# Patient Record
Sex: FEMALE | Race: WHITE | NOT HISPANIC OR LATINO | Employment: UNEMPLOYED | URBAN - NONMETROPOLITAN AREA
[De-identification: names, ages, dates, MRNs, and addresses within clinical notes are randomized per-mention and may not be internally consistent; named-entity substitution may affect disease eponyms.]

---

## 2017-01-01 ENCOUNTER — APPOINTMENT (OUTPATIENT)
Dept: CT IMAGING | Facility: HOSPITAL | Age: 72
End: 2017-01-01

## 2017-01-01 ENCOUNTER — APPOINTMENT (OUTPATIENT)
Dept: ULTRASOUND IMAGING | Facility: HOSPITAL | Age: 72
End: 2017-01-01

## 2017-01-01 ENCOUNTER — HOSPITAL ENCOUNTER (EMERGENCY)
Facility: HOSPITAL | Age: 72
Discharge: HOME | End: 2017-01-01
Attending: EMERGENCY MEDICINE | Admitting: EMERGENCY MEDICINE

## 2017-01-01 VITALS
OXYGEN SATURATION: 97 % | DIASTOLIC BLOOD PRESSURE: 74 MMHG | WEIGHT: 197 LBS | TEMPERATURE: 97.6 F | BODY MASS INDEX: 33.63 KG/M2 | HEIGHT: 64 IN | SYSTOLIC BLOOD PRESSURE: 123 MMHG | RESPIRATION RATE: 18 BRPM | HEART RATE: 62 BPM

## 2017-01-01 DIAGNOSIS — M79.7 FIBROMYALGIA: ICD-10-CM

## 2017-01-01 DIAGNOSIS — S39.012A LUMBAR STRAIN, INITIAL ENCOUNTER: Primary | ICD-10-CM

## 2017-01-01 DIAGNOSIS — N30.00 ACUTE CYSTITIS WITHOUT HEMATURIA: ICD-10-CM

## 2017-01-01 LAB
ALBUMIN SERPL-MCNC: 4 G/DL (ref 3.4–4.8)
ALBUMIN/GLOB SERPL: 1.1 G/DL (ref 1.5–2.5)
ALP SERPL-CCNC: 88 U/L (ref 46–116)
ALT SERPL W P-5'-P-CCNC: 18 U/L (ref 10–36)
AMYLASE SERPL-CCNC: 43 U/L (ref 28–100)
ANION GAP SERPL CALCULATED.3IONS-SCNC: 4.6 MMOL/L (ref 3.6–11.2)
AST SERPL-CCNC: 17 U/L (ref 10–30)
BACTERIA UR QL AUTO: ABNORMAL /HPF
BASOPHILS # BLD AUTO: 0.03 10*3/MM3 (ref 0–0.3)
BASOPHILS NFR BLD AUTO: 0.3 % (ref 0–2)
BILIRUB SERPL-MCNC: 0.2 MG/DL (ref 0.2–1.8)
BILIRUB UR QL STRIP: NEGATIVE
BUN BLD-MCNC: 20 MG/DL (ref 7–21)
BUN/CREAT SERPL: 22.5 (ref 7–25)
CALCIUM SPEC-SCNC: 9.8 MG/DL (ref 7.7–10)
CHLORIDE SERPL-SCNC: 112 MMOL/L (ref 99–112)
CLARITY UR: CLEAR
CO2 SERPL-SCNC: 25.4 MMOL/L (ref 24.3–31.9)
COLOR UR: YELLOW
CREAT BLD-MCNC: 0.89 MG/DL (ref 0.43–1.29)
DEPRECATED RDW RBC AUTO: 43.7 FL (ref 37–54)
EOSINOPHIL # BLD AUTO: 0.27 10*3/MM3 (ref 0–0.7)
EOSINOPHIL NFR BLD AUTO: 2.6 % (ref 0–7)
ERYTHROCYTE [DISTWIDTH] IN BLOOD BY AUTOMATED COUNT: 12.6 % (ref 11.5–14.5)
GFR SERPL CREATININE-BSD FRML MDRD: 62 ML/MIN/1.73
GLOBULIN UR ELPH-MCNC: 3.7 GM/DL
GLUCOSE BLD-MCNC: 98 MG/DL (ref 70–110)
GLUCOSE UR STRIP-MCNC: NEGATIVE MG/DL
HCT VFR BLD AUTO: 36.8 % (ref 37–47)
HGB BLD-MCNC: 11.9 G/DL (ref 12–16)
HGB UR QL STRIP.AUTO: NEGATIVE
HYALINE CASTS UR QL AUTO: ABNORMAL /LPF
IMM GRANULOCYTES # BLD: 0.02 10*3/MM3 (ref 0–0.03)
IMM GRANULOCYTES NFR BLD: 0.2 % (ref 0–0.5)
KETONES UR QL STRIP: NEGATIVE
LEUKOCYTE ESTERASE UR QL STRIP.AUTO: ABNORMAL
LIPASE SERPL-CCNC: 20 U/L (ref 13–60)
LYMPHOCYTES # BLD AUTO: 2.96 10*3/MM3 (ref 1–3)
LYMPHOCYTES NFR BLD AUTO: 28.6 % (ref 16–46)
MCH RBC QN AUTO: 31 PG (ref 27–33)
MCHC RBC AUTO-ENTMCNC: 32.3 G/DL (ref 33–37)
MCV RBC AUTO: 95.8 FL (ref 80–94)
MONOCYTES # BLD AUTO: 1.24 10*3/MM3 (ref 0.1–0.9)
MONOCYTES NFR BLD AUTO: 12 % (ref 0–12)
NEUTROPHILS # BLD AUTO: 5.82 10*3/MM3 (ref 1.4–6.5)
NEUTROPHILS NFR BLD AUTO: 56.3 % (ref 40–75)
NITRITE UR QL STRIP: NEGATIVE
OSMOLALITY SERPL CALC.SUM OF ELEC: 285.7 MOSM/KG (ref 273–305)
PH UR STRIP.AUTO: 7 [PH] (ref 5–8)
PLATELET # BLD AUTO: 255 10*3/MM3 (ref 130–400)
PMV BLD AUTO: 10.1 FL (ref 6–10)
POTASSIUM BLD-SCNC: 3.7 MMOL/L (ref 3.5–5.3)
PROT SERPL-MCNC: 7.7 G/DL (ref 6–8)
PROT UR QL STRIP: NEGATIVE
RBC # BLD AUTO: 3.84 10*6/MM3 (ref 4.2–5.4)
RBC # UR: ABNORMAL /HPF
REF LAB TEST METHOD: ABNORMAL
SODIUM BLD-SCNC: 142 MMOL/L (ref 135–153)
SP GR UR STRIP: 1.01 (ref 1–1.03)
SQUAMOUS #/AREA URNS HPF: ABNORMAL /HPF
UROBILINOGEN UR QL STRIP: ABNORMAL
WBC NRBC COR # BLD: 10.34 10*3/MM3 (ref 4.5–12.5)
WBC UR QL AUTO: ABNORMAL /HPF

## 2017-01-01 PROCEDURE — 87086 URINE CULTURE/COLONY COUNT: CPT | Performed by: EMERGENCY MEDICINE

## 2017-01-01 PROCEDURE — 80053 COMPREHEN METABOLIC PANEL: CPT | Performed by: EMERGENCY MEDICINE

## 2017-01-01 PROCEDURE — 25010000002 ONDANSETRON PER 1 MG: Performed by: EMERGENCY MEDICINE

## 2017-01-01 PROCEDURE — 96374 THER/PROPH/DIAG INJ IV PUSH: CPT

## 2017-01-01 PROCEDURE — 96375 TX/PRO/DX INJ NEW DRUG ADDON: CPT

## 2017-01-01 PROCEDURE — 74176 CT ABD & PELVIS W/O CONTRAST: CPT | Performed by: RADIOLOGY

## 2017-01-01 PROCEDURE — 99284 EMERGENCY DEPT VISIT MOD MDM: CPT

## 2017-01-01 PROCEDURE — 85025 COMPLETE CBC W/AUTO DIFF WBC: CPT | Performed by: EMERGENCY MEDICINE

## 2017-01-01 PROCEDURE — 83690 ASSAY OF LIPASE: CPT | Performed by: EMERGENCY MEDICINE

## 2017-01-01 PROCEDURE — 76705 ECHO EXAM OF ABDOMEN: CPT

## 2017-01-01 PROCEDURE — 81001 URINALYSIS AUTO W/SCOPE: CPT | Performed by: EMERGENCY MEDICINE

## 2017-01-01 PROCEDURE — 25010000002 MORPHINE PER 10 MG: Performed by: EMERGENCY MEDICINE

## 2017-01-01 PROCEDURE — 82150 ASSAY OF AMYLASE: CPT | Performed by: EMERGENCY MEDICINE

## 2017-01-01 PROCEDURE — 76705 ECHO EXAM OF ABDOMEN: CPT | Performed by: RADIOLOGY

## 2017-01-01 PROCEDURE — 74176 CT ABD & PELVIS W/O CONTRAST: CPT

## 2017-01-01 PROCEDURE — 25010000002 KETOROLAC TROMETHAMINE PER 15 MG: Performed by: EMERGENCY MEDICINE

## 2017-01-01 RX ORDER — GABAPENTIN 400 MG/1
400 CAPSULE ORAL 3 TIMES DAILY
Status: ON HOLD | COMMUNITY
End: 2019-11-07 | Stop reason: SDUPTHER

## 2017-01-01 RX ORDER — ACETAMINOPHEN AND CODEINE PHOSPHATE 300; 30 MG/1; MG/1
1 TABLET ORAL 2 TIMES DAILY PRN
Status: ON HOLD | COMMUNITY
End: 2017-12-22

## 2017-01-01 RX ORDER — NAPROXEN 375 MG/1
375 TABLET ORAL 2 TIMES DAILY PRN
Qty: 20 TABLET | Refills: 0 | Status: ON HOLD | OUTPATIENT
Start: 2017-01-01 | End: 2017-12-11

## 2017-01-01 RX ORDER — SULFAMETHOXAZOLE AND TRIMETHOPRIM 800; 160 MG/1; MG/1
1 TABLET ORAL ONCE
Status: COMPLETED | OUTPATIENT
Start: 2017-01-01 | End: 2017-01-01

## 2017-01-01 RX ORDER — OXYBUTYNIN CHLORIDE 5 MG/1
5 TABLET ORAL DAILY
Status: ON HOLD | COMMUNITY
End: 2017-12-11

## 2017-01-01 RX ORDER — KETOROLAC TROMETHAMINE 30 MG/ML
15 INJECTION, SOLUTION INTRAMUSCULAR; INTRAVENOUS ONCE
Status: COMPLETED | OUTPATIENT
Start: 2017-01-01 | End: 2017-01-01

## 2017-01-01 RX ORDER — LOSARTAN POTASSIUM 50 MG/1
100 TABLET ORAL DAILY
Status: ON HOLD | COMMUNITY
End: 2017-12-11

## 2017-01-01 RX ORDER — OMEPRAZOLE 20 MG/1
20 CAPSULE, DELAYED RELEASE ORAL
Status: ON HOLD | COMMUNITY
End: 2018-10-19

## 2017-01-01 RX ORDER — SULFAMETHOXAZOLE AND TRIMETHOPRIM 800; 160 MG/1; MG/1
1 TABLET ORAL 2 TIMES DAILY
Qty: 14 TABLET | Refills: 0 | Status: SHIPPED | OUTPATIENT
Start: 2017-01-01 | End: 2017-07-11

## 2017-01-01 RX ORDER — ASPIRIN 81 MG/1
81 TABLET, CHEWABLE ORAL DAILY
Status: ON HOLD | COMMUNITY
End: 2017-12-11

## 2017-01-01 RX ORDER — SODIUM CHLORIDE 0.9 % (FLUSH) 0.9 %
10 SYRINGE (ML) INJECTION AS NEEDED
Status: DISCONTINUED | OUTPATIENT
Start: 2017-01-01 | End: 2017-01-01 | Stop reason: HOSPADM

## 2017-01-01 RX ORDER — ONDANSETRON 2 MG/ML
4 INJECTION INTRAMUSCULAR; INTRAVENOUS ONCE
Status: COMPLETED | OUTPATIENT
Start: 2017-01-01 | End: 2017-01-01

## 2017-01-01 RX ORDER — TRAMADOL HYDROCHLORIDE 50 MG/1
50 TABLET ORAL 2 TIMES DAILY
Status: ON HOLD | COMMUNITY
End: 2017-12-11

## 2017-01-01 RX ORDER — RANITIDINE 150 MG/1
150 TABLET ORAL DAILY
Status: ON HOLD | COMMUNITY
End: 2017-12-11

## 2017-01-01 RX ORDER — SIMVASTATIN 20 MG
20 TABLET ORAL 2 TIMES WEEKLY
Status: ON HOLD | COMMUNITY
End: 2017-12-11

## 2017-01-01 RX ADMIN — ONDANSETRON 4 MG: 2 INJECTION, SOLUTION INTRAMUSCULAR; INTRAVENOUS at 11:12

## 2017-01-01 RX ADMIN — SULFAMETHOXAZOLE AND TRIMETHOPRIM 160 MG: 800; 160 TABLET ORAL at 15:30

## 2017-01-01 RX ADMIN — KETOROLAC TROMETHAMINE 15 MG: 30 INJECTION, SOLUTION INTRAMUSCULAR; INTRAVENOUS at 15:28

## 2017-01-01 RX ADMIN — MORPHINE SULFATE 4 MG: 4 INJECTION, SOLUTION INTRAMUSCULAR; INTRAVENOUS at 11:14

## 2017-01-01 NOTE — ED PROVIDER NOTES
Subjective   History of Present Illness  71-year-old white female presents with complaint of abdominal pain and right back pain.  She states that this started about 2 days ago with no known initiating factor.  She describes right lumbar pain which is worse with bending, making it difficult to clean herself after using the bathroom.  She also describes right lower quadrant abdominal pain.  She has nausea but denies vomiting or diarrhea.  She states that she has had some constipation.  There are no exacerbating or alleviating factors of this abdominal pain.  Review of Systems   All other systems reviewed and are negative.      Past Medical History   Diagnosis Date   • Arthritis    • Asthma    • Degenerative disc disease, lumbar    • Fibromyalgia    • Hypertension        No Known Allergies    Past Surgical History   Procedure Laterality Date   • Tubal abdominal ligation     • Appendectomy         Family History   Problem Relation Age of Onset   • Breast cancer Sister        Social History     Social History   • Marital status:      Spouse name: N/A   • Number of children: N/A   • Years of education: N/A     Social History Main Topics   • Smoking status: Former Smoker   • Smokeless tobacco: Not on file   • Alcohol use No   • Drug use: No   • Sexual activity: Defer     Other Topics Concern   • Not on file     Social History Narrative           Objective   Physical Exam   Constitutional: She is oriented to person, place, and time. She appears well-developed and well-nourished.   HENT:   Head: Normocephalic and atraumatic.   Mouth/Throat: Oropharynx is clear and moist.   Eyes: Conjunctivae are normal.   Neck: Normal range of motion. Neck supple.   Cardiovascular: Normal rate, regular rhythm and normal heart sounds.  Exam reveals no gallop and no friction rub.    No murmur heard.  Pulmonary/Chest: Effort normal and breath sounds normal. No respiratory distress. She has no wheezes. She has no rales.   Abdominal: Soft.  Bowel sounds are normal. She exhibits no distension. There is tenderness (diffusely tender, more so in the right lower quadrant.). There is no rebound and no guarding.   Musculoskeletal: She exhibits tenderness (Right lumbar tenderness with just touching the skin.). She exhibits no edema or deformity.   Neurological: She is alert and oriented to person, place, and time.   Skin: Skin is warm and dry.   Psychiatric: She has a normal mood and affect.   Nursing note and vitals reviewed.      Procedures   Results for orders placed or performed during the hospital encounter of 01/01/17   Comprehensive Metabolic Panel   Result Value Ref Range    Glucose 98 70 - 110 mg/dL    BUN 20 7 - 21 mg/dL    Creatinine 0.89 0.43 - 1.29 mg/dL    Sodium 142 135 - 153 mmol/L    Potassium 3.7 3.5 - 5.3 mmol/L    Chloride 112 99 - 112 mmol/L    CO2 25.4 24.3 - 31.9 mmol/L    Calcium 9.8 7.7 - 10.0 mg/dL    Total Protein 7.7 6.0 - 8.0 g/dL    Albumin 4.00 3.40 - 4.80 g/dL    ALT (SGPT) 18 10 - 36 U/L    AST (SGOT) 17 10 - 30 U/L    Alkaline Phosphatase 88 46 - 116 U/L    Total Bilirubin 0.2 0.2 - 1.8 mg/dL    eGFR Non African Amer 62 >60 mL/min/1.73    Globulin 3.7 gm/dL    A/G Ratio 1.1 (L) 1.5 - 2.5 g/dL    BUN/Creatinine Ratio 22.5 7.0 - 25.0    Anion Gap 4.6 3.6 - 11.2 mmol/L   Amylase   Result Value Ref Range    Amylase 43 28 - 100 U/L   Lipase   Result Value Ref Range    Lipase 20 13 - 60 U/L   Urinalysis With / Culture If Indicated   Result Value Ref Range    Color, UA Yellow Yellow, Straw    Appearance, UA Clear Clear    pH, UA 7.0 5.0 - 8.0    Specific Gravity, UA 1.007 1.005 - 1.030    Glucose, UA Negative Negative    Ketones, UA Negative Negative    Bilirubin, UA Negative Negative    Blood, UA Negative Negative    Protein, UA Negative Negative    Leuk Esterase, UA Moderate (2+) (A) Negative    Nitrite, UA Negative Negative    Urobilinogen, UA 0.2 E.U./dL 0.2 - 1.0 E.U./dL   CBC Auto Differential   Result Value Ref Range    WBC  10.34 4.50 - 12.50 10*3/mm3    RBC 3.84 (L) 4.20 - 5.40 10*6/mm3    Hemoglobin 11.9 (L) 12.0 - 16.0 g/dL    Hematocrit 36.8 (L) 37.0 - 47.0 %    MCV 95.8 (H) 80.0 - 94.0 fL    MCH 31.0 27.0 - 33.0 pg    MCHC 32.3 (L) 33.0 - 37.0 g/dL    RDW 12.6 11.5 - 14.5 %    RDW-SD 43.7 37.0 - 54.0 fl    MPV 10.1 (H) 6.0 - 10.0 fL    Platelets 255 130 - 400 10*3/mm3    Neutrophil % 56.3 40.0 - 75.0 %    Lymphocyte % 28.6 16.0 - 46.0 %    Monocyte % 12.0 0.0 - 12.0 %    Eosinophil % 2.6 0.0 - 7.0 %    Basophil % 0.3 0.0 - 2.0 %    Immature Grans % 0.2 0.0 - 0.5 %    Neutrophils, Absolute 5.82 1.40 - 6.50 10*3/mm3    Lymphocytes, Absolute 2.96 1.00 - 3.00 10*3/mm3    Monocytes, Absolute 1.24 (H) 0.10 - 0.90 10*3/mm3    Eosinophils, Absolute 0.27 0.00 - 0.70 10*3/mm3    Basophils, Absolute 0.03 0.00 - 0.30 10*3/mm3    Immature Grans, Absolute 0.02 0.00 - 0.03 10*3/mm3   Osmolality, Calculated   Result Value Ref Range    Osmolality Calc 285.7 273.0 - 305.0 mOsm/kg   Urinalysis, Microscopic Only   Result Value Ref Range    RBC, UA 0-2 (A) None Seen /HPF    WBC, UA 6-12 (A) None Seen /HPF    Bacteria, UA 1+ (A) None Seen /HPF    Squamous Epithelial Cells, UA 3-6 (A) None Seen, 0-2 /HPF    Hyaline Casts, UA None Seen None Seen /LPF    Methodology Automated Microscopy             ED Course  ED Course                  MDM  Number of Diagnoses or Management Options  Acute cystitis without hematuria:   Fibromyalgia:   Lumbar strain, initial encounter:      Amount and/or Complexity of Data Reviewed  Clinical lab tests: reviewed and ordered  Tests in the radiology section of CPT®: reviewed and ordered    Risk of Complications, Morbidity, and/or Mortality  Presenting problems: high  Diagnostic procedures: high  Management options: high        Final diagnoses:   Lumbar strain, initial encounter   Acute cystitis without hematuria   Fibromyalgia            Galo Rodgers MD  01/01/17 1524

## 2017-01-01 NOTE — ED NOTES
Pt reports worsening pain allover, requests additional pain medication, Dr. Rodgers notified.     Xuan Gill RN  01/01/17 4228

## 2017-01-03 LAB — BACTERIA SPEC AEROBE CULT: NORMAL

## 2017-01-17 ENCOUNTER — HOSPITAL ENCOUNTER (EMERGENCY)
Facility: HOSPITAL | Age: 72
Discharge: HOME | End: 2017-01-18
Attending: EMERGENCY MEDICINE | Admitting: EMERGENCY MEDICINE

## 2017-01-17 DIAGNOSIS — R39.9 UTI SYMPTOMS: ICD-10-CM

## 2017-01-17 DIAGNOSIS — S09.90XA HEAD INJURY, INITIAL ENCOUNTER: Primary | ICD-10-CM

## 2017-01-17 PROCEDURE — 93010 ELECTROCARDIOGRAM REPORT: CPT | Performed by: INTERNAL MEDICINE

## 2017-01-17 PROCEDURE — 93005 ELECTROCARDIOGRAM TRACING: CPT | Performed by: EMERGENCY MEDICINE

## 2017-01-17 PROCEDURE — 99283 EMERGENCY DEPT VISIT LOW MDM: CPT

## 2017-01-18 ENCOUNTER — APPOINTMENT (OUTPATIENT)
Dept: CT IMAGING | Facility: HOSPITAL | Age: 72
End: 2017-01-18

## 2017-01-18 ENCOUNTER — APPOINTMENT (OUTPATIENT)
Dept: GENERAL RADIOLOGY | Facility: HOSPITAL | Age: 72
End: 2017-01-18

## 2017-01-18 VITALS
HEIGHT: 64 IN | RESPIRATION RATE: 20 BRPM | HEART RATE: 78 BPM | OXYGEN SATURATION: 98 % | BODY MASS INDEX: 34.15 KG/M2 | WEIGHT: 200 LBS | TEMPERATURE: 98.9 F | DIASTOLIC BLOOD PRESSURE: 78 MMHG | SYSTOLIC BLOOD PRESSURE: 130 MMHG

## 2017-01-18 PROCEDURE — 70450 CT HEAD/BRAIN W/O DYE: CPT | Performed by: RADIOLOGY

## 2017-01-18 PROCEDURE — 73120 X-RAY EXAM OF HAND: CPT | Performed by: RADIOLOGY

## 2017-01-18 PROCEDURE — 73120 X-RAY EXAM OF HAND: CPT

## 2017-01-18 PROCEDURE — 70160 X-RAY EXAM OF NASAL BONES: CPT | Performed by: RADIOLOGY

## 2017-01-18 PROCEDURE — 70450 CT HEAD/BRAIN W/O DYE: CPT

## 2017-01-18 PROCEDURE — 70160 X-RAY EXAM OF NASAL BONES: CPT

## 2017-01-18 RX ORDER — SULFAMETHOXAZOLE AND TRIMETHOPRIM 800; 160 MG/1; MG/1
1 TABLET ORAL 2 TIMES DAILY
Qty: 14 TABLET | Refills: 0 | Status: SHIPPED | OUTPATIENT
Start: 2017-01-18 | End: 2017-01-25

## 2017-01-18 NOTE — ED PROVIDER NOTES
Subjective   Patient is a 71 y.o. female presenting with fall.   History provided by:  Patient  Fall   Mechanism of injury: fall    Injury location:  Head/neck, hand and face  Hand injury location:  R hand  Fall:     Fall occurred:  Standing    Point of impact:  Head and face  Suspicion of alcohol use: no    Suspicion of drug use: no    Associated symptoms: no abdominal pain and no chest pain        Review of Systems   Constitutional: Negative.  Negative for fever.   HENT: Negative.         Pain nose   Respiratory: Negative.    Cardiovascular: Negative.  Negative for chest pain.   Gastrointestinal: Negative.  Negative for abdominal pain.   Endocrine: Negative.    Genitourinary: Negative.  Negative for dysuria.   Musculoskeletal:        Pain right hand   Skin: Negative.    Neurological: Negative.    Psychiatric/Behavioral: Negative.    All other systems reviewed and are negative.      Past Medical History   Diagnosis Date   • Arthritis    • Asthma    • Degenerative disc disease, lumbar    • Fibromyalgia    • Hypertension        No Known Allergies    Past Surgical History   Procedure Laterality Date   • Tubal abdominal ligation     • Appendectomy         Family History   Problem Relation Age of Onset   • Breast cancer Sister        Social History     Social History   • Marital status:      Spouse name: N/A   • Number of children: N/A   • Years of education: N/A     Social History Main Topics   • Smoking status: Former Smoker   • Smokeless tobacco: None   • Alcohol use No   • Drug use: No   • Sexual activity: Defer     Other Topics Concern   • None     Social History Narrative           Objective   Physical Exam   Constitutional: She is oriented to person, place, and time. She appears well-developed and well-nourished. No distress.   HENT:   Head: Normocephalic and atraumatic.   Right Ear: External ear normal.   Left Ear: External ear normal.   Nose: Nose normal.   Abrasion bridge of nose   Eyes: Conjunctivae  and EOM are normal. Pupils are equal, round, and reactive to light.   Neck: Normal range of motion. Neck supple. No JVD present. No tracheal deviation present.   Cardiovascular: Normal rate, regular rhythm and normal heart sounds.    No murmur heard.  Pulmonary/Chest: Effort normal and breath sounds normal. No respiratory distress. She has no wheezes.   Abdominal: Soft. Bowel sounds are normal. There is no tenderness.   Musculoskeletal: Normal range of motion. She exhibits no edema or deformity.   temder right hand   Neurological: She is alert and oriented to person, place, and time. No cranial nerve deficit.   Skin: Skin is warm and dry. No rash noted. She is not diaphoretic. No erythema. No pallor.   Psychiatric: She has a normal mood and affect. Her behavior is normal. Thought content normal.   Nursing note and vitals reviewed.      Procedures         ED Course  ED Course                  MDM  Number of Diagnoses or Management Options     Amount and/or Complexity of Data Reviewed  Clinical lab tests: ordered and reviewed  Tests in the radiology section of CPT®: ordered and reviewed    Risk of Complications, Morbidity, and/or Mortality  Presenting problems: moderate  Diagnostic procedures: moderate  Management options: moderate    Patient Progress  Patient progress: stable      Final diagnoses:   Head injury, initial encounter   UTI symptoms            Gabo Lovett MD  01/19/17 8429

## 2017-02-17 ENCOUNTER — TRANSCRIBE ORDERS (OUTPATIENT)
Dept: ADMINISTRATIVE | Facility: HOSPITAL | Age: 72
End: 2017-02-17

## 2017-02-17 DIAGNOSIS — N63.0 BREAST MASS: Primary | ICD-10-CM

## 2017-02-22 ENCOUNTER — APPOINTMENT (OUTPATIENT)
Dept: MAMMOGRAPHY | Facility: HOSPITAL | Age: 72
End: 2017-02-22

## 2017-03-02 ENCOUNTER — HOSPITAL ENCOUNTER (OUTPATIENT)
Dept: MAMMOGRAPHY | Facility: HOSPITAL | Age: 72
Discharge: HOME | End: 2017-03-02
Admitting: NURSE PRACTITIONER

## 2017-03-02 ENCOUNTER — HOSPITAL ENCOUNTER (OUTPATIENT)
Dept: ULTRASOUND IMAGING | Facility: HOSPITAL | Age: 72
Discharge: HOME | End: 2017-03-02

## 2017-03-02 DIAGNOSIS — N63.20 LUMP OF LEFT BREAST: ICD-10-CM

## 2017-03-02 DIAGNOSIS — N63.0 BREAST MASS: ICD-10-CM

## 2017-03-02 PROCEDURE — G0204 DX MAMMO INCL CAD BI: HCPCS | Performed by: RADIOLOGY

## 2017-03-02 PROCEDURE — G0279 TOMOSYNTHESIS, MAMMO: HCPCS | Performed by: RADIOLOGY

## 2017-03-02 PROCEDURE — G0279 TOMOSYNTHESIS, MAMMO: HCPCS

## 2017-03-02 PROCEDURE — G0204 DX MAMMO INCL CAD BI: HCPCS

## 2017-03-02 PROCEDURE — 76642 ULTRASOUND BREAST LIMITED: CPT

## 2017-03-02 PROCEDURE — 76642 ULTRASOUND BREAST LIMITED: CPT | Performed by: RADIOLOGY

## 2017-03-12 ENCOUNTER — APPOINTMENT (OUTPATIENT)
Dept: GENERAL RADIOLOGY | Facility: HOSPITAL | Age: 72
End: 2017-03-12

## 2017-03-12 ENCOUNTER — HOSPITAL ENCOUNTER (EMERGENCY)
Facility: HOSPITAL | Age: 72
Discharge: HOME | End: 2017-03-12
Attending: EMERGENCY MEDICINE | Admitting: EMERGENCY MEDICINE

## 2017-03-12 ENCOUNTER — APPOINTMENT (OUTPATIENT)
Dept: CT IMAGING | Facility: HOSPITAL | Age: 72
End: 2017-03-12

## 2017-03-12 VITALS
BODY MASS INDEX: 33.46 KG/M2 | HEIGHT: 64 IN | WEIGHT: 196 LBS | DIASTOLIC BLOOD PRESSURE: 58 MMHG | RESPIRATION RATE: 18 BRPM | OXYGEN SATURATION: 99 % | HEART RATE: 79 BPM | TEMPERATURE: 97.9 F | SYSTOLIC BLOOD PRESSURE: 116 MMHG

## 2017-03-12 DIAGNOSIS — S80.01XA CONTUSION OF RIGHT KNEE, INITIAL ENCOUNTER: ICD-10-CM

## 2017-03-12 DIAGNOSIS — S01.81XA FOREHEAD LACERATION, INITIAL ENCOUNTER: ICD-10-CM
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
## 2017-03-12 LAB
ALBUMIN SERPL-MCNC: 3.6 G/DL (ref 3.4–4.8)
ALBUMIN/GLOB SERPL: 1 G/DL (ref 1.5–2.5)
ALP SERPL-CCNC: 81 U/L (ref 35–104)
ALT SERPL W P-5'-P-CCNC: 15 U/L (ref 10–36)
ANION GAP SERPL CALCULATED.3IONS-SCNC: 5.1 MMOL/L (ref 3.6–11.2)
AST SERPL-CCNC: 16 U/L (ref 10–30)
BACTERIA UR QL AUTO: ABNORMAL /HPF
BASOPHILS # BLD AUTO: 0.02 10*3/MM3 (ref 0–0.3)
BASOPHILS NFR BLD AUTO: 0.2 % (ref 0–2)
BILIRUB SERPL-MCNC: 0.1 MG/DL (ref 0.2–1.8)
BILIRUB UR QL STRIP: NEGATIVE
BUN BLD-MCNC: 24 MG/DL (ref 7–21)
BUN/CREAT SERPL: 24.7 (ref 7–25)
CALCIUM SPEC-SCNC: 9.2 MG/DL (ref 7.7–10)
CHLORIDE SERPL-SCNC: 111 MMOL/L (ref 99–112)
CLARITY UR: CLEAR
CO2 SERPL-SCNC: 22.9 MMOL/L (ref 24.3–31.9)
COLOR UR: YELLOW
CREAT BLD-MCNC: 0.97 MG/DL (ref 0.43–1.29)
DEPRECATED RDW RBC AUTO: 44.2 FL (ref 37–54)
EOSINOPHIL # BLD AUTO: 0.21 10*3/MM3 (ref 0–0.7)
EOSINOPHIL NFR BLD AUTO: 2.2 % (ref 0–7)
ERYTHROCYTE [DISTWIDTH] IN BLOOD BY AUTOMATED COUNT: 12.7 % (ref 11.5–14.5)
GFR SERPL CREATININE-BSD FRML MDRD: 56 ML/MIN/1.73
GLOBULIN UR ELPH-MCNC: 3.5 GM/DL
GLUCOSE BLD-MCNC: 98 MG/DL (ref 70–110)
GLUCOSE UR STRIP-MCNC: NEGATIVE MG/DL
HCT VFR BLD AUTO: 33.7 % (ref 37–47)
HGB BLD-MCNC: 11 G/DL (ref 12–16)
HGB UR QL STRIP.AUTO: NEGATIVE
IMM GRANULOCYTES # BLD: 0.02 10*3/MM3 (ref 0–0.03)
IMM GRANULOCYTES NFR BLD: 0.2 % (ref 0–0.5)
KETONES UR QL STRIP: NEGATIVE
LEUKOCYTE ESTERASE UR QL STRIP.AUTO: ABNORMAL
LYMPHOCYTES # BLD AUTO: 2.44 10*3/MM3 (ref 1–3)
LYMPHOCYTES NFR BLD AUTO: 25.2 % (ref 16–46)
MCH RBC QN AUTO: 31.2 PG (ref 27–33)
MCHC RBC AUTO-ENTMCNC: 32.6 G/DL (ref 33–37)
MCV RBC AUTO: 95.5 FL (ref 80–94)
MONOCYTES # BLD AUTO: 1.31 10*3/MM3 (ref 0.1–0.9)
MONOCYTES NFR BLD AUTO: 13.5 % (ref 0–12)
NEUTROPHILS # BLD AUTO: 5.68 10*3/MM3 (ref 1.4–6.5)
NEUTROPHILS NFR BLD AUTO: 58.7 % (ref 40–75)
NITRITE UR QL STRIP: NEGATIVE
OSMOLALITY SERPL CALC.SUM OF ELEC: 281.6 MOSM/KG (ref 273–305)
PH UR STRIP.AUTO: 6.5 [PH] (ref 5–8)
PLATELET # BLD AUTO: 197 10*3/MM3 (ref 130–400)
PMV BLD AUTO: 10.3 FL (ref 6–10)
POTASSIUM BLD-SCNC: 3.7 MMOL/L (ref 3.5–5.3)
PROT SERPL-MCNC: 7.1 G/DL (ref 6–8)
PROT UR QL STRIP: NEGATIVE
RBC # BLD AUTO: 3.53 10*6/MM3 (ref 4.2–5.4)
RBC # UR: ABNORMAL /HPF
REF LAB TEST METHOD: ABNORMAL
SODIUM BLD-SCNC: 139 MMOL/L (ref 135–153)
SP GR UR STRIP: 1.01 (ref 1–1.03)
SQUAMOUS #/AREA URNS HPF: ABNORMAL /HPF
TROPONIN I SERPL-MCNC: <0.006 NG/ML
UROBILINOGEN UR QL STRIP: ABNORMAL
WBC NRBC COR # BLD: 9.68 10*3/MM3 (ref 4.5–12.5)
WBC UR QL AUTO: ABNORMAL /HPF

## 2017-03-12 PROCEDURE — 90471 IMMUNIZATION ADMIN: CPT | Performed by: PHYSICIAN ASSISTANT

## 2017-03-12 PROCEDURE — 36415 COLL VENOUS BLD VENIPUNCTURE: CPT

## 2017-03-12 PROCEDURE — 73564 X-RAY EXAM KNEE 4 OR MORE: CPT

## 2017-03-12 PROCEDURE — 93005 ELECTROCARDIOGRAM TRACING: CPT | Performed by: PHYSICIAN ASSISTANT

## 2017-03-12 PROCEDURE — 73562 X-RAY EXAM OF KNEE 3: CPT | Performed by: RADIOLOGY

## 2017-03-12 PROCEDURE — 70450 CT HEAD/BRAIN W/O DYE: CPT

## 2017-03-12 PROCEDURE — 73502 X-RAY EXAM HIP UNI 2-3 VIEWS: CPT

## 2017-03-12 PROCEDURE — 84484 ASSAY OF TROPONIN QUANT: CPT | Performed by: PHYSICIAN ASSISTANT

## 2017-03-12 PROCEDURE — 87086 URINE CULTURE/COLONY COUNT: CPT | Performed by: PHYSICIAN ASSISTANT

## 2017-03-12 PROCEDURE — 25010000002 TDAP 5-2.5-18.5 LF-MCG/0.5 SUSPENSION: Performed by: PHYSICIAN ASSISTANT

## 2017-03-12 PROCEDURE — 90715 TDAP VACCINE 7 YRS/> IM: CPT | Performed by: PHYSICIAN ASSISTANT

## 2017-03-12 PROCEDURE — 85025 COMPLETE CBC W/AUTO DIFF WBC: CPT | Performed by: PHYSICIAN ASSISTANT

## 2017-03-12 PROCEDURE — 99284 EMERGENCY DEPT VISIT MOD MDM: CPT

## 2017-03-12 PROCEDURE — 80053 COMPREHEN METABOLIC PANEL: CPT | Performed by: PHYSICIAN ASSISTANT

## 2017-03-12 PROCEDURE — 73502 X-RAY EXAM HIP UNI 2-3 VIEWS: CPT | Performed by: RADIOLOGY

## 2017-03-12 PROCEDURE — 81001 URINALYSIS AUTO W/SCOPE: CPT | Performed by: PHYSICIAN ASSISTANT

## 2017-03-12 PROCEDURE — 70450 CT HEAD/BRAIN W/O DYE: CPT | Performed by: RADIOLOGY

## 2017-03-12 RX ORDER — LIDOCAINE HYDROCHLORIDE 10 MG/ML
10 INJECTION, SOLUTION EPIDURAL; INFILTRATION; INTRACAUDAL; PERINEURAL ONCE
Status: COMPLETED | OUTPATIENT
Start: 2017-03-12 | End: 2017-03-12

## 2017-03-12 RX ORDER — ACETAMINOPHEN AND CODEINE PHOSPHATE 300; 30 MG/1; MG/1
1 TABLET ORAL ONCE
Status: COMPLETED | OUTPATIENT
Start: 2017-03-12 | End: 2017-03-12

## 2017-03-12 RX ADMIN — LIDOCAINE HYDROCHLORIDE 10 ML: 10 INJECTION, SOLUTION EPIDURAL; INFILTRATION; INTRACAUDAL; PERINEURAL at 08:23

## 2017-03-12 RX ADMIN — TETANUS TOXOID, REDUCED DIPHTHERIA TOXOID AND ACELLULAR PERTUSSIS VACCINE, ADSORBED 0.5 ML: 5; 2.5; 8; 8; 2.5 SUSPENSION INTRAMUSCULAR at 09:39

## 2017-03-12 RX ADMIN — ACETAMINOPHEN AND CODEINE PHOSPHATE 1 TABLET: 30; 300 TABLET ORAL at 07:31

## 2017-03-12 NOTE — ED PROVIDER NOTES
Subjective   HPI Comments: 71-year-old female who states she fell this morning.  She was getting up to get her water and got dizzy and fell into a door.  She states she hit her forehead on the door handle.  She denies any syncope or loss of consciousness.  She states she stays dizzy all the time.  She is also complaining of right knee pain since her fall.  Patient denies any new chest pain or abdominal pain.  She states she chronically has chest pain and abdominal pain but no new symptoms.  She denies any nausea or vomiting.  She denies any change in her vision.    Patient is a 71 y.o. female presenting with fall.   History provided by:  Patient  Fall   Mechanism of injury: fall    Injury location:  Head/neck and leg  Head/neck injury location:  Head  Leg injury location:  R knee  Incident location:  Home  Time since incident:  1 hour  Arrived directly from scene: yes    Fall:     Fall occurred:  Walking    Impact surface: door handle.    Point of impact:  Head    Entrapped after fall: no    Suspicion of alcohol use: no    Suspicion of drug use: no    Tetanus status:  Unknown  Prior to arrival data:     Blood loss:  Minimal    Responsiveness at scene:  Alert    Orientation at scene:  Person, place, situation and time    Loss of consciousness: no      Amnesic to event: no      Airway interventions:  None    Breathing interventions:  None    IV access status:  None    Cardiac interventions:  None    Medications administered:  None    Immobilization:  None  Associated symptoms: no abdominal pain, no back pain, no blindness, no chest pain, no difficulty breathing, no headaches, no hearing loss, no loss of consciousness, no nausea, no neck pain, no seizures and no vomiting        Review of Systems   HENT: Negative for hearing loss.    Eyes: Negative.  Negative for blindness.   Respiratory: Negative.    Cardiovascular: Negative for chest pain.   Gastrointestinal: Negative for abdominal pain, nausea and vomiting.    Genitourinary: Negative.    Musculoskeletal: Positive for arthralgias. Negative for back pain and neck pain.   Skin: Positive for wound.   Neurological: Positive for dizziness. Negative for seizures, loss of consciousness and headaches.   Psychiatric/Behavioral: Negative.    All other systems reviewed and are negative.      Past Medical History   Diagnosis Date   • Arthritis    • Asthma    • Degenerative disc disease, lumbar    • Fibromyalgia    • Hypertension        No Known Allergies    Past Surgical History   Procedure Laterality Date   • Tubal abdominal ligation     • Appendectomy         Family History   Problem Relation Age of Onset   • Breast cancer Sister        Social History     Social History   • Marital status:      Spouse name: N/A   • Number of children: N/A   • Years of education: N/A     Social History Main Topics   • Smoking status: Former Smoker   • Smokeless tobacco: None   • Alcohol use No   • Drug use: No   • Sexual activity: Defer     Other Topics Concern   • None     Social History Narrative   • None           Objective   Physical Exam   Constitutional: She is oriented to person, place, and time. She appears well-developed and well-nourished. No distress.   HENT:   Head: Normocephalic.   Right Ear: External ear normal.   Left Ear: External ear normal.   Nose: Nose normal.   Mouth/Throat: Oropharynx is clear and moist.   Laceration to the forehead   Eyes: Conjunctivae and EOM are normal. Pupils are equal, round, and reactive to light.   Neck: Normal range of motion. Neck supple.   Cardiovascular: Normal rate, regular rhythm, normal heart sounds and intact distal pulses.    Pulmonary/Chest: Effort normal and breath sounds normal. No respiratory distress.   Abdominal: Soft. Bowel sounds are normal. She exhibits no distension. There is no tenderness. There is no rebound and no guarding.   Musculoskeletal: Normal range of motion. She exhibits tenderness (right knee). She exhibits no edema  or deformity.   Neurological: She is alert and oriented to person, place, and time. No cranial nerve deficit. She exhibits normal muscle tone. Coordination normal.   Skin: Skin is warm and dry. She is not diaphoretic.   Psychiatric: She has a normal mood and affect. Her behavior is normal. Judgment and thought content normal.   Nursing note and vitals reviewed.      Laceration Repair  Date/Time: 3/12/2017 9:01 AM  Performed by: CHIQUI ROBBINS  Authorized by: SHANTAL VEGA   Body area: head/neck  Location details: forehead  Laceration length: 6 cm  Foreign bodies: no foreign bodies  Tendon involvement: none  Nerve involvement: none  Vascular damage: no  Anesthesia: local infiltration    Anesthesia:  Anesthesia: local infiltration  Local Anesthetic: lidocaine 1% without epinephrine   Anesthetic total: 10 mL  Sedation:  Patient sedated: no    Preparation: Patient was prepped and draped in the usual sterile fashion.  Amount of cleaning: standard  Debridement: none  Degree of undermining: none  Skin closure: 5-0 nylon and Ethilon  Number of sutures: 10  Technique: simple  Approximation: close  Approximation difficulty: simple  Patient tolerance: Patient tolerated the procedure well with no immediate complications               ED Course  ED Course   Value Comment By Time   ECG 12 Lead 7:45 - sinus rhythm, rate of 66, no acute ischemia per RYAN Barker 03/12 8877    Pt's laceration has been repaired, she tolerated it well.  She is currently drinking a Pepsi, no acute distress.  Awaiting UA results. RYAN Gill 03/12 0120                  MDM  Number of Diagnoses or Management Options  Contusion of right knee, initial encounter:   Fall at home, initial encounter:   Forehead laceration, initial encounter:      Amount and/or Complexity of Data Reviewed  Clinical lab tests: reviewed and ordered  Tests in the radiology section of CPT®: ordered and reviewed  Independent visualization of images,  tracings, or specimens: yes    Patient Progress  Patient progress: improved      Final diagnoses:   Fall at home, initial encounter   Forehead laceration, initial encounter   Contusion of right knee, initial encounter            RYAN Gill  03/12/17 0923

## 2017-03-12 NOTE — ED NOTES
Ekg performed by tech and shown to Dr. Souza at 0745 on 03/12/2017.     Fartun Villa  03/12/17 0748

## 2017-03-12 NOTE — DISCHARGE INSTRUCTIONS
Have sutures removed in 1 week.  Keep the wound clean and dry, apply neosporin (antibiotic ointment) twice a day.    Hypertension  Hypertension, commonly called high blood pressure, is when the force of blood pumping through your arteries is too strong. Your arteries are the blood vessels that carry blood from your heart throughout your body. A blood pressure reading consists of a higher number over a lower number, such as 110/72. The higher number (systolic) is the pressure inside your arteries when your heart pumps. The lower number (diastolic) is the pressure inside your arteries when your heart relaxes. Ideally you want your blood pressure below 120/80.  Hypertension forces your heart to work harder to pump blood. Your arteries may become narrow or stiff. Having untreated or uncontrolled hypertension can cause heart attack, stroke, kidney disease, and other problems.  RISK FACTORS  Some risk factors for high blood pressure are controllable. Others are not.   Risk factors you cannot control include:   · Race. You may be at higher risk if you are .  · Age. Risk increases with age.  · Gender. Men are at higher risk than women before age 45 years. After age 65, women are at higher risk than men.  Risk factors you can control include:  · Not getting enough exercise or physical activity.  · Being overweight.  · Getting too much fat, sugar, calories, or salt in your diet.  · Drinking too much alcohol.  SIGNS AND SYMPTOMS  Hypertension does not usually cause signs or symptoms. Extremely high blood pressure (hypertensive crisis) may cause headache, anxiety, shortness of breath, and nosebleed.  DIAGNOSIS  To check if you have hypertension, your health care provider will measure your blood pressure while you are seated, with your arm held at the level of your heart. It should be measured at least twice using the same arm. Certain conditions can cause a difference in blood pressure between your right and  left arms. A blood pressure reading that is higher than normal on one occasion does not mean that you need treatment. If it is not clear whether you have high blood pressure, you may be asked to return on a different day to have your blood pressure checked again. Or, you may be asked to monitor your blood pressure at home for 1 or more weeks.  TREATMENT  Treating high blood pressure includes making lifestyle changes and possibly taking medicine. Living a healthy lifestyle can help lower high blood pressure. You may need to change some of your habits.  Lifestyle changes may include:  · Following the DASH diet. This diet is high in fruits, vegetables, and whole grains. It is low in salt, red meat, and added sugars.  · Keep your sodium intake below 2,300 mg per day.  · Getting at least 30-45 minutes of aerobic exercise at least 4 times per week.  · Losing weight if necessary.  · Not smoking.  · Limiting alcoholic beverages.  · Learning ways to reduce stress.  Your health care provider may prescribe medicine if lifestyle changes are not enough to get your blood pressure under control, and if one of the following is true:  · You are 18-59 years of age and your systolic blood pressure is above 140.  · You are 60 years of age or older, and your systolic blood pressure is above 150.  · Your diastolic blood pressure is above 90.  · You have diabetes, and your systolic blood pressure is over 140 or your diastolic blood pressure is over 90.  · You have kidney disease and your blood pressure is above 140/90.  · You have heart disease and your blood pressure is above 140/90.  Your personal target blood pressure may vary depending on your medical conditions, your age, and other factors.  HOME CARE INSTRUCTIONS  · Have your blood pressure rechecked as directed by your health care provider.    · Take medicines only as directed by your health care provider. Follow the directions carefully. Blood pressure medicines must be taken as  prescribed. The medicine does not work as well when you skip doses. Skipping doses also puts you at risk for problems.  · Do not smoke.    · Monitor your blood pressure at home as directed by your health care provider.   SEEK MEDICAL CARE IF:   · You think you are having a reaction to medicines taken.  · You have recurrent headaches or feel dizzy.  · You have swelling in your ankles.  · You have trouble with your vision.  SEEK IMMEDIATE MEDICAL CARE IF:  · You develop a severe headache or confusion.  · You have unusual weakness, numbness, or feel faint.  · You have severe chest or abdominal pain.  · You vomit repeatedly.  · You have trouble breathing.  MAKE SURE YOU:   · Understand these instructions.  · Will watch your condition.  · Will get help right away if you are not doing well or get worse.     This information is not intended to replace advice given to you by your health care provider. Make sure you discuss any questions you have with your health care provider.     Document Released: 12/18/2006 Document Revised: 05/03/2016 Document Reviewed: 10/10/2014  [employer] Interactive Patient Education ©2016 [employer]

## 2017-03-14 LAB — BACTERIA SPEC AEROBE CULT: NORMAL

## 2017-04-03 ENCOUNTER — TRANSCRIBE ORDERS (OUTPATIENT)
Dept: ADMINISTRATIVE | Facility: HOSPITAL | Age: 72
End: 2017-04-03

## 2017-04-03 DIAGNOSIS — E11.42 TYPE 2 DIABETES, CONTROLLED, WITH PERIPHERAL NEUROPATHY (HCC): ICD-10-CM

## 2017-04-03 DIAGNOSIS — E55.9 VITAMIN D DEFICIENCY: Primary | ICD-10-CM

## 2017-04-03 DIAGNOSIS — E78.5 DYSLIPIDEMIA: ICD-10-CM

## 2017-04-03 DIAGNOSIS — D51.8 VITAMIN B12 DEFICIENCY (DIETARY) ANEMIA: ICD-10-CM

## 2017-04-03 DIAGNOSIS — I10 ESSENTIAL HYPERTENSION: ICD-10-CM

## 2017-04-03 DIAGNOSIS — R53.82 CHRONIC FATIGUE, UNSPECIFIED: ICD-10-CM

## 2017-04-04 ENCOUNTER — LAB (OUTPATIENT)
Dept: LAB | Facility: HOSPITAL | Age: 72
End: 2017-04-04
Attending: INTERNAL MEDICINE

## 2017-04-04 DIAGNOSIS — I10 ESSENTIAL HYPERTENSION: ICD-10-CM

## 2017-04-04 DIAGNOSIS — R53.82 CHRONIC FATIGUE, UNSPECIFIED: ICD-10-CM

## 2017-04-04 DIAGNOSIS — E11.42 TYPE 2 DIABETES, CONTROLLED, WITH PERIPHERAL NEUROPATHY (HCC): ICD-10-CM

## 2017-04-04 DIAGNOSIS — D51.8 VITAMIN B12 DEFICIENCY (DIETARY) ANEMIA: ICD-10-CM

## 2017-04-04 DIAGNOSIS — E55.9 VITAMIN D DEFICIENCY: ICD-10-CM

## 2017-04-04 DIAGNOSIS — E78.5 DYSLIPIDEMIA: ICD-10-CM

## 2017-04-04 LAB
25(OH)D3 SERPL-MCNC: 25 NG/ML
ALBUMIN SERPL-MCNC: 4.1 G/DL (ref 3.4–4.8)
ALBUMIN/GLOB SERPL: 1.2 G/DL (ref 1.5–2.5)
ALP SERPL-CCNC: 83 U/L (ref 35–104)
ALT SERPL W P-5'-P-CCNC: 13 U/L (ref 10–36)
ANION GAP SERPL CALCULATED.3IONS-SCNC: 4.8 MMOL/L (ref 3.6–11.2)
AST SERPL-CCNC: 15 U/L (ref 10–30)
BASOPHILS # BLD AUTO: 0.02 10*3/MM3 (ref 0–0.3)
BASOPHILS NFR BLD AUTO: 0.2 % (ref 0–2)
BILIRUB SERPL-MCNC: 0.2 MG/DL (ref 0.2–1.8)
BUN BLD-MCNC: 24 MG/DL (ref 7–21)
BUN/CREAT SERPL: 28.6 (ref 7–25)
CALCIUM SPEC-SCNC: 9.5 MG/DL (ref 7.7–10)
CHLORIDE SERPL-SCNC: 112 MMOL/L (ref 99–112)
CHOLEST SERPL-MCNC: 211 MG/DL (ref 0–200)
CO2 SERPL-SCNC: 26.2 MMOL/L (ref 24.3–31.9)
CREAT BLD-MCNC: 0.84 MG/DL (ref 0.43–1.29)
DEPRECATED RDW RBC AUTO: 43.1 FL (ref 37–54)
EOSINOPHIL # BLD AUTO: 0.25 10*3/MM3 (ref 0–0.7)
EOSINOPHIL NFR BLD AUTO: 3.1 % (ref 0–7)
ERYTHROCYTE [DISTWIDTH] IN BLOOD BY AUTOMATED COUNT: 12.8 % (ref 11.5–14.5)
FOLATE SERPL-MCNC: 23.3 NG/ML (ref 5.4–20)
GFR SERPL CREATININE-BSD FRML MDRD: 67 ML/MIN/1.73
GLOBULIN UR ELPH-MCNC: 3.3 GM/DL
GLUCOSE BLD-MCNC: 102 MG/DL (ref 70–110)
HBA1C MFR BLD: 5.7 % (ref 4.5–5.7)
HCT VFR BLD AUTO: 35.8 % (ref 37–47)
HDLC SERPL-MCNC: 46 MG/DL (ref 60–100)
HGB BLD-MCNC: 11.3 G/DL (ref 12–16)
IMM GRANULOCYTES # BLD: 0.01 10*3/MM3 (ref 0–0.03)
IMM GRANULOCYTES NFR BLD: 0.1 % (ref 0–0.5)
LDLC SERPL CALC-MCNC: 104 MG/DL (ref 0–100)
LDLC/HDLC SERPL: 2.25 {RATIO}
LYMPHOCYTES # BLD AUTO: 2.36 10*3/MM3 (ref 1–3)
LYMPHOCYTES NFR BLD AUTO: 28.9 % (ref 16–46)
MCH RBC QN AUTO: 30.8 PG (ref 27–33)
MCHC RBC AUTO-ENTMCNC: 31.6 G/DL (ref 33–37)
MCV RBC AUTO: 97.5 FL (ref 80–94)
MONOCYTES # BLD AUTO: 0.93 10*3/MM3 (ref 0.1–0.9)
MONOCYTES NFR BLD AUTO: 11.4 % (ref 0–12)
NEUTROPHILS # BLD AUTO: 4.59 10*3/MM3 (ref 1.4–6.5)
NEUTROPHILS NFR BLD AUTO: 56.3 % (ref 40–75)
OSMOLALITY SERPL CALC.SUM OF ELEC: 289.2 MOSM/KG (ref 273–305)
PLATELET # BLD AUTO: 240 10*3/MM3 (ref 130–400)
PMV BLD AUTO: 10 FL (ref 6–10)
POTASSIUM BLD-SCNC: 4 MMOL/L (ref 3.5–5.3)
PROT SERPL-MCNC: 7.4 G/DL (ref 6–8)
RBC # BLD AUTO: 3.67 10*6/MM3 (ref 4.2–5.4)
SODIUM BLD-SCNC: 143 MMOL/L (ref 135–153)
TRIGL SERPL-MCNC: 307 MG/DL (ref 0–150)
VIT B12 BLD-MCNC: 616 PG/ML (ref 211–911)
VLDLC SERPL-MCNC: 61.4 MG/DL
WBC NRBC COR # BLD: 8.16 10*3/MM3 (ref 4.5–12.5)

## 2017-04-04 PROCEDURE — 36415 COLL VENOUS BLD VENIPUNCTURE: CPT

## 2017-04-04 PROCEDURE — 82607 VITAMIN B-12: CPT | Performed by: INTERNAL MEDICINE

## 2017-04-04 PROCEDURE — 83036 HEMOGLOBIN GLYCOSYLATED A1C: CPT | Performed by: INTERNAL MEDICINE

## 2017-04-04 PROCEDURE — 82306 VITAMIN D 25 HYDROXY: CPT | Performed by: INTERNAL MEDICINE

## 2017-04-04 PROCEDURE — 82746 ASSAY OF FOLIC ACID SERUM: CPT | Performed by: INTERNAL MEDICINE

## 2017-04-04 PROCEDURE — 85025 COMPLETE CBC W/AUTO DIFF WBC: CPT | Performed by: INTERNAL MEDICINE

## 2017-04-04 PROCEDURE — 80061 LIPID PANEL: CPT | Performed by: INTERNAL MEDICINE

## 2017-04-04 PROCEDURE — 80053 COMPREHEN METABOLIC PANEL: CPT | Performed by: INTERNAL MEDICINE

## 2017-07-03 ENCOUNTER — TRANSCRIBE ORDERS (OUTPATIENT)
Dept: ADMINISTRATIVE | Facility: HOSPITAL | Age: 72
End: 2017-07-03

## 2017-07-03 DIAGNOSIS — R10.13 EPIGASTRIC ABDOMINAL PAIN: Primary | ICD-10-CM

## 2017-07-06 ENCOUNTER — APPOINTMENT (OUTPATIENT)
Dept: ULTRASOUND IMAGING | Facility: HOSPITAL | Age: 72
End: 2017-07-06
Attending: INTERNAL MEDICINE

## 2017-07-11 ENCOUNTER — APPOINTMENT (OUTPATIENT)
Dept: CT IMAGING | Facility: HOSPITAL | Age: 72
End: 2017-07-11

## 2017-07-11 ENCOUNTER — HOSPITAL ENCOUNTER (EMERGENCY)
Facility: HOSPITAL | Age: 72
Discharge: HOME | End: 2017-07-11
Attending: EMERGENCY MEDICINE | Admitting: EMERGENCY MEDICINE

## 2017-07-11 ENCOUNTER — APPOINTMENT (OUTPATIENT)
Dept: GENERAL RADIOLOGY | Facility: HOSPITAL | Age: 72
End: 2017-07-11

## 2017-07-11 VITALS
BODY MASS INDEX: 36.25 KG/M2 | DIASTOLIC BLOOD PRESSURE: 73 MMHG | WEIGHT: 197 LBS | HEIGHT: 62 IN | OXYGEN SATURATION: 99 % | TEMPERATURE: 98.4 F | RESPIRATION RATE: 17 BRPM | SYSTOLIC BLOOD PRESSURE: 125 MMHG | HEART RATE: 73 BPM

## 2017-07-11 DIAGNOSIS — S62.631A CLOSED DISPLACED FRACTURE OF DISTAL PHALANX OF LEFT INDEX FINGER, INITIAL ENCOUNTER: Primary | ICD-10-CM

## 2017-07-11 PROCEDURE — 73130 X-RAY EXAM OF HAND: CPT

## 2017-07-11 PROCEDURE — 70450 CT HEAD/BRAIN W/O DYE: CPT

## 2017-07-11 PROCEDURE — 99283 EMERGENCY DEPT VISIT LOW MDM: CPT

## 2017-07-11 PROCEDURE — 73130 X-RAY EXAM OF HAND: CPT | Performed by: RADIOLOGY

## 2017-07-11 PROCEDURE — 70480 CT ORBIT/EAR/FOSSA W/O DYE: CPT

## 2017-07-11 PROCEDURE — 70450 CT HEAD/BRAIN W/O DYE: CPT | Performed by: RADIOLOGY

## 2017-07-11 PROCEDURE — 70480 CT ORBIT/EAR/FOSSA W/O DYE: CPT | Performed by: RADIOLOGY

## 2017-07-11 RX ORDER — HYDROCODONE BITARTRATE AND ACETAMINOPHEN 5; 325 MG/1; MG/1
1 TABLET ORAL ONCE
Status: COMPLETED | OUTPATIENT
Start: 2017-07-11 | End: 2017-07-11

## 2017-07-11 RX ORDER — HYDROCODONE BITARTRATE AND ACETAMINOPHEN 5; 325 MG/1; MG/1
TABLET ORAL
Status: COMPLETED
Start: 2017-07-11 | End: 2017-07-11

## 2017-07-11 RX ORDER — HYDROCODONE BITARTRATE AND ACETAMINOPHEN 5; 325 MG/1; MG/1
1 TABLET ORAL EVERY 6 HOURS PRN
Qty: 10 TABLET | Refills: 0 | Status: ON HOLD | OUTPATIENT
Start: 2017-07-11 | End: 2017-12-11

## 2017-07-11 RX ADMIN — HYDROCODONE BITARTRATE AND ACETAMINOPHEN 1 TABLET: 5; 325 TABLET ORAL at 08:37

## 2017-07-11 NOTE — DISCHARGE INSTRUCTIONS

## 2017-07-11 NOTE — ED PROVIDER NOTES
Subjective   Patient is a 71 y.o. female presenting with hand injury.   Hand Injury   Location:  Hand  Hand location:  L hand  Injury: yes    Time since incident:  1 day  Mechanism of injury: fall    Fall:     Fall occurred:  Tripped    Impact surface:  [place]    Point of impact:  Hands    Entrapped after fall: no    Pain details:     Quality:  Aching    Severity:  Moderate    Onset quality:  Sudden    Timing:  Constant  Foreign body present:  No foreign bodies  Associated symptoms: no fever    Associated symptoms comment:  She reports that she felt that her finger was dislocated and she reduced it.      Review of Systems   Constitutional: Negative.  Negative for fever.   HENT: Negative.    Respiratory: Negative.    Cardiovascular: Negative.  Negative for chest pain.   Gastrointestinal: Negative.  Negative for abdominal pain.   Endocrine: Negative.    Genitourinary: Negative.  Negative for dysuria.   Skin: Negative.    Neurological: Negative.    Psychiatric/Behavioral: Negative.    All other systems reviewed and are negative.      Past Medical History:   Diagnosis Date   • Arthritis    • Asthma    • Degenerative disc disease, lumbar    • Diabetes mellitus    • Fibromyalgia    • GERD (gastroesophageal reflux disease)    • Hyperlipidemia    • Hypertension    • Osteoporosis        No Known Allergies    Past Surgical History:   Procedure Laterality Date   • APPENDECTOMY     • ESOPHAGEAL DILATATION     • TOTAL SHOULDER REPLACEMENT Bilateral    • TUBAL ABDOMINAL LIGATION         Family History   Problem Relation Age of Onset   • Breast cancer Sister        Social History     Social History   • Marital status:      Spouse name: N/A   • Number of children: N/A   • Years of education: N/A     Social History Main Topics   • Smoking status: Former Smoker   • Smokeless tobacco: None   • Alcohol use No   • Drug use: No   • Sexual activity: Defer     Other Topics Concern   • None     Social History Narrative   • None            Objective   Physical Exam   Constitutional: She is oriented to person, place, and time. She appears well-developed and well-nourished. No distress.   HENT:   Head: Normocephalic and atraumatic.   Right Ear: External ear normal.   Left Ear: External ear normal.   Nose: Nose normal.   Eyes: Conjunctivae and EOM are normal. Pupils are equal, round, and reactive to light.   There is left superior periorbital swelling with some bruising and tenderness.  I do not appreciate any drop-offs.   Neck: Normal range of motion. Neck supple. No JVD present. No tracheal deviation present.   Cardiovascular: Normal rate, regular rhythm and normal heart sounds.    No murmur heard.  Pulmonary/Chest: Effort normal and breath sounds normal. No respiratory distress. She has no wheezes.   Abdominal: Soft. Bowel sounds are normal. There is no tenderness.   Musculoskeletal: Normal range of motion. She exhibits tenderness (losing and swelling over patient's index and middle finger on her left hand.  ). She exhibits no edema or deformity.   Neurological: She is alert and oriented to person, place, and time. No cranial nerve deficit.   Skin: Skin is warm and dry. No rash noted. She is not diaphoretic. No erythema. No pallor.   Psychiatric: She has a normal mood and affect. Her behavior is normal. Thought content normal.   Nursing note and vitals reviewed.      Splint application  Date/Time: 7/11/2017 9:36 AM  Performed by: NOAH DOUGLAS  Authorized by: ISAAC MIGUEL   Location details: left index finger  Splint type: static finger  Supplies used: aluminum splint  Post-procedure: The splinted body part was neurovascularly unchanged following the procedure.  Patient tolerance: Patient tolerated the procedure well with no immediate complications               ED Course  ED Course                  MDM  Number of Diagnoses or Management Options  Closed displaced fracture of distal phalanx of left index finger, initial  encounter: new and requires workup     Amount and/or Complexity of Data Reviewed  Tests in the radiology section of CPT®: ordered and reviewed  Discuss the patient with other providers: yes    Risk of Complications, Morbidity, and/or Mortality  Presenting problems: moderate        Final diagnoses:   Closed displaced fracture of distal phalanx of left index finger, initial encounter            RYAN Daniel  07/11/17 1609

## 2017-07-31 ENCOUNTER — TRANSCRIBE ORDERS (OUTPATIENT)
Dept: ADMINISTRATIVE | Facility: HOSPITAL | Age: 72
End: 2017-07-31

## 2017-07-31 ENCOUNTER — LAB (OUTPATIENT)
Dept: LAB | Facility: HOSPITAL | Age: 72
End: 2017-07-31
Attending: INTERNAL MEDICINE

## 2017-07-31 DIAGNOSIS — E11.9 DIABETES MELLITUS WITHOUT COMPLICATION (HCC): ICD-10-CM

## 2017-07-31 DIAGNOSIS — E78.5 HYPERLIPIDEMIA, UNSPECIFIED HYPERLIPIDEMIA TYPE: ICD-10-CM

## 2017-07-31 DIAGNOSIS — I10 ESSENTIAL HYPERTENSION, MALIGNANT: Primary | ICD-10-CM

## 2017-07-31 DIAGNOSIS — R53.82 CHRONIC FATIGUE, UNSPECIFIED: ICD-10-CM

## 2017-07-31 DIAGNOSIS — D51.8 ANEMIA OF DECREASED VITAMIN B12 ABSORPTION: ICD-10-CM

## 2017-07-31 DIAGNOSIS — I10 ESSENTIAL HYPERTENSION, MALIGNANT: ICD-10-CM

## 2017-07-31 LAB
ALBUMIN SERPL-MCNC: 4.3 G/DL (ref 3.4–4.8)
ALBUMIN/GLOB SERPL: 1.1 G/DL (ref 1.5–2.5)
ALP SERPL-CCNC: 103 U/L (ref 35–104)
ALT SERPL W P-5'-P-CCNC: 15 U/L (ref 10–36)
ANION GAP SERPL CALCULATED.3IONS-SCNC: 7.9 MMOL/L (ref 3.6–11.2)
AST SERPL-CCNC: 23 U/L (ref 10–30)
BASOPHILS # BLD AUTO: 0.02 10*3/MM3 (ref 0–0.3)
BASOPHILS NFR BLD AUTO: 0.2 % (ref 0–2)
BILIRUB SERPL-MCNC: 0.3 MG/DL (ref 0.2–1.8)
BUN BLD-MCNC: 25 MG/DL (ref 7–21)
BUN/CREAT SERPL: 28.1 (ref 7–25)
CALCIUM SPEC-SCNC: 9.8 MG/DL (ref 7.7–10)
CHLORIDE SERPL-SCNC: 114 MMOL/L (ref 99–112)
CHOLEST SERPL-MCNC: 249 MG/DL (ref 0–200)
CO2 SERPL-SCNC: 21.1 MMOL/L (ref 24.3–31.9)
CREAT BLD-MCNC: 0.89 MG/DL (ref 0.43–1.29)
DEPRECATED RDW RBC AUTO: 42 FL (ref 37–54)
EOSINOPHIL # BLD AUTO: 0.18 10*3/MM3 (ref 0–0.7)
EOSINOPHIL NFR BLD AUTO: 1.8 % (ref 0–7)
ERYTHROCYTE [DISTWIDTH] IN BLOOD BY AUTOMATED COUNT: 12.5 % (ref 11.5–14.5)
FOLATE SERPL-MCNC: >24 NG/ML (ref 5.4–20)
GFR SERPL CREATININE-BSD FRML MDRD: 62 ML/MIN/1.73
GLOBULIN UR ELPH-MCNC: 3.8 GM/DL
GLUCOSE BLD-MCNC: 108 MG/DL (ref 70–110)
HBA1C MFR BLD: 5.8 % (ref 4.5–5.7)
HCT VFR BLD AUTO: 37.8 % (ref 37–47)
HDLC SERPL-MCNC: 53 MG/DL (ref 60–100)
HGB BLD-MCNC: 12.3 G/DL (ref 12–16)
IMM GRANULOCYTES # BLD: 0.01 10*3/MM3 (ref 0–0.03)
IMM GRANULOCYTES NFR BLD: 0.1 % (ref 0–0.5)
LDLC SERPL CALC-MCNC: 173 MG/DL (ref 0–100)
LDLC/HDLC SERPL: 3.26 {RATIO}
LYMPHOCYTES # BLD AUTO: 2 10*3/MM3 (ref 1–3)
LYMPHOCYTES NFR BLD AUTO: 20.3 % (ref 16–46)
MCH RBC QN AUTO: 30.9 PG (ref 27–33)
MCHC RBC AUTO-ENTMCNC: 32.5 G/DL (ref 33–37)
MCV RBC AUTO: 95 FL (ref 80–94)
MONOCYTES # BLD AUTO: 0.85 10*3/MM3 (ref 0.1–0.9)
MONOCYTES NFR BLD AUTO: 8.6 % (ref 0–12)
NEUTROPHILS # BLD AUTO: 6.81 10*3/MM3 (ref 1.4–6.5)
NEUTROPHILS NFR BLD AUTO: 69 % (ref 40–75)
OSMOLALITY SERPL CALC.SUM OF ELEC: 289.9 MOSM/KG (ref 273–305)
PLATELET # BLD AUTO: 239 10*3/MM3 (ref 130–400)
PMV BLD AUTO: 9.9 FL (ref 6–10)
POTASSIUM BLD-SCNC: 4 MMOL/L (ref 3.5–5.3)
PROT SERPL-MCNC: 8.1 G/DL (ref 6–8)
RBC # BLD AUTO: 3.98 10*6/MM3 (ref 4.2–5.4)
SODIUM BLD-SCNC: 143 MMOL/L (ref 135–153)
TRIGL SERPL-MCNC: 115 MG/DL (ref 0–150)
VIT B12 BLD-MCNC: 633 PG/ML (ref 211–911)
VLDLC SERPL-MCNC: 23 MG/DL
WBC NRBC COR # BLD: 9.87 10*3/MM3 (ref 4.5–12.5)

## 2017-07-31 PROCEDURE — 82746 ASSAY OF FOLIC ACID SERUM: CPT | Performed by: INTERNAL MEDICINE

## 2017-07-31 PROCEDURE — 80053 COMPREHEN METABOLIC PANEL: CPT | Performed by: INTERNAL MEDICINE

## 2017-07-31 PROCEDURE — 82607 VITAMIN B-12: CPT | Performed by: INTERNAL MEDICINE

## 2017-07-31 PROCEDURE — 83036 HEMOGLOBIN GLYCOSYLATED A1C: CPT | Performed by: INTERNAL MEDICINE

## 2017-07-31 PROCEDURE — 36415 COLL VENOUS BLD VENIPUNCTURE: CPT

## 2017-07-31 PROCEDURE — 85025 COMPLETE CBC W/AUTO DIFF WBC: CPT | Performed by: INTERNAL MEDICINE

## 2017-07-31 PROCEDURE — 80061 LIPID PANEL: CPT | Performed by: INTERNAL MEDICINE

## 2017-09-25 ENCOUNTER — EPISODE CHANGES (OUTPATIENT)
Dept: CASE MANAGEMENT | Facility: OTHER | Age: 72
End: 2017-09-25

## 2017-10-17 ENCOUNTER — HOSPITAL ENCOUNTER (OUTPATIENT)
Dept: MAMMOGRAPHY | Facility: HOSPITAL | Age: 72
Discharge: HOME | End: 2017-10-17
Attending: RADIOLOGY | Admitting: RADIOLOGY

## 2017-10-17 DIAGNOSIS — Z09 FOLLOW-UP EXAM, 3-6 MONTHS SINCE PREVIOUS EXAM: ICD-10-CM

## 2017-10-17 PROCEDURE — G0279 TOMOSYNTHESIS, MAMMO: HCPCS

## 2017-10-17 PROCEDURE — G0206 DX MAMMO INCL CAD UNI: HCPCS | Performed by: RADIOLOGY

## 2017-10-17 PROCEDURE — G0279 TOMOSYNTHESIS, MAMMO: HCPCS | Performed by: RADIOLOGY

## 2017-10-17 PROCEDURE — G0206 DX MAMMO INCL CAD UNI: HCPCS

## 2017-11-30 ENCOUNTER — LAB (OUTPATIENT)
Dept: LAB | Facility: HOSPITAL | Age: 72
End: 2017-11-30
Attending: INTERNAL MEDICINE

## 2017-11-30 ENCOUNTER — TRANSCRIBE ORDERS (OUTPATIENT)
Dept: ADMINISTRATIVE | Facility: HOSPITAL | Age: 72
End: 2017-11-30

## 2017-11-30 DIAGNOSIS — G93.32 CHRONIC FATIGUE SYNDROME: ICD-10-CM

## 2017-11-30 DIAGNOSIS — E11.9 DIABETES MELLITUS WITH NO COMPLICATION (HCC): ICD-10-CM

## 2017-11-30 DIAGNOSIS — E55.9 VITAMIN D DEFICIENCY: ICD-10-CM

## 2017-11-30 DIAGNOSIS — I10 ESSENTIAL HYPERTENSION, MALIGNANT: ICD-10-CM

## 2017-11-30 DIAGNOSIS — M25.50 PAIN IN JOINT, MULTIPLE SITES: ICD-10-CM

## 2017-11-30 DIAGNOSIS — E55.9 VITAMIN D DEFICIENCY: Primary | ICD-10-CM

## 2017-11-30 LAB
25(OH)D3 SERPL-MCNC: 29 NG/ML
ALBUMIN SERPL-MCNC: 4 G/DL (ref 3.4–4.8)
ALBUMIN/GLOB SERPL: 1.1 G/DL (ref 1.5–2.5)
ALP SERPL-CCNC: 95 U/L (ref 35–104)
ALT SERPL W P-5'-P-CCNC: 19 U/L (ref 10–36)
ANION GAP SERPL CALCULATED.3IONS-SCNC: 5.1 MMOL/L (ref 3.6–11.2)
AST SERPL-CCNC: 22 U/L (ref 10–30)
BASOPHILS # BLD AUTO: 0.03 10*3/MM3 (ref 0–0.3)
BASOPHILS NFR BLD AUTO: 0.3 % (ref 0–2)
BILIRUB SERPL-MCNC: 0.4 MG/DL (ref 0.2–1.8)
BUN BLD-MCNC: 17 MG/DL (ref 7–21)
BUN/CREAT SERPL: 17.3 (ref 7–25)
CALCIUM SPEC-SCNC: 9.2 MG/DL (ref 7.7–10)
CHLORIDE SERPL-SCNC: 116 MMOL/L (ref 99–112)
CHROMATIN AB SERPL-ACNC: 3 IU/ML (ref 0–14)
CK SERPL-CCNC: 89 U/L (ref 24–173)
CO2 SERPL-SCNC: 22.9 MMOL/L (ref 24.3–31.9)
CREAT BLD-MCNC: 0.98 MG/DL (ref 0.43–1.29)
CRP SERPL-MCNC: 1.04 MG/DL (ref 0–0.99)
DEPRECATED RDW RBC AUTO: 42.6 FL (ref 37–54)
EOSINOPHIL # BLD AUTO: 0.31 10*3/MM3 (ref 0–0.7)
EOSINOPHIL NFR BLD AUTO: 3.2 % (ref 0–7)
ERYTHROCYTE [DISTWIDTH] IN BLOOD BY AUTOMATED COUNT: 12.6 % (ref 11.5–14.5)
GFR SERPL CREATININE-BSD FRML MDRD: 56 ML/MIN/1.73
GLOBULIN UR ELPH-MCNC: 3.5 GM/DL
GLUCOSE BLD-MCNC: 94 MG/DL (ref 70–110)
HBA1C MFR BLD: 6 % (ref 4.5–5.7)
HCT VFR BLD AUTO: 36.4 % (ref 37–47)
HGB BLD-MCNC: 11.7 G/DL (ref 12–16)
IMM GRANULOCYTES # BLD: 0.01 10*3/MM3 (ref 0–0.03)
IMM GRANULOCYTES NFR BLD: 0.1 % (ref 0–0.5)
LYMPHOCYTES # BLD AUTO: 2.19 10*3/MM3 (ref 1–3)
LYMPHOCYTES NFR BLD AUTO: 22.8 % (ref 16–46)
MCH RBC QN AUTO: 31 PG (ref 27–33)
MCHC RBC AUTO-ENTMCNC: 32.1 G/DL (ref 33–37)
MCV RBC AUTO: 96.3 FL (ref 80–94)
MONOCYTES # BLD AUTO: 1.02 10*3/MM3 (ref 0.1–0.9)
MONOCYTES NFR BLD AUTO: 10.6 % (ref 0–12)
NEUTROPHILS # BLD AUTO: 6.03 10*3/MM3 (ref 1.4–6.5)
NEUTROPHILS NFR BLD AUTO: 63 % (ref 40–75)
OSMOLALITY SERPL CALC.SUM OF ELEC: 288.1 MOSM/KG (ref 273–305)
PLATELET # BLD AUTO: 255 10*3/MM3 (ref 130–400)
PMV BLD AUTO: 9.6 FL (ref 6–10)
POTASSIUM BLD-SCNC: 3.6 MMOL/L (ref 3.5–5.3)
PROT SERPL-MCNC: 7.5 G/DL (ref 6–8)
RBC # BLD AUTO: 3.78 10*6/MM3 (ref 4.2–5.4)
SODIUM BLD-SCNC: 144 MMOL/L (ref 135–153)
WBC NRBC COR # BLD: 9.59 10*3/MM3 (ref 4.5–12.5)

## 2017-11-30 PROCEDURE — 86140 C-REACTIVE PROTEIN: CPT

## 2017-11-30 PROCEDURE — 85025 COMPLETE CBC W/AUTO DIFF WBC: CPT

## 2017-11-30 PROCEDURE — 80053 COMPREHEN METABOLIC PANEL: CPT

## 2017-11-30 PROCEDURE — 82306 VITAMIN D 25 HYDROXY: CPT

## 2017-11-30 PROCEDURE — 36415 COLL VENOUS BLD VENIPUNCTURE: CPT

## 2017-11-30 PROCEDURE — 86200 CCP ANTIBODY: CPT

## 2017-11-30 PROCEDURE — 82550 ASSAY OF CK (CPK): CPT

## 2017-11-30 PROCEDURE — 83036 HEMOGLOBIN GLYCOSYLATED A1C: CPT

## 2017-11-30 PROCEDURE — 86431 RHEUMATOID FACTOR QUANT: CPT

## 2017-12-01 LAB — CCP IGA+IGG SERPL IA-ACNC: 7 UNITS (ref 0–19)

## 2017-12-11 ENCOUNTER — APPOINTMENT (OUTPATIENT)
Dept: GENERAL RADIOLOGY | Facility: HOSPITAL | Age: 72
End: 2017-12-11

## 2017-12-11 ENCOUNTER — APPOINTMENT (OUTPATIENT)
Dept: CT IMAGING | Facility: HOSPITAL | Age: 72
End: 2017-12-11

## 2017-12-11 ENCOUNTER — HOSPITAL ENCOUNTER (INPATIENT)
Facility: HOSPITAL | Age: 72
LOS: 8 days | Discharge: NURSING, REHAB OR LONG-TERM CARE | End: 2017-12-19
Attending: FAMILY MEDICINE | Admitting: INTERNAL MEDICINE

## 2017-12-11 DIAGNOSIS — R41.82 ALTERED MENTAL STATUS, UNSPECIFIED ALTERED MENTAL STATUS TYPE: ICD-10-CM

## 2017-12-11 DIAGNOSIS — J18.9 PNEUMONIA OF LEFT LOWER LOBE DUE TO INFECTIOUS ORGANISM: Primary | ICD-10-CM

## 2017-12-11 LAB
6-ACETYL MORPHINE: NEGATIVE
A-A DO2: 46.5 MMHG (ref 0–300)
ALBUMIN SERPL-MCNC: 4.2 G/DL (ref 3.4–4.8)
ALBUMIN/GLOB SERPL: 1.2 G/DL (ref 1.5–2.5)
ALP SERPL-CCNC: 102 U/L (ref 35–104)
ALT SERPL W P-5'-P-CCNC: 15 U/L (ref 10–36)
AMPHET+METHAMPHET UR QL: NEGATIVE
ANION GAP SERPL CALCULATED.3IONS-SCNC: 8.2 MMOL/L (ref 3.6–11.2)
ARTERIAL PATENCY WRIST A: POSITIVE
AST SERPL-CCNC: 20 U/L (ref 10–30)
ATMOSPHERIC PRESS: 728 MMHG
BACTERIA UR QL AUTO: ABNORMAL /HPF
BARBITURATES UR QL SCN: NEGATIVE
BASE EXCESS BLDA CALC-SCNC: -3.6 MMOL/L
BASOPHILS # BLD AUTO: 0.01 10*3/MM3 (ref 0–0.3)
BASOPHILS NFR BLD AUTO: 0.1 % (ref 0–2)
BDY SITE: ABNORMAL
BENZODIAZ UR QL SCN: POSITIVE
BILIRUB SERPL-MCNC: 0.4 MG/DL (ref 0.2–1.8)
BILIRUB UR QL STRIP: NEGATIVE
BODY TEMPERATURE: 98.6 C
BUN BLD-MCNC: 14 MG/DL (ref 7–21)
BUN/CREAT SERPL: 16.5 (ref 7–25)
BUPRENORPHINE SERPL-MCNC: NEGATIVE NG/ML
CALCIUM SPEC-SCNC: 9.4 MG/DL (ref 7.7–10)
CANNABINOIDS SERPL QL: NEGATIVE
CHLORIDE SERPL-SCNC: 111 MMOL/L (ref 99–112)
CLARITY UR: ABNORMAL
CO2 SERPL-SCNC: 20.8 MMOL/L (ref 24.3–31.9)
COCAINE UR QL: NEGATIVE
COHGB MFR BLD: 1.6 % (ref 0–5)
COLOR UR: YELLOW
CREAT BLD-MCNC: 0.85 MG/DL (ref 0.43–1.29)
D DIMER PPP FEU-MCNC: 1.07 MCGFEU/ML (ref 0–0.5)
D-LACTATE SERPL-SCNC: 0.9 MMOL/L (ref 0.5–2)
DEPRECATED RDW RBC AUTO: 42.5 FL (ref 37–54)
EOSINOPHIL # BLD AUTO: 0.03 10*3/MM3 (ref 0–0.7)
EOSINOPHIL NFR BLD AUTO: 0.2 % (ref 0–7)
ERYTHROCYTE [DISTWIDTH] IN BLOOD BY AUTOMATED COUNT: 12.5 % (ref 11.5–14.5)
ETHANOL BLD-MCNC: <10 MG/DL
ETHANOL UR QL: <0.01 %
GFR SERPL CREATININE-BSD FRML MDRD: 66 ML/MIN/1.73
GLOBULIN UR ELPH-MCNC: 3.6 GM/DL
GLUCOSE BLD-MCNC: 133 MG/DL (ref 70–110)
GLUCOSE BLDC GLUCOMTR-MCNC: 128 MG/DL (ref 70–130)
GLUCOSE BLDC GLUCOMTR-MCNC: 134 MG/DL (ref 70–130)
GLUCOSE UR STRIP-MCNC: NEGATIVE MG/DL
HCO3 BLDA-SCNC: 19.4 MMOL/L (ref 22–26)
HCT VFR BLD AUTO: 37.9 % (ref 37–47)
HCT VFR BLD CALC: 40 % (ref 37–47)
HGB BLD-MCNC: 12.8 G/DL (ref 12–16)
HGB BLDA-MCNC: 13.7 G/DL (ref 12–16)
HGB UR QL STRIP.AUTO: ABNORMAL
HOLD SPECIMEN: NORMAL
HOLD SPECIMEN: NORMAL
HOROWITZ INDEX BLD+IHG-RTO: 21 %
HYALINE CASTS UR QL AUTO: ABNORMAL /LPF
IMM GRANULOCYTES # BLD: 0.03 10*3/MM3 (ref 0–0.03)
IMM GRANULOCYTES NFR BLD: 0.2 % (ref 0–0.5)
INR PPP: 0.98 (ref 0.9–1.1)
KETONES UR QL STRIP: NEGATIVE
LEUKOCYTE ESTERASE UR QL STRIP.AUTO: NEGATIVE
LYMPHOCYTES # BLD AUTO: 0.86 10*3/MM3 (ref 1–3)
LYMPHOCYTES NFR BLD AUTO: 5.1 % (ref 16–46)
MAGNESIUM SERPL-MCNC: 2 MG/DL (ref 1.7–2.6)
MCH RBC QN AUTO: 32.6 PG (ref 27–33)
MCHC RBC AUTO-ENTMCNC: 33.8 G/DL (ref 33–37)
MCV RBC AUTO: 96.4 FL (ref 80–94)
METHADONE UR QL SCN: NEGATIVE
METHGB BLD QL: 0.3 % (ref 0–3)
MODALITY: ABNORMAL
MONOCYTES # BLD AUTO: 1.66 10*3/MM3 (ref 0.1–0.9)
MONOCYTES NFR BLD AUTO: 9.9 % (ref 0–12)
NEUTROPHILS # BLD AUTO: 14.16 10*3/MM3 (ref 1.4–6.5)
NEUTROPHILS NFR BLD AUTO: 84.5 % (ref 40–75)
NITRITE UR QL STRIP: NEGATIVE
OPIATES UR QL: POSITIVE
OSMOLALITY SERPL CALC.SUM OF ELEC: 281.8 MOSM/KG (ref 273–305)
OXYCODONE UR QL SCN: NEGATIVE
OXYHGB MFR BLDV: 90 % (ref 85–100)
PCO2 BLDA: 29.9 MM HG (ref 35–45)
PCP UR QL SCN: NEGATIVE
PH BLDA: 7.43 PH UNITS (ref 7.35–7.45)
PH UR STRIP.AUTO: 8 [PH] (ref 5–8)
PLATELET # BLD AUTO: 203 10*3/MM3 (ref 130–400)
PMV BLD AUTO: 10.2 FL (ref 6–10)
PO2 BLDA: 60.7 MM HG (ref 80–100)
POTASSIUM BLD-SCNC: 3.8 MMOL/L (ref 3.5–5.3)
PROT SERPL-MCNC: 7.8 G/DL (ref 6–8)
PROT UR QL STRIP: NEGATIVE
PROTHROMBIN TIME: 13.1 SECONDS (ref 11–15.4)
RBC # BLD AUTO: 3.93 10*6/MM3 (ref 4.2–5.4)
RBC # UR: ABNORMAL /HPF
REF LAB TEST METHOD: ABNORMAL
SAO2 % BLDCOA: 91.7 % (ref 90–100)
SODIUM BLD-SCNC: 140 MMOL/L (ref 135–153)
SP GR UR STRIP: 1.01 (ref 1–1.03)
SQUAMOUS #/AREA URNS HPF: ABNORMAL /HPF
UROBILINOGEN UR QL STRIP: ABNORMAL
WBC NRBC COR # BLD: 16.75 10*3/MM3 (ref 4.5–12.5)
WBC UR QL AUTO: ABNORMAL /HPF
WHOLE BLOOD HOLD SPECIMEN: NORMAL
WHOLE BLOOD HOLD SPECIMEN: NORMAL

## 2017-12-11 PROCEDURE — 25010000002 AZITHROMYCIN: Performed by: FAMILY MEDICINE

## 2017-12-11 PROCEDURE — 80307 DRUG TEST PRSMV CHEM ANLYZR: CPT | Performed by: FAMILY MEDICINE

## 2017-12-11 PROCEDURE — 85025 COMPLETE CBC W/AUTO DIFF WBC: CPT | Performed by: FAMILY MEDICINE

## 2017-12-11 PROCEDURE — 83735 ASSAY OF MAGNESIUM: CPT | Performed by: FAMILY MEDICINE

## 2017-12-11 PROCEDURE — 81001 URINALYSIS AUTO W/SCOPE: CPT | Performed by: FAMILY MEDICINE

## 2017-12-11 PROCEDURE — 94640 AIRWAY INHALATION TREATMENT: CPT

## 2017-12-11 PROCEDURE — 83050 HGB METHEMOGLOBIN QUAN: CPT | Performed by: FAMILY MEDICINE

## 2017-12-11 PROCEDURE — 36600 WITHDRAWAL OF ARTERIAL BLOOD: CPT | Performed by: FAMILY MEDICINE

## 2017-12-11 PROCEDURE — 70450 CT HEAD/BRAIN W/O DYE: CPT

## 2017-12-11 PROCEDURE — 82375 ASSAY CARBOXYHB QUANT: CPT | Performed by: FAMILY MEDICINE

## 2017-12-11 PROCEDURE — 93005 ELECTROCARDIOGRAM TRACING: CPT | Performed by: INTERNAL MEDICINE

## 2017-12-11 PROCEDURE — 36415 COLL VENOUS BLD VENIPUNCTURE: CPT

## 2017-12-11 PROCEDURE — 83605 ASSAY OF LACTIC ACID: CPT | Performed by: FAMILY MEDICINE

## 2017-12-11 PROCEDURE — 84145 PROCALCITONIN (PCT): CPT | Performed by: FAMILY MEDICINE

## 2017-12-11 PROCEDURE — 82962 GLUCOSE BLOOD TEST: CPT

## 2017-12-11 PROCEDURE — 70450 CT HEAD/BRAIN W/O DYE: CPT | Performed by: RADIOLOGY

## 2017-12-11 PROCEDURE — 85610 PROTHROMBIN TIME: CPT | Performed by: FAMILY MEDICINE

## 2017-12-11 PROCEDURE — 85379 FIBRIN DEGRADATION QUANT: CPT | Performed by: INTERNAL MEDICINE

## 2017-12-11 PROCEDURE — 25010000002 ENOXAPARIN PER 10 MG: Performed by: INTERNAL MEDICINE

## 2017-12-11 PROCEDURE — 87040 BLOOD CULTURE FOR BACTERIA: CPT | Performed by: FAMILY MEDICINE

## 2017-12-11 PROCEDURE — 80053 COMPREHEN METABOLIC PANEL: CPT | Performed by: FAMILY MEDICINE

## 2017-12-11 PROCEDURE — 71010 HC CHEST PA OR AP: CPT

## 2017-12-11 PROCEDURE — 71010 XR CHEST 1 VW: CPT | Performed by: RADIOLOGY

## 2017-12-11 PROCEDURE — 94799 UNLISTED PULMONARY SVC/PX: CPT

## 2017-12-11 PROCEDURE — 25010000002 CEFTRIAXONE: Performed by: FAMILY MEDICINE

## 2017-12-11 PROCEDURE — 82805 BLOOD GASES W/O2 SATURATION: CPT | Performed by: FAMILY MEDICINE

## 2017-12-11 PROCEDURE — 99285 EMERGENCY DEPT VISIT HI MDM: CPT

## 2017-12-11 RX ORDER — OXYBUTYNIN CHLORIDE 10 MG/1
10 TABLET, EXTENDED RELEASE ORAL DAILY
COMMUNITY

## 2017-12-11 RX ORDER — POLYETHYLENE GLYCOL 3350 17 G/17G
17 POWDER, FOR SOLUTION ORAL DAILY
Status: DISCONTINUED | OUTPATIENT
Start: 2017-12-12 | End: 2017-12-19 | Stop reason: HOSPADM

## 2017-12-11 RX ORDER — CYCLOSPORINE 0.5 MG/ML
1 EMULSION OPHTHALMIC 2 TIMES DAILY
Status: ON HOLD | COMMUNITY
End: 2018-10-19

## 2017-12-11 RX ORDER — TOPIRAMATE 100 MG/1
100 TABLET, FILM COATED ORAL 2 TIMES DAILY
Status: CANCELLED | OUTPATIENT
Start: 2017-12-11

## 2017-12-11 RX ORDER — CELECOXIB 200 MG/1
200 CAPSULE ORAL 2 TIMES DAILY PRN
Status: DISCONTINUED | OUTPATIENT
Start: 2017-12-11 | End: 2017-12-19 | Stop reason: HOSPADM

## 2017-12-11 RX ORDER — SODIUM CHLORIDE 0.9 % (FLUSH) 0.9 %
10 SYRINGE (ML) INJECTION AS NEEDED
Status: DISCONTINUED | OUTPATIENT
Start: 2017-12-11 | End: 2017-12-19 | Stop reason: HOSPADM

## 2017-12-11 RX ORDER — OXYBUTYNIN CHLORIDE 5 MG/1
10 TABLET, EXTENDED RELEASE ORAL DAILY
Status: CANCELLED | OUTPATIENT
Start: 2017-12-11

## 2017-12-11 RX ORDER — OXYBUTYNIN CHLORIDE 5 MG/1
10 TABLET, EXTENDED RELEASE ORAL DAILY
Status: DISCONTINUED | OUTPATIENT
Start: 2017-12-11 | End: 2017-12-19 | Stop reason: HOSPADM

## 2017-12-11 RX ORDER — AMLODIPINE BESYLATE 5 MG/1
5 TABLET ORAL
Status: DISCONTINUED | OUTPATIENT
Start: 2017-12-11 | End: 2017-12-19 | Stop reason: HOSPADM

## 2017-12-11 RX ORDER — CELECOXIB 200 MG/1
200 CAPSULE ORAL 2 TIMES DAILY
Status: CANCELLED | OUTPATIENT
Start: 2017-12-11

## 2017-12-11 RX ORDER — BACLOFEN 10 MG/1
10 TABLET ORAL NIGHTLY
COMMUNITY
End: 2018-10-31 | Stop reason: HOSPADM

## 2017-12-11 RX ORDER — GABAPENTIN 400 MG/1
400 CAPSULE ORAL 3 TIMES DAILY
Status: CANCELLED | OUTPATIENT
Start: 2017-12-11

## 2017-12-11 RX ORDER — PANTOPRAZOLE SODIUM 40 MG/1
40 TABLET, DELAYED RELEASE ORAL EVERY MORNING
Status: CANCELLED | OUTPATIENT
Start: 2017-12-12

## 2017-12-11 RX ORDER — ACETAMINOPHEN AND CODEINE PHOSPHATE 300; 30 MG/1; MG/1
1 TABLET ORAL EVERY 4 HOURS PRN
Status: DISCONTINUED | OUTPATIENT
Start: 2017-12-11 | End: 2017-12-19 | Stop reason: HOSPADM

## 2017-12-11 RX ORDER — LOSARTAN POTASSIUM 50 MG/1
100 TABLET ORAL DAILY
Status: CANCELLED | OUTPATIENT
Start: 2017-12-11

## 2017-12-11 RX ORDER — TOPIRAMATE 100 MG/1
100 TABLET, FILM COATED ORAL EVERY 12 HOURS SCHEDULED
Status: DISCONTINUED | OUTPATIENT
Start: 2017-12-11 | End: 2017-12-19 | Stop reason: HOSPADM

## 2017-12-11 RX ORDER — ASPIRIN 81 MG/1
81 TABLET ORAL DAILY
COMMUNITY
End: 2018-10-31 | Stop reason: HOSPADM

## 2017-12-11 RX ORDER — ALPRAZOLAM 0.5 MG/1
0.5 TABLET ORAL 2 TIMES DAILY PRN
Status: DISCONTINUED | OUTPATIENT
Start: 2017-12-11 | End: 2017-12-19 | Stop reason: HOSPADM

## 2017-12-11 RX ORDER — DULOXETIN HYDROCHLORIDE 60 MG/1
60 CAPSULE, DELAYED RELEASE ORAL DAILY
Status: CANCELLED | OUTPATIENT
Start: 2017-12-11

## 2017-12-11 RX ORDER — LOSARTAN POTASSIUM 100 MG/1
100 TABLET ORAL DAILY
Status: ON HOLD | COMMUNITY
End: 2018-10-19

## 2017-12-11 RX ORDER — DULOXETIN HYDROCHLORIDE 60 MG/1
60 CAPSULE, DELAYED RELEASE ORAL DAILY
Status: DISCONTINUED | OUTPATIENT
Start: 2017-12-11 | End: 2017-12-19 | Stop reason: HOSPADM

## 2017-12-11 RX ORDER — ASPIRIN 81 MG/1
81 TABLET ORAL DAILY
Status: DISCONTINUED | OUTPATIENT
Start: 2017-12-12 | End: 2017-12-19 | Stop reason: HOSPADM

## 2017-12-11 RX ORDER — BACLOFEN 10 MG/1
10 TABLET ORAL NIGHTLY
Status: CANCELLED | OUTPATIENT
Start: 2017-12-11

## 2017-12-11 RX ORDER — PANTOPRAZOLE SODIUM 40 MG/1
40 TABLET, DELAYED RELEASE ORAL
Status: DISCONTINUED | OUTPATIENT
Start: 2017-12-12 | End: 2017-12-19 | Stop reason: HOSPADM

## 2017-12-11 RX ORDER — RANITIDINE 300 MG/1
300 TABLET ORAL DAILY
COMMUNITY
End: 2017-12-22 | Stop reason: HOSPADM

## 2017-12-11 RX ORDER — GABAPENTIN 400 MG/1
400 CAPSULE ORAL 3 TIMES DAILY
Status: DISCONTINUED | OUTPATIENT
Start: 2017-12-11 | End: 2017-12-19 | Stop reason: HOSPADM

## 2017-12-11 RX ORDER — ALPRAZOLAM 0.5 MG/1
0.5 TABLET ORAL 2 TIMES DAILY PRN
Status: CANCELLED | OUTPATIENT
Start: 2017-12-11

## 2017-12-11 RX ORDER — POLYETHYLENE GLYCOL 3350 17 G/17G
17 POWDER, FOR SOLUTION ORAL DAILY
Status: ON HOLD | COMMUNITY
End: 2019-11-05

## 2017-12-11 RX ORDER — AMLODIPINE BESYLATE 5 MG/1
5 TABLET ORAL DAILY
Status: CANCELLED | OUTPATIENT
Start: 2017-12-11

## 2017-12-11 RX ORDER — METOPROLOL SUCCINATE 25 MG/1
25 TABLET, EXTENDED RELEASE ORAL DAILY
Status: CANCELLED | OUTPATIENT
Start: 2017-12-11

## 2017-12-11 RX ORDER — IPRATROPIUM BROMIDE AND ALBUTEROL SULFATE 2.5; .5 MG/3ML; MG/3ML
3 SOLUTION RESPIRATORY (INHALATION)
Status: DISCONTINUED | OUTPATIENT
Start: 2017-12-11 | End: 2017-12-11

## 2017-12-11 RX ORDER — ACETAMINOPHEN 325 MG/1
650 TABLET ORAL ONCE
Status: COMPLETED | OUTPATIENT
Start: 2017-12-11 | End: 2017-12-11

## 2017-12-11 RX ORDER — BUPROPION HYDROCHLORIDE 150 MG/1
300 TABLET ORAL DAILY
Status: CANCELLED | OUTPATIENT
Start: 2017-12-12

## 2017-12-11 RX ORDER — LOSARTAN POTASSIUM 50 MG/1
100 TABLET ORAL
Status: DISCONTINUED | OUTPATIENT
Start: 2017-12-11 | End: 2017-12-19 | Stop reason: HOSPADM

## 2017-12-11 RX ORDER — METOPROLOL SUCCINATE 25 MG/1
25 TABLET, EXTENDED RELEASE ORAL
Status: DISCONTINUED | OUTPATIENT
Start: 2017-12-11 | End: 2017-12-19 | Stop reason: HOSPADM

## 2017-12-11 RX ORDER — IPRATROPIUM BROMIDE AND ALBUTEROL SULFATE 2.5; .5 MG/3ML; MG/3ML
3 SOLUTION RESPIRATORY (INHALATION)
Status: DISCONTINUED | OUTPATIENT
Start: 2017-12-11 | End: 2017-12-19 | Stop reason: HOSPADM

## 2017-12-11 RX ORDER — TOPIRAMATE 100 MG/1
100 TABLET, FILM COATED ORAL 2 TIMES DAILY
Status: ON HOLD | COMMUNITY
End: 2019-11-05

## 2017-12-11 RX ORDER — METOPROLOL SUCCINATE 25 MG/1
25 TABLET, EXTENDED RELEASE ORAL DAILY
Status: ON HOLD | COMMUNITY
End: 2018-10-19

## 2017-12-11 RX ORDER — FAMOTIDINE 20 MG/1
40 TABLET, FILM COATED ORAL DAILY
Status: CANCELLED | OUTPATIENT
Start: 2017-12-11

## 2017-12-11 RX ORDER — ACETAMINOPHEN AND CODEINE PHOSPHATE 300; 30 MG/1; MG/1
1 TABLET ORAL 2 TIMES DAILY PRN
Status: CANCELLED | OUTPATIENT
Start: 2017-12-11

## 2017-12-11 RX ADMIN — GABAPENTIN 400 MG: 400 CAPSULE ORAL at 18:00

## 2017-12-11 RX ADMIN — TOPIRAMATE 100 MG: 100 TABLET, FILM COATED ORAL at 21:11

## 2017-12-11 RX ADMIN — METOPROLOL SUCCINATE 25 MG: 25 TABLET, FILM COATED, EXTENDED RELEASE ORAL at 18:00

## 2017-12-11 RX ADMIN — POLYVINYL ALCOHOL, POVIDONE 1 DROP: 14; 6 SOLUTION/ DROPS OPHTHALMIC at 21:11

## 2017-12-11 RX ADMIN — GABAPENTIN 400 MG: 400 CAPSULE ORAL at 21:14

## 2017-12-11 RX ADMIN — CEFTRIAXONE 1 G: 1 INJECTION, POWDER, FOR SOLUTION INTRAMUSCULAR; INTRAVENOUS at 13:49

## 2017-12-11 RX ADMIN — AZITHROMYCIN 500 MG: 500 INJECTION, POWDER, LYOPHILIZED, FOR SOLUTION INTRAVENOUS at 14:46

## 2017-12-11 RX ADMIN — DOXYCYCLINE 100 MG: 100 INJECTION, POWDER, LYOPHILIZED, FOR SOLUTION INTRAVENOUS at 18:01

## 2017-12-11 RX ADMIN — AMLODIPINE BESYLATE 5 MG: 5 TABLET ORAL at 18:00

## 2017-12-11 RX ADMIN — IPRATROPIUM BROMIDE AND ALBUTEROL SULFATE 3 ML: .5; 3 SOLUTION RESPIRATORY (INHALATION) at 18:32

## 2017-12-11 RX ADMIN — ACETAMINOPHEN 650 MG: 325 TABLET ORAL at 13:49

## 2017-12-11 RX ADMIN — SODIUM CHLORIDE 500 ML: 9 INJECTION, SOLUTION INTRAVENOUS at 13:49

## 2017-12-11 RX ADMIN — ENOXAPARIN SODIUM 40 MG: 40 INJECTION SUBCUTANEOUS at 18:01

## 2017-12-11 RX ADMIN — ALPRAZOLAM 0.5 MG: 0.5 TABLET ORAL at 21:11

## 2017-12-11 RX ADMIN — DULOXETINE HYDROCHLORIDE 60 MG: 60 CAPSULE, DELAYED RELEASE ORAL at 18:00

## 2017-12-11 RX ADMIN — OXYBUTYNIN CHLORIDE 10 MG: 5 TABLET, FILM COATED, EXTENDED RELEASE ORAL at 18:00

## 2017-12-11 RX ADMIN — LOSARTAN POTASSIUM 100 MG: 50 TABLET, FILM COATED ORAL at 18:00

## 2017-12-11 NOTE — PLAN OF CARE
Problem: Patient Care Overview (Adult)  Goal: Plan of Care Review  Outcome: Ongoing (interventions implemented as appropriate)    Problem: Pneumonia (Adult)  Goal: Signs and Symptoms of Listed Potential Problems Will be Absent or Manageable (Pneumonia)  Outcome: Ongoing (interventions implemented as appropriate)    Problem: Fall Risk (Adult)  Goal: Identify Related Risk Factors and Signs and Symptoms  Outcome: Ongoing (interventions implemented as appropriate)  Goal: Absence of Falls  Outcome: Ongoing (interventions implemented as appropriate)    Problem: Pressure Ulcer Risk (Lopez Scale) (Adult,Obstetrics,Pediatric)  Goal: Identify Related Risk Factors and Signs and Symptoms  Outcome: Ongoing (interventions implemented as appropriate)  Goal: Skin Integrity  Outcome: Ongoing (interventions implemented as appropriate)

## 2017-12-11 NOTE — PROGRESS NOTES
Smoking Cessation Counseling    As noted in the patient's chart, she is a former smoker.  Smoking cessation counseling not warranted at this time.    Nalini Garcia RPH  12/11/2017  5:16 PM

## 2017-12-11 NOTE — H&P
Chief complaint : Shortness of breath    Subjective     Patient is a 72 y.o. female presented to the emergency room with increased confusion.  She describes increasing cough, congestion and shortness of breath over the last 2 weeks.  Her symptoms worsened to the point she is having difficulty performing her activities of daily living.  She sought attention in the emergency room.  In the emergency room she was seen in evaluating diagnosed with pneumonia.  She was referred for remission.    This afternoon she is alert and sitting up in bed.  She is pleasant and talkative in no obvious distress.  She does complain of continued cough and shortness of breath with any exertion.  She denies any associated hemoptysis.    Review of Systems   Constitutional: Positive for activity change, appetite change, chills, fatigue and fever. Negative for unexpected weight change.   HENT: Positive for postnasal drip, rhinorrhea, sinus pain and sinus pressure. Negative for congestion, dental problem and mouth sores.    Eyes: Negative for pain, discharge, itching and visual disturbance.   Respiratory: Positive for cough and shortness of breath. Negative for apnea, choking, chest tightness and wheezing.    Cardiovascular: Negative for chest pain and leg swelling.   Gastrointestinal: Negative for abdominal distention, abdominal pain, constipation and diarrhea.   Endocrine: Negative for cold intolerance, heat intolerance, polydipsia, polyphagia and polyuria.   Genitourinary: Negative for difficulty urinating, dysuria, hematuria, urgency and vaginal pain.   Musculoskeletal: Positive for arthralgias, back pain and gait problem. Negative for myalgias and neck pain.   Skin: Negative for color change, pallor and rash.   Allergic/Immunologic: Negative for environmental allergies, food allergies and immunocompromised state.   Neurological: Positive for weakness. Negative for dizziness and seizures.   Hematological: Negative for adenopathy.  Does not bruise/bleed easily.   Psychiatric/Behavioral: Negative for agitation, hallucinations and self-injury. The patient is not nervous/anxious and is not hyperactive.         Past Medical History  Past Medical History:   Diagnosis Date   • Arthritis    • Asthma    • Degenerative disc disease, lumbar    • Diabetes mellitus    • Fibromyalgia    • GERD (gastroesophageal reflux disease)    • Hyperlipidemia    • Hypertension    • Osteoporosis      Surgical History  Past Surgical History:   Procedure Laterality Date   • APPENDECTOMY     • ESOPHAGEAL DILATATION     • TOTAL SHOULDER REPLACEMENT Bilateral    • TUBAL ABDOMINAL LIGATION       Family History  Family History   Problem Relation Age of Onset   • Breast cancer Sister      Social History  Social History   Substance Use Topics   • Smoking status: Former Smoker   • Smokeless tobacco: None   • Alcohol use No     Home Medications  Prescriptions Prior to Admission   Medication Sig Dispense Refill Last Dose   • acetaminophen-codeine (TYLENOL #3) 300-30 MG per tablet Take 1 tablet by mouth 2 (Two) Times a Day As Needed.   12/11/2017 at Unknown time   • ALPRAZolam (XANAX) 0.5 MG tablet Take 0.5 mg by mouth 2 (Two) Times a Day As Needed.   12/11/2017 at Unknown time   • amLODIPine (NORVASC) 5 MG tablet Take 5 mg by mouth Daily.   12/11/2017 at Unknown time   • aspirin 81 MG EC tablet Take 81 mg by mouth Daily.   12/11/2017 at Unknown time   • baclofen (LIORESAL) 10 MG tablet Take 10 mg by mouth Every Night.   12/10/2017 at Unknown time   • buPROPion XL (WELLBUTRIN XL) 300 MG 24 hr tablet Take 300 mg by mouth Every Morning.   12/11/2017 at Unknown time   • celecoxib (CELEBREX) 200 MG capsule Take 200 mg by mouth 2 (Two) Times a Day.   12/11/2017 at Unknown time   • cycloSPORINE (RESTASIS) 0.05 % ophthalmic emulsion Administer 1 drop to both eyes 2 (Two) Times a Day.   12/11/2017 at Unknown time   • DULoxetine (CYMBALTA) 60 MG capsule Take 60 mg by mouth Daily.    12/11/2017 at Unknown time   • gabapentin (NEURONTIN) 400 MG capsule Take 400 mg by mouth 3 (Three) Times a Day.   12/11/2017 at Unknown time   • losartan (COZAAR) 100 MG tablet Take 100 mg by mouth Daily.   12/11/2017 at Unknown time   • metoprolol succinate XL (TOPROL-XL) 25 MG 24 hr tablet Take 25 mg by mouth Daily.   12/11/2017 at Unknown time   • omeprazole (priLOSEC) 20 MG capsule Take 20 mg by mouth 2 (Two) Times a Day Before Meals.   12/11/2017 at Unknown time   • oxybutynin XL (DITROPAN-XL) 10 MG 24 hr tablet Take 10 mg by mouth Daily.   12/11/2017 at Unknown time   • polyethylene glycol (MIRALAX) packet Take 17 g by mouth Daily.   12/11/2017 at Unknown time   • raNITIdine (ZANTAC) 300 MG tablet Take 300 mg by mouth Daily.   12/11/2017 at Unknown time   • topiramate (TOPAMAX) 100 MG tablet Take 100 mg by mouth 2 (Two) Times a Day.   12/11/2017 at Unknown time         Allergies:  Review of patient's allergies indicates no known allergies.    Objective     Vital Signs  Temp:  [99.3 °F (37.4 °C)-102.3 °F (39.1 °C)] 99.3 °F (37.4 °C)  Heart Rate:  [76-89] 79  Resp:  [18-20] 20  BP: (107-168)/() 107/57    Physical Exam:    Physical Exam   Constitutional: She is oriented to person, place, and time.   Obese white female who is alert and talkative in no obvious distress at rest.   HENT:   Head: Normocephalic and atraumatic.   Right Ear: External ear normal.   Left Ear: External ear normal.   Nose: Nose normal.   Mouth/Throat: Oropharynx is clear and moist. No oropharyngeal exudate.   Eyes: Conjunctivae and EOM are normal. Pupils are equal, round, and reactive to light. Right eye exhibits no discharge. Left eye exhibits no discharge. No scleral icterus.   Neck: Normal range of motion. Neck supple. No JVD present. No tracheal deviation present. No thyromegaly present.   Cardiovascular: Normal rate, regular rhythm, normal heart sounds and intact distal pulses.  Exam reveals no gallop and no friction rub.    No  murmur heard.  Pulmonary/Chest: Effort normal. No stridor. She has wheezes.   Scattered rhonchi bilaterally, diminished breath sounds at lung bases.  Interrupted speech secondary to shortness of breath.  Accessory muscle use is present.  Respiratory distress with speech.   Abdominal: Soft. Bowel sounds are normal. She exhibits no distension and no mass. There is no tenderness. There is no guarding. No hernia.   Genitourinary:   Genitourinary Comments: No Freitas catheter   Neurological: She is alert and oriented to person, place, and time. She displays normal reflexes. No cranial nerve deficit. Coordination normal.   Skin: Skin is warm and dry. No rash noted. No erythema. No pallor.   Psychiatric: She has a normal mood and affect. Her behavior is normal. Judgment and thought content normal.   Nursing note and vitals reviewed.      Results Review:    I reviewed the patient's clinical results from admission:  Imaging Results (last 72 hours)     Procedure Component Value Units Date/Time    CT Head Without Contrast [[id number]] Collected:  12/11/17 1407     Updated:  12/11/17 1411    Narrative:       CT HEAD WO CONTRAST-     CLINICAL INDICATION: ams          COMPARISON: 7/11/2017      TECHNIQUE: Axial images of the brain were obtained with out intravenous  contrast.  Reformatted images were created in the sagittal and coronal  planes.     DOSE:         Radiation dose reduction techniques were utilized per ALARA protocol.  Automated exposure control was initiated through either or CareDoEndoStim or  DoseRight software packages by  protocol.           FINDINGS:   Today's study shows no mass, hemorrhage, or midline shift.   The ventricles, cisterns, and sulci are unremarkable. There is no  hydrocephalus.   There is no evidence of acute ischemia.  I do not see epidural or subdural hematoma.  The gray-white differentiation is appropriate.   The bone window setting images show no destructive calvarial lesion or  acute  calvarial fracture.   The posterior fossa is unremarkable.             Impression:       No acute intracranial pathology. Nothing is seen on this exam to  specifically account for the patient's symptoms.     This report was finalized on 12/11/2017 2:09 PM by Dr. Chuckie Reyez MD.       XR Chest 1 View [[id number]] Collected:  12/11/17 1430     Updated:  12/11/17 1433    Narrative:       XR CHEST 1 VW-     CLINICAL INDICATION: Altered mental status          COMPARISON: 4/22/2016      TECHNIQUE: Single frontal view of the chest.     FINDINGS:     There is airspace disease in the left lung concerning for pneumonia  The cardiac silhouette is normal. The pulmonary vasculature is  unremarkable.  There is no evidence of an acute osseous abnormality.   There are no suspicious-appearing parenchymal soft tissue nodules.            Impression:       Appearance concerning for left basilar pneumonia         This report was finalized on 12/11/2017 2:31 PM by Dr. Chuckie Reyez MD.             LABS:    Lab Results   Component Value Date    GLUCOSE 133 (H) 12/11/2017    GLUCOSE 94 11/30/2017    GLUCOSE 108 07/31/2017    BUN 14 12/11/2017    CREATININE 0.85 12/11/2017    EGFRIFNONA 66 12/11/2017    EGFRIFAFRI  09/12/2016      Comment:      <15 Indicative of kidney failure.    BCR 16.5 12/11/2017    CO2 20.8 (L) 12/11/2017    CALCIUM 9.4 12/11/2017    ALBUMIN 4.20 12/11/2017    LABIL2 1.2 (L) 12/11/2017    AST 20 12/11/2017    ALT 15 12/11/2017    WBC 16.75 (H) 12/11/2017    WBC 9.59 11/30/2017    WBC 9.87 07/31/2017    HGB 12.8 12/11/2017    HCT 37.9 12/11/2017    MCV 96.4 (H) 12/11/2017     12/11/2017     12/11/2017     11/30/2017     07/31/2017    K 3.8 12/11/2017    K 3.6 11/30/2017    K 4.0 07/31/2017     12/11/2017    ANIONGAP 8.2 12/11/2017       Lab Results   Component Value Date    INR 0.98 12/11/2017    INR <0.90 04/26/2016    INR 0.93 06/24/2014    PROTIME 13.1 12/11/2017    PROTIME 10.1  04/26/2016    PROTIME 10.6 06/24/2014         Results from last 7 days  Lab Units 12/11/17  1333   INR  0.98           Assessment/Plan     1) pneumonia of left lower lobe  2) sepsis  3) metabolic encephalopathy-present at the time of admission  4) leukocytosis  5) anemia-multifactorial  6) DVT prophylaxis-subcutaneous Lovenox      I discussed the patients findings and my recommendations with patient, family and nursing staff.     Skinny Meehan MD  12/11/17  5:35 PM    Time: 33 minutes of time with history and physical and coordination of care

## 2017-12-11 NOTE — ED PROVIDER NOTES
Subjective   HPI Comments: 72-year-old female with history of hypertension, hyperlipidemia presents the emergency room with altered mental status that began yesterday.  Patient's son reports the patient has developed pneumonialike symptoms over the past week.  Patient has had a cough and was evaluated by primary care provider earlier in the week.  Patient was placed on antibiotics but antibiotic is unknown name.  Patient has not been on antibiotics last 3 months.  Patient with no known sick contacts.  Patient's son states that this morning he went to check on patient reports that she was talking on the phone to she thought was his sister's house patient was not talking to anyone.  Patient was also noted to be talking to herself he therefore called EMS who brought her to the emergency room    Patient is a 72 y.o. female presenting with altered mental status.   History provided by:  Patient and relative  Altered Mental Status   Presenting symptoms: confusion and disorientation    Severity:  Moderate  Most recent episode:  Yesterday  Episode history:  Continuous  Timing:  Constant  Progression:  Worsening  Chronicity:  New  Context: recent illness and recent infection    Context: not head injury    Associated symptoms: fever and weakness    Associated symptoms: no abdominal pain, no agitation, no difficulty breathing, no light-headedness, no nausea, no rash, no seizures, no slurred speech and no vomiting        Review of Systems   Constitutional: Positive for fever. Negative for activity change.   HENT: Negative for congestion, sore throat and tinnitus.    Eyes: Negative for visual disturbance.   Respiratory: Negative for apnea, cough, shortness of breath, wheezing and stridor.    Cardiovascular: Negative for chest pain.   Gastrointestinal: Negative for abdominal pain, diarrhea, nausea and vomiting.   Genitourinary: Negative for dysuria and flank pain.   Musculoskeletal: Negative for arthralgias and myalgias.   Skin:  Negative for rash.   Neurological: Positive for weakness. Negative for dizziness, seizures and light-headedness.   Hematological: Negative for adenopathy.   Psychiatric/Behavioral: Positive for confusion. Negative for agitation.   All other systems reviewed and are negative.      Past Medical History:   Diagnosis Date   • Arthritis    • Asthma    • Degenerative disc disease, lumbar    • Diabetes mellitus    • Fibromyalgia    • GERD (gastroesophageal reflux disease)    • Hyperlipidemia    • Hypertension    • Osteoporosis        No Known Allergies    Past Surgical History:   Procedure Laterality Date   • APPENDECTOMY     • ESOPHAGEAL DILATATION     • TOTAL SHOULDER REPLACEMENT Bilateral    • TUBAL ABDOMINAL LIGATION         Family History   Problem Relation Age of Onset   • Breast cancer Sister        Social History     Social History   • Marital status:      Spouse name: N/A   • Number of children: N/A   • Years of education: N/A     Social History Main Topics   • Smoking status: Former Smoker   • Smokeless tobacco: None   • Alcohol use No   • Drug use: No   • Sexual activity: Defer     Other Topics Concern   • None     Social History Narrative           Objective   Physical Exam   Constitutional:   Ill appearing. Febrile. Cooperative.    HENT:   Right Ear: External ear normal.   Left Ear: External ear normal.   Generalized pharyngeal erythema.  Moist mucous membranes.   Eyes: EOM are normal. Pupils are equal, round, and reactive to light. No scleral icterus.   Neck: Normal range of motion. Neck supple. No JVD present. Carotid bruit is not present.   Cardiovascular: Normal rate, regular rhythm and normal heart sounds.  Exam reveals no gallop and no friction rub.    No murmur heard.  Pulmonary/Chest: Effort normal. She has no wheezes. She has no rales.   Abdominal: Soft. Bowel sounds are normal. She exhibits no abdominal bruit. There is no tenderness. There is no rigidity, no rebound, no guarding and no CVA  tenderness.   Musculoskeletal: She exhibits no edema or tenderness.   Neurological: She is alert. No cranial nerve deficit or sensory deficit.   Patient disoriented to situation and time.   Skin: Skin is warm and dry. She is not diaphoretic. There is pallor.   Nursing note and vitals reviewed.      Procedures         ED Course  ED Course                Patient was noted to have recently been treated with antibiotics for pneumonia.  Patient's family is unsure of antibiotic the patient has been given.  Patient was started on Rocephin as well as azithromycin empirically.  Patient given IV fluids while emergency room.  Patient states was noted to have white count 16,000 as well as a temperature rectally of 102.  Patient is given Tylenol.  Patient is more alert since given the aforementioned treatment.  Patient's chest x-ray shows a left lower lobe infiltrate concerning for pneumonia.  Has spoken to Dr. Meehan patient's primary care provider who is agreeable to the patient hospital further evaluation treatment.  MDM    Final diagnoses:   Pneumonia of left lower lobe due to infectious organism   Altered mental status, unspecified altered mental status type            Chava Jose MD  12/11/17 [id number]

## 2017-12-11 NOTE — ED NOTES
Patient presents to ER with confusion, and states she is hurting all over. Patient AAO, but family states she keeps going in and out of confusion. Son and daughter at bedside. Son states they were told the patient has pneumonia and has been treated with abx for it. Family states patient has been feeling bad for the past week and has not been acting like herself.   Patient resting quietly on stretcher with no complaints. Pt AAO. No respiratory distress noted. Respirations even and unlabored. Pt denies any needs at this time. Skin PWD. Pt family at bedside. Will continue to monitor and follow plan of care. Bed rails up x 2, bed in lowest position, call light within reach.      Loreta Schulz RN  12/11/17 0004

## 2017-12-12 ENCOUNTER — APPOINTMENT (OUTPATIENT)
Dept: ULTRASOUND IMAGING | Facility: HOSPITAL | Age: 72
End: 2017-12-12
Attending: INTERNAL MEDICINE

## 2017-12-12 LAB
ALBUMIN SERPL-MCNC: 3.9 G/DL (ref 3.4–4.8)
ALBUMIN/GLOB SERPL: 1.3 G/DL (ref 1.5–2.5)
ALP SERPL-CCNC: 97 U/L (ref 35–104)
ALT SERPL W P-5'-P-CCNC: 17 U/L (ref 10–36)
ANION GAP SERPL CALCULATED.3IONS-SCNC: 10.2 MMOL/L (ref 3.6–11.2)
AST SERPL-CCNC: 23 U/L (ref 10–30)
BASOPHILS # BLD AUTO: 0.01 10*3/MM3 (ref 0–0.3)
BASOPHILS NFR BLD AUTO: 0.1 % (ref 0–2)
BILIRUB SERPL-MCNC: 0.3 MG/DL (ref 0.2–1.8)
BNP SERPL-MCNC: 99 PG/ML (ref 0–100)
BUN BLD-MCNC: 18 MG/DL (ref 7–21)
BUN/CREAT SERPL: 22.5 (ref 7–25)
CALCIUM SPEC-SCNC: 8.7 MG/DL (ref 7.7–10)
CHLORIDE SERPL-SCNC: 111 MMOL/L (ref 99–112)
CO2 SERPL-SCNC: 20.8 MMOL/L (ref 24.3–31.9)
CREAT BLD-MCNC: 0.8 MG/DL (ref 0.43–1.29)
CRP SERPL-MCNC: 11.48 MG/DL (ref 0–0.99)
DEPRECATED RDW RBC AUTO: 44 FL (ref 37–54)
EOSINOPHIL # BLD AUTO: 0.01 10*3/MM3 (ref 0–0.7)
EOSINOPHIL NFR BLD AUTO: 0.1 % (ref 0–7)
ERYTHROCYTE [DISTWIDTH] IN BLOOD BY AUTOMATED COUNT: 12.7 % (ref 11.5–14.5)
FLUAV AG NPH QL: NEGATIVE
FLUBV AG NPH QL IA: NEGATIVE
GFR SERPL CREATININE-BSD FRML MDRD: 70 ML/MIN/1.73
GLOBULIN UR ELPH-MCNC: 3.1 GM/DL
GLUCOSE BLD-MCNC: 96 MG/DL (ref 70–110)
GLUCOSE BLDC GLUCOMTR-MCNC: 106 MG/DL (ref 70–130)
HCT VFR BLD AUTO: 34.5 % (ref 37–47)
HGB BLD-MCNC: 10.9 G/DL (ref 12–16)
IMM GRANULOCYTES # BLD: 0.02 10*3/MM3 (ref 0–0.03)
IMM GRANULOCYTES NFR BLD: 0.1 % (ref 0–0.5)
L PNEUMO1 AG UR QL IA: NEGATIVE
LYMPHOCYTES # BLD AUTO: 2.16 10*3/MM3 (ref 1–3)
LYMPHOCYTES NFR BLD AUTO: 14 % (ref 16–46)
M PNEUMO IGM SER QL: NEGATIVE
MAGNESIUM SERPL-MCNC: 2.1 MG/DL (ref 1.7–2.6)
MCH RBC QN AUTO: 31.1 PG (ref 27–33)
MCHC RBC AUTO-ENTMCNC: 31.6 G/DL (ref 33–37)
MCV RBC AUTO: 98.6 FL (ref 80–94)
MONOCYTES # BLD AUTO: 1.76 10*3/MM3 (ref 0.1–0.9)
MONOCYTES NFR BLD AUTO: 11.4 % (ref 0–12)
NEUTROPHILS # BLD AUTO: 11.42 10*3/MM3 (ref 1.4–6.5)
NEUTROPHILS NFR BLD AUTO: 74.3 % (ref 40–75)
OSMOLALITY SERPL CALC.SUM OF ELEC: 284.9 MOSM/KG (ref 273–305)
PHOSPHATE SERPL-MCNC: 3.4 MG/DL (ref 2.7–4.5)
PLATELET # BLD AUTO: 177 10*3/MM3 (ref 130–400)
PMV BLD AUTO: 10.4 FL (ref 6–10)
POTASSIUM BLD-SCNC: 3.9 MMOL/L (ref 3.5–5.3)
PROT SERPL-MCNC: 7 G/DL (ref 6–8)
RBC # BLD AUTO: 3.5 10*6/MM3 (ref 4.2–5.4)
SODIUM BLD-SCNC: 142 MMOL/L (ref 135–153)
WBC NRBC COR # BLD: 15.38 10*3/MM3 (ref 4.5–12.5)

## 2017-12-12 PROCEDURE — 87804 INFLUENZA ASSAY W/OPTIC: CPT | Performed by: INTERNAL MEDICINE

## 2017-12-12 PROCEDURE — 93970 EXTREMITY STUDY: CPT

## 2017-12-12 PROCEDURE — 93970 EXTREMITY STUDY: CPT | Performed by: RADIOLOGY

## 2017-12-12 PROCEDURE — 86632 CHLAMYDIA IGM ANTIBODY: CPT | Performed by: INTERNAL MEDICINE

## 2017-12-12 PROCEDURE — C1751 CATH, INF, PER/CENT/MIDLINE: HCPCS

## 2017-12-12 PROCEDURE — G8979 MOBILITY GOAL STATUS: HCPCS

## 2017-12-12 PROCEDURE — 94799 UNLISTED PULMONARY SVC/PX: CPT

## 2017-12-12 PROCEDURE — 82962 GLUCOSE BLOOD TEST: CPT

## 2017-12-12 PROCEDURE — 87899 AGENT NOS ASSAY W/OPTIC: CPT | Performed by: INTERNAL MEDICINE

## 2017-12-12 PROCEDURE — 83880 ASSAY OF NATRIURETIC PEPTIDE: CPT | Performed by: INTERNAL MEDICINE

## 2017-12-12 PROCEDURE — G8978 MOBILITY CURRENT STATUS: HCPCS

## 2017-12-12 PROCEDURE — 83735 ASSAY OF MAGNESIUM: CPT | Performed by: INTERNAL MEDICINE

## 2017-12-12 PROCEDURE — 97116 GAIT TRAINING THERAPY: CPT

## 2017-12-12 PROCEDURE — 25010000002 CEFTRIAXONE: Performed by: INTERNAL MEDICINE

## 2017-12-12 PROCEDURE — 25010000002 ENOXAPARIN PER 10 MG: Performed by: INTERNAL MEDICINE

## 2017-12-12 PROCEDURE — 97530 THERAPEUTIC ACTIVITIES: CPT

## 2017-12-12 PROCEDURE — 97163 PT EVAL HIGH COMPLEX 45 MIN: CPT

## 2017-12-12 PROCEDURE — 80053 COMPREHEN METABOLIC PANEL: CPT | Performed by: INTERNAL MEDICINE

## 2017-12-12 PROCEDURE — 85025 COMPLETE CBC W/AUTO DIFF WBC: CPT | Performed by: INTERNAL MEDICINE

## 2017-12-12 PROCEDURE — 86140 C-REACTIVE PROTEIN: CPT | Performed by: INTERNAL MEDICINE

## 2017-12-12 PROCEDURE — 84100 ASSAY OF PHOSPHORUS: CPT | Performed by: INTERNAL MEDICINE

## 2017-12-12 RX ORDER — SODIUM CHLORIDE 0.9 % (FLUSH) 0.9 %
10 SYRINGE (ML) INJECTION EVERY 12 HOURS SCHEDULED
Status: DISCONTINUED | OUTPATIENT
Start: 2017-12-12 | End: 2017-12-19 | Stop reason: HOSPADM

## 2017-12-12 RX ORDER — SODIUM CHLORIDE 0.9 % (FLUSH) 0.9 %
10 SYRINGE (ML) INJECTION AS NEEDED
Status: DISCONTINUED | OUTPATIENT
Start: 2017-12-12 | End: 2017-12-19 | Stop reason: HOSPADM

## 2017-12-12 RX ORDER — L.ACID,CASEI/B.ANIMAL/S.THERMO 16B CELL
1 CAPSULE ORAL DAILY
Status: DISCONTINUED | OUTPATIENT
Start: 2017-12-12 | End: 2017-12-19 | Stop reason: HOSPADM

## 2017-12-12 RX ADMIN — OXYBUTYNIN CHLORIDE 10 MG: 5 TABLET, FILM COATED, EXTENDED RELEASE ORAL at 08:30

## 2017-12-12 RX ADMIN — Medication 10 ML: at 14:31

## 2017-12-12 RX ADMIN — Medication 1 CAPSULE: at 14:29

## 2017-12-12 RX ADMIN — PANTOPRAZOLE SODIUM 40 MG: 40 TABLET, DELAYED RELEASE ORAL at 05:37

## 2017-12-12 RX ADMIN — IPRATROPIUM BROMIDE AND ALBUTEROL SULFATE 3 ML: .5; 3 SOLUTION RESPIRATORY (INHALATION) at 19:10

## 2017-12-12 RX ADMIN — ACETAMINOPHEN AND CODEINE PHOSPHATE 1 TABLET: 30; 300 TABLET ORAL at 19:58

## 2017-12-12 RX ADMIN — GABAPENTIN 400 MG: 400 CAPSULE ORAL at 19:58

## 2017-12-12 RX ADMIN — ENOXAPARIN SODIUM 40 MG: 40 INJECTION SUBCUTANEOUS at 08:30

## 2017-12-12 RX ADMIN — GABAPENTIN 400 MG: 400 CAPSULE ORAL at 17:06

## 2017-12-12 RX ADMIN — TOPIRAMATE 100 MG: 100 TABLET, FILM COATED ORAL at 08:30

## 2017-12-12 RX ADMIN — ALPRAZOLAM 0.5 MG: 0.5 TABLET ORAL at 21:20

## 2017-12-12 RX ADMIN — ACETAMINOPHEN AND CODEINE PHOSPHATE 1 TABLET: 30; 300 TABLET ORAL at 05:53

## 2017-12-12 RX ADMIN — TOPIRAMATE 100 MG: 100 TABLET, FILM COATED ORAL at 19:58

## 2017-12-12 RX ADMIN — GABAPENTIN 400 MG: 400 CAPSULE ORAL at 08:43

## 2017-12-12 RX ADMIN — ACETAMINOPHEN AND CODEINE PHOSPHATE 1 TABLET: 30; 300 TABLET ORAL at 01:59

## 2017-12-12 RX ADMIN — DOXYCYCLINE 100 MG: 100 INJECTION, POWDER, LYOPHILIZED, FOR SOLUTION INTRAVENOUS at 05:37

## 2017-12-12 RX ADMIN — IPRATROPIUM BROMIDE AND ALBUTEROL SULFATE 3 ML: .5; 3 SOLUTION RESPIRATORY (INHALATION) at 06:50

## 2017-12-12 RX ADMIN — IPRATROPIUM BROMIDE AND ALBUTEROL SULFATE 3 ML: .5; 3 SOLUTION RESPIRATORY (INHALATION) at 00:13

## 2017-12-12 RX ADMIN — POLYETHYLENE GLYCOL (3350) 17 G: 17 POWDER, FOR SOLUTION ORAL at 08:31

## 2017-12-12 RX ADMIN — Medication 10 ML: at 19:59

## 2017-12-12 RX ADMIN — DOXYCYCLINE 100 MG: 100 INJECTION, POWDER, LYOPHILIZED, FOR SOLUTION INTRAVENOUS at 17:06

## 2017-12-12 RX ADMIN — DULOXETINE HYDROCHLORIDE 60 MG: 60 CAPSULE, DELAYED RELEASE ORAL at 08:30

## 2017-12-12 RX ADMIN — ASPIRIN 81 MG: 81 TABLET ORAL at 08:30

## 2017-12-12 RX ADMIN — POLYVINYL ALCOHOL, POVIDONE 1 DROP: 14; 6 SOLUTION/ DROPS OPHTHALMIC at 19:59

## 2017-12-12 RX ADMIN — IPRATROPIUM BROMIDE AND ALBUTEROL SULFATE 3 ML: .5; 3 SOLUTION RESPIRATORY (INHALATION) at 13:13

## 2017-12-12 RX ADMIN — POLYVINYL ALCOHOL, POVIDONE 1 DROP: 14; 6 SOLUTION/ DROPS OPHTHALMIC at 08:31

## 2017-12-12 RX ADMIN — CEFTRIAXONE 1 G: 1 INJECTION, POWDER, FOR SOLUTION INTRAMUSCULAR; INTRAVENOUS at 14:28

## 2017-12-12 NOTE — PLAN OF CARE
Problem: Patient Care Overview (Adult)  Goal: Plan of Care Review  Outcome: Ongoing (interventions implemented as appropriate)    12/11/17 1958 12/11/17 2218   Coping/Psychosocial Response Interventions   Plan Of Care Reviewed With patient --    Patient Care Overview   Progress --  progress toward functional goals as expected       Goal: Adult Individualization and Mutuality  Outcome: Ongoing (interventions implemented as appropriate)    Problem: Pneumonia (Adult)  Goal: Signs and Symptoms of Listed Potential Problems Will be Absent or Manageable (Pneumonia)  Outcome: Ongoing (interventions implemented as appropriate)    Problem: Fall Risk (Adult)  Goal: Identify Related Risk Factors and Signs and Symptoms  Outcome: Ongoing (interventions implemented as appropriate)  Goal: Absence of Falls  Outcome: Ongoing (interventions implemented as appropriate)    Problem: Pressure Ulcer Risk (Lopez Scale) (Adult,Obstetrics,Pediatric)  Goal: Identify Related Risk Factors and Signs and Symptoms  Outcome: Ongoing (interventions implemented as appropriate)  Goal: Skin Integrity  Outcome: Ongoing (interventions implemented as appropriate)

## 2017-12-12 NOTE — PROGRESS NOTES
Discharge Planning Assessment   [facility]     Patient Name: Domonique Cummings  MRN: [id number]  Today's Date: 12/11/2017    Admit Date: 12/11/2017          Discharge Needs Assessment       12/11/17 1914    Living Environment    Lives With alone    Living Arrangements apartment    Transportation Available car;family or friend will provide    Discharge Needs Assessment    Concerns To Be Addressed no discharge needs identified    Readmission Within The Last 30 Days no previous admission in last 30 days    Equipment Currently Used at Home none    Discharge Planning Comments PT IS BEING ADMITTED TO [facility] TO DR RAMSEY FOR PNEUMONIA.  VS: 102.3, 89, 18, 142/82, 93%  CO: COUGH, CONFUSION, AMS, WAS SEEN BY PCP THIS WEEK AND STARTED ON ANTIBIOTIC FOR DX OF PNEUMONIA. PT UNSURE OF NAME OF ANTIBIOTIC.   ER TX: NS BOLUS 500 ML, TYLENOL, ROCEPHIN IV, ZITHROMAX IV  LABS: WBC 16.75, BUN 14  CXRAY: LLL INFILTRATE CONCERNING FOR PNEUMONIA  CT HEAD: NEGATIVE  ABG: PH 7.42, PCO2 29.9, PO2 60.7  HX: DM, HTN  FLOOR ORDERS: NEBS Q 6 HRS, CBC/CMP IN AM, LOVENOX SQ QD, VIBRAMYCIN IV, ROCEPHIN IV, ABG PRN, O2 TITRATE, ABG PRN, ASA QD               Discharge Plan             Discharge Placement                     Demographic Summary       12/11/17 1913    Referral Information    Admission Type inpatient    Arrived From home or self-care    Referral Source admission list;emergency department    Reason For Consult discharge planning    Record Reviewed history and physical;medical record;patient profile    Primary Care Physician Information    Name DR RAMSEY            Functional Status       12/11/17 1914    Functional Status Current    Ambulation 0-->independent    Transferring 0-->independent    Toileting 0-->independent    Bathing 0-->independent    Dressing 0-->independent    Eating 0-->independent    Communication 0-->understands/communicates without difficulty    Functional Status Prior    Ambulation 0-->independent    Transferring  0-->independent    Toileting 0-->independent    Bathing 0-->independent    Dressing 0-->independent    Eating 0-->independent    Communication 0-->understands/communicates without difficulty            Psychosocial                 Abuse/Neglect                 Legal       12/11/17 1914    Legal    Legal Comments PT DOES NOT HAVE POA, LIVING WILL OR ADVANCED DIRECTIVES.            Substance Abuse                 Patient Forms               Lindsay Lin, RN

## 2017-12-12 NOTE — PROGRESS NOTES
Discharge Planning Assessment   [place]     Patient Name: Domonique Cummings  MRN: [id number]  Today's Date: 12/12/2017    Admit Date: 12/11/2017          Discharge Needs Assessment       12/12/17 1040    Living Environment    Lives With alone    Living Arrangements apartment   Pt lives at [facility]    Transportation Available car;family or friend will provide   Family provides transportation for pt.     Living Environment    Quality Of Family Relationships supportive;helpful;involved   Deepali Brooks, present , duing time of this review , and supportive    Able to Return to Prior Living Arrangements yes    Living Arrangement Comments Dtr & pt are hopeful for return back to physical  baseline and plans for returning home to her apartment.    Discharge Needs Assessment    Community Agency Name(S) Pt currently does not utilize home health services. She may require home health with P.T at dc. To be determined.    Equipment Currently Used at Home walker, rolling;commode   Pt has cpap that she doesnt wear    Equipment Needed After Discharge none    Discharge Disposition home or self-care            Discharge Plan       12/12/17 1105    Case Management/Social Work Plan    Plan Pt alert and talkative this am. She answers simple questions approp. Deepali Brooks, at bedside assisting pt with info. Pt lives alone at [facility]. She has a Rolling Walker, that she uses occasionally. Pt does not utilize home health services at this time. She may benefit from Home Health at dc ( to be determined on clinical progression). Pt plans to return back to her apartment when stable for dc. She currently denies any additional dc needs. Family provides transportation for pt.  CM will cont. to follow and assist as needed.    Additional Comments Pt admitted with c/o ongoing SOA and increased cough.  Pt has developed worsening symptoms causing difficulty with ADLs. CXR reveals PNA. She is receiving IV Abx          Discharge Placement     No  information found                Demographic Summary       12/12/17 1022    Referral Information    Admission Type inpatient    Arrived From admitted as an inpatient    Referral Source admission list    Reason For Consult discharge planning    Record Reviewed medical record    Primary Care Physician Information    Name Walter Meehan-PCP            Functional Status       12/12/17 1023    Functional Status Current    Current Functional Level Comment up with assist only    Change in Functional Status Since Onset of Current Illness/Injury other (see comments)   Pt reports difficulty with performing ADLs since onset of symptoms.    Functional Status Prior    Swallowing 2-->difficulty swallowing foods   Pt reports h/o swallowing difficulty for couple years requiring dilatation of esophagus.    IADL    IADL Comments Pt reports indep with ADLs prior to onset of symptoms. She is now having difficulty with ADLs since onset of illness.    Activity Tolerance    Usual Activity Tolerance fair    Current Activity Tolerance poor    Cognitive/Perceptual/Developmental    Current Mental Status/Cognitive Functioning other (see comments)   Pt alert, talkative , answering simple questions approp.    Recent Changes in Mental Status/Cognitive Functioning other (see comments)   Per MD notes, pt presented with confusion and AMS. Pt alert and talkative this am.    Employment/Financial    Financial Concerns none   Pt has Medicare and currently denies issues with meds. She denies having to do without any meds r/t cost. She uses [facility].             Psychosocial     None            Abuse/Neglect     None            Legal       12/12/17 1115    Legal    Legal Comments NO POA or AD- Pt declines info at this time.            Substance Abuse     None            Patient Forms     None          Tosha Ivory RN

## 2017-12-12 NOTE — PLAN OF CARE
Problem: Inpatient Physical Therapy  Goal: Transfer Training Goal 1 LTG- PT    12/12/17 1618   Transfer Training PT LTG   Transfer Training PT LTG, Date Established 12/12/17   Transfer Training PT LTG, Time to Achieve by discharge   Transfer Training PT LTG, Activity Type bed to chair /chair to bed;sit to stand/stand to sit   Transfer Training PT LTG, [place] Level contact guard assist;minimum assist (75% patient effort)   Transfer Training PT LTG, Assist Device other (see comments)  (with appropriate AD)       Goal: Gait Training Goal LTG- PT    12/12/17 1618   Gait Training PT LTG   Gait Training Goal PT LTG, Date Established 12/12/17   Gait Training Goal PT LTG, Time to Achieve by discharge   Gait Training Goal PT LTG, [place] Level minimum assist (75% patient effort);contact guard assist   Gait Training Goal PT LTG, Assist Device other (see comments)  (with appropriate AD)   Gait Training Goal PT LTG, Distance to Achieve 150

## 2017-12-12 NOTE — PLAN OF CARE
Problem: Patient Care Overview (Adult)  Goal: Adult Individualization and Mutuality  Outcome: Ongoing (interventions implemented as appropriate)  Goal: Discharge Needs Assessment  Outcome: Ongoing (interventions implemented as appropriate)    Problem: Pneumonia (Adult)  Goal: Signs and Symptoms of Listed Potential Problems Will be Absent or Manageable (Pneumonia)  Outcome: Ongoing (interventions implemented as appropriate)    Problem: Fall Risk (Adult)  Goal: Identify Related Risk Factors and Signs and Symptoms  Outcome: Ongoing (interventions implemented as appropriate)  Goal: Absence of Falls  Outcome: Ongoing (interventions implemented as appropriate)    Problem: Pressure Ulcer Risk (Lopez Scale) (Adult,Obstetrics,Pediatric)  Goal: Identify Related Risk Factors and Signs and Symptoms  Outcome: Ongoing (interventions implemented as appropriate)  Goal: Skin Integrity  Outcome: Ongoing (interventions implemented as appropriate)

## 2017-12-12 NOTE — PROGRESS NOTES
LOS: 1 day       Chief Complaint :  Shortness of breath   Subjective     Interval History:     Patient Complaints:  Domonique Cummings  has been admitted with pneumonia.  She is alert and talking.  She notes that her cough, congestion and shortness of breath persists.  She describes weakness associated with her symptoms which does not allow her to perform any of her activities of daily living.  She denies any associated fevers, chills, hemoptysis.  She does have a mild intermittent productive cough.  She has no other complaints at this time.  She describes an uneventful night.  She continues to tolerate IV antibiotics.    Review of Systems  Objective     Vital Signs  Temp:  [98.5 °F (36.9 °C)-102.3 °F (39.1 °C)] 98.5 °F (36.9 °C)  Heart Rate:  [50-89] 58  Resp:  [18-20] 18  BP: ()/() 100/46    Physical Exam    Constitutional: She is oriented to person, place, and time.   Obese white female who is alert and talkative in no obvious distress at rest.   HENT:   Head: Normocephalic and atraumatic.   Right Ear: External ear normal.   Left Ear: External ear normal.   Nose: Nose normal.   Mouth/Throat: Oropharynx is clear and moist. No oropharyngeal exudate.   Eyes: Conjunctivae and EOM are normal. Pupils are equal, round, and reactive to light. Right eye exhibits no discharge. Left eye exhibits no discharge. No scleral icterus.   Neck: Normal range of motion. Neck supple. No JVD present. No tracheal deviation present. No thyromegaly present.   Cardiovascular: Normal rate, regular rhythm, normal heart sounds and intact distal pulses.  Exam reveals no gallop and no friction rub.    No murmur heard.  Pulmonary/Chest: Effort normal. No stridor. She has wheezes.   Scattered rhonchi bilaterally, diminished breath sounds at lung bases.  Interrupted speech secondary to shortness of breath.  Accessory muscle use is present.  Respiratory distress with speech.   Abdominal: Soft. Bowel sounds are normal. She exhibits no  distension and no mass. There is no tenderness. There is no guarding. No hernia.   Genitourinary:   Genitourinary Comments: No Freitas catheter   Neurological: She is alert and oriented to person, place, and time. She displays normal reflexes. No cranial nerve deficit. Coordination normal.   Skin: Skin is warm and dry. No rash noted. No erythema. No pallor.   Psychiatric: She has a normal mood and affect. Her behavior is normal. Judgment and thought content normal.   Nursing note and vitals reviewed.      Results Review:     I reviewed the patient's new clinical results  : See Below  MEDICATIONS:    amLODIPine 5 mg Oral Q24H   aspirin 81 mg Oral Daily   ceftriaxone 1 g Intravenous Q24H   And      doxycycline 100 mg Intravenous Q12H   DULoxetine 60 mg Oral Daily   enoxaparin 40 mg Subcutaneous Daily   gabapentin 400 mg Oral TID   ipratropium-albuterol 3 mL Nebulization Q6H - RT   losartan 100 mg Oral Q24H   metoprolol succinate XL 25 mg Oral Q24H   oxybutynin XL 10 mg Oral Daily   pantoprazole 40 mg Oral Q AM   polyethylene glycol 17 g Oral Daily   polyvinyl alcohol-povidone 1 drop Both Eyes Q12H   topiramate 100 mg Oral Q12H       LABS:        Results from last 7 days  Lab Units 12/12/17  0543 12/12/17  0147 12/11/17  1346 12/11/17  1333   CRP mg/dL  --  11.48*  --   --    LACTATE mmol/L  --   --  0.9  --    WBC 10*3/mm3 15.38*  --   --  16.75*   HEMOGLOBIN g/dL 10.9*  --   --  12.8   HEMATOCRIT % 34.5*  --   --  37.9   MCV fL 98.6*  --   --  96.4*   MCHC g/dL 31.6*  --   --  33.8   PLATELETS 10*3/mm3 177  --   --  203   INR   --   --   --  0.98       Results from last 7 days  Lab Units 12/11/17  1413   PH, ARTERIAL pH units 7.431   PO2 ART mm Hg 60.7*   PCO2, ARTERIAL mm Hg 29.9*   HCO3 ART mmol/L 19.4*       Results from last 7 days  Lab Units 12/12/17  0147 12/11/17  1333   SODIUM mmol/L 142 140   POTASSIUM mmol/L 3.9 3.8   MAGNESIUM mg/dL 2.1 2.0   CHLORIDE mmol/L 111 111   CO2 mmol/L 20.8* 20.8*   BUN mg/dL 18 14    CREATININE mg/dL 0.80 0.85   EGFR IF NONAFRICN AM mL/min/1.73 70 66   CALCIUM mg/dL 8.7 9.4   GLUCOSE mg/dL 96 133*   ALBUMIN g/dL 3.90 4.20   BILIRUBIN mg/dL 0.3 0.4   ALK PHOS U/L 97 102   AST (SGOT) U/L 23 20   ALT (SGPT) U/L 17 15   Estimated Creatinine Clearance: 65.7 mL/min (by C-G formula based on Cr of 0.8).  No results found for: AMMONIA      Blood Culture   Date Value Ref Range Status   12/11/2017 No growth at less than 24 hours  Preliminary   12/11/2017 No growth at less than 24 hours  Preliminary                  Assessment/Plan    1) pneumonia of left lower lobe  2) sepsis  3) metabolic encephalopathy-present at the time of admission  4) leukocytosis  5) anemia-multifactorial  6) DVT prophylaxis-subcutaneous Lovenox   See orders entered.     Skinny Meehan MD  12/12/17  7:47 AM

## 2017-12-12 NOTE — THERAPY EVALUATION
Acute Care - Physical Therapy Initial Evaluation/Treatment Note  JANEE Peraza     Patient Name: Domonique Cummings  : 1945  MRN: [id number]  Today's Date: 2017   Onset of Illness/Injury or Date of Surgery Date: 17 (admit date)  Date of Referral to PT: 17 (admit date)  Referring Physician: Zoran      Admit Date: 2017     Visit Dx:    ICD-10-CM ICD-9-CM   1. Pneumonia of left lower lobe due to infectious organism J18.1 486   2. Altered mental status, unspecified altered mental status type R41.82 780.97     Patient Active Problem List   Diagnosis   • Pneumonia of left lower lobe due to infectious organism     Past Medical History:   Diagnosis Date   • Arthritis    • Asthma    • Degenerative disc disease, lumbar    • Diabetes mellitus    • Fibromyalgia    • GERD (gastroesophageal reflux disease)    • Hyperlipidemia    • Hypertension    • Osteoporosis      Past Surgical History:   Procedure Laterality Date   • APPENDECTOMY     • ESOPHAGEAL DILATATION     • TOTAL SHOULDER REPLACEMENT Bilateral    • TUBAL ABDOMINAL LIGATION            PT ASSESSMENT (last 72 hours)      PT Evaluation       17 1601 17 1040    Rehab Evaluation    Document Type evaluation;therapy note (daily note)  -CT     Subjective Information agree to therapy;complains of;weakness  -CT     Patient Effort, Rehab Treatment good  -CT     Symptoms Noted During/After Treatment fatigue  -CT     Symptoms Noted Comment Pt tolerated today evaluation and treatment session well with rest breaks provided as needed. Pt able to ambualte with use of RW Min A.   -CT     General Information    Patient Profile Review yes  -CT     Onset of Illness/Injury or Date of Surgery Date 17   admit date  -CT     Referring Physician Zroan  -CT     Precautions/Limitations fall precautions;oxygen therapy device and L/min  -CT     Prior Level of Function independent:;all household mobility;community mobility   with use of AD  -CT      Equipment Currently Used at Home walker, rolling;commode  -CT walker, rolling;commode   Pt has cpap that she doesnt wear  -MW    Plans/Goals Discussed With patient;agreed upon  -CT     Risks Reviewed patient:;LOB;nausea/vomiting;dizziness;increased discomfort;change in vital signs;increased drainage;lines disloged  -CT     Benefits Reviewed patient:;improve function;increase independence;increase strength;increase balance;decrease pain;decrease risk of DVT;improve skin integrity;increase knowledge  -CT     Barriers to Rehab medically complex;physical barrier  -CT     Living Environment    Lives With alone  -CT alone  -MW    Living Arrangements apartment  -CT apartment   Pt lives at [facility]  -MW    Home Accessibility no concerns  -CT     Transportation Available  car;family or friend will provide   Family provides transportation for pt.   -MW    Clinical Impression    Date of Referral to PT 12/12/17   admit date  -CT     Functional Level At Time Of Evaluation Min/Mod A x 2  -CT     Patient/Family Goals Statement Pt goals are to return to PLOF  -CT     Criteria for Skilled Therapeutic Interventions Met yes;treatment indicated  -CT     Pathology/Pathophysiology Noted (Describe Specifically for Each System) musculoskeletal;neuromuscular  -CT     Impairments Found (describe specific impairments) aerobic capacity/endurance;gait, locomotion, and balance  -CT     Functional Limitations in Following Categories (Describe Specific Limitations) self-care;home management  -CT     Rehab Potential good, to achieve stated therapy goals  -CT     Predicted Duration of Therapy Intervention (days/wks) length of stay  -CT     Pain Assessment    Pain Assessment Castillo-Trotter FACES  -CT     Castillo-Trotter FACES Pain Rating 0  -CT     Cognitive Assessment/Intervention    Current Cognitive/Communication Assessment functional  -CT     Orientation Status oriented to;oriented x 4  -CT     Follows Commands/Answers Questions able to follow  multi-step instructions;100% of the time  -CT     Personal Safety mild impairment  -CT     Personal Safety Interventions fall prevention program maintained;gait belt;muscle strengthening facilitated;nonskid shoes/slippers when out of bed  -CT     ROM (Range of Motion)    General ROM Detail BLE grossly WFL  -CT     MMT (Manual Muscle Testing)    General MMT Assessment Detail BLE grossly 4/5  -CT     Bed Mobility, Assessment/Treatment    Bed Mobility, Assistive Device bed rails  -CT     Bed Mobility, Roll Left, [place] moderate assist (50% patient effort)  -CT     Bed Mobility, Roll Right, [place] moderate assist (50% patient effort)  -CT     Bed Mobility, Scoot/Bridge, [place] minimum assist (75% patient effort)  -CT     Bed Mob, Supine to Sit, [place] minimum assist (75% patient effort);moderate assist (50% patient effort)  -CT     Bed Mobility, Impairments strength decreased  -CT     Transfer Assessment/Treatment    Transfers, Sit-Stand [place] moderate assist (50% patient effort)  -CT     Transfers, Stand-Sit [place] moderate assist (50% patient effort)  -CT     Transfers, Sit-Stand-Sit, Assist Device rolling walker  -CT     Gait Assessment/Treatment    Gait, [place] Level minimum assist (75% patient effort);2 person assist required;verbal cues required;nonverbal cues required (demo/gesture)  -CT     Gait, Assistive Device rolling walker  -CT     Gait, Distance (Feet) 150  -CT     Gait, Gait Pattern Analysis swing-to gait  -CT     Gait, Gait Deviations elizabeth decreased;forward flexed posture;step length decreased;weight-shifting ability decreased  -CT     Gait, Safety Issues step length decreased  -CT     Positioning and Restraints    Pre-Treatment Position in bed  -CT     Post Treatment Position chair  -CT     In Chair sitting;call light within reach;encouraged to call for assist  -CT       12/11/17 9815 12/11/17 1176    General Information    Equipment Currently Used at Home  none  -PL none  -CP    Living Environment    Lives With alone  -PL alone  -CP    Living Arrangements apartment  -PL apartment  -CP    Home Accessibility  no concerns  -CP    Stair Railings at Home  none  -CP    Type of Financial/Environmental Concern  none  -CP    Transportation Available car;family or friend will provide  -PL family or friend will provide  -CP      User Key  (r) = Recorded By, (t) = Taken By, (c) = Cosigned By    Initials Name Provider Type    MW Tosha Ivory, RN Registered Nurse    CT Moriah Vegas PT Physical Therapist    PL Lindsay Lin, ZOË Case Manager    CP Angeli Mackey RN Registered Nurse          Physical Therapy Education     Title: PT OT SLP Therapies (Done)     Topic: Physical Therapy (Done)     Point: Mobility training (Done)    Learning Progress Summary    Learner Readiness Method Response Comment Documented by Status   Patient Acceptance [facility] 12/12/17 1619 Done               Point: Home exercise program (Done)    Learning Progress Summary    Learner Readiness Method Response Comment Documented by Status   Patient Acceptance E [facility] 12/12/17 1619 Done               Point: Body mechanics (Done)    Learning Progress Summary    Learner Readiness Method Response Comment Documented by Status   Patient Acceptance E [facility] 12/12/17 1619 Done               Point: Precautions (Done)    Learning Progress Summary    Learner Readiness Method Response Comment Documented by Status   Patient Acceptance E [facility] 12/12/17 1619 Done                      User Key     Initials Effective Dates Name Provider Type Discipline    CT 03/14/16 -  Moriah Vegas PT Physical Therapist PT                PT Recommendation and Plan  Anticipated Equipment Needs At Discharge:  (to be determined)  Anticipated Discharge Disposition: home with assist  Planned Therapy Interventions: balance training, bed mobility training, gait training, home exercise program, motor coordination training,  neuromuscular re-education, patient/family education, strengthening, transfer training  PT Frequency: 3-5 times/wk, per priority policy             IP PT Goals       12/12/17 1618          Transfer Training PT LTG    Transfer Training PT LTG, Date Established 12/12/17  -CT      Transfer Training PT LTG, Time to Achieve by discharge  -CT      Transfer Training PT LTG, Activity Type bed to chair /chair to bed;sit to stand/stand to sit  -CT      Transfer Training PT LTG, [place] Level contact guard assist;minimum assist (75% patient effort)  -CT      Transfer Training PT LTG, Assist Device other (see comments)   with appropriate AD  -CT      Gait Training PT LTG    Gait Training Goal PT LTG, Date Established 12/12/17  -CT      Gait Training Goal PT LTG, Time to Achieve by discharge  -CT      Gait Training Goal PT LTG, [place] Level minimum assist (75% patient effort);contact guard assist  -CT      Gait Training Goal PT LTG, Assist Device other (see comments)   with appropriate AD  -CT      Gait Training Goal PT LTG, Distance to Achieve 150  -CT        User Key  (r) = Recorded By, (t) = Taken By, (c) = Cosigned By    Initials Name Provider Type    CT Moriah Vegas PT Physical Therapist                Outcome Measures       12/12/17 1600          How much help from another person do you currently need...    Turning from your back to your side while in flat bed without using bedrails? 3  -CT      Moving from lying on back to sitting on the side of a flat bed without bedrails? 3  -CT      Moving to and from a bed to a chair (including a wheelchair)? 3  -CT      Standing up from a chair using your arms (e.g., wheelchair, bedside chair)? 3  -CT      Climbing 3-5 steps with a railing? 2  -CT      To walk in hospital room? 3  -CT      AM-PAC 6 Clicks Score 17  -CT      Functional Assessment    Outcome Measure Options AM-PAC 6 Clicks Basic Mobility (PT)  -CT        User Key  (r) = Recorded By, (t) = Taken By, (c) =  Cosigned By    Initials Name Provider Type    CT Moriah Vegas, CLARA Physical Therapist           Time Calculation:         PT Charges       12/12/17 1620          Time Calculation    PT Received On 12/12/17  -CT      PT - Next Appointment 12/13/17  -CT      PT Goal Re-Cert Due Date 12/26/17  -CT      Time Calculation- PT    Total Timed Code Minutes- PT 55 minute(s)  -CT        User Key  (r) = Recorded By, (t) = Taken By, (c) = Cosigned By    Initials Name Provider Type    CT Moriah Vegas PT Physical Therapist          Therapy Charges for Today     Code Description Service Date Service Provider Modifiers Qty    22707076983 HC PT MOBILITY CURRENT 12/12/2017 Moriah Vegas, PT GP, CK 1    82473193183 HC PT MOBILITY PROJECTED 12/12/2017 Moriah Vegas, PT GP, CJ 1    74808771083 HC PT EVAL HIGH COMPLEXITY 2 12/12/2017 Moriah Vegas, PT GP 1    09405648464 HC GAIT TRAINING EA 15 MIN 12/12/2017 Moriah Vegas, PT GP 1    25395513786 HC PT THERAPEUTIC ACT EA 15 MIN 12/12/2017 Moriah Vegas, PT GP 1    77284627254 HC PT THER SUPP EA 15 MIN 12/12/2017 Moriah Vegas, PT GP 4          PT G-Codes  Outcome Measure Options: AM-PAC 6 Clicks Basic Mobility (PT)  Score: 17  Functional Limitation: Mobility: Walking and moving around  Mobility: Walking and Moving Around Current Status (): At least 40 percent but less than 60 percent impaired, limited or restricted  Mobility: Walking and Moving Around Goal Status (): At least 20 percent but less than 40 percent impaired, limited or restricted      Moriah Vegas PT  12/12/2017

## 2017-12-12 NOTE — PROGRESS NOTES
Discharge Planning Assessment   [place]     Patient Name: Domonique Cummings  MRN: [id number]  Today's Date: 12/12/2017    Admit Date: 12/11/2017          Discharge Needs Assessment       12/12/17 1040    Living Environment    Lives With alone    Living Arrangements apartment   Pt lives at [facility]    Transportation Available car;family or friend will provide   Family provides transportation for pt.     Living Environment    Quality Of Family Relationships supportive;helpful;involved   Deepali Brooks, present , duing time of this review , and supportive    Able to Return to Prior Living Arrangements yes    Living Arrangement Comments Dtr & pt are hopeful for return back to physical  baseline and plans for returning home to her apartment.    Discharge Needs Assessment    Community Agency Name(S) Pt currently does not utilize home health services. She may require home health with P.T at dc. To be determined.    Equipment Currently Used at Home walker, rolling;commode   Pt has cpap that she doesnt wear    Equipment Needed After Discharge none    Discharge Disposition home or self-care            Discharge Plan       12/12/17 1105    Case Management/Social Work Plan    Plan Pt alert and talkative this am. She answers simple questions approp. Deepali Brooks, at bedside assisting pt with info. Pt lives alone at [facility]. She has a Rolling Walker, that she uses occasionally. Pt does not utilize home health services at this time. She may benefit from Home Health at dc ( to be determined on clinical progression). Pt plans to return back to her apartment when stable for dc. She currently denies any additional dc needs. Family provides transportation for pt.  CM will cont. to follow and assist as needed.    Additional Comments Pt admitted with c/o ongoing SOA and increased cough.  Pt has developed worsening symptoms causing difficulty with ADLs. CXR reveals PNA. She is receiving IV Abx          Discharge Placement     No  information found                Demographic Summary       12/12/17 1022    Referral Information    Admission Type inpatient    Arrived From admitted as an inpatient    Referral Source admission list    Reason For Consult discharge planning    Record Reviewed medical record    Primary Care Physician Information    Name Dr. Meehan-PCP            Functional Status       12/12/17 1023    Functional Status Current    Current Functional Level Comment up with assist only    Change in Functional Status Since Onset of Current Illness/Injury other (see comments)   Pt reports difficulty with performing ADLs since onset of symptoms.    Functional Status Prior    Swallowing 2-->difficulty swallowing foods   Pt reports h/o swallowing difficulty for couple years requiring , what sounds like,  dilatation of esophagus.    IADL    IADL Comments Pt reports indep with ADLs prior to onset of symptoms. She is now having difficulty with ADLs since onset of illness.    Activity Tolerance    Usual Activity Tolerance fair    Current Activity Tolerance poor    Cognitive/Perceptual/Developmental    Current Mental Status/Cognitive Functioning other (see comments)   Pt alert, talkative , answering simple questions approp.    Recent Changes in Mental Status/Cognitive Functioning other (see comments)   Per MD notes, pt presented with confusion and AMS. Pt alert and talkative this am.    Employment/Financial    Financial Concerns none   Pt has Medicare and currently denies issues with meds. She denies having to do without any meds r/t cost. She uses [facility].             Psychosocial     None            Abuse/Neglect     None            Legal       12/12/17 1115    Legal    Legal Comments NO POA or AD- Pt declines info at this time.            Substance Abuse     None            Patient Forms     None          Tosha Ivory, RN

## 2017-12-13 LAB
ALBUMIN SERPL-MCNC: 3.5 G/DL (ref 3.4–4.8)
ALBUMIN/GLOB SERPL: 1.1 G/DL (ref 1.5–2.5)
ALP SERPL-CCNC: 77 U/L (ref 35–104)
ALT SERPL W P-5'-P-CCNC: 16 U/L (ref 10–36)
ANION GAP SERPL CALCULATED.3IONS-SCNC: 4.9 MMOL/L (ref 3.6–11.2)
AST SERPL-CCNC: 18 U/L (ref 10–30)
BASOPHILS # BLD AUTO: 0.01 10*3/MM3 (ref 0–0.3)
BASOPHILS NFR BLD AUTO: 0.1 % (ref 0–2)
BILIRUB SERPL-MCNC: 0.2 MG/DL (ref 0.2–1.8)
BUN BLD-MCNC: 16 MG/DL (ref 7–21)
BUN/CREAT SERPL: 20.3 (ref 7–25)
CALCIUM SPEC-SCNC: 8.7 MG/DL (ref 7.7–10)
CHLORIDE SERPL-SCNC: 112 MMOL/L (ref 99–112)
CO2 SERPL-SCNC: 23.1 MMOL/L (ref 24.3–31.9)
CREAT BLD-MCNC: 0.79 MG/DL (ref 0.43–1.29)
CRP SERPL-MCNC: 9.14 MG/DL (ref 0–0.99)
DEPRECATED RDW RBC AUTO: 42.6 FL (ref 37–54)
EOSINOPHIL # BLD AUTO: 0.07 10*3/MM3 (ref 0–0.7)
EOSINOPHIL NFR BLD AUTO: 0.7 % (ref 0–7)
ERYTHROCYTE [DISTWIDTH] IN BLOOD BY AUTOMATED COUNT: 12.4 % (ref 11.5–14.5)
GFR SERPL CREATININE-BSD FRML MDRD: 71 ML/MIN/1.73
GLOBULIN UR ELPH-MCNC: 3.1 GM/DL
GLUCOSE BLD-MCNC: 104 MG/DL (ref 70–110)
HCT VFR BLD AUTO: 33.1 % (ref 37–47)
HGB BLD-MCNC: 10.3 G/DL (ref 12–16)
IMM GRANULOCYTES # BLD: 0.02 10*3/MM3 (ref 0–0.03)
IMM GRANULOCYTES NFR BLD: 0.2 % (ref 0–0.5)
LYMPHOCYTES # BLD AUTO: 3.16 10*3/MM3 (ref 1–3)
LYMPHOCYTES NFR BLD AUTO: 31.7 % (ref 16–46)
MAGNESIUM SERPL-MCNC: 1.9 MG/DL (ref 1.7–2.6)
MCH RBC QN AUTO: 30.5 PG (ref 27–33)
MCHC RBC AUTO-ENTMCNC: 31.1 G/DL (ref 33–37)
MCV RBC AUTO: 97.9 FL (ref 80–94)
MONOCYTES # BLD AUTO: 1.26 10*3/MM3 (ref 0.1–0.9)
MONOCYTES NFR BLD AUTO: 12.7 % (ref 0–12)
NEUTROPHILS # BLD AUTO: 5.44 10*3/MM3 (ref 1.4–6.5)
NEUTROPHILS NFR BLD AUTO: 54.6 % (ref 40–75)
OSMOLALITY SERPL CALC.SUM OF ELEC: 280.9 MOSM/KG (ref 273–305)
PHOSPHATE SERPL-MCNC: 3.5 MG/DL (ref 2.7–4.5)
PLATELET # BLD AUTO: 170 10*3/MM3 (ref 130–400)
PMV BLD AUTO: 10.3 FL (ref 6–10)
POTASSIUM BLD-SCNC: 3.7 MMOL/L (ref 3.5–5.3)
PROCALCITONIN SERPL-MCNC: 0.2 NG/ML (ref 0–0.08)
PROT SERPL-MCNC: 6.6 G/DL (ref 6–8)
RBC # BLD AUTO: 3.38 10*6/MM3 (ref 4.2–5.4)
SODIUM BLD-SCNC: 140 MMOL/L (ref 135–153)
WBC NRBC COR # BLD: 9.96 10*3/MM3 (ref 4.5–12.5)

## 2017-12-13 PROCEDURE — 83735 ASSAY OF MAGNESIUM: CPT | Performed by: INTERNAL MEDICINE

## 2017-12-13 PROCEDURE — 97167 OT EVAL HIGH COMPLEX 60 MIN: CPT

## 2017-12-13 PROCEDURE — 87186 SC STD MICRODIL/AGAR DIL: CPT | Performed by: INTERNAL MEDICINE

## 2017-12-13 PROCEDURE — 87205 SMEAR GRAM STAIN: CPT | Performed by: INTERNAL MEDICINE

## 2017-12-13 PROCEDURE — 25010000002 CEFTRIAXONE: Performed by: INTERNAL MEDICINE

## 2017-12-13 PROCEDURE — 87147 CULTURE TYPE IMMUNOLOGIC: CPT | Performed by: INTERNAL MEDICINE

## 2017-12-13 PROCEDURE — 94799 UNLISTED PULMONARY SVC/PX: CPT

## 2017-12-13 PROCEDURE — 85025 COMPLETE CBC W/AUTO DIFF WBC: CPT | Performed by: INTERNAL MEDICINE

## 2017-12-13 PROCEDURE — 86140 C-REACTIVE PROTEIN: CPT | Performed by: INTERNAL MEDICINE

## 2017-12-13 PROCEDURE — 87077 CULTURE AEROBIC IDENTIFY: CPT | Performed by: INTERNAL MEDICINE

## 2017-12-13 PROCEDURE — 84100 ASSAY OF PHOSPHORUS: CPT | Performed by: INTERNAL MEDICINE

## 2017-12-13 PROCEDURE — 80053 COMPREHEN METABOLIC PANEL: CPT | Performed by: INTERNAL MEDICINE

## 2017-12-13 PROCEDURE — 97116 GAIT TRAINING THERAPY: CPT

## 2017-12-13 PROCEDURE — 97110 THERAPEUTIC EXERCISES: CPT

## 2017-12-13 PROCEDURE — 25010000002 ENOXAPARIN PER 10 MG: Performed by: INTERNAL MEDICINE

## 2017-12-13 PROCEDURE — 87070 CULTURE OTHR SPECIMN AEROBIC: CPT | Performed by: INTERNAL MEDICINE

## 2017-12-13 RX ADMIN — Medication 10 ML: at 19:54

## 2017-12-13 RX ADMIN — ALPRAZOLAM 0.5 MG: 0.5 TABLET ORAL at 19:54

## 2017-12-13 RX ADMIN — ACETAMINOPHEN AND CODEINE PHOSPHATE 1 TABLET: 30; 300 TABLET ORAL at 15:13

## 2017-12-13 RX ADMIN — DOXYCYCLINE 100 MG: 100 INJECTION, POWDER, LYOPHILIZED, FOR SOLUTION INTRAVENOUS at 18:06

## 2017-12-13 RX ADMIN — LOSARTAN POTASSIUM 100 MG: 50 TABLET, FILM COATED ORAL at 08:30

## 2017-12-13 RX ADMIN — ENOXAPARIN SODIUM 40 MG: 40 INJECTION SUBCUTANEOUS at 08:31

## 2017-12-13 RX ADMIN — TOPIRAMATE 100 MG: 100 TABLET, FILM COATED ORAL at 19:53

## 2017-12-13 RX ADMIN — IPRATROPIUM BROMIDE AND ALBUTEROL SULFATE 3 ML: .5; 3 SOLUTION RESPIRATORY (INHALATION) at 13:06

## 2017-12-13 RX ADMIN — POLYVINYL ALCOHOL, POVIDONE 1 DROP: 14; 6 SOLUTION/ DROPS OPHTHALMIC at 19:53

## 2017-12-13 RX ADMIN — ASPIRIN 81 MG: 81 TABLET ORAL at 08:31

## 2017-12-13 RX ADMIN — ACETAMINOPHEN AND CODEINE PHOSPHATE 1 TABLET: 30; 300 TABLET ORAL at 19:54

## 2017-12-13 RX ADMIN — IPRATROPIUM BROMIDE AND ALBUTEROL SULFATE 3 ML: .5; 3 SOLUTION RESPIRATORY (INHALATION) at 06:23

## 2017-12-13 RX ADMIN — CEFTRIAXONE 1 G: 1 INJECTION, POWDER, FOR SOLUTION INTRAMUSCULAR; INTRAVENOUS at 08:31

## 2017-12-13 RX ADMIN — AMLODIPINE BESYLATE 5 MG: 5 TABLET ORAL at 08:30

## 2017-12-13 RX ADMIN — DULOXETINE HYDROCHLORIDE 60 MG: 60 CAPSULE, DELAYED RELEASE ORAL at 08:30

## 2017-12-13 RX ADMIN — OXYBUTYNIN CHLORIDE 10 MG: 5 TABLET, FILM COATED, EXTENDED RELEASE ORAL at 08:30

## 2017-12-13 RX ADMIN — TOPIRAMATE 100 MG: 100 TABLET, FILM COATED ORAL at 08:30

## 2017-12-13 RX ADMIN — IPRATROPIUM BROMIDE AND ALBUTEROL SULFATE 3 ML: .5; 3 SOLUTION RESPIRATORY (INHALATION) at 00:38

## 2017-12-13 RX ADMIN — POLYETHYLENE GLYCOL (3350) 17 G: 17 POWDER, FOR SOLUTION ORAL at 08:31

## 2017-12-13 RX ADMIN — GABAPENTIN 400 MG: 400 CAPSULE ORAL at 19:54

## 2017-12-13 RX ADMIN — METOPROLOL SUCCINATE 25 MG: 25 TABLET, FILM COATED, EXTENDED RELEASE ORAL at 08:30

## 2017-12-13 RX ADMIN — PANTOPRAZOLE SODIUM 40 MG: 40 TABLET, DELAYED RELEASE ORAL at 05:36

## 2017-12-13 RX ADMIN — POLYVINYL ALCOHOL, POVIDONE 1 DROP: 14; 6 SOLUTION/ DROPS OPHTHALMIC at 08:31

## 2017-12-13 RX ADMIN — Medication 1 CAPSULE: at 08:31

## 2017-12-13 RX ADMIN — ACETAMINOPHEN AND CODEINE PHOSPHATE 1 TABLET: 30; 300 TABLET ORAL at 04:44

## 2017-12-13 RX ADMIN — DOXYCYCLINE 100 MG: 100 INJECTION, POWDER, LYOPHILIZED, FOR SOLUTION INTRAVENOUS at 05:36

## 2017-12-13 RX ADMIN — IPRATROPIUM BROMIDE AND ALBUTEROL SULFATE 3 ML: .5; 3 SOLUTION RESPIRATORY (INHALATION) at 19:12

## 2017-12-13 RX ADMIN — ALPRAZOLAM 0.5 MG: 0.5 TABLET ORAL at 08:30

## 2017-12-13 RX ADMIN — GABAPENTIN 400 MG: 400 CAPSULE ORAL at 04:44

## 2017-12-13 RX ADMIN — Medication 10 ML: at 08:34

## 2017-12-13 RX ADMIN — GABAPENTIN 400 MG: 400 CAPSULE ORAL at 18:06

## 2017-12-13 NOTE — NURSING NOTE
[facility] ADMISSION NOTE:  Patient enrolled in the TEENA program to assist with education and support during transition home from hospital. TEENA home  will see patient at home within 48 hours of discharge and will follow up with patient in home and telephonically for 6 weeks post discharge under the Karma Model.    Clinical Assessment Instrument Scores:  Short Portable Mental Status Questionnaire- 4  Geriatric Depression Scale-9  Instrumental Activities of Daily Living-4  SEALS Activities of Daily Living-5  Overall Quality of Life- 1  Subjective Health Rating- 1  Symptom Bother Scale-27  Generalized Anxiety Scale-9  Health Literacy Test- 4

## 2017-12-13 NOTE — PROGRESS NOTES
LOS: 2 days       Chief Complaint :  Shortness of breath   Subjective     Interval History:     Patient Complaints:  Domonique Cummings  remains alert and talkative in no obvious distress.  She notes her shortness breath is slightly better.  She denies any chest pain, fever, chills, nausea or vomiting.  She does continue with a productive cough and generalized weakness.  She denies any associated hemoptysis.  She's had no more confusion.  Telemetry continues with sinus rhythm.  Nurses describe no acute changes or events in the last 24 hours.   She continues to tolerate IV antibiotics.    Review of Systems-unchanged from recent history and physical  Objective     Vital Signs  Temp:  [97.6 °F (36.4 °C)-98.7 °F (37.1 °C)] 98.6 °F (37 °C)  Heart Rate:  [55-70] 70  Resp:  [18] 18  BP: (105-129)/(49-68) 105/63    Physical Exam    Constitutional: She is oriented to person, place, and time.   Obese white female who is alert and talkative in no obvious distress at rest.   HENT:   Head: Normocephalic and atraumatic.   Right Ear: External ear normal.   Left Ear: External ear normal.   Nose: Nose normal.   Mouth/Throat: Oropharynx is clear and moist. No oropharyngeal exudate.   Eyes: Conjunctivae and EOM are normal. Pupils are equal, round, and reactive to light. Right eye exhibits no discharge. Left eye exhibits no discharge. No scleral icterus.   Neck: Normal range of motion. Neck supple. No JVD present. No tracheal deviation present. No thyromegaly present.   Cardiovascular: Normal rate, regular rhythm, normal heart sounds and intact distal pulses.  Exam reveals no gallop and no friction rub.    No murmur heard.  Pulmonary/Chest: Effort normal. No stridor.  Trace expiratory wheezing   Scattered rhonchi bilaterally, diminished breath sounds at lung bases.   Abdominal: Soft. Bowel sounds are normal. She exhibits no distension and no mass. There is no tenderness. There is no guarding. No hernia.   Genitourinary:    Genitourinary Comments: No Freitas catheter   Neurological: She is alert and oriented to person, place, and time. She displays normal reflexes. No cranial nerve deficit. Coordination normal.   Skin: Skin is warm and dry. No rash noted. No erythema. No pallor.   Psychiatric: She has a normal mood and affect. Her behavior is normal. Judgment and thought content normal.   Nursing note and vitals reviewed.      Results Review:     I reviewed the patient's new clinical results  : See Below  MEDICATIONS:    amLODIPine 5 mg Oral Q24H   aspirin 81 mg Oral Daily   ceftriaxone 1 g Intravenous Q24H   And      doxycycline 100 mg Intravenous Q12H   DULoxetine 60 mg Oral Daily   enoxaparin 40 mg Subcutaneous Daily   gabapentin 400 mg Oral TID   ipratropium-albuterol 3 mL Nebulization Q6H - RT   losartan 100 mg Oral Q24H   metoprolol succinate XL 25 mg Oral Q24H   oxybutynin XL 10 mg Oral Daily   pantoprazole 40 mg Oral Q AM   polyethylene glycol 17 g Oral Daily   polyvinyl alcohol-povidone 1 drop Both Eyes Q12H   Risaquad-2 1 capsule Oral Daily   sodium chloride 10 mL Intracatheter Q12H   sodium chloride 10 mL Intracatheter Q12H   topiramate 100 mg Oral Q12H       LABS:        Results from last 7 days  Lab Units 12/13/17  0058 12/12/17  0543 12/12/17  0147 12/11/17  1346 12/11/17  1333   CRP mg/dL 9.14*  --  11.48*  --   --    LACTATE mmol/L  --   --   --  0.9  --    WBC 10*3/mm3 9.96 15.38*  --   --  16.75*   HEMOGLOBIN g/dL 10.3* 10.9*  --   --  12.8   HEMATOCRIT % 33.1* 34.5*  --   --  37.9   MCV fL 97.9* 98.6*  --   --  96.4*   MCHC g/dL 31.1* 31.6*  --   --  33.8   PLATELETS 10*3/mm3 170 177  --   --  203   INR   --   --   --   --  0.98       Results from last 7 days  Lab Units 12/11/17  1413   PH, ARTERIAL pH units 7.431   PO2 ART mm Hg 60.7*   PCO2, ARTERIAL mm Hg 29.9*   HCO3 ART mmol/L 19.4*       Results from last 7 days  Lab Units 12/13/17  0058 12/12/17  0147 12/11/17  1333   SODIUM mmol/L 140 142 140   POTASSIUM  mmol/L 3.7 3.9 3.8   MAGNESIUM mg/dL 1.9 2.1 2.0   CHLORIDE mmol/L 112 111 111   CO2 mmol/L 23.1* 20.8* 20.8*   BUN mg/dL 16 18 14   CREATININE mg/dL 0.79 0.80 0.85   EGFR IF NONAFRICN AM mL/min/1.73 71 70 66   CALCIUM mg/dL 8.7 8.7 9.4   GLUCOSE mg/dL 104 96 133*   ALBUMIN g/dL 3.50 3.90 4.20   BILIRUBIN mg/dL 0.2 0.3 0.4   ALK PHOS U/L 77 97 102   AST (SGOT) U/L 18 23 20   ALT (SGPT) U/L 16 17 15   Estimated Creatinine Clearance: 65.7 mL/min (by C-G formula based on Cr of 0.79).  No results found for: AMMONIA      Blood Culture   Date Value Ref Range Status   12/11/2017 No growth at less than 24 hours  Preliminary   12/11/2017 No growth at less than 24 hours  Preliminary                  Assessment/Plan    1) pneumonia of left lower lobe   - C-reactive protein and white blood cell count improving.  Symptoms also improving,  mycoplasma pneumonia and legionella studies negative.  Flu swab negative.  Blood cultures with no growth.  2) sepsis  3) metabolic encephalopathy-present at the time of admission  4) leukocytosis  5) anemia-multifactorial  6) DVT prophylaxis-subcutaneous Lovenox   See orders entered.     Skinny Meehan MD  12/13/17  6:53 AM

## 2017-12-13 NOTE — THERAPY TREATMENT NOTE
Acute Care - Physical Therapy Treatment Note   [place]     Patient Name: Domonique Cummings  : 1945  MRN: [id number]  Today's Date: 2017  Onset of Illness/Injury or Date of Surgery Date: 17 (admit date)  Date of Referral to PT: 17 (admit date)  Referring Physician: Zoran    Admit Date: 2017    Visit Dx:    ICD-10-CM ICD-9-CM   1. Pneumonia of left lower lobe due to infectious organism J18.1 486   2. Altered mental status, unspecified altered mental status type R41.82 780.97     Patient Active Problem List   Diagnosis   • Pneumonia of left lower lobe due to infectious organism               Adult Rehabilitation Note       17 1644          Rehab Assessment/Intervention    Discipline physical therapist  -CT      Document Type therapy note (daily note)  -CT      Subjective Information agree to therapy  -CT      Patient Effort, Rehab Treatment good  -CT      Symptoms Noted During/After Treatment fatigue  -CT      Precautions/Limitations fall precautions;oxygen therapy device and L/min  -CT      Patient Response to Treatment Pt tolerated today treatment session well with rest breaks provided as needed.   -CT      Recorded by [CT] Moriah Vegas, CLARA      Cognitive Assessment/Intervention    Current Cognitive/Communication Assessment functional  -CT      Orientation Status oriented to;oriented x 4  -CT      Personal Safety Interventions fall prevention program maintained;gait belt;muscle strengthening facilitated;nonskid shoes/slippers when out of bed  -CT      Recorded by [CT] Moriah Vegas, PT      Bed Mobility, Assessment/Treatment    Bed Mobility, Assistive Device bed rails  -CT      Bed Mobility, Scoot/Bridge, [place] minimum assist (75% patient effort)  -CT      Bed Mob, Supine to Sit, [place] minimum assist (75% patient effort);moderate assist (50% patient effort)  -CT      Bed Mobility, Impairments strength decreased  -CT      Recorded by [CT] Moriah Vegas, PT       Transfer Assessment/Treatment    Transfers, Sit-Stand [place] moderate assist (50% patient effort)  -CT      Transfers, Stand-Sit [place] moderate assist (50% patient effort)  -CT      Transfers, Sit-Stand-Sit, Assist Device rolling walker  -CT      Recorded by [CT] Moriah Vegas, PT      Gait Assessment/Treatment    Gait, [place] Level minimum assist (75% patient effort);2 person assist required;verbal cues required;nonverbal cues required (demo/gesture)  -CT      Gait, Assistive Device rolling walker  -CT      Gait, Distance (Feet) 160  -CT      Gait, Gait Pattern Analysis swing-to gait  -CT      Recorded by [CT] Moriah Vegas, PT      Therapy Exercises    Bilateral Lower Extremities AROM:;15 reps;sitting  -CT      Recorded by [CT] Moriah Vegas, CLARA      Positioning and Restraints    Pre-Treatment Position in bed  -CT      Post Treatment Position chair  -CT      In Chair sitting;call light within reach;encouraged to call for assist;with family/caregiver  -CT      Recorded by [CT] Moriah Vegas, PT        User Key  (r) = Recorded By, (t) = Taken By, (c) = Cosigned By    Initials Name Effective Dates    CT Moriah Vegas, PT 03/14/16 -                 IP PT Goals       12/12/17 1618          Transfer Training PT LTG    Transfer Training PT LTG, Date Established 12/12/17  -CT      Transfer Training PT LTG, Time to Achieve by discharge  -CT      Transfer Training PT LTG, Activity Type bed to chair /chair to bed;sit to stand/stand to sit  -CT      Transfer Training PT LTG, [place] Level contact guard assist;minimum assist (75% patient effort)  -CT      Transfer Training PT LTG, Assist Device other (see comments)   with appropriate AD  -CT      Gait Training PT LTG    Gait Training Goal PT LTG, Date Established 12/12/17  -CT      Gait Training Goal PT LTG, Time to Achieve by discharge  -CT      Gait Training Goal PT LTG, [place] Level minimum assist (75% patient effort);contact guard assist   -CT      Gait Training Goal PT LTG, Assist Device other (see comments)   with appropriate AD  -CT      Gait Training Goal PT LTG, Distance to Achieve 150  -CT        User Key  (r) = Recorded By, (t) = Taken By, (c) = Cosigned By    Initials Name Provider Type    CT Moriah Vegas PT Physical Therapist          Physical Therapy Education     Title: PT OT SLP Therapies (Done)     Topic: Physical Therapy (Done)     Point: Mobility training (Done)    Learning Progress Summary    Learner Readiness Method Response Comment Documented by Status   Patient Acceptance E VU  CT 12/13/17 1646 Done    Acceptance E VU  EB 12/13/17 0930 Done    Acceptance E VU  EB 12/13/17 0926 Done    Acceptance E VU  CT 12/12/17 1619 Done               Point: Home exercise program (Done)    Learning Progress Summary    Learner Readiness Method Response Comment Documented by Status   Patient Acceptance E VU  CT 12/13/17 1646 Done    Acceptance E VU  EB 12/13/17 0930 Done    Acceptance E VU  EB 12/13/17 0926 Done    Acceptance E VU  CT 12/12/17 1619 Done               Point: Body mechanics (Done)    Learning Progress Summary    Learner Readiness Method Response Comment Documented by Status   Patient Acceptance E VU  CT 12/13/17 1646 Done    Acceptance E VU  EB 12/13/17 0930 Done    Acceptance E VU  EB 12/13/17 0926 Done    Acceptance E VU  CT 12/12/17 1619 Done               Point: Precautions (Done)    Learning Progress Summary    Learner Readiness Method Response Comment Documented by Status   Patient Acceptance E VU  CT 12/13/17 1646 Done    Acceptance E VU  EB 12/13/17 0930 Done    Acceptance E VU  EB 12/13/17 0926 Done    Acceptance E VU  CT 12/12/17 1619 Done                      User Key     Initials Effective Dates Name Provider Type Discipline    EB 06/16/16 -  Shalini Harry, RN Registered Nurse Nurse    CT 03/14/16 -  Moriah Vegas PT Physical Therapist PT                    PT Recommendation and Plan  Anticipated Equipment  Needs At Discharge:  (to be determined)  Anticipated Discharge Disposition: home with assist  Planned Therapy Interventions: balance training, bed mobility training, gait training, home exercise program, motor coordination training, neuromuscular re-education, patient/family education, strengthening, transfer training  PT Frequency: 3-5 times/wk, per priority policy             Outcome Measures       12/13/17 1600 12/12/17 1600       How much help from another person do you currently need...    Turning from your back to your side while in flat bed without using bedrails? 3  -CT 3  -CT     Moving from lying on back to sitting on the side of a flat bed without bedrails? 3  -CT 3  -CT     Moving to and from a bed to a chair (including a wheelchair)? 3  -CT 3  -CT     Standing up from a chair using your arms (e.g., wheelchair, bedside chair)? 3  -CT 3  -CT     Climbing 3-5 steps with a railing? 2  -CT 2  -CT     To walk in hospital room? 3  -CT 3  -CT     AM-PAC 6 Clicks Score 17  -CT 17  -CT     Functional Assessment    Outcome Measure Options AM-PAC 6 Clicks Basic Mobility (PT)  -CT AM-PAC 6 Clicks Basic Mobility (PT)  -CT       User Key  (r) = Recorded By, (t) = Taken By, (c) = Cosigned By    Initials Name Provider Type    CT Moriah Vegas PT Physical Therapist           Time Calculation:         PT Charges       12/13/17 1647          Time Calculation    PT Received On 12/13/17  -CT      PT - Next Appointment 12/14/17  -CT      PT Goal Re-Cert Due Date 12/26/17  -CT      Time Calculation- PT    Total Timed Code Minutes- PT 28 minute(s)  -CT        User Key  (r) = Recorded By, (t) = Taken By, (c) = Cosigned By    Initials Name Provider Type    CT Moriah Vegas PT Physical Therapist          Therapy Charges for Today     Code Description Service Date Service Provider Modifiers Qty    70284686971 HC PT MOBILITY CURRENT 12/12/2017 Moriah Vegas PT GP, CK 1    30431378124 HC PT MOBILITY PROJECTED 12/12/2017 Moriah  Ascencion, PT GP, CJ 1    67598526987 HC PT EVAL HIGH COMPLEXITY 2 12/12/2017 Moriah Ascencion, PT GP 1    19106767776 HC GAIT TRAINING EA 15 MIN 12/12/2017 Moriah Ascencion, PT GP 1    53144826319 HC PT THERAPEUTIC ACT EA 15 MIN 12/12/2017 Moriah Ascencion, PT GP 1    48332932294 HC PT THER SUPP EA 15 MIN 12/12/2017 Moriah Vegas, PT GP 4    53033506923 HC GAIT TRAINING EA 15 MIN 12/13/2017 Moriah Ascencion, PT GP 1    22466926755 HC PT THER PROC EA 15 MIN 12/13/2017 Moriah Ascencion, PT GP 1    67980412061 HC PT THER SUPP EA 15 MIN 12/13/2017 Moriah Vegas, PT GP 2          PT G-Codes  Outcome Measure Options: AM-PAC 6 Clicks Basic Mobility (PT)  Score: 17  Functional Limitation: Mobility: Walking and moving around  Mobility: Walking and Moving Around Current Status (): At least 40 percent but less than 60 percent impaired, limited or restricted  Mobility: Walking and Moving Around Goal Status (): At least 20 percent but less than 40 percent impaired, limited or restricted    Moriah Vegas, PT  12/13/2017

## 2017-12-13 NOTE — PLAN OF CARE
Problem: Inpatient Occupational Therapy  Goal: Strength Goal LTG- OT    12/12/17 1607   Strength OT LTG   Strength Goal OT LTG, Date Established 12/12/17   Strength Goal OT LTG, Time to Achieve by discharge   Strength Goal OT LTG, Measure to Achieve BUE increase x 1 to enhance self care/fxl task performance       Goal: Occupational Therapy Goal LTG- OT    12/12/17 1607   Occupational Therapy OT LTG   Occupational Therapy OT LTG, Date Established 12/12/17   Occupational Therapy OT LTG, Time to Achieve by discharge   Occupational Therapy OT LTG, Activity Type provide finger orthosis to provide fxl positioning L hand. Pt. ind. with applications.

## 2017-12-13 NOTE — PLAN OF CARE
Problem: Pneumonia (Adult)  Goal: Signs and Symptoms of Listed Potential Problems Will be Absent or Manageable (Pneumonia)  Outcome: Ongoing (interventions implemented as appropriate)    Problem: Fall Risk (Adult)  Goal: Identify Related Risk Factors and Signs and Symptoms  Outcome: Outcome(s) achieved Date Met:  12/13/17  Goal: Absence of Falls  Outcome: Ongoing (interventions implemented as appropriate)    Problem: Pressure Ulcer Risk (Lopez Scale) (Adult,Obstetrics,Pediatric)  Goal: Identify Related Risk Factors and Signs and Symptoms  Outcome: Outcome(s) achieved Date Met:  12/13/17  Goal: Skin Integrity  Outcome: Ongoing (interventions implemented as appropriate)

## 2017-12-14 PROCEDURE — 94799 UNLISTED PULMONARY SVC/PX: CPT

## 2017-12-14 PROCEDURE — 97530 THERAPEUTIC ACTIVITIES: CPT

## 2017-12-14 PROCEDURE — 25010000002 CEFTRIAXONE: Performed by: INTERNAL MEDICINE

## 2017-12-14 PROCEDURE — 25010000002 ENOXAPARIN PER 10 MG: Performed by: INTERNAL MEDICINE

## 2017-12-14 PROCEDURE — 97116 GAIT TRAINING THERAPY: CPT

## 2017-12-14 RX ADMIN — ACETAMINOPHEN AND CODEINE PHOSPHATE 1 TABLET: 30; 300 TABLET ORAL at 08:53

## 2017-12-14 RX ADMIN — IPRATROPIUM BROMIDE AND ALBUTEROL SULFATE 3 ML: .5; 3 SOLUTION RESPIRATORY (INHALATION) at 06:56

## 2017-12-14 RX ADMIN — DULOXETINE HYDROCHLORIDE 60 MG: 60 CAPSULE, DELAYED RELEASE ORAL at 08:38

## 2017-12-14 RX ADMIN — GABAPENTIN 400 MG: 400 CAPSULE ORAL at 08:37

## 2017-12-14 RX ADMIN — IPRATROPIUM BROMIDE AND ALBUTEROL SULFATE 3 ML: .5; 3 SOLUTION RESPIRATORY (INHALATION) at 13:18

## 2017-12-14 RX ADMIN — TOPIRAMATE 100 MG: 100 TABLET, FILM COATED ORAL at 20:18

## 2017-12-14 RX ADMIN — CEFTRIAXONE 1 G: 1 INJECTION, POWDER, FOR SOLUTION INTRAMUSCULAR; INTRAVENOUS at 08:37

## 2017-12-14 RX ADMIN — Medication 1 CAPSULE: at 08:37

## 2017-12-14 RX ADMIN — POLYVINYL ALCOHOL, POVIDONE 1 DROP: 14; 6 SOLUTION/ DROPS OPHTHALMIC at 20:18

## 2017-12-14 RX ADMIN — ACETAMINOPHEN AND CODEINE PHOSPHATE 1 TABLET: 30; 300 TABLET ORAL at 20:18

## 2017-12-14 RX ADMIN — IPRATROPIUM BROMIDE AND ALBUTEROL SULFATE 3 ML: .5; 3 SOLUTION RESPIRATORY (INHALATION) at 19:23

## 2017-12-14 RX ADMIN — ALPRAZOLAM 0.5 MG: 0.5 TABLET ORAL at 08:53

## 2017-12-14 RX ADMIN — ACETAMINOPHEN AND CODEINE PHOSPHATE 1 TABLET: 30; 300 TABLET ORAL at 14:22

## 2017-12-14 RX ADMIN — Medication 10 ML: at 08:39

## 2017-12-14 RX ADMIN — PANTOPRAZOLE SODIUM 40 MG: 40 TABLET, DELAYED RELEASE ORAL at 04:56

## 2017-12-14 RX ADMIN — IPRATROPIUM BROMIDE AND ALBUTEROL SULFATE 3 ML: .5; 3 SOLUTION RESPIRATORY (INHALATION) at 00:57

## 2017-12-14 RX ADMIN — TOPIRAMATE 100 MG: 100 TABLET, FILM COATED ORAL at 08:38

## 2017-12-14 RX ADMIN — POLYVINYL ALCOHOL, POVIDONE 1 DROP: 14; 6 SOLUTION/ DROPS OPHTHALMIC at 08:38

## 2017-12-14 RX ADMIN — DOXYCYCLINE 100 MG: 100 INJECTION, POWDER, LYOPHILIZED, FOR SOLUTION INTRAVENOUS at 16:36

## 2017-12-14 RX ADMIN — LOSARTAN POTASSIUM 100 MG: 50 TABLET, FILM COATED ORAL at 08:38

## 2017-12-14 RX ADMIN — POLYETHYLENE GLYCOL (3350) 17 G: 17 POWDER, FOR SOLUTION ORAL at 08:38

## 2017-12-14 RX ADMIN — METOPROLOL SUCCINATE 25 MG: 25 TABLET, FILM COATED, EXTENDED RELEASE ORAL at 08:38

## 2017-12-14 RX ADMIN — ENOXAPARIN SODIUM 40 MG: 40 INJECTION SUBCUTANEOUS at 08:38

## 2017-12-14 RX ADMIN — GABAPENTIN 400 MG: 400 CAPSULE ORAL at 16:37

## 2017-12-14 RX ADMIN — OXYBUTYNIN CHLORIDE 10 MG: 5 TABLET, FILM COATED, EXTENDED RELEASE ORAL at 08:38

## 2017-12-14 RX ADMIN — ALPRAZOLAM 0.5 MG: 0.5 TABLET ORAL at 20:18

## 2017-12-14 RX ADMIN — ASPIRIN 81 MG: 81 TABLET ORAL at 08:38

## 2017-12-14 RX ADMIN — GABAPENTIN 400 MG: 400 CAPSULE ORAL at 20:18

## 2017-12-14 RX ADMIN — AMLODIPINE BESYLATE 5 MG: 5 TABLET ORAL at 08:38

## 2017-12-14 RX ADMIN — DOXYCYCLINE 100 MG: 100 INJECTION, POWDER, LYOPHILIZED, FOR SOLUTION INTRAVENOUS at 04:56

## 2017-12-14 NOTE — THERAPY TREATMENT NOTE
Acute Care - Physical Therapy Treatment Note   [facility]     Patient Name: Domonique Cummings  : 1945  MRN: [id number]  Today's Date: 2017  Onset of Illness/Injury or Date of Surgery Date: 17 (admit date)  Date of Referral to PT: 17 (admit date)  Referring Physician: Zoran    Admit Date: 2017    Visit Dx:    ICD-10-CM ICD-9-CM   1. Pneumonia of left lower lobe due to infectious organism J18.1 486   2. Altered mental status, unspecified altered mental status type R41.82 780.97     Patient Active Problem List   Diagnosis   • Pneumonia of left lower lobe due to infectious organism               Adult Rehabilitation Note       17 1624 17 1644       Rehab Assessment/Intervention    Discipline physical therapy assistant  -RF physical therapist  -CT     Document Type therapy note (daily note)  -RF therapy note (daily note)  -CT     Subjective Information agree to therapy  -RF agree to therapy  -CT     Patient Effort, Rehab Treatment good  -RF good  -CT     Symptoms Noted During/After Treatment fatigue  -RF fatigue  -CT     Precautions/Limitations fall precautions;oxygen therapy device and L/min  -RF fall precautions;oxygen therapy device and L/min  -CT     Patient Response to Treatment Patient demonstrated good activity tolerance with increased ambualtion distances noted this session.   -RF Pt tolerated today treatment session well with rest breaks provided as needed.   -CT     Recorded by [RF] Veronica Mares PTA [CT] Moriah Vegas PT     Pain Assessment    Pain Assessment Castillo-Trotter FACES  -RF      Castillo-Trotter FACES Pain Rating 6  -RF      Pain Type Acute pain  -RF      Pain Location Head   headache  -RF      Pain Intervention(s) Repositioned;Ambulation/increased activity;Rest   RN notified  -RF      Recorded by [RF] Veronica Mares PTA      Cognitive Assessment/Intervention    Current Cognitive/Communication Assessment functional  -RF functional  -CT     Orientation Status  oriented to;oriented x 4  -RF oriented to;oriented x 4  -CT     Follows Commands/Answers Questions able to follow single-step instructions;100% of the time  -RF      Personal Safety fully aware of deficits  -RF      Personal Safety Interventions fall prevention program maintained;muscle strengthening facilitated;gait belt;nonskid shoes/slippers when out of bed  -RF fall prevention program maintained;gait belt;muscle strengthening facilitated;nonskid shoes/slippers when out of bed  -CT     Recorded by [RF] Veronica Mares PTA [CT] Moriah Vegas, PT     Bed Mobility, Assessment/Treatment    Bed Mobility, Assistive Device bed rails  -RF bed rails  -CT     Bed Mobility, Scoot/Bridge, [place] minimum assist (75% patient effort)  -RF minimum assist (75% patient effort)  -CT     Bed Mob, Supine to Sit, [place] minimum assist (75% patient effort);moderate assist (50% patient effort)  -RF minimum assist (75% patient effort);moderate assist (50% patient effort)  -CT     Bed Mobility, Impairments strength decreased  -RF strength decreased  -CT     Recorded by [RF] Veronica Mares PTA [CT] Moriah Vegas PT     Transfer Assessment/Treatment    Transfers, Bed-Chair [place] minimum assist (75% patient effort);nonverbal cues required (demo/gesture);verbal cues required  -RF      Transfers, Sit-Stand [place] minimum assist (75% patient effort)  -RF moderate assist (50% patient effort)  -CT     Transfers, Stand-Sit [place] minimum assist (75% patient effort)  -RF moderate assist (50% patient effort)  -CT     Transfers, Sit-Stand-Sit, Assist Device rolling walker  -RF rolling walker  -CT     Toilet Transfer, [place] minimum assist (75% patient effort)  -RF      Toilet Transfer, Assistive Device elevated toilet seat;rolling walker;other (see comments)   grab bar  -RF      Transfer, Comment Pt performeed static stand unsupported X5 mins for washing hands and brushing hair at sink.   -RF      Recorded  by [RF] Veronica Mares PTA [CT] Moriah Vegas, PT     Gait Assessment/Treatment    Gait, [place] Level minimum assist (75% patient effort);2 person assist required;verbal cues required;nonverbal cues required (demo/gesture)  -RF minimum assist (75% patient effort);2 person assist required;verbal cues required;nonverbal cues required (demo/gesture)  -CT     Gait, Assistive Device rolling walker  -RF rolling walker  -CT     Gait, Distance (Feet) 300  -  -CT     Gait, Gait Pattern Analysis swing-to gait  -RF swing-to gait  -CT     Recorded by [RF] Veronica Mares PTA [CT] Moriah Vegas PT     Therapy Exercises    Bilateral Lower Extremities --  -RF AROM:;15 reps;sitting  -CT     Recorded by [RF] Veronica Mares PTA [CT] Moriah Vegas PT     Positioning and Restraints    Pre-Treatment Position in bed  -RF in bed  -CT     Post Treatment Position chair  -RF chair  -CT     In Chair sitting;call light within reach;encouraged to call for assist;with family/caregiver  -RF sitting;call light within reach;encouraged to call for assist;with family/caregiver  -CT     Recorded by [RF] Veronica Mares PTA [CT] Moriah Vegas PT       User Key  (r) = Recorded By, (t) = Taken By, (c) = Cosigned By    Initials Name Effective Dates    CT Moriah Vegas, PT 03/14/16 -     RF Veronica Mares, PTA 07/19/17 -                 IP PT Goals       12/12/17 1618          Transfer Training PT LTG    Transfer Training PT LTG, Date Established 12/12/17  -CT      Transfer Training PT LTG, Time to Achieve by discharge  -CT      Transfer Training PT LTG, Activity Type bed to chair /chair to bed;sit to stand/stand to sit  -CT      Transfer Training PT LTG, [place] Level contact guard assist;minimum assist (75% patient effort)  -CT      Transfer Training PT LTG, Assist Device other (see comments)   with appropriate AD  -CT      Gait Training PT LTG    Gait Training Goal PT LTG, Date Established 12/12/17  -CT      Gait  Training Goal PT LTG, Time to Achieve by discharge  -CT      Gait Training Goal PT LTG, [place] Level minimum assist (75% patient effort);contact guard assist  -CT      Gait Training Goal PT LTG, Assist Device other (see comments)   with appropriate AD  -CT      Gait Training Goal PT LTG, Distance to Achieve 150  -CT        User Key  (r) = Recorded By, (t) = Taken By, (c) = Cosigned By    Initials Name Provider Type    CT Moriah Vegas PT Physical Therapist          Physical Therapy Education     Title: PT OT SLP Therapies (Done)     Topic: Physical Therapy (Done)     Point: Mobility training (Done)    Learning Progress Summary    Learner Readiness Method Response Comment Documented by Status   Patient Acceptance E VU,NR  RF 12/14/17 1627 Done    Acceptance E VU  MC 12/14/17 0935 Done    Acceptance E VU  SS 12/13/17 2027 Done    Acceptance E VU  CT 12/13/17 1646 Done    Acceptance E VU  EB 12/13/17 0930 Done    Acceptance E VU  EB 12/13/17 0926 Done    Acceptance E VU  CT 12/12/17 1619 Done               Point: Home exercise program (Done)    Learning Progress Summary    Learner Readiness Method Response Comment Documented by Status   Patient Acceptance E VU,NR  RF 12/14/17 1627 Done    Acceptance E VU  MC 12/14/17 0935 Done    Acceptance E VU  SS 12/13/17 2027 Done    Acceptance E VU  CT 12/13/17 1646 Done    Acceptance E VU  EB 12/13/17 0930 Done    Acceptance E VU  EB 12/13/17 0926 Done    Acceptance E VU  CT 12/12/17 1619 Done               Point: Body mechanics (Done)    Learning Progress Summary    Learner Readiness Method Response Comment Documented by Status   Patient Acceptance E VU,NR  RF 12/14/17 1627 Done    Acceptance E VU  MC 12/14/17 0935 Done    Acceptance E VU  SS 12/13/17 2027 Done    Acceptance E VU  CT 12/13/17 1646 Done    Acceptance E VU  EB 12/13/17 0930 Done    Acceptance E VU  EB 12/13/17 0926 Done    Acceptance E VU  CT 12/12/17 1619 Done               Point: Precautions (Done)     Learning Progress Summary    Learner Readiness Method Response Comment Documented by Status   Patient Acceptance E VU,NR  RF 12/14/17 1627 Done    Acceptance E VU  MC 12/14/17 0935 Done    Acceptance E VU  SS 12/13/17 2027 Done    Acceptance E VU  CT 12/13/17 1646 Done    Acceptance E VU  EB 12/13/17 0930 Done    Acceptance E VU  EB 12/13/17 0926 Done    Acceptance E VU  CT 12/12/17 1619 Done                      User Key     Initials Effective Dates Name Provider Type Discipline    EB 06/16/16 -  Shalini Harry, RN Registered Nurse Nurse    CT 03/14/16 -  Moriah Vegas, PT Physical Therapist PT     11/18/16 -  Sandrine Garduno, RN Registered Nurse Nurse     12/27/16 -  Deepthi Morel, RN Registered Nurse Nurse     07/19/17 -  Veronica Mares, JUAN FRANCISCO Physical Therapy Assistant PT                    PT Recommendation and Plan  Anticipated Equipment Needs At Discharge:  (to be determined)  Anticipated Discharge Disposition: home with assist  Planned Therapy Interventions: balance training, bed mobility training, gait training, home exercise program, motor coordination training, neuromuscular re-education, patient/family education, strengthening, transfer training  PT Frequency: 3-5 times/wk, per priority policy             Outcome Measures       12/14/17 1600 12/13/17 1600 12/12/17 1600    How much help from another person do you currently need...    Turning from your back to your side while in flat bed without using bedrails? 3  -RF 3  -CT 3  -CT    Moving from lying on back to sitting on the side of a flat bed without bedrails? 3  -RF 3  -CT 3  -CT    Moving to and from a bed to a chair (including a wheelchair)? 3  -RF 3  -CT 3  -CT    Standing up from a chair using your arms (e.g., wheelchair, bedside chair)? 3  -RF 3  -CT 3  -CT    Climbing 3-5 steps with a railing? 2  -RF 2  -CT 2  -CT    To walk in hospital room? 3  -RF 3  -CT 3  -CT    AM-PAC 6 Clicks Score 17  -RF 17  -CT 17  -CT    Functional  Assessment    Outcome Measure Options AM-PAC 6 Clicks Basic Mobility (PT)  -RF AM-PAC 6 Clicks Basic Mobility (PT)  -CT AM-PAC 6 Clicks Basic Mobility (PT)  -CT      User Key  (r) = Recorded By, (t) = Taken By, (c) = Cosigned By    Initials Name Provider Type    CT Moriah Vegas, PT Physical Therapist    RF Veronica Mares, JUAN FRANCISCO Physical Therapy Assistant           Time Calculation:         PT Charges       12/14/17 1628          Time Calculation    PT Received On 12/14/17  -RF      PT - Next Appointment 12/15/17  -RF      PT Goal Re-Cert Due Date 12/26/17  -RF      Time Calculation- PT    Total Timed Code Minutes- PT 38 minute(s)  -RF        User Key  (r) = Recorded By, (t) = Taken By, (c) = Cosigned By    Initials Name Provider Type    RF Veronica Mares, JUAN FRANCISCO Physical Therapy Assistant          Therapy Charges for Today     Code Description Service Date Service Provider Modifiers Qty    57267443120 HC GAIT TRAINING EA 15 MIN 12/14/2017 Veronica Mares, PTA GP 1    36731480454 HC PT THERAPEUTIC ACT EA 15 MIN 12/14/2017 Veronica Mares, PTA GP 2    61506031885 HC PT THER SUPP EA 15 MIN 12/14/2017 Veronica Mares, JUAN FRANCISCO GP 2          PT G-Codes  Outcome Measure Options: AM-PAC 6 Clicks Basic Mobility (PT)  Score: 17  Functional Limitation: Mobility: Walking and moving around  Mobility: Walking and Moving Around Current Status (): At least 40 percent but less than 60 percent impaired, limited or restricted  Mobility: Walking and Moving Around Goal Status (): At least 20 percent but less than 40 percent impaired, limited or restricted    Veronica Mares PTA  12/14/2017

## 2017-12-14 NOTE — PLAN OF CARE
Problem: Pneumonia (Adult)  Goal: Signs and Symptoms of Listed Potential Problems Will be Absent or Manageable (Pneumonia)  Outcome: Ongoing (interventions implemented as appropriate)    Problem: Fall Risk (Adult)  Goal: Absence of Falls  Outcome: Ongoing (interventions implemented as appropriate)    Problem: Pressure Ulcer Risk (Lopez Scale) (Adult,Obstetrics,Pediatric)  Goal: Skin Integrity  Outcome: Ongoing (interventions implemented as appropriate)

## 2017-12-14 NOTE — PROGRESS NOTES
LOS: 3 days       Chief Complaint :  Shortness of breath   Subjective     Interval History:     Patient Complaints:  Domonique Cummings  continues with cough, congestion and weakness.  She continues with shortness breath with any exertion.  She denies any associated chest pain fevers chills nausea or vomiting.      She continues to tolerate IV antibiotics.    Review of Systems-unchanged from recent history and physical  Objective     Vital Signs  Temp:  [98.3 °F (36.8 °C)-99.2 °F (37.3 °C)] 98.3 °F (36.8 °C)  Heart Rate:  [55-79] 62  Resp:  [16-20] 18  BP: (108-149)/(54-71) 149/71    Physical Exam    Constitutional: She is oriented to person, place, and time.   Obese white female who is alert and talkative in no obvious distress at rest.   HENT:   Head: Normocephalic and atraumatic.   Right Ear: External ear normal.   Left Ear: External ear normal.   Nose: Nose normal.   Mouth/Throat: Oropharynx is clear and moist. No oropharyngeal exudate.   Eyes: Conjunctivae and EOM are normal. Pupils are equal, round, and reactive to light. Right eye exhibits no discharge. Left eye exhibits no discharge. No scleral icterus.   Neck: Normal range of motion. Neck supple. No JVD present. No tracheal deviation present. No thyromegaly present.   Cardiovascular: Normal rate, regular rhythm, normal heart sounds and intact distal pulses.  Exam reveals no gallop and no friction rub.    No murmur heard.  Pulmonary/Chest: Effort normal. No stridor.  Trace expiratory wheezing   Scattered rhonchi bilaterally, diminished breath sounds at lung bases.   Abdominal: Soft. Bowel sounds are normal. She exhibits no distension and no mass. There is no tenderness. There is no guarding. No hernia.   Genitourinary:   Genitourinary Comments: No Freitas catheter   Neurological: She is alert and oriented to person, place, and time. She displays normal reflexes. No cranial nerve deficit. Coordination normal.   Skin: Skin is warm and dry. No rash noted. No  erythema. No pallor.   Psychiatric: She has a normal mood and affect. Her behavior is normal. Judgment and thought content normal.   Nursing note and vitals reviewed.      Results Review:     I reviewed the patient's new clinical results  : See Below  MEDICATIONS:    amLODIPine 5 mg Oral Q24H   aspirin 81 mg Oral Daily   ceftriaxone 1 g Intravenous Q24H   And      doxycycline 100 mg Intravenous Q12H   DULoxetine 60 mg Oral Daily   enoxaparin 40 mg Subcutaneous Daily   gabapentin 400 mg Oral TID   ipratropium-albuterol 3 mL Nebulization Q6H - RT   losartan 100 mg Oral Q24H   metoprolol succinate XL 25 mg Oral Q24H   oxybutynin XL 10 mg Oral Daily   pantoprazole 40 mg Oral Q AM   polyethylene glycol 17 g Oral Daily   polyvinyl alcohol-povidone 1 drop Both Eyes Q12H   Risaquad-2 1 capsule Oral Daily   sodium chloride 10 mL Intracatheter Q12H   sodium chloride 10 mL Intracatheter Q12H   topiramate 100 mg Oral Q12H       LABS:        Results from last 7 days  Lab Units 12/13/17  0058 12/12/17  0543 12/12/17  0147 12/11/17  1346 12/11/17  1333   CRP mg/dL 9.14*  --  11.48*  --   --    LACTATE mmol/L  --   --   --  0.9  --    WBC 10*3/mm3 9.96 15.38*  --   --  16.75*   HEMOGLOBIN g/dL 10.3* 10.9*  --   --  12.8   HEMATOCRIT % 33.1* 34.5*  --   --  37.9   MCV fL 97.9* 98.6*  --   --  96.4*   MCHC g/dL 31.1* 31.6*  --   --  33.8   PLATELETS 10*3/mm3 170 177  --   --  203   INR   --   --   --   --  0.98       Results from last 7 days  Lab Units 12/11/17  1413   PH, ARTERIAL pH units 7.431   PO2 ART mm Hg 60.7*   PCO2, ARTERIAL mm Hg 29.9*   HCO3 ART mmol/L 19.4*       Results from last 7 days  Lab Units 12/13/17  0058 12/12/17  0147 12/11/17  1333   SODIUM mmol/L 140 142 140   POTASSIUM mmol/L 3.7 3.9 3.8   MAGNESIUM mg/dL 1.9 2.1 2.0   CHLORIDE mmol/L 112 111 111   CO2 mmol/L 23.1* 20.8* 20.8*   BUN mg/dL 16 18 14   CREATININE mg/dL 0.79 0.80 0.85   EGFR IF NONAFRICN AM mL/min/1.73 71 70 66   CALCIUM mg/dL 8.7 8.7 9.4    GLUCOSE mg/dL 104 96 133*   ALBUMIN g/dL 3.50 3.90 4.20   BILIRUBIN mg/dL 0.2 0.3 0.4   ALK PHOS U/L 77 97 102   AST (SGOT) U/L 18 23 20   ALT (SGPT) U/L 16 17 15   Estimated Creatinine Clearance: 69.7 mL/min (by C-G formula based on Cr of 0.79).  No results found for: AMMONIA      Blood Culture   Date Value Ref Range Status   12/11/2017 No growth at less than 24 hours  Preliminary   12/11/2017 No growth at less than 24 hours  Preliminary                  Assessment/Plan    1) pneumonia of left lower lobe   - C-reactive protein and white blood cell count improving.  Symptoms also improving,  mycoplasma pneumonia and legionella studies negative.  Flu swab negative.  Blood cultures with no growth.  2) sepsis  3) metabolic encephalopathy-present at the time of admission  4) leukocytosis  5) anemia-multifactorial  6) DVT prophylaxis-subcutaneous Lovenox   See orders entered.     Skinny Meehan MD  12/14/17  8:08 AM

## 2017-12-14 NOTE — PROGRESS NOTES
Discharge Planning Assessment   Stu     Patient Name: Domonique Cummings  MRN: [id number]  Today's Date: 12/14/2017    Admit Date: 12/11/2017          Discharge Needs Assessment     None            Discharge Plan       12/14/17 1333    Case Management/Social Work Plan    Plan SS received consult per Case Management for discharge planning.  SS spoke with pt on this date.  Pt states she will return home at discharge rather than short term nursing home placement at [facility] at discharge.  Pt requests home health referral at discharge.  SS will arrange with Physician order.     Patient/Family In Agreement With Plan yes      12/14/17 1304    Case Management/Social Work Plan    Plan CM follow up: Pt lying quietly in bed. Reports she doesnt feel much improvement, reports she is still weak. Pt has been participating with [facility] Pt is considering home heatlh with [facility]  vs. [facility] for short term rehab. SS consulted to assist with disposition.        Discharge Placement     No information found        Expected Discharge Date and Time     Expected Discharge Date Expected Discharge Time    Dec 16, 2017               Demographic Summary     None            Functional Status     None            Psychosocial     None            Abuse/Neglect     None            Legal     None            Substance Abuse     None            Patient Forms     None          Nayana Brooks

## 2017-12-14 NOTE — PLAN OF CARE
Problem: Patient Care Overview (Adult)  Goal: Plan of Care Review  Outcome: Ongoing (interventions implemented as appropriate)  Goal: Adult Individualization and Mutuality  Outcome: Ongoing (interventions implemented as appropriate)  Goal: Discharge Needs Assessment  Outcome: Ongoing (interventions implemented as appropriate)    Problem: Pneumonia (Adult)  Goal: Signs and Symptoms of Listed Potential Problems Will be Absent or Manageable (Pneumonia)  Outcome: Ongoing (interventions implemented as appropriate)    Problem: Fall Risk (Adult)  Goal: Absence of Falls  Outcome: Ongoing (interventions implemented as appropriate)    Problem: Pressure Ulcer Risk (Lopez Scale) (Adult,Obstetrics,Pediatric)  Goal: Skin Integrity  Outcome: Ongoing (interventions implemented as appropriate)

## 2017-12-15 LAB
ALBUMIN SERPL-MCNC: 3.6 G/DL (ref 3.4–4.8)
ALBUMIN/GLOB SERPL: 1.2 G/DL (ref 1.5–2.5)
ALP SERPL-CCNC: 77 U/L (ref 35–104)
ALT SERPL W P-5'-P-CCNC: 24 U/L (ref 10–36)
ANION GAP SERPL CALCULATED.3IONS-SCNC: 6.8 MMOL/L (ref 3.6–11.2)
AST SERPL-CCNC: 31 U/L (ref 10–30)
BASOPHILS # BLD AUTO: 0.02 10*3/MM3 (ref 0–0.3)
BASOPHILS NFR BLD AUTO: 0.2 % (ref 0–2)
BILIRUB SERPL-MCNC: 0.2 MG/DL (ref 0.2–1.8)
BUN BLD-MCNC: 13 MG/DL (ref 7–21)
BUN/CREAT SERPL: 17.1 (ref 7–25)
C PNEUM IGM TITR SER IF: NORMAL {TITER}
C PSITTACI IGM TITR SER IF: NORMAL {TITER}
C TRACH IGM TITR SER IF: NORMAL {TITER}
CALCIUM SPEC-SCNC: 9.4 MG/DL (ref 7.7–10)
CHLORIDE SERPL-SCNC: 111 MMOL/L (ref 99–112)
CO2 SERPL-SCNC: 22.2 MMOL/L (ref 24.3–31.9)
CREAT BLD-MCNC: 0.76 MG/DL (ref 0.43–1.29)
CRP SERPL-MCNC: 3.73 MG/DL (ref 0–0.99)
DEPRECATED RDW RBC AUTO: 43.7 FL (ref 37–54)
EOSINOPHIL # BLD AUTO: 0.32 10*3/MM3 (ref 0–0.7)
EOSINOPHIL NFR BLD AUTO: 3.5 % (ref 0–7)
ERYTHROCYTE [DISTWIDTH] IN BLOOD BY AUTOMATED COUNT: 12.4 % (ref 11.5–14.5)
GFR SERPL CREATININE-BSD FRML MDRD: 75 ML/MIN/1.73
GLOBULIN UR ELPH-MCNC: 3 GM/DL
GLUCOSE BLD-MCNC: 105 MG/DL (ref 70–110)
HCT VFR BLD AUTO: 33.9 % (ref 37–47)
HGB BLD-MCNC: 10.5 G/DL (ref 12–16)
IMM GRANULOCYTES # BLD: 0.01 10*3/MM3 (ref 0–0.03)
IMM GRANULOCYTES NFR BLD: 0.1 % (ref 0–0.5)
LYMPHOCYTES # BLD AUTO: 2.81 10*3/MM3 (ref 1–3)
LYMPHOCYTES NFR BLD AUTO: 31 % (ref 16–46)
MCH RBC QN AUTO: 30.7 PG (ref 27–33)
MCHC RBC AUTO-ENTMCNC: 31 G/DL (ref 33–37)
MCV RBC AUTO: 99.1 FL (ref 80–94)
MONOCYTES # BLD AUTO: 1.11 10*3/MM3 (ref 0.1–0.9)
MONOCYTES NFR BLD AUTO: 12.3 % (ref 0–12)
NEUTROPHILS # BLD AUTO: 4.78 10*3/MM3 (ref 1.4–6.5)
NEUTROPHILS NFR BLD AUTO: 52.9 % (ref 40–75)
OSMOLALITY SERPL CALC.SUM OF ELEC: 279.9 MOSM/KG (ref 273–305)
PLATELET # BLD AUTO: 194 10*3/MM3 (ref 130–400)
PMV BLD AUTO: 10.5 FL (ref 6–10)
POTASSIUM BLD-SCNC: 3.8 MMOL/L (ref 3.5–5.3)
PROT SERPL-MCNC: 6.6 G/DL (ref 6–8)
RBC # BLD AUTO: 3.42 10*6/MM3 (ref 4.2–5.4)
SODIUM BLD-SCNC: 140 MMOL/L (ref 135–153)
WBC NRBC COR # BLD: 9.05 10*3/MM3 (ref 4.5–12.5)

## 2017-12-15 PROCEDURE — G8987 SELF CARE CURRENT STATUS: HCPCS

## 2017-12-15 PROCEDURE — 25010000002 CEFTRIAXONE: Performed by: INTERNAL MEDICINE

## 2017-12-15 PROCEDURE — 94799 UNLISTED PULMONARY SVC/PX: CPT

## 2017-12-15 PROCEDURE — 97530 THERAPEUTIC ACTIVITIES: CPT

## 2017-12-15 PROCEDURE — 85025 COMPLETE CBC W/AUTO DIFF WBC: CPT | Performed by: INTERNAL MEDICINE

## 2017-12-15 PROCEDURE — 86140 C-REACTIVE PROTEIN: CPT | Performed by: INTERNAL MEDICINE

## 2017-12-15 PROCEDURE — 80053 COMPREHEN METABOLIC PANEL: CPT | Performed by: INTERNAL MEDICINE

## 2017-12-15 PROCEDURE — 25010000002 ENOXAPARIN PER 10 MG: Performed by: INTERNAL MEDICINE

## 2017-12-15 PROCEDURE — G8988 SELF CARE GOAL STATUS: HCPCS

## 2017-12-15 RX ORDER — DOXYCYCLINE 100 MG/1
100 CAPSULE ORAL EVERY 12 HOURS
Status: DISCONTINUED | OUTPATIENT
Start: 2017-12-15 | End: 2017-12-19 | Stop reason: HOSPADM

## 2017-12-15 RX ORDER — CEFDINIR 300 MG/1
300 CAPSULE ORAL EVERY 12 HOURS SCHEDULED
Status: COMPLETED | OUTPATIENT
Start: 2017-12-16 | End: 2017-12-17

## 2017-12-15 RX ADMIN — ACETAMINOPHEN AND CODEINE PHOSPHATE 1 TABLET: 30; 300 TABLET ORAL at 20:20

## 2017-12-15 RX ADMIN — TOPIRAMATE 100 MG: 100 TABLET, FILM COATED ORAL at 08:46

## 2017-12-15 RX ADMIN — ALPRAZOLAM 0.5 MG: 0.5 TABLET ORAL at 08:59

## 2017-12-15 RX ADMIN — Medication 10 ML: at 20:21

## 2017-12-15 RX ADMIN — GABAPENTIN 400 MG: 400 CAPSULE ORAL at 20:20

## 2017-12-15 RX ADMIN — POLYVINYL ALCOHOL, POVIDONE 1 DROP: 14; 6 SOLUTION/ DROPS OPHTHALMIC at 08:45

## 2017-12-15 RX ADMIN — IPRATROPIUM BROMIDE AND ALBUTEROL SULFATE 3 ML: .5; 3 SOLUTION RESPIRATORY (INHALATION) at 07:23

## 2017-12-15 RX ADMIN — OXYBUTYNIN CHLORIDE 10 MG: 5 TABLET, FILM COATED, EXTENDED RELEASE ORAL at 08:46

## 2017-12-15 RX ADMIN — ENOXAPARIN SODIUM 40 MG: 40 INJECTION SUBCUTANEOUS at 08:45

## 2017-12-15 RX ADMIN — DOXYCYCLINE 100 MG: 100 INJECTION, POWDER, LYOPHILIZED, FOR SOLUTION INTRAVENOUS at 04:53

## 2017-12-15 RX ADMIN — ALPRAZOLAM 0.5 MG: 0.5 TABLET ORAL at 20:21

## 2017-12-15 RX ADMIN — PANTOPRAZOLE SODIUM 40 MG: 40 TABLET, DELAYED RELEASE ORAL at 04:53

## 2017-12-15 RX ADMIN — IPRATROPIUM BROMIDE AND ALBUTEROL SULFATE 3 ML: .5; 3 SOLUTION RESPIRATORY (INHALATION) at 00:46

## 2017-12-15 RX ADMIN — DULOXETINE HYDROCHLORIDE 60 MG: 60 CAPSULE, DELAYED RELEASE ORAL at 08:46

## 2017-12-15 RX ADMIN — GABAPENTIN 400 MG: 400 CAPSULE ORAL at 08:50

## 2017-12-15 RX ADMIN — IPRATROPIUM BROMIDE AND ALBUTEROL SULFATE 3 ML: .5; 3 SOLUTION RESPIRATORY (INHALATION) at 13:29

## 2017-12-15 RX ADMIN — POLYETHYLENE GLYCOL (3350) 17 G: 17 POWDER, FOR SOLUTION ORAL at 08:45

## 2017-12-15 RX ADMIN — DOXYCYCLINE 100 MG: 100 CAPSULE ORAL at 17:16

## 2017-12-15 RX ADMIN — METOPROLOL SUCCINATE 25 MG: 25 TABLET, FILM COATED, EXTENDED RELEASE ORAL at 08:45

## 2017-12-15 RX ADMIN — ASPIRIN 81 MG: 81 TABLET ORAL at 08:46

## 2017-12-15 RX ADMIN — LOSARTAN POTASSIUM 100 MG: 50 TABLET, FILM COATED ORAL at 08:45

## 2017-12-15 RX ADMIN — ACETAMINOPHEN AND CODEINE PHOSPHATE 1 TABLET: 30; 300 TABLET ORAL at 08:59

## 2017-12-15 RX ADMIN — Medication 10 ML: at 08:46

## 2017-12-15 RX ADMIN — AMLODIPINE BESYLATE 5 MG: 5 TABLET ORAL at 08:45

## 2017-12-15 RX ADMIN — TOPIRAMATE 100 MG: 100 TABLET, FILM COATED ORAL at 20:21

## 2017-12-15 RX ADMIN — POLYVINYL ALCOHOL, POVIDONE 1 DROP: 14; 6 SOLUTION/ DROPS OPHTHALMIC at 20:21

## 2017-12-15 RX ADMIN — IPRATROPIUM BROMIDE AND ALBUTEROL SULFATE 3 ML: .5; 3 SOLUTION RESPIRATORY (INHALATION) at 19:24

## 2017-12-15 RX ADMIN — ACETAMINOPHEN AND CODEINE PHOSPHATE 1 TABLET: 30; 300 TABLET ORAL at 17:17

## 2017-12-15 RX ADMIN — GABAPENTIN 400 MG: 400 CAPSULE ORAL at 16:41

## 2017-12-15 RX ADMIN — Medication 1 CAPSULE: at 08:46

## 2017-12-15 RX ADMIN — CEFTRIAXONE 1 G: 1 INJECTION, POWDER, FOR SOLUTION INTRAMUSCULAR; INTRAVENOUS at 08:45

## 2017-12-15 NOTE — PROGRESS NOTES
LOS: 4 days       Chief Complaint :  Shortness of breath   Subjective     Interval History:     Patient Complaints:  Domonique Cummings  Describes uneventful night with no acute changes or events.   She continues with cough, congestion and weakness.  She continues with shortness breath with any exertion.  She denies any associated chest pain fevers chills nausea or vomiting.      She continues to tolerate IV antibiotics.    Review of Systems-unchanged from recent history and physical  Objective     Vital Signs  Temp:  [97.9 °F (36.6 °C)-98.9 °F (37.2 °C)] 98.4 °F (36.9 °C)  Heart Rate:  [52-91] 66  Resp:  [18-20] 20  BP: (129-133)/(41-77) 131/62    Physical Exam    Constitutional: She is oriented to person, place, and time.   Obese white female who is alert and talkative in no obvious distress at rest.   HENT:   Head: Normocephalic and atraumatic.   Right Ear: External ear normal.   Left Ear: External ear normal.   Nose: Nose normal.   Mouth/Throat: Oropharynx is clear and moist. No oropharyngeal exudate.   Eyes: Conjunctivae and EOM are normal. Pupils are equal, round, and reactive to light. Right eye exhibits no discharge. Left eye exhibits no discharge. No scleral icterus.   Neck: Normal range of motion. Neck supple. No JVD present. No tracheal deviation present. No thyromegaly present.   Cardiovascular: Normal rate, regular rhythm, normal heart sounds and intact distal pulses.  Exam reveals no gallop and no friction rub.    No murmur heard.  Pulmonary/Chest: Effort normal. No stridor.  Trace expiratory wheezing   Scattered rhonchi bilaterally, diminished breath sounds at lung bases.   Abdominal: Soft. Bowel sounds are normal. She exhibits no distension and no mass. There is no tenderness. There is no guarding. No hernia.   Genitourinary:   Genitourinary Comments: No Freitas catheter   Neurological: She is alert and oriented to person, place, and time. She displays normal reflexes. No cranial nerve deficit.  Coordination normal.   Skin: Skin is warm and dry. No rash noted. No erythema. No pallor.   Psychiatric: She has a normal mood and affect. Her behavior is normal. Judgment and thought content normal.   Nursing note and vitals reviewed.      Results Review:     I reviewed the patient's new clinical results  : See Below  MEDICATIONS:    amLODIPine 5 mg Oral Q24H   aspirin 81 mg Oral Daily   ceftriaxone 1 g Intravenous Q24H   And      doxycycline 100 mg Intravenous Q12H   DULoxetine 60 mg Oral Daily   enoxaparin 40 mg Subcutaneous Daily   gabapentin 400 mg Oral TID   ipratropium-albuterol 3 mL Nebulization Q6H - RT   losartan 100 mg Oral Q24H   metoprolol succinate XL 25 mg Oral Q24H   oxybutynin XL 10 mg Oral Daily   pantoprazole 40 mg Oral Q AM   polyethylene glycol 17 g Oral Daily   polyvinyl alcohol-povidone 1 drop Both Eyes Q12H   Risaquad-2 1 capsule Oral Daily   sodium chloride 10 mL Intracatheter Q12H   sodium chloride 10 mL Intracatheter Q12H   topiramate 100 mg Oral Q12H       LABS:        Results from last 7 days  Lab Units 12/15/17  0104 12/13/17  0058 12/12/17  0543 12/12/17  0147 12/11/17  1346 12/11/17  1333   CRP mg/dL 3.73* 9.14*  --  11.48*  --   --    LACTATE mmol/L  --   --   --   --  0.9  --    WBC 10*3/mm3 9.05 9.96 15.38*  --   --  16.75*   HEMOGLOBIN g/dL 10.5* 10.3* 10.9*  --   --  12.8   HEMATOCRIT % 33.9* 33.1* 34.5*  --   --  37.9   MCV fL 99.1* 97.9* 98.6*  --   --  96.4*   MCHC g/dL 31.0* 31.1* 31.6*  --   --  33.8   PLATELETS 10*3/mm3 194 170 177  --   --  203   INR   --   --   --   --   --  0.98       Results from last 7 days  Lab Units 12/11/17  1413   PH, ARTERIAL pH units 7.431   PO2 ART mm Hg 60.7*   PCO2, ARTERIAL mm Hg 29.9*   HCO3 ART mmol/L 19.4*       Results from last 7 days  Lab Units 12/15/17  0104 12/13/17  0058 12/12/17  0147 12/11/17  1333   SODIUM mmol/L 140 140 142 140   POTASSIUM mmol/L 3.8 3.7 3.9 3.8   MAGNESIUM mg/dL  --  1.9 2.1 2.0   CHLORIDE mmol/L 111 112 111 111    CO2 mmol/L 22.2* 23.1* 20.8* 20.8*   BUN mg/dL 13 16 18 14   CREATININE mg/dL 0.76 0.79 0.80 0.85   EGFR IF NONAFRICN AM mL/min/1.73 75 71 70 66   CALCIUM mg/dL 9.4 8.7 8.7 9.4   GLUCOSE mg/dL 105 104 96 133*   ALBUMIN g/dL 3.60 3.50 3.90 4.20   BILIRUBIN mg/dL 0.2 0.2 0.3 0.4   ALK PHOS U/L 77 77 97 102   AST (SGOT) U/L 31* 18 23 20   ALT (SGPT) U/L 24 16 17 15   Estimated Creatinine Clearance: 69 mL/min (by C-G formula based on Cr of 0.76).  No results found for: AMMONIA      Blood Culture   Date Value Ref Range Status   12/11/2017 No growth at less than 24 hours  Preliminary   12/11/2017 No growth at less than 24 hours  Preliminary                  Assessment/Plan    1) pneumonia of left lower lobe   - C-reactive protein and white blood cell count improving.  Symptoms also improving,  mycoplasma pneumonia and legionella studies negative.  Flu swab negative.  Blood cultures with no growth.  2) sepsis  3) metabolic encephalopathy-present at the time of admission  4) leukocytosis  5) anemia-multifactorial  6) DVT prophylaxis-subcutaneous Lovenox   See orders entered.     Skinny Meehan MD  12/15/17  8:28 AM

## 2017-12-15 NOTE — PLAN OF CARE
Problem: Patient Care Overview (Adult)  Goal: Adult Individualization and Mutuality  Outcome: Ongoing (interventions implemented as appropriate)    Problem: Pneumonia (Adult)  Goal: Signs and Symptoms of Listed Potential Problems Will be Absent or Manageable (Pneumonia)  Outcome: Ongoing (interventions implemented as appropriate)    Problem: Fall Risk (Adult)  Goal: Absence of Falls  Outcome: Ongoing (interventions implemented as appropriate)    Problem: Pressure Ulcer Risk (Lopez Scale) (Adult,Obstetrics,Pediatric)  Goal: Skin Integrity  Outcome: Ongoing (interventions implemented as appropriate)

## 2017-12-15 NOTE — THERAPY EVALUATION
Acute Care - Occupational Therapy Initial Evaluation   [place]     Patient Name: Domonique Cummings  : 1945  MRN: [id number]  Today's Date: 12/15/2017  Onset of Illness/Injury or Date of Surgery Date: 17 (admit date)     Referring Physician: Zoran    Admit Date: 2017       ICD-10-CM ICD-9-CM   1. Pneumonia of left lower lobe due to infectious organism J18.1 486   2. Altered mental status, unspecified altered mental status type R41.82 780.97     Patient Active Problem List   Diagnosis   • Pneumonia of left lower lobe due to infectious organism     Past Medical History:   Diagnosis Date   • Arthritis    • Asthma    • Degenerative disc disease, lumbar    • Diabetes mellitus    • Fibromyalgia    • GERD (gastroesophageal reflux disease)    • Hyperlipidemia    • Hypertension    • Osteoporosis      Past Surgical History:   Procedure Laterality Date   • APPENDECTOMY     • ESOPHAGEAL DILATATION     • TOTAL SHOULDER REPLACEMENT Bilateral    • TUBAL ABDOMINAL LIGATION            OT ASSESSMENT FLOWSHEET (last 72 hours)      OT Evaluation       12/15/17 0800 17 1624 17 1644 17 0824 17 1601    Rehab Evaluation    Document Type evaluation  -KR therapy note (daily note)  -RF therapy note (daily note)  -CT evaluation  -KR evaluation;therapy note (daily note)  -CT    Subjective Information  agree to therapy  -RF agree to therapy  -CT agree to therapy  -KR agree to therapy;complains of;weakness  -CT    Patient Effort, Rehab Treatment  good  -RF good  -CT good  -KR good  -CT    Symptoms Noted During/After Treatment  fatigue  -RF fatigue  -CT  fatigue  -CT    Symptoms Noted Comment     Pt tolerated today evaluation and treatment session well with rest breaks provided as needed. Pt able to ambualte with use of RW Min A.   -CT    General Information    Patient Profile Review     yes  -CT    Onset of Illness/Injury or Date of Surgery Date     17   admit date  -CT    Referring Physician      Meehan  -CT    Precautions/Limitations  fall precautions;oxygen therapy device and L/min  -RF fall precautions;oxygen therapy device and L/min  -CT  fall precautions;oxygen therapy device and L/min  -CT    Prior Level of Function     independent:;all household mobility;community mobility   with use of AD  -CT    Equipment Currently Used at Home     walker, rolling;commode  -CT    Plans/Goals Discussed With     patient;agreed upon  -CT    Risks Reviewed     patient:;LOB;nausea/vomiting;dizziness;increased discomfort;change in vital signs;increased drainage;lines disloged  -CT    Benefits Reviewed     patient:;improve function;increase independence;increase strength;increase balance;decrease pain;decrease risk of DVT;improve skin integrity;increase knowledge  -CT    Barriers to Rehab     medically complex;physical barrier  -CT    Living Environment    Lives With    alone  -KR alone  -CT    Living Arrangements    apartment  -KR apartment  -CT    Home Accessibility     no concerns  -CT    Clinical Impression    Criteria for Skilled Therapeutic Interventions Met    yes  -KR     Rehab Potential    good, to achieve stated therapy goals  -KR     Therapy Frequency    3-5 times/wk  -KR     Pain Assessment    Pain Assessment  Castillo-Trotter FACES  -RF   Castillo-Trotter FACES  -CT    Castillo-Trotter FACES Pain Rating  6  -RF   0  -CT    Pain Type  Acute pain  -RF       Pain Location  Head   headache  -RF       Pain Intervention(s)  Repositioned;Ambulation/increased activity;Rest   RN notified  -RF       Cognitive Assessment/Intervention    Current Cognitive/Communication Assessment  functional  -RF functional  -CT functional  -KR functional  -CT    Orientation Status  oriented to;oriented x 4  -RF oriented to;oriented x 4  -CT oriented x 4  -KR oriented to;oriented x 4  -CT    Follows Commands/Answers Questions  able to follow single-step instructions;100% of the time  -RF   able to follow multi-step instructions;100% of the time  -CT     Personal Safety  fully aware of deficits  -RF   mild impairment  -CT    Personal Safety Interventions  fall prevention program maintained;muscle strengthening facilitated;gait belt;nonskid shoes/slippers when out of bed  -RF fall prevention program maintained;gait belt;muscle strengthening facilitated;nonskid shoes/slippers when out of bed  -CT  fall prevention program maintained;gait belt;muscle strengthening facilitated;nonskid shoes/slippers when out of bed  -CT    ROM (Range of Motion)    General ROM Detail    BUE AROM WFL, L index finger 3/5  -KR BLE grossly WFL  -CT    MMT (Manual Muscle Testing)    General MMT Assessment Detail    BUE 3-/5  -KR BLE grossly 4/5  -CT    Bed Mobility, Assessment/Treatment    Bed Mobility, Assistive Device  bed rails  -RF bed rails  -CT  bed rails  -CT    Bed Mobility, Roll Left, [place]     moderate assist (50% patient effort)  -CT    Bed Mobility, Roll Right, [place]     moderate assist (50% patient effort)  -CT    Bed Mobility, Scoot/Bridge, [place]  minimum assist (75% patient effort)  -RF minimum assist (75% patient effort)  -CT  minimum assist (75% patient effort)  -CT    Bed Mob, Supine to Sit, [place]  minimum assist (75% patient effort);moderate assist (50% patient effort)  -RF minimum assist (75% patient effort);moderate assist (50% patient effort)  -CT  minimum assist (75% patient effort);moderate assist (50% patient effort)  -CT    Bed Mobility, Impairments  strength decreased  -RF strength decreased  -CT  strength decreased  -CT    Transfer Assessment/Treatment    Transfers, Bed-Chair [place]  minimum assist (75% patient effort);nonverbal cues required (demo/gesture);verbal cues required  -RF       Transfers, Sit-Stand [place]  minimum assist (75% patient effort)  -RF moderate assist (50% patient effort)  -CT  moderate assist (50% patient effort)  -CT    Transfers, Stand-Sit [place]  minimum assist (75% patient effort)  -RF moderate  assist (50% patient effort)  -CT  moderate assist (50% patient effort)  -CT    Transfers, Sit-Stand-Sit, Assist Device  rolling walker  -RF rolling walker  -CT  rolling walker  -CT    Toilet Transfer, [place]  minimum assist (75% patient effort)  -RF       Toilet Transfer, Assistive Device  elevated toilet seat;rolling walker;other (see comments)   grab bar  -RF       Transfer, Comment  Pt performeed static stand unsupported X5 mins for washing hands and brushing hair at sink.   -RF       Upper Body Bathing Assessment/Training    UB Bathing Assess/Train, [place] Level    moderate assist (50% patient effort)  -KR     Lower Body Bathing Assessment/Training    LB Bathing Assess/Train, [place] Level    moderate assist (50% patient effort)  -KR     Upper Body Dressing Assessment/Training    UB Dressing Assess/Train, [place]    moderate assist (50% patient effort)  -KR     Lower Body Dressing Assessment/Training    LB Dressing Assess/Train, [place]    moderate assist (50% patient effort)  -KR     Toileting Assessment/Training    Toileting Assess/Train, [place]ep[place] Level    moderate assist (50% patient effort)  -KR     Grooming Assessment/Training    Grooming Assess/Train, Indepen Level    minimum assist (75% patient effort)  -KR     Therapy Exercises    Bilateral Lower Extremities  --  -RF AROM:;15 reps;sitting  -CT      General Therapy Interventions    Planned Therapy Interventions    activity intolerance;ADL retraining;ROM (Range of Motion);strengthening;adaptive equipment training  -KR     Positioning and Restraints    Pre-Treatment Position  in bed  -RF in bed  -CT  in bed  -CT    Post Treatment Position  chair  -RF chair  -CT  chair  -CT    In Chair  sitting;call light within reach;encouraged to call for assist;with family/caregiver  -RF sitting;call light within reach;encouraged to call for assist;with family/caregiver  -CT  sitting;call light within reach;encouraged to call for assist  -CT       12/12/17 1040 12/12/17 1023             General Information    Equipment Currently Used at Home walker, rolling;commode   Pt has cpap that she doesnt wear  -        Living Environment    Lives With alone  -        Living Arrangements apartment   Pt lives at [facility]  -        Transportation Available car;family or friend will provide   Family provides transportation for pt.   -        Functional Level Prior    Swallowing  2-->difficulty swallowing foods   Pt reports h/o swallowing difficulty for couple years requiring , what sounds like,  dilatation of esophagus.  -         User Key  (r) = Recorded By, (t) = Taken By, (c) = Cosigned By    Initials Name Effective Dates     Tosha Ivory, RN 06/16/16 -     KR Erasmo Goff, OT 03/14/16 -     CT Moriah Vegas, PT 03/14/16 -     RF Veronica Mares, PTA 07/19/17 -                  OT Recommendation and Plan  Planned Therapy Interventions: activity intolerance, ADL retraining, ROM (Range of Motion), strengthening, adaptive equipment training  Therapy Frequency: 3-5 times/wk             OT Goals       12/12/17 1607          Strength OT LTG    Strength Goal OT LTG, Date Established 12/12/17  -KR      Strength Goal OT LTG, Time to Achieve by discharge  -KR      Strength Goal OT LTG, Measure to Achieve BUE increase x 1 to enhance self care/fxl task performance  -      Occupational Therapy OT LTG    Occupational Therapy OT LTG, Date Established 12/12/17  -KR      Occupational Therapy OT LTG, Time to Achieve by discharge  -KR      Occupational Therapy OT LTG, Activity Type provide finger orthosis to provide fxl positioning L hand. Pt. ind. with applications.  -        User Key  (r) = Recorded By, (t) = Taken By, (c) = Cosigned By    Initials Name Provider Type    KOSTAS Goff OT Occupational Therapist                Outcome Measures       12/15/17 0800 12/14/17 1600 12/13/17 1600    How much help from another person do you currently need...     Turning from your back to your side while in flat bed without using bedrails?  3  -RF 3  -CT    Moving from lying on back to sitting on the side of a flat bed without bedrails?  3  -RF 3  -CT    Moving to and from a bed to a chair (including a wheelchair)?  3  -RF 3  -CT    Standing up from a chair using your arms (e.g., wheelchair, bedside chair)?  3  -RF 3  -CT    Climbing 3-5 steps with a railing?  2  -RF 2  -CT    To walk in hospital room?  3  -RF 3  -CT    AM-PAC 6 Clicks Score  17  -RF 17  -CT    Functional Assessment    Outcome Measure Options AM-PAC 6 Clicks Daily Activity (OT)  -KR AM-PAC 6 Clicks Basic Mobility (PT)  -RF AM-PAC 6 Clicks Basic Mobility (PT)  -CT      12/13/17 0831 12/12/17 1600       How much help from another person do you currently need...    Turning from your back to your side while in flat bed without using bedrails?  3  -CT     Moving from lying on back to sitting on the side of a flat bed without bedrails?  3  -CT     Moving to and from a bed to a chair (including a wheelchair)?  3  -CT     Standing up from a chair using your arms (e.g., wheelchair, bedside chair)?  3  -CT     Climbing 3-5 steps with a railing?  2  -CT     To walk in hospital room?  3  -CT     AM-PAC 6 Clicks Score  17  -CT     How much help from another is currently needed...    Putting on and taking off regular lower body clothing? 3  -KR      Bathing (including washing, rinsing, and drying) 3  -KR      Toileting (which includes using toilet bed pan or urinal) 3  -KR      Putting on and taking off regular upper body clothing 3  -KR      Taking care of personal grooming (such as brushing teeth) 3  -KR      Eating meals 3  -KR      Score 18  -KR      Functional Assessment    Outcome Measure Options  AM-PAC 6 Clicks Basic Mobility (PT)  -CT       User Key  (r) = Recorded By, (t) = Taken By, (c) = Cosigned By    Initials Name Provider Type    KOSTAS Goff, OT Occupational Therapist    CT Moriah Vegas, PT Physical  Therapist    RF Veronica Mares, JUAN FRANCISCO Physical Therapy Assistant          Time Calculation:        Therapy Charges for Today     Code Description Service Date Service Provider Modifiers Qty    [id number]  OT SELFCARE CURRENT 12/15/2017 GUILLERMO Pedraza CK 1    [id number]  OT SELFCARE PROJECTED 12/15/2017 GUILLERMO Pedraza CJ 1          OT G-codes  Functional Limitation: Self care  Self Care Current Status (): At least 40 percent but less than 60 percent impaired, limited or restricted  Self Care Goal Status (): At least 20 percent but less than 40 percent impaired, limited or restricted    Erasmo Goff OT  12/15/2017

## 2017-12-15 NOTE — PROGRESS NOTES
Discharge Planning Assessment   [facility]     Patient Name: Domonique Cummings  MRN: [id number]  Today's Date: 12/15/2017    Admit Date: 12/11/2017          Discharge Needs Assessment     None            Discharge Plan       12/15/17 1246    Case Management/Social Work Plan    Plan Pt admited on 12/11/17.  Pt lives at home at [facility] and plans to return home at discharge.  Pt requests home health referral at discharge.  SS will follow and assist with discharge needs.     Patient/Family In Agreement With Plan yes        Discharge Placement     No information found        Expected Discharge Date and Time     Expected Discharge Date Expected Discharge Time    Dec 16, 2017               Demographic Summary     None            Functional Status     None            Psychosocial     None            Abuse/Neglect     None            Legal     None            Substance Abuse     None            Patient Forms     None          Nayana Brooks

## 2017-12-15 NOTE — PROGRESS NOTES
Antimicrobial Length of Therapy:    Day 5 Rocephin   Day 5 doxycycline    Thank you.  Samia Brown, Pharm.D.  12/15/2017  1:29 PM

## 2017-12-15 NOTE — SIGNIFICANT NOTE
12/15/17 1553   Rehab Treatment   Discipline physical therapist   Rehab Evaluation   Evaluation Not Performed patient/family decline, not feeling well  (Pt declined PT in AM and PM today d/t not feeling well. )

## 2017-12-16 LAB
BACTERIA SPEC AEROBE CULT: NORMAL
BACTERIA SPEC AEROBE CULT: NORMAL

## 2017-12-16 PROCEDURE — 94799 UNLISTED PULMONARY SVC/PX: CPT

## 2017-12-16 PROCEDURE — 25010000002 FUROSEMIDE PER 20 MG: Performed by: INTERNAL MEDICINE

## 2017-12-16 PROCEDURE — 25010000002 ENOXAPARIN PER 10 MG: Performed by: INTERNAL MEDICINE

## 2017-12-16 RX ORDER — FUROSEMIDE 10 MG/ML
20 INJECTION INTRAMUSCULAR; INTRAVENOUS ONCE
Status: COMPLETED | OUTPATIENT
Start: 2017-12-16 | End: 2017-12-16

## 2017-12-16 RX ADMIN — GABAPENTIN 400 MG: 400 CAPSULE ORAL at 20:44

## 2017-12-16 RX ADMIN — ACETAMINOPHEN AND CODEINE PHOSPHATE 1 TABLET: 30; 300 TABLET ORAL at 10:23

## 2017-12-16 RX ADMIN — DOXYCYCLINE 100 MG: 100 CAPSULE ORAL at 05:13

## 2017-12-16 RX ADMIN — POLYVINYL ALCOHOL, POVIDONE 1 DROP: 14; 6 SOLUTION/ DROPS OPHTHALMIC at 20:44

## 2017-12-16 RX ADMIN — METOPROLOL SUCCINATE 25 MG: 25 TABLET, FILM COATED, EXTENDED RELEASE ORAL at 09:25

## 2017-12-16 RX ADMIN — FUROSEMIDE 20 MG: 10 INJECTION, SOLUTION INTRAMUSCULAR; INTRAVENOUS at 10:23

## 2017-12-16 RX ADMIN — DOXYCYCLINE 100 MG: 100 CAPSULE ORAL at 17:49

## 2017-12-16 RX ADMIN — Medication 10 ML: at 09:25

## 2017-12-16 RX ADMIN — POLYETHYLENE GLYCOL (3350) 17 G: 17 POWDER, FOR SOLUTION ORAL at 09:25

## 2017-12-16 RX ADMIN — IPRATROPIUM BROMIDE AND ALBUTEROL SULFATE 3 ML: .5; 3 SOLUTION RESPIRATORY (INHALATION) at 00:40

## 2017-12-16 RX ADMIN — ENOXAPARIN SODIUM 40 MG: 40 INJECTION SUBCUTANEOUS at 09:24

## 2017-12-16 RX ADMIN — IPRATROPIUM BROMIDE AND ALBUTEROL SULFATE 3 ML: .5; 3 SOLUTION RESPIRATORY (INHALATION) at 19:24

## 2017-12-16 RX ADMIN — Medication 10 ML: at 20:44

## 2017-12-16 RX ADMIN — GABAPENTIN 400 MG: 400 CAPSULE ORAL at 17:27

## 2017-12-16 RX ADMIN — ACETAMINOPHEN AND CODEINE PHOSPHATE 1 TABLET: 30; 300 TABLET ORAL at 20:44

## 2017-12-16 RX ADMIN — LOSARTAN POTASSIUM 100 MG: 50 TABLET, FILM COATED ORAL at 09:25

## 2017-12-16 RX ADMIN — ALPRAZOLAM 0.5 MG: 0.5 TABLET ORAL at 09:24

## 2017-12-16 RX ADMIN — GABAPENTIN 400 MG: 400 CAPSULE ORAL at 09:24

## 2017-12-16 RX ADMIN — TOPIRAMATE 100 MG: 100 TABLET, FILM COATED ORAL at 09:24

## 2017-12-16 RX ADMIN — ALPRAZOLAM 0.5 MG: 0.5 TABLET ORAL at 20:44

## 2017-12-16 RX ADMIN — POLYVINYL ALCOHOL, POVIDONE 1 DROP: 14; 6 SOLUTION/ DROPS OPHTHALMIC at 09:24

## 2017-12-16 RX ADMIN — PANTOPRAZOLE SODIUM 40 MG: 40 TABLET, DELAYED RELEASE ORAL at 05:13

## 2017-12-16 RX ADMIN — CEFDINIR 300 MG: 300 CAPSULE ORAL at 09:24

## 2017-12-16 RX ADMIN — IPRATROPIUM BROMIDE AND ALBUTEROL SULFATE 3 ML: .5; 3 SOLUTION RESPIRATORY (INHALATION) at 07:18

## 2017-12-16 RX ADMIN — Medication 10 ML: at 20:45

## 2017-12-16 RX ADMIN — OXYBUTYNIN CHLORIDE 10 MG: 5 TABLET, FILM COATED, EXTENDED RELEASE ORAL at 09:24

## 2017-12-16 RX ADMIN — ASPIRIN 81 MG: 81 TABLET ORAL at 09:24

## 2017-12-16 RX ADMIN — AMLODIPINE BESYLATE 5 MG: 5 TABLET ORAL at 09:24

## 2017-12-16 RX ADMIN — Medication 1 CAPSULE: at 09:24

## 2017-12-16 RX ADMIN — IPRATROPIUM BROMIDE AND ALBUTEROL SULFATE 3 ML: .5; 3 SOLUTION RESPIRATORY (INHALATION) at 14:47

## 2017-12-16 RX ADMIN — TOPIRAMATE 100 MG: 100 TABLET, FILM COATED ORAL at 20:44

## 2017-12-16 RX ADMIN — ACETAMINOPHEN AND CODEINE PHOSPHATE 1 TABLET: 30; 300 TABLET ORAL at 16:45

## 2017-12-16 RX ADMIN — ACETAMINOPHEN AND CODEINE PHOSPHATE 1 TABLET: 30; 300 TABLET ORAL at 05:15

## 2017-12-16 RX ADMIN — DULOXETINE HYDROCHLORIDE 60 MG: 60 CAPSULE, DELAYED RELEASE ORAL at 09:24

## 2017-12-16 RX ADMIN — CEFDINIR 300 MG: 300 CAPSULE ORAL at 20:44

## 2017-12-16 NOTE — SIGNIFICANT NOTE
Place sponge pads behind pt ears, pt has complaint of slightly sore behind right ear, upon inspection no redness or breakdown was notable.

## 2017-12-16 NOTE — PLAN OF CARE
Problem: Pneumonia (Adult)  Goal: Signs and Symptoms of Listed Potential Problems Will be Absent or Manageable (Pneumonia)  Outcome: Ongoing (interventions implemented as appropriate)    12/13/17 0923   Pneumonia   Problems Assessed (Pneumonia) all   Problems Present (Pneumonia) fluid/electrolyte imbalance         Problem: Fall Risk (Adult)  Goal: Absence of Falls  Outcome: Ongoing (interventions implemented as appropriate)    12/15/17 1112   Fall Risk (Adult)   Absence of Falls making progress toward outcome         Problem: Pressure Ulcer Risk (Lopez Scale) (Adult,Obstetrics,Pediatric)  Goal: Skin Integrity  Outcome: Ongoing (interventions implemented as appropriate)    12/15/17 1112   Pressure Ulcer Risk (Lopez Scale) (Adult,Obstetrics,Pediatric)   Skin Integrity making progress toward outcome

## 2017-12-17 LAB
ALBUMIN SERPL-MCNC: 3.5 G/DL (ref 3.4–4.8)
ALBUMIN/GLOB SERPL: 1.3 G/DL (ref 1.5–2.5)
ALP SERPL-CCNC: 80 U/L (ref 35–104)
ALT SERPL W P-5'-P-CCNC: 34 U/L (ref 10–36)
ANION GAP SERPL CALCULATED.3IONS-SCNC: 7.5 MMOL/L (ref 3.6–11.2)
AST SERPL-CCNC: 30 U/L (ref 10–30)
BACTERIA SPEC RESP CULT: ABNORMAL
BACTERIA SPEC RESP CULT: ABNORMAL
BASOPHILS # BLD AUTO: 0.02 10*3/MM3 (ref 0–0.3)
BASOPHILS NFR BLD AUTO: 0.2 % (ref 0–2)
BILIRUB SERPL-MCNC: 0.1 MG/DL (ref 0.2–1.8)
BUN BLD-MCNC: 20 MG/DL (ref 7–21)
BUN/CREAT SERPL: 21.7 (ref 7–25)
CALCIUM SPEC-SCNC: 8.9 MG/DL (ref 7.7–10)
CHLORIDE SERPL-SCNC: 109 MMOL/L (ref 99–112)
CO2 SERPL-SCNC: 23.5 MMOL/L (ref 24.3–31.9)
CREAT BLD-MCNC: 0.92 MG/DL (ref 0.43–1.29)
CRP SERPL-MCNC: 1.85 MG/DL (ref 0–0.99)
DEPRECATED RDW RBC AUTO: 43.9 FL (ref 37–54)
EOSINOPHIL # BLD AUTO: 0.37 10*3/MM3 (ref 0–0.7)
EOSINOPHIL NFR BLD AUTO: 3.6 % (ref 0–7)
ERYTHROCYTE [DISTWIDTH] IN BLOOD BY AUTOMATED COUNT: 12.3 % (ref 11.5–14.5)
GFR SERPL CREATININE-BSD FRML MDRD: 60 ML/MIN/1.73
GLOBULIN UR ELPH-MCNC: 2.7 GM/DL
GLUCOSE BLD-MCNC: 115 MG/DL (ref 70–110)
GRAM STN SPEC: ABNORMAL
HCT VFR BLD AUTO: 35.7 % (ref 37–47)
HGB BLD-MCNC: 11.3 G/DL (ref 12–16)
IMM GRANULOCYTES # BLD: 0.03 10*3/MM3 (ref 0–0.03)
IMM GRANULOCYTES NFR BLD: 0.3 % (ref 0–0.5)
LYMPHOCYTES # BLD AUTO: 3.57 10*3/MM3 (ref 1–3)
LYMPHOCYTES NFR BLD AUTO: 35.1 % (ref 16–46)
MCH RBC QN AUTO: 30.8 PG (ref 27–33)
MCHC RBC AUTO-ENTMCNC: 31.7 G/DL (ref 33–37)
MCV RBC AUTO: 97.3 FL (ref 80–94)
MONOCYTES # BLD AUTO: 1.24 10*3/MM3 (ref 0.1–0.9)
MONOCYTES NFR BLD AUTO: 12.2 % (ref 0–12)
NEUTROPHILS # BLD AUTO: 4.94 10*3/MM3 (ref 1.4–6.5)
NEUTROPHILS NFR BLD AUTO: 48.6 % (ref 40–75)
OSMOLALITY SERPL CALC.SUM OF ELEC: 282.9 MOSM/KG (ref 273–305)
PLATELET # BLD AUTO: 233 10*3/MM3 (ref 130–400)
PMV BLD AUTO: 10.4 FL (ref 6–10)
POTASSIUM BLD-SCNC: 3.9 MMOL/L (ref 3.5–5.3)
PROT SERPL-MCNC: 6.2 G/DL (ref 6–8)
RBC # BLD AUTO: 3.67 10*6/MM3 (ref 4.2–5.4)
SODIUM BLD-SCNC: 140 MMOL/L (ref 135–153)
WBC NRBC COR # BLD: 10.17 10*3/MM3 (ref 4.5–12.5)

## 2017-12-17 PROCEDURE — 94799 UNLISTED PULMONARY SVC/PX: CPT

## 2017-12-17 PROCEDURE — 80053 COMPREHEN METABOLIC PANEL: CPT | Performed by: INTERNAL MEDICINE

## 2017-12-17 PROCEDURE — 25010000002 ENOXAPARIN PER 10 MG: Performed by: INTERNAL MEDICINE

## 2017-12-17 PROCEDURE — 25010000002 VANCOMYCIN PER 500 MG: Performed by: INTERNAL MEDICINE

## 2017-12-17 PROCEDURE — 85025 COMPLETE CBC W/AUTO DIFF WBC: CPT | Performed by: INTERNAL MEDICINE

## 2017-12-17 PROCEDURE — 86140 C-REACTIVE PROTEIN: CPT | Performed by: INTERNAL MEDICINE

## 2017-12-17 RX ADMIN — Medication 10 ML: at 09:00

## 2017-12-17 RX ADMIN — IPRATROPIUM BROMIDE AND ALBUTEROL SULFATE 3 ML: .5; 3 SOLUTION RESPIRATORY (INHALATION) at 19:18

## 2017-12-17 RX ADMIN — ENOXAPARIN SODIUM 40 MG: 40 INJECTION SUBCUTANEOUS at 09:00

## 2017-12-17 RX ADMIN — ACETAMINOPHEN AND CODEINE PHOSPHATE 1 TABLET: 30; 300 TABLET ORAL at 13:22

## 2017-12-17 RX ADMIN — CEFDINIR 300 MG: 300 CAPSULE ORAL at 08:24

## 2017-12-17 RX ADMIN — Medication 10 ML: at 21:01

## 2017-12-17 RX ADMIN — ACETAMINOPHEN AND CODEINE PHOSPHATE 1 TABLET: 30; 300 TABLET ORAL at 00:33

## 2017-12-17 RX ADMIN — METOPROLOL SUCCINATE 25 MG: 25 TABLET, FILM COATED, EXTENDED RELEASE ORAL at 08:25

## 2017-12-17 RX ADMIN — GABAPENTIN 400 MG: 400 CAPSULE ORAL at 21:01

## 2017-12-17 RX ADMIN — ASPIRIN 81 MG: 81 TABLET ORAL at 08:24

## 2017-12-17 RX ADMIN — POLYETHYLENE GLYCOL (3350) 17 G: 17 POWDER, FOR SOLUTION ORAL at 08:24

## 2017-12-17 RX ADMIN — TOPIRAMATE 100 MG: 100 TABLET, FILM COATED ORAL at 21:01

## 2017-12-17 RX ADMIN — PANTOPRAZOLE SODIUM 40 MG: 40 TABLET, DELAYED RELEASE ORAL at 05:08

## 2017-12-17 RX ADMIN — ACETAMINOPHEN AND CODEINE PHOSPHATE 1 TABLET: 30; 300 TABLET ORAL at 19:14

## 2017-12-17 RX ADMIN — ALPRAZOLAM 0.5 MG: 0.5 TABLET ORAL at 21:01

## 2017-12-17 RX ADMIN — GABAPENTIN 400 MG: 400 CAPSULE ORAL at 16:49

## 2017-12-17 RX ADMIN — IPRATROPIUM BROMIDE AND ALBUTEROL SULFATE 3 ML: .5; 3 SOLUTION RESPIRATORY (INHALATION) at 07:05

## 2017-12-17 RX ADMIN — TOPIRAMATE 100 MG: 100 TABLET, FILM COATED ORAL at 08:25

## 2017-12-17 RX ADMIN — IPRATROPIUM BROMIDE AND ALBUTEROL SULFATE 3 ML: .5; 3 SOLUTION RESPIRATORY (INHALATION) at 13:21

## 2017-12-17 RX ADMIN — POLYVINYL ALCOHOL, POVIDONE 1 DROP: 14; 6 SOLUTION/ DROPS OPHTHALMIC at 08:24

## 2017-12-17 RX ADMIN — DULOXETINE HYDROCHLORIDE 60 MG: 60 CAPSULE, DELAYED RELEASE ORAL at 08:25

## 2017-12-17 RX ADMIN — ACETAMINOPHEN AND CODEINE PHOSPHATE 1 TABLET: 30; 300 TABLET ORAL at 08:28

## 2017-12-17 RX ADMIN — Medication 1 CAPSULE: at 08:24

## 2017-12-17 RX ADMIN — VANCOMYCIN HYDROCHLORIDE 1500 MG: 5 INJECTION, POWDER, LYOPHILIZED, FOR SOLUTION INTRAVENOUS at 16:49

## 2017-12-17 RX ADMIN — CEFDINIR 300 MG: 300 CAPSULE ORAL at 21:01

## 2017-12-17 RX ADMIN — DOXYCYCLINE 100 MG: 100 CAPSULE ORAL at 16:49

## 2017-12-17 RX ADMIN — POLYVINYL ALCOHOL, POVIDONE 1 DROP: 14; 6 SOLUTION/ DROPS OPHTHALMIC at 21:01

## 2017-12-17 RX ADMIN — OXYBUTYNIN CHLORIDE 10 MG: 5 TABLET, FILM COATED, EXTENDED RELEASE ORAL at 08:24

## 2017-12-17 RX ADMIN — GABAPENTIN 400 MG: 400 CAPSULE ORAL at 08:24

## 2017-12-17 RX ADMIN — AMLODIPINE BESYLATE 5 MG: 5 TABLET ORAL at 08:25

## 2017-12-17 RX ADMIN — IPRATROPIUM BROMIDE AND ALBUTEROL SULFATE 3 ML: .5; 3 SOLUTION RESPIRATORY (INHALATION) at 00:23

## 2017-12-17 RX ADMIN — LOSARTAN POTASSIUM 100 MG: 50 TABLET, FILM COATED ORAL at 08:25

## 2017-12-17 RX ADMIN — DOXYCYCLINE 100 MG: 100 CAPSULE ORAL at 05:08

## 2017-12-17 RX ADMIN — ALPRAZOLAM 0.5 MG: 0.5 TABLET ORAL at 08:28

## 2017-12-17 NOTE — PROGRESS NOTES
Patient initiated on vancomycin for MRSA on sputum culture. Based on patient and population kinetics (ke = 0.073 hrs-1, t 1/2 = 9.5 hrs, Vd = 67.4 L), will start vancomycin at 1500 mg q12 hrs. Will check a trough level at steady state and follow.    Thank you,    Susanna Fagan [facility]

## 2017-12-18 PROBLEM — J15.212 MRSA PNEUMONIA (HCC): Status: ACTIVE | Noted: 2017-12-18

## 2017-12-18 PROCEDURE — 25010000002 ENOXAPARIN PER 10 MG: Performed by: INTERNAL MEDICINE

## 2017-12-18 PROCEDURE — 97116 GAIT TRAINING THERAPY: CPT

## 2017-12-18 PROCEDURE — 97530 THERAPEUTIC ACTIVITIES: CPT

## 2017-12-18 PROCEDURE — 94799 UNLISTED PULMONARY SVC/PX: CPT

## 2017-12-18 PROCEDURE — 25010000002 VANCOMYCIN PER 500 MG: Performed by: INTERNAL MEDICINE

## 2017-12-18 RX ADMIN — Medication 10 ML: at 20:17

## 2017-12-18 RX ADMIN — LOSARTAN POTASSIUM 100 MG: 50 TABLET, FILM COATED ORAL at 08:03

## 2017-12-18 RX ADMIN — DOXYCYCLINE 100 MG: 100 CAPSULE ORAL at 18:00

## 2017-12-18 RX ADMIN — IPRATROPIUM BROMIDE AND ALBUTEROL SULFATE 3 ML: .5; 3 SOLUTION RESPIRATORY (INHALATION) at 00:34

## 2017-12-18 RX ADMIN — GABAPENTIN 400 MG: 400 CAPSULE ORAL at 20:11

## 2017-12-18 RX ADMIN — GABAPENTIN 400 MG: 400 CAPSULE ORAL at 16:00

## 2017-12-18 RX ADMIN — PANTOPRAZOLE SODIUM 40 MG: 40 TABLET, DELAYED RELEASE ORAL at 05:10

## 2017-12-18 RX ADMIN — ENOXAPARIN SODIUM 40 MG: 40 INJECTION SUBCUTANEOUS at 08:03

## 2017-12-18 RX ADMIN — Medication 10 ML: at 08:08

## 2017-12-18 RX ADMIN — IPRATROPIUM BROMIDE AND ALBUTEROL SULFATE 3 ML: .5; 3 SOLUTION RESPIRATORY (INHALATION) at 18:50

## 2017-12-18 RX ADMIN — IPRATROPIUM BROMIDE AND ALBUTEROL SULFATE 3 ML: .5; 3 SOLUTION RESPIRATORY (INHALATION) at 07:15

## 2017-12-18 RX ADMIN — POLYETHYLENE GLYCOL (3350) 17 G: 17 POWDER, FOR SOLUTION ORAL at 08:03

## 2017-12-18 RX ADMIN — GABAPENTIN 400 MG: 400 CAPSULE ORAL at 08:03

## 2017-12-18 RX ADMIN — POLYVINYL ALCOHOL, POVIDONE 1 DROP: 14; 6 SOLUTION/ DROPS OPHTHALMIC at 08:03

## 2017-12-18 RX ADMIN — TOPIRAMATE 100 MG: 100 TABLET, FILM COATED ORAL at 08:03

## 2017-12-18 RX ADMIN — ASPIRIN 81 MG: 81 TABLET ORAL at 08:03

## 2017-12-18 RX ADMIN — DULOXETINE HYDROCHLORIDE 60 MG: 60 CAPSULE, DELAYED RELEASE ORAL at 08:03

## 2017-12-18 RX ADMIN — IPRATROPIUM BROMIDE AND ALBUTEROL SULFATE 3 ML: .5; 3 SOLUTION RESPIRATORY (INHALATION) at 13:39

## 2017-12-18 RX ADMIN — ACETAMINOPHEN AND CODEINE PHOSPHATE 1 TABLET: 30; 300 TABLET ORAL at 02:18

## 2017-12-18 RX ADMIN — VANCOMYCIN HYDROCHLORIDE 1500 MG: 5 INJECTION, POWDER, LYOPHILIZED, FOR SOLUTION INTRAVENOUS at 03:06

## 2017-12-18 RX ADMIN — ACETAMINOPHEN AND CODEINE PHOSPHATE 1 TABLET: 30; 300 TABLET ORAL at 08:08

## 2017-12-18 RX ADMIN — ACETAMINOPHEN AND CODEINE PHOSPHATE 1 TABLET: 30; 300 TABLET ORAL at 13:48

## 2017-12-18 RX ADMIN — POLYVINYL ALCOHOL, POVIDONE 1 DROP: 14; 6 SOLUTION/ DROPS OPHTHALMIC at 20:11

## 2017-12-18 RX ADMIN — Medication 1 CAPSULE: at 08:03

## 2017-12-18 RX ADMIN — METOPROLOL SUCCINATE 25 MG: 25 TABLET, FILM COATED, EXTENDED RELEASE ORAL at 08:03

## 2017-12-18 RX ADMIN — TOPIRAMATE 100 MG: 100 TABLET, FILM COATED ORAL at 20:11

## 2017-12-18 RX ADMIN — ALPRAZOLAM 0.5 MG: 0.5 TABLET ORAL at 20:16

## 2017-12-18 RX ADMIN — OXYBUTYNIN CHLORIDE 10 MG: 5 TABLET, FILM COATED, EXTENDED RELEASE ORAL at 08:03

## 2017-12-18 RX ADMIN — AMLODIPINE BESYLATE 5 MG: 5 TABLET ORAL at 08:03

## 2017-12-18 RX ADMIN — ALPRAZOLAM 0.5 MG: 0.5 TABLET ORAL at 08:08

## 2017-12-18 RX ADMIN — DOXYCYCLINE 100 MG: 100 CAPSULE ORAL at 05:10

## 2017-12-18 NOTE — DISCHARGE PLACEMENT REQUEST
Case Management/Social Work    Patient Name:  Domonique Cummings  YOB: 1945  MRN: [id number]  Admit Date:  12/11/2017    Screen for Swing Bed services received from [facility].  Patient meets criteria with PT, OT, RT and IV medication.      Electronically signed by:  Angelina Louis RN  12/18/17 3:01 PM

## 2017-12-18 NOTE — THERAPY TREATMENT NOTE
Acute Care - Occupational Therapy Treatment Note   [facility]     Patient Name: Domonique Cummings  : 1945  MRN: [id number]  Today's Date: 2017  Onset of Illness/Injury or Date of Surgery Date: 17 (admit date)     Referring Physician: Zoran      Admit Date: 2017    Visit Dx:     ICD-10-CM ICD-9-CM   1. Pneumonia of left lower lobe due to infectious organism J18.1 486   2. Altered mental status, unspecified altered mental status type R41.82 780.97     Patient Active Problem List   Diagnosis   • Pneumonia of left lower lobe due to infectious organism   • MRSA pneumonia                 OT Goals       17 1607          Strength OT LTG    Strength Goal OT LTG, Date Established 17  -KR      Strength Goal OT LTG, Time to Achieve by discharge  -KR      Strength Goal OT LTG, Measure to Achieve BUE increase x 1 to enhance self care/fxl task performance  -KR      Occupational Therapy OT LTG    Occupational Therapy OT LTG, Date Established 17  -KR      Occupational Therapy OT LTG, Time to Achieve by discharge  -KR      Occupational Therapy OT LTG, Activity Type provide finger orthosis to provide fxl positioning L hand. Pt. ind. with applications.  -KR        User Key  (r) = Recorded By, (t) = Taken By, (c) = Cosigned By    Initials Name Provider Type    KOSTAS Goff OT Occupational Therapist                OT Recommendation and Plan  Planned Therapy Interventions: activity intolerance, ADL retraining, ROM (Range of Motion), strengthening, adaptive equipment training  Therapy Frequency: 3-5 times/wk          Time Calculation:            OT G-codes  Functional Limitation: Self care  Self Care Current Status (): At least 40 percent but less than 60 percent impaired, limited or restricted  Self Care Goal Status (): At least 20 percent but less than 40 percent impaired, limited or restricted    Erasmo Goff OT  2017

## 2017-12-18 NOTE — PLAN OF CARE
Problem: Patient Care Overview (Adult)  Goal: Plan of Care Review  Outcome: Ongoing (interventions implemented as appropriate)    Problem: Pneumonia (Adult)  Goal: Signs and Symptoms of Listed Potential Problems Will be Absent or Manageable (Pneumonia)  Outcome: Ongoing (interventions implemented as appropriate)    Problem: Fall Risk (Adult)  Goal: Absence of Falls  Outcome: Ongoing (interventions implemented as appropriate)

## 2017-12-18 NOTE — CONSULTS
INFECTIOUS DISEASE CONSULTATION REPORT      Referring Provider: Dr. Meehan  Reason for Consultation: MRSA pneumonia      Principal problem: MRSA pneumonia    Subjective .     History of present illness:    As you well know Dr. Meehan, MsWalter Cummings is a 72 y.o. years old female with past medical history significant for asthma, diabetes, fibromyalgia with prior pneumonia 2 years ago, who presented to [facility] Emergency Department on 12/11/2017 for difficulty breathing, dry cough, generalized weakness and level of energy.  On admission she was found have evidence of pneumonia with leukocytosis and elevated CRP level but negative Legionella urinary antigen, mycoplasma IgM, and influenza with respiratory culture from sputum from 12/13/2017 finalized as MRSA.  CRP level improving to almost normal level at 1.85.    Infectious Disease consultation was requested for antimicrobial management.     History taken from: patient chart RN    Case was discussed with patient and nursing staff    Review of Systems     Constitutional: See history of present illness  Eyes: no eye drainage, itching or redness.  HEENT: no mouth sores, dysphagia or nose bleed.  Respiratory: The history of present illness  Cardiovascular: no chest pain, no palpitations, no orthopnea.  Gastrointestinal: no nausea, vomiting or diarrhea. No abdominal pain, hematemesis or rectal bleeding.  Genitourinary: no dysuria or polyuria.  Hematologic/lymphatic: no lymph node abnormalities, no easy bruising or easy bleeding.  Musculoskeletal: no muscle or joint pain.  Skin: No rash and no itching.  Neurological: no loss of consciousness, no seizure, no headache.  Behavioral/Psych: no depression or suicidal ideation.  Endocrine: no hot flashes.  Immunologic: negative.    Past Medical History    Past Medical History:   Diagnosis Date   • Arthritis    • Asthma    • Degenerative disc disease, lumbar    • Diabetes mellitus    • Fibromyalgia    • GERD  "(gastroesophageal reflux disease)    • Hyperlipidemia    • Hypertension    • Osteoporosis        Past Surgical History    Past Surgical History:   Procedure Laterality Date   • APPENDECTOMY     • ESOPHAGEAL DILATATION     • TOTAL SHOULDER REPLACEMENT Bilateral    • TUBAL ABDOMINAL LIGATION         Family History    Family History   Problem Relation Age of Onset   • Breast cancer Sister        Social History    Social History   Substance Use Topics   • Smoking status: Former Smoker   • Smokeless tobacco: None   • Alcohol use No       Allergies    Review of patient's allergies indicates no known allergies.    Objective     /56 (BP Location: Right arm, Patient Position: Lying)  Pulse 62  Temp 97.6 °F (36.4 °C) (Axillary)   Resp 18  Ht 162.6 cm (64\")  Wt 100 kg (221 lb 3.2 oz)  SpO2 98%  BMI 37.97 kg/m2    Temp:  [97.5 °F (36.4 °C)-97.9 °F (36.6 °C)] 97.6 °F (36.4 °C)        Intake/Output Summary (Last 24 hours) at 12/18/17 0990  Last data filed at 12/18/17 0345   Gross per 24 hour   Intake             1460 ml   Output              950 ml   Net              510 ml         Physical Exam:      General Appearance:    Alert, cooperative, in no acute distress,On 2 L    Head:    Normocephalic, without obvious abnormality, atraumatic   Eyes:            Lids and lashes normal, conjunctivae and sclerae normal, no   icterus, no pallor, corneas clear, PERRLA   Ears:    Ears appear intact with no abnormalities noted   Throat:   No oral lesions, no thrush, oral mucosa moist   Neck:   No adenopathy, supple, trachea midline, no thyromegaly, no   carotid bruit, no JVD   Back:     No tenderness to percussion or palpation, range of motion   normal   Lungs:     Mild wheezing on end expiration bilaterally     Heart:    Regular rhythm and normal rate, normal S1 and S2, no            murmur, no gallop, no rub, no click   Chest Wall:    No abnormalities observed   Abdomen:     Normal bowel sounds, no masses, no organomegaly, soft "        non-tender, non-distended, no guarding, no rebound                tenderness   Rectal:     Deferred   Extremities:   Moves all extremities well, no edema, no cyanosis, no             redness   Pulses:   Pulses palpable and equal bilaterally   Skin:   No bleeding, bruising or rash,Right upper extremity powerglide    Lymph nodes:   No palpable adenopathy   Neurologic:   Awake, alert and oriented x 3. Following commands.       Results:      Results from last 7 days  Lab Units 12/17/17  0112 12/15/17  0104 12/13/17  0058 12/12/17  0543 12/11/17  1333   WBC 10*3/mm3 10.17 9.05 9.96 15.38* 16.75*     Lab Results   Component Value Date    NEUTROABS 4.94 12/17/2017         Results from last 7 days  Lab Units 12/17/17  0112   CREATININE mg/dL 0.92         Results from last 7 days  Lab Units 12/17/17  0112 12/15/17  0104 12/13/17  0058   CRP mg/dL 1.85* 3.73* 9.14*       Cultures:    Blood Culture   Date Value Ref Range Status   12/11/2017 No growth at 5 days  Final   12/11/2017 No growth at 5 days  Final     Respiratory Culture   Date Value Ref Range Status   12/13/2017 Light growth (2+) Staphylococcus aureus, MRSA (A)  Final     Comment:       Methicillin resistant Staphylococcus aureus, Patient may be an isolation risk.  This isolate does not demonstrate inducible clindamycin resistance in vitro.         Results Review:    I have personally reviewed laboratory data, culture results, radiology studies and antimicrobial therapy.    Hospital Medications (active)       Dose Frequency Start End    acetaminophen-codeine (TYLENOL #3) 300-30 MG per tablet 1 tablet 1 tablet Every 4 Hours PRN 12/11/2017 12/21/2017    Sig - Route: Take 1 tablet by mouth Every 4 (Four) Hours As Needed for Moderate Pain . - Oral    ALPRAZolam (XANAX) tablet 0.5 mg 0.5 mg 2 Times Daily PRN 12/11/2017 12/21/2017    Sig - Route: Take 1 tablet by mouth 2 (Two) Times a Day As Needed for Anxiety. - Oral    amLODIPine (NORVASC) tablet 5 mg 5 mg Every  24 Hours Scheduled 12/11/2017     Sig - Route: Take 1 tablet by mouth Daily. - Oral    aspirin EC tablet 81 mg 81 mg Daily 12/12/2017     Sig - Route: Take 1 tablet by mouth Daily. - Oral    cefdinir (OMNICEF) capsule 300 mg 300 mg Every 12 Hours Scheduled 12/16/2017 12/17/2017    Sig - Route: Take 1 capsule by mouth Every 12 (Twelve) Hours. - Oral    celecoxib (CeleBREX) capsule 200 mg 200 mg 2 Times Daily PRN 12/11/2017     Sig - Route: Take 1 capsule by mouth 2 (Two) Times a Day As Needed for Mild Pain . - Oral    doxycycline (MONODOX) capsule 100 mg 100 mg Every 12 Hours 12/15/2017 12/25/2017    Sig - Route: Take 1 capsule by mouth Every 12 (Twelve) Hours. - Oral    DULoxetine (CYMBALTA) DR capsule 60 mg 60 mg Daily 12/11/2017     Sig - Route: Take 1 capsule by mouth Daily. - Oral    enoxaparin (LOVENOX) syringe 40 mg 40 mg Daily 12/11/2017     Sig - Route: Inject 0.4 mL under the skin Daily. - Subcutaneous    gabapentin (NEURONTIN) capsule 400 mg 400 mg 3 Times Daily 12/11/2017     Sig - Route: Take 1 capsule by mouth 3 (Three) Times a Day. - Oral    ipratropium-albuterol (DUO-NEB) nebulizer solution 3 mL 3 mL Every 6 Hours - RT 12/11/2017     Sig - Route: Take 3 mL by nebulization Every 6 (Six) Hours. - Nebulization    losartan (COZAAR) tablet 100 mg 100 mg Every 24 Hours Scheduled 12/11/2017     Sig - Route: Take 2 tablets by mouth Daily. - Oral    metoprolol succinate XL (TOPROL-XL) 24 hr tablet 25 mg 25 mg Every 24 Hours Scheduled 12/11/2017     Sig - Route: Take 1 tablet by mouth Daily. - Oral    oxybutynin XL (DITROPAN-XL) 24 hr tablet 10 mg 10 mg Daily 12/11/2017     Sig - Route: Take 2 tablets by mouth Daily. - Oral    pantoprazole (PROTONIX) EC tablet 40 mg 40 mg Every Early Morning 12/12/2017     Sig - Route: Take 1 tablet by mouth Every Morning. - Oral    Pharmacy to dose vancomycin  Continuous PRN 12/17/2017 12/24/2017    Sig - Route: Continuous As Needed for Consult. - Does not apply     polyethylene glycol (MIRALAX) powder 17 g 17 g Daily 12/12/2017     Sig - Route: Take 17 g by mouth Daily. - Oral    polyvinyl alcohol-povidone (HYPOTEARS) 1.4-0.6 % ophthalmic solution 1 drop 1 drop Every 12 Hours Scheduled 12/11/2017     Sig - Route: Administer 1 drop to both eyes Every 12 (Twelve) Hours. - Both Eyes    Risaquad-2 capsule 1 capsule 1 capsule Daily 12/12/2017 12/25/2017    Sig - Route: Take 1 capsule by mouth Daily. - Oral    sodium chloride 0.9 % flush 10 mL 10 mL As Needed 12/11/2017     Sig - Route: Infuse 10 mL into a venous catheter As Needed for Line Care. - Intravenous    sodium chloride 0.9 % flush 10 mL 10 mL Every 12 Hours Scheduled 12/12/2017     Sig - Route: 10 mL by Intracatheter route Every 12 (Twelve) Hours. - Intracatheter    Cosign for Ordering: Accepted by Skinny Meehan MD on 12/13/2017  7:26 AM    sodium chloride 0.9 % flush 10 mL 10 mL As Needed 12/12/2017     Sig - Route: 10 mL by Intracatheter route As Needed for Line Care (After Medication Administration or Blood Draw). - Intracatheter    Cosign for Ordering: Accepted by Skinny Meehan MD on 12/13/2017  7:26 AM    sodium chloride 0.9 % flush 10 mL 10 mL Every 12 Hours Scheduled 12/12/2017     Sig - Route: 10 mL by Intracatheter route Every 12 (Twelve) Hours. - Intracatheter    Cosign for Ordering: Accepted by Skinny Meehan MD on 12/13/2017  7:26 AM    sodium chloride 0.9 % flush 10 mL 10 mL As Needed 12/12/2017     Sig - Route: 10 mL by Intracatheter route As Needed for Line Care (After Medication Administration). - Intracatheter    Cosign for Ordering: Accepted by Skinny Meehan MD on 12/13/2017  7:26 AM    topiramate (TOPAMAX) tablet 100 mg 100 mg Every 12 Hours Scheduled 12/11/2017     Sig - Route: Take 1 tablet by mouth Every 12 (Twelve) Hours. - Oral    vancomycin (VANCOCIN) 1,500 mg in sodium chloride 0.9 % 500 mL IVPB (Discontinued) 1,500 mg Every 12 Hours 12/17/2017 12/18/2017    Sig - Route: Infuse 1,500 mg  into a venous catheter Every 12 (Twelve) Hours. - Intravenous              Assessment/Plan     ASSESSMENT:    1.  MRSA pneumonia    PLAN:    At this time antibiotic therapy was de-escalated from vancomycin and doxycycline to by mouth doxycycline 100 mg twice a day as the patient is showing significant clinical improvement with almost normalization of her CRP level.      Patient's findings and recommendations were discussed with patient and nursing staff    Code Status: Full Code     Scribed for Terrance Cantu M.D. By [place]ia Collins PA-C.      [place]ia Collins PA-C  12/18/17  9:28 AM    Physician Attestation:    The documentation recorded by the scribe accurately reflects the service I personally performed and the decisions made by me.    Terrance Cantu MD  Infectious Diseases  12/18/17  11:22 AM

## 2017-12-18 NOTE — PROGRESS NOTES
Continued Stay Note  JANEE Peraza     Patient Name: Domonique Cummings  MRN: [id number]  Today's Date: 12/18/2017    Admit Date: 12/11/2017          Discharge Plan       12/18/17 0855    Case Management/Social Work Plan    Plan CM follow up note: Pt currently in isolation for MRSA in sput. CM spoke with pt via phone this am. She reports still feeling weak and slow to progress. She has been started on Vanco IV in addition to doxy po, O2 at 2lnc. & neb. txs. She plans to return back home when stable for dc. Pt is requesting Home Health with P.T  upon discharge, this can be arranged by SS with MD order. CM will cont. to follow. Pt expressing appreciation for care received and CM follow up.               Discharge Codes     None        Expected Discharge Date and Time     Expected Discharge Date Expected Discharge Time    Dec 16, 2017             Tosha Ivory RN

## 2017-12-18 NOTE — THERAPY TREATMENT NOTE
Acute Care - Occupational Therapy Treatment Note   Stu     Patient Name: Domonique Cummings  : 1945  MRN: [id number]  Today's Date: 2017  Onset of Illness/Injury or Date of Surgery Date: 17 (admit date)     Referring Physician: Zoran      Admit Date: 2017    Visit Dx:     ICD-10-CM ICD-9-CM   1. Pneumonia of left lower lobe due to infectious organism J18.1 486   2. Altered mental status, unspecified altered mental status type R41.82 780.97     Patient Active Problem List   Diagnosis   • Pneumonia of left lower lobe due to infectious organism   • MRSA pneumonia                 OT Goals       17 1607          Strength OT LTG    Strength Goal OT LTG, Date Established 17  -KR      Strength Goal OT LTG, Time to Achieve by discharge  -KR      Strength Goal OT LTG, Measure to Achieve BUE increase x 1 to enhance self care/fxl task performance  -KR      Occupational Therapy OT LTG    Occupational Therapy OT LTG, Date Established 17  -KR      Occupational Therapy OT LTG, Time to Achieve by discharge  -KR      Occupational Therapy OT LTG, Activity Type provide finger orthosis to provide fxl positioning L hand. Pt. ind. with applications.  -KR        User Key  (r) = Recorded By, (t) = Taken By, (c) = Cosigned By    Initials Name Provider Type    KOSTAS Goff OT Occupational Therapist                OT Recommendation and Plan  Planned Therapy Interventions: activity intolerance, ADL retraining, ROM (Range of Motion), strengthening, adaptive equipment training  Therapy Frequency: 3-5 times/wk          Time Calculation:            OT G-codes  Functional Limitation: Self care  Self Care Current Status (): At least 40 percent but less than 60 percent impaired, limited or restricted  Self Care Goal Status (): At least 20 percent but less than 40 percent impaired, limited or restricted    Erasmo Goff OT  2017

## 2017-12-18 NOTE — THERAPY TREATMENT NOTE
Acute Care - Physical Therapy Treatment Note   [facility]     Patient Name: Domonique Cummings  : 1945  MRN: [id number]  Today's Date: 2017  Onset of Illness/Injury or Date of Surgery Date: 17 (admit date)  Date of Referral to PT: 17 (admit date)  Referring Physician: Zoran    Admit Date: 2017    Visit Dx:    ICD-10-CM ICD-9-CM   1. Pneumonia of left lower lobe due to infectious organism J18.1 486   2. Altered mental status, unspecified altered mental status type R41.82 780.97     Patient Active Problem List   Diagnosis   • Pneumonia of left lower lobe due to infectious organism   • MRSA pneumonia               Adult Rehabilitation Note       17 1633          Rehab Assessment/Intervention    Discipline physical therapist  -CT      Document Type therapy note (daily note)  -CT      Subjective Information agree to therapy;complains of;weakness  -CT      Patient Effort, Rehab Treatment good  -CT      Precautions/Limitations fall precautions;other (see comments)   isolation precautions  -CT      Patient Response to Treatment Pt tolerated todays treatment session well with rest breaks provided as needed. Pt ambulated outside room today with isolation mask.   -CT      Recorded by [CT] Moriah Vegas PT      Cognitive Assessment/Intervention    Current Cognitive/Communication Assessment functional  -CT      Orientation Status oriented to;oriented x 4  -CT      Personal Safety Interventions fall prevention program maintained;gait belt;muscle strengthening facilitated;nonskid shoes/slippers when out of bed  -CT      Recorded by [CT] Moriah Vegas PT      Bed Mobility, Assessment/Treatment    Bed Mobility, Assistive Device bed rails  -CT      Bed Mobility, Scoot/Bridge, [place] minimum assist (75% patient effort);verbal cues required;nonverbal cues required (demo/gesture)  -CT      Bed Mob, Supine to Sit, [place] minimum assist (75% patient effort);moderate assist (50% patient  effort);verbal cues required;nonverbal cues required (demo/gesture)  -CT      Bed Mob, Sit to Supine, [place] minimum assist (75% patient effort);moderate assist (50% patient effort);verbal cues required;nonverbal cues required (demo/gesture)  -CT      Bed Mobility, Impairments strength decreased  -CT      Recorded by [CT] Moriah Vegas PT      Transfer Assessment/Treatment    Transfers, Sit-Stand [place] minimum assist (75% patient effort)  -CT      Transfers, Stand-Sit [place] minimum assist (75% patient effort)  -CT      Transfers, Sit-Stand-Sit, Assist Device rolling walker  -CT      Toilet Transfer, [place] minimum assist (75% patient effort)  -CT      Toilet Transfer, Assistive Device elevated toilet seat;rolling walker;other (see comments)   grab bar  -CT      Recorded by [CT] Moriah Vegas PT      Gait Assessment/Treatment    Gait, [place] Level minimum assist (75% patient effort);2 person assist required;verbal cues required;nonverbal cues required (demo/gesture)  -CT      Gait, Assistive Device rolling walker  -CT      Gait, Distance (Feet) 300  -CT      Gait, Gait Pattern Analysis swing-to gait  -CT      Recorded by [CT] Moriah Vegas PT      Therapy Exercises    Bilateral Lower Extremities AROM:;15 reps;sitting  -CT      Recorded by [CT] Moriah Vegas PT      Positioning and Restraints    Pre-Treatment Position in bed  -CT      Post Treatment Position bed  -CT      In Bed supine;call light within reach;encouraged to call for assist  -CT      Recorded by [CT] Moriah Vegas PT        User Key  (r) = Recorded By, (t) = Taken By, (c) = Cosigned By    Initials Name Effective Dates    CT Moriah Vegas PT 03/14/16 -                 IP PT Goals       12/12/17 1618          Transfer Training PT LTG    Transfer Training PT LTG, Date Established 12/12/17  -CT      Transfer Training PT LTG, Time to Achieve by discharge  -CT      Transfer Training PT LTG, Activity Type bed to  chair /chair to bed;sit to stand/stand to sit  -CT      Transfer Training PT LTG, [place] Level contact guard assist;minimum assist (75% patient effort)  -CT      Transfer Training PT LTG, Assist Device other (see comments)   with appropriate AD  -CT      Gait Training PT LTG    Gait Training Goal PT LTG, Date Established 12/12/17  -CT      Gait Training Goal PT LTG, Time to Achieve by discharge  -CT      Gait Training Goal PT LTG, [place] Level minimum assist (75% patient effort);contact guard assist  -CT      Gait Training Goal PT LTG, Assist Device other (see comments)   with appropriate AD  -CT      Gait Training Goal PT LTG, Distance to Achieve 150  -CT        User Key  (r) = Recorded By, (t) = Taken By, (c) = Cosigned By    Initials Name Provider Type    CT Moriah Vegas PT Physical Therapist          Physical Therapy Education     Title: PT OT SLP Therapies (Done)     Topic: Physical Therapy (Done)     Point: Mobility training (Done)    Learning Progress Summary    Learner Readiness Method Response Comment Documented by Status   Patient Acceptance E VU  CT 12/18/17 1637 Done    Acceptance E VU  KM 12/17/17 2011 Done    Acceptance E VU  MP 12/17/17 0928 Done    Acceptance E VU  KM 12/16/17 2152 Done    Acceptance E VU  KJ 12/16/17 1721 Done    Acceptance E VU  KJ 12/16/17 1720 Done    Acceptance E VU  KM 12/15/17 2058 Done    Acceptance E VU,NR  RF 12/14/17 1627 Done    Acceptance E VU  MC 12/14/17 0935 Done    Acceptance E VU  SS 12/13/17 2027 Done    Acceptance E VU  CT 12/13/17 1646 Done    Acceptance E VU  EB 12/13/17 0930 Done    Acceptance E VU  EB 12/13/17 0926 Done    Acceptance E VU  CT 12/12/17 1619 Done               Point: Home exercise program (Done)    Learning Progress Summary    Learner Readiness Method Response Comment Documented by Status   Patient Acceptance E VU  CT 12/18/17 1637 Done    Acceptance E VU  KM 12/17/17 2011 Done    Acceptance E VU  MP 12/17/17 0928 Done     Acceptance E VU   12/16/17 2152 Done    Acceptance E VU  KJ 12/16/17 1721 Done    Acceptance E VU  KJ 12/16/17 1720 Done    Acceptance E VU  KM 12/15/17 2058 Done    Acceptance E VU,NR  RF 12/14/17 1627 Done    Acceptance E VU   12/14/17 0935 Done    Acceptance E VU  SS 12/13/17 2027 Done    Acceptance E VU  CT 12/13/17 1646 Done    Acceptance E VU  EB 12/13/17 0930 Done    Acceptance E VU  EB 12/13/17 0926 Done    Acceptance E VU  CT 12/12/17 1619 Done               Point: Body mechanics (Done)    Learning Progress Summary    Learner Readiness Method Response Comment Documented by Status   Patient Acceptance E VU  CT 12/18/17 1637 Done    Acceptance E VU  KM 12/17/17 2011 Done    Acceptance E VU   12/17/17 0928 Done    Acceptance E VU   12/16/17 2152 Done    Acceptance E VU  KJ 12/16/17 1721 Done    Acceptance E VU   12/16/17 1720 Done    Acceptance E VU   12/15/17 2058 Done    Acceptance E VU,NR   12/14/17 1627 Done    Acceptance E VU   12/14/17 0935 Done    Acceptance E VU   12/13/17 2027 Done    Acceptance E VU  CT 12/13/17 1646 Done    Acceptance E VU  EB 12/13/17 0930 Done    Acceptance E VU  EB 12/13/17 0926 Done    Acceptance E VU  CT 12/12/17 1619 Done               Point: Precautions (Done)    Learning Progress Summary    Learner Readiness Method Response Comment Documented by Status   Patient Acceptance E VU  CT 12/18/17 1637 Done    Acceptance E VU   12/17/17 2011 Done    Acceptance E VU   12/17/17 0928 Done    Acceptance E VU   12/16/17 2152 Done    Acceptance E VU  KJ 12/16/17 1721 Done    Acceptance E VU  KJ 12/16/17 1720 Done    Acceptance E VU  KM 12/15/17 2058 Done    Acceptance E VU,NR  RF 12/14/17 1627 Done    Acceptance E VU   12/14/17 0935 Done    Acceptance E VU  SS 12/13/17 2027 Done    Acceptance E VU  CT 12/13/17 1646 Done    Acceptance E VU  EB 12/13/17 0930 Done    Acceptance E VU  EB 12/13/17 0926 Done    Acceptance E VU  CT 12/12/17 1619 Done                       User Key     Initials Effective Dates Name Provider Type Discipline    KM 06/16/16 -  Gabo Black, RN Registered Nurse Nurse    EB 06/16/16 -  Shalini Harry, RN Registered Nurse Nurse    MP 06/16/16 -  Pearl Watkins, RN Registered Nurse Nurse    CT 03/14/16 -  Moriah Vegas, PT Physical Therapist PT    KJ 09/27/17 -  Geeta Ibarra, RN Registered Nurse Nurse    SS 11/18/16 -  Sandrine Garduno, RN Registered Nurse Nurse     12/27/16 -  Deepthi Morel, RN Registered Nurse Nurse    RF 07/19/17 -  Veronica Mares PTA Physical Therapy Assistant PT                    PT Recommendation and Plan  Anticipated Equipment Needs At Discharge:  (to be determined)  Anticipated Discharge Disposition: home with assist  Planned Therapy Interventions: balance training, bed mobility training, gait training, home exercise program, motor coordination training, neuromuscular re-education, patient/family education, strengthening, transfer training  PT Frequency: 3-5 times/wk, per priority policy             Outcome Measures       12/18/17 1600          How much help from another person do you currently need...    Turning from your back to your side while in flat bed without using bedrails? 3  -CT      Moving from lying on back to sitting on the side of a flat bed without bedrails? 3  -CT      Moving to and from a bed to a chair (including a wheelchair)? 3  -CT      Standing up from a chair using your arms (e.g., wheelchair, bedside chair)? 3  -CT      Climbing 3-5 steps with a railing? 2  -CT      To walk in hospital room? 3  -CT      AM-PAC 6 Clicks Score 17  -CT      Functional Assessment    Outcome Measure Options AM-PAC 6 Clicks Basic Mobility (PT)  -CT        User Key  (r) = Recorded By, (t) = Taken By, (c) = Cosigned By    Initials Name Provider Type    CT Moriah Vegas, PT Physical Therapist           Time Calculation:         PT Charges       12/18/17 1637          Time Calculation    PT  Received On 12/18/17  -CT      PT - Next Appointment 12/19/17  -CT      PT Goal Re-Cert Due Date 12/26/17  -CT      Time Calculation- PT    Total Timed Code Minutes- PT 40 minute(s)  -CT        User Key  (r) = Recorded By, (t) = Taken By, (c) = Cosigned By    Initials Name Provider Type    CT Moriah Vegas, PT Physical Therapist          Therapy Charges for Today     Code Description Service Date Service Provider Modifiers Qty    94370801153 HC GAIT TRAINING EA 15 MIN 12/18/2017 Moriah Vegas, PT GP 2    40899562421 HC PT THERAPEUTIC ACT EA 15 MIN 12/18/2017 Moriah Vegas, PT GP 1    14811788760 HC PT THER SUPP EA 15 MIN 12/18/2017 Moriah Vegas, PT GP 3          PT G-Codes  Outcome Measure Options: AM-PAC 6 Clicks Basic Mobility (PT)  Score: 17  Functional Limitation: Mobility: Walking and moving around  Mobility: Walking and Moving Around Current Status (): At least 40 percent but less than 60 percent impaired, limited or restricted  Mobility: Walking and Moving Around Goal Status (): At least 20 percent but less than 40 percent impaired, limited or restricted    Moriah Vegas PT  12/18/2017

## 2017-12-18 NOTE — DISCHARGE PLACEMENT REQUEST
Case Management/Social Work    Patient Name:  Domonique Cummings  YOB: 1945  MRN: [id number]  Admit Date:  12/11/2017    Spoke with Dr. Meehan re: Swing Bed screen.  Gave order for Swing Bed consult and will place patient in Swing Bed services in the AM.      Electronically signed by:  Angelina Louis RN  12/18/17 3:02 PM

## 2017-12-18 NOTE — PROGRESS NOTES
LOS: 7 days       Chief Complaint :  Shortness of breath   Subjective     Interval History:     Patient Complaints:  Domonique Cummings  continues with a gradual improvement.  However she also continues with productive cough, cough, congestion and shortness of breath.  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  Sputum cultures are positive for MRSA.  She has been started on vancomycin on December 18.  She has no other complaints this morning.  Telemetry is consistent with sinus bradycardia.       Review of Systems-unchanged from recent history and physical  Objective     Vital Signs  Temp:  [97.5 °F (36.4 °C)-97.9 °F (36.6 °C)] 97.6 °F (36.4 °C)  Heart Rate:  [54-83] 63  Resp:  [18-20] 18  BP: (116-171)/(56-93) 124/56    Physical Exam    Constitutional: She is oriented to person, place, and time.   Obese white female who is alert and talkative in no obvious distress at rest.   HENT:   Head: Normocephalic and atraumatic.   Right Ear: External ear normal.   Left Ear: External ear normal.   Nose: Nose normal.   Mouth/Throat: Oropharynx is clear and moist. No oropharyngeal exudate.   Eyes: Conjunctivae and EOM are normal. Pupils are equal, round, and reactive to light. Right eye exhibits no discharge. Left eye exhibits no discharge. No scleral icterus.   Neck: Normal range of motion. Neck supple. No JVD present. No tracheal deviation present. No thyromegaly present.   Cardiovascular: Normal rate, regular rhythm, normal heart sounds and intact distal pulses.  Exam reveals no gallop and no friction rub.    No murmur heard.  Pulmonary/Chest: Effort normal. No stridor.  Trace expiratory wheezing   Scattered rhonchi bilaterally, diminished breath sounds at lung bases.   Abdominal: Soft. Bowel sounds are normal. She exhibits no distension and no mass. There is no tenderness. There is no guarding. No hernia.   Genitourinary:   Genitourinary Comments: No Freitas catheter    Neurological: She is alert and oriented to person, place, and time. She displays normal reflexes. No cranial nerve deficit. Coordination normal.   Skin: Skin is warm and dry. No rash noted. No erythema. No pallor.   Psychiatric: She has a normal mood and affect. Her behavior is normal. Judgment and thought content normal.   Nursing note and vitals reviewed.      Results Review:     I reviewed the patient's new clinical results  : See Below  MEDICATIONS:    amLODIPine 5 mg Oral Q24H   aspirin 81 mg Oral Daily   doxycycline 100 mg Oral Q12H   DULoxetine 60 mg Oral Daily   enoxaparin 40 mg Subcutaneous Daily   gabapentin 400 mg Oral TID   ipratropium-albuterol 3 mL Nebulization Q6H - RT   losartan 100 mg Oral Q24H   metoprolol succinate XL 25 mg Oral Q24H   oxybutynin XL 10 mg Oral Daily   pantoprazole 40 mg Oral Q AM   polyethylene glycol 17 g Oral Daily   polyvinyl alcohol-povidone 1 drop Both Eyes Q12H   Risaquad-2 1 capsule Oral Daily   sodium chloride 10 mL Intracatheter Q12H   sodium chloride 10 mL Intracatheter Q12H   topiramate 100 mg Oral Q12H   vancomycin 1,500 mg Intravenous Q12H       LABS:        Results from last 7 days  Lab Units 12/17/17  0112 12/15/17  0104 12/13/17  0058  12/11/17  1346 12/11/17  1333   CRP mg/dL 1.85* 3.73* 9.14*  < >  --   --    LACTATE mmol/L  --   --   --   --  0.9  --    WBC 10*3/mm3 10.17 9.05 9.96  < >  --  16.75*   HEMOGLOBIN g/dL 11.3* 10.5* 10.3*  < >  --  12.8   HEMATOCRIT % 35.7* 33.9* 33.1*  < >  --  37.9   MCV fL 97.3* 99.1* 97.9*  < >  --  96.4*   MCHC g/dL 31.7* 31.0* 31.1*  < >  --  33.8   PLATELETS 10*3/mm3 233 194 170  < >  --  203   INR   --   --   --   --   --  0.98   < > = values in this interval not displayed.    Results from last 7 days  Lab Units 12/11/17  1413   PH, ARTERIAL pH units 7.431   PO2 ART mm Hg 60.7*   PCO2, ARTERIAL mm Hg 29.9*   HCO3 ART mmol/L 19.4*       Results from last 7 days  Lab Units 12/17/17  0112 12/15/17  0104 12/13/17  0058  12/12/17  0147 12/11/17  1333   SODIUM mmol/L 140 140 140 142 140   POTASSIUM mmol/L 3.9 3.8 3.7 3.9 3.8   MAGNESIUM mg/dL  --   --  1.9 2.1 2.0   CHLORIDE mmol/L 109 111 112 111 111   CO2 mmol/L 23.5* 22.2* 23.1* 20.8* 20.8*   BUN mg/dL 20 13 16 18 14   CREATININE mg/dL 0.92 0.76 0.79 0.80 0.85   EGFR IF NONAFRICN AM mL/min/1.73 60* 75 71 70 66   CALCIUM mg/dL 8.9 9.4 8.7 8.7 9.4   GLUCOSE mg/dL 115* 105 104 96 133*   ALBUMIN g/dL 3.50 3.60 3.50 3.90 4.20   BILIRUBIN mg/dL 0.1* 0.2 0.2 0.3 0.4   ALK PHOS U/L 80 77 77 97 102   AST (SGOT) U/L 30 31* 18 23 20   ALT (SGPT) U/L 34 24 16 17 15   Estimated Creatinine Clearance: 62.6 mL/min (by C-G formula based on Cr of 0.92).  No results found for: AMMONIA      Blood Culture   Date Value Ref Range Status   12/11/2017 No growth at less than 24 hours  Preliminary   12/11/2017 No growth at less than 24 hours  Preliminary                  Assessment/Plan    1) pneumonia of left lower lobe   - C-reactive protein and white blood cell count improving.  Symptoms also improving,  mycoplasma pneumonia and legionella studies negative.  Flu swab negative.  Blood cultures with no growth.  C-reactive protein continues to improve.  Sputum positive for MRSA-infectious disease consulted.  2) sepsis  3) metabolic encephalopathy-present at the time of admission  4) leukocytosis  5) anemia-multifactorial  6) DVT prophylaxis-subcutaneous Lovenox   See orders entered.     Skinny Meehan MD  12/18/17  7:15 AM

## 2017-12-19 ENCOUNTER — HOSPITAL ENCOUNTER (INPATIENT)
Facility: HOSPITAL | Age: 72
LOS: 3 days | Discharge: HOME | End: 2017-12-22
Attending: INTERNAL MEDICINE | Admitting: INTERNAL MEDICINE

## 2017-12-19 VITALS
WEIGHT: 221 LBS | OXYGEN SATURATION: 97 % | DIASTOLIC BLOOD PRESSURE: 76 MMHG | HEIGHT: 64 IN | SYSTOLIC BLOOD PRESSURE: 130 MMHG | HEART RATE: 77 BPM | TEMPERATURE: 97.9 F | BODY MASS INDEX: 37.73 KG/M2 | RESPIRATION RATE: 18 BRPM

## 2017-12-19 DIAGNOSIS — J15.212: Primary | ICD-10-CM

## 2017-12-19 LAB
ALBUMIN SERPL-MCNC: 3.5 G/DL (ref 3.4–4.8)
ALBUMIN/GLOB SERPL: 1.1 G/DL (ref 1.5–2.5)
ALP SERPL-CCNC: 78 U/L (ref 35–104)
ALT SERPL W P-5'-P-CCNC: 28 U/L (ref 10–36)
ANION GAP SERPL CALCULATED.3IONS-SCNC: 7.9 MMOL/L (ref 3.6–11.2)
AST SERPL-CCNC: 21 U/L (ref 10–30)
BASOPHILS # BLD AUTO: 0.02 10*3/MM3 (ref 0–0.3)
BASOPHILS NFR BLD AUTO: 0.2 % (ref 0–2)
BILIRUB SERPL-MCNC: 0.1 MG/DL (ref 0.2–1.8)
BNP SERPL-MCNC: 29 PG/ML (ref 0–100)
BUN BLD-MCNC: 19 MG/DL (ref 7–21)
BUN/CREAT SERPL: 19.2 (ref 7–25)
CALCIUM SPEC-SCNC: 9.5 MG/DL (ref 7.7–10)
CHLORIDE SERPL-SCNC: 108 MMOL/L (ref 99–112)
CO2 SERPL-SCNC: 23.1 MMOL/L (ref 24.3–31.9)
CREAT BLD-MCNC: 0.99 MG/DL (ref 0.43–1.29)
CRP SERPL-MCNC: 1.72 MG/DL (ref 0–0.99)
DEPRECATED RDW RBC AUTO: 44 FL (ref 37–54)
EOSINOPHIL # BLD AUTO: 0.47 10*3/MM3 (ref 0–0.7)
EOSINOPHIL NFR BLD AUTO: 5 % (ref 0–7)
ERYTHROCYTE [DISTWIDTH] IN BLOOD BY AUTOMATED COUNT: 12.5 % (ref 11.5–14.5)
GFR SERPL CREATININE-BSD FRML MDRD: 55 ML/MIN/1.73
GLOBULIN UR ELPH-MCNC: 3.2 GM/DL
GLUCOSE BLD-MCNC: 97 MG/DL (ref 70–110)
HCT VFR BLD AUTO: 34.6 % (ref 37–47)
HGB BLD-MCNC: 11 G/DL (ref 12–16)
IMM GRANULOCYTES # BLD: 0.03 10*3/MM3 (ref 0–0.03)
IMM GRANULOCYTES NFR BLD: 0.3 % (ref 0–0.5)
LYMPHOCYTES # BLD AUTO: 2.77 10*3/MM3 (ref 1–3)
LYMPHOCYTES NFR BLD AUTO: 29.3 % (ref 16–46)
MAGNESIUM SERPL-MCNC: 2.2 MG/DL (ref 1.7–2.6)
MCH RBC QN AUTO: 30.9 PG (ref 27–33)
MCHC RBC AUTO-ENTMCNC: 31.8 G/DL (ref 33–37)
MCV RBC AUTO: 97.2 FL (ref 80–94)
MONOCYTES # BLD AUTO: 1.05 10*3/MM3 (ref 0.1–0.9)
MONOCYTES NFR BLD AUTO: 11.1 % (ref 0–12)
NEUTROPHILS # BLD AUTO: 5.12 10*3/MM3 (ref 1.4–6.5)
NEUTROPHILS NFR BLD AUTO: 54.1 % (ref 40–75)
OSMOLALITY SERPL CALC.SUM OF ELEC: 279.7 MOSM/KG (ref 273–305)
PHOSPHATE SERPL-MCNC: 4.4 MG/DL (ref 2.7–4.5)
PLATELET # BLD AUTO: 258 10*3/MM3 (ref 130–400)
PMV BLD AUTO: 10.1 FL (ref 6–10)
POTASSIUM BLD-SCNC: 3.8 MMOL/L (ref 3.5–5.3)
PROT SERPL-MCNC: 6.7 G/DL (ref 6–8)
RBC # BLD AUTO: 3.56 10*6/MM3 (ref 4.2–5.4)
SODIUM BLD-SCNC: 139 MMOL/L (ref 135–153)
WBC NRBC COR # BLD: 9.46 10*3/MM3 (ref 4.5–12.5)

## 2017-12-19 PROCEDURE — 84100 ASSAY OF PHOSPHORUS: CPT | Performed by: INTERNAL MEDICINE

## 2017-12-19 PROCEDURE — 86140 C-REACTIVE PROTEIN: CPT | Performed by: INTERNAL MEDICINE

## 2017-12-19 PROCEDURE — 25010000002 ENOXAPARIN PER 10 MG: Performed by: INTERNAL MEDICINE

## 2017-12-19 PROCEDURE — G8987 SELF CARE CURRENT STATUS: HCPCS

## 2017-12-19 PROCEDURE — G8979 MOBILITY GOAL STATUS: HCPCS

## 2017-12-19 PROCEDURE — 94799 UNLISTED PULMONARY SVC/PX: CPT

## 2017-12-19 PROCEDURE — G8978 MOBILITY CURRENT STATUS: HCPCS

## 2017-12-19 PROCEDURE — G8988 SELF CARE GOAL STATUS: HCPCS

## 2017-12-19 PROCEDURE — 83735 ASSAY OF MAGNESIUM: CPT | Performed by: INTERNAL MEDICINE

## 2017-12-19 PROCEDURE — 97163 PT EVAL HIGH COMPLEX 45 MIN: CPT

## 2017-12-19 PROCEDURE — 83880 ASSAY OF NATRIURETIC PEPTIDE: CPT | Performed by: INTERNAL MEDICINE

## 2017-12-19 PROCEDURE — 85025 COMPLETE CBC W/AUTO DIFF WBC: CPT | Performed by: INTERNAL MEDICINE

## 2017-12-19 PROCEDURE — 97116 GAIT TRAINING THERAPY: CPT

## 2017-12-19 PROCEDURE — 97167 OT EVAL HIGH COMPLEX 60 MIN: CPT

## 2017-12-19 PROCEDURE — 80053 COMPREHEN METABOLIC PANEL: CPT | Performed by: INTERNAL MEDICINE

## 2017-12-19 RX ORDER — SODIUM CHLORIDE 0.9 % (FLUSH) 0.9 %
10 SYRINGE (ML) INJECTION AS NEEDED
Status: DISCONTINUED | OUTPATIENT
Start: 2017-12-19 | End: 2017-12-22 | Stop reason: HOSPADM

## 2017-12-19 RX ORDER — DOXYCYCLINE 100 MG/1
100 CAPSULE ORAL EVERY 12 HOURS
Status: DISCONTINUED | OUTPATIENT
Start: 2017-12-19 | End: 2017-12-22 | Stop reason: HOSPADM

## 2017-12-19 RX ORDER — SODIUM CHLORIDE 0.9 % (FLUSH) 0.9 %
10 SYRINGE (ML) INJECTION EVERY 12 HOURS SCHEDULED
Status: DISCONTINUED | OUTPATIENT
Start: 2017-12-19 | End: 2017-12-22 | Stop reason: HOSPADM

## 2017-12-19 RX ORDER — SODIUM CHLORIDE 0.9 % (FLUSH) 0.9 %
10 SYRINGE (ML) INJECTION EVERY 12 HOURS SCHEDULED
Status: CANCELLED | OUTPATIENT
Start: 2017-12-19

## 2017-12-19 RX ORDER — ASPIRIN 81 MG/1
81 TABLET ORAL DAILY
Status: DISCONTINUED | OUTPATIENT
Start: 2017-12-20 | End: 2017-12-22 | Stop reason: HOSPADM

## 2017-12-19 RX ORDER — OXYBUTYNIN CHLORIDE 5 MG/1
10 TABLET, EXTENDED RELEASE ORAL DAILY
Status: CANCELLED | OUTPATIENT
Start: 2017-12-19

## 2017-12-19 RX ORDER — L.ACID,CASEI/B.ANIMAL/S.THERMO 16B CELL
1 CAPSULE ORAL DAILY
Status: CANCELLED | OUTPATIENT
Start: 2017-12-19 | End: 2017-12-25

## 2017-12-19 RX ORDER — DULOXETIN HYDROCHLORIDE 60 MG/1
60 CAPSULE, DELAYED RELEASE ORAL DAILY
Status: DISCONTINUED | OUTPATIENT
Start: 2017-12-20 | End: 2017-12-22 | Stop reason: HOSPADM

## 2017-12-19 RX ORDER — AMLODIPINE BESYLATE 5 MG/1
5 TABLET ORAL
Status: DISCONTINUED | OUTPATIENT
Start: 2017-12-20 | End: 2017-12-22 | Stop reason: HOSPADM

## 2017-12-19 RX ORDER — LOSARTAN POTASSIUM 50 MG/1
100 TABLET ORAL
Status: DISCONTINUED | OUTPATIENT
Start: 2017-12-20 | End: 2017-12-22 | Stop reason: HOSPADM

## 2017-12-19 RX ORDER — DULOXETIN HYDROCHLORIDE 60 MG/1
60 CAPSULE, DELAYED RELEASE ORAL DAILY
Status: CANCELLED | OUTPATIENT
Start: 2017-12-19

## 2017-12-19 RX ORDER — METOPROLOL SUCCINATE 25 MG/1
25 TABLET, EXTENDED RELEASE ORAL
Status: CANCELLED | OUTPATIENT
Start: 2017-12-19

## 2017-12-19 RX ORDER — L.ACID,CASEI/B.ANIMAL/S.THERMO 16B CELL
1 CAPSULE ORAL DAILY
Status: DISCONTINUED | OUTPATIENT
Start: 2017-12-20 | End: 2017-12-20

## 2017-12-19 RX ORDER — PANTOPRAZOLE SODIUM 40 MG/1
40 TABLET, DELAYED RELEASE ORAL
Status: DISCONTINUED | OUTPATIENT
Start: 2017-12-20 | End: 2017-12-22 | Stop reason: HOSPADM

## 2017-12-19 RX ORDER — TOPIRAMATE 100 MG/1
100 TABLET, FILM COATED ORAL EVERY 12 HOURS SCHEDULED
Status: CANCELLED | OUTPATIENT
Start: 2017-12-19

## 2017-12-19 RX ORDER — LOSARTAN POTASSIUM 50 MG/1
100 TABLET ORAL
Status: CANCELLED | OUTPATIENT
Start: 2017-12-19

## 2017-12-19 RX ORDER — POLYETHYLENE GLYCOL 3350 17 G/17G
17 POWDER, FOR SOLUTION ORAL DAILY
Status: DISCONTINUED | OUTPATIENT
Start: 2017-12-20 | End: 2017-12-22 | Stop reason: HOSPADM

## 2017-12-19 RX ORDER — GABAPENTIN 400 MG/1
400 CAPSULE ORAL EVERY 12 HOURS SCHEDULED
Status: CANCELLED | OUTPATIENT
Start: 2017-12-19

## 2017-12-19 RX ORDER — METOPROLOL SUCCINATE 25 MG/1
25 TABLET, EXTENDED RELEASE ORAL
Status: DISCONTINUED | OUTPATIENT
Start: 2017-12-20 | End: 2017-12-22 | Stop reason: HOSPADM

## 2017-12-19 RX ORDER — CELECOXIB 200 MG/1
200 CAPSULE ORAL 2 TIMES DAILY PRN
Status: DISCONTINUED | OUTPATIENT
Start: 2017-12-19 | End: 2017-12-22 | Stop reason: HOSPADM

## 2017-12-19 RX ORDER — SODIUM CHLORIDE 0.9 % (FLUSH) 0.9 %
10 SYRINGE (ML) INJECTION AS NEEDED
Status: CANCELLED | OUTPATIENT
Start: 2017-12-19

## 2017-12-19 RX ORDER — CELECOXIB 200 MG/1
200 CAPSULE ORAL 2 TIMES DAILY PRN
Status: CANCELLED | OUTPATIENT
Start: 2017-12-19

## 2017-12-19 RX ORDER — ACETAMINOPHEN AND CODEINE PHOSPHATE 300; 30 MG/1; MG/1
1 TABLET ORAL EVERY 4 HOURS PRN
Status: CANCELLED | OUTPATIENT
Start: 2017-12-19 | End: 2017-12-21

## 2017-12-19 RX ORDER — POLYETHYLENE GLYCOL 3350 17 G/17G
17 POWDER, FOR SOLUTION ORAL DAILY
Status: CANCELLED | OUTPATIENT
Start: 2017-12-19

## 2017-12-19 RX ORDER — TOPIRAMATE 100 MG/1
100 TABLET, FILM COATED ORAL EVERY 12 HOURS SCHEDULED
Status: DISCONTINUED | OUTPATIENT
Start: 2017-12-19 | End: 2017-12-22 | Stop reason: HOSPADM

## 2017-12-19 RX ORDER — ASPIRIN 81 MG/1
81 TABLET ORAL DAILY
Status: CANCELLED | OUTPATIENT
Start: 2017-12-19

## 2017-12-19 RX ORDER — ALPRAZOLAM 0.5 MG/1
0.5 TABLET ORAL 2 TIMES DAILY PRN
Status: DISPENSED | OUTPATIENT
Start: 2017-12-19 | End: 2017-12-21

## 2017-12-19 RX ORDER — DOXYCYCLINE 100 MG/1
100 CAPSULE ORAL EVERY 12 HOURS
Status: CANCELLED | OUTPATIENT
Start: 2017-12-19 | End: 2017-12-25

## 2017-12-19 RX ORDER — OXYBUTYNIN CHLORIDE 5 MG/1
10 TABLET, EXTENDED RELEASE ORAL DAILY
Status: DISCONTINUED | OUTPATIENT
Start: 2017-12-20 | End: 2017-12-22 | Stop reason: HOSPADM

## 2017-12-19 RX ORDER — ACETAMINOPHEN AND CODEINE PHOSPHATE 300; 30 MG/1; MG/1
1 TABLET ORAL EVERY 4 HOURS PRN
Status: DISPENSED | OUTPATIENT
Start: 2017-12-19 | End: 2017-12-21

## 2017-12-19 RX ORDER — PANTOPRAZOLE SODIUM 40 MG/1
40 TABLET, DELAYED RELEASE ORAL
Status: CANCELLED | OUTPATIENT
Start: 2017-12-20

## 2017-12-19 RX ORDER — IPRATROPIUM BROMIDE AND ALBUTEROL SULFATE 2.5; .5 MG/3ML; MG/3ML
3 SOLUTION RESPIRATORY (INHALATION)
Status: DISCONTINUED | OUTPATIENT
Start: 2017-12-19 | End: 2017-12-22 | Stop reason: HOSPADM

## 2017-12-19 RX ORDER — GABAPENTIN 400 MG/1
400 CAPSULE ORAL EVERY 12 HOURS SCHEDULED
Status: DISCONTINUED | OUTPATIENT
Start: 2017-12-19 | End: 2017-12-22 | Stop reason: HOSPADM

## 2017-12-19 RX ORDER — ALPRAZOLAM 0.5 MG/1
0.5 TABLET ORAL 2 TIMES DAILY PRN
Status: CANCELLED | OUTPATIENT
Start: 2017-12-19 | End: 2017-12-21

## 2017-12-19 RX ORDER — AMLODIPINE BESYLATE 5 MG/1
5 TABLET ORAL
Status: CANCELLED | OUTPATIENT
Start: 2017-12-19

## 2017-12-19 RX ORDER — IPRATROPIUM BROMIDE AND ALBUTEROL SULFATE 2.5; .5 MG/3ML; MG/3ML
3 SOLUTION RESPIRATORY (INHALATION)
Status: CANCELLED | OUTPATIENT
Start: 2017-12-19

## 2017-12-19 RX ADMIN — IPRATROPIUM BROMIDE AND ALBUTEROL SULFATE 3 ML: .5; 3 SOLUTION RESPIRATORY (INHALATION) at 13:42

## 2017-12-19 RX ADMIN — METOPROLOL SUCCINATE 25 MG: 25 TABLET, FILM COATED, EXTENDED RELEASE ORAL at 08:54

## 2017-12-19 RX ADMIN — POLYETHYLENE GLYCOL (3350) 17 G: 17 POWDER, FOR SOLUTION ORAL at 08:55

## 2017-12-19 RX ADMIN — GABAPENTIN 400 MG: 400 CAPSULE ORAL at 20:27

## 2017-12-19 RX ADMIN — AMLODIPINE BESYLATE 5 MG: 5 TABLET ORAL at 08:54

## 2017-12-19 RX ADMIN — POLYVINYL ALCOHOL, POVIDONE 1 DROP: 14; 6 SOLUTION/ DROPS OPHTHALMIC at 08:54

## 2017-12-19 RX ADMIN — Medication 10 ML: at 20:27

## 2017-12-19 RX ADMIN — ACETAMINOPHEN AND CODEINE PHOSPHATE 1 TABLET: 300; 30 TABLET ORAL at 18:37

## 2017-12-19 RX ADMIN — DULOXETINE HYDROCHLORIDE 60 MG: 60 CAPSULE, DELAYED RELEASE ORAL at 08:55

## 2017-12-19 RX ADMIN — GABAPENTIN 400 MG: 400 CAPSULE ORAL at 08:55

## 2017-12-19 RX ADMIN — ALPRAZOLAM 0.5 MG: 0.5 TABLET ORAL at 05:31

## 2017-12-19 RX ADMIN — DOXYCYCLINE 100 MG: 100 CAPSULE ORAL at 17:42

## 2017-12-19 RX ADMIN — ACETAMINOPHEN AND CODEINE PHOSPHATE 1 TABLET: 30; 300 TABLET ORAL at 05:31

## 2017-12-19 RX ADMIN — ASPIRIN 81 MG: 81 TABLET ORAL at 08:54

## 2017-12-19 RX ADMIN — TUBERCULIN PURIFIED PROTEIN DERIVATIVE 5 UNITS: 5 INJECTION, SOLUTION INTRADERMAL at 15:08

## 2017-12-19 RX ADMIN — IPRATROPIUM BROMIDE AND ALBUTEROL SULFATE 3 ML: .5; 3 SOLUTION RESPIRATORY (INHALATION) at 06:47

## 2017-12-19 RX ADMIN — TOPIRAMATE 100 MG: 100 TABLET, FILM COATED ORAL at 08:54

## 2017-12-19 RX ADMIN — TOPIRAMATE 100 MG: 100 TABLET, FILM COATED ORAL at 20:27

## 2017-12-19 RX ADMIN — LOSARTAN POTASSIUM 100 MG: 50 TABLET, FILM COATED ORAL at 08:54

## 2017-12-19 RX ADMIN — DOXYCYCLINE 100 MG: 100 CAPSULE ORAL at 05:31

## 2017-12-19 RX ADMIN — Medication 10 ML: at 10:07

## 2017-12-19 RX ADMIN — IPRATROPIUM BROMIDE AND ALBUTEROL SULFATE 3 ML: .5; 3 SOLUTION RESPIRATORY (INHALATION) at 00:05

## 2017-12-19 RX ADMIN — PANTOPRAZOLE SODIUM 40 MG: 40 TABLET, DELAYED RELEASE ORAL at 05:31

## 2017-12-19 RX ADMIN — POLYVINYL ALCOHOL, POVIDONE 1 DROP: 14; 6 SOLUTION/ DROPS OPHTHALMIC at 20:27

## 2017-12-19 RX ADMIN — ENOXAPARIN SODIUM 40 MG: 40 INJECTION SUBCUTANEOUS at 08:54

## 2017-12-19 RX ADMIN — IPRATROPIUM BROMIDE AND ALBUTEROL SULFATE 3 ML: .5; 3 SOLUTION RESPIRATORY (INHALATION) at 19:07

## 2017-12-19 RX ADMIN — Medication 1 CAPSULE: at 08:55

## 2017-12-19 RX ADMIN — OXYBUTYNIN CHLORIDE 10 MG: 5 TABLET, FILM COATED, EXTENDED RELEASE ORAL at 08:54

## 2017-12-19 NOTE — PLAN OF CARE
Problem: Pneumonia (Adult)  Goal: Signs and Symptoms of Listed Potential Problems Will be Absent or Manageable (Pneumonia)  Outcome: Ongoing (interventions implemented as appropriate)    12/18/17 0837   Pneumonia   Problems Assessed (Pneumonia) all   Problems Present (Pneumonia) none         Problem: Fall Risk (Adult)  Goal: Absence of Falls  Outcome: Ongoing (interventions implemented as appropriate)    12/17/17 0928   Fall Risk (Adult)   Absence of Falls achieves outcome         Problem: Pressure Ulcer Risk (Lopez Scale) (Adult,Obstetrics,Pediatric)  Goal: Skin Integrity  Outcome: Ongoing (interventions implemented as appropriate)    12/15/17 1112   Pressure Ulcer Risk (Lopez Scale) (Adult,Obstetrics,Pediatric)   Skin Integrity making progress toward outcome

## 2017-12-19 NOTE — PROGRESS NOTES
Discharge Planning Assessment  [facility]     Patient Name: Domonique Cummings  MRN: [id number]  Today's Date: 12/19/2017    Admit Date: 12/19/2017          Discharge Needs Assessment       12/19/17 1520    Living Environment    Lives With alone   Pt lives alone at [facility].     Transportation Available family or friend will provide    Living Environment    Quality Of Family Relationships supportive   Dtr, Deepali is supportive.     Able to Return to Prior Living Arrangements yes    Discharge Needs Assessment    Outpatient/Agency/Support Group Needs --   Pt does not utilize home health services.     Community Agency Name(S) --   Dtr reports that pt was at [facility] in the past.    Equipment Currently Used at Home commode;walker, rolling   Pt has a rolling walker and bedside commode.     Discharge Disposition home or self-care;swing bed   Pt was admitted to swing bed on this date.            Discharge Plan       12/19/17 1523    Case Management/Social Work Plan    Plan Pt was admitted to swing bed on this date. Pt lives alone at [facility] and plans to return home at discharge. Pt does not utilize home health services. Pt has a rolling walker and bedside commode. SS to follow and assist as needed with discharge planning.                   Demographic Summary       12/19/17 1519    Referral Information    Referral Source nursing    Reason For Consult --   SS received consult swing bed. Pt was transferred to [facility] from [facility].     Primary Care Physician Information    Name Dr. Meehan       Legal       12/19/17 1520    Legal    Legal Comments Pt does not have a POA or living will. Pt declines informaiton.           Nancy Paula

## 2017-12-19 NOTE — PROGRESS NOTES
"  I have personally seen and examined the patient today and discussed overnight interval progress and pertinent issues with nursing staff.    Subjective:    Patient states she is feeling significantly better but sleepy this morning.  CRP continues to show improvement at 1.72 with normal white count and no fever overnight.  Transferred to swing bed today    History taken from: patient chart RN      Vital Signs    /76 (BP Location: Right arm, Patient Position: Lying)  Pulse 82  Temp 97.9 °F (36.6 °C) (Axillary)   Resp 18  Ht 162.6 cm (64\")  Wt 100 kg (221 lb)  SpO2 98%  BMI 37.93 kg/m2    Temp:  [97.8 °F (36.6 °C)-98.1 °F (36.7 °C)] 97.8 °F (36.6 °C)      Intake/Output Summary (Last 24 hours) at 12/19/17 1146  Last data filed at 12/19/17 0304   Gross per 24 hour   Intake              720 ml   Output              450 ml   Net              270 ml     Intake & Output (last 3 days)       12/16 0701 - 12/17 0700 12/17 0701 - 12/18 0700 12/18 0701 - 12/19 0700 12/19 0701 - 12/20 0700    P.O.  1320 960     IV Piggyback  500      Total Intake(mL/kg)  1820 (18.1) 960 (9.6)     Urine (mL/kg/hr) 2500 (1.1) 1450 (0.6) 450 (0.2)     Stool  0 (0) 0 (0)     Total Output 2500 1450 450      Net -2500 +370 +510              Unmeasured Urine Occurrence   2 x     Unmeasured Stool Occurrence  2 x 2 x           Physical Exam:       General Appearance:    Alert, cooperative, in no acute distress,On 2 L    Head:    Normocephalic, without obvious abnormality, atraumatic   Eyes:            Lids and lashes normal, conjunctivae and sclerae normal, no   icterus, no pallor, corneas clear, PERRLA   Ears:    Ears appear intact with no abnormalities noted   Throat:   No oral lesions, no thrush, oral mucosa moist   Neck:   No adenopathy, supple, trachea midline, no thyromegaly, no   carotid bruit, no JVD   Back:     No tenderness to percussion or palpation, range of motion   normal   Lungs:     Mild wheezing on end expiration bilaterally  "    Heart:    Regular rhythm and normal rate, normal S1 and S2, no            murmur, no gallop, no rub, no click   Chest Wall:    No abnormalities observed   Abdomen:     Normal bowel sounds, no masses, no organomegaly, soft        non-tender, non-distended, no guarding, no rebound                tenderness   Rectal:     Deferred   Extremities:   Moves all extremities well, no edema, no cyanosis, no             redness   Pulses:   Pulses palpable and equal bilaterally   Skin:   No bleeding, bruising or rash,Right upper extremity powerglide    Lymph nodes:   No palpable adenopathy   Neurologic:   Awake, alert and oriented x 3. Following commands.         Results:      Results from last 7 days  Lab Units 12/19/17  0101 12/17/17  0112 12/15/17  0104 12/13/17  0058   WBC 10*3/mm3 9.46 10.17 9.05 9.96     Lab Results   Component Value Date    NEUTROABS 5.12 12/19/2017         Results from last 7 days  Lab Units 12/19/17  0101   CREATININE mg/dL 0.99         Results from last 7 days  Lab Units 12/19/17  0101 12/17/17  0112 12/15/17  0104   CRP mg/dL 1.72* 1.85* 3.73*       Results Review:    I have personally reviewed laboratory data, culture results, radiology studies and antimicrobial therapy.    Hospital Medications (active)       Dose Frequency Start End    acetaminophen-codeine (TYLENOL #3) 300-30 MG per tablet 1 tablet 1 tablet Every 4 Hours PRN 12/19/2017 12/21/2017    Sig - Route: Take 1 tablet by mouth Every 4 (Four) Hours As Needed for Moderate Pain . - Oral    ALPRAZolam (XANAX) tablet 0.5 mg 0.5 mg 2 Times Daily PRN 12/19/2017 12/21/2017    Sig - Route: Take 1 tablet by mouth 2 (Two) Times a Day As Needed for Anxiety. - Oral    amLODIPine (NORVASC) tablet 5 mg 5 mg Every 24 Hours Scheduled 12/20/2017     Sig - Route: Take 1 tablet by mouth Daily. - Oral    aspirin EC tablet 81 mg 81 mg Daily 12/20/2017     Sig - Route: Take 1 tablet by mouth Daily. - Oral    celecoxib (CeleBREX) capsule 200 mg 200 mg 2 Times  Daily PRN 12/19/2017     Sig - Route: Take 1 capsule by mouth 2 (Two) Times a Day As Needed for Mild Pain . - Oral    doxycycline (MONODOX) capsule 100 mg 100 mg Every 12 Hours 12/19/2017 12/25/2017    Sig - Route: Take 1 capsule by mouth Every 12 (Twelve) Hours. - Oral    DULoxetine (CYMBALTA) DR capsule 60 mg 60 mg Daily 12/20/2017     Sig - Route: Take 1 capsule by mouth Daily. - Oral    enoxaparin (LOVENOX) syringe 40 mg 40 mg Daily 12/20/2017     Sig - Route: Inject 0.4 mL under the skin Daily. - Subcutaneous    gabapentin (NEURONTIN) capsule 400 mg 400 mg Every 12 Hours Scheduled 12/19/2017     Sig - Route: Take 1 capsule by mouth Every 12 (Twelve) Hours. - Oral    ipratropium-albuterol (DUO-NEB) nebulizer solution 3 mL 3 mL Every 6 Hours - RT 12/19/2017     Sig - Route: Take 3 mL by nebulization Every 6 (Six) Hours. - Nebulization    losartan (COZAAR) tablet 100 mg 100 mg Every 24 Hours Scheduled 12/20/2017     Sig - Route: Take 2 tablets by mouth Daily. - Oral    metoprolol succinate XL (TOPROL-XL) 24 hr tablet 25 mg 25 mg Every 24 Hours Scheduled 12/20/2017     Sig - Route: Take 1 tablet by mouth Daily. - Oral    oxybutynin XL (DITROPAN-XL) 24 hr tablet 10 mg 10 mg Daily 12/20/2017     Sig - Route: Take 2 tablets by mouth Daily. - Oral    pantoprazole (PROTONIX) EC tablet 40 mg 40 mg Every Early Morning 12/20/2017     Sig - Route: Take 1 tablet by mouth Every Morning. - Oral    polyethylene glycol (MIRALAX) powder 17 g 17 g Daily 12/20/2017     Sig - Route: Take 17 g by mouth Daily. - Oral    polyvinyl alcohol-povidone (HYPOTEARS) 1.4-0.6 % ophthalmic solution 1 drop 1 drop Every 12 Hours Scheduled 12/19/2017     Sig - Route: Administer 1 drop to both eyes Every 12 (Twelve) Hours. - Both Eyes    Risaquad-2 capsule 1 capsule 1 capsule Daily 12/20/2017 12/26/2017    Sig - Route: Take 1 capsule by mouth Daily. - Oral    sodium chloride 0.9 % flush 10 mL 10 mL Every 12 Hours Scheduled 12/19/2017     Sig -  Route: 10 mL by Intracatheter route Every 12 (Twelve) Hours. - Intracatheter    sodium chloride 0.9 % flush 10 mL 10 mL As Needed 12/19/2017     Sig - Route: 10 mL by Intracatheter route As Needed for Line Care (After Medication Administration). - Intracatheter    sodium chloride 0.9 % flush 10 mL 10 mL Every 12 Hours Scheduled 12/19/2017     Sig - Route: 10 mL by Intracatheter route Every 12 (Twelve) Hours. - Intracatheter    sodium chloride 0.9 % flush 10 mL 10 mL As Needed 12/19/2017     Sig - Route: 10 mL by Intracatheter route As Needed for Line Care (After Medication Administration or Blood Draw). - Intracatheter    sodium chloride 0.9 % flush 10 mL 10 mL As Needed 12/19/2017     Sig - Route: Infuse 10 mL into a venous catheter As Needed for Line Care. - Intravenous    topiramate (TOPAMAX) tablet 100 mg 100 mg Every 12 Hours Scheduled 12/19/2017     Sig - Route: Take 1 tablet by mouth Every 12 (Twelve) Hours. - Oral    tuberculin injection 5 Units 5 Units Once 12/19/2017     Sig - Route: Inject 0.1 mL into the skin 1 (One) Time. - Intradermal    tuberculin injection 5 Units 5 Units Once 1/2/2018     Sig - Route: Inject 0.1 mL into the skin 1 (One) Time. - Intradermal    acetaminophen-codeine (TYLENOL #3) 300-30 MG per tablet 1 tablet (Discontinued) 1 tablet Every 4 Hours PRN 12/11/2017 12/19/2017    Sig - Route: Take 1 tablet by mouth Every 4 (Four) Hours As Needed for Moderate Pain . - Oral    Reason for Discontinue: Patient Discharge    ALPRAZolam (XANAX) tablet 0.5 mg (Discontinued) 0.5 mg 2 Times Daily PRN 12/11/2017 12/19/2017    Sig - Route: Take 1 tablet by mouth 2 (Two) Times a Day As Needed for Anxiety. - Oral    Reason for Discontinue: Patient Discharge    amLODIPine (NORVASC) tablet 5 mg (Discontinued) 5 mg Every 24 Hours Scheduled 12/11/2017 12/19/2017    Sig - Route: Take 1 tablet by mouth Daily. - Oral    Reason for Discontinue: Patient Discharge    aspirin EC tablet 81 mg (Discontinued) 81 mg  Daily 12/12/2017 12/19/2017    Sig - Route: Take 1 tablet by mouth Daily. - Oral    Reason for Discontinue: Patient Discharge    celecoxib (CeleBREX) capsule 200 mg (Discontinued) 200 mg 2 Times Daily PRN 12/11/2017 12/19/2017    Sig - Route: Take 1 capsule by mouth 2 (Two) Times a Day As Needed for Mild Pain . - Oral    Reason for Discontinue: Patient Discharge    doxycycline (MONODOX) capsule 100 mg (Discontinued) 100 mg Every 12 Hours 12/15/2017 12/19/2017    Sig - Route: Take 1 capsule by mouth Every 12 (Twelve) Hours. - Oral    Reason for Discontinue: Patient Discharge    DULoxetine (CYMBALTA) DR capsule 60 mg (Discontinued) 60 mg Daily 12/11/2017 12/19/2017    Sig - Route: Take 1 capsule by mouth Daily. - Oral    Reason for Discontinue: Patient Discharge    enoxaparin (LOVENOX) syringe 40 mg (Discontinued) 40 mg Daily 12/11/2017 12/19/2017    Sig - Route: Inject 0.4 mL under the skin Daily. - Subcutaneous    Reason for Discontinue: Patient Discharge    gabapentin (NEURONTIN) capsule 400 mg (Discontinued) 400 mg 3 Times Daily 12/11/2017 12/19/2017    Sig - Route: Take 1 capsule by mouth 3 (Three) Times a Day. - Oral    Reason for Discontinue: Patient Discharge    ipratropium-albuterol (DUO-NEB) nebulizer solution 3 mL (Discontinued) 3 mL Every 6 Hours - RT 12/11/2017 12/19/2017    Sig - Route: Take 3 mL by nebulization Every 6 (Six) Hours. - Nebulization    Reason for Discontinue: Patient Discharge    losartan (COZAAR) tablet 100 mg (Discontinued) 100 mg Every 24 Hours Scheduled 12/11/2017 12/19/2017    Sig - Route: Take 2 tablets by mouth Daily. - Oral    Reason for Discontinue: Patient Discharge    metoprolol succinate XL (TOPROL-XL) 24 hr tablet 25 mg (Discontinued) 25 mg Every 24 Hours Scheduled 12/11/2017 12/19/2017    Sig - Route: Take 1 tablet by mouth Daily. - Oral    Reason for Discontinue: Patient Discharge    oxybutynin XL (DITROPAN-XL) 24 hr tablet 10 mg (Discontinued) 10 mg Daily 12/11/2017  12/19/2017    Sig - Route: Take 2 tablets by mouth Daily. - Oral    Reason for Discontinue: Patient Discharge    pantoprazole (PROTONIX) EC tablet 40 mg (Discontinued) 40 mg Every Early Morning 12/12/2017 12/19/2017    Sig - Route: Take 1 tablet by mouth Every Morning. - Oral    Reason for Discontinue: Patient Discharge    Pharmacy to dose vancomycin (Discontinued)  Continuous PRN 12/17/2017 12/18/2017    Sig - Route: Continuous As Needed for Consult. - Does not apply    Reason for Discontinue: Therapy completed    polyethylene glycol (MIRALAX) powder 17 g (Discontinued) 17 g Daily 12/12/2017 12/19/2017    Sig - Route: Take 17 g by mouth Daily. - Oral    Reason for Discontinue: Patient Discharge    polyvinyl alcohol-povidone (HYPOTEARS) 1.4-0.6 % ophthalmic solution 1 drop (Discontinued) 1 drop Every 12 Hours Scheduled 12/11/2017 12/19/2017    Sig - Route: Administer 1 drop to both eyes Every 12 (Twelve) Hours. - Both Eyes    Reason for Discontinue: Patient Discharge    Risaquad-2 capsule 1 capsule (Discontinued) 1 capsule Daily 12/12/2017 12/19/2017    Sig - Route: Take 1 capsule by mouth Daily. - Oral    Reason for Discontinue: Patient Discharge    sodium chloride 0.9 % flush 10 mL (Discontinued) 10 mL As Needed 12/11/2017 12/19/2017    Sig - Route: Infuse 10 mL into a venous catheter As Needed for Line Care. - Intravenous    Reason for Discontinue: Patient Discharge    sodium chloride 0.9 % flush 10 mL (Discontinued) 10 mL Every 12 Hours Scheduled 12/12/2017 12/19/2017    Sig - Route: 10 mL by Intracatheter route Every 12 (Twelve) Hours. - Intracatheter    Reason for Discontinue: Patient Discharge    Cosign for Ordering: Accepted by Skinny Meehan MD on 12/13/2017  7:26 AM    sodium chloride 0.9 % flush 10 mL (Discontinued) 10 mL As Needed 12/12/2017 12/19/2017    Sig - Route: 10 mL by Intracatheter route As Needed for Line Care (After Medication Administration or Blood Draw). - Intracatheter    Reason for  Discontinue: Patient Discharge    Cosign for Ordering: Accepted by Skinny Meehan MD on 12/13/2017  7:26 AM    sodium chloride 0.9 % flush 10 mL (Discontinued) 10 mL Every 12 Hours Scheduled 12/12/2017 12/19/2017    Sig - Route: 10 mL by Intracatheter route Every 12 (Twelve) Hours. - Intracatheter    Reason for Discontinue: Patient Discharge    Cosign for Ordering: Accepted by Skinny Meehan MD on 12/13/2017  7:26 AM    sodium chloride 0.9 % flush 10 mL (Discontinued) 10 mL As Needed 12/12/2017 12/19/2017    Sig - Route: 10 mL by Intracatheter route As Needed for Line Care (After Medication Administration). - Intracatheter    Reason for Discontinue: Patient Discharge    Cosign for Ordering: Accepted by Skinny Meehan MD on 12/13/2017  7:26 AM    topiramate (TOPAMAX) tablet 100 mg (Discontinued) 100 mg Every 12 Hours Scheduled 12/11/2017 12/19/2017    Sig - Route: Take 1 tablet by mouth Every 12 (Twelve) Hours. - Oral    Reason for Discontinue: Patient Discharge            Cultures:    Respiratory Culture   Date Value Ref Range Status   12/13/2017 Light growth (2+) Staphylococcus aureus, MRSA (A)  Final     Comment:       Methicillin resistant Staphylococcus aureus, Patient may be an isolation risk.  This isolate does not demonstrate inducible clindamycin resistance in vitro.             Assessment/Plan     ASSESSMENT:       1.  MRSA pneumonia     PLAN:    Patient states she is feeling significantly better but sleepy this morning.  CRP continues to show improvement at 1.72 with normal white count and no fever overnight.  Transferred to swing bed today.     Would recommend to continue doxycycline 100 mg twice a day as the patient is showing significant clinical improvement with almost normalization of her CRP level.    Patient's findings and recommendations were discussed with patient and nursing staff    Code Status: Prior    Al[place]ia Collins PA-C  12/19/17  11:46 AM

## 2017-12-19 NOTE — THERAPY EVALUATION
Acute Care - Physical Therapy Initial Evaluation/Treatment Note  JANEE Peraza     Patient Name: Domonique Cummings  : 1945  MRN: [id number]  Today's Date: 2017   Onset of Illness/Injury or Date of Surgery Date: 17 (swing bed admit date)  Date of Referral to PT: 17  Referring Physician: Zoran      Admit Date: 2017     Visit Dx:  No diagnosis found.  Patient Active Problem List   Diagnosis   • Pneumonia of left lower lobe due to infectious organism   • MRSA pneumonia     Past Medical History:   Diagnosis Date   • Arthritis    • Asthma    • Degenerative disc disease, lumbar    • Diabetes mellitus    • Fibromyalgia    • GERD (gastroesophageal reflux disease)    • Hyperlipidemia    • Hypertension    • Osteoporosis      Past Surgical History:   Procedure Laterality Date   • APPENDECTOMY     • ESOPHAGEAL DILATATION     • TOTAL SHOULDER REPLACEMENT Bilateral    • TUBAL ABDOMINAL LIGATION            PT ASSESSMENT (last 72 hours)      PT Evaluation       17 1547 17 1520    Rehab Evaluation    Document Type evaluation;therapy note (daily note)   pt evaluated for swing inpatient today  -CT     Subjective Information agree to therapy  -CT     Patient Effort, Rehab Treatment good  -CT     Symptoms Noted Comment Pt admited to swing bed inpatient today and evaluated by PT. Pt tolerated evaluation and treatment session well with rest breaks provided as needed.   -CT     General Information    Patient Profile Review yes  -CT     Onset of Illness/Injury or Date of Surgery Date 17   swing bed admit date  -CT     Referring Physician Zoran  -CT     Precautions/Limitations fall precautions;other (see comments)   isolation precautions  -CT     Prior Level of Function independent:;all household mobility;community mobility   with use of AD  -CT     Equipment Currently Used at Home walker, rolling;commode  -CT commode;walker, rolling   Pt has a rolling walker and bedside commode.   -WP     "Plans/Goals Discussed With patient;agreed upon  -CT     Risks Reviewed patient:;LOB;nausea/vomiting;dizziness;increased discomfort;change in vital signs;increased drainage;lines disloged  -CT     Benefits Reviewed patient:;improve function;increase independence;increase strength;increase balance;decrease pain;decrease risk of DVT;improve skin integrity;increase knowledge  -CT     Barriers to Rehab medically complex;physical barrier  -CT     Living Environment    Lives With alone  -CT alone   Pt lives alone at [facility].   -WP    Living Arrangements apartment  -CT     Home Accessibility no concerns  -CT     Transportation Available  family or friend will provide  -WP    Clinical Impression    Date of Referral to PT 02/19/17  -CT     Functional Level At Time Of Evaluation Min A x 2  -CT     Patient/Family Goals Statement Pt goals are to return to PLOF and \"go home\"  -CT     Criteria for Skilled Therapeutic Interventions Met yes;treatment indicated  -CT     Pathology/Pathophysiology Noted (Describe Specifically for Each System) musculoskeletal;neuromuscular  -CT     Impairments Found (describe specific impairments) aerobic capacity/endurance;gait, locomotion, and balance  -CT     Functional Limitations in Following Categories (Describe Specific Limitations) self-care;home management  -CT     Rehab Potential good, to achieve stated therapy goals  -CT     Predicted Duration of Therapy Intervention (days/wks) length of stay  -CT     Cognitive Assessment/Intervention    Current Cognitive/Communication Assessment functional  -CT     Orientation Status oriented to;oriented x 4  -CT     Follows Commands/Answers Questions able to follow multi-step instructions;100% of the time  -CT     Personal Safety Interventions fall prevention program maintained;gait belt;muscle strengthening facilitated;nonskid shoes/slippers when out of bed  -CT     ROM (Range of Motion)    General ROM Detail BLE grossly WFL  -CT     MMT (Manual " Muscle Testing)    General MMT Assessment Detail BLE grossly 4/5  -CT     Bed Mobility, Assessment/Treatment    Bed Mobility, Assistive Device bed rails  -CT     Bed Mobility, Roll Left, [place] minimum assist (75% patient effort)  -CT     Bed Mobility, Roll Right, [place] minimum assist (75% patient effort)  -CT     Bed Mobility, Scoot/Bridge, [place] minimum assist (75% patient effort)  -CT     Bed Mob, Supine to Sit, [place] minimum assist (75% patient effort)  -CT     Bed Mobility, Impairments strength decreased  -CT     Transfer Assessment/Treatment    Transfers, Bed-Chair [place] minimum assist (75% patient effort);nonverbal cues required (demo/gesture);verbal cues required  -CT     Transfers, Chair-Bed [place] minimum assist (75% patient effort);verbal cues required;nonverbal cues required (demo/gesture)  -CT     Transfers, Bed-Chair-Bed, Assist Device rolling walker  -CT     Transfers, Sit-Stand [place] minimum assist (75% patient effort)  -CT     Transfers, Stand-Sit [place] minimum assist (75% patient effort)  -CT     Transfers, Sit-Stand-Sit, Assist Device rolling walker  -CT     Toilet Transfer, [place] minimum assist (75% patient effort)  -CT     Toilet Transfer, Assistive Device elevated toilet seat;rolling walker;other (see comments)   grab bars  -CT     Gait Assessment/Treatment    Gait, [place] Level minimum assist (75% patient effort);2 person assist required;verbal cues required;nonverbal cues required (demo/gesture)  -CT     Gait, Assistive Device rolling walker  -CT     Gait, Distance (Feet) 250  -CT     Gait, Gait Pattern Analysis swing-to gait  -CT     Gait, Gait Deviations elizabeth decreased;forward flexed posture;step length decreased  -CT     Gait, Safety Issues balance decreased during turns  -CT     Gait, Impairments strength decreased  -CT     Therapy Exercises    Bilateral Lower Extremities AROM:;15 reps;sitting  -CT     Positioning and  Restraints    Pre-Treatment Position in bed  -CT     Post Treatment Position chair  -CT     In Chair sitting;call light within reach;encouraged to call for assist  -CT       12/19/17 1330 12/19/17 1327    General Information    Equipment Currently Used at Home  walker, rolling;commode  -TC    Living Environment    Lives With alone  -TC     Living Arrangements apartment  -TC     Home Accessibility no concerns  -TC     Stair Railings at Home none  -TC     Type of Financial/Environmental Concern none  -TC     Transportation Available car;family or friend will provide  -TC       12/19/17 1110 12/18/17 1633    Rehab Evaluation    Document Type  therapy note (daily note)  -CT    Subjective Information  agree to therapy;complains of;weakness  -CT    Patient Effort, Rehab Treatment  good  -CT    General Information    Precautions/Limitations  fall precautions;other (see comments)   isolation precautions  -CT    Cognitive Assessment/Intervention    Current Cognitive/Communication Assessment  functional  -CT    Orientation Status  oriented to;oriented x 4  -CT    Personal Safety Interventions  fall prevention program maintained;gait belt;muscle strengthening facilitated;nonskid shoes/slippers when out of bed  -CT    Muscle Tone Assessment    Muscle Tone Assessment Bilateral Upper Extremities;Bilateral Lower Extremities  -TC     Bilateral Upper Extremities Muscle Tone Assessment mildly decreased tone  -TC     Bilateral Lower Extremities Muscle Tone Assessment mildly decreased tone  -TC     Bed Mobility, Assessment/Treatment    Bed Mobility, Assistive Device  bed rails  -CT    Bed Mobility, Scoot/Bridge, [place]  minimum assist (75% patient effort);verbal cues required;nonverbal cues required (demo/gesture)  -CT    Bed Mob, Supine to Sit, [place]  minimum assist (75% patient effort);moderate assist (50% patient effort);verbal cues required;nonverbal cues required (demo/gesture)  -CT    Bed Mob, Sit to Supine,  [place]  minimum assist (75% patient effort);moderate assist (50% patient effort);verbal cues required;nonverbal cues required (demo/gesture)  -CT    Bed Mobility, Impairments  strength decreased  -CT    Transfer Assessment/Treatment    Transfers, Sit-Stand [place]  minimum assist (75% patient effort)  -CT    Transfers, Stand-Sit [place]  minimum assist (75% patient effort)  -CT    Transfers, Sit-Stand-Sit, Assist Device  rolling walker  -CT    Toilet Transfer, [place]  minimum assist (75% patient effort)  -CT    Toilet Transfer, Assistive Device  elevated toilet seat;rolling walker;other (see comments)   grab bar  -CT    Gait Assessment/Treatment    Gait, [place] Level  minimum assist (75% patient effort);2 person assist required;verbal cues required;nonverbal cues required (demo/gesture)  -CT    Gait, Assistive Device  rolling walker  -CT    Gait, Distance (Feet)  300  -CT    Gait, Gait Pattern Analysis  swing-to gait  -CT    Therapy Exercises    Bilateral Lower Extremities  AROM:;15 reps;sitting  -CT    Positioning and Restraints    Pre-Treatment Position  in bed  -CT    Post Treatment Position  bed  -CT    In Bed  supine;call light within reach;encouraged to call for assist  -CT      User Key  (r) = Recorded By, (t) = Taken By, (c) = Cosigned By    Initials Name Provider Type    TC Danna Serrano RN Registered Nurse    JONATHAN Paula     CT Moriah Vegas PT Physical Therapist          Physical Therapy Education     Title: PT OT SLP Therapies (Done)     Topic: Physical Therapy (Done)     Point: Mobility training (Done)    Learning Progress Summary    Learner Readiness Method Response Comment Documented by Status   Patient Acceptance E [facility] 12/19/17 1611 Done               Point: Home exercise program (Done)    Learning Progress Summary    Learner Readiness Method Response Comment Documented by Status   Patient Acceptance E [facility] 12/19/17 1611 Done                Point: Body mechanics (Done)    Learning Progress Summary    Learner Readiness Method Response Comment Documented by Status   Patient Acceptance E [facility]  CT 12/19/17 1611 Done               Point: Precautions (Done)    Learning Progress Summary    Learner Readiness Method Response Comment Documented by Status   Patient Acceptance E [facility]U  CT 12/19/17 1611 Done                      User Key     Initials Effective Dates Name Provider Type Discipline    CT 03/14/16 -  Moriah Vegas, PT Physical Therapist PT                PT Recommendation and Plan  Anticipated Equipment Needs At Discharge:  (to be determined)  Anticipated Discharge Disposition: home with assist  Planned Therapy Interventions: balance training, bed mobility training, gait training, home exercise program, neuromuscular re-education, patient/family education, postural re-education, strengthening, stair training, transfer training  PT Frequency: 5 times/wk, per priority policy             IP PT Goals       12/19/17 1607 12/12/17 1618       Transfer Training PT LTG    Transfer Training PT LTG, Date Established 12/19/17  -CT 12/12/17  -CT     Transfer Training PT LTG, Time to Achieve by discharge  -CT by discharge  -CT     Transfer Training PT LTG, Activity Type bed to chair /chair to bed;sit to stand/stand to sit  -CT bed to chair /chair to bed;sit to stand/stand to sit  -CT     Transfer Training PT LTG, [place] Level conditional independence  -CT contact guard assist;minimum assist (75% patient effort)  -CT     Transfer Training PT LTG, Assist Device other (see comments)   with appropriate AD  -CT other (see comments)   with appropriate AD  -CT     Gait Training PT LTG    Gait Training Goal PT LTG, Date Established 12/19/17  -CT 12/12/17  -CT     Gait Training Goal PT LTG, Time to Achieve by discharge  -CT by discharge  -CT     Gait Training Goal PT LTG, [place] Level conditional independence  -CT minimum assist (75% patient effort);contact guard  assist  -CT     Gait Training Goal PT LTG, Assist Device other (see comments)   with appropriate AD  -CT other (see comments)   with appropriate AD  -CT     Gait Training Goal PT LTG, Distance to Achieve 250  -  -CT       User Key  (r) = Recorded By, (t) = Taken By, (c) = Cosigned By    Initials Name Provider Type    CT Moriah Vegas PT Physical Therapist                Outcome Measures       12/19/17 1600 12/18/17 1600       How much help from another person do you currently need...    Turning from your back to your side while in flat bed without using bedrails? 3  -CT 3  -CT     Moving from lying on back to sitting on the side of a flat bed without bedrails? 3  -CT 3  -CT     Moving to and from a bed to a chair (including a wheelchair)? 3  -CT 3  -CT     Standing up from a chair using your arms (e.g., wheelchair, bedside chair)? 3  -CT 3  -CT     Climbing 3-5 steps with a railing? 2  -CT 2  -CT     To walk in hospital room? 3  -CT 3  -CT     AM-PAC 6 Clicks Score 17  -CT 17  -CT     Functional Assessment    Outcome Measure Options AM-PAC 6 Clicks Basic Mobility (PT)  -CT AM-PAC 6 Clicks Basic Mobility (PT)  -CT       User Key  (r) = Recorded By, (t) = Taken By, (c) = Cosigned By    Initials Name Provider Type    CT Moriah Vegas PT Physical Therapist           Time Calculation:         PT Charges       12/19/17 1611          Time Calculation    PT Received On 12/19/17  -CT      PT - Next Appointment 12/20/17  -CT      PT Goal Re-Cert Due Date 01/02/18  -CT      Time Calculation- PT    Total Timed Code Minutes- PT 45 minute(s)  -CT        User Key  (r) = Recorded By, (t) = Taken By, (c) = Cosigned By    Initials Name Provider Type    CT Moriah Vegas PT Physical Therapist          Therapy Charges for Today     Code Description Service Date Service Provider Modifiers Qty    65463104903 HC PT MOBILITY CURRENT 12/19/2017 Moriah Vegas PT GP, CK 1    95689916900 HC PT MOBILITY PROJECTED 12/19/2017 Moriah  Ascencion, PT GP, CJ 1    51481165644 HC PT EVAL HIGH COMPLEXITY 2 12/19/2017 Moriah Vegas, PT GP 1    44532539283 HC GAIT TRAINING EA 15 MIN 12/19/2017 Moriah Vegas, PT GP 1    26784199539 HC PT THER SUPP EA 15 MIN 12/19/2017 Moriah Vegas, PT GP 3          PT G-Codes  Outcome Measure Options: AM-PAC 6 Clicks Basic Mobility (PT)  Score: 17  Functional Limitation: Mobility: Walking and moving around  Mobility: Walking and Moving Around Current Status (): At least 40 percent but less than 60 percent impaired, limited or restricted  Mobility: Walking and Moving Around Goal Status (): At least 20 percent but less than 40 percent impaired, limited or restricted      Moriah Vegas, PT  12/19/2017

## 2017-12-19 NOTE — PLAN OF CARE
Problem: Inpatient Physical Therapy  Goal: Transfer Training Goal 1 LTG- PT    12/19/17 1607   Transfer Training PT LTG   Transfer Training PT LTG, Date Established 12/19/17   Transfer Training PT LTG, Time to Achieve by discharge   Transfer Training PT LTG, Activity Type bed to chair /chair to bed;sit to stand/stand to sit   Transfer Training PT LTG, [place] Level conditional independence   Transfer Training PT LTG, Assist Device other (see comments)  (with appropriate AD)       Goal: Gait Training Goal LTG- PT    12/19/17 1607   Gait Training PT LTG   Gait Training Goal PT LTG, Date Established 12/19/17   Gait Training Goal PT LTG, Time to Achieve by discharge   Gait Training Goal PT LTG, [place] Level conditional independence   Gait Training Goal PT LTG, Assist Device other (see comments)  (with appropriate AD)   Gait Training Goal PT LTG, Distance to Achieve 250

## 2017-12-19 NOTE — PROGRESS NOTES
LOS: 8 days       Chief Complaint :  Shortness of breath   Subjective     Interval History:     Patient Complaints:  Domonique Cummings  describes a gradual improvement in her shortness of breath, cough and congestion.  Her strength is slowly improving.  She does continue with difficulty performing activities of daily living.  She describes walking about 20 feet yesterday.  She has no other complaints.  Telemetry is consistent with sinus bradycardia.          Review of Systems-unchanged from recent history and physical  Objective     Vital Signs  Temp:  [97.7 °F (36.5 °C)-98.1 °F (36.7 °C)] 98.1 °F (36.7 °C)  Heart Rate:  [] 73  Resp:  [18-20] 18  BP: (122-144)/(62-80) 144/80    Physical Exam    Constitutional: She is oriented to person, place, and time.   Obese white female who is alert and talkative in no obvious distress at rest.   HENT:   Head: Normocephalic and atraumatic.   Right Ear: External ear normal.   Left Ear: External ear normal.   Nose: Nose normal.   Mouth/Throat: Oropharynx is clear and moist. No oropharyngeal exudate.   Eyes: Conjunctivae and EOM are normal. Pupils are equal, round, and reactive to light. Right eye exhibits no discharge. Left eye exhibits no discharge. No scleral icterus.   Neck: Normal range of motion. Neck supple. No JVD present. No tracheal deviation present. No thyromegaly present.   Cardiovascular: Normal rate, regular rhythm, normal heart sounds and intact distal pulses.  Exam reveals no gallop and no friction rub.    No murmur heard.  Pulmonary/Chest: Effort normal. No stridor.  Trace expiratory wheezing   Scattered rhonchi bilaterally, diminished breath sounds at lung bases.   Abdominal: Soft. Bowel sounds are normal. She exhibits no distension and no mass. There is no tenderness. There is no guarding. No hernia.   Genitourinary:   Genitourinary Comments: No Freitas catheter   Neurological: She is alert and oriented to person, place, and time. She displays normal  reflexes. No cranial nerve deficit. Coordination normal.   Skin: Skin is warm and dry. No rash noted. No erythema. No pallor.   Psychiatric: She has a normal mood and affect. Her behavior is normal. Judgment and thought content normal.   Nursing note and vitals reviewed.      Results Review:     I reviewed the patient's new clinical results  : See Below  MEDICATIONS:    amLODIPine 5 mg Oral Q24H   aspirin 81 mg Oral Daily   doxycycline 100 mg Oral Q12H   DULoxetine 60 mg Oral Daily   enoxaparin 40 mg Subcutaneous Daily   gabapentin 400 mg Oral TID   ipratropium-albuterol 3 mL Nebulization Q6H - RT   losartan 100 mg Oral Q24H   metoprolol succinate XL 25 mg Oral Q24H   oxybutynin XL 10 mg Oral Daily   pantoprazole 40 mg Oral Q AM   polyethylene glycol 17 g Oral Daily   polyvinyl alcohol-povidone 1 drop Both Eyes Q12H   Risaquad-2 1 capsule Oral Daily   sodium chloride 10 mL Intracatheter Q12H   sodium chloride 10 mL Intracatheter Q12H   topiramate 100 mg Oral Q12H       LABS:        Results from last 7 days  Lab Units 12/19/17  0101 12/17/17  0112 12/15/17  0104   CRP mg/dL 1.72* 1.85* 3.73*   WBC 10*3/mm3 9.46 10.17 9.05   HEMOGLOBIN g/dL 11.0* 11.3* 10.5*   HEMATOCRIT % 34.6* 35.7* 33.9*   MCV fL 97.2* 97.3* 99.1*   MCHC g/dL 31.8* 31.7* 31.0*   PLATELETS 10*3/mm3 258 233 194           Results from last 7 days  Lab Units 12/19/17  0101 12/17/17  0112 12/15/17  0104 12/13/17  0058   SODIUM mmol/L 139 140 140 140   POTASSIUM mmol/L 3.8 3.9 3.8 3.7   MAGNESIUM mg/dL 2.2  --   --  1.9   CHLORIDE mmol/L 108 109 111 112   CO2 mmol/L 23.1* 23.5* 22.2* 23.1*   BUN mg/dL 19 20 13 16   CREATININE mg/dL 0.99 0.92 0.76 0.79   EGFR IF NONAFRICN AM mL/min/1.73 55* 60* 75 71   CALCIUM mg/dL 9.5 8.9 9.4 8.7   GLUCOSE mg/dL 97 115* 105 104   ALBUMIN g/dL 3.50 3.50 3.60 3.50   BILIRUBIN mg/dL 0.1* 0.1* 0.2 0.2   ALK PHOS U/L 78 80 77 77   AST (SGOT) U/L 21 30 31* 18   ALT (SGPT) U/L 28 34 24 16   Estimated Creatinine Clearance: 58.2  mL/min (by C-G formula based on Cr of 0.99).  No results found for: AMMONIA      Blood Culture   Date Value Ref Range Status   12/11/2017 No growth at less than 24 hours  Preliminary   12/11/2017 No growth at less than 24 hours  Preliminary                  Assessment/Plan    1) pneumonia of left lower lobe   - C-reactive protein and white blood cell count improving.  Symptoms also improving,  mycoplasma pneumonia and legionella studies negative.  Flu swab negative.  Blood cultures with no growth.  C-reactive protein continues to improve.  Sputum positive for MRSA-infectious disease consulted.  2) sepsis  3) metabolic encephalopathy-present at the time of admission  4) leukocytosis  5) anemia-multifactorial  6) DVT prophylaxis-subcutaneous Lovenox  7) disposition-swing bed consulted December 19.  Transfer if accepted   See orders entered.     Skinny Meehan MD  12/19/17  7:03 AM

## 2017-12-19 NOTE — PROGRESS NOTES
Discharge Planning Assessment  JANEE Peraza     Patient Name: Domonique Cummings  MRN: [id number]  Today's Date: 12/19/2017    Admit Date: 12/11/2017          Discharge Needs Assessment     None            Discharge Plan       12/19/17 0918    Final Note    Final Note Pt to be discharged to Swing Bed on this date.        Discharge Placement     No information found        Expected Discharge Date and Time     Expected Discharge Date Expected Discharge Time    Dec 19, 2017               Demographic Summary     None            Functional Status     None            Psychosocial     None            Abuse/Neglect     None            Legal     None            Substance Abuse     None            Patient Forms     None          Nayana Brooks

## 2017-12-19 NOTE — PLAN OF CARE
Problem: Patient Care Overview (Adult)  Goal: Plan of Care Review  Outcome: Ongoing (interventions implemented as appropriate)    12/19/17 1333   Coping/Psychosocial Response Interventions   Plan Of Care Reviewed With patient   Patient Care Overview   Progress no change         Problem: Pneumonia (Adult)  Goal: Signs and Symptoms of Listed Potential Problems Will be Absent or Manageable (Pneumonia)  Outcome: Ongoing (interventions implemented as appropriate)    12/19/17 1333   Pneumonia   Problems Assessed (Pneumonia) all   Problems Present (Pneumonia) none         Problem: Fall Risk (Adult)  Goal: Identify Related Risk Factors and Signs and Symptoms  Outcome: Ongoing (interventions implemented as appropriate)    12/19/17 1333   Fall Risk   Fall Risk: Related Risk Factors age-related changes;gait/mobility problems         Problem: Infection, Risk/Actual (Adult)  Goal: Identify Related Risk Factors and Signs and Symptoms  Outcome: Ongoing (interventions implemented as appropriate)    12/19/17 1333   Infection, Risk/Actual   Infection, Risk/Actual: Related Risk Factors age extremes;prolonged hospitalization   Signs and Symptoms (Infection, Risk/Actual) weakness         Problem: Activity Intolerance (Adult)  Goal: Identify Related Risk Factors and Signs and Symptoms  Outcome: Ongoing (interventions implemented as appropriate)    12/19/17 1333   Activity Intolerance   Activity Intolerance: Related Risk Factors generalized weakness

## 2017-12-19 NOTE — PLAN OF CARE
Problem: Patient Care Overview (Adult)  Goal: Discharge Needs Assessment  Outcome: Ongoing (interventions implemented as appropriate)  Discharge Planning Assessment  JANEE [place]     Patient Name: Domonique Cummings               MRN: [id number]  Today's Date: 12/19/2017                   Admit Date: 12/19/2017             Discharge Needs Assessment        12/19/17 1520     Living Environment     Lives With alone   Pt lives alone at [facility].      Transportation Available family or friend will provide     Living Environment     Quality Of Family Relationships supportive   Dtr, Deepali is supportive.      Able to Return to Prior Living Arrangements yes     Discharge Needs Assessment     Outpatient/Agency/Support Group Needs --   Pt does not utilize home health services.      Community Agency Name(S) --   Dtr reports that pt was at [facility] [place] in the past.     Equipment Currently Used at Home commode;walker, rolling   Pt has a rolling walker and bedside commode.      Discharge Disposition home or self-care;swing bed   Pt was admitted to swing bed on this date.       [facility]       Discharge Plan        12/19/17 1523     Case Management/Social Work Plan     Plan Pt was admitted to swing bed on this date. Pt lives alone at [facility] and plans to return home at discharge. Pt does not utilize home health services. Pt has a rolling walker and bedside commode. SS to follow and assist as needed with discharge planning.        [facility]       Demographic Summary        12/19/17 1519     Referral Information     Referral Source nursing     Reason For Consult --   SS received consult swing bed. Pt was transferred to [facility] [place] from [facility].      Primary Care Physician Information     Name Dr. Meehan        Abuse/Neglect      None       [facility]       Legal        12/19/17 1520     Legal     Legal Comments Pt does not have a POA or living will. Pt declines informaiton.    Nancy Paula

## 2017-12-20 PROCEDURE — 25010000002 ENOXAPARIN PER 10 MG: Performed by: INTERNAL MEDICINE

## 2017-12-20 PROCEDURE — 94799 UNLISTED PULMONARY SVC/PX: CPT

## 2017-12-20 PROCEDURE — 97530 THERAPEUTIC ACTIVITIES: CPT

## 2017-12-20 PROCEDURE — 97116 GAIT TRAINING THERAPY: CPT

## 2017-12-20 RX ORDER — L.ACID,PARA/B.BIFIDUM/S.THERM 8B CELL
1 CAPSULE ORAL DAILY
Status: DISCONTINUED | OUTPATIENT
Start: 2017-12-21 | End: 2017-12-22 | Stop reason: HOSPADM

## 2017-12-20 RX ADMIN — Medication 10 ML: at 08:32

## 2017-12-20 RX ADMIN — POLYVINYL ALCOHOL, POVIDONE 1 DROP: 14; 6 SOLUTION/ DROPS OPHTHALMIC at 20:31

## 2017-12-20 RX ADMIN — AMLODIPINE BESYLATE 5 MG: 5 TABLET ORAL at 08:26

## 2017-12-20 RX ADMIN — ALPRAZOLAM 0.5 MG: 0.5 TABLET ORAL at 08:45

## 2017-12-20 RX ADMIN — IPRATROPIUM BROMIDE AND ALBUTEROL SULFATE 3 ML: .5; 3 SOLUTION RESPIRATORY (INHALATION) at 12:30

## 2017-12-20 RX ADMIN — IPRATROPIUM BROMIDE AND ALBUTEROL SULFATE 3 ML: .5; 3 SOLUTION RESPIRATORY (INHALATION) at 19:53

## 2017-12-20 RX ADMIN — ACETAMINOPHEN AND CODEINE PHOSPHATE 1 TABLET: 300; 30 TABLET ORAL at 06:29

## 2017-12-20 RX ADMIN — METOPROLOL SUCCINATE 25 MG: 25 TABLET, FILM COATED, EXTENDED RELEASE ORAL at 08:27

## 2017-12-20 RX ADMIN — ASPIRIN 81 MG: 81 TABLET, COATED ORAL at 08:26

## 2017-12-20 RX ADMIN — GABAPENTIN 400 MG: 400 CAPSULE ORAL at 08:27

## 2017-12-20 RX ADMIN — IPRATROPIUM BROMIDE AND ALBUTEROL SULFATE 3 ML: .5; 3 SOLUTION RESPIRATORY (INHALATION) at 00:38

## 2017-12-20 RX ADMIN — LOSARTAN POTASSIUM 100 MG: 50 TABLET, FILM COATED ORAL at 08:27

## 2017-12-20 RX ADMIN — Medication 1 CAPSULE: at 08:27

## 2017-12-20 RX ADMIN — Medication 10 ML: at 20:33

## 2017-12-20 RX ADMIN — ACETAMINOPHEN AND CODEINE PHOSPHATE 1 TABLET: 300; 30 TABLET ORAL at 15:23

## 2017-12-20 RX ADMIN — PANTOPRAZOLE SODIUM 40 MG: 40 TABLET, DELAYED RELEASE ORAL at 06:29

## 2017-12-20 RX ADMIN — ALPRAZOLAM 0.5 MG: 0.5 TABLET ORAL at 20:55

## 2017-12-20 RX ADMIN — DOXYCYCLINE 100 MG: 100 CAPSULE ORAL at 17:08

## 2017-12-20 RX ADMIN — ACETAMINOPHEN AND CODEINE PHOSPHATE 1 TABLET: 300; 30 TABLET ORAL at 20:55

## 2017-12-20 RX ADMIN — IPRATROPIUM BROMIDE AND ALBUTEROL SULFATE 3 ML: .5; 3 SOLUTION RESPIRATORY (INHALATION) at 06:46

## 2017-12-20 RX ADMIN — POLYVINYL ALCOHOL, POVIDONE 1 DROP: 14; 6 SOLUTION/ DROPS OPHTHALMIC at 08:27

## 2017-12-20 RX ADMIN — TOPIRAMATE 100 MG: 100 TABLET, FILM COATED ORAL at 20:31

## 2017-12-20 RX ADMIN — POLYETHYLENE GLYCOL (3350) 17 G: 17 POWDER, FOR SOLUTION ORAL at 08:27

## 2017-12-20 RX ADMIN — ENOXAPARIN SODIUM 40 MG: 40 INJECTION SUBCUTANEOUS at 08:27

## 2017-12-20 RX ADMIN — DULOXETINE HYDROCHLORIDE 60 MG: 60 CAPSULE, DELAYED RELEASE ORAL at 08:26

## 2017-12-20 RX ADMIN — GABAPENTIN 400 MG: 400 CAPSULE ORAL at 20:31

## 2017-12-20 RX ADMIN — OXYBUTYNIN CHLORIDE 10 MG: 5 TABLET, FILM COATED, EXTENDED RELEASE ORAL at 08:27

## 2017-12-20 RX ADMIN — DOXYCYCLINE 100 MG: 100 CAPSULE ORAL at 06:29

## 2017-12-20 RX ADMIN — TOPIRAMATE 100 MG: 100 TABLET, FILM COATED ORAL at 08:27

## 2017-12-20 NOTE — THERAPY TREATMENT NOTE
Acute Care - Physical Therapy Treatment Note   [place]     Patient Name: Domonique Cummings  : 1945  MRN: [id number]  Today's Date: 2017  Onset of Illness/Injury or Date of Surgery Date: 17 (swing bed admit date)  Date of Referral to PT: 17  Referring Physician: Zoran    Admit Date: 2017    Visit Dx:  No diagnosis found.  Patient Active Problem List   Diagnosis   • Pneumonia of left lower lobe due to infectious organism   • MRSA pneumonia               Adult Rehabilitation Note       17 1422 17 1633       Rehab Assessment/Intervention    Discipline physical therapist  -CT physical therapist  -CT     Document Type therapy note (daily note)  -CT therapy note (daily note)  -CT     Subjective Information agree to therapy  -CT agree to therapy;complains of;weakness  -CT     Patient Effort, Rehab Treatment good  -CT good  -CT     Precautions/Limitations fall precautions;other (see comments)   isolation precautions  -CT fall precautions;other (see comments)   isolation precautions  -CT     Patient Response to Treatment Pt tolerated todays treatment session well with rest breaks provided as needed.  -CT Pt tolerated todays treatment session well with rest breaks provided as needed. Pt ambulated outside room today with isolation mask.   -CT     Recorded by [CT] Moriah Vegas PT [CT] Moriah Vegas PT     Cognitive Assessment/Intervention    Current Cognitive/Communication Assessment functional  -CT functional  -CT     Orientation Status oriented to;oriented x 4  -CT oriented to;oriented x 4  -CT     Personal Safety Interventions fall prevention program maintained;gait belt;muscle strengthening facilitated;nonskid shoes/slippers when out of bed  -CT fall prevention program maintained;gait belt;muscle strengthening facilitated;nonskid shoes/slippers when out of bed  -CT     Recorded by [CT] Moriah Vegas, CLARA [CT] Moriah Vegas PT     Bed Mobility, Assessment/Treatment    Bed  Mobility, Assistive Device bed rails  -CT bed rails  -CT     Bed Mobility, Roll Left, [place] minimum assist (75% patient effort)  -CT      Bed Mobility, Roll Right, [place] minimum assist (75% patient effort)  -CT      Bed Mobility, Scoot/Bridge, [place] minimum assist (75% patient effort)  -CT minimum assist (75% patient effort);verbal cues required;nonverbal cues required (demo/gesture)  -CT     Bed Mob, Supine to Sit, [place] minimum assist (75% patient effort)  -CT minimum assist (75% patient effort);moderate assist (50% patient effort);verbal cues required;nonverbal cues required (demo/gesture)  -CT     Bed Mob, Sit to Supine, [place]  minimum assist (75% patient effort);moderate assist (50% patient effort);verbal cues required;nonverbal cues required (demo/gesture)  -CT     Bed Mobility, Impairments strength decreased  -CT strength decreased  -CT     Recorded by [CT] Moriah Vegas, PT [CT] Moriah Vegas PT     Transfer Assessment/Treatment    Transfers, Sit-Stand [place] minimum assist (75% patient effort)  -CT minimum assist (75% patient effort)  -CT     Transfers, Stand-Sit [place] minimum assist (75% patient effort)  -CT minimum assist (75% patient effort)  -CT     Transfers, Sit-Stand-Sit, Assist Device rolling walker  -CT rolling walker  -CT     Toilet Transfer, [place] minimum assist (75% patient effort)  -CT minimum assist (75% patient effort)  -CT     Toilet Transfer, Assistive Device elevated toilet seat;rolling walker;other (see comments)   grab bars  -CT elevated toilet seat;rolling walker;other (see comments)   grab bar  -CT     Recorded by [CT] Moriah Vegas, PT [CT] Moriah Vegas PT     Gait Assessment/Treatment    Gait, [place] Level minimum assist (75% patient effort);2 person assist required;verbal cues required;nonverbal cues required (demo/gesture)  -CT minimum assist (75% patient effort);2 person assist required;verbal cues  required;nonverbal cues required (demo/gesture)  -CT     Gait, Assistive Device rolling walker  -CT rolling walker  -CT     Gait, Distance (Feet) 260  -  -CT     Gait, Gait Pattern Analysis swing-to gait  -CT swing-to gait  -CT     Gait, Impairments strength decreased  -CT      Recorded by [CT] Moriah Vegas, PT [CT] Moriah Vegas PT     Therapy Exercises    Bilateral Lower Extremities AROM:;15 reps;sitting  -CT AROM:;15 reps;sitting  -CT     Recorded by [CT] Moriah Vegas, PT [CT] Moriah Vegas PT     Positioning and Restraints    Pre-Treatment Position in bed  -CT in bed  -CT     Post Treatment Position chair  -CT bed  -CT     In Bed  supine;call light within reach;encouraged to call for assist  -CT     In Chair sitting;call light within reach;encouraged to call for assist  -CT      Recorded by [CT] Moriah Vegas, PT [CT] Moriah Vegas PT       User Key  (r) = Recorded By, (t) = Taken By, (c) = Cosigned By    Initials Name Effective Dates    CT Moriah Vegas, PT 03/14/16 -                 IP PT Goals       12/19/17 1607 12/12/17 1618       Transfer Training PT LTG    Transfer Training PT LTG, Date Established 12/19/17  -CT 12/12/17  -CT     Transfer Training PT LTG, Time to Achieve by discharge  -CT by discharge  -CT     Transfer Training PT LTG, Activity Type bed to chair /chair to bed;sit to stand/stand to sit  -CT bed to chair /chair to bed;sit to stand/stand to sit  -CT     Transfer Training PT LTG, [place] Level conditional independence  -CT contact guard assist;minimum assist (75% patient effort)  -CT     Transfer Training PT LTG, Assist Device other (see comments)   with appropriate AD  -CT other (see comments)   with appropriate AD  -CT     Gait Training PT LTG    Gait Training Goal PT LTG, Date Established 12/19/17  -CT 12/12/17  -CT     Gait Training Goal PT LTG, Time to Achieve by discharge  -CT by discharge  -CT     Gait Training Goal PT LTG, [place] Level conditional  independence  -CT minimum assist (75% patient effort);contact guard assist  -CT     Gait Training Goal PT LTG, Assist Device other (see comments)   with appropriate AD  -CT other (see comments)   with appropriate AD  -CT     Gait Training Goal PT LTG, Distance to Achieve 250  -  -CT       User Key  (r) = Recorded By, (t) = Taken By, (c) = Cosigned By    Initials Name Provider Type    CT Moriah Vegas PT Physical Therapist          Physical Therapy Education     Title: PT OT SLP Therapies (Done)     Topic: Physical Therapy (Done)     Point: Mobility training (Done)    Learning Progress Summary    Learner Readiness Method Response Comment Documented by Status   Patient Acceptance E VU  CT 12/20/17 1429 Done    Acceptance E VU  CT 12/19/17 1611 Done               Point: Home exercise program (Done)    Learning Progress Summary    Learner Readiness Method Response Comment Documented by Status   Patient Acceptance E VU  CT 12/20/17 1429 Done    Acceptance E VU  CT 12/19/17 1611 Done               Point: Body mechanics (Done)    Learning Progress Summary    Learner Readiness Method Response Comment Documented by Status   Patient Acceptance E VU  CT 12/20/17 1429 Done    Acceptance E VU  CT 12/19/17 1611 Done               Point: Precautions (Done)    Learning Progress Summary    Learner Readiness Method Response Comment Documented by Status   Patient Acceptance E VU  CT 12/20/17 1429 Done    Acceptance E VU  CT 12/19/17 1611 Done                      User Key     Initials Effective Dates Name Provider Type Discipline    CT 03/14/16 -  Moriah Vegas PT Physical Therapist PT                    PT Recommendation and Plan  Anticipated Equipment Needs At Discharge:  (to be determined)  Anticipated Discharge Disposition: home with assist  Planned Therapy Interventions: balance training, bed mobility training, gait training, home exercise program, neuromuscular re-education, patient/family education, postural  re-education, strengthening, stair training, transfer training  PT Frequency: 5 times/wk, per priority policy             Outcome Measures       12/20/17 1400 12/19/17 1600 12/18/17 1600    How much help from another person do you currently need...    Turning from your back to your side while in flat bed without using bedrails? 3  -CT 3  -CT 3  -CT    Moving from lying on back to sitting on the side of a flat bed without bedrails? 3  -CT 3  -CT 3  -CT    Moving to and from a bed to a chair (including a wheelchair)? 3  -CT 3  -CT 3  -CT    Standing up from a chair using your arms (e.g., wheelchair, bedside chair)? 3  -CT 3  -CT 3  -CT    Climbing 3-5 steps with a railing? 2  -CT 2  -CT 2  -CT    To walk in hospital room? 3  -CT 3  -CT 3  -CT    AM-PAC 6 Clicks Score 17  -CT 17  -CT 17  -CT    Functional Assessment    Outcome Measure Options AM-PAC 6 Clicks Basic Mobility (PT)  -CT AM-PAC 6 Clicks Basic Mobility (PT)  -CT AM-PAC 6 Clicks Basic Mobility (PT)  -CT      User Key  (r) = Recorded By, (t) = Taken By, (c) = Cosigned By    Initials Name Provider Type    CT Moriah Vegas PT Physical Therapist           Time Calculation:         PT Charges       12/20/17 1430          Time Calculation    PT Received On 12/20/17  -CT      PT - Next Appointment 12/21/17  -CT      PT Goal Re-Cert Due Date 01/02/18  -CT      Time Calculation- PT    Total Timed Code Minutes- PT 32 minute(s)  -CT        User Key  (r) = Recorded By, (t) = Taken By, (c) = Cosigned By    Initials Name Provider Type    CT Moriah Vegas PT Physical Therapist          Therapy Charges for Today     Code Description Service Date Service Provider Modifiers Qty    29404349620 HC PT MOBILITY CURRENT 12/19/2017 Moriah Vegas PT GP, CK 1    61197023968 HC PT MOBILITY PROJECTED 12/19/2017 Moriah Vegas PT GP, CJ 1    60119474882 HC PT EVAL HIGH COMPLEXITY 2 12/19/2017 Moriah Vegas PT GP 1    59810248824 HC GAIT TRAINING EA 15 MIN 12/19/2017 Moriah  Ascencion, PT GP 1    07451837042 HC PT THER SUPP EA 15 MIN 12/19/2017 Moriah Leonardell, PT GP 3    22031705167 HC GAIT TRAINING EA 15 MIN 12/20/2017 Moriah Ascencion, PT GP 1    30381942530 HC PT THERAPEUTIC ACT EA 15 MIN 12/20/2017 Moriahtonya Leonardell, PT GP 1    23603582537 HC PT THER SUPP EA 15 MIN 12/20/2017 Moriahtonya Leonardell, PT GP 2          PT G-Codes  Outcome Measure Options: AM-PAC 6 Clicks Basic Mobility (PT)  Score: 17  Functional Limitation: Mobility: Walking and moving around  Mobility: Walking and Moving Around Current Status (): At least 40 percent but less than 60 percent impaired, limited or restricted  Mobility: Walking and Moving Around Goal Status (): At least 20 percent but less than 40 percent impaired, limited or restricted    Moriah Vegas, PT  12/20/2017

## 2017-12-20 NOTE — THERAPY EVALUATION
Acute Care - Occupational Therapy Initial Evaluation   [place]     Patient Name: Domonique Cummings  : 1945  MRN: [id number]  Today's Date: 2017  Onset of Illness/Injury or Date of Surgery Date: 17 (swing bed admit date)     Referring Physician: Zoran    Admit Date: 2017     No diagnosis found.  Patient Active Problem List   Diagnosis   • Pneumonia of left lower lobe due to infectious organism   • MRSA pneumonia     Past Medical History:   Diagnosis Date   • Arthritis    • Asthma    • Degenerative disc disease, lumbar    • Diabetes mellitus    • Fibromyalgia    • GERD (gastroesophageal reflux disease)    • Hyperlipidemia    • Hypertension    • Osteoporosis      Past Surgical History:   Procedure Laterality Date   • APPENDECTOMY     • ESOPHAGEAL DILATATION     • TOTAL SHOULDER REPLACEMENT Bilateral    • TUBAL ABDOMINAL LIGATION            OT ASSESSMENT FLOWSHEET (last 72 hours)      OT Evaluation       17 1422 17 0825 17 2000 17 1637 17 1547    Rehab Evaluation    Document Type therapy note (daily note)  -CT   evaluation  -KR evaluation;therapy note (daily note)   pt evaluated for swing inpatient today  -CT    Subjective Information agree to therapy  -CT   agree to therapy  -KR agree to therapy  -CT    Patient Effort, Rehab Treatment good  -CT   good  -KR good  -CT    Symptoms Noted Comment     Pt admited to swing bed inpatient today and evaluated by PT. Pt tolerated evaluation and treatment session well with rest breaks provided as needed.   -CT    General Information    Patient Profile Review     yes  -CT    Onset of Illness/Injury or Date of Surgery Date     17   swing bed admit date  -CT    Referring Physician     Zoran  -CT    Precautions/Limitations fall precautions;other (see comments)   isolation precautions  -CT    fall precautions;other (see comments)   isolation precautions  -CT    Prior Level of Function     independent:;all household  mobility;community mobility   with use of AD  -CT    Equipment Currently Used at Home     walker, rolling;commode  -CT    Plans/Goals Discussed With     patient;agreed upon  -CT    Risks Reviewed     patient:;LOB;nausea/vomiting;dizziness;increased discomfort;change in vital signs;increased drainage;lines disloged  -CT    Benefits Reviewed     patient:;improve function;increase independence;increase strength;increase balance;decrease pain;decrease risk of DVT;improve skin integrity;increase knowledge  -CT    Barriers to Rehab     medically complex;physical barrier  -CT    Living Environment    Lives With    alone  -KR alone  -CT    Living Arrangements    apartment  -KR apartment  -CT    Home Accessibility     no concerns  -CT    Clinical Impression    Criteria for Skilled Therapeutic Interventions Met    yes  -KR     Rehab Potential    good, to achieve stated therapy goals  -KR     Therapy Frequency    3-5 times/wk  -KR     Vision Assessment/Intervention    Visual Impairment    WFL  -KR     Cognitive Assessment/Intervention    Current Cognitive/Communication Assessment functional  -CT   functional  -KR functional  -CT    Orientation Status oriented to;oriented x 4  -CT   oriented x 4  -KR oriented to;oriented x 4  -CT    Follows Commands/Answers Questions    100% of the time;able to follow multi-step instructions  -KR able to follow multi-step instructions;100% of the time  -CT    Personal Safety Interventions fall prevention program maintained;gait belt;muscle strengthening facilitated;nonskid shoes/slippers when out of bed  -CT    fall prevention program maintained;gait belt;muscle strengthening facilitated;nonskid shoes/slippers when out of bed  -CT    ROM (Range of Motion)    General ROM Detail    BUE WFL  -KR BLE grossly WFL  -CT    MMT (Manual Muscle Testing)    General MMT Assessment Detail    BUE 3/5  -KR BLE grossly 4/5  -CT    Muscle Tone Assessment    Muscle Tone Assessment   Bilateral Upper  Extremities;Bilateral Lower Extremities  -NATHEN      Bilateral Upper Extremities Muscle Tone Assessment  mildly decreased tone  -JS mildly decreased tone  -NATHEN      Bilateral Lower Extremities Muscle Tone Assessment  mildly decreased tone  -JS mildly decreased tone  -NATHEN      Bed Mobility, Assessment/Treatment    Bed Mobility, Assistive Device bed rails  -CT    bed rails  -CT    Bed Mobility, Roll Left, [place] minimum assist (75% patient effort)  -CT    minimum assist (75% patient effort)  -CT    Bed Mobility, Roll Right, [place] minimum assist (75% patient effort)  -CT    minimum assist (75% patient effort)  -CT    Bed Mobility, Scoot/Bridge, [place] minimum assist (75% patient effort)  -CT    minimum assist (75% patient effort)  -CT    Bed Mob, Supine to Sit, [place] minimum assist (75% patient effort)  -CT    minimum assist (75% patient effort)  -CT    Bed Mobility, Impairments strength decreased  -CT    strength decreased  -CT    Transfer Assessment/Treatment    Transfers, Bed-Chair [place]     minimum assist (75% patient effort);nonverbal cues required (demo/gesture);verbal cues required  -CT    Transfers, Chair-Bed [place]     minimum assist (75% patient effort);verbal cues required;nonverbal cues required (demo/gesture)  -CT    Transfers, Bed-Chair-Bed, Assist Device     rolling walker  -CT    Transfers, Sit-Stand [place] minimum assist (75% patient effort)  -CT    minimum assist (75% patient effort)  -CT    Transfers, Stand-Sit [place] minimum assist (75% patient effort)  -CT    minimum assist (75% patient effort)  -CT    Transfers, Sit-Stand-Sit, Assist Device rolling walker  -CT    rolling walker  -CT    Toilet Transfer, [place] minimum assist (75% patient effort)  -CT    minimum assist (75% patient effort)  -CT    Toilet Transfer, Assistive Device elevated toilet seat;rolling walker;other (see comments)   grab bars  -CT    elevated toilet seat;rolling walker;other  (see comments)   grab bars  -CT    Upper Body Bathing Assessment/Training    UB Bathing Assess/Train, [place] Level    minimum assist (75% patient effort)  -KR     Lower Body Bathing Assessment/Training    LB Bathing Assess/Train, [place] Level    minimum assist (75% patient effort)  -KR     Upper Body Dressing Assessment/Training    UB Dressing Assess/Train, [place]    minimum assist (75% patient effort)  -KR     Lower Body Dressing Assessment/Training    LB Dressing Assess/Train, [place]    minimum assist (75% patient effort)  -KR     Toileting Assessment/Training    Toileting Assess/Train, [place] Level    minimum assist (75% patient effort)  -KR     Grooming Assessment/Training    Grooming Assess/Train, [place] Level    minimum assist (75% patient effort)  -KR     Therapy Exercises    Bilateral Lower Extremities AROM:;15 reps;sitting  -CT    AROM:;15 reps;sitting  -CT    General Therapy Interventions    Planned Therapy Interventions    strengthening;activity intolerance  -KR     Positioning and Restraints    Pre-Treatment Position in bed  -CT    in bed  -CT    Post Treatment Position chair  -CT    chair  -CT    In Chair sitting;call light within reach;encouraged to call for assist  -CT    sitting;call light within reach;encouraged to call for assist  -CT      12/19/17 1520 12/19/17 1330 12/19/17 1327 12/19/17 1110 12/18/17 1633    Rehab Evaluation    Document Type     therapy note (daily note)  -CT    Subjective Information     agree to therapy;complains of;weakness  -CT    Patient Effort, Rehab Treatment     good  -CT    General Information    Precautions/Limitations     fall precautions;other (see comments)   isolation precautions  -CT    Equipment Currently Used at Home commode;walker, rolling   Pt has a rolling walker and bedside commode.   -WP  walker, rolling;commode  -TC      Living Environment    Lives With alone   Pt lives alone at [facility].   -WP alone  -TC       Living  Arrangements  apartment  -TC       Home Accessibility  no concerns  -TC       Stair Railings at Home  none  -TC       Type of Financial/Environmental Concern  none  -TC       Transportation Available family or friend will provide  -WP car;family or friend will provide  -TC       Functional Level Prior    Ambulation  0-->independent  -TC 0-->independent  -TC      Transferring  0-->independent  -TC 0-->independent  -TC      Toileting  0-->independent  -TC 0-->independent  -TC      Bathing  0-->independent  -TC 0-->independent  -TC      Dressing  0-->independent  -TC 0-->independent  -TC      Eating  0-->independent  -TC 0-->independent  -TC      Communication  0-->understands/communicates without difficulty  -TC 0-->understands/communicates without difficulty  -TC      Swallowing  0-->swallows foods/liquids without difficulty  -TC 0-->swallows foods/liquids without difficulty  -TC      Prior Functional Level Comment  0  -TC       Cognitive Assessment/Intervention    Current Cognitive/Communication Assessment     functional  -CT    Orientation Status     oriented to;oriented x 4  -CT    Personal Safety Interventions     fall prevention program maintained;gait belt;muscle strengthening facilitated;nonskid shoes/slippers when out of bed  -CT    Muscle Tone Assessment    Muscle Tone Assessment    Bilateral Upper Extremities;Bilateral Lower Extremities  -TC     Bilateral Upper Extremities Muscle Tone Assessment    mildly decreased tone  -TC     Bilateral Lower Extremities Muscle Tone Assessment    mildly decreased tone  -TC     Bed Mobility, Assessment/Treatment    Bed Mobility, Assistive Device     bed rails  -CT    Bed Mobility, Scoot/Bridge, [place]     minimum assist (75% patient effort);verbal cues required;nonverbal cues required (demo/gesture)  -CT    Bed Mob, Supine to Sit, [place]     minimum assist (75% patient effort);moderate assist (50% patient effort);verbal cues required;nonverbal cues required  (demo/gesture)  -CT    Bed Mob, Sit to Supine, [place]     minimum assist (75% patient effort);moderate assist (50% patient effort);verbal cues required;nonverbal cues required (demo/gesture)  -CT    Bed Mobility, Impairments     strength decreased  -CT    Transfer Assessment/Treatment    Transfers, Sit-Stand [place]     minimum assist (75% patient effort)  -CT    Transfers, Stand-Sit [place]     minimum assist (75% patient effort)  -CT    Transfers, Sit-Stand-Sit, Assist Device     rolling walker  -CT    Toilet Transfer, [place]     minimum assist (75% patient effort)  -CT    Toilet Transfer, Assistive Device     elevated toilet seat;rolling walker;other (see comments)   grab bar  -CT    Therapy Exercises    Bilateral Lower Extremities     AROM:;15 reps;sitting  -CT    Positioning and Restraints    Pre-Treatment Position     in bed  -CT    Post Treatment Position     bed  -CT    In Bed     supine;call light within reach;encouraged to call for assist  -CT      User Key  (r) = Recorded By, (t) = Taken By, (c) = Cosigned By    Initials Name Effective Dates    TC Danna Serrano, ZOË 06/16/16 -     JS Anabella Gallegos RN 06/16/16 -     WP Nancy Paula 02/10/16 -     KR Erasmo Goff OT 03/14/16 -     CT Moriah Vegas, PT 03/14/16 -     NATHEN Lisa Roman RN 12/15/16 -                  OT Recommendation and Plan  Planned Therapy Interventions: strengthening, activity intolerance  Therapy Frequency: 3-5 times/wk             OT Goals       12/19/17 1643 12/12/17 1607       Strength OT LTG    Strength Goal OT LTG, Date Established 12/19/17  -KR 12/12/17  -KR     Strength Goal OT LTG, Time to Achieve by discharge  -KR by discharge  -KR     Strength Goal OT LTG, Measure to Achieve BUE increase x 1 to enhance self care/mobility skills  -KR BUE increase x 1 to enhance self care/fxl task performance  -KR     Occupational Therapy OT LTG    Occupational Therapy OT LTG, Date Established  12/12/17   -KR     Occupational Therapy OT LTG, Time to Achieve  by discharge  -KR     Occupational Therapy OT LTG, Activity Type  provide finger orthosis to provide fxl positioning L hand. Pt. ind. with applications.  -KR     ADL OT LTG    ADL OT LTG, Date Established 12/19/17  -KR      ADL OT LTG, Time to Achieve by discharge  -KR      ADL OT LTG, Activity Type ADL skills  -KR      ADL OT LTG, [place] Level standby assist  -KR        User Key  (r) = Recorded By, (t) = Taken By, (c) = Cosigned By    Initials Name Provider Type    KOSTAS Goff OT Occupational Therapist                Outcome Measures       12/20/17 1600 12/20/17 1400 12/19/17 1645    How much help from another person do you currently need...    Turning from your back to your side while in flat bed without using bedrails?  3  -CT     Moving from lying on back to sitting on the side of a flat bed without bedrails?  3  -CT     Moving to and from a bed to a chair (including a wheelchair)?  3  -CT     Standing up from a chair using your arms (e.g., wheelchair, bedside chair)?  3  -CT     Climbing 3-5 steps with a railing?  2  -CT     To walk in hospital room?  3  -CT     AM-PAC 6 Clicks Score  17  -CT     How much help from another is currently needed...    Putting on and taking off regular lower body clothing?   3  -KR    Bathing (including washing, rinsing, and drying)   3  -KR    Toileting (which includes using toilet bed pan or urinal)   3  -KR    Putting on and taking off regular upper body clothing   3  -KR    Taking care of personal grooming (such as brushing teeth)   3  -KR    Eating meals   3  -KR    Score   18  -KR    Functional Assessment    Outcome Measure Options AM-PAC 6 Clicks Daily Activity (OT)  -KR AM-PAC 6 Clicks Basic Mobility (PT)  -CT       12/19/17 1600 12/18/17 1600       How much help from another person do you currently need...    Turning from your back to your side while in flat bed without using bedrails? 3  -CT 3  -CT      Moving from lying on back to sitting on the side of a flat bed without bedrails? 3  -CT 3  -CT     Moving to and from a bed to a chair (including a wheelchair)? 3  -CT 3  -CT     Standing up from a chair using your arms (e.g., wheelchair, bedside chair)? 3  -CT 3  -CT     Climbing 3-5 steps with a railing? 2  -CT 2  -CT     To walk in hospital room? 3  -CT 3  -CT     AM-PAC 6 Clicks Score 17  -CT 17  -CT     Functional Assessment    Outcome Measure Options AM-PAC 6 Clicks Basic Mobility (PT)  -CT AM-PAC 6 Clicks Basic Mobility (PT)  -CT       User Key  (r) = Recorded By, (t) = Taken By, (c) = Cosigned By    Initials Name Provider Type    KR Erasmo Goff OT Occupational Therapist    CT Moriah Vegas PT Physical Therapist          Time Calculation:        Therapy Charges for Today     Code Description Service Date Service Provider Modifiers Qty    61979588728  OT SELFCARE CURRENT 12/19/2017 GUILLERMO Pedraza, CJ 1    [id number]  OT SELFCARE PROJECTED 12/19/2017 Erasmo Goff OT GO, CI 1    [id number]  OT EVAL HIGH COMPLEXITY 2 12/19/2017 Erasmo Goff OT GO 1          OT G-codes  Functional Limitation: Self care  Self Care Current Status (): At least 20 percent but less than 40 percent impaired, limited or restricted  Self Care Goal Status (): At least 1 percent but less than 20 percent impaired, limited or restricted    Erasmo Goff OT  12/20/2017

## 2017-12-20 NOTE — PROGRESS NOTES
Swing bed status starting 12/19/2017     LOS: 1 day       Chief Complaint :  Shortness of breath   Subjective     Interval History:     Patient Complaints:  Domonique Cummings  remains alert and talkative.  She continues to describe a very slow gradual improvement in her shortness of breath, cough and congestion.  She continues with diffuse weakness and difficulty performing her activities of daily living.  She denies any associated chest pain or fever.  She notes that her symptoms are better with nebulizer treatments and antibiotics.  She has no complications from current treatment.  Nurses describe an uneventful night.        Review of Systems-unchanged from recent history and physical  Objective     Vital Signs  Temp:  [97.8 °F (36.6 °C)-98.2 °F (36.8 °C)] 97.9 °F (36.6 °C)  Heart Rate:  [57-77] 61  Resp:  [18-20] 18  BP: (111-138)/(50-68) 111/50    Physical Exam    Constitutional: She is oriented to person, place, and time.   Obese white female who is alert and talkative in no obvious distress at rest.   HENT:   Head: Normocephalic and atraumatic.   Right Ear: External ear normal.   Left Ear: External ear normal.   Nose: Nose normal.   Mouth/Throat: Oropharynx is clear and moist. No oropharyngeal exudate.   Eyes: Conjunctivae and EOM are normal. Pupils are equal, round, and reactive to light. Right eye exhibits no discharge. Left eye exhibits no discharge. No scleral icterus.   Neck: Normal range of motion. Neck supple. No JVD present. No tracheal deviation present. No thyromegaly present.   Cardiovascular: Normal rate, regular rhythm, normal heart sounds and intact distal pulses.  Exam reveals no gallop and no friction rub.    No murmur heard.  Pulmonary/Chest: Effort normal. No stridor.  Trace expiratory wheezing   Scattered rhonchi bilaterally, diminished breath sounds at lung bases.   Abdominal: Soft. Bowel sounds are normal. She exhibits no distension and no mass. There is no tenderness. There is no guarding.  No hernia.   Genitourinary:   Genitourinary Comments: No Freitas catheter   Neurological: She is alert and oriented to person, place, and time. She displays normal reflexes. No cranial nerve deficit. Coordination normal.   Skin: Skin is warm and dry. No rash noted. No erythema. No pallor.   Psychiatric: She has a normal mood and affect. Her behavior is normal. Judgment and thought content normal.   Nursing note and vitals reviewed.      Results Review:     I reviewed the patient's new clinical results  : See Below  MEDICATIONS:    amLODIPine 5 mg Oral Q24H   aspirin 81 mg Oral Daily   doxycycline 100 mg Oral Q12H   DULoxetine 60 mg Oral Daily   enoxaparin 40 mg Subcutaneous Daily   gabapentin 400 mg Oral Q12H   ipratropium-albuterol 3 mL Nebulization Q6H - RT   losartan 100 mg Oral Q24H   metoprolol succinate XL 25 mg Oral Q24H   oxybutynin XL 10 mg Oral Daily   pantoprazole 40 mg Oral Q AM   polyethylene glycol 17 g Oral Daily   polyvinyl alcohol-povidone 1 drop Both Eyes Q12H   Risaquad-2 1 capsule Oral Daily   sodium chloride 10 mL Intracatheter Q12H   sodium chloride 10 mL Intracatheter Q12H   topiramate 100 mg Oral Q12H   [START ON 1/2/2018] tuberculin 5 Units Intradermal Once       LABS:        Results from last 7 days  Lab Units 12/19/17  0101 12/17/17  0112 12/15/17  0104   CRP mg/dL 1.72* 1.85* 3.73*   WBC 10*3/mm3 9.46 10.17 9.05   HEMOGLOBIN g/dL 11.0* 11.3* 10.5*   HEMATOCRIT % 34.6* 35.7* 33.9*   MCV fL 97.2* 97.3* 99.1*   MCHC g/dL 31.8* 31.7* 31.0*   PLATELETS 10*3/mm3 258 233 194           Results from last 7 days  Lab Units 12/19/17  0101 12/17/17  0112 12/15/17  0104   SODIUM mmol/L 139 140 140   POTASSIUM mmol/L 3.8 3.9 3.8   MAGNESIUM mg/dL 2.2  --   --    CHLORIDE mmol/L 108 109 111   CO2 mmol/L 23.1* 23.5* 22.2*   BUN mg/dL 19 20 13   CREATININE mg/dL 0.99 0.92 0.76   EGFR IF NONAFRICN AM mL/min/1.73 55* 60* 75   CALCIUM mg/dL 9.5 8.9 9.4   GLUCOSE mg/dL 97 115* 105   ALBUMIN g/dL 3.50 3.50 3.60    BILIRUBIN mg/dL 0.1* 0.1* 0.2   ALK PHOS U/L 78 80 77   AST (SGOT) U/L 21 30 31*   ALT (SGPT) U/L 28 34 24   Estimated Creatinine Clearance: 52.2 mL/min (by C-G formula based on Cr of 0.99).  No results found for: AMMONIA      Blood Culture   Date Value Ref Range Status   12/11/2017 No growth at less than 24 hours  Preliminary   12/11/2017 No growth at less than 24 hours  Preliminary                  Assessment/Plan    1) MRSA pneumonia of left lower lobe   - C-reactive protein and white blood cell count improving.  Symptoms also improving,  mycoplasma pneumonia and legionella studies negative.  Flu swab negative.  Blood cultures with no growth.  C-reactive protein continues to improve.  Sputum positive for MRSA-infectious disease consulted-recommendations appreciated.  2) sepsis  3) metabolic encephalopathy-present at the time of admission  4) leukocytosis  5) anemia-multifactorial  6) DVT prophylaxis-subcutaneous Lovenox  7) disposition-swing bed consulted December 19.  Transfer if accepted   See orders entered.     Skinny Meehan MD  12/20/17  7:02 AM

## 2017-12-20 NOTE — DISCHARGE SUMMARY
Date of Admission:   12/11/2017 12:59 PM    Date of Discharge:    12/19/2017    Discharge Diagnosis:   Principal Problem:    MRSA pneumonia  Active Problems:  1) pneumonia of left lower lobe   - C-reactive protein and white blood cell count improving.  Symptoms also improving,  mycoplasma pneumonia and legionella studies negative.  Flu swab negative.  Blood cultures with no growth.  C-reactive protein continues to improve.  Sputum positive for MRSA-infectious disease consulted.  2) sepsis  3) metabolic encephalopathy-present at the time of admission  4) leukocytosis  5) anemia-multifactorial  6) DVT prophylaxis-subcutaneous Lovenox  7) disposition-swing bed consulted December 19.  Transfer if accepted      Presenting Problem/History of Present Illness  See History and Physical for Presenting Problems / Illnesses.       Hospital Course  Patient is a 72 y.o. female presented with pneumonia.  She says she started with Rocephin and Zithromax as well as nebulizers and oxygen.  Despite continued treatment patient continue with shortness of breath.  She has had very slow improvement.  Sputum cultures later returned positive for MRSA.  She has been started on MRSA coverage.  Infectious disease consulted.  Due to severe debility, weakness and inability to perform her activities of daily living she was transferred to swing bed status on December 19.  Her further disposition determined by the end of her swing bed status..      Procedures Performed         Consults:   Consults     Date and Time Order Name Status Description    12/18/2017 0715 Inpatient Consult to Infectious Diseases Completed           Condition on Discharge:    Poor    Vital Signs  Temp:  [97.8 °F (36.6 °C)-98.4 °F (36.9 °C)] 98.4 °F (36.9 °C)  Heart Rate:  [52-77] 52  Resp:  [18-20] 18  BP: (111-138)/(50-68) 117/57    Physical Exam:  Refer to physical examination documented in progress note of this date    Discharge Disposition  Swing Bed    Discharge  Medications   Domonique Cummings   Home Medication Instructions BRANDEE:[id number]    Printed on:12/20/17 3899   Medication Information                      acetaminophen-codeine (TYLENOL #3) 300-30 MG per tablet  Take 1 tablet by mouth 2 (Two) Times a Day As Needed.             ALPRAZolam (XANAX) 0.5 MG tablet  Take 0.5 mg by mouth 2 (Two) Times a Day As Needed.             amLODIPine (NORVASC) 5 MG tablet  Take 5 mg by mouth Daily.             aspirin 81 MG EC tablet  Take 81 mg by mouth Daily.             baclofen (LIORESAL) 10 MG tablet  Take 10 mg by mouth Every Night.             buPROPion XL (WELLBUTRIN XL) 300 MG 24 hr tablet  Take 300 mg by mouth Every Morning.             celecoxib (CELEBREX) 200 MG capsule  Take 200 mg by mouth 2 (Two) Times a Day.             cycloSPORINE (RESTASIS) 0.05 % ophthalmic emulsion  Administer 1 drop to both eyes 2 (Two) Times a Day.             DULoxetine (CYMBALTA) 60 MG capsule  Take 60 mg by mouth Daily.             gabapentin (NEURONTIN) 400 MG capsule  Take 400 mg by mouth 3 (Three) Times a Day.             losartan (COZAAR) 100 MG tablet  Take 100 mg by mouth Daily.             metoprolol succinate XL (TOPROL-XL) 25 MG 24 hr tablet  Take 25 mg by mouth Daily.             omeprazole (priLOSEC) 20 MG capsule  Take 20 mg by mouth 2 (Two) Times a Day Before Meals.             oxybutynin XL (DITROPAN-XL) 10 MG 24 hr tablet  Take 10 mg by mouth Daily.             polyethylene glycol (MIRALAX) packet  Take 17 g by mouth Daily.             raNITIdine (ZANTAC) 300 MG tablet  Take 300 mg by mouth Daily.             topiramate (TOPAMAX) 100 MG tablet  Take 100 mg by mouth 2 (Two) Times a Day.                 Discharge Diet:  diabetic    Activity at Discharge:  as tolerated    Follow-up Appointments  Follow-up Information     Follow up with Skinny Meehan MD .    Specialty:  Internal Medicine    Contact information:    [address] [facility] [place] [place] [address] [phone]           Follow up with Hiro Menard MD .    Specialty:  Gastroenterology    Contact information:    [address] [place] [place] [address]  [phone]             Skinny Meehan MD  12/20/17  8:44 AM    Time: Discharge 32 min

## 2017-12-20 NOTE — PLAN OF CARE
Problem: Inpatient Occupational Therapy  Goal: Strength Goal LTG- OT   12/19/17 1643   Strength OT LTG   Strength Goal OT LTG, Date Established 12/19/17   Strength Goal OT LTG, Time to Achieve by discharge   Strength Goal OT LTG, Measure to Achieve BUE increase x 1 to enhance self care/mobility skills     Goal: ADL Goal LTG- OT   12/19/17 1643   ADL OT LTG   ADL OT LTG, Date Established 12/19/17   ADL OT LTG, Time to Achieve by discharge   ADL OT LTG, Activity Type ADL skills   ADL OT LTG, [place] Level standby assist

## 2017-12-20 NOTE — PROGRESS NOTES
"  I have personally seen and examined the patient today and discussed overnight interval progress and pertinent issues with nursing staff.    Subjective:    Patient was transferred to swing bed on 12/19/2017.  Comfortable this morning with no complaints.  No fever or diarrhea reported.      History taken from: patient chart RN      Vital Signs    /57 (BP Location: Right arm, Patient Position: Lying)  Pulse 52  Temp 98.4 °F (36.9 °C) (Oral)   Resp 18  Ht 152.4 cm (60\")  Wt 95 kg (209 lb 6 oz)  SpO2 99%  BMI 40.89 kg/m2    Temp:  [97.9 °F (36.6 °C)-98.4 °F (36.9 °C)] 98.4 °F (36.9 °C)      Intake/Output Summary (Last 24 hours) at 12/20/17 1202  Last data filed at 12/20/17 0300   Gross per 24 hour   Intake             1220 ml   Output                0 ml   Net             1220 ml     Intake & Output (last 3 days)       12/17 0701 - 12/18 0700 12/18 0701 - 12/19 0700 12/19 0701 - 12/20 0700 12/20 0701 - 12/21 0700    P.O.   1220     Total Intake(mL/kg)   1220 (12.8)     Net     +1220              Unmeasured Urine Occurrence   8 x     Unmeasured Stool Occurrence   4 x           Physical Exam:       General Appearance:    Alert, cooperative, in no acute distress,On 2 L    Head:    Normocephalic, without obvious abnormality, atraumatic   Eyes:            Lids and lashes normal, conjunctivae and sclerae normal, no   icterus, no pallor, corneas clear, PERRLA   Ears:    Ears appear intact with no abnormalities noted   Throat:   No oral lesions, no thrush, oral mucosa moist   Neck:   No adenopathy, supple, trachea midline, no thyromegaly, no   carotid bruit, no JVD   Back:     No tenderness to percussion or palpation, range of motion   normal   Lungs:     Mild wheezing on end expiration bilaterally     Heart:    Regular rhythm and normal rate, normal S1 and S2, no            murmur, no gallop, no rub, no click   Chest Wall:    No abnormalities observed   Abdomen:     Normal bowel sounds, no masses, no " organomegaly, soft        non-tender, non-distended, no guarding, no rebound                tenderness   Rectal:     Deferred   Extremities:   Moves all extremities well, no edema, no cyanosis, no             redness   Pulses:   Pulses palpable and equal bilaterally   Skin:   No bleeding, bruising or rash,Right upper extremity powerglide    Lymph nodes:   No palpable adenopathy   Neurologic:   Awake, alert and oriented x 3. Following commands.         Results:      Results from last 7 days  Lab Units 12/19/17  0101 12/17/17  0112 12/15/17  0104   WBC 10*3/mm3 9.46 10.17 9.05     Lab Results   Component Value Date    NEUTROABS 5.12 12/19/2017         Results from last 7 days  Lab Units 12/19/17  0101   CREATININE mg/dL 0.99         Results from last 7 days  Lab Units 12/19/17  0101 12/17/17  0112 12/15/17  0104   CRP mg/dL 1.72* 1.85* 3.73*       Results Review:    I have personally reviewed laboratory data, culture results, radiology studies and antimicrobial therapy.    Hospital Medications (active)       Dose Frequency Start End    acetaminophen-codeine (TYLENOL #3) 300-30 MG per tablet 1 tablet 1 tablet Every 4 Hours PRN 12/19/2017 12/21/2017    Sig - Route: Take 1 tablet by mouth Every 4 (Four) Hours As Needed for Moderate Pain . - Oral    ALPRAZolam (XANAX) tablet 0.5 mg 0.5 mg 2 Times Daily PRN 12/19/2017 12/21/2017    Sig - Route: Take 1 tablet by mouth 2 (Two) Times a Day As Needed for Anxiety. - Oral    amLODIPine (NORVASC) tablet 5 mg 5 mg Every 24 Hours Scheduled 12/20/2017     Sig - Route: Take 1 tablet by mouth Daily. - Oral    aspirin EC tablet 81 mg 81 mg Daily 12/20/2017     Sig - Route: Take 1 tablet by mouth Daily. - Oral    celecoxib (CeleBREX) capsule 200 mg 200 mg 2 Times Daily PRN 12/19/2017     Sig - Route: Take 1 capsule by mouth 2 (Two) Times a Day As Needed for Mild Pain . - Oral    doxycycline (MONODOX) capsule 100 mg 100 mg Every 12 Hours 12/19/2017 12/25/2017    Sig - Route: Take 1  capsule by mouth Every 12 (Twelve) Hours. - Oral    DULoxetine (CYMBALTA) DR capsule 60 mg 60 mg Daily 12/20/2017     Sig - Route: Take 1 capsule by mouth Daily. - Oral    enoxaparin (LOVENOX) syringe 40 mg 40 mg Daily 12/20/2017     Sig - Route: Inject 0.4 mL under the skin Daily. - Subcutaneous    gabapentin (NEURONTIN) capsule 400 mg 400 mg Every 12 Hours Scheduled 12/19/2017     Sig - Route: Take 1 capsule by mouth Every 12 (Twelve) Hours. - Oral    ipratropium-albuterol (DUO-NEB) nebulizer solution 3 mL 3 mL Every 6 Hours - RT 12/19/2017     Sig - Route: Take 3 mL by nebulization Every 6 (Six) Hours. - Nebulization    losartan (COZAAR) tablet 100 mg 100 mg Every 24 Hours Scheduled 12/20/2017     Sig - Route: Take 2 tablets by mouth Daily. - Oral    metoprolol succinate XL (TOPROL-XL) 24 hr tablet 25 mg 25 mg Every 24 Hours Scheduled 12/20/2017     Sig - Route: Take 1 tablet by mouth Daily. - Oral    oxybutynin XL (DITROPAN-XL) 24 hr tablet 10 mg 10 mg Daily 12/20/2017     Sig - Route: Take 2 tablets by mouth Daily. - Oral    pantoprazole (PROTONIX) EC tablet 40 mg 40 mg Every Early Morning 12/20/2017     Sig - Route: Take 1 tablet by mouth Every Morning. - Oral    polyethylene glycol (MIRALAX) powder 17 g 17 g Daily 12/20/2017     Sig - Route: Take 17 g by mouth Daily. - Oral    polyvinyl alcohol-povidone (HYPOTEARS) 1.4-0.6 % ophthalmic solution 1 drop 1 drop Every 12 Hours Scheduled 12/19/2017     Sig - Route: Administer 1 drop to both eyes Every 12 (Twelve) Hours. - Both Eyes    Risaquad-2 capsule 1 capsule 1 capsule Daily 12/20/2017 12/26/2017    Sig - Route: Take 1 capsule by mouth Daily. - Oral    sodium chloride 0.9 % flush 10 mL 10 mL Every 12 Hours Scheduled 12/19/2017     Sig - Route: 10 mL by Intracatheter route Every 12 (Twelve) Hours. - Intracatheter    sodium chloride 0.9 % flush 10 mL 10 mL As Needed 12/19/2017     Sig - Route: 10 mL by Intracatheter route As Needed for Line Care (After  Medication Administration). - Intracatheter    sodium chloride 0.9 % flush 10 mL 10 mL Every 12 Hours Scheduled 12/19/2017     Sig - Route: 10 mL by Intracatheter route Every 12 (Twelve) Hours. - Intracatheter    sodium chloride 0.9 % flush 10 mL 10 mL As Needed 12/19/2017     Sig - Route: 10 mL by Intracatheter route As Needed for Line Care (After Medication Administration or Blood Draw). - Intracatheter    sodium chloride 0.9 % flush 10 mL 10 mL As Needed 12/19/2017     Sig - Route: Infuse 10 mL into a venous catheter As Needed for Line Care. - Intravenous    topiramate (TOPAMAX) tablet 100 mg 100 mg Every 12 Hours Scheduled 12/19/2017     Sig - Route: Take 1 tablet by mouth Every 12 (Twelve) Hours. - Oral    tuberculin injection 5 Units 5 Units Once 12/19/2017     Sig - Route: Inject 0.1 mL into the skin 1 (One) Time. - Intradermal    tuberculin injection 5 Units 5 Units Once 1/2/2018     Sig - Route: Inject 0.1 mL into the skin 1 (One) Time. - Intradermal    acetaminophen-codeine (TYLENOL #3) 300-30 MG per tablet 1 tablet (Discontinued) 1 tablet Every 4 Hours PRN 12/11/2017 12/19/2017    Sig - Route: Take 1 tablet by mouth Every 4 (Four) Hours As Needed for Moderate Pain . - Oral    Reason for Discontinue: Patient Discharge    ALPRAZolam (XANAX) tablet 0.5 mg (Discontinued) 0.5 mg 2 Times Daily PRN 12/11/2017 12/19/2017    Sig - Route: Take 1 tablet by mouth 2 (Two) Times a Day As Needed for Anxiety. - Oral    Reason for Discontinue: Patient Discharge    amLODIPine (NORVASC) tablet 5 mg (Discontinued) 5 mg Every 24 Hours Scheduled 12/11/2017 12/19/2017    Sig - Route: Take 1 tablet by mouth Daily. - Oral    Reason for Discontinue: Patient Discharge    aspirin EC tablet 81 mg (Discontinued) 81 mg Daily 12/12/2017 12/19/2017    Sig - Route: Take 1 tablet by mouth Daily. - Oral    Reason for Discontinue: Patient Discharge    celecoxib (CeleBREX) capsule 200 mg (Discontinued) 200 mg 2 Times Daily PRN 12/11/2017  12/19/2017    Sig - Route: Take 1 capsule by mouth 2 (Two) Times a Day As Needed for Mild Pain . - Oral    Reason for Discontinue: Patient Discharge    doxycycline (MONODOX) capsule 100 mg (Discontinued) 100 mg Every 12 Hours 12/15/2017 12/19/2017    Sig - Route: Take 1 capsule by mouth Every 12 (Twelve) Hours. - Oral    Reason for Discontinue: Patient Discharge    DULoxetine (CYMBALTA) DR capsule 60 mg (Discontinued) 60 mg Daily 12/11/2017 12/19/2017    Sig - Route: Take 1 capsule by mouth Daily. - Oral    Reason for Discontinue: Patient Discharge    enoxaparin (LOVENOX) syringe 40 mg (Discontinued) 40 mg Daily 12/11/2017 12/19/2017    Sig - Route: Inject 0.4 mL under the skin Daily. - Subcutaneous    Reason for Discontinue: Patient Discharge    gabapentin (NEURONTIN) capsule 400 mg (Discontinued) 400 mg 3 Times Daily 12/11/2017 12/19/2017    Sig - Route: Take 1 capsule by mouth 3 (Three) Times a Day. - Oral    Reason for Discontinue: Patient Discharge    ipratropium-albuterol (DUO-NEB) nebulizer solution 3 mL (Discontinued) 3 mL Every 6 Hours - RT 12/11/2017 12/19/2017    Sig - Route: Take 3 mL by nebulization Every 6 (Six) Hours. - Nebulization    Reason for Discontinue: Patient Discharge    losartan (COZAAR) tablet 100 mg (Discontinued) 100 mg Every 24 Hours Scheduled 12/11/2017 12/19/2017    Sig - Route: Take 2 tablets by mouth Daily. - Oral    Reason for Discontinue: Patient Discharge    metoprolol succinate XL (TOPROL-XL) 24 hr tablet 25 mg (Discontinued) 25 mg Every 24 Hours Scheduled 12/11/2017 12/19/2017    Sig - Route: Take 1 tablet by mouth Daily. - Oral    Reason for Discontinue: Patient Discharge    oxybutynin XL (DITROPAN-XL) 24 hr tablet 10 mg (Discontinued) 10 mg Daily 12/11/2017 12/19/2017    Sig - Route: Take 2 tablets by mouth Daily. - Oral    Reason for Discontinue: Patient Discharge    pantoprazole (PROTONIX) EC tablet 40 mg (Discontinued) 40 mg Every Early Morning 12/12/2017 12/19/2017    Sig  - Route: Take 1 tablet by mouth Every Morning. - Oral    Reason for Discontinue: Patient Discharge    Pharmacy to dose vancomycin (Discontinued)  Continuous PRN 12/17/2017 12/18/2017    Sig - Route: Continuous As Needed for Consult. - Does not apply    Reason for Discontinue: Therapy completed    polyethylene glycol (MIRALAX) powder 17 g (Discontinued) 17 g Daily 12/12/2017 12/19/2017    Sig - Route: Take 17 g by mouth Daily. - Oral    Reason for Discontinue: Patient Discharge    polyvinyl alcohol-povidone (HYPOTEARS) 1.4-0.6 % ophthalmic solution 1 drop (Discontinued) 1 drop Every 12 Hours Scheduled 12/11/2017 12/19/2017    Sig - Route: Administer 1 drop to both eyes Every 12 (Twelve) Hours. - Both Eyes    Reason for Discontinue: Patient Discharge    Risaquad-2 capsule 1 capsule (Discontinued) 1 capsule Daily 12/12/2017 12/19/2017    Sig - Route: Take 1 capsule by mouth Daily. - Oral    Reason for Discontinue: Patient Discharge    sodium chloride 0.9 % flush 10 mL (Discontinued) 10 mL As Needed 12/11/2017 12/19/2017    Sig - Route: Infuse 10 mL into a venous catheter As Needed for Line Care. - Intravenous    Reason for Discontinue: Patient Discharge    sodium chloride 0.9 % flush 10 mL (Discontinued) 10 mL Every 12 Hours Scheduled 12/12/2017 12/19/2017    Sig - Route: 10 mL by Intracatheter route Every 12 (Twelve) Hours. - Intracatheter    Reason for Discontinue: Patient Discharge    Cosign for Ordering: Accepted by Skinny Meehan MD on 12/13/2017  7:26 AM    sodium chloride 0.9 % flush 10 mL (Discontinued) 10 mL As Needed 12/12/2017 12/19/2017    Sig - Route: 10 mL by Intracatheter route As Needed for Line Care (After Medication Administration or Blood Draw). - Intracatheter    Reason for Discontinue: Patient Discharge    Cosign for Ordering: Accepted by Skinny Meehan MD on 12/13/2017  7:26 AM    sodium chloride 0.9 % flush 10 mL (Discontinued) 10 mL Every 12 Hours Scheduled 12/12/2017 12/19/2017    Sig -  Route: 10 mL by Intracatheter route Every 12 (Twelve) Hours. - Intracatheter    Reason for Discontinue: Patient Discharge    Cosign for Ordering: Accepted by Skinny Meehan MD on 12/13/2017  7:26 AM    sodium chloride 0.9 % flush 10 mL (Discontinued) 10 mL As Needed 12/12/2017 12/19/2017    Sig - Route: 10 mL by Intracatheter route As Needed for Line Care (After Medication Administration). - Intracatheter    Reason for Discontinue: Patient Discharge    Cosign for Ordering: Accepted by Skinny Meehan MD on 12/13/2017  7:26 AM    topiramate (TOPAMAX) tablet 100 mg (Discontinued) 100 mg Every 12 Hours Scheduled 12/11/2017 12/19/2017    Sig - Route: Take 1 tablet by mouth Every 12 (Twelve) Hours. - Oral    Reason for Discontinue: Patient Discharge            Cultures:    Respiratory Culture   Date Value Ref Range Status   12/13/2017 Light growth (2+) Staphylococcus aureus, MRSA (A)  Final     Comment:       Methicillin resistant Staphylococcus aureus, Patient may be an isolation risk.  This isolate does not demonstrate inducible clindamycin resistance in vitro.             Assessment/Plan     ASSESSMENT:       1.  MRSA pneumonia     PLAN:    Patient was transferred to [facility] bed on 12/19/2017.  Comfortable this morning with no complaints.  No fever or diarrhea reported.  CBC and CRP ordered for a.m.     Would recommend to continue doxycycline 100 mg twice a day as the patient is showing significant clinical improvement with almost normalization of her CRP level.    Patient's findings and recommendations were discussed with patient and nursing staff    Code Status: Prior    [place]ia Collins PA-C  12/20/17  12:02 PM

## 2017-12-20 NOTE — PROGRESS NOTES
Discharge Planning Assessment   Stu     Patient Name: Domonique Cummings  MRN: [id number]  Today's Date: 12/20/2017    Admit Date: 12/19/2017       Discharge Plan       12/20/17 1213    Case Management/Social Work Plan    Plan Pt was admitted to swing bed on 12-19-17. Pt lives at the [facility] alone and plans to return home at discharge. Pt does not utilize home health services. Pt has a rolling walker and bedside commode. SS to follow.               Nancy Paula

## 2017-12-20 NOTE — H&P
12/20/2017-swing bed H&P note    Chief complaint : Shortness of breath    Subjective     Patient is a 72 y.o. female recently admitted to [facility].  She has recently been in [facility] with pneumonia and now diagnosed with MRSA pneumonia.  She continues with diffuse weakness, shortness of breath and debility.  She is unable to perform right Tuesday living.  She has been changed to swing bed status on December 19.  See progress note for this to date for further details.     Review of Systems   Constitutional: Positive for activity change, appetite change, chills, fatigue and fever. Negative for unexpected weight change.   HENT: Positive for postnasal drip, rhinorrhea, sinus pain and sinus pressure. Negative for congestion, dental problem and mouth sores.    Eyes: Negative for pain, discharge, itching and visual disturbance.   Respiratory: Positive for cough and shortness of breath. Negative for apnea, choking, chest tightness and wheezing.    Cardiovascular: Negative for chest pain and leg swelling.   Gastrointestinal: Negative for abdominal distention, abdominal pain, constipation and diarrhea.   Endocrine: Negative for cold intolerance, heat intolerance, polydipsia, polyphagia and polyuria.   Genitourinary: Negative for difficulty urinating, dysuria, hematuria, urgency and vaginal pain.   Musculoskeletal: Positive for arthralgias, back pain and gait problem. Negative for myalgias and neck pain.   Skin: Negative for color change, pallor and rash.   Allergic/Immunologic: Negative for environmental allergies, food allergies and immunocompromised state.   Neurological: Positive for weakness. Negative for dizziness and seizures.   Hematological: Negative for adenopathy. Does not bruise/bleed easily.   Psychiatric/Behavioral: Negative for agitation, hallucinations and self-injury. The patient is not nervous/anxious and is not hyperactive.         Past Medical History  Past Medical History:    Diagnosis Date   • Arthritis    • Asthma    • Degenerative disc disease, lumbar    • Diabetes mellitus    • Fibromyalgia    • GERD (gastroesophageal reflux disease)    • Hyperlipidemia    • Hypertension    • Osteoporosis      Surgical History  Past Surgical History:   Procedure Laterality Date   • APPENDECTOMY     • ESOPHAGEAL DILATATION     • TOTAL SHOULDER REPLACEMENT Bilateral    • TUBAL ABDOMINAL LIGATION       Family History  Family History   Problem Relation Age of Onset   • Breast cancer Sister      Social History  Social History   Substance Use Topics   • Smoking status: Former Smoker   • Smokeless tobacco: None   • Alcohol use No     Home Medications  Prescriptions Prior to Admission   Medication Sig Dispense Refill Last Dose   • acetaminophen-codeine (TYLENOL #3) 300-30 MG per tablet Take 1 tablet by mouth 2 (Two) Times a Day As Needed.   Unknown at Unknown time   • ALPRAZolam (XANAX) 0.5 MG tablet Take 0.5 mg by mouth 2 (Two) Times a Day As Needed.   Unknown at Unknown time   • amLODIPine (NORVASC) 5 MG tablet Take 5 mg by mouth Daily.   Unknown at Unknown time   • aspirin 81 MG EC tablet Take 81 mg by mouth Daily.   Unknown at Unknown time   • baclofen (LIORESAL) 10 MG tablet Take 10 mg by mouth Every Night.   Unknown at Unknown time   • buPROPion XL (WELLBUTRIN XL) 300 MG 24 hr tablet Take 300 mg by mouth Every Morning.   Unknown at Unknown time   • celecoxib (CELEBREX) 200 MG capsule Take 200 mg by mouth 2 (Two) Times a Day.   Unknown at Unknown time   • cycloSPORINE (RESTASIS) 0.05 % ophthalmic emulsion Administer 1 drop to both eyes 2 (Two) Times a Day.   Unknown at Unknown time   • DULoxetine (CYMBALTA) 60 MG capsule Take 60 mg by mouth Daily.   Unknown at Unknown time   • gabapentin (NEURONTIN) 400 MG capsule Take 400 mg by mouth 3 (Three) Times a Day.   Unknown at Unknown time   • losartan (COZAAR) 100 MG tablet Take 100 mg by mouth Daily.   Unknown at Unknown time   • metoprolol succinate XL  (TOPROL-XL) 25 MG 24 hr tablet Take 25 mg by mouth Daily.   Unknown at Unknown time   • omeprazole (priLOSEC) 20 MG capsule Take 20 mg by mouth 2 (Two) Times a Day Before Meals.   Unknown at Unknown time   • oxybutynin XL (DITROPAN-XL) 10 MG 24 hr tablet Take 10 mg by mouth Daily.   Unknown at Unknown time   • polyethylene glycol (MIRALAX) packet Take 17 g by mouth Daily.   Unknown at Unknown time   • raNITIdine (ZANTAC) 300 MG tablet Take 300 mg by mouth Daily.   Unknown at Unknown time   • topiramate (TOPAMAX) 100 MG tablet Take 100 mg by mouth 2 (Two) Times a Day.   Unknown at Unknown time         Allergies:  Review of patient's allergies indicates no known allergies.    Objective     Vital Signs  Temp:  [97.8 °F (36.6 °C)-98.4 °F (36.9 °C)] 98.4 °F (36.9 °C)  Heart Rate:  [52-77] 52  Resp:  [18-20] 18  BP: (111-138)/(50-68) 117/57    Physical Exam:    Physical Exam   Constitutional: She is oriented to person, place, and time.   Obese white female who is alert and talkative in no obvious distress at rest.   HENT:   Head: Normocephalic and atraumatic.   Right Ear: External ear normal.   Left Ear: External ear normal.   Nose: Nose normal.   Mouth/Throat: Oropharynx is clear and moist. No oropharyngeal exudate.   Eyes: Conjunctivae and EOM are normal. Pupils are equal, round, and reactive to light. Right eye exhibits no discharge. Left eye exhibits no discharge. No scleral icterus.   Neck: Normal range of motion. Neck supple. No JVD present. No tracheal deviation present. No thyromegaly present.   Cardiovascular: Normal rate, regular rhythm, normal heart sounds and intact distal pulses.  Exam reveals no gallop and no friction rub.    No murmur heard.  Pulmonary/Chest: Effort normal. No stridor. She has wheezes.   Scattered rhonchi bilaterally, diminished breath sounds at lung bases.  Interrupted speech secondary to shortness of breath.  Accessory muscle use is present.  Respiratory distress with speech.   Abdominal:  Soft. Bowel sounds are normal. She exhibits no distension and no mass. There is no tenderness. There is no guarding. No hernia.   Genitourinary:   Genitourinary Comments: No Freitas catheter   Neurological: She is alert and oriented to person, place, and time. She displays normal reflexes. No cranial nerve deficit. Coordination normal.   Skin: Skin is warm and dry. No rash noted. No erythema. No pallor.   Psychiatric: She has a normal mood and affect. Her behavior is normal. Judgment and thought content normal.   Nursing note and vitals reviewed.      Results Review:    I reviewed the patient's clinical results from admission:  Imaging Results (last 72 hours)     Procedure Component Value Units Date/Time    CT Head Without Contrast [[id number]] Collected:  12/11/17 1407     Updated:  12/11/17 1411    Narrative:       CT HEAD WO CONTRAST-     CLINICAL INDICATION: ams          COMPARISON: 7/11/2017      TECHNIQUE: Axial images of the brain were obtained with out intravenous  contrast.  Reformatted images were created in the sagittal and coronal  planes.     DOSE:         Radiation dose reduction techniques were utilized per ALARA protocol.  Automated exposure control was initiated through either or [email or web address] or  [employer] software packages by  protocol.           FINDINGS:   Today's study shows no mass, hemorrhage, or midline shift.   The ventricles, cisterns, and sulci are unremarkable. There is no  hydrocephalus.   There is no evidence of acute ischemia.  I do not see epidural or subdural hematoma.  The gray-white differentiation is appropriate.   The bone window setting images show no destructive calvarial lesion or  acute calvarial fracture.   The posterior fossa is unremarkable.             Impression:       No acute intracranial pathology. Nothing is seen on this exam to  specifically account for the patient's symptoms.     This report was finalized on 12/11/2017 2:09 PM by Dr. Chuckie Reyez MD.       XR  Chest 1 View [[id number]] Collected:  12/11/17 1430     Updated:  12/11/17 1433    Narrative:       XR CHEST 1 VW-     CLINICAL INDICATION: Altered mental status          COMPARISON: 4/22/2016      TECHNIQUE: Single frontal view of the chest.     FINDINGS:     There is airspace disease in the left lung concerning for pneumonia  The cardiac silhouette is normal. The pulmonary vasculature is  unremarkable.  There is no evidence of an acute osseous abnormality.   There are no suspicious-appearing parenchymal soft tissue nodules.            Impression:       Appearance concerning for left basilar pneumonia         This report was finalized on 12/11/2017 2:31 PM by Dr. Chuckie Reyez MD.             LABS:    Lab Results   Component Value Date    GLUCOSE 97 12/19/2017    GLUCOSE 115 (H) 12/17/2017    GLUCOSE 105 12/15/2017    BUN 19 12/19/2017    CREATININE 0.99 12/19/2017    EGFRIFNONA 55 (L) 12/19/2017    EGFRIFAFRI  09/12/2016      Comment:      <15 Indicative of kidney failure.    BCR 19.2 12/19/2017    CO2 23.1 (L) 12/19/2017    CALCIUM 9.5 12/19/2017    ALBUMIN 3.50 12/19/2017    LABIL2 1.1 (L) 12/19/2017    AST 21 12/19/2017    ALT 28 12/19/2017    WBC 9.46 12/19/2017    WBC 10.17 12/17/2017    WBC 9.05 12/15/2017    HGB 11.0 (L) 12/19/2017    HCT 34.6 (L) 12/19/2017    MCV 97.2 (H) 12/19/2017     12/19/2017     12/19/2017     12/17/2017     12/15/2017    K 3.8 12/19/2017    K 3.9 12/17/2017    K 3.8 12/15/2017     12/19/2017    ANIONGAP 7.9 12/19/2017       Lab Results   Component Value Date    INR 0.98 12/11/2017    INR <0.90 04/26/2016    INR 0.93 06/24/2014    PROTIME 13.1 12/11/2017    PROTIME 10.1 04/26/2016    PROTIME 10.6 06/24/2014                 Assessment/Plan     1) pneumonia of left lower lobe-MRSA pneumonia   2) sepsis  3) metabolic encephalopathy-present at the time of admission  4) leukocytosis  5) anemia-multifactorial  6) DVT prophylaxis-subcutaneous Lovenox      I  discussed the patients findings and my recommendations with patient, family and nursing staff.     Skinny Meehan MD  12/20/17  8:49 AM    Time: 33 minutes of time with history and physical and coordination of care

## 2017-12-20 NOTE — PLAN OF CARE
Problem: Patient Care Overview (Adult)  Goal: Plan of Care Review  Outcome: Ongoing (interventions implemented as appropriate)    12/19/17 2341   Coping/Psychosocial Response Interventions   Plan Of Care Reviewed With patient   Patient Care Overview   Progress no change       Goal: Adult Individualization and Mutuality  Outcome: Ongoing (interventions implemented as appropriate)    Problem: Pneumonia (Adult)  Goal: Signs and Symptoms of Listed Potential Problems Will be Absent or Manageable (Pneumonia)  Outcome: Ongoing (interventions implemented as appropriate)    12/19/17 2341   Pneumonia   Problems Assessed (Pneumonia) all   Problems Present (Pneumonia) none         Problem: Fall Risk (Adult)  Goal: Identify Related Risk Factors and Signs and Symptoms  Outcome: Ongoing (interventions implemented as appropriate)    12/19/17 1333 12/19/17 2341   Fall Risk   Fall Risk: Related Risk Factors age-related changes;gait/mobility problems --    Fall Risk: Signs and Symptoms --  presence of risk factors       Goal: Absence of Falls  Outcome: Ongoing (interventions implemented as appropriate)    Problem: Infection, Risk/Actual (Adult)  Goal: Identify Related Risk Factors and Signs and Symptoms  Outcome: Ongoing (interventions implemented as appropriate)    12/19/17 1333   Infection, Risk/Actual   Infection, Risk/Actual: Related Risk Factors age extremes;prolonged hospitalization   Signs and Symptoms (Infection, Risk/Actual) weakness       Goal: Infection Prevention/Resolution  Outcome: Ongoing (interventions implemented as appropriate)    Problem: Activity Intolerance (Adult)  Goal: Identify Related Risk Factors and Signs and Symptoms  Outcome: Ongoing (interventions implemented as appropriate)    12/19/17 2341   Activity Intolerance   Activity Intolerance: Related Risk Factors generalized weakness   Signs and Symptoms (Activity Intolerance) dyspnea/shortness of breath       Goal: Activity Tolerance  Outcome: Ongoing  (interventions implemented as appropriate)  Goal: Effective Energy Conservation Techniques  Outcome: Ongoing (interventions implemented as appropriate)

## 2017-12-20 NOTE — PLAN OF CARE
Problem: Patient Care Overview (Adult)  Goal: Plan of Care Review  Outcome: Ongoing (interventions implemented as appropriate)    Goal: Adult Individualization and Mutuality  Outcome: Ongoing (interventions implemented as appropriate)      Problem: Pneumonia (Adult)  Goal: Signs and Symptoms of Listed Potential Problems Will be Absent or Manageable (Pneumonia)  Outcome: Ongoing (interventions implemented as appropriate)      Problem: Fall Risk (Adult)  Goal: Identify Related Risk Factors and Signs and Symptoms  Outcome: Ongoing (interventions implemented as appropriate)    Goal: Absence of Falls  Outcome: Ongoing (interventions implemented as appropriate)      Problem: Infection, Risk/Actual (Adult)  Goal: Identify Related Risk Factors and Signs and Symptoms  Outcome: Ongoing (interventions implemented as appropriate)    Goal: Infection Prevention/Resolution  Outcome: Ongoing (interventions implemented as appropriate)      Problem: Activity Intolerance (Adult)  Goal: Activity Tolerance  Outcome: Ongoing (interventions implemented as appropriate)    Goal: Effective Energy Conservation Techniques  Outcome: Ongoing (interventions implemented as appropriate)

## 2017-12-21 LAB
ALBUMIN SERPL-MCNC: 3.6 G/DL (ref 3.4–4.8)
ALBUMIN/GLOB SERPL: 1.2 G/DL (ref 1.5–2.5)
ALP SERPL-CCNC: 78 U/L (ref 35–104)
ALT SERPL W P-5'-P-CCNC: 23 U/L (ref 10–36)
ANION GAP SERPL CALCULATED.3IONS-SCNC: 4.6 MMOL/L (ref 3.6–11.2)
AST SERPL-CCNC: 17 U/L (ref 10–30)
BASOPHILS # BLD AUTO: 0.02 10*3/MM3 (ref 0–0.3)
BASOPHILS NFR BLD AUTO: 0.2 % (ref 0–2)
BILIRUB SERPL-MCNC: 0.2 MG/DL (ref 0.2–1.8)
BNP SERPL-MCNC: 63 PG/ML (ref 0–100)
BUN BLD-MCNC: 24 MG/DL (ref 7–21)
BUN/CREAT SERPL: 25 (ref 7–25)
CALCIUM SPEC-SCNC: 9.1 MG/DL (ref 7.7–10)
CHLORIDE SERPL-SCNC: 110 MMOL/L (ref 99–112)
CO2 SERPL-SCNC: 26.4 MMOL/L (ref 24.3–31.9)
CREAT BLD-MCNC: 0.96 MG/DL (ref 0.43–1.29)
CRP SERPL-MCNC: 0.87 MG/DL (ref 0–0.99)
DEPRECATED RDW RBC AUTO: 40.7 FL (ref 37–54)
EOSINOPHIL # BLD AUTO: 0.31 10*3/MM3 (ref 0–0.7)
EOSINOPHIL NFR BLD AUTO: 3.2 % (ref 0–7)
ERYTHROCYTE [DISTWIDTH] IN BLOOD BY AUTOMATED COUNT: 12 % (ref 11.5–14.5)
GFR SERPL CREATININE-BSD FRML MDRD: 57 ML/MIN/1.73
GLOBULIN UR ELPH-MCNC: 2.9 GM/DL
GLUCOSE BLD-MCNC: 108 MG/DL (ref 70–110)
HCT VFR BLD AUTO: 33.3 % (ref 37–47)
HGB BLD-MCNC: 10.6 G/DL (ref 12–16)
IMM GRANULOCYTES # BLD: 0.04 10*3/MM3 (ref 0–0.03)
IMM GRANULOCYTES NFR BLD: 0.4 % (ref 0–0.5)
LYMPHOCYTES # BLD AUTO: 3.09 10*3/MM3 (ref 1–3)
LYMPHOCYTES NFR BLD AUTO: 31.5 % (ref 16–46)
MAGNESIUM SERPL-MCNC: 2 MG/DL (ref 1.7–2.6)
MCH RBC QN AUTO: 31.1 PG (ref 27–33)
MCHC RBC AUTO-ENTMCNC: 31.8 G/DL (ref 33–37)
MCV RBC AUTO: 97.7 FL (ref 80–94)
MONOCYTES # BLD AUTO: 1.03 10*3/MM3 (ref 0.1–0.9)
MONOCYTES NFR BLD AUTO: 10.5 % (ref 0–12)
NEUTROPHILS # BLD AUTO: 5.33 10*3/MM3 (ref 1.4–6.5)
NEUTROPHILS NFR BLD AUTO: 54.2 % (ref 40–75)
OSMOLALITY SERPL CALC.SUM OF ELEC: 285.8 MOSM/KG (ref 273–305)
PHOSPHATE SERPL-MCNC: 4.2 MG/DL (ref 2.7–4.5)
PLATELET # BLD AUTO: 271 10*3/MM3 (ref 130–400)
PMV BLD AUTO: 10 FL (ref 6–10)
POTASSIUM BLD-SCNC: 3.8 MMOL/L (ref 3.5–5.3)
PROT SERPL-MCNC: 6.5 G/DL (ref 6–8)
RBC # BLD AUTO: 3.41 10*6/MM3 (ref 4.2–5.4)
SODIUM BLD-SCNC: 141 MMOL/L (ref 135–153)
WBC NRBC COR # BLD: 9.82 10*3/MM3 (ref 4.5–12.5)

## 2017-12-21 PROCEDURE — 94799 UNLISTED PULMONARY SVC/PX: CPT

## 2017-12-21 PROCEDURE — 85025 COMPLETE CBC W/AUTO DIFF WBC: CPT | Performed by: INTERNAL MEDICINE

## 2017-12-21 PROCEDURE — 97535 SELF CARE MNGMENT TRAINING: CPT

## 2017-12-21 PROCEDURE — 84100 ASSAY OF PHOSPHORUS: CPT | Performed by: INTERNAL MEDICINE

## 2017-12-21 PROCEDURE — 80053 COMPREHEN METABOLIC PANEL: CPT | Performed by: INTERNAL MEDICINE

## 2017-12-21 PROCEDURE — 86140 C-REACTIVE PROTEIN: CPT | Performed by: INTERNAL MEDICINE

## 2017-12-21 PROCEDURE — 25010000002 ENOXAPARIN PER 10 MG: Performed by: INTERNAL MEDICINE

## 2017-12-21 PROCEDURE — 97116 GAIT TRAINING THERAPY: CPT

## 2017-12-21 PROCEDURE — 83735 ASSAY OF MAGNESIUM: CPT | Performed by: INTERNAL MEDICINE

## 2017-12-21 PROCEDURE — 97530 THERAPEUTIC ACTIVITIES: CPT

## 2017-12-21 PROCEDURE — 83880 ASSAY OF NATRIURETIC PEPTIDE: CPT | Performed by: INTERNAL MEDICINE

## 2017-12-21 RX ORDER — ACETAMINOPHEN AND CODEINE PHOSPHATE 300; 30 MG/1; MG/1
1 TABLET ORAL EVERY 4 HOURS PRN
Status: DISCONTINUED | OUTPATIENT
Start: 2017-12-21 | End: 2017-12-22 | Stop reason: HOSPADM

## 2017-12-21 RX ORDER — ALPRAZOLAM 0.5 MG/1
0.5 TABLET ORAL 2 TIMES DAILY PRN
Status: DISCONTINUED | OUTPATIENT
Start: 2017-12-21 | End: 2017-12-22 | Stop reason: HOSPADM

## 2017-12-21 RX ADMIN — POLYVINYL ALCOHOL, POVIDONE 1 DROP: 14; 6 SOLUTION/ DROPS OPHTHALMIC at 20:11

## 2017-12-21 RX ADMIN — IPRATROPIUM BROMIDE AND ALBUTEROL SULFATE 3 ML: .5; 3 SOLUTION RESPIRATORY (INHALATION) at 07:11

## 2017-12-21 RX ADMIN — TOPIRAMATE 100 MG: 100 TABLET, FILM COATED ORAL at 20:10

## 2017-12-21 RX ADMIN — POLYETHYLENE GLYCOL (3350) 17 G: 17 POWDER, FOR SOLUTION ORAL at 09:28

## 2017-12-21 RX ADMIN — IPRATROPIUM BROMIDE AND ALBUTEROL SULFATE 3 ML: .5; 3 SOLUTION RESPIRATORY (INHALATION) at 19:03

## 2017-12-21 RX ADMIN — GABAPENTIN 400 MG: 400 CAPSULE ORAL at 20:10

## 2017-12-21 RX ADMIN — Medication 10 ML: at 09:29

## 2017-12-21 RX ADMIN — POLYVINYL ALCOHOL, POVIDONE 1 DROP: 14; 6 SOLUTION/ DROPS OPHTHALMIC at 09:27

## 2017-12-21 RX ADMIN — ENOXAPARIN SODIUM 40 MG: 40 INJECTION SUBCUTANEOUS at 09:28

## 2017-12-21 RX ADMIN — IPRATROPIUM BROMIDE AND ALBUTEROL SULFATE 3 ML: .5; 3 SOLUTION RESPIRATORY (INHALATION) at 12:35

## 2017-12-21 RX ADMIN — Medication 10 ML: at 20:11

## 2017-12-21 RX ADMIN — ACETAMINOPHEN AND CODEINE PHOSPHATE 1 TABLET: 300; 30 TABLET ORAL at 17:43

## 2017-12-21 RX ADMIN — TOPIRAMATE 100 MG: 100 TABLET, FILM COATED ORAL at 09:28

## 2017-12-21 RX ADMIN — IPRATROPIUM BROMIDE AND ALBUTEROL SULFATE 3 ML: .5; 3 SOLUTION RESPIRATORY (INHALATION) at 00:53

## 2017-12-21 RX ADMIN — AMLODIPINE BESYLATE 5 MG: 5 TABLET ORAL at 09:28

## 2017-12-21 RX ADMIN — DULOXETINE HYDROCHLORIDE 60 MG: 60 CAPSULE, DELAYED RELEASE ORAL at 09:28

## 2017-12-21 RX ADMIN — ASPIRIN 81 MG: 81 TABLET, COATED ORAL at 09:27

## 2017-12-21 RX ADMIN — DOXYCYCLINE 100 MG: 100 CAPSULE ORAL at 05:21

## 2017-12-21 RX ADMIN — ACETAMINOPHEN AND CODEINE PHOSPHATE 1 TABLET: 300; 30 TABLET ORAL at 09:27

## 2017-12-21 RX ADMIN — DOXYCYCLINE 100 MG: 100 CAPSULE ORAL at 17:43

## 2017-12-21 RX ADMIN — ALPRAZOLAM 0.5 MG: 0.5 TABLET ORAL at 20:10

## 2017-12-21 RX ADMIN — CELECOXIB 200 MG: 200 CAPSULE ORAL at 09:27

## 2017-12-21 RX ADMIN — Medication 10 ML: at 20:12

## 2017-12-21 RX ADMIN — GABAPENTIN 400 MG: 400 CAPSULE ORAL at 09:29

## 2017-12-21 RX ADMIN — ACETAMINOPHEN AND CODEINE PHOSPHATE 1 TABLET: 300; 30 TABLET ORAL at 22:36

## 2017-12-21 RX ADMIN — LOSARTAN POTASSIUM 100 MG: 50 TABLET, FILM COATED ORAL at 09:27

## 2017-12-21 RX ADMIN — OXYBUTYNIN CHLORIDE 10 MG: 5 TABLET, FILM COATED, EXTENDED RELEASE ORAL at 09:28

## 2017-12-21 RX ADMIN — Medication 1 CAPSULE: at 09:27

## 2017-12-21 RX ADMIN — ALPRAZOLAM 0.5 MG: 0.5 TABLET ORAL at 09:27

## 2017-12-21 RX ADMIN — PANTOPRAZOLE SODIUM 40 MG: 40 TABLET, DELAYED RELEASE ORAL at 05:21

## 2017-12-21 NOTE — PROGRESS NOTES
Swing bed status starting 12/19/2017     LOS: 2 days       Chief Complaint :  Shortness of breath   Subjective     Interval History:     Patient Complaints:  Domonique Cummings  remains alert and talkative this morning.  She continues to complain of some intermittent cough, congestion and generalized weakness.  She describes that her shortness of breath seems to be resolving.  She continues to have difficulty performing activities of daily living but also describe a slow gradual improvement.  She has no other complaints at this time.  She denies associated fevers or chills.  Nurses have describe no acute changes or events in the last 24 hours.    Review of Systems-unchanged from recent history and physical  Objective     Vital Signs  Temp:  [97.9 °F (36.6 °C)-98.4 °F (36.9 °C)] 97.9 °F (36.6 °C)  Heart Rate:  [51-62] 62  Resp:  [18] 18  BP: (110-117)/(50-57) 110/50    Physical Exam    Constitutional: She is oriented to person, place, and time.   Obese white female who is alert and talkative in no obvious distress at rest.   HENT:   Head: Normocephalic and atraumatic.   Right Ear: External ear normal.   Left Ear: External ear normal.   Nose: Nose normal.   Mouth/Throat: Oropharynx is clear and moist. No oropharyngeal exudate.   Eyes: Conjunctivae and EOM are normal. Pupils are equal, round, and reactive to light. Right eye exhibits no discharge. Left eye exhibits no discharge. No scleral icterus.   Neck: Normal range of motion. Neck supple. No JVD present. No tracheal deviation present. No thyromegaly present.   Cardiovascular: Normal rate, regular rhythm, normal heart sounds and intact distal pulses.  Exam reveals no gallop and no friction rub.    No murmur heard.  Pulmonary/Chest: Effort normal. No stridor.  Trace expiratory wheezing   Scattered rhonchi bilaterally, diminished breath sounds at lung bases.   Abdominal: Soft. Bowel sounds are normal. She exhibits no distension and no mass. There is no tenderness. There  is no guarding. No hernia.   Genitourinary:   Genitourinary Comments: No Freitas catheter   Neurological: She is alert and oriented to person, place, and time. She displays normal reflexes. No cranial nerve deficit. Coordination normal.   Skin: Skin is warm and dry. No rash noted. No erythema. No pallor.   Psychiatric: She has a normal mood and affect. Her behavior is normal. Judgment and thought content normal.   Nursing note and vitals reviewed.      Results Review:     I reviewed the patient's new clinical results  : See Below  MEDICATIONS:    amLODIPine 5 mg Oral Q24H   aspirin 81 mg Oral Daily   doxycycline 100 mg Oral Q12H   DULoxetine 60 mg Oral Daily   enoxaparin 40 mg Subcutaneous Daily   gabapentin 400 mg Oral Q12H   ipratropium-albuterol 3 mL Nebulization Q6H - RT   lactobacillus acidophilus 1 capsule Oral Daily   losartan 100 mg Oral Q24H   metoprolol succinate XL 25 mg Oral Q24H   oxybutynin XL 10 mg Oral Daily   pantoprazole 40 mg Oral Q AM   polyethylene glycol 17 g Oral Daily   polyvinyl alcohol-povidone 1 drop Both Eyes Q12H   sodium chloride 10 mL Intracatheter Q12H   sodium chloride 10 mL Intracatheter Q12H   topiramate 100 mg Oral Q12H   [START ON 1/2/2018] tuberculin 5 Units Intradermal Once       LABS:        Results from last 7 days  Lab Units 12/21/17  0133 12/19/17  0101 12/17/17  0112   CRP mg/dL 0.87 1.72* 1.85*   WBC 10*3/mm3 9.82 9.46 10.17   HEMOGLOBIN g/dL 10.6* 11.0* 11.3*   HEMATOCRIT % 33.3* 34.6* 35.7*   MCV fL 97.7* 97.2* 97.3*   MCHC g/dL 31.8* 31.8* 31.7*   PLATELETS 10*3/mm3 271 258 233           Results from last 7 days  Lab Units 12/21/17  0133 12/19/17  0101 12/17/17  0112   SODIUM mmol/L 141 139 140   POTASSIUM mmol/L 3.8 3.8 3.9   MAGNESIUM mg/dL 2.0 2.2  --    CHLORIDE mmol/L 110 108 109   CO2 mmol/L 26.4 23.1* 23.5*   BUN mg/dL 24* 19 20   CREATININE mg/dL 0.96 0.99 0.92   EGFR IF NONAFRICN AM mL/min/1.73 57* 55* 60*   CALCIUM mg/dL 9.1 9.5 8.9   GLUCOSE mg/dL 108 97 115*    ALBUMIN g/dL 3.60 3.50 3.50   BILIRUBIN mg/dL 0.2 0.1* 0.1*   ALK PHOS U/L 78 78 80   AST (SGOT) U/L 17 21 30   ALT (SGPT) U/L 23 28 34   Estimated Creatinine Clearance: 54.5 mL/min (by C-G formula based on Cr of 0.96).  No results found for: AMMONIA      Blood Culture   Date Value Ref Range Status   12/11/2017 No growth at less than 24 hours  Preliminary   12/11/2017 No growth at less than 24 hours  Preliminary                  Assessment/Plan    1) MRSA pneumonia of left lower lobe   - C-reactive protein and white blood cell count improving.  Symptoms also improving,  mycoplasma pneumonia and legionella studies negative.  Flu swab negative.  Blood cultures with no growth.  C-reactive protein continues to improve.  Sputum positive for MRSA-infectious disease consulted-recommendations appreciated.  2) sepsis  3) metabolic encephalopathy-present at the time of admission  4) leukocytosis  5) anemia-multifactorial  6) DVT prophylaxis-subcutaneous Lovenox  7) disposition-swing bed consulted December 19.  Transfer if accepted   See orders entered.     Skinny Meehan MD  12/21/17  6:36 AM

## 2017-12-21 NOTE — PROGRESS NOTES
"  I have personally seen and examined the patient today and discussed overnight interval progress and pertinent issues with nursing staff.    Subjective:    Patient denies fever, chills, nausea or diarrhea.  CRP less than 0.87 with normal white count.  Nurse reports no significant changes overnight.    Patient was transferred to swing bed on 12/19/2017.     History taken from: patient chart RN        Vital Signs    /55 (BP Location: Right arm, Patient Position: Lying)  Pulse 52  Temp 98.1 °F (36.7 °C) (Oral)   Resp 18  Ht 152.4 cm (60\")  Wt 97.3 kg (214 lb 9.6 oz)  SpO2 99%  BMI 41.91 kg/m2    Temp:  [97.9 °F (36.6 °C)-98.1 °F (36.7 °C)] 98.1 °F (36.7 °C)      Intake/Output Summary (Last 24 hours) at 12/21/17 1125  Last data filed at 12/21/17 0300   Gross per 24 hour   Intake             1320 ml   Output                0 ml   Net             1320 ml     Intake & Output (last 3 days)       12/18 0701 - 12/19 0700 12/19 0701 - 12/20 0700 12/20 0701 - 12/21 0700 12/21 0701 - 12/22 0700    P.O.  1220 1800     Total Intake(mL/kg)  1220 (12.8) 1800 (18.5)     Net   +1220 +1800              Unmeasured Urine Occurrence  8 x 9 x     Unmeasured Stool Occurrence  4 x 4 x           Physical Exam:       General Appearance:    Alert, cooperative, in no acute distress,On 2 L    Head:    Normocephalic, without obvious abnormality, atraumatic   Eyes:            Lids and lashes normal, conjunctivae and sclerae normal, no   icterus, no pallor, corneas clear, PERRLA   Ears:    Ears appear intact with no abnormalities noted   Throat:   No oral lesions, no thrush, oral mucosa moist   Neck:   No adenopathy, supple, trachea midline, no thyromegaly, no   carotid bruit, no JVD   Back:     No tenderness to percussion or palpation, range of motion   normal   Lungs:     Mild wheezing on end expiration bilaterally     Heart:    Regular rhythm and normal rate, normal S1 and S2, no            murmur, no gallop, no rub, no click "   Chest Wall:    No abnormalities observed   Abdomen:     Normal bowel sounds, no masses, no organomegaly, soft        non-tender, non-distended, no guarding, no rebound                tenderness   Rectal:     Deferred   Extremities:   Moves all extremities well, no edema, no cyanosis, no             redness   Pulses:   Pulses palpable and equal bilaterally   Skin:   No bleeding, bruising or rash,Right upper extremity powerglide    Lymph nodes:   No palpable adenopathy   Neurologic:   Awake, alert and oriented x 3. Following commands.         Results:      Results from last 7 days  Lab Units 12/21/17  0133 12/19/17  0101 12/17/17  0112 12/15/17  0104   WBC 10*3/mm3 9.82 9.46 10.17 9.05     Lab Results   Component Value Date    NEUTROABS 5.33 12/21/2017         Results from last 7 days  Lab Units 12/21/17 0133   CREATININE mg/dL 0.96         Results from last 7 days  Lab Units 12/21/17  0133 12/19/17  0101 12/17/17  0112   CRP mg/dL 0.87 1.72* 1.85*       Results Review:    I have personally reviewed laboratory data, culture results, radiology studies and antimicrobial therapy.    Hospital Medications (active)       Dose Frequency Start End    acetaminophen-codeine (TYLENOL #3) 300-30 MG per tablet 1 tablet 1 tablet Every 4 Hours PRN 12/19/2017 12/21/2017    Sig - Route: Take 1 tablet by mouth Every 4 (Four) Hours As Needed for Moderate Pain . - Oral    ALPRAZolam (XANAX) tablet 0.5 mg 0.5 mg 2 Times Daily PRN 12/19/2017 12/21/2017    Sig - Route: Take 1 tablet by mouth 2 (Two) Times a Day As Needed for Anxiety. - Oral    amLODIPine (NORVASC) tablet 5 mg 5 mg Every 24 Hours Scheduled 12/20/2017     Sig - Route: Take 1 tablet by mouth Daily. - Oral    aspirin EC tablet 81 mg 81 mg Daily 12/20/2017     Sig - Route: Take 1 tablet by mouth Daily. - Oral    celecoxib (CeleBREX) capsule 200 mg 200 mg 2 Times Daily PRN 12/19/2017     Sig - Route: Take 1 capsule by mouth 2 (Two) Times a Day As Needed for Mild Pain . - Oral     doxycycline (MONODOX) capsule 100 mg 100 mg Every 12 Hours 12/19/2017 12/25/2017    Sig - Route: Take 1 capsule by mouth Every 12 (Twelve) Hours. - Oral    DULoxetine (CYMBALTA) DR capsule 60 mg 60 mg Daily 12/20/2017     Sig - Route: Take 1 capsule by mouth Daily. - Oral    enoxaparin (LOVENOX) syringe 40 mg 40 mg Daily 12/20/2017     Sig - Route: Inject 0.4 mL under the skin Daily. - Subcutaneous    gabapentin (NEURONTIN) capsule 400 mg 400 mg Every 12 Hours Scheduled 12/19/2017     Sig - Route: Take 1 capsule by mouth Every 12 (Twelve) Hours. - Oral    ipratropium-albuterol (DUO-NEB) nebulizer solution 3 mL 3 mL Every 6 Hours - RT 12/19/2017     Sig - Route: Take 3 mL by nebulization Every 6 (Six) Hours. - Nebulization    losartan (COZAAR) tablet 100 mg 100 mg Every 24 Hours Scheduled 12/20/2017     Sig - Route: Take 2 tablets by mouth Daily. - Oral    metoprolol succinate XL (TOPROL-XL) 24 hr tablet 25 mg 25 mg Every 24 Hours Scheduled 12/20/2017     Sig - Route: Take 1 tablet by mouth Daily. - Oral    oxybutynin XL (DITROPAN-XL) 24 hr tablet 10 mg 10 mg Daily 12/20/2017     Sig - Route: Take 2 tablets by mouth Daily. - Oral    pantoprazole (PROTONIX) EC tablet 40 mg 40 mg Every Early Morning 12/20/2017     Sig - Route: Take 1 tablet by mouth Every Morning. - Oral    polyethylene glycol (MIRALAX) powder 17 g 17 g Daily 12/20/2017     Sig - Route: Take 17 g by mouth Daily. - Oral    polyvinyl alcohol-povidone (HYPOTEARS) 1.4-0.6 % ophthalmic solution 1 drop 1 drop Every 12 Hours Scheduled 12/19/2017     Sig - Route: Administer 1 drop to both eyes Every 12 (Twelve) Hours. - Both Eyes    Risaquad-2 capsule 1 capsule 1 capsule Daily 12/20/2017 12/26/2017    Sig - Route: Take 1 capsule by mouth Daily. - Oral    sodium chloride 0.9 % flush 10 mL 10 mL Every 12 Hours Scheduled 12/19/2017     Sig - Route: 10 mL by Intracatheter route Every 12 (Twelve) Hours. - Intracatheter    sodium chloride 0.9 % flush 10 mL 10 mL  As Needed 12/19/2017     Sig - Route: 10 mL by Intracatheter route As Needed for Line Care (After Medication Administration). - Intracatheter    sodium chloride 0.9 % flush 10 mL 10 mL Every 12 Hours Scheduled 12/19/2017     Sig - Route: 10 mL by Intracatheter route Every 12 (Twelve) Hours. - Intracatheter    sodium chloride 0.9 % flush 10 mL 10 mL As Needed 12/19/2017     Sig - Route: 10 mL by Intracatheter route As Needed for Line Care (After Medication Administration or Blood Draw). - Intracatheter    sodium chloride 0.9 % flush 10 mL 10 mL As Needed 12/19/2017     Sig - Route: Infuse 10 mL into a venous catheter As Needed for Line Care. - Intravenous    topiramate (TOPAMAX) tablet 100 mg 100 mg Every 12 Hours Scheduled 12/19/2017     Sig - Route: Take 1 tablet by mouth Every 12 (Twelve) Hours. - Oral    tuberculin injection 5 Units 5 Units Once 12/19/2017     Sig - Route: Inject 0.1 mL into the skin 1 (One) Time. - Intradermal    tuberculin injection 5 Units 5 Units Once 1/2/2018     Sig - Route: Inject 0.1 mL into the skin 1 (One) Time. - Intradermal    acetaminophen-codeine (TYLENOL #3) 300-30 MG per tablet 1 tablet (Discontinued) 1 tablet Every 4 Hours PRN 12/11/2017 12/19/2017    Sig - Route: Take 1 tablet by mouth Every 4 (Four) Hours As Needed for Moderate Pain . - Oral    Reason for Discontinue: Patient Discharge    ALPRAZolam (XANAX) tablet 0.5 mg (Discontinued) 0.5 mg 2 Times Daily PRN 12/11/2017 12/19/2017    Sig - Route: Take 1 tablet by mouth 2 (Two) Times a Day As Needed for Anxiety. - Oral    Reason for Discontinue: Patient Discharge    amLODIPine (NORVASC) tablet 5 mg (Discontinued) 5 mg Every 24 Hours Scheduled 12/11/2017 12/19/2017    Sig - Route: Take 1 tablet by mouth Daily. - Oral    Reason for Discontinue: Patient Discharge    aspirin EC tablet 81 mg (Discontinued) 81 mg Daily 12/12/2017 12/19/2017    Sig - Route: Take 1 tablet by mouth Daily. - Oral    Reason for Discontinue: Patient  Discharge    celecoxib (CeleBREX) capsule 200 mg (Discontinued) 200 mg 2 Times Daily PRN 12/11/2017 12/19/2017    Sig - Route: Take 1 capsule by mouth 2 (Two) Times a Day As Needed for Mild Pain . - Oral    Reason for Discontinue: Patient Discharge    doxycycline (MONODOX) capsule 100 mg (Discontinued) 100 mg Every 12 Hours 12/15/2017 12/19/2017    Sig - Route: Take 1 capsule by mouth Every 12 (Twelve) Hours. - Oral    Reason for Discontinue: Patient Discharge    DULoxetine (CYMBALTA) DR capsule 60 mg (Discontinued) 60 mg Daily 12/11/2017 12/19/2017    Sig - Route: Take 1 capsule by mouth Daily. - Oral    Reason for Discontinue: Patient Discharge    enoxaparin (LOVENOX) syringe 40 mg (Discontinued) 40 mg Daily 12/11/2017 12/19/2017    Sig - Route: Inject 0.4 mL under the skin Daily. - Subcutaneous    Reason for Discontinue: Patient Discharge    gabapentin (NEURONTIN) capsule 400 mg (Discontinued) 400 mg 3 Times Daily 12/11/2017 12/19/2017    Sig - Route: Take 1 capsule by mouth 3 (Three) Times a Day. - Oral    Reason for Discontinue: Patient Discharge    ipratropium-albuterol (DUO-NEB) nebulizer solution 3 mL (Discontinued) 3 mL Every 6 Hours - RT 12/11/2017 12/19/2017    Sig - Route: Take 3 mL by nebulization Every 6 (Six) Hours. - Nebulization    Reason for Discontinue: Patient Discharge    losartan (COZAAR) tablet 100 mg (Discontinued) 100 mg Every 24 Hours Scheduled 12/11/2017 12/19/2017    Sig - Route: Take 2 tablets by mouth Daily. - Oral    Reason for Discontinue: Patient Discharge    metoprolol succinate XL (TOPROL-XL) 24 hr tablet 25 mg (Discontinued) 25 mg Every 24 Hours Scheduled 12/11/2017 12/19/2017    Sig - Route: Take 1 tablet by mouth Daily. - Oral    Reason for Discontinue: Patient Discharge    oxybutynin XL (DITROPAN-XL) 24 hr tablet 10 mg (Discontinued) 10 mg Daily 12/11/2017 12/19/2017    Sig - Route: Take 2 tablets by mouth Daily. - Oral    Reason for Discontinue: Patient Discharge     pantoprazole (PROTONIX) EC tablet 40 mg (Discontinued) 40 mg Every Early Morning 12/12/2017 12/19/2017    Sig - Route: Take 1 tablet by mouth Every Morning. - Oral    Reason for Discontinue: Patient Discharge    Pharmacy to dose vancomycin (Discontinued)  Continuous PRN 12/17/2017 12/18/2017    Sig - Route: Continuous As Needed for Consult. - Does not apply    Reason for Discontinue: Therapy completed    polyethylene glycol (MIRALAX) powder 17 g (Discontinued) 17 g Daily 12/12/2017 12/19/2017    Sig - Route: Take 17 g by mouth Daily. - Oral    Reason for Discontinue: Patient Discharge    polyvinyl alcohol-povidone (HYPOTEARS) 1.4-0.6 % ophthalmic solution 1 drop (Discontinued) 1 drop Every 12 Hours Scheduled 12/11/2017 12/19/2017    Sig - Route: Administer 1 drop to both eyes Every 12 (Twelve) Hours. - Both Eyes    Reason for Discontinue: Patient Discharge    Risaquad-2 capsule 1 capsule (Discontinued) 1 capsule Daily 12/12/2017 12/19/2017    Sig - Route: Take 1 capsule by mouth Daily. - Oral    Reason for Discontinue: Patient Discharge    sodium chloride 0.9 % flush 10 mL (Discontinued) 10 mL As Needed 12/11/2017 12/19/2017    Sig - Route: Infuse 10 mL into a venous catheter As Needed for Line Care. - Intravenous    Reason for Discontinue: Patient Discharge    sodium chloride 0.9 % flush 10 mL (Discontinued) 10 mL Every 12 Hours Scheduled 12/12/2017 12/19/2017    Sig - Route: 10 mL by Intracatheter route Every 12 (Twelve) Hours. - Intracatheter    Reason for Discontinue: Patient Discharge    Cosign for Ordering: Accepted by Skinny Meehan MD on 12/13/2017  7:26 AM    sodium chloride 0.9 % flush 10 mL (Discontinued) 10 mL As Needed 12/12/2017 12/19/2017    Sig - Route: 10 mL by Intracatheter route As Needed for Line Care (After Medication Administration or Blood Draw). - Intracatheter    Reason for Discontinue: Patient Discharge    Cosign for Ordering: Accepted by Skinny Meehan MD on 12/13/2017  7:26 AM     sodium chloride 0.9 % flush 10 mL (Discontinued) 10 mL Every 12 Hours Scheduled 12/12/2017 12/19/2017    Sig - Route: 10 mL by Intracatheter route Every 12 (Twelve) Hours. - Intracatheter    Reason for Discontinue: Patient Discharge    Cosign for Ordering: Accepted by Skinny Meehan MD on 12/13/2017  7:26 AM    sodium chloride 0.9 % flush 10 mL (Discontinued) 10 mL As Needed 12/12/2017 12/19/2017    Sig - Route: 10 mL by Intracatheter route As Needed for Line Care (After Medication Administration). - Intracatheter    Reason for Discontinue: Patient Discharge    Cosign for Ordering: Accepted by Skinny Meehan MD on 12/13/2017  7:26 AM    topiramate (TOPAMAX) tablet 100 mg (Discontinued) 100 mg Every 12 Hours Scheduled 12/11/2017 12/19/2017    Sig - Route: Take 1 tablet by mouth Every 12 (Twelve) Hours. - Oral    Reason for Discontinue: Patient Discharge            Cultures:    Respiratory Culture   Date Value Ref Range Status   12/13/2017 Light growth (2+) Staphylococcus aureus, MRSA (A)  Final     Comment:       Methicillin resistant Staphylococcus aureus, Patient may be an isolation risk.  This isolate does not demonstrate inducible clindamycin resistance in vitro.             Assessment/Plan     ASSESSMENT:       1.  MRSA pneumonia     PLAN:    Patient denies fever, chills, nausea or diarrhea.  CRP less than 0.87 with normal white count.  Nurse reports no significant changes overnight.    Patient was transferred to swing bed on 12/19/2017.      Would recommend to continue doxycycline 100 mg twice a day as the patient is showing significant clinical improvement with almost normalization of her CRP level.    Patient's findings and recommendations were discussed with patient and nursing staff    Code Status: Full Code    DARREN Batista  12/21/17  11:25 AM

## 2017-12-21 NOTE — PLAN OF CARE
Problem: Patient Care Overview (Adult)  Goal: Plan of Care Review  Outcome: Ongoing (interventions implemented as appropriate)      Problem: Pneumonia (Adult)  Goal: Signs and Symptoms of Listed Potential Problems Will be Absent or Manageable (Pneumonia)  Outcome: Ongoing (interventions implemented as appropriate)      Problem: Fall Risk (Adult)  Goal: Identify Related Risk Factors and Signs and Symptoms  Outcome: Ongoing (interventions implemented as appropriate)    Goal: Absence of Falls  Outcome: Ongoing (interventions implemented as appropriate)      Problem: Infection, Risk/Actual (Adult)  Goal: Identify Related Risk Factors and Signs and Symptoms  Outcome: Ongoing (interventions implemented as appropriate)    Goal: Infection Prevention/Resolution  Outcome: Ongoing (interventions implemented as appropriate)      Problem: Activity Intolerance (Adult)  Goal: Identify Related Risk Factors and Signs and Symptoms  Outcome: Ongoing (interventions implemented as appropriate)    Goal: Activity Tolerance  Outcome: Ongoing (interventions implemented as appropriate)    Goal: Effective Energy Conservation Techniques  Outcome: Ongoing (interventions implemented as appropriate)

## 2017-12-21 NOTE — THERAPY TREATMENT NOTE
Acute Care - Physical Therapy Treatment Note   [place]     Patient Name: Domonique Cummings  : 1945  MRN: [id number]  Today's Date: 2017  Onset of Illness/Injury or Date of Surgery Date: 17 (swing bed admit date)  Date of Referral to PT: 17  Referring Physician: Zoran    Admit Date: 2017    Visit Dx:  No diagnosis found.  Patient Active Problem List   Diagnosis   • Pneumonia of left lower lobe due to infectious organism   • MRSA pneumonia               Adult Rehabilitation Note       17 1030 17 1422 17 1633    Rehab Assessment/Intervention    Discipline physical therapist  -BC physical therapist  -CT physical therapist  -CT    Document Type therapy note (daily note)  -BC therapy note (daily note)  -CT therapy note (daily note)  -CT    Subjective Information agree to therapy  -BC agree to therapy  -CT agree to therapy;complains of;weakness  -CT    Patient Effort, Rehab Treatment good  -BC good  -CT good  -CT    Precautions/Limitations fall precautions   isolation precautions  -BC fall precautions;other (see comments)   isolation precautions  -CT fall precautions;other (see comments)   isolation precautions  -CT    Patient Response to Treatment  Pt tolerated todays treatment session well with rest breaks provided as needed.  -CT Pt tolerated todays treatment session well with rest breaks provided as needed. Pt ambulated outside room today with isolation mask.   -CT    Recorded by [BC] Soheila Corrigan, PT [CT] Moriah Vegas, PT [CT] Moriah Vegas, PT    Cognitive Assessment/Intervention    Current Cognitive/Communication Assessment functional  -BC functional  -CT functional  -CT    Orientation Status oriented to;oriented x 4  -BC oriented to;oriented x 4  -CT oriented to;oriented x 4  -CT    Personal Safety Interventions fall prevention program maintained;gait belt;muscle strengthening facilitated;nonskid shoes/slippers when out of bed  -BC fall prevention program  maintained;gait belt;muscle strengthening facilitated;nonskid shoes/slippers when out of bed  -CT fall prevention program maintained;gait belt;muscle strengthening facilitated;nonskid shoes/slippers when out of bed  -CT    Recorded by [BC] Soheila Corrigan, PT [CT] Moriah Vegas, PT [CT] Moriah Vegas, PT    Bed Mobility, Assessment/Treatment    Bed Mobility, Assistive Device bed rails  -BC bed rails  -CT bed rails  -CT    Bed Mobility, Roll Left, [place] minimum assist (75% patient effort)  -BC minimum assist (75% patient effort)  -CT     Bed Mobility, Roll Right, [place] minimum assist (75% patient effort)  -BC minimum assist (75% patient effort)  -CT     Bed Mobility, Scoot/Bridge, [place] minimum assist (75% patient effort)  -BC minimum assist (75% patient effort)  -CT minimum assist (75% patient effort);verbal cues required;nonverbal cues required (demo/gesture)  -CT    Bed Mob, Supine to Sit, [place] minimum assist (75% patient effort)  -BC minimum assist (75% patient effort)  -CT minimum assist (75% patient effort);moderate assist (50% patient effort);verbal cues required;nonverbal cues required (demo/gesture)  -CT    Bed Mob, Sit to Supine, [place]   minimum assist (75% patient effort);moderate assist (50% patient effort);verbal cues required;nonverbal cues required (demo/gesture)  -CT    Bed Mobility, Impairments strength decreased  -BC strength decreased  -CT strength decreased  -CT    Recorded by [BC] Soheila Corrigan, PT [CT] Moriah Vegas, PT [CT] Moriah Vegas, PT    Transfer Assessment/Treatment    Transfers, Sit-Stand [place] minimum assist (75% patient effort)  -BC minimum assist (75% patient effort)  -CT minimum assist (75% patient effort)  -CT    Transfers, Stand-Sit [place] minimum assist (75% patient effort)  -BC minimum assist (75% patient effort)  -CT minimum assist (75% patient effort)  -CT    Transfers, Sit-Stand-Sit, Assist Device rolling walker  -BC  rolling walker  -CT rolling walker  -CT    Toilet Transfer, [place] minimum assist (75% patient effort)  -BC minimum assist (75% patient effort)  -CT minimum assist (75% patient effort)  -CT    Toilet Transfer, Assistive Device elevated toilet seat;rolling walker;other (see comments)   grab bars  -BC elevated toilet seat;rolling walker;other (see comments)   grab bars  -CT elevated toilet seat;rolling walker;other (see comments)   grab bar  -CT    Recorded by [BC] Soheila Corrigan, PT [CT] Moriah Vegas, PT [CT] Moriah Vegas, PT    Gait Assessment/Treatment    Gait, [place] Level minimum assist (75% patient effort);2 person assist required;verbal cues required;nonverbal cues required (demo/gesture)  -BC minimum assist (75% patient effort);2 person assist required;verbal cues required;nonverbal cues required (demo/gesture)  -CT minimum assist (75% patient effort);2 person assist required;verbal cues required;nonverbal cues required (demo/gesture)  -CT    Gait, Assistive Device rolling walker  -BC rolling walker  -CT rolling walker  -CT    Gait, Distance (Feet) 300  -  -  -CT    Gait, Gait Pattern Analysis swing-to gait  -BC swing-to gait  -CT swing-to gait  -CT    Gait, Impairments strength decreased  -BC strength decreased  -CT     Recorded by [BC] Soheila Corrigan, PT [CT] Moriah Vegas, PT [CT] Moriah Vegas, CLARA    Therapy Exercises    Bilateral Lower Extremities AROM:;15 reps;sitting  -BC AROM:;15 reps;sitting  -CT AROM:;15 reps;sitting  -CT    Recorded by [BC] Soheila Corrigan, PT [CT] Moriah Vegas, PT [CT] Moriah Vegas, CLARA    Positioning and Restraints    Pre-Treatment Position in bed  -BC in bed  -CT in bed  -CT    Post Treatment Position chair  -BC chair  -CT bed  -CT    In Bed   supine;call light within reach;encouraged to call for assist  -CT    In Chair notified nsg;sitting;call light within reach;encouraged to call for assist  -BC sitting;call light within reach;encouraged  to call for assist  -CT     Recorded by [BC] Soheila Corrigan, PT [CT] Moriah Vegas, PT [CT] Moriah Vegas, CLARA      User Key  (r) = Recorded By, (t) = Taken By, (c) = Cosigned By    Initials Name Effective Dates    BC Soheila Corrigan, PT 03/14/16 -     CT Moriah Vegas, PT 03/14/16 -                 IP PT Goals       12/19/17 1607 12/12/17 1618       Transfer Training PT LTG    Transfer Training PT LTG, Date Established 12/19/17  -CT 12/12/17  -CT     Transfer Training PT LTG, Time to Achieve by discharge  -CT by discharge  -CT     Transfer Training PT LTG, Activity Type bed to chair /chair to bed;sit to stand/stand to sit  -CT bed to chair /chair to bed;sit to stand/stand to sit  -CT     Transfer Training PT LTG, [place] Level conditional independence  -CT contact guard assist;minimum assist (75% patient effort)  -CT     Transfer Training PT LTG, Assist Device other (see comments)   with appropriate AD  -CT other (see comments)   with appropriate AD  -CT     Gait Training PT LTG    Gait Training Goal PT LTG, Date Established 12/19/17  -CT 12/12/17  -CT     Gait Training Goal PT LTG, Time to Achieve by discharge  -CT by discharge  -CT     Gait Training Goal PT LTG, [place] Level conditional independence  -CT minimum assist (75% patient effort);contact guard assist  -CT     Gait Training Goal PT LTG, Assist Device other (see comments)   with appropriate AD  -CT other (see comments)   with appropriate AD  -CT     Gait Training Goal PT LTG, Distance to Achieve 250  -  -CT       User Key  (r) = Recorded By, (t) = Taken By, (c) = Cosigned By    Initials Name Provider Type    CT Moriah Vegas, PT Physical Therapist          Physical Therapy Education     Title: PT OT SLP Therapies (Done)     Topic: Physical Therapy (Done)     Point: Mobility training (Done)    Learning Progress Summary    Learner Readiness Method Response Comment Documented by Status   Patient Acceptance E ROSLYN  BC 12/21/17 1037 Done     Acceptance E VU  CT 12/20/17 1429 Done    Acceptance E VU  CT 12/19/17 1611 Done               Point: Home exercise program (Done)    Learning Progress Summary    Learner Readiness Method Response Comment Documented by Status   Patient Acceptance E VU  BC 12/21/17 1037 Done    Acceptance E VU  CT 12/20/17 1429 Done    Acceptance E VU  CT 12/19/17 1611 Done               Point: Body mechanics (Done)    Learning Progress Summary    Learner Readiness Method Response Comment Documented by Status   Patient Acceptance E VU  BC 12/21/17 1037 Done    Acceptance E VU  CT 12/20/17 1429 Done    Acceptance E VU  CT 12/19/17 1611 Done               Point: Precautions (Done)    Learning Progress Summary    Learner Readiness Method Response Comment Documented by Status   Patient Acceptance E VU  BC 12/21/17 1037 Done    Acceptance E VU  CT 12/20/17 1429 Done    Acceptance E VU  CT 12/19/17 1611 Done                      User Key     Initials Effective Dates Name Provider Type Discipline    BC 03/14/16 -  Soheila Corrigan, PT Physical Therapist PT    CT 03/14/16 -  Moriah Vegas, PT Physical Therapist PT                    PT Recommendation and Plan  Anticipated Equipment Needs At Discharge:  (to be determined)  Anticipated Discharge Disposition: home with assist  Planned Therapy Interventions: balance training, bed mobility training, gait training, home exercise program, neuromuscular re-education, patient/family education, postural re-education, strengthening, stair training, transfer training  PT Frequency: 5 times/wk, per priority policy             Outcome Measures       12/21/17 1000 12/20/17 1600 12/20/17 1400    How much help from another person do you currently need...    Turning from your back to your side while in flat bed without using bedrails? 3  -BC  3  -CT    Moving from lying on back to sitting on the side of a flat bed without bedrails? 3  -BC  3  -CT    Moving to and from a bed to a chair (including a  wheelchair)? 3  -BC  3  -CT    Standing up from a chair using your arms (e.g., wheelchair, bedside chair)? 3  -BC  3  -CT    Climbing 3-5 steps with a railing? 2  -BC  2  -CT    To walk in hospital room? 3  -BC  3  -CT    AM-PAC 6 Clicks Score 17  -BC  17  -CT    Functional Assessment    Outcome Measure Options AM-PAC 6 Clicks Basic Mobility (PT)  -BC AM-PAC 6 Clicks Daily Activity (OT)  -KR AM-PAC 6 Clicks Basic Mobility (PT)  -CT      12/19/17 1645 12/19/17 1600 12/18/17 1600    How much help from another person do you currently need...    Turning from your back to your side while in flat bed without using bedrails?  3  -CT 3  -CT    Moving from lying on back to sitting on the side of a flat bed without bedrails?  3  -CT 3  -CT    Moving to and from a bed to a chair (including a wheelchair)?  3  -CT 3  -CT    Standing up from a chair using your arms (e.g., wheelchair, bedside chair)?  3  -CT 3  -CT    Climbing 3-5 steps with a railing?  2  -CT 2  -CT    To walk in hospital room?  3  -CT 3  -CT    AM-PAC 6 Clicks Score  17  -CT 17  -CT    How much help from another is currently needed...    Putting on and taking off regular lower body clothing? 3  -KR      Bathing (including washing, rinsing, and drying) 3  -KR      Toileting (which includes using toilet bed pan or urinal) 3  -KR      Putting on and taking off regular upper body clothing 3  -KR      Taking care of personal grooming (such as brushing teeth) 3  -KR      Eating meals 3  -KR      Score 18  -KR      Functional Assessment    Outcome Measure Options  AM-PAC 6 Clicks Basic Mobility (PT)  -CT AM-PAC 6 Clicks Basic Mobility (PT)  -CT      User Key  (r) = Recorded By, (t) = Taken By, (c) = Cosigned By    Initials Name Provider Type    BC Soheila Corrigan, PT Physical Therapist    KR Erasmo Goff, OT Occupational Therapist    CT Moriah Vegas, PT Physical Therapist           Time Calculation:         PT Charges       12/21/17 1039          Time Calculation     Start Time --   30  -BC      PT Received On 12/21/17  -BC        User Key  (r) = Recorded By, (t) = Taken By, (c) = Cosigned By    Initials Name Provider Type    BC Soheila Corrigan PT Physical Therapist          Therapy Charges for Today     Code Description Service Date Service Provider Modifiers Qty    53177328699  GAIT TRAINING EA 15 MIN 12/21/2017 Soheila Corrigan, PT GP 1    60327645092 HC PT THERAPEUTIC ACT EA 15 MIN 12/21/2017 Soheila Corrigan, PT GP 1    75489241537 HC PT THER SUPP EA 15 MIN 12/21/2017 Soheila Corrigan, PT GP 2          PT G-Codes  Outcome Measure Options: AM-PAC 6 Clicks Basic Mobility (PT)  Score: 17  Functional Limitation: Mobility: Walking and moving around  Mobility: Walking and Moving Around Current Status (): At least 40 percent but less than 60 percent impaired, limited or restricted  Mobility: Walking and Moving Around Goal Status (): At least 20 percent but less than 40 percent impaired, limited or restricted    Soheila Corrigan PT  12/21/2017

## 2017-12-22 VITALS
HEIGHT: 60 IN | WEIGHT: 216.59 LBS | TEMPERATURE: 98.5 F | OXYGEN SATURATION: 91 % | BODY MASS INDEX: 42.52 KG/M2 | RESPIRATION RATE: 18 BRPM | DIASTOLIC BLOOD PRESSURE: 60 MMHG | HEART RATE: 64 BPM | SYSTOLIC BLOOD PRESSURE: 120 MMHG

## 2017-12-22 PROCEDURE — G8988 SELF CARE GOAL STATUS: HCPCS

## 2017-12-22 PROCEDURE — G8987 SELF CARE CURRENT STATUS: HCPCS

## 2017-12-22 PROCEDURE — 97535 SELF CARE MNGMENT TRAINING: CPT

## 2017-12-22 PROCEDURE — 94799 UNLISTED PULMONARY SVC/PX: CPT

## 2017-12-22 PROCEDURE — 25010000002 ENOXAPARIN PER 10 MG: Performed by: INTERNAL MEDICINE

## 2017-12-22 RX ORDER — ACETAMINOPHEN AND CODEINE PHOSPHATE 300; 30 MG/1; MG/1
1 TABLET ORAL 2 TIMES DAILY PRN
Qty: 60 TABLET | Refills: 0 | Status: ON HOLD | OUTPATIENT
Start: 2017-12-22 | End: 2019-11-05

## 2017-12-22 RX ORDER — IPRATROPIUM BROMIDE AND ALBUTEROL SULFATE 2.5; .5 MG/3ML; MG/3ML
3 SOLUTION RESPIRATORY (INHALATION)
Qty: 360 ML | Refills: 3 | Status: ON HOLD | OUTPATIENT
Start: 2017-12-22 | End: 2018-10-19

## 2017-12-22 RX ORDER — L.ACID,PARA/B.BIFIDUM/S.THERM 8B CELL
1 CAPSULE ORAL DAILY
Qty: 30 CAPSULE | Refills: 0 | Status: SHIPPED | OUTPATIENT
Start: 2017-12-22 | End: 2018-01-21

## 2017-12-22 RX ORDER — DOXYCYCLINE 100 MG/1
100 CAPSULE ORAL EVERY 12 HOURS
Qty: 12 CAPSULE | Refills: 0 | Status: SHIPPED | OUTPATIENT
Start: 2017-12-22 | End: 2017-12-28

## 2017-12-22 RX ADMIN — OXYBUTYNIN CHLORIDE 10 MG: 5 TABLET, FILM COATED, EXTENDED RELEASE ORAL at 09:50

## 2017-12-22 RX ADMIN — POLYVINYL ALCOHOL, POVIDONE 1 DROP: 14; 6 SOLUTION/ DROPS OPHTHALMIC at 09:51

## 2017-12-22 RX ADMIN — Medication 1 CAPSULE: at 09:49

## 2017-12-22 RX ADMIN — ACETAMINOPHEN AND CODEINE PHOSPHATE 1 TABLET: 300; 30 TABLET ORAL at 05:21

## 2017-12-22 RX ADMIN — METOPROLOL SUCCINATE 25 MG: 25 TABLET, FILM COATED, EXTENDED RELEASE ORAL at 09:50

## 2017-12-22 RX ADMIN — ALPRAZOLAM 0.5 MG: 0.5 TABLET ORAL at 09:50

## 2017-12-22 RX ADMIN — LOSARTAN POTASSIUM 100 MG: 50 TABLET, FILM COATED ORAL at 09:50

## 2017-12-22 RX ADMIN — ENOXAPARIN SODIUM 40 MG: 40 INJECTION SUBCUTANEOUS at 09:50

## 2017-12-22 RX ADMIN — AMLODIPINE BESYLATE 5 MG: 5 TABLET ORAL at 09:50

## 2017-12-22 RX ADMIN — IPRATROPIUM BROMIDE AND ALBUTEROL SULFATE 3 ML: .5; 3 SOLUTION RESPIRATORY (INHALATION) at 06:59

## 2017-12-22 RX ADMIN — Medication 10 ML: at 09:51

## 2017-12-22 RX ADMIN — ACETAMINOPHEN AND CODEINE PHOSPHATE 1 TABLET: 300; 30 TABLET ORAL at 09:50

## 2017-12-22 RX ADMIN — IPRATROPIUM BROMIDE AND ALBUTEROL SULFATE 3 ML: .5; 3 SOLUTION RESPIRATORY (INHALATION) at 01:48

## 2017-12-22 RX ADMIN — DOXYCYCLINE 100 MG: 100 CAPSULE ORAL at 05:21

## 2017-12-22 RX ADMIN — PANTOPRAZOLE SODIUM 40 MG: 40 TABLET, DELAYED RELEASE ORAL at 05:21

## 2017-12-22 RX ADMIN — POLYETHYLENE GLYCOL (3350) 17 G: 17 POWDER, FOR SOLUTION ORAL at 09:50

## 2017-12-22 RX ADMIN — DULOXETINE HYDROCHLORIDE 60 MG: 60 CAPSULE, DELAYED RELEASE ORAL at 09:50

## 2017-12-22 RX ADMIN — ASPIRIN 81 MG: 81 TABLET, COATED ORAL at 09:49

## 2017-12-22 RX ADMIN — GABAPENTIN 400 MG: 400 CAPSULE ORAL at 09:50

## 2017-12-22 RX ADMIN — TOPIRAMATE 100 MG: 100 TABLET, FILM COATED ORAL at 09:50

## 2017-12-22 NOTE — PROGRESS NOTES
Discharge Planning Assessment  [facility]     Patient Name: Domonique Cummings  MRN: [id number]  Today's Date: 12/22/2017    Admit Date: 12/19/2017       Discharge Plan       12/22/17 1111    Final Note    Final Note Pt is being discharged home on this date. SS arranged HH with [facility]. Nursing to call report. SS received order to arrange for 02 and nebulizer. Pt does not qulifiy for 02. [facility] No further needs at this time.         Discharge Placement     No information found        Expected Discharge Date and Time     Expected Discharge Date Expected Discharge Time    Dec 22, 2017             Amber Glaser

## 2017-12-22 NOTE — DISCHARGE PLACEMENT REQUEST
"Domonique Huston (72 y.o. Female)     Date of Birth Social Security Number Address Home Phone MRN    [id number]  [id number] [phone] [id number]    Rastafari Marital Status          Sabianist        Admission Date Admission Type Admitting Provider Attending Provider Department, Room/Bed    12/19/17 Elective Skinny Meehan MD Morton, Steven E, MD [address], [address]/    Discharge Date Discharge Disposition Discharge Destination         Home or Self Care             Attending Provider: Skinny Meehan MD     Allergies:  No Known Allergies    Isolation:  Contact Drop   Infection:  MRSA (12/17/17)   Code Status:  FULL    Ht:  152.4 cm (60\")   Wt:  98.2 kg (216 lb 9.5 oz)    Admission Cmt:  None   Principal Problem:  None                Active Insurance as of 12/19/2017     Primary Coverage     Payor Plan Insurance Group Employer/Plan Group    MEDICARE MEDICARE A & B      Payor Plan Address Payor Plan Phone Number Effective From Effective To    [address] [phone] 10/1/2009     [place] [address]       Subscriber Name Subscriber Birth Date Member ID       DOMONIQUE HUSTON 1945 [id number]                 Emergency Contacts      (Rel.) Home Phone Work Phone Mobile Phone    Benito Huston (Son) [phone] -- --    Deepali Gallegos (Daughter) -- -- [phone]    Dennise Flanagan (Daughter) [phone] -- --            Insurance Information                MEDICARE/MEDICARE A & B Phone: [phone]    Subscriber: Domonique Huston Subscriber#: [id number]    Group#:  Precert#:              History & Physical      Skinny Meehan MD at 12/20/2017  8:49 AM              12/20/2017-swing bed H&P note    Chief complaint : Shortness of breath    Subjective     Patient is a 72 y.o. female recently admitted to [facility].  She has recently been in [facility] with pneumonia and now diagnosed with MRSA pneumonia.  She " continues with diffuse weakness, shortness of breath and debility.  She is unable to perform right Tuesday living.  She has been changed to swing bed status on December 19.  See progress note for this to date for further details.     Review of Systems   Constitutional: Positive for activity change, appetite change, chills, fatigue and fever. Negative for unexpected weight change.   HENT: Positive for postnasal drip, rhinorrhea, sinus pain and sinus pressure. Negative for congestion, dental problem and mouth sores.    Eyes: Negative for pain, discharge, itching and visual disturbance.   Respiratory: Positive for cough and shortness of breath. Negative for apnea, choking, chest tightness and wheezing.    Cardiovascular: Negative for chest pain and leg swelling.   Gastrointestinal: Negative for abdominal distention, abdominal pain, constipation and diarrhea.   Endocrine: Negative for cold intolerance, heat intolerance, polydipsia, polyphagia and polyuria.   Genitourinary: Negative for difficulty urinating, dysuria, hematuria, urgency and vaginal pain.   Musculoskeletal: Positive for arthralgias, back pain and gait problem. Negative for myalgias and neck pain.   Skin: Negative for color change, pallor and rash.   Allergic/Immunologic: Negative for environmental allergies, food allergies and immunocompromised state.   Neurological: Positive for weakness. Negative for dizziness and seizures.   Hematological: Negative for adenopathy. Does not bruise/bleed easily.   Psychiatric/Behavioral: Negative for agitation, hallucinations and self-injury. The patient is not nervous/anxious and is not hyperactive.         Past Medical History  Past Medical History:   Diagnosis Date   • Arthritis    • Asthma    • Degenerative disc disease, lumbar    • Diabetes mellitus    • Fibromyalgia    • GERD (gastroesophageal reflux disease)    • Hyperlipidemia    • Hypertension    • Osteoporosis      Surgical History  Past Surgical History:    Procedure Laterality Date   • APPENDECTOMY     • ESOPHAGEAL DILATATION     • TOTAL SHOULDER REPLACEMENT Bilateral    • TUBAL ABDOMINAL LIGATION       Family History  Family History   Problem Relation Age of Onset   • Breast cancer Sister      Social History  Social History   Substance Use Topics   • Smoking status: Former Smoker   • Smokeless tobacco: None   • Alcohol use No     Home Medications  Prescriptions Prior to Admission   Medication Sig Dispense Refill Last Dose   • acetaminophen-codeine (TYLENOL #3) 300-30 MG per tablet Take 1 tablet by mouth 2 (Two) Times a Day As Needed.   Unknown at Unknown time   • ALPRAZolam (XANAX) 0.5 MG tablet Take 0.5 mg by mouth 2 (Two) Times a Day As Needed.   Unknown at Unknown time   • amLODIPine (NORVASC) 5 MG tablet Take 5 mg by mouth Daily.   Unknown at Unknown time   • aspirin 81 MG EC tablet Take 81 mg by mouth Daily.   Unknown at Unknown time   • baclofen (LIORESAL) 10 MG tablet Take 10 mg by mouth Every Night.   Unknown at Unknown time   • buPROPion XL (WELLBUTRIN XL) 300 MG 24 hr tablet Take 300 mg by mouth Every Morning.   Unknown at Unknown time   • celecoxib (CELEBREX) 200 MG capsule Take 200 mg by mouth 2 (Two) Times a Day.   Unknown at Unknown time   • cycloSPORINE (RESTASIS) 0.05 % ophthalmic emulsion Administer 1 drop to both eyes 2 (Two) Times a Day.   Unknown at Unknown time   • DULoxetine (CYMBALTA) 60 MG capsule Take 60 mg by mouth Daily.   Unknown at Unknown time   • gabapentin (NEURONTIN) 400 MG capsule Take 400 mg by mouth 3 (Three) Times a Day.   Unknown at Unknown time   • losartan (COZAAR) 100 MG tablet Take 100 mg by mouth Daily.   Unknown at Unknown time   • metoprolol succinate XL (TOPROL-XL) 25 MG 24 hr tablet Take 25 mg by mouth Daily.   Unknown at Unknown time   • omeprazole (priLOSEC) 20 MG capsule Take 20 mg by mouth 2 (Two) Times a Day Before Meals.   Unknown at Unknown time   • oxybutynin XL (DITROPAN-XL) 10 MG 24 hr tablet Take 10 mg by  mouth Daily.   Unknown at Unknown time   • polyethylene glycol (MIRALAX) packet Take 17 g by mouth Daily.   Unknown at Unknown time   • raNITIdine (ZANTAC) 300 MG tablet Take 300 mg by mouth Daily.   Unknown at Unknown time   • topiramate (TOPAMAX) 100 MG tablet Take 100 mg by mouth 2 (Two) Times a Day.   Unknown at Unknown time         Allergies:  Review of patient's allergies indicates no known allergies.    Objective     Vital Signs  Temp:  [97.8 °F (36.6 °C)-98.4 °F (36.9 °C)] 98.4 °F (36.9 °C)  Heart Rate:  [52-77] 52  Resp:  [18-20] 18  BP: (111-138)/(50-68) 117/57    Physical Exam:    Physical Exam   Constitutional: She is oriented to person, place, and time.   Obese white female who is alert and talkative in no obvious distress at rest.   HENT:   Head: Normocephalic and atraumatic.   Right Ear: External ear normal.   Left Ear: External ear normal.   Nose: Nose normal.   Mouth/Throat: Oropharynx is clear and moist. No oropharyngeal exudate.   Eyes: Conjunctivae and EOM are normal. Pupils are equal, round, and reactive to light. Right eye exhibits no discharge. Left eye exhibits no discharge. No scleral icterus.   Neck: Normal range of motion. Neck supple. No JVD present. No tracheal deviation present. No thyromegaly present.   Cardiovascular: Normal rate, regular rhythm, normal heart sounds and intact distal pulses.  Exam reveals no gallop and no friction rub.    No murmur heard.  Pulmonary/Chest: Effort normal. No stridor. She has wheezes.   Scattered rhonchi bilaterally, diminished breath sounds at lung bases.  Interrupted speech secondary to shortness of breath.  Accessory muscle use is present.  Respiratory distress with speech.   Abdominal: Soft. Bowel sounds are normal. She exhibits no distension and no mass. There is no tenderness. There is no guarding. No hernia.   Genitourinary:   Genitourinary Comments: No Freitas catheter   Neurological: She is alert and oriented to person, place, and time. She  displays normal reflexes. No cranial nerve deficit. Coordination normal.   Skin: Skin is warm and dry. No rash noted. No erythema. No pallor.   Psychiatric: She has a normal mood and affect. Her behavior is normal. Judgment and thought content normal.   Nursing note and vitals reviewed.      Results Review:    I reviewed the patient's clinical results from admission:  Imaging Results (last 72 hours)     Procedure Component Value Units Date/Time    CT Head Without Contrast [[id number]] Collected:  12/11/17 1407     Updated:  12/11/17 1411    Narrative:       CT HEAD WO CONTRAST-     CLINICAL INDICATION: ams          COMPARISON: 7/11/2017      TECHNIQUE: Axial images of the brain were obtained with out intravenous  contrast.  Reformatted images were created in the sagittal and coronal  planes.     DOSE:         Radiation dose reduction techniques were utilized per ALARA protocol.  Automated exposure control was initiated through either or [employer] or  [employer] software packages by  protocol.           FINDINGS:   Today's study shows no mass, hemorrhage, or midline shift.   The ventricles, cisterns, and sulci are unremarkable. There is no  hydrocephalus.   There is no evidence of acute ischemia.  I do not see epidural or subdural hematoma.  The gray-white differentiation is appropriate.   The bone window setting images show no destructive calvarial lesion or  acute calvarial fracture.   The posterior fossa is unremarkable.             Impression:       No acute intracranial pathology. Nothing is seen on this exam to  specifically account for the patient's symptoms.     This report was finalized on 12/11/2017 2:09 PM by Dr. Chuckie Reyez MD.       XR Chest 1 View [[id number]] Collected:  12/11/17 1430     Updated:  12/11/17 1433    Narrative:       XR CHEST 1 VW-     CLINICAL INDICATION: Altered mental status          COMPARISON: 4/22/2016      TECHNIQUE: Single frontal view of the chest.     FINDINGS:      There is airspace disease in the left lung concerning for pneumonia  The cardiac silhouette is normal. The pulmonary vasculature is  unremarkable.  There is no evidence of an acute osseous abnormality.   There are no suspicious-appearing parenchymal soft tissue nodules.            Impression:       Appearance concerning for left basilar pneumonia         This report was finalized on 12/11/2017 2:31 PM by Dr. Chuckie Reyez MD.             LABS:    Lab Results   Component Value Date    GLUCOSE 97 12/19/2017    GLUCOSE 115 (H) 12/17/2017    GLUCOSE 105 12/15/2017    BUN 19 12/19/2017    CREATININE 0.99 12/19/2017    EGFRIFNONA 55 (L) 12/19/2017    EGFRIFAFRI  09/12/2016      Comment:      <15 Indicative of kidney failure.    BCR 19.2 12/19/2017    CO2 23.1 (L) 12/19/2017    CALCIUM 9.5 12/19/2017    ALBUMIN 3.50 12/19/2017    LABIL2 1.1 (L) 12/19/2017    AST 21 12/19/2017    ALT 28 12/19/2017    WBC 9.46 12/19/2017    WBC 10.17 12/17/2017    WBC 9.05 12/15/2017    HGB 11.0 (L) 12/19/2017    HCT 34.6 (L) 12/19/2017    MCV 97.2 (H) 12/19/2017     12/19/2017     12/19/2017     12/17/2017     12/15/2017    K 3.8 12/19/2017    K 3.9 12/17/2017    K 3.8 12/15/2017     12/19/2017    ANIONGAP 7.9 12/19/2017       Lab Results   Component Value Date    INR 0.98 12/11/2017    INR <0.90 04/26/2016    INR 0.93 06/24/2014    PROTIME 13.1 12/11/2017    PROTIME 10.1 04/26/2016    PROTIME 10.6 06/24/2014                 Assessment/Plan     1) pneumonia of left lower lobe-MRSA pneumonia   2) sepsis  3) metabolic encephalopathy-present at the time of admission  4) leukocytosis  5) anemia-multifactorial  6) DVT prophylaxis-subcutaneous Lovenox      I discussed the patients findings and my recommendations with patient, family and nursing staff.     Skinny Meehan MD  12/20/17  8:49 AM    Time: 33 minutes of time with history and physical and coordination of care     Electronically signed by Skinny LEONE  MD Zoran at 12/20/2017  8:51 AM        Hospital Medications (active)       Dose Frequency Start End    acetaminophen-codeine (TYLENOL #3) 300-30 MG per tablet 1 tablet 1 tablet Every 4 Hours PRN 12/19/2017 12/21/2017    Sig - Route: Take 1 tablet by mouth Every 4 (Four) Hours As Needed for Moderate Pain . - Oral    acetaminophen-codeine (TYLENOL #3) 300-30 MG per tablet 1 tablet 1 tablet Every 4 Hours PRN 12/21/2017 12/31/2017    Sig - Route: Take 1 tablet by mouth Every 4 (Four) Hours As Needed for Moderate Pain . - Oral    ALPRAZolam (XANAX) tablet 0.5 mg 0.5 mg 2 Times Daily PRN 12/19/2017 12/21/2017    Sig - Route: Take 1 tablet by mouth 2 (Two) Times a Day As Needed for Anxiety. - Oral    ALPRAZolam (XANAX) tablet 0.5 mg 0.5 mg 2 Times Daily PRN 12/21/2017 12/31/2017    Sig - Route: Take 1 tablet by mouth 2 (Two) Times a Day As Needed for Anxiety. - Oral    amLODIPine (NORVASC) tablet 5 mg 5 mg Every 24 Hours Scheduled 12/20/2017     Sig - Route: Take 1 tablet by mouth Daily. - Oral    aspirin EC tablet 81 mg 81 mg Daily 12/20/2017     Sig - Route: Take 1 tablet by mouth Daily. - Oral    celecoxib (CeleBREX) capsule 200 mg 200 mg 2 Times Daily PRN 12/19/2017     Sig - Route: Take 1 capsule by mouth 2 (Two) Times a Day As Needed for Mild Pain . - Oral    doxycycline (MONODOX) capsule 100 mg 100 mg Every 12 Hours 12/19/2017 12/25/2017    Sig - Route: Take 1 capsule by mouth Every 12 (Twelve) Hours. - Oral    DULoxetine (CYMBALTA) DR capsule 60 mg 60 mg Daily 12/20/2017     Sig - Route: Take 1 capsule by mouth Daily. - Oral    enoxaparin (LOVENOX) syringe 40 mg 40 mg Daily 12/20/2017     Sig - Route: Inject 0.4 mL under the skin Daily. - Subcutaneous    gabapentin (NEURONTIN) capsule 400 mg 400 mg Every 12 Hours Scheduled 12/19/2017     Sig - Route: Take 1 capsule by mouth Every 12 (Twelve) Hours. - Oral    ipratropium-albuterol (DUO-NEB) nebulizer solution 3 mL 3 mL Every 6 Hours - RT 12/19/2017     Sig -  Route: Take 3 mL by nebulization Every 6 (Six) Hours. - Nebulization    lactobacillus acidophilus (RISAQUAD) capsule 1 capsule 1 capsule Daily 12/21/2017 12/26/2017    Sig - Route: Take 1 capsule by mouth Daily. - Oral    losartan (COZAAR) tablet 100 mg 100 mg Every 24 Hours Scheduled 12/20/2017     Sig - Route: Take 2 tablets by mouth Daily. - Oral    metoprolol succinate XL (TOPROL-XL) 24 hr tablet 25 mg 25 mg Every 24 Hours Scheduled 12/20/2017     Sig - Route: Take 1 tablet by mouth Daily. - Oral    oxybutynin XL (DITROPAN-XL) 24 hr tablet 10 mg 10 mg Daily 12/20/2017     Sig - Route: Take 2 tablets by mouth Daily. - Oral    pantoprazole (PROTONIX) EC tablet 40 mg 40 mg Every Early Morning 12/20/2017     Sig - Route: Take 1 tablet by mouth Every Morning. - Oral    polyethylene glycol (MIRALAX) powder 17 g 17 g Daily 12/20/2017     Sig - Route: Take 17 g by mouth Daily. - Oral    polyvinyl alcohol-povidone (HYPOTEARS) 1.4-0.6 % ophthalmic solution 1 drop 1 drop Every 12 Hours Scheduled 12/19/2017     Sig - Route: Administer 1 drop to both eyes Every 12 (Twelve) Hours. - Both Eyes    sodium chloride 0.9 % flush 10 mL 10 mL Every 12 Hours Scheduled 12/19/2017     Sig - Route: 10 mL by Intracatheter route Every 12 (Twelve) Hours. - Intracatheter    sodium chloride 0.9 % flush 10 mL 10 mL As Needed 12/19/2017     Sig - Route: 10 mL by Intracatheter route As Needed for Line Care (After Medication Administration). - Intracatheter    sodium chloride 0.9 % flush 10 mL 10 mL Every 12 Hours Scheduled 12/19/2017     Sig - Route: 10 mL by Intracatheter route Every 12 (Twelve) Hours. - Intracatheter    sodium chloride 0.9 % flush 10 mL 10 mL As Needed 12/19/2017     Sig - Route: 10 mL by Intracatheter route As Needed for Line Care (After Medication Administration or Blood Draw). - Intracatheter    sodium chloride 0.9 % flush 10 mL 10 mL As Needed 12/19/2017     Sig - Route: Infuse 10 mL into a venous catheter As Needed for  "Line Care. - Intravenous    topiramate (TOPAMAX) tablet 100 mg 100 mg Every 12 Hours Scheduled 12/19/2017     Sig - Route: Take 1 tablet by mouth Every 12 (Twelve) Hours. - Oral    tuberculin injection 5 Units 5 Units Once 1/2/2018     Sig - Route: Inject 0.1 mL into the skin 1 (One) Time. - Intradermal            Lab Results (last 24 hours)     ** No results found for the last 24 hours. **        Imaging Results (last 24 hours)     ** No results found for the last 24 hours. **        ECG/EMG Results (last 24 hours)     ** No results found for the last 24 hours. **           Physician Progress Notes (last 24 hours) (Notes from 12/21/2017  8:28 AM through 12/22/2017  8:28 AM)      Terrance Cantu MD at 12/21/2017 11:25 AM  Version 2 of 2           I have personally seen and examined the patient today and discussed overnight interval progress and pertinent issues with nursing staff.    Subjective:    Patient denies fever, chills, nausea or diarrhea.  CRP less than 0.87 with normal white count.  Nurse reports no significant changes overnight.    Patient was transferred to swing bed on 12/19/2017.     History taken from: patient chart RN        Vital Signs    /55 (BP Location: Right arm, Patient Position: Lying)  Pulse 52  Temp 98.1 °F (36.7 °C) (Oral)   Resp 18  Ht 152.4 cm (60\")  Wt 97.3 kg (214 lb 9.6 oz)  SpO2 99%  BMI 41.91 kg/m2    Temp:  [97.9 °F (36.6 °C)-98.1 °F (36.7 °C)] 98.1 °F (36.7 °C)      Intake/Output Summary (Last 24 hours) at 12/21/17 1125  Last data filed at 12/21/17 0300   Gross per 24 hour   Intake             1320 ml   Output                0 ml   Net             1320 ml     Intake & Output (last 3 days)       12/18 0701 - 12/19 0700 12/19 0701 - 12/20 0700 12/20 0701 - 12/21 0700 12/21 0701 - 12/22 0700    P.O.  1220 1800     Total Intake(mL/kg)  1220 (12.8) 1800 (18.5)     Net   +1220 +1800              Unmeasured Urine Occurrence  8 x 9 x     Unmeasured Stool Occurrence  4 x 4 " x           Physical Exam:       General Appearance:    Alert, cooperative, in no acute distress,On 2 L    Head:    Normocephalic, without obvious abnormality, atraumatic   Eyes:            Lids and lashes normal, conjunctivae and sclerae normal, no   icterus, no pallor, corneas clear, PERRLA   Ears:    Ears appear intact with no abnormalities noted   Throat:   No oral lesions, no thrush, oral mucosa moist   Neck:   No adenopathy, supple, trachea midline, no thyromegaly, no   carotid bruit, no JVD   Back:     No tenderness to percussion or palpation, range of motion   normal   Lungs:     Mild wheezing on end expiration bilaterally     Heart:    Regular rhythm and normal rate, normal S1 and S2, no            murmur, no gallop, no rub, no click   Chest Wall:    No abnormalities observed   Abdomen:     Normal bowel sounds, no masses, no organomegaly, soft        non-tender, non-distended, no guarding, no rebound                tenderness   Rectal:     Deferred   Extremities:   Moves all extremities well, no edema, no cyanosis, no             redness   Pulses:   Pulses palpable and equal bilaterally   Skin:   No bleeding, bruising or rash,Right upper extremity powerglide    Lymph nodes:   No palpable adenopathy   Neurologic:   Awake, alert and oriented x 3. Following commands.         Results:      Results from last 7 days  Lab Units 12/21/17  0133 12/19/17  0101 12/17/17  0112 12/15/17  0104   WBC 10*3/mm3 9.82 9.46 10.17 9.05     Lab Results   Component Value Date    NEUTROABS 5.33 12/21/2017         Results from last 7 days  Lab Units 12/21/17  0133   CREATININE mg/dL 0.96         Results from last 7 days  Lab Units 12/21/17  0133 12/19/17  0101 12/17/17  0112   CRP mg/dL 0.87 1.72* 1.85*       Results Review:    I have personally reviewed laboratory data, culture results, radiology studies and antimicrobial therapy.    Hospital Medications (active)       Dose Frequency Start End    acetaminophen-codeine (TYLENOL #3)  300-30 MG per tablet 1 tablet 1 tablet Every 4 Hours PRN 12/19/2017 12/21/2017    Sig - Route: Take 1 tablet by mouth Every 4 (Four) Hours As Needed for Moderate Pain . - Oral    ALPRAZolam (XANAX) tablet 0.5 mg 0.5 mg 2 Times Daily PRN 12/19/2017 12/21/2017    Sig - Route: Take 1 tablet by mouth 2 (Two) Times a Day As Needed for Anxiety. - Oral    amLODIPine (NORVASC) tablet 5 mg 5 mg Every 24 Hours Scheduled 12/20/2017     Sig - Route: Take 1 tablet by mouth Daily. - Oral    aspirin EC tablet 81 mg 81 mg Daily 12/20/2017     Sig - Route: Take 1 tablet by mouth Daily. - Oral    celecoxib (CeleBREX) capsule 200 mg 200 mg 2 Times Daily PRN 12/19/2017     Sig - Route: Take 1 capsule by mouth 2 (Two) Times a Day As Needed for Mild Pain . - Oral    doxycycline (MONODOX) capsule 100 mg 100 mg Every 12 Hours 12/19/2017 12/25/2017    Sig - Route: Take 1 capsule by mouth Every 12 (Twelve) Hours. - Oral    DULoxetine (CYMBALTA) DR capsule 60 mg 60 mg Daily 12/20/2017     Sig - Route: Take 1 capsule by mouth Daily. - Oral    enoxaparin (LOVENOX) syringe 40 mg 40 mg Daily 12/20/2017     Sig - Route: Inject 0.4 mL under the skin Daily. - Subcutaneous    gabapentin (NEURONTIN) capsule 400 mg 400 mg Every 12 Hours Scheduled 12/19/2017     Sig - Route: Take 1 capsule by mouth Every 12 (Twelve) Hours. - Oral    ipratropium-albuterol (DUO-NEB) nebulizer solution 3 mL 3 mL Every 6 Hours - RT 12/19/2017     Sig - Route: Take 3 mL by nebulization Every 6 (Six) Hours. - Nebulization    losartan (COZAAR) tablet 100 mg 100 mg Every 24 Hours Scheduled 12/20/2017     Sig - Route: Take 2 tablets by mouth Daily. - Oral    metoprolol succinate XL (TOPROL-XL) 24 hr tablet 25 mg 25 mg Every 24 Hours Scheduled 12/20/2017     Sig - Route: Take 1 tablet by mouth Daily. - Oral    oxybutynin XL (DITROPAN-XL) 24 hr tablet 10 mg 10 mg Daily 12/20/2017     Sig - Route: Take 2 tablets by mouth Daily. - Oral    pantoprazole (PROTONIX) EC tablet 40 mg 40  mg Every Early Morning 12/20/2017     Sig - Route: Take 1 tablet by mouth Every Morning. - Oral    polyethylene glycol (MIRALAX) powder 17 g 17 g Daily 12/20/2017     Sig - Route: Take 17 g by mouth Daily. - Oral    polyvinyl alcohol-povidone (HYPOTEARS) 1.4-0.6 % ophthalmic solution 1 drop 1 drop Every 12 Hours Scheduled 12/19/2017     Sig - Route: Administer 1 drop to both eyes Every 12 (Twelve) Hours. - Both Eyes    Risaquad-2 capsule 1 capsule 1 capsule Daily 12/20/2017 12/26/2017    Sig - Route: Take 1 capsule by mouth Daily. - Oral    sodium chloride 0.9 % flush 10 mL 10 mL Every 12 Hours Scheduled 12/19/2017     Sig - Route: 10 mL by Intracatheter route Every 12 (Twelve) Hours. - Intracatheter    sodium chloride 0.9 % flush 10 mL 10 mL As Needed 12/19/2017     Sig - Route: 10 mL by Intracatheter route As Needed for Line Care (After Medication Administration). - Intracatheter    sodium chloride 0.9 % flush 10 mL 10 mL Every 12 Hours Scheduled 12/19/2017     Sig - Route: 10 mL by Intracatheter route Every 12 (Twelve) Hours. - Intracatheter    sodium chloride 0.9 % flush 10 mL 10 mL As Needed 12/19/2017     Sig - Route: 10 mL by Intracatheter route As Needed for Line Care (After Medication Administration or Blood Draw). - Intracatheter    sodium chloride 0.9 % flush 10 mL 10 mL As Needed 12/19/2017     Sig - Route: Infuse 10 mL into a venous catheter As Needed for Line Care. - Intravenous    topiramate (TOPAMAX) tablet 100 mg 100 mg Every 12 Hours Scheduled 12/19/2017     Sig - Route: Take 1 tablet by mouth Every 12 (Twelve) Hours. - Oral    tuberculin injection 5 Units 5 Units Once 12/19/2017     Sig - Route: Inject 0.1 mL into the skin 1 (One) Time. - Intradermal    tuberculin injection 5 Units 5 Units Once 1/2/2018     Sig - Route: Inject 0.1 mL into the skin 1 (One) Time. - Intradermal    acetaminophen-codeine (TYLENOL #3) 300-30 MG per tablet 1 tablet (Discontinued) 1 tablet Every 4 Hours PRN 12/11/2017  12/19/2017    Sig - Route: Take 1 tablet by mouth Every 4 (Four) Hours As Needed for Moderate Pain . - Oral    Reason for Discontinue: Patient Discharge    ALPRAZolam (XANAX) tablet 0.5 mg (Discontinued) 0.5 mg 2 Times Daily PRN 12/11/2017 12/19/2017    Sig - Route: Take 1 tablet by mouth 2 (Two) Times a Day As Needed for Anxiety. - Oral    Reason for Discontinue: Patient Discharge    amLODIPine (NORVASC) tablet 5 mg (Discontinued) 5 mg Every 24 Hours Scheduled 12/11/2017 12/19/2017    Sig - Route: Take 1 tablet by mouth Daily. - Oral    Reason for Discontinue: Patient Discharge    aspirin EC tablet 81 mg (Discontinued) 81 mg Daily 12/12/2017 12/19/2017    Sig - Route: Take 1 tablet by mouth Daily. - Oral    Reason for Discontinue: Patient Discharge    celecoxib (CeleBREX) capsule 200 mg (Discontinued) 200 mg 2 Times Daily PRN 12/11/2017 12/19/2017    Sig - Route: Take 1 capsule by mouth 2 (Two) Times a Day As Needed for Mild Pain . - Oral    Reason for Discontinue: Patient Discharge    doxycycline (MONODOX) capsule 100 mg (Discontinued) 100 mg Every 12 Hours 12/15/2017 12/19/2017    Sig - Route: Take 1 capsule by mouth Every 12 (Twelve) Hours. - Oral    Reason for Discontinue: Patient Discharge    DULoxetine (CYMBALTA) DR capsule 60 mg (Discontinued) 60 mg Daily 12/11/2017 12/19/2017    Sig - Route: Take 1 capsule by mouth Daily. - Oral    Reason for Discontinue: Patient Discharge    enoxaparin (LOVENOX) syringe 40 mg (Discontinued) 40 mg Daily 12/11/2017 12/19/2017    Sig - Route: Inject 0.4 mL under the skin Daily. - Subcutaneous    Reason for Discontinue: Patient Discharge    gabapentin (NEURONTIN) capsule 400 mg (Discontinued) 400 mg 3 Times Daily 12/11/2017 12/19/2017    Sig - Route: Take 1 capsule by mouth 3 (Three) Times a Day. - Oral    Reason for Discontinue: Patient Discharge    ipratropium-albuterol (DUO-NEB) nebulizer solution 3 mL (Discontinued) 3 mL Every 6 Hours - RT 12/11/2017 12/19/2017    Sig -  Route: Take 3 mL by nebulization Every 6 (Six) Hours. - Nebulization    Reason for Discontinue: Patient Discharge    losartan (COZAAR) tablet 100 mg (Discontinued) 100 mg Every 24 Hours Scheduled 12/11/2017 12/19/2017    Sig - Route: Take 2 tablets by mouth Daily. - Oral    Reason for Discontinue: Patient Discharge    metoprolol succinate XL (TOPROL-XL) 24 hr tablet 25 mg (Discontinued) 25 mg Every 24 Hours Scheduled 12/11/2017 12/19/2017    Sig - Route: Take 1 tablet by mouth Daily. - Oral    Reason for Discontinue: Patient Discharge    oxybutynin XL (DITROPAN-XL) 24 hr tablet 10 mg (Discontinued) 10 mg Daily 12/11/2017 12/19/2017    Sig - Route: Take 2 tablets by mouth Daily. - Oral    Reason for Discontinue: Patient Discharge    pantoprazole (PROTONIX) EC tablet 40 mg (Discontinued) 40 mg Every Early Morning 12/12/2017 12/19/2017    Sig - Route: Take 1 tablet by mouth Every Morning. - Oral    Reason for Discontinue: Patient Discharge    Pharmacy to dose vancomycin (Discontinued)  Continuous PRN 12/17/2017 12/18/2017    Sig - Route: Continuous As Needed for Consult. - Does not apply    Reason for Discontinue: Therapy completed    polyethylene glycol (MIRALAX) powder 17 g (Discontinued) 17 g Daily 12/12/2017 12/19/2017    Sig - Route: Take 17 g by mouth Daily. - Oral    Reason for Discontinue: Patient Discharge    polyvinyl alcohol-povidone (HYPOTEARS) 1.4-0.6 % ophthalmic solution 1 drop (Discontinued) 1 drop Every 12 Hours Scheduled 12/11/2017 12/19/2017    Sig - Route: Administer 1 drop to both eyes Every 12 (Twelve) Hours. - Both Eyes    Reason for Discontinue: Patient Discharge    Risaquad-2 capsule 1 capsule (Discontinued) 1 capsule Daily 12/12/2017 12/19/2017    Sig - Route: Take 1 capsule by mouth Daily. - Oral    Reason for Discontinue: Patient Discharge    sodium chloride 0.9 % flush 10 mL (Discontinued) 10 mL As Needed 12/11/2017 12/19/2017    Sig - Route: Infuse 10 mL into a venous catheter As Needed  for Line Care. - Intravenous    Reason for Discontinue: Patient Discharge    sodium chloride 0.9 % flush 10 mL (Discontinued) 10 mL Every 12 Hours Scheduled 12/12/2017 12/19/2017    Sig - Route: 10 mL by Intracatheter route Every 12 (Twelve) Hours. - Intracatheter    Reason for Discontinue: Patient Discharge    Cosign for Ordering: Accepted by Skinny Meehan MD on 12/13/2017  7:26 AM    sodium chloride 0.9 % flush 10 mL (Discontinued) 10 mL As Needed 12/12/2017 12/19/2017    Sig - Route: 10 mL by Intracatheter route As Needed for Line Care (After Medication Administration or Blood Draw). - Intracatheter    Reason for Discontinue: Patient Discharge    Cosign for Ordering: Accepted by Skinny Meehan MD on 12/13/2017  7:26 AM    sodium chloride 0.9 % flush 10 mL (Discontinued) 10 mL Every 12 Hours Scheduled 12/12/2017 12/19/2017    Sig - Route: 10 mL by Intracatheter route Every 12 (Twelve) Hours. - Intracatheter    Reason for Discontinue: Patient Discharge    Cosign for Ordering: Accepted by Skinny Meehan MD on 12/13/2017  7:26 AM    sodium chloride 0.9 % flush 10 mL (Discontinued) 10 mL As Needed 12/12/2017 12/19/2017    Sig - Route: 10 mL by Intracatheter route As Needed for Line Care (After Medication Administration). - Intracatheter    Reason for Discontinue: Patient Discharge    Cosign for Ordering: Accepted by Skinny Meehan MD on 12/13/2017  7:26 AM    topiramate (TOPAMAX) tablet 100 mg (Discontinued) 100 mg Every 12 Hours Scheduled 12/11/2017 12/19/2017    Sig - Route: Take 1 tablet by mouth Every 12 (Twelve) Hours. - Oral    Reason for Discontinue: Patient Discharge            Cultures:    Respiratory Culture   Date Value Ref Range Status   12/13/2017 Light growth (2+) Staphylococcus aureus, MRSA (A)  Final     Comment:       Methicillin resistant Staphylococcus aureus, Patient may be an isolation risk.  This isolate does not demonstrate inducible clindamycin resistance in vitro.    "          Assessment/Plan     ASSESSMENT:       1.  MRSA pneumonia     PLAN:    Patient denies fever, chills, nausea or diarrhea.  CRP less than 0.87 with normal white count.  Nurse reports no significant changes overnight.    Patient was transferred to swing bed on 12/19/2017.      Would recommend to continue doxycycline 100 mg twice a day as the patient is showing significant clinical improvement with almost normalization of her CRP level.    Patient's findings and recommendations were discussed with patient and nursing staff    Code Status: Full Code    DARREN Batista  12/21/17  11:25 AM       Electronically signed by Terrance Cantu MD at 12/22/2017  5:43 AM      DARREN Batista at 12/21/2017 11:25 AM  Version 1 of 2           I have personally seen and examined the patient today and discussed overnight interval progress and pertinent issues with nursing staff.    Subjective:    Patient denies fever, chills, nausea or diarrhea.  CRP less than 0.87 with normal white count.  Nurse reports no significant changes overnight.    Patient was transferred to swing bed on 12/19/2017.     History taken from: patient chart RN        Vital Signs    /55 (BP Location: Right arm, Patient Position: Lying)  Pulse 52  Temp 98.1 °F (36.7 °C) (Oral)   Resp 18  Ht 152.4 cm (60\")  Wt 97.3 kg (214 lb 9.6 oz)  SpO2 99%  BMI 41.91 kg/m2    Temp:  [97.9 °F (36.6 °C)-98.1 °F (36.7 °C)] 98.1 °F (36.7 °C)      Intake/Output Summary (Last 24 hours) at 12/21/17 1125  Last data filed at 12/21/17 0300   Gross per 24 hour   Intake             1320 ml   Output                0 ml   Net             1320 ml     Intake & Output (last 3 days)       12/18 0701 - 12/19 0700 12/19 0701 - 12/20 0700 12/20 0701 - 12/21 0700 12/21 0701 - 12/22 0700    P.O.  1220 1800     Total Intake(mL/kg)  1220 (12.8) 1800 (18.5)     Net   +1220 +1800              Unmeasured Urine Occurrence  8 x 9 x     Unmeasured Stool Occurrence  4 x 4 x     "       Physical Exam:       General Appearance:    Alert, cooperative, in no acute distress,On 2 L    Head:    Normocephalic, without obvious abnormality, atraumatic   Eyes:            Lids and lashes normal, conjunctivae and sclerae normal, no   icterus, no pallor, corneas clear, PERRLA   Ears:    Ears appear intact with no abnormalities noted   Throat:   No oral lesions, no thrush, oral mucosa moist   Neck:   No adenopathy, supple, trachea midline, no thyromegaly, no   carotid bruit, no JVD   Back:     No tenderness to percussion or palpation, range of motion   normal   Lungs:     Mild wheezing on end expiration bilaterally     Heart:    Regular rhythm and normal rate, normal S1 and S2, no            murmur, no gallop, no rub, no click   Chest Wall:    No abnormalities observed   Abdomen:     Normal bowel sounds, no masses, no organomegaly, soft        non-tender, non-distended, no guarding, no rebound                tenderness   Rectal:     Deferred   Extremities:   Moves all extremities well, no edema, no cyanosis, no             redness   Pulses:   Pulses palpable and equal bilaterally   Skin:   No bleeding, bruising or rash,Right upper extremity powerglide    Lymph nodes:   No palpable adenopathy   Neurologic:   Awake, alert and oriented x 3. Following commands.         Results:      Results from last 7 days  Lab Units 12/21/17  0133 12/19/17  0101 12/17/17  0112 12/15/17  0104   WBC 10*3/mm3 9.82 9.46 10.17 9.05     Lab Results   Component Value Date    NEUTROABS 5.33 12/21/2017         Results from last 7 days  Lab Units 12/21/17  0133   CREATININE mg/dL 0.96         Results from last 7 days  Lab Units 12/21/17  0133 12/19/17  0101 12/17/17  0112   CRP mg/dL 0.87 1.72* 1.85*       Results Review:    I have personally reviewed laboratory data, culture results, radiology studies and antimicrobial therapy.    Hospital Medications (active)       Dose Frequency Start End    acetaminophen-codeine (TYLENOL #3)            Assessment/Plan     ASSESSMENT:       1.  MRSA pneumonia     PLAN:    Patient denies fever, chills, nausea or diarrhea.  CRP less than 0.87 with normal white count.  Nurse reports no significant changes overnight.    Patient was transferred to swing bed on 12/19/2017.      Would recommend to continue doxycycline 100 mg twice a day as the patient is showing significant clinical improvement with almost normalization of her CRP level.    Patient's findings and recommendations were discussed with patient and nursing staff    Code Status: Full Code    DARREN Batista  12/21/17  11:25 AM       Electronically signed by DARREN Batista at 12/21/2017 11:28 AM      Skinny Meehan MD at 12/22/2017  6:30 AM  Version 1 of 1         Swing bed status starting 12/19/2017     LOS: 3 days       Chief Complaint :  Shortness of breath   Subjective     Interval History:     Patient Complaints:  Domonique Cummings  with generalized weakness.  She denies any chest pain, fever, chills, nausea or vomiting.  Her cough and congestion continued to improve.  She has no associated diarrhea.  She continues to tolerate antibiotics well.  She was able to sit up in a chair for several minutes yesterday and walk.  She has no other complaints.      Review of Systems-unchanged from recent history and physical  Objective     Vital Signs  Temp:  [98.1 °F (36.7 °C)] 98.1 °F (36.7 °C)  Heart Rate:  [52-68] 62  Resp:  [18] 18  BP: (118-120)/(55-57) 120/57    Physical Exam    Constitutional: She is oriented to person, place, and time.   Obese white female who is alert and talkative in no obvious distress at rest.   HENT:   Head: Normocephalic and atraumatic.   Right Ear: External ear normal.   Left Ear: External ear normal.   Nose: Nose normal.   Mouth/Throat: Oropharynx is clear and moist. No oropharyngeal exudate.   Eyes: Conjunctivae and EOM are normal. Pupils are equal, round, and reactive to light. Right eye exhibits no discharge.  Left eye exhibits no discharge. No scleral icterus.   Neck: Normal range of motion. Neck supple. No JVD present. No tracheal deviation present. No thyromegaly present.   Cardiovascular: Normal rate, regular rhythm, normal heart sounds and intact distal pulses.  Exam reveals no gallop and no friction rub.    No murmur heard.  Pulmonary/Chest: Effort normal.  Clear to auscultation.  Abdominal: Soft. Bowel sounds are normal. She exhibits no distension and no mass. There is no tenderness. There is no guarding. No hernia.   Genitourinary:   Genitourinary Comments: No Freitas catheter   Neurological: She is alert and oriented to person, place, and time. She displays normal reflexes. No cranial nerve deficit. Coordination normal.   Skin: Skin is warm and dry. No rash noted. No erythema. No pallor.   Psychiatric: She has a normal mood and affect. Her behavior is normal. Judgment and thought content normal.   Nursing note and vitals reviewed.      Results Review:     I reviewed the patient's new clinical results  : See Below  MEDICATIONS:    amLODIPine 5 mg Oral Q24H   aspirin 81 mg Oral Daily   doxycycline 100 mg Oral Q12H   DULoxetine 60 mg Oral Daily   enoxaparin 40 mg Subcutaneous Daily   gabapentin 400 mg Oral Q12H   ipratropium-albuterol 3 mL Nebulization Q6H - RT   lactobacillus acidophilus 1 capsule Oral Daily   losartan 100 mg Oral Q24H   metoprolol succinate XL 25 mg Oral Q24H   oxybutynin XL 10 mg Oral Daily   pantoprazole 40 mg Oral Q AM   polyethylene glycol 17 g Oral Daily   polyvinyl alcohol-povidone 1 drop Both Eyes Q12H   sodium chloride 10 mL Intracatheter Q12H   sodium chloride 10 mL Intracatheter Q12H   topiramate 100 mg Oral Q12H   [START ON 1/2/2018] tuberculin 5 Units Intradermal Once       LABS:        Results from last 7 days  Lab Units 12/21/17  0133 12/19/17  0101 12/17/17  0112   CRP mg/dL 0.87 1.72* 1.85*   WBC 10*3/mm3 9.82 9.46 10.17   HEMOGLOBIN g/dL 10.6* 11.0* 11.3*   HEMATOCRIT % 33.3* 34.6*  35.7*   MCV fL 97.7* 97.2* 97.3*   MCHC g/dL 31.8* 31.8* 31.7*   PLATELETS 10*3/mm3 271 258 233           Results from last 7 days  Lab Units 12/21/17  0133 12/19/17  0101 12/17/17  0112   SODIUM mmol/L 141 139 140   POTASSIUM mmol/L 3.8 3.8 3.9   MAGNESIUM mg/dL 2.0 2.2  --    CHLORIDE mmol/L 110 108 109   CO2 mmol/L 26.4 23.1* 23.5*   BUN mg/dL 24* 19 20   CREATININE mg/dL 0.96 0.99 0.92   EGFR IF NONAFRICN AM mL/min/1.73 57* 55* 60*   CALCIUM mg/dL 9.1 9.5 8.9   GLUCOSE mg/dL 108 97 115*   ALBUMIN g/dL 3.60 3.50 3.50   BILIRUBIN mg/dL 0.2 0.1* 0.1*   ALK PHOS U/L 78 78 80   AST (SGOT) U/L 17 21 30   ALT (SGPT) U/L 23 28 34   Estimated Creatinine Clearance: 54.9 mL/min (by C-G formula based on Cr of 0.96).  No results found for: AMMONIA      Blood Culture   Date Value Ref Range Status   12/11/2017 No growth at less than 24 hours  Preliminary   12/11/2017 No growth at less than 24 hours  Preliminary                  Assessment/Plan    1) MRSA pneumonia of left lower lobe   - C-reactive protein and white blood cell count improving.  Symptoms also improving,  mycoplasma pneumonia and legionella studies negative.  Flu swab negative.  Blood cultures with no growth.  C-reactive protein continues to improve.  Sputum positive for MRSA-infectious disease consulted-recommendations appreciated.  2) sepsis  3) metabolic encephalopathy-present at the time of admission  4) leukocytosis  5) anemia-multifactorial  6) DVT prophylaxis-subcutaneous Lovenox  7) disposition-swing bed December 19.     See orders entered.     Skinny Meehan MD  12/22/17  6:30 AM     Electronically signed by Skinny Meehan MD at 12/22/2017  6:33 AM        Medical Student Notes (last 24 hours) (Notes from 12/21/2017  8:28 AM through 12/22/2017  8:28 AM)     No notes of this type exist for this encounter.        Consult Notes (last 24 hours) (Notes from 12/21/2017  8:28 AM through 12/22/2017  8:28 AM)     No notes of this type exist for this  encounter.        Nutrition Notes (last 24 hours) (Notes from 2017  8:28 AM through 2017  8:28 AM)     No notes of this type exist for this encounter.           Physical Therapy Notes (last 24 hours) (Notes from 2017  8:28 AM through 2017  8:28 AM)      Soheila Corrigan, PT at 2017 10:41 AM  Version 1 of 1         Acute Care - Physical Therapy Treatment Note  JANEE Stu     Patient Name: Domonique Cummings  : 1945  MRN: [id number]  Today's Date: 2017  Onset of Illness/Injury or Date of Surgery Date: 17 (swing bed admit date)  Date of Referral to PT: 17  Referring Physician: Zoran    Admit Date: 2017    Visit Dx:  No diagnosis found.  Patient Active Problem List   Diagnosis   • Pneumonia of left lower lobe due to infectious organism   • MRSA pneumonia               Adult Rehabilitation Note       17 1030 17 1422 17 1633    Rehab Assessment/Intervention    Discipline physical therapist  -BC physical therapist  -CT physical therapist  -CT    Document Type therapy note (daily note)  -BC therapy note (daily note)  -CT therapy note (daily note)  -CT    Subjective Information agree to therapy  -BC agree to therapy  -CT agree to therapy;complains of;weakness  -CT    Patient Effort, Rehab Treatment good  -BC good  -CT good  -CT    Precautions/Limitations fall precautions   isolation precautions  -BC fall precautions;other (see comments)   isolation precautions  -CT fall precautions;other (see comments)   isolation precautions  -CT    Patient Response to Treatment  Pt tolerated todays treatment session well with rest breaks provided as needed.  -CT Pt tolerated todays treatment session well with rest breaks provided as needed. Pt ambulated outside room today with isolation mask.   -CT    Recorded by [BC] Soheila Corrigan, PT [CT] Moriah Vegas, PT [CT] Moriah Vegas, PT    Cognitive Assessment/Intervention    Current Cognitive/Communication  Assessment functional  -BC functional  -CT functional  -CT    Orientation Status oriented to;oriented x 4  -BC oriented to;oriented x 4  -CT oriented to;oriented x 4  -CT    Personal Safety Interventions fall prevention program maintained;gait belt;muscle strengthening facilitated;nonskid shoes/slippers when out of bed  -BC fall prevention program maintained;gait belt;muscle strengthening facilitated;nonskid shoes/slippers when out of bed  -CT fall prevention program maintained;gait belt;muscle strengthening facilitated;nonskid shoes/slippers when out of bed  -CT    Recorded by [BC] Soheila Corrigan, PT [CT] Moriah Vegas, PT [CT] Moriah Vegas, PT    Bed Mobility, Assessment/Treatment    Bed Mobility, Assistive Device bed rails  -BC bed rails  -CT bed rails  -CT    Bed Mobility, Roll Left, [place] minimum assist (75% patient effort)  -BC minimum assist (75% patient effort)  -CT     Bed Mobility, Roll Right, [place] minimum assist (75% patient effort)  -BC minimum assist (75% patient effort)  -CT     Bed Mobility, Scoot/Bridge, [place] minimum assist (75% patient effort)  -BC minimum assist (75% patient effort)  -CT minimum assist (75% patient effort);verbal cues required;nonverbal cues required (demo/gesture)  -CT    Bed Mob, Supine to Sit, [place] minimum assist (75% patient effort)  -BC minimum assist (75% patient effort)  -CT minimum assist (75% patient effort);moderate assist (50% patient effort);verbal cues required;nonverbal cues required (demo/gesture)  -CT    Bed Mob, Sit to Supine, [place]   minimum assist (75% patient effort);moderate assist (50% patient effort);verbal cues required;nonverbal cues required (demo/gesture)  -CT    Bed Mobility, Impairments strength decreased  -BC strength decreased  -CT strength decreased  -CT    Recorded by [BC] Soheila Corrigan, PT [CT] Moriah Vegas, PT [CT] Moriah Vegas, PT    Transfer Assessment/Treatment    Transfers, Sit-Stand  [place] minimum assist (75% patient effort)  -BC minimum assist (75% patient effort)  -CT minimum assist (75% patient effort)  -CT    Transfers, Stand-Sit [place] minimum assist (75% patient effort)  -BC minimum assist (75% patient effort)  -CT minimum assist (75% patient effort)  -CT    Transfers, Sit-Stand-Sit, Assist Device rolling walker  -BC rolling walker  -CT rolling walker  -CT    Toilet Transfer, [place] minimum assist (75% patient effort)  -BC minimum assist (75% patient effort)  -CT minimum assist (75% patient effort)  -CT    Toilet Transfer, Assistive Device elevated toilet seat;rolling walker;other (see comments)   grab bars  -BC elevated toilet seat;rolling walker;other (see comments)   grab bars  -CT elevated toilet seat;rolling walker;other (see comments)   grab bar  -CT    Recorded by [BC] Soheila Corrigan, PT [CT] Moriah Vegas, PT [CT] Moriah Vegas, PT    Gait Assessment/Treatment    Gait, [place] Level minimum assist (75% patient effort);2 person assist required;verbal cues required;nonverbal cues required (demo/gesture)  -BC minimum assist (75% patient effort);2 person assist required;verbal cues required;nonverbal cues required (demo/gesture)  -CT minimum assist (75% patient effort);2 person assist required;verbal cues required;nonverbal cues required (demo/gesture)  -CT    Gait, Assistive Device rolling walker  -BC rolling walker  -CT rolling walker  -CT    Gait, Distance (Feet) 300  -  -  -CT    Gait, Gait Pattern Analysis swing-to gait  -BC swing-to gait  -CT swing-to gait  -CT    Gait, Impairments strength decreased  -BC strength decreased  -CT     Recorded by [BC] Soheila Corrigan, PT [CT] Moriah Vegas, PT [CT] Moriah Vegas PT    Therapy Exercises    Bilateral Lower Extremities AROM:;15 reps;sitting  -BC AROM:;15 reps;sitting  -CT AROM:;15 reps;sitting  -CT    Recorded by [BC] Soheila Corrigan, PT [CT] Moriah Vegas, PT [CT] Moriah Vegas, PT     Positioning and Restraints    Pre-Treatment Position in bed  -BC in bed  -CT in bed  -CT    Post Treatment Position chair  -BC chair  -CT bed  -CT    In Bed   supine;call light within reach;encouraged to call for assist  -CT    In Chair notified nsg;sitting;call light within reach;encouraged to call for assist  -BC sitting;call light within reach;encouraged to call for assist  -CT     Recorded by [BC] Soheila Corrigan, PT [CT] Moriah Vegas, PT [CT] Moriah Vegas, PT      User Key  (r) = Recorded By, (t) = Taken By, (c) = Cosigned By    Initials Name Effective Dates    BC Soheila Corrigan, PT 03/14/16 -     CT Moriah Vegas, PT 03/14/16 -                 IP PT Goals       12/19/17 1607 12/12/17 1618       Transfer Training PT LTG    Transfer Training PT LTG, Date Established 12/19/17  -CT 12/12/17  -CT     Transfer Training PT LTG, Time to Achieve by discharge  -CT by discharge  -CT     Transfer Training PT LTG, Activity Type bed to chair /chair to bed;sit to stand/stand to sit  -CT bed to chair /chair to bed;sit to stand/stand to sit  -CT     Transfer Training PT LTG, [place] Level conditional independence  -CT contact guard assist;minimum assist (75% patient effort)  -CT     Transfer Training PT LTG, Assist Device other (see comments)   with appropriate AD  -CT other (see comments)   with appropriate AD  -CT     Gait Training PT LTG    Gait Training Goal PT LTG, Date Established 12/19/17  -CT 12/12/17  -CT     Gait Training Goal PT LTG, Time to Achieve by discharge  -CT by discharge  -CT     Gait Training Goal PT LTG, [place] Level conditional independence  -CT minimum assist (75% patient effort);contact guard assist  -CT     Gait Training Goal PT LTG, Assist Device other (see comments)   with appropriate AD  -CT other (see comments)   with appropriate AD  -CT     Gait Training Goal PT LTG, Distance to Achieve 250  -  -CT       User Key  (r) = Recorded By, (t) = Taken By, (c) = Cosigned By     Initials Name Provider Type    CT Moriah Vegas PT Physical Therapist          Physical Therapy Education     Title: PT OT SLP Therapies (Done)     Topic: Physical Therapy (Done)     Point: Mobility training (Done)    Learning Progress Summary    Learner Readiness Method Response Comment Documented by Status   Patient Acceptance E   BC 12/21/17 1037 Done    Acceptance E VU  CT 12/20/17 1429 Done    Acceptance E VU  CT 12/19/17 1611 Done               Point: Home exercise program (Done)    Learning Progress Summary    Learner Readiness Method Response Comment Documented by Status   Patient Acceptance E VU  BC 12/21/17 1037 Done    Acceptance E VU  CT 12/20/17 1429 Done    Acceptance E VU  CT 12/19/17 1611 Done               Point: Body mechanics (Done)    Learning Progress Summary    Learner Readiness Method Response Comment Documented by Status   Patient Acceptance E   BC 12/21/17 1037 Done    Acceptance E VU  CT 12/20/17 1429 Done    Acceptance E VU  CT 12/19/17 1611 Done               Point: Precautions (Done)    Learning Progress Summary    Learner Readiness Method Response Comment Documented by Status   Patient Acceptance E [facility] [place] 12/21/17 1037 Done    Acceptance E VU  CT 12/20/17 1429 Done    Acceptance E VU  CT 12/19/17 1611 Done                      User Key     Initials Effective Dates Name Provider Type Discipline    BC 03/14/16 -  Soheila Corrigan, PT Physical Therapist PT    CT 03/14/16 -  Moriah Vegas PT Physical Therapist PT                    PT Recommendation and Plan  Anticipated Equipment Needs At Discharge:  (to be determined)  Anticipated Discharge Disposition: home with assist  Planned Therapy Interventions: balance training, bed mobility training, gait training, home exercise program, neuromuscular re-education, patient/family education, postural re-education, strengthening, stair training, transfer training  PT Frequency: 5 times/wk, per priority policy             Outcome Measures        12/21/17 1000 12/20/17 1600 12/20/17 1400    How much help from another person do you currently need...    Turning from your back to your side while in flat bed without using bedrails? 3  -BC  3  -CT    Moving from lying on back to sitting on the side of a flat bed without bedrails? 3  -BC  3  -CT    Moving to and from a bed to a chair (including a wheelchair)? 3  -BC  3  -CT    Standing up from a chair using your arms (e.g., wheelchair, bedside chair)? 3  -BC  3  -CT    Climbing 3-5 steps with a railing? 2  -BC  2  -CT    To walk in hospital room? 3  -BC  3  -CT    AM-PAC 6 Clicks Score 17  -BC  17  -CT    Functional Assessment    Outcome Measure Options AM-PAC 6 Clicks Basic Mobility (PT)  -BC AM-PAC 6 Clicks Daily Activity (OT)  -KR AM-PAC 6 Clicks Basic Mobility (PT)  -CT      12/19/17 1645 12/19/17 1600 12/18/17 1600    How much help from another person do you currently need...    Turning from your back to your side while in flat bed without using bedrails?  3  -CT 3  -CT    Moving from lying on back to sitting on the side of a flat bed without bedrails?  3  -CT 3  -CT    Moving to and from a bed to a chair (including a wheelchair)?  3  -CT 3  -CT    Standing up from a chair using your arms (e.g., wheelchair, bedside chair)?  3  -CT 3  -CT    Climbing 3-5 steps with a railing?  2  -CT 2  -CT    To walk in hospital room?  3  -CT 3  -CT    AM-PAC 6 Clicks Score  17  -CT 17  -CT    How much help from another is currently needed...    Putting on and taking off regular lower body clothing? 3  -KR      Bathing (including washing, rinsing, and drying) 3  -KR      Toileting (which includes using toilet bed pan or urinal) 3  -KR      Putting on and taking off regular upper body clothing 3  -KR      Taking care of personal grooming (such as brushing teeth) 3  -KR      Eating meals 3  -KR      Score 18  -KR      Functional Assessment    Outcome Measure Options  AM-PAC 6 Clicks Basic Mobility (PT)  -CT AM-PAC 6 Clicks  Basic Mobility (PT)  -CT      User Key  (r) = Recorded By, (t) = Taken By, (c) = Cosigned By    Initials Name Provider Type    BC Soheila Corrigan, PT Physical Therapist    KR Erasmo Goff OT Occupational Therapist    CT Moriah Vegas, PT Physical Therapist           Time Calculation:         PT Charges       17 1039          Time Calculation    Start Time --   30  -BC      PT Received On 17  -BC        User Key  (r) = Recorded By, (t) = Taken By, (c) = Cosigned By    Initials Name Provider Type    BC Soheila Corrigan, PT Physical Therapist          Therapy Charges for Today     Code Description Service Date Service Provider Modifiers Qty    77889273432 HC GAIT TRAINING EA 15 MIN 2017 Soheila Corrigan, PT GP 1    40989588547 HC PT THERAPEUTIC ACT EA 15 MIN 2017 Soheila Corrigan, PT GP 1    81708353970 HC PT THER SUPP EA 15 MIN 2017 Soheila Corrigan, PT GP 2          PT G-Codes  Outcome Measure Options: AM-PAC 6 Clicks Basic Mobility (PT)  Score: 17  Functional Limitation: Mobility: Walking and moving around  Mobility: Walking and Moving Around Current Status (): At least 40 percent but less than 60 percent impaired, limited or restricted  Mobility: Walking and Moving Around Goal Status (): At least 20 percent but less than 40 percent impaired, limited or restricted    Soheila Corrigan PT  2017          Electronically signed by Soheila Corrigan PT at 2017 10:42 AM           Occupational Therapy Notes (last 24 hours) (Notes from 2017  8:28 AM through 2017  8:28 AM)      Suzanne Porter OT at 2017  8:12 PM  Version 1 of 1         Acute Care - Occupational Therapy Treatment Note  JANEE Per[facility]     Patient Name: Domonique Cummings  : 1945  MRN: [id number]  Today's Date: 2017  Onset of Illness/Injury or Date of Surgery Date: 17 (swing bed admit date)     Referring Physician: Zoran      Admit Date: 2017    Visit Dx:   No  diagnosis found.  Patient Active Problem List   Diagnosis   • Pneumonia of left lower lobe due to infectious organism   • MRSA pneumonia             Adult Rehabilitation Note       12/21/17 2006 12/21/17 1030 12/20/17 1422    Rehab Assessment/Intervention    Discipline occupational therapist  -BC physical therapist  -BCA physical therapist  -CT    Document Type therapy note (daily note)  -BC therapy note (daily note)  -BCA therapy note (daily note)  -CT    Subjective Information agree to therapy;no complaints  -BC agree to therapy  -BCA agree to therapy  -CT    Patient Effort, Rehab Treatment good  -BC good  -BCA good  -CT    Precautions/Limitations other (see comments);fall precautions   isolation precautions  -BC fall precautions   isolation precautions  -BCA fall precautions;other (see comments)   isolation precautions  -CT    Patient Response to Treatment Pt. tolerated tx. well with minimal rest breaks.   Pt. presented very pleasant, agreeable to treatment session.  -BC  Pt tolerated todays treatment session well with rest breaks provided as needed.  -CT    Recorded by [BC] Suzanne Porter, OT [BCA] Soheila Corrigan, PT [CT] Moriah Vegas PT    Cognitive Assessment/Intervention    Current Cognitive/Communication Assessment functional  -BC functional  -BCA functional  -CT    Orientation Status oriented x 4  -BC oriented to;oriented x 4  -BCA oriented to;oriented x 4  -CT    Personal Safety Interventions fall prevention program maintained;muscle strengthening facilitated;nonskid shoes/slippers when out of bed  -BC fall prevention program maintained;gait belt;muscle strengthening facilitated;nonskid shoes/slippers when out of bed  -BCA fall prevention program maintained;gait belt;muscle strengthening facilitated;nonskid shoes/slippers when out of bed  -CT    Recorded by [BC] Suzanne Porter, OT [BCA] Soheila Corrigan, PT [CT] Moriah Vegas, CLARA    Bed Mobility, Assessment/Treatment    Bed Mobility, Assistive Device   bed rails  -BCA bed rails  -CT    Bed Mobility, Roll Left, [place]  minimum assist (75% patient effort)  -BCA minimum assist (75% patient effort)  -CT    Bed Mobility, Roll Right, [place]  minimum assist (75% patient effort)  -BCA minimum assist (75% patient effort)  -CT    Bed Mobility, Scoot/Bridge, [place]  minimum assist (75% patient effort)  -BCA minimum assist (75% patient effort)  -CT    Bed Mob, Supine to Sit, [place]  minimum assist (75% patient effort)  -BCA minimum assist (75% patient effort)  -CT    Bed Mobility, Impairments  strength decreased  -BCA strength decreased  -CT    Recorded by  [BCA] Soheila Corrigan, PT [CT] Moriah Vegas, PT    Transfer Assessment/Treatment    Transfers, Sit-Stand [place]  minimum assist (75% patient effort)  -BCA minimum assist (75% patient effort)  -CT    Transfers, Stand-Sit [place]  minimum assist (75% patient effort)  -BCA minimum assist (75% patient effort)  -CT    Transfers, Sit-Stand-Sit, Assist Device  rolling walker  -BCA rolling walker  -CT    Toilet Transfer, [place]  minimum assist (75% patient effort)  -BCA minimum assist (75% patient effort)  -CT    Toilet Transfer, Assistive Device  elevated toilet seat;rolling walker;other (see comments)   grab bars  -BCA elevated toilet seat;rolling walker;other (see comments)   grab bars  -CT    Recorded by  [BCA] Soheila Corrigan, PT [CT] Moriah Vegas, PT    Gait Assessment/Treatment    Gait, [place] Level  minimum assist (75% patient effort);2 person assist required;verbal cues required;nonverbal cues required (demo/gesture)  -BCA minimum assist (75% patient effort);2 person assist required;verbal cues required;nonverbal cues required (demo/gesture)  -CT    Gait, Assistive Device  rolling walker  -BCA rolling walker  -CT    Gait, Distance (Feet)  300  -  -CT    Gait, Gait Pattern Analysis  swing-to gait  -BCA swing-to gait  -CT    Gait, Impairments  strength decreased   -BCA strength decreased  -CT    Recorded by  [BCA] Soheila Corrigan, PT [CT] Moriah Vegas, CLARA    Toileting Assessment/Training    Toileting Assess/Train, Comment Supervision  -BC      Recorded by [BC] Suzanne Porter, OT      Grooming Assessment/Training    Grooming Assess/Train, Comment set up A/supervision  -BC      Recorded by [BC] Suzanne Porter, OT      Therapy Exercises    Bilateral Lower Extremities  AROM:;15 reps;sitting  -BCA AROM:;15 reps;sitting  -CT    Bilateral Upper Extremity AROM:;10 reps;20 reps;sitting;elbow flexion/extension;hand pumps;shoulder extension/flexion   [facility] lt. strengthening.  -BC      BUE Resistance manual resistance- minimal   [place]  -BC      Recorded by [BC] Suzanne Porter, OT [BCA] Soheila Corrigan, PT [CT] Moriah Vegas PT    Positioning and Restraints    Pre-Treatment Position sitting in chair/recliner  -BC in bed  -BCA in bed  -CT    Post Treatment Position chair  -BC chair  -BCA chair  -CT    In Chair encouraged to call for assist;call light within reach  -BC notified nsg;sitting;call light within reach;encouraged to call for assist  -BCA sitting;call light within reach;encouraged to call for assist  -CT    Recorded by [BC] Suzanne Porter, OT [BCA] Soheila Corrigan, PT [CT] Moriah Vegas PT      User Key  (r) = Recorded By, (t) = Taken By, (c) = Cosigned By    Initials Name Effective Dates    BCA Soheila Corrigan, PT 03/14/16 -     CT Moriah Vegas, PT 03/14/16 -     BC Suzanne Porter OT 01/21/17 -                 OT Goals       12/19/17 1643 12/12/17 1607       Strength OT LTG    Strength Goal OT LTG, Date Established 12/19/17  -KR 12/12/17  -KR     Strength Goal OT LTG, Time to Achieve by discharge  -KR by discharge  -KR     Strength Goal OT LTG, Measure to Achieve BUE increase x 1 to enhance self care/mobility skills  -KR BUE increase x 1 to enhance self care/fxl task performance  -KR     Occupational Therapy OT LTG    Occupational Therapy OT LTG, Date  Established  12/12/17  -KR     Occupational Therapy OT LTG, Time to Achieve  by discharge  -KR     Occupational Therapy OT LTG, Activity Type  provide finger orthosis to provide fxl positioning L hand. Pt. ind. with applications.  -KR     ADL OT LTG    ADL OT LTG, Date Established 12/19/17  -KR      ADL OT LTG, Time to Achieve by discharge  -KR      ADL OT LTG, Activity Type ADL skills  -KR      ADL OT LTG, [place] Level standby assist  -KR        User Key  (r) = Recorded By, (t) = Taken By, (c) = Cosigned By    Initials Name Provider Type    KOSTAS Goff OT Occupational Therapist                OT Recommendation and Plan  Planned Therapy Interventions: strengthening, activity intolerance  Therapy Frequency: 3-5 times/wk           Outcome Measures       12/21/17 1000 12/20/17 1600 12/20/17 1400    How much help from another person do you currently need...    Turning from your back to your side while in flat bed without using bedrails? 3  -BC  3  -CT    Moving from lying on back to sitting on the side of a flat bed without bedrails? 3  -BC  3  -CT    Moving to and from a bed to a chair (including a wheelchair)? 3  -BC  3  -CT    Standing up from a chair using your arms (e.g., wheelchair, bedside chair)? 3  -BC  3  -CT    Climbing 3-5 steps with a railing? 2  -BC  2  -CT    To walk in hospital room? 3  -BC  3  -CT    AM-PAC 6 Clicks Score 17  -BC  17  -CT    Functional Assessment    Outcome Measure Options AM-PAC 6 Clicks Basic Mobility (PT)  -BC AM-PAC 6 Clicks Daily Activity (OT)  - AM-PAC 6 Clicks Basic Mobility (PT)  -CT      12/19/17 1645 12/19/17 1600       How much help from another person do you currently need...    Turning from your back to your side while in flat bed without using bedrails?  3  -CT     Moving from lying on back to sitting on the side of a flat bed without bedrails?  3  -CT     Moving to and from a bed to a chair (including a wheelchair)?  3  -CT     Standing up from a chair using  your arms (e.g., wheelchair, bedside chair)?  3  -CT     Climbing 3-5 steps with a railing?  2  -CT     To walk in hospital room?  3  -CT     AM-PAC 6 Clicks Score  17  -CT     How much help from another is currently needed...    Putting on and taking off regular lower body clothing? 3  -KR      Bathing (including washing, rinsing, and drying) 3  -KR      Toileting (which includes using toilet bed pan or urinal) 3  -KR      Putting on and taking off regular upper body clothing 3  -KR      Taking care of personal grooming (such as brushing teeth) 3  -KR      Eating meals 3  -KR      Score 18  -KR      Functional Assessment    Outcome Measure Options  AM-PAC 6 Clicks Basic Mobility (PT)  -CT       User Key  (r) = Recorded By, (t) = Taken By, (c) = Cosigned By    Initials Name Provider Type    BC Soheila Corrigan, PT Physical Therapist    KR Erasmo Goff, OT Occupational Therapist    CT Moriah Vegas, PT Physical Therapist           Time Calculation:         Time Calculation- OT       12/21/17 2011          Time Calculation- OT    Total Timed Code Minutes- OT 25 minute(s)  -BC        User Key  (r) = Recorded By, (t) = Taken By, (c) = Cosigned By    Initials Name Provider Type    BC Suzanne Porter, OT Occupational Therapist           Therapy Charges for Today     Code Description Service Date Service Provider Modifiers Qty    86164724921  OT THERAPEUTIC ACT EA 15 MIN 12/21/2017 Suzanne Porter OT GO 1    [id number]  OT SELF CARE/MGMT/TRAIN EA 15 MIN 12/21/2017 Suzanne Porter OT GO 1          OT G-codes  Functional Limitation: Self care  Self Care Current Status (): At least 20 percent but less than 40 percent impaired, limited or restricted  Self Care Goal Status (): At least 1 percent but less than 20 percent impaired, limited or restricted    Suzanne Porter OT  12/21/2017     Electronically signed by Suzanne Porter OT at 12/21/2017  8:12 PM        Speech Language Pathology Notes (last 24 hours)  (Notes from 12/21/2017  8:28 AM through 12/22/2017  8:28 AM)     No notes of this type exist for this encounter.        Respiratory Therapy Notes (last 24 hours) (Notes from 12/21/2017  8:28 AM through 12/22/2017  8:28 AM)     No notes of this type exist for this encounter.             Discharge Summary      kSinny Meehan MD at 12/22/2017  6:40 AM          Date of Admission:   12/19/2017 11:07 AM    Date of Discharge:   12/22/2017     Discharge Diagnosis:   Principal Problem:    MRSA pneumonia  Active Problems:  1) pneumonia of left lower lobe   -MRSA  2) sepsis  3) metabolic encephalopathy-present at the time of admission  4) leukocytosis  5) anemia-multifactorial  6) DVT prophylaxis-subcutaneous Lovenox  7) disposition- December 19.   Presenting Problem/History of Present Illness  See History and Physical for Presenting Problems / Illnesses.       Hospital Course  Patient is a 72 y.o. female presented with pneumonia.  She has been admitted to swing bed status on 12/19/2017 due to ongoing debility area fortunately, her lung examination has continued to improve.  Her inflammatory markers including C-reactive protein have normalized.  She is currently able to cannulate with assistance.  She describes her breathing is approaching baseline.  The remainder hospital course is been relatively unremarkable with no acute changes or events during swing bed status.  Arrangements are being made for home health physical therapy and home health.  She'll follow-up with me in the office in one week.        Procedures Performed         Consults:   Consults     Date and Time Order Name Status Description    12/19/2017 1108 IP Consult to Internal Medicine      12/18/2017 0715 Inpatient Consult to Infectious Diseases Completed           Condition on Discharge:   Fair    Vital Signs  Temp:  [98.1 °F (36.7 °C)] 98.1 °F (36.7 °C)  Heart Rate:  [52-68] 62  Resp:  [18] 18  BP: (118-120)/(55-57) 120/57    Physical Exam:  Refer to  physical examination documented in progress note of this date    Discharge Disposition  Home or Self Care    Discharge Medications   Emiliano Domonique MANZO   Home Medication Instructions BRANDEE:[id number]    Printed on:12/22/17 2112   Medication Information                      acetaminophen-codeine (TYLENOL #3) 300-30 MG per tablet  Take 1 tablet by mouth 2 (Two) Times a Day As Needed for Moderate Pain .             ALPRAZolam (XANAX) 0.5 MG tablet  Take 0.5 mg by mouth 2 (Two) Times a Day As Needed.             amLODIPine (NORVASC) 5 MG tablet  Take 5 mg by mouth Daily.             aspirin 81 MG EC tablet  Take 81 mg by mouth Daily.             baclofen (LIORESAL) 10 MG tablet  Take 10 mg by mouth Every Night.             buPROPion XL (WELLBUTRIN XL) 300 MG 24 hr tablet  Take 300 mg by mouth Every Morning.             celecoxib (CELEBREX) 200 MG capsule  Take 200 mg by mouth 2 (Two) Times a Day.             cycloSPORINE (RESTASIS) 0.05 % ophthalmic emulsion  Administer 1 drop to both eyes 2 (Two) Times a Day.             doxycycline (MONODOX) 100 MG capsule  Take 1 capsule by mouth Every 12 (Twelve) Hours for 6 days. Indications: Pneumonia             DULoxetine (CYMBALTA) 60 MG capsule  Take 60 mg by mouth Daily.             gabapentin (NEURONTIN) 400 MG capsule  Take 400 mg by mouth 3 (Three) Times a Day.             ipratropium-albuterol (DUO-NEB) 0.5-2.5 mg/mL nebulizer  Take 3 mL by nebulization Every 6 (Six) Hours.             lactobacillus acidophilus (RISAQUAD) capsule capsule  Take 1 capsule by mouth Daily for 30 doses.             losartan (COZAAR) 100 MG tablet  Take 100 mg by mouth Daily.             metoprolol succinate XL (TOPROL-XL) 25 MG 24 hr tablet  Take 25 mg by mouth Daily.             omeprazole (priLOSEC) 20 MG capsule  Take 20 mg by mouth 2 (Two) Times a Day Before Meals.             oxybutynin XL (DITROPAN-XL) 10 MG 24 hr tablet  Take 10 mg by mouth Daily.             polyethylene glycol  (MIRALAX) packet  Take 17 g by mouth Daily.             topiramate (TOPAMAX) 100 MG tablet  Take 100 mg by mouth 2 (Two) Times a Day.                 Discharge Diet:   Diet Instructions     Advance Diet As Tolerated                  diabetic    Activity at Discharge:   Activity Instructions     Activity as Tolerated                  as tolerated    Follow-up Appointments  Additional Instructions for the Follow-ups that You Need to Schedule     Discharge Follow-up with PCP    As directed    Follow Up Details:  Dr. Meehan-one week           Referral to Home Health    As directed    Face to Face Visit Date:  12/22/2017    Follow-up Provider for Plan of Care?:  I will be treating the patient on an ongoing basis.  Please send me the Plan of Care for signature.    Follow-up Provider:  JENIFFER MEEHAN [[id number]]    Reason/Clinical Findings:  Debility    Describe mobility limitations that make leaving home difficult:  Recent pneumonia and debility    Nursing/Therapeutic Services Requested:  Skilled Nursing Physical Therapy    Skilled nursing orders:  Mini-nebs (Nurses to check CBC, CMP, C-reactive protein on December 26 and weekly ×3 weeks.)    PT orders:  Therapeutic exercise Gait Training Transfer training Strengthening    Weight Bearing Status:  As Tolerated    Frequency:  1 Week 1                 Follow-up Information     Follow up with Jeniffer Meehan MD .    Specialty:  Internal Medicine    Why:  Dr. Meehan-one week    Contact information:    [address] [place] [address]  [phone]          Follow up with Hiro Menard MD .    Specialty:  Gastroenterology    Why:  Dr. Meehan-one week    Contact information:    [address] [place] [address]  [phone]             Jeniffer Meehan MD  12/22/17  6:40 AM    Time: Discharge 32 min         Electronically signed by Jeniffer Meehan MD at 12/22/2017  6:43 AM        Discharge Order     Start     Ordered    12/22/17 0638  Discharge  patient  Once     Expected Discharge Date:  17    Discharge Disposition:  Home or Self Care        17 0640         [facility] [address]  [facility] [address]  Phone:  [phone]  Fax:   Date: Dec 22, 2017      Referral to Home Health     Patient:  Domonique Cummings MRN:  [id number]   [address]   [facility] [address] :  1945  SSN:    Phone: [phone] Sex:  F      INSURANCE PAYOR PLAN [employer] # SUBSCRIBER ID   Primary: MEDICARE [id number]   [id number]      Referring Provider Information:  JENIFFER RAMSEY Phone: [phone] Fax:       Referral Information:   # Visits:  1 Referral Type: Home Health [42]   Urgency:  Routine Referral Reason: Specialty Services Required   Start Date: Dec 22, 2017 End Date:  To be determined by Insurer   Diagnosis: Pneumonia of upper lobe due to methicillin resistant Staphylococcus aureus (MRSA), unspecified laterality (J15.212 [ICD-10-CM] 482.42 [ICD-9-CM])      Refer to Dept:   Refer to Provider:   Refer to Facility:       Face to Face Visit Date: 2017  Follow-up Provider for Plan of Care? I will be treating the patient on an ongoing basis.  Please send me the Plan of Care for signature.  Follow-up Provider: JENIFFER RAMSEY [[id number]]  Reason/Clinical Findings: Debility  Describe mobility limitations that make leaving home difficult: Recent pneumonia and debility  Nursing/Therapeutic Services Requested: Skilled Nursing  Nursing/Therapeutic Services Requested: Physical Therapy  Skilled nursing orders: Mini-nebs (Nurses to check CBC, CMP, C-reactive protein on  and weekly ×3 weeks.)  PT orders: Therapeutic exercise  PT orders: Gait Training  PT orders: Transfer training  PT orders: Strengthening  Weight Bearing Status: As Tolerated  Frequency: 1 Week 1     This document serves as a request of services and does not constitute Insurance authorization or approval of services.  To determine eligibility, please  contact the members Insurance carrier to verify and review coverage.     If you have medical questions regarding this request for services. Please contact [address] at [phone] between the hours of 8:00am - 5:00pm (Mon-Fri).             Authorizing Provider:Skinny Meehan MD  Authorizing Provider's NPI: [id number]  Order Entered By: Skinny Meehan MD 12/22/2017  6:40 AM     Electronically signed by: Skinny Meehan MD 12/22/2017  6:40 AM

## 2017-12-22 NOTE — PLAN OF CARE
Problem: Patient Care Overview (Adult)  Goal: Plan of Care Review  Outcome: Ongoing (interventions implemented as appropriate)    Goal: Discharge Needs Assessment  Outcome: Ongoing (interventions implemented as appropriate)      Problem: Pneumonia (Adult)  Goal: Signs and Symptoms of Listed Potential Problems Will be Absent or Manageable (Pneumonia)  Outcome: Ongoing (interventions implemented as appropriate)      Problem: Fall Risk (Adult)  Goal: Identify Related Risk Factors and Signs and Symptoms  Outcome: Ongoing (interventions implemented as appropriate)    Goal: Absence of Falls  Outcome: Ongoing (interventions implemented as appropriate)      Problem: Infection, Risk/Actual (Adult)  Goal: Identify Related Risk Factors and Signs and Symptoms  Outcome: Ongoing (interventions implemented as appropriate)    Goal: Infection Prevention/Resolution  Outcome: Ongoing (interventions implemented as appropriate)      Problem: Activity Intolerance (Adult)  Goal: Identify Related Risk Factors and Signs and Symptoms  Outcome: Ongoing (interventions implemented as appropriate)    Goal: Activity Tolerance  Outcome: Ongoing (interventions implemented as appropriate)    Goal: Effective Energy Conservation Techniques  Outcome: Ongoing (interventions implemented as appropriate)

## 2017-12-22 NOTE — THERAPY TREATMENT NOTE
Acute Care - Occupational Therapy Treatment Note   Stu     Patient Name: Domonique Cummings  : 1945  MRN: [id number]  Today's Date: 2017  Onset of Illness/Injury or Date of Surgery Date: 17 (swing bed admit date)     Referring Physician: Zoran      Admit Date: 2017    Visit Dx:     ICD-10-CM ICD-9-CM   1. Pneumonia of upper lobe due to methicillin resistant Staphylococcus aureus (MRSA), unspecified laterality J15.212 482.42     Patient Active Problem List   Diagnosis   • Pneumonia of left lower lobe due to infectious organism   • MRSA pneumonia             Adult Rehabilitation Note       17 1026 17 1030    Rehab Assessment/Intervention    Discipline occupational therapist  -KR occupational therapist  -BC physical therapist  -BCA    Document Type therapy note (daily note)  -KR therapy note (daily note)  -BC therapy note (daily note)  -BCA    Subjective Information agree to therapy  -KR agree to therapy;no complaints  -BC agree to therapy  -BCA    Patient Effort, Rehab Treatment  good  -BC good  -BCA    Precautions/Limitations  other (see comments);fall precautions   isolation precautions  -BC fall precautions   isolation precautions  -BCA    Patient Response to Treatment  Pt. tolerated tx. well with minimal rest breaks.   Pt. presented very pleasant, agreeable to treatment session.  -BC     Recorded by [KR] Erasmo Goff, OT [BC] Suzanne Porter, OT [BCA] Soheila Corrigan, CLARA    Cognitive Assessment/Intervention    Current Cognitive/Communication Assessment  functional  -BC functional  -BCA    Orientation Status  oriented x 4  -BC oriented to;oriented x 4  -BCA    Personal Safety Interventions  fall prevention program maintained;muscle strengthening facilitated;nonskid shoes/slippers when out of bed  -BC fall prevention program maintained;gait belt;muscle strengthening facilitated;nonskid shoes/slippers when out of bed  -BCA    Recorded by  [BC] Suzanne Porter,  OT [BCA] Soheila Corrigan, PT    Bed Mobility, Assessment/Treatment    Bed Mobility, Assistive Device   bed rails  -BCA    Bed Mobility, Roll Left, [place]   minimum assist (75% patient effort)  -BCA    Bed Mobility, Roll Right, [place]   minimum assist (75% patient effort)  -BCA    Bed Mobility, Scoot/Bridge, [place]   minimum assist (75% patient effort)  -BCA    Bed Mob, Supine to Sit, [place]   minimum assist (75% patient effort)  -BCA    Bed Mobility, Impairments   strength decreased  -BCA    Recorded by   [BCA] Soheila Corrigan, PT    Transfer Assessment/Treatment    Transfers, Sit-Stand [place]   minimum assist (75% patient effort)  -BCA    Transfers, Stand-Sit [place]   minimum assist (75% patient effort)  -BCA    Transfers, Sit-Stand-Sit, Assist Device   rolling walker  -BCA    Toilet Transfer, [place]   minimum assist (75% patient effort)  -BCA    Toilet Transfer, Assistive Device   elevated toilet seat;rolling walker;other (see comments)   grab bars  -BCA    Recorded by   [BCA] Soheila Corrigan, PT    Gait Assessment/Treatment    Gait, [place] Level   minimum assist (75% patient effort);2 person assist required;verbal cues required;nonverbal cues required (demo/gesture)  -BCA    Gait, Assistive Device   rolling walker  -BCA    Gait, Distance (Feet)   300  -BCA    Gait, Gait Pattern Analysis   swing-to gait  -BCA    Gait, Impairments   strength decreased  -BCA    Recorded by   [BCA] Soheila Corrigan, PT    Upper Body Bathing Assessment/Training    UB Bathing Assess/Train, [place] Level stand by assist  -KR      Recorded by [KR] Erasmo Goff OT      Lower Body Bathing Assessment/Training    LB Bathing Assess/Train, [place] Level contact guard assist  -KR      Recorded by [KR] Erasmo Goff OT      Upper Body Dressing Assessment/Training    UB Dressing Assess/Train, [place] set up required  -KR      Recorded by [KR] Erasmo Goff, OT      Lower Body  Dressing Assessment/Training    LB Dressing Assess/Train, [place] contact guard assist  -KR      Recorded by [KR] Erasmo Goff OT      Toileting Assessment/Training    Toileting Assess/Train, [place] contact guard assist  -KR      Toileting Assess/Train, Comment  Supervision  -BC     Recorded by [KR] Erasmo Goff OT [BC] Suzanne Porter OT     Grooming Assessment/Training    Grooming Assess/Train, [place] set up required  -KR      Grooming Assess/Train, Comment  set up A/supervision  -BC     Recorded by [KR] Erasmo Goff OT [BC] Suzanne Porter OT     Therapy Exercises    Bilateral Lower Extremities   AROM:;15 reps;sitting  -BCA    Bilateral Upper Extremity  AROM:;10 reps;20 reps;sitting;elbow flexion/extension;hand pumps;shoulder extension/flexion   [facility] lt. strengthening.  -BC     BUE Resistance  manual resistance- minimal   [place]abar  -BC     Recorded by  [BC] Suzanne Porter, OT [BCA] Soheila Corrigan, PT    Positioning and Restraints    Pre-Treatment Position  sitting in chair/recliner  -BC in bed  -BCA    Post Treatment Position  chair  -BC chair  -BCA    In Chair  encouraged to call for assist;call light within reach  -BC notified nsg;sitting;call light within reach;encouraged to call for assist  -BCA    Recorded by  [BC] uSzanne Porter, OT [BCA] Soheila Corrigan, PT      12/20/17 5582          Rehab Assessment/Intervention    Discipline physical therapist  -CT      Document Type therapy note (daily note)  -CT      Subjective Information agree to therapy  -CT      Patient Effort, Rehab Treatment good  -CT      Precautions/Limitations fall precautions;other (see comments)   isolation precautions  -CT      Patient Response to Treatment Pt tolerated todays treatment session well with rest breaks provided as needed.  -CT      Recorded by [CT] Moriah Vegas, CLARA      Cognitive Assessment/Intervention    Current Cognitive/Communication Assessment functional  -CT      Orientation Status oriented  to;oriented x 4  -CT      Personal Safety Interventions fall prevention program maintained;gait belt;muscle strengthening facilitated;nonskid shoes/slippers when out of bed  -CT      Recorded by [CT] Moriah Vegas PT      Bed Mobility, Assessment/Treatment    Bed Mobility, Assistive Device bed rails  -CT      Bed Mobility, Roll Left, [place] minimum assist (75% patient effort)  -CT      Bed Mobility, Roll Right, [place] minimum assist (75% patient effort)  -CT      Bed Mobility, Scoot/Bridge, [place] minimum assist (75% patient effort)  -CT      Bed Mob, Supine to Sit, [place] minimum assist (75% patient effort)  -CT      Bed Mobility, Impairments strength decreased  -CT      Recorded by [CT] Moriah Vegas PT      Transfer Assessment/Treatment    Transfers, Sit-Stand [place] minimum assist (75% patient effort)  -CT      Transfers, Stand-Sit [place] minimum assist (75% patient effort)  -CT      Transfers, Sit-Stand-Sit, Assist Device rolling walker  -CT      Toilet Transfer, [place] minimum assist (75% patient effort)  -CT      Toilet Transfer, Assistive Device elevated toilet seat;rolling walker;other (see comments)   grab bars  -CT      Recorded by [CT] Moriah Vegas PT      Gait Assessment/Treatment    Gait, [place] Level minimum assist (75% patient effort);2 person assist required;verbal cues required;nonverbal cues required (demo/gesture)  -CT      Gait, Assistive Device rolling walker  -CT      Gait, Distance (Feet) 260  -CT      Gait, Gait Pattern Analysis swing-to gait  -CT      Gait, Impairments strength decreased  -CT      Recorded by [CT] Moriah Vegas PT      Therapy Exercises    Bilateral Lower Extremities AROM:;15 reps;sitting  -CT      Recorded by [CT] Moriah Vegas PT      Positioning and Restraints    Pre-Treatment Position in bed  -CT      Post Treatment Position chair  -CT      In Chair sitting;call light within reach;encouraged to call for assist   -CT      Recorded by [CT] Moriah Vegas, PT        User Key  (r) = Recorded By, (t) = Taken By, (c) = Cosigned By    Initials Name Effective Dates    BCA Soheila Corrigan, PT 03/14/16 -     KR Erasmo Goff, OT 03/14/16 -     CT Moriah Vegas, PT 03/14/16 -     BC Suzanne Porter, OT 01/21/17 -                 OT Goals       12/19/17 1643 12/12/17 1607       Strength OT LTG    Strength Goal OT LTG, Date Established 12/19/17  -KR 12/12/17  -KR     Strength Goal OT LTG, Time to Achieve by discharge  -KR by discharge  -KR     Strength Goal OT LTG, Measure to Achieve BUE increase x 1 to enhance self care/mobility skills  -KR BUE increase x 1 to enhance self care/fxl task performance  -KR     Occupational Therapy OT LTG    Occupational Therapy OT LTG, Date Established  12/12/17  -KR     Occupational Therapy OT LTG, Time to Achieve  by discharge  -KR     Occupational Therapy OT LTG, Activity Type  provide finger orthosis to provide fxl positioning L hand. Pt. ind. with applications.  -KR     ADL OT LTG    ADL OT LTG, Date Established 12/19/17  -KR      ADL OT LTG, Time to Achieve by discharge  -KR      ADL OT LTG, Activity Type ADL skills  -KR      ADL OT LTG, [place] Level standby assist  -KR        User Key  (r) = Recorded By, (t) = Taken By, (c) = Cosigned By    Initials Name Provider Type    KOSTAS Goff OT Occupational Therapist                OT Recommendation and Plan  Planned Therapy Interventions: strengthening, activity intolerance  Therapy Frequency: 3-5 times/wk           Outcome Measures       12/21/17 1000 12/20/17 1600 12/20/17 1400    How much help from another person do you currently need...    Turning from your back to your side while in flat bed without using bedrails? 3  -BC  3  -CT    Moving from lying on back to sitting on the side of a flat bed without bedrails? 3  -BC  3  -CT    Moving to and from a bed to a chair (including a wheelchair)? 3  -BC  3  -CT    Standing up from a chair using  your arms (e.g., wheelchair, bedside chair)? 3  -BC  3  -CT    Climbing 3-5 steps with a railing? 2  -BC  2  -CT    To walk in hospital room? 3  -BC  3  -CT    AM-PAC 6 Clicks Score 17  -BC  17  -CT    Functional Assessment    Outcome Measure Options AM-PAC 6 Clicks Basic Mobility (PT)  -BC AM-PAC 6 Clicks Daily Activity (OT)  -KR AM-PAC 6 Clicks Basic Mobility (PT)  -CT      12/19/17 1645 12/19/17 1600       How much help from another person do you currently need...    Turning from your back to your side while in flat bed without using bedrails?  3  -CT     Moving from lying on back to sitting on the side of a flat bed without bedrails?  3  -CT     Moving to and from a bed to a chair (including a wheelchair)?  3  -CT     Standing up from a chair using your arms (e.g., wheelchair, bedside chair)?  3  -CT     Climbing 3-5 steps with a railing?  2  -CT     To walk in hospital room?  3  -CT     AM-PAC 6 Clicks Score  17  -CT     How much help from another is currently needed...    Putting on and taking off regular lower body clothing? 3  -KR      Bathing (including washing, rinsing, and drying) 3  -KR      Toileting (which includes using toilet bed pan or urinal) 3  -KR      Putting on and taking off regular upper body clothing 3  -KR      Taking care of personal grooming (such as brushing teeth) 3  -KR      Eating meals 3  -KR      Score 18  -KR      Functional Assessment    Outcome Measure Options  AM-PAC 6 Clicks Basic Mobility (PT)  -CT       User Key  (r) = Recorded By, (t) = Taken By, (c) = Cosigned By    Initials Name Provider Type    BC Soheila Corrigan, PT Physical Therapist    KR Erasmo Goff, OT Occupational Therapist    CT Moriah Vegas, PT Physical Therapist           Time Calculation:         Time Calculation- OT       12/22/17 1027          Time Calculation- OT    Total Timed Code Minutes- OT 15 minute(s)  -KR        User Key  (r) = Recorded By, (t) = Taken By, (c) = Cosigned By    Initials Name  Provider Type    KR Erasmo Goff OT Occupational Therapist           Therapy Charges for Today     Code Description Service Date Service Provider Modifiers Qty    72169702158 HC OT SELFCARE CURRENT 12/22/2017 Erasmo Goff OT GO, CI 1    37625651457 HC OT SELFCARE PROJECTED 12/22/2017 Erasmo Goff OT GO, CI 1    [id number]  OT SELF CARE/MGMT/TRAIN EA 15 MIN 12/22/2017 Erasmo Goff OT GO 1          OT G-codes  Functional Limitation: Self care  Self Care Current Status (): At least 1 percent but less than 20 percent impaired, limited or restricted  Self Care Goal Status (): At least 1 percent but less than 20 percent impaired, limited or restricted    Erasmo Goff OT  12/22/2017

## 2017-12-22 NOTE — DISCHARGE INSTR - APPOINTMENTS
Follow up appointment has been scheduled with Dr. Meehan on Jan 2, 2018 at 11:15 am.  You can reach the office at [phone].

## 2017-12-22 NOTE — THERAPY TREATMENT NOTE
Acute Care - Occupational Therapy Treatment Note   Stu     Patient Name: Domonique Cummings  : 1945  MRN: [id number]  Today's Date: 2017  Onset of Illness/Injury or Date of Surgery Date: 17 (swing bed admit date)     Referring Physician: Zoran      Admit Date: 2017    Visit Dx:   No diagnosis found.  Patient Active Problem List   Diagnosis   • Pneumonia of left lower lobe due to infectious organism   • MRSA pneumonia             Adult Rehabilitation Note       17 1030 17 1422    Rehab Assessment/Intervention    Discipline occupational therapist  -BC physical therapist  -BCA physical therapist  -CT    Document Type therapy note (daily note)  -BC therapy note (daily note)  -BCA therapy note (daily note)  -CT    Subjective Information agree to therapy;no complaints  -BC agree to therapy  -BCA agree to therapy  -CT    Patient Effort, Rehab Treatment good  -BC good  -BCA good  -CT    Precautions/Limitations other (see comments);fall precautions   isolation precautions  -BC fall precautions   isolation precautions  -BCA fall precautions;other (see comments)   isolation precautions  -CT    Patient Response to Treatment Pt. tolerated tx. well with minimal rest breaks.   Pt. presented very pleasant, agreeable to treatment session.  -BC  Pt tolerated todays treatment session well with rest breaks provided as needed.  -CT    Recorded by [BC] Suzanne Porter, OT [BCA] Soheila Corrigan, PT [CT] Moriah Vegas, CLARA    Cognitive Assessment/Intervention    Current Cognitive/Communication Assessment functional  -BC functional  -BCA functional  -CT    Orientation Status oriented x 4  -BC oriented to;oriented x 4  -BCA oriented to;oriented x 4  -CT    Personal Safety Interventions fall prevention program maintained;muscle strengthening facilitated;nonskid shoes/slippers when out of bed  -BC fall prevention program maintained;gait belt;muscle strengthening facilitated;nonskid  shoes/slippers when out of bed  -BCA fall prevention program maintained;gait belt;muscle strengthening facilitated;nonskid shoes/slippers when out of bed  -CT    Recorded by [BC] Suzanne Porter, OT [BCA] Soheila Corrigan, PT [CT] Moriah Vegas, PT    Bed Mobility, Assessment/Treatment    Bed Mobility, Assistive Device  bed rails  -BCA bed rails  -CT    Bed Mobility, Roll Left, [place]  minimum assist (75% patient effort)  -BCA minimum assist (75% patient effort)  -CT    Bed Mobility, Roll Right, [place]  minimum assist (75% patient effort)  -BCA minimum assist (75% patient effort)  -CT    Bed Mobility, Scoot/Bridge, [place]  minimum assist (75% patient effort)  -BCA minimum assist (75% patient effort)  -CT    Bed Mob, Supine to Sit, [place]  minimum assist (75% patient effort)  -BCA minimum assist (75% patient effort)  -CT    Bed Mobility, Impairments  strength decreased  -BCA strength decreased  -CT    Recorded by  [BCA] Soheila Corrigan, PT [CT] Moriah Vegas, PT    Transfer Assessment/Treatment    Transfers, Sit-Stand [place]  minimum assist (75% patient effort)  -BCA minimum assist (75% patient effort)  -CT    Transfers, Stand-Sit [place]  minimum assist (75% patient effort)  -BCA minimum assist (75% patient effort)  -CT    Transfers, Sit-Stand-Sit, Assist Device  rolling walker  -[facility] rolling walker  -CT    Toilet Transfer, [place]  minimum assist (75% patient effort)  -BCA minimum assist (75% patient effort)  -CT    Toilet Transfer, Assistive Device  elevated toilet seat;rolling walker;other (see comments)   grab bars  -[facility] elevated toilet seat;rolling walker;other (see comments)   grab bars  -CT    Recorded by  [BCA] Soheila Corrigan, PT [CT] Moriah Vegas, PT    Gait Assessment/Treatment    Gait, [place] Level  minimum assist (75% patient effort);2 person assist required;verbal cues required;nonverbal cues required (demo/gesture)  -BCA minimum assist (75% patient  effort);2 person assist required;verbal cues required;nonverbal cues required (demo/gesture)  -CT    Gait, Assistive Device  rolling walker  -BCA rolling walker  -CT    Gait, Distance (Feet)  300  -  -CT    Gait, Gait Pattern Analysis  swing-to gait  -BCA swing-to gait  -CT    Gait, Impairments  strength decreased  -BCA strength decreased  -CT    Recorded by  [BCA] Soheila Corrigan, PT [CT] Moriah Vegas PT    Toileting Assessment/Training    Toileting Assess/Train, Comment Supervision  -BC      Recorded by [BC] Suzanne Porter, OT      Grooming Assessment/Training    Grooming Assess/Train, Comment set up A/supervision  -BC      Recorded by [BC] Suzanne Porter, OT      Therapy Exercises    Bilateral Lower Extremities  AROM:;15 reps;sitting  -BCA AROM:;15 reps;sitting  -CT    Bilateral Upper Extremity AROM:;10 reps;20 reps;sitting;elbow flexion/extension;hand pumps;shoulder extension/flexion   [facility] lt. strengthening.  -BC      BUE Resistance manual resistance- minimal   [place]  -BC      Recorded by [BC] Suzanne Porter, OT [BCA] Soheila Corrigan, PT [CT] Moriah Vegas PT    Positioning and Restraints    Pre-Treatment Position sitting in chair/recliner  -BC in bed  -BCA in bed  -CT    Post Treatment Position chair  -BC chair  -BCA chair  -CT    In Chair encouraged to call for assist;call light within reach  -BC notified nsg;sitting;call light within reach;encouraged to call for assist  -BCA sitting;call light within reach;encouraged to call for assist  -CT    Recorded by [BC] Suzanne Porter, OT [BCA] Soheila Corrigan, PT [CT] Moriah Vegas PT      User Key  (r) = Recorded By, (t) = Taken By, (c) = Cosigned By    Initials Name Effective Dates    BCA Soheila Corrigan, PT 03/14/16 -     CT Moriah Vegas, CLARA 03/14/16 -     BC Suzanne Porter OT 01/21/17 -                 OT Goals       12/19/17 1643 12/12/17 1607       Strength OT LTG    Strength Goal OT LTG, Date Established 12/19/17  -KR 12/12/17  -KR      Strength Goal OT LTG, Time to Achieve by discharge  -KR by discharge  -KR     Strength Goal OT LTG, Measure to Achieve BUE increase x 1 to enhance self care/mobility skills  -KR BUE increase x 1 to enhance self care/fxl task performance  -KR     Occupational Therapy OT LTG    Occupational Therapy OT LTG, Date Established  12/12/17  -KR     Occupational Therapy OT LTG, Time to Achieve  by discharge  -KR     Occupational Therapy OT LTG, Activity Type  provide finger orthosis to provide fxl positioning L hand. Pt. ind. with applications.  -KR     ADL OT LTG    ADL OT LTG, Date Established 12/19/17  -KR      ADL OT LTG, Time to Achieve by discharge  -KR      ADL OT LTG, Activity Type ADL skills  -KR      ADL OT LTG, [place] Level standby assist  -KR        User Key  (r) = Recorded By, (t) = Taken By, (c) = Cosigned By    Initials Name Provider Type    KOSTAS Goff OT Occupational Therapist                OT Recommendation and Plan  Planned Therapy Interventions: strengthening, activity intolerance  Therapy Frequency: 3-5 times/wk           Outcome Measures       12/21/17 1000 12/20/17 1600 12/20/17 1400    How much help from another person do you currently need...    Turning from your back to your side while in flat bed without using bedrails? 3  -BC  3  -CT    Moving from lying on back to sitting on the side of a flat bed without bedrails? 3  -BC  3  -CT    Moving to and from a bed to a chair (including a wheelchair)? 3  -BC  3  -CT    Standing up from a chair using your arms (e.g., wheelchair, bedside chair)? 3  -BC  3  -CT    Climbing 3-5 steps with a railing? 2  -BC  2  -CT    To walk in hospital room? 3  -BC  3  -CT    AM-PAC 6 Clicks Score 17  -BC  17  -CT    Functional Assessment    Outcome Measure Options AM-PAC 6 Clicks Basic Mobility (PT)  -BC AM-PAC 6 Clicks Daily Activity (OT)  -KR AM-PAC 6 Clicks Basic Mobility (PT)  -CT      12/19/17 1645 12/19/17 1600       How much help from another person do  you currently need...    Turning from your back to your side while in flat bed without using bedrails?  3  -CT     Moving from lying on back to sitting on the side of a flat bed without bedrails?  3  -CT     Moving to and from a bed to a chair (including a wheelchair)?  3  -CT     Standing up from a chair using your arms (e.g., wheelchair, bedside chair)?  3  -CT     Climbing 3-5 steps with a railing?  2  -CT     To walk in hospital room?  3  -CT     AM-PAC 6 Clicks Score  17  -CT     How much help from another is currently needed...    Putting on and taking off regular lower body clothing? 3  -KR      Bathing (including washing, rinsing, and drying) 3  -KR      Toileting (which includes using toilet bed pan or urinal) 3  -KR      Putting on and taking off regular upper body clothing 3  -KR      Taking care of personal grooming (such as brushing teeth) 3  -KR      Eating meals 3  -KR      Score 18  -KR      Functional Assessment    Outcome Measure Options  AM-PAC 6 Clicks Basic Mobility (PT)  -CT       User Key  (r) = Recorded By, (t) = Taken By, (c) = Cosigned By    Initials Name Provider Type    BC Soheila Corrigan, PT Physical Therapist    KR Erasmo Goff, OT Occupational Therapist    CT Moriah Vegas, PT Physical Therapist           Time Calculation:         Time Calculation- OT       12/21/17 2011          Time Calculation- OT    Total Timed Code Minutes- OT 25 minute(s)  -BC        User Key  (r) = Recorded By, (t) = Taken By, (c) = Cosigned By    Initials Name Provider Type    [facility], OT Occupational Therapist           Therapy Charges for Today     Code Description Service Date Service Provider Modifiers Qty    [id number]  OT THERAPEUTIC ACT EA 15 MIN 12/21/2017 [facility], OT GO 1    [id number]  OT SELF CARE/MGMT/TRAIN EA 15 MIN 12/21/2017 [facility], OT GO 1          OT G-codes  Functional Limitation: Self care  Self Care Current Status (): At least 20 percent but less than 40  percent impaired, limited or restricted  Self Care Goal Status (): At least 1 percent but less than 20 percent impaired, limited or restricted    Suzanne Porter, OT  12/21/2017

## 2017-12-22 NOTE — DISCHARGE INSTR - LAB
[facility] has been arranged for home services.  You can reach the office at .      [facility] arranged for home by medical social worker.

## 2017-12-22 NOTE — PROGRESS NOTES
"  I have personally seen and examined the patient today and discussed overnight interval progress and pertinent issues with nursing staff.    Subjective:    Case discussed with Dr. Meehan.  Patient overall has shown significant clinical improvement with normal CRP and resolving leukocytosis.        History taken from: patient chart RN        Vital Signs    /60 (BP Location: Right arm, Patient Position: Lying)  Pulse 64  Temp 98.5 °F (36.9 °C) (Oral)   Resp 18  Ht 152.4 cm (60\")  Wt 98.2 kg (216 lb 9.5 oz)  SpO2 91% Comment: o2 sat 96-91 with ambulation approximately 300 ft w walker  BMI 42.3 kg/m2    Temp:  [98.5 °F (36.9 °C)] 98.5 °F (36.9 °C)      Intake/Output Summary (Last 24 hours) at 12/22/17 1007  Last data filed at 12/22/17 0450   Gross per 24 hour   Intake             1560 ml   Output                0 ml   Net             1560 ml     Intake & Output (last 3 days)       12/19 0701 - 12/20 0700 12/20 0701 - 12/21 0700 12/21 0701 - 12/22 0700 12/22 0701 - 12/23 0700    P.O. 1220 1800 2040     Total Intake(mL/kg) 1220 (12.8) 1800 (18.5) 2040 (20.8)     Net +1220 +1800 +2040              Unmeasured Urine Occurrence 8 x 9 x 6 x     Unmeasured Stool Occurrence 4 x 4 x 5 x           Physical Exam:       General Appearance:    Alert, cooperative, in no acute distress,On 2 L    Head:    Normocephalic, without obvious abnormality, atraumatic   Eyes:            Lids and lashes normal, conjunctivae and sclerae normal, no   icterus, no pallor, corneas clear, PERRLA   Ears:    Ears appear intact with no abnormalities noted   Throat:   No oral lesions, no thrush, oral mucosa moist   Neck:   No adenopathy, supple, trachea midline, no thyromegaly, no   carotid bruit, no JVD   Back:     No tenderness to percussion or palpation, range of motion   normal   Lungs:     Mild wheezing on end expiration bilaterally     Heart:    Regular rhythm and normal rate, normal S1 and S2, no            murmur, no gallop, no rub, " no click   Chest Wall:    No abnormalities observed   Abdomen:     Normal bowel sounds, no masses, no organomegaly, soft        non-tender, non-distended, no guarding, no rebound                tenderness   Rectal:     Deferred   Extremities:   Moves all extremities well, no edema, no cyanosis, no             redness   Pulses:   Pulses palpable and equal bilaterally   Skin:   No bleeding, bruising or rash,Right upper extremity powerglide    Lymph nodes:   No palpable adenopathy   Neurologic:   Awake, alert and oriented x 3. Following commands.         Results:      Results from last 7 days  Lab Units 12/21/17 0133 12/19/17 0101 12/17/17  0112   WBC 10*3/mm3 9.82 9.46 10.17     Lab Results   Component Value Date    NEUTROABS 5.33 12/21/2017         Results from last 7 days  Lab Units 12/21/17 0133   CREATININE mg/dL 0.96         Results from last 7 days  Lab Units 12/21/17 0133 12/19/17 0101 12/17/17 0112   CRP mg/dL 0.87 1.72* 1.85*       Results Review:    I have personally reviewed laboratory data, culture results, radiology studies and antimicrobial therapy.    Hospital Medications (active)       Dose Frequency Start End    acetaminophen-codeine (TYLENOL #3) 300-30 MG per tablet 1 tablet 1 tablet Every 4 Hours PRN 12/19/2017 12/21/2017    Sig - Route: Take 1 tablet by mouth Every 4 (Four) Hours As Needed for Moderate Pain . - Oral    ALPRAZolam (XANAX) tablet 0.5 mg 0.5 mg 2 Times Daily PRN 12/19/2017 12/21/2017    Sig - Route: Take 1 tablet by mouth 2 (Two) Times a Day As Needed for Anxiety. - Oral    amLODIPine (NORVASC) tablet 5 mg 5 mg Every 24 Hours Scheduled 12/20/2017     Sig - Route: Take 1 tablet by mouth Daily. - Oral    aspirin EC tablet 81 mg 81 mg Daily 12/20/2017     Sig - Route: Take 1 tablet by mouth Daily. - Oral    celecoxib (CeleBREX) capsule 200 mg 200 mg 2 Times Daily PRN 12/19/2017     Sig - Route: Take 1 capsule by mouth 2 (Two) Times a Day As Needed for Mild Pain . - Oral     doxycycline (MONODOX) capsule 100 mg 100 mg Every 12 Hours 12/19/2017 12/25/2017    Sig - Route: Take 1 capsule by mouth Every 12 (Twelve) Hours. - Oral    DULoxetine (CYMBALTA) DR capsule 60 mg 60 mg Daily 12/20/2017     Sig - Route: Take 1 capsule by mouth Daily. - Oral    enoxaparin (LOVENOX) syringe 40 mg 40 mg Daily 12/20/2017     Sig - Route: Inject 0.4 mL under the skin Daily. - Subcutaneous    gabapentin (NEURONTIN) capsule 400 mg 400 mg Every 12 Hours Scheduled 12/19/2017     Sig - Route: Take 1 capsule by mouth Every 12 (Twelve) Hours. - Oral    ipratropium-albuterol (DUO-NEB) nebulizer solution 3 mL 3 mL Every 6 Hours - RT 12/19/2017     Sig - Route: Take 3 mL by nebulization Every 6 (Six) Hours. - Nebulization    losartan (COZAAR) tablet 100 mg 100 mg Every 24 Hours Scheduled 12/20/2017     Sig - Route: Take 2 tablets by mouth Daily. - Oral    metoprolol succinate XL (TOPROL-XL) 24 hr tablet 25 mg 25 mg Every 24 Hours Scheduled 12/20/2017     Sig - Route: Take 1 tablet by mouth Daily. - Oral    oxybutynin XL (DITROPAN-XL) 24 hr tablet 10 mg 10 mg Daily 12/20/2017     Sig - Route: Take 2 tablets by mouth Daily. - Oral    pantoprazole (PROTONIX) EC tablet 40 mg 40 mg Every Early Morning 12/20/2017     Sig - Route: Take 1 tablet by mouth Every Morning. - Oral    polyethylene glycol (MIRALAX) powder 17 g 17 g Daily 12/20/2017     Sig - Route: Take 17 g by mouth Daily. - Oral    polyvinyl alcohol-povidone (HYPOTEARS) 1.4-0.6 % ophthalmic solution 1 drop 1 drop Every 12 Hours Scheduled 12/19/2017     Sig - Route: Administer 1 drop to both eyes Every 12 (Twelve) Hours. - Both Eyes    Risaquad-2 capsule 1 capsule 1 capsule Daily 12/20/2017 12/26/2017    Sig - Route: Take 1 capsule by mouth Daily. - Oral    sodium chloride 0.9 % flush 10 mL 10 mL Every 12 Hours Scheduled 12/19/2017     Sig - Route: 10 mL by Intracatheter route Every 12 (Twelve) Hours. - Intracatheter    sodium chloride 0.9 % flush 10 mL 10 mL As  Needed 12/19/2017     Sig - Route: 10 mL by Intracatheter route As Needed for Line Care (After Medication Administration). - Intracatheter    sodium chloride 0.9 % flush 10 mL 10 mL Every 12 Hours Scheduled 12/19/2017     Sig - Route: 10 mL by Intracatheter route Every 12 (Twelve) Hours. - Intracatheter    sodium chloride 0.9 % flush 10 mL 10 mL As Needed 12/19/2017     Sig - Route: 10 mL by Intracatheter route As Needed for Line Care (After Medication Administration or Blood Draw). - Intracatheter    sodium chloride 0.9 % flush 10 mL 10 mL As Needed 12/19/2017     Sig - Route: Infuse 10 mL into a venous catheter As Needed for Line Care. - Intravenous    topiramate (TOPAMAX) tablet 100 mg 100 mg Every 12 Hours Scheduled 12/19/2017     Sig - Route: Take 1 tablet by mouth Every 12 (Twelve) Hours. - Oral    tuberculin injection 5 Units 5 Units Once 12/19/2017     Sig - Route: Inject 0.1 mL into the skin 1 (One) Time. - Intradermal    tuberculin injection 5 Units 5 Units Once 1/2/2018     Sig - Route: Inject 0.1 mL into the skin 1 (One) Time. - Intradermal    acetaminophen-codeine (TYLENOL #3) 300-30 MG per tablet 1 tablet (Discontinued) 1 tablet Every 4 Hours PRN 12/11/2017 12/19/2017    Sig - Route: Take 1 tablet by mouth Every 4 (Four) Hours As Needed for Moderate Pain . - Oral    Reason for Discontinue: Patient Discharge    ALPRAZolam (XANAX) tablet 0.5 mg (Discontinued) 0.5 mg 2 Times Daily PRN 12/11/2017 12/19/2017    Sig - Route: Take 1 tablet by mouth 2 (Two) Times a Day As Needed for Anxiety. - Oral    Reason for Discontinue: Patient Discharge    amLODIPine (NORVASC) tablet 5 mg (Discontinued) 5 mg Every 24 Hours Scheduled 12/11/2017 12/19/2017    Sig - Route: Take 1 tablet by mouth Daily. - Oral    Reason for Discontinue: Patient Discharge    aspirin EC tablet 81 mg (Discontinued) 81 mg Daily 12/12/2017 12/19/2017    Sig - Route: Take 1 tablet by mouth Daily. - Oral    Reason for Discontinue: Patient Discharge     celecoxib (CeleBREX) capsule 200 mg (Discontinued) 200 mg 2 Times Daily PRN 12/11/2017 12/19/2017    Sig - Route: Take 1 capsule by mouth 2 (Two) Times a Day As Needed for Mild Pain . - Oral    Reason for Discontinue: Patient Discharge    doxycycline (MONODOX) capsule 100 mg (Discontinued) 100 mg Every 12 Hours 12/15/2017 12/19/2017    Sig - Route: Take 1 capsule by mouth Every 12 (Twelve) Hours. - Oral    Reason for Discontinue: Patient Discharge    DULoxetine (CYMBALTA) DR capsule 60 mg (Discontinued) 60 mg Daily 12/11/2017 12/19/2017    Sig - Route: Take 1 capsule by mouth Daily. - Oral    Reason for Discontinue: Patient Discharge    enoxaparin (LOVENOX) syringe 40 mg (Discontinued) 40 mg Daily 12/11/2017 12/19/2017    Sig - Route: Inject 0.4 mL under the skin Daily. - Subcutaneous    Reason for Discontinue: Patient Discharge    gabapentin (NEURONTIN) capsule 400 mg (Discontinued) 400 mg 3 Times Daily 12/11/2017 12/19/2017    Sig - Route: Take 1 capsule by mouth 3 (Three) Times a Day. - Oral    Reason for Discontinue: Patient Discharge    ipratropium-albuterol (DUO-NEB) nebulizer solution 3 mL (Discontinued) 3 mL Every 6 Hours - RT 12/11/2017 12/19/2017    Sig - Route: Take 3 mL by nebulization Every 6 (Six) Hours. - Nebulization    Reason for Discontinue: Patient Discharge    losartan (COZAAR) tablet 100 mg (Discontinued) 100 mg Every 24 Hours Scheduled 12/11/2017 12/19/2017    Sig - Route: Take 2 tablets by mouth Daily. - Oral    Reason for Discontinue: Patient Discharge    metoprolol succinate XL (TOPROL-XL) 24 hr tablet 25 mg (Discontinued) 25 mg Every 24 Hours Scheduled 12/11/2017 12/19/2017    Sig - Route: Take 1 tablet by mouth Daily. - Oral    Reason for Discontinue: Patient Discharge    oxybutynin XL (DITROPAN-XL) 24 hr tablet 10 mg (Discontinued) 10 mg Daily 12/11/2017 12/19/2017    Sig - Route: Take 2 tablets by mouth Daily. - Oral    Reason for Discontinue: Patient Discharge    pantoprazole  (PROTONIX) EC tablet 40 mg (Discontinued) 40 mg Every Early Morning 12/12/2017 12/19/2017    Sig - Route: Take 1 tablet by mouth Every Morning. - Oral    Reason for Discontinue: Patient Discharge    Pharmacy to dose vancomycin (Discontinued)  Continuous PRN 12/17/2017 12/18/2017    Sig - Route: Continuous As Needed for Consult. - Does not apply    Reason for Discontinue: Therapy completed    polyethylene glycol (MIRALAX) powder 17 g (Discontinued) 17 g Daily 12/12/2017 12/19/2017    Sig - Route: Take 17 g by mouth Daily. - Oral    Reason for Discontinue: Patient Discharge    polyvinyl alcohol-povidone (HYPOTEARS) 1.4-0.6 % ophthalmic solution 1 drop (Discontinued) 1 drop Every 12 Hours Scheduled 12/11/2017 12/19/2017    Sig - Route: Administer 1 drop to both eyes Every 12 (Twelve) Hours. - Both Eyes    Reason for Discontinue: Patient Discharge    Risaquad-2 capsule 1 capsule (Discontinued) 1 capsule Daily 12/12/2017 12/19/2017    Sig - Route: Take 1 capsule by mouth Daily. - Oral    Reason for Discontinue: Patient Discharge    sodium chloride 0.9 % flush 10 mL (Discontinued) 10 mL As Needed 12/11/2017 12/19/2017    Sig - Route: Infuse 10 mL into a venous catheter As Needed for Line Care. - Intravenous    Reason for Discontinue: Patient Discharge    sodium chloride 0.9 % flush 10 mL (Discontinued) 10 mL Every 12 Hours Scheduled 12/12/2017 12/19/2017    Sig - Route: 10 mL by Intracatheter route Every 12 (Twelve) Hours. - Intracatheter    Reason for Discontinue: Patient Discharge    Cosign for Ordering: Accepted by Skinny Meehan MD on 12/13/2017  7:26 AM    sodium chloride 0.9 % flush 10 mL (Discontinued) 10 mL As Needed 12/12/2017 12/19/2017    Sig - Route: 10 mL by Intracatheter route As Needed for Line Care (After Medication Administration or Blood Draw). - Intracatheter    Reason for Discontinue: Patient Discharge    Cosign for Ordering: Accepted by Skinny Meehan MD on 12/13/2017  7:26 AM    sodium chloride  0.9 % flush 10 mL (Discontinued) 10 mL Every 12 Hours Scheduled 12/12/2017 12/19/2017    Sig - Route: 10 mL by Intracatheter route Every 12 (Twelve) Hours. - Intracatheter    Reason for Discontinue: Patient Discharge    Cosign for Ordering: Accepted by Skinny Meehan MD on 12/13/2017  7:26 AM    sodium chloride 0.9 % flush 10 mL (Discontinued) 10 mL As Needed 12/12/2017 12/19/2017    Sig - Route: 10 mL by Intracatheter route As Needed for Line Care (After Medication Administration). - Intracatheter    Reason for Discontinue: Patient Discharge    Cosign for Ordering: Accepted by Skinny Meehan MD on 12/13/2017  7:26 AM    topiramate (TOPAMAX) tablet 100 mg (Discontinued) 100 mg Every 12 Hours Scheduled 12/11/2017 12/19/2017    Sig - Route: Take 1 tablet by mouth Every 12 (Twelve) Hours. - Oral    Reason for Discontinue: Patient Discharge            Cultures:    Respiratory Culture   Date Value Ref Range Status   12/13/2017 Light growth (2+) Staphylococcus aureus, MRSA (A)  Final     Comment:       Methicillin resistant Staphylococcus aureus, Patient may be an isolation risk.  This isolate does not demonstrate inducible clindamycin resistance in vitro.             Assessment/Plan     ASSESSMENT:       1.  MRSA pneumonia     PLAN:    Case discussed with Dr. Meehan.  Patient overall has shown significant clinical improvement with normal CRP and resolving leukocytosis.    In the setting of significant clinical improvement with normal CRP and resolving leukocytosis and infectious disease will sign off for now.  Please let us know we can further assist in this case thank you!    Patient's findings and recommendations were discussed with patient and nursing staff    Code Status: Full Code     Scribed for Dr. Terrance Gallegos, DARREN  12/22/17  10:07 AM

## 2017-12-22 NOTE — DISCHARGE SUMMARY
Date of Admission:   12/19/2017 11:07 AM    Date of Discharge:   12/22/2017     Discharge Diagnosis:   Principal Problem:    MRSA pneumonia  Active Problems:  1) pneumonia of left lower lobe   -MRSA  2) sepsis  3) metabolic encephalopathy-present at the time of admission  4) leukocytosis  5) anemia-multifactorial  6) DVT prophylaxis-subcutaneous Lovenox  7) disposition- December 19.   Presenting Problem/History of Present Illness  See History and Physical for Presenting Problems / Illnesses.       Hospital Course  Patient is a 72 y.o. female presented with pneumonia.  She has been admitted to swing bed status on 12/19/2017 due to ongoing debility area fortunately, her lung examination has continued to improve.  Her inflammatory markers including C-reactive protein have normalized.  She is currently able to cannulate with assistance.  She describes her breathing is approaching baseline.  The remainder hospital course is been relatively unremarkable with no acute changes or events during swing bed status.  Arrangements are being made for home health physical therapy and home health.  She'll follow-up with me in the office in one week.        Procedures Performed         Consults:   Consults     Date and Time Order Name Status Description    12/19/2017 1108 IP Consult to Internal Medicine      12/18/2017 0715 Inpatient Consult to Infectious Diseases Completed           Condition on Discharge:   Fair    Vital Signs  Temp:  [98.1 °F (36.7 °C)] 98.1 °F (36.7 °C)  Heart Rate:  [52-68] 62  Resp:  [18] 18  BP: (118-120)/(55-57) 120/57    Physical Exam:  Refer to physical examination documented in progress note of this date    Discharge Disposition  Home or Self Care    Discharge Medications   Domonique Cummings   Home Medication Instructions BRANDEE:[id number]    Printed on:12/22/17 4949   Medication Information                      acetaminophen-codeine (TYLENOL #3) 300-30 MG per tablet  Take 1 tablet by mouth 2 (Two) Times a  Day As Needed for Moderate Pain .             ALPRAZolam (XANAX) 0.5 MG tablet  Take 0.5 mg by mouth 2 (Two) Times a Day As Needed.             amLODIPine (NORVASC) 5 MG tablet  Take 5 mg by mouth Daily.             aspirin 81 MG EC tablet  Take 81 mg by mouth Daily.             baclofen (LIORESAL) 10 MG tablet  Take 10 mg by mouth Every Night.             buPROPion XL (WELLBUTRIN XL) 300 MG 24 hr tablet  Take 300 mg by mouth Every Morning.             celecoxib (CELEBREX) 200 MG capsule  Take 200 mg by mouth 2 (Two) Times a Day.             cycloSPORINE (RESTASIS) 0.05 % ophthalmic emulsion  Administer 1 drop to both eyes 2 (Two) Times a Day.             doxycycline (MONODOX) 100 MG capsule  Take 1 capsule by mouth Every 12 (Twelve) Hours for 6 days. Indications: Pneumonia             DULoxetine (CYMBALTA) 60 MG capsule  Take 60 mg by mouth Daily.             gabapentin (NEURONTIN) 400 MG capsule  Take 400 mg by mouth 3 (Three) Times a Day.             ipratropium-albuterol (DUO-NEB) 0.5-2.5 mg/mL nebulizer  Take 3 mL by nebulization Every 6 (Six) Hours.             lactobacillus acidophilus (RISAQUAD) capsule capsule  Take 1 capsule by mouth Daily for 30 doses.             losartan (COZAAR) 100 MG tablet  Take 100 mg by mouth Daily.             metoprolol succinate XL (TOPROL-XL) 25 MG 24 hr tablet  Take 25 mg by mouth Daily.             omeprazole (priLOSEC) 20 MG capsule  Take 20 mg by mouth 2 (Two) Times a Day Before Meals.             oxybutynin XL (DITROPAN-XL) 10 MG 24 hr tablet  Take 10 mg by mouth Daily.             polyethylene glycol (MIRALAX) packet  Take 17 g by mouth Daily.             topiramate (TOPAMAX) 100 MG tablet  Take 100 mg by mouth 2 (Two) Times a Day.                 Discharge Diet:   Diet Instructions     Advance Diet As Tolerated                  diabetic    Activity at Discharge:   Activity Instructions     Activity as Tolerated                  as tolerated    Follow-up  Appointments  Additional Instructions for the Follow-ups that You Need to Schedule     Discharge Follow-up with PCP    As directed    Follow Up Details:  Dr. Meehan-one week           Referral to Home Health    As directed    Face to Face Visit Date:  12/22/2017    Follow-up Provider for Plan of Care?:  I will be treating the patient on an ongoing basis.  Please send me the Plan of Care for signature.    Follow-up Provider:  SKINNY MEEHAN [[id number]]    Reason/Clinical Findings:  Debility    Describe mobility limitations that make leaving home difficult:  Recent pneumonia and debility    Nursing/Therapeutic Services Requested:  Skilled Nursing Physical Therapy    Skilled nursing orders:  Mini-nebs (Nurses to check CBC, CMP, C-reactive protein on December 26 and weekly ×3 weeks.)    PT orders:  Therapeutic exercise Gait Training Transfer training Strengthening    Weight Bearing Status:  As Tolerated    Frequency:  1 Week 1                 Follow-up Information     Follow up with Skinny Meehan MD .    Specialty:  Internal Medicine    Why:  Dr. Meehan-one week    Contact information:    [address]  [place] [address]  [phone]          Follow up with Hiro Menard MD .    Specialty:  Gastroenterology    Why:  Dr. Meehan-one week    Contact information:    [address]  [place] [address]  [phone]             Skinny Meehan MD  12/22/17  6:40 AM    Time: Discharge 32 min

## 2017-12-22 NOTE — PROGRESS NOTES
Swing bed status starting 12/19/2017     LOS: 3 days       Chief Complaint :  Shortness of breath   Subjective     Interval History:     Patient Complaints:  Domonique Cummings  with generalized weakness.  She denies any chest pain, fever, chills, nausea or vomiting.  Her cough and congestion continued to improve.  She has no associated diarrhea.  She continues to tolerate antibiotics well.  She was able to sit up in a chair for several minutes yesterday and walk.  She has no other complaints.      Review of Systems-unchanged from recent history and physical  Objective     Vital Signs  Temp:  [98.1 °F (36.7 °C)] 98.1 °F (36.7 °C)  Heart Rate:  [52-68] 62  Resp:  [18] 18  BP: (118-120)/(55-57) 120/57    Physical Exam    Constitutional: She is oriented to person, place, and time.   Obese white female who is alert and talkative in no obvious distress at rest.   HENT:   Head: Normocephalic and atraumatic.   Right Ear: External ear normal.   Left Ear: External ear normal.   Nose: Nose normal.   Mouth/Throat: Oropharynx is clear and moist. No oropharyngeal exudate.   Eyes: Conjunctivae and EOM are normal. Pupils are equal, round, and reactive to light. Right eye exhibits no discharge. Left eye exhibits no discharge. No scleral icterus.   Neck: Normal range of motion. Neck supple. No JVD present. No tracheal deviation present. No thyromegaly present.   Cardiovascular: Normal rate, regular rhythm, normal heart sounds and intact distal pulses.  Exam reveals no gallop and no friction rub.    No murmur heard.  Pulmonary/Chest: Effort normal.  Clear to auscultation.  Abdominal: Soft. Bowel sounds are normal. She exhibits no distension and no mass. There is no tenderness. There is no guarding. No hernia.   Genitourinary:   Genitourinary Comments: No Freitas catheter   Neurological: She is alert and oriented to person, place, and time. She displays normal reflexes. No cranial nerve deficit. Coordination normal.   Skin: Skin is warm  and dry. No rash noted. No erythema. No pallor.   Psychiatric: She has a normal mood and affect. Her behavior is normal. Judgment and thought content normal.   Nursing note and vitals reviewed.      Results Review:     I reviewed the patient's new clinical results  : See Below  MEDICATIONS:    amLODIPine 5 mg Oral Q24H   aspirin 81 mg Oral Daily   doxycycline 100 mg Oral Q12H   DULoxetine 60 mg Oral Daily   enoxaparin 40 mg Subcutaneous Daily   gabapentin 400 mg Oral Q12H   ipratropium-albuterol 3 mL Nebulization Q6H - RT   lactobacillus acidophilus 1 capsule Oral Daily   losartan 100 mg Oral Q24H   metoprolol succinate XL 25 mg Oral Q24H   oxybutynin XL 10 mg Oral Daily   pantoprazole 40 mg Oral Q AM   polyethylene glycol 17 g Oral Daily   polyvinyl alcohol-povidone 1 drop Both Eyes Q12H   sodium chloride 10 mL Intracatheter Q12H   sodium chloride 10 mL Intracatheter Q12H   topiramate 100 mg Oral Q12H   [START ON 1/2/2018] tuberculin 5 Units Intradermal Once       LABS:        Results from last 7 days  Lab Units 12/21/17  0133 12/19/17  0101 12/17/17  0112   CRP mg/dL 0.87 1.72* 1.85*   WBC 10*3/mm3 9.82 9.46 10.17   HEMOGLOBIN g/dL 10.6* 11.0* 11.3*   HEMATOCRIT % 33.3* 34.6* 35.7*   MCV fL 97.7* 97.2* 97.3*   MCHC g/dL 31.8* 31.8* 31.7*   PLATELETS 10*3/mm3 271 258 233           Results from last 7 days  Lab Units 12/21/17  0133 12/19/17  0101 12/17/17  0112   SODIUM mmol/L 141 139 140   POTASSIUM mmol/L 3.8 3.8 3.9   MAGNESIUM mg/dL 2.0 2.2  --    CHLORIDE mmol/L 110 108 109   CO2 mmol/L 26.4 23.1* 23.5*   BUN mg/dL 24* 19 20   CREATININE mg/dL 0.96 0.99 0.92   EGFR IF NONAFRICN AM mL/min/1.73 57* 55* 60*   CALCIUM mg/dL 9.1 9.5 8.9   GLUCOSE mg/dL 108 97 115*   ALBUMIN g/dL 3.60 3.50 3.50   BILIRUBIN mg/dL 0.2 0.1* 0.1*   ALK PHOS U/L 78 78 80   AST (SGOT) U/L 17 21 30   ALT (SGPT) U/L 23 28 34   Estimated Creatinine Clearance: 54.9 mL/min (by C-G formula based on Cr of 0.96).  No results found for: AMMONIA       Blood Culture   Date Value Ref Range Status   12/11/2017 No growth at less than 24 hours  Preliminary   12/11/2017 No growth at less than 24 hours  Preliminary                  Assessment/Plan    1) MRSA pneumonia of left lower lobe   - C-reactive protein and white blood cell count improving.  Symptoms also improving,  mycoplasma pneumonia and legionella studies negative.  Flu swab negative.  Blood cultures with no growth.  C-reactive protein continues to improve.  Sputum positive for MRSA-infectious disease consulted-recommendations appreciated.  2) sepsis  3) metabolic encephalopathy-present at the time of admission  4) leukocytosis  5) anemia-multifactorial  6) DVT prophylaxis-subcutaneous Lovenox  7) disposition-swing bed December 19.     See orders entered.     Skinny Meehan MD  12/22/17  6:30 AM

## 2017-12-22 NOTE — DISCHARGE PLACEMENT REQUEST
"Domonique Huston (72 y.o. Female)     Date of Birth Social Security Number Address Home Phone MRN    1945  [address]   [facility] [address] [phone] [id number]    Sabianist Marital Status          Scientologist        Admission Date Admission Type Admitting Provider Attending Provider Department, Room/Bed    17 Elective Skinny Meehan MD Morton, Steven E, MD [address], 3327/1P    Discharge Date Discharge Disposition Discharge Destination         Home or Self Care             Attending Provider: Skinny Meehan MD     Allergies:  No Known Allergies    Isolation:  Contact Drop   Infection:  MRSA (17)   Code Status:  FULL    Ht:  152.4 cm (60\")   Wt:  98.2 kg (216 lb 9.5 oz)    Admission Cmt:  None   Principal Problem:  None                Active Insurance as of 2017     Primary Coverage     Payor Plan Insurance Group Employer/Plan Group    MEDICARE MEDICARE A & B      Payor Plan Address Payor Plan Phone Number Effective From Effective To    [address] [phone] 10/1/2009     [place] [address]       Subscriber Name Subscriber Birth Date Member ID       DOMONIQUE HUSTON 1945 [id number]                 Emergency Contacts      (Rel.) Home Phone Work Phone Mobile Phone    Benito Huston (Son) [phone] -- --    Deepali Gallegos (Daughter) -- -- [phone]    Dennise Flanagan (Daughter) [phone] -- --            Insurance Information                MEDICARE/MEDICARE A & B Phone: [phone]    Subscriber: Domonique Huston Subscriber#: [id number]    Group#:  Precert#:         [address]  [address]  [place] [address]  Phone:  [phone]  Fax:   Date Ordered: Dec 22, 2017         Patient:  Domonique Huston MRN:  [id number]   [address]   [place] [address] :  1945  SSN:    Phone: [phone] Sex:  F     Weight: 98.2 kg (216 lb 9.5 oz)         Ht Readings from Last 1 " "Encounters:   12/19/17 152.4 cm (60\")         Home Nebulizer                    (Order ID: [id number])    Diagnosis:  Pneumonia of upper lobe due to methicillin resistant Staphylococcus aureus (MRSA), unspecified laterality (J15.212 [ICD-10-CM] 482.42 [ICD-9-CM])   Quantity:  1     Nebulizer Equipment:  Nebulizer w/ Compressor  Nebulizer Accessories:  Nebulizer Kit - Administration Set, Non-Disposable  The face to face evaluation was performed on: 12/22/2017  Length of Need (99 Months = Lifetime): 99 Months = Lifetime            Authorizing Provider:Skinny Meehan MD  Authorizing Provider's NPI: [id number]  Order Entered By: Skinny Meehan MD 12/22/2017  6:40 AM     Electronically signed by: Skinny Meehan MD 12/22/2017  6:40 AM           "

## 2017-12-28 ENCOUNTER — LAB REQUISITION (OUTPATIENT)
Dept: LAB | Facility: HOSPITAL | Age: 72
End: 2017-12-28

## 2017-12-28 DIAGNOSIS — J18.9 PNEUMONIA: ICD-10-CM

## 2017-12-28 LAB
ALBUMIN SERPL-MCNC: 4.1 G/DL (ref 3.4–4.8)
ALBUMIN/GLOB SERPL: 1.2 G/DL (ref 1.5–2.5)
ALP SERPL-CCNC: 86 U/L (ref 35–104)
ALT SERPL W P-5'-P-CCNC: 18 U/L (ref 10–36)
ANION GAP SERPL CALCULATED.3IONS-SCNC: 7.7 MMOL/L (ref 3.6–11.2)
AST SERPL-CCNC: 21 U/L (ref 10–30)
BASOPHILS # BLD AUTO: 0.02 10*3/MM3 (ref 0–0.3)
BASOPHILS NFR BLD AUTO: 0.2 % (ref 0–2)
BILIRUB SERPL-MCNC: 0.3 MG/DL (ref 0.2–1.8)
BUN BLD-MCNC: 17 MG/DL (ref 7–21)
BUN/CREAT SERPL: 19.8 (ref 7–25)
CALCIUM SPEC-SCNC: 9.7 MG/DL (ref 7.7–10)
CHLORIDE SERPL-SCNC: 112 MMOL/L (ref 99–112)
CO2 SERPL-SCNC: 20.3 MMOL/L (ref 24.3–31.9)
CREAT BLD-MCNC: 0.86 MG/DL (ref 0.43–1.29)
CRP SERPL-MCNC: 1 MG/DL (ref 0–0.99)
DEPRECATED RDW RBC AUTO: 42.9 FL (ref 37–54)
EOSINOPHIL # BLD AUTO: 0.2 10*3/MM3 (ref 0–0.7)
EOSINOPHIL NFR BLD AUTO: 2 % (ref 0–7)
ERYTHROCYTE [DISTWIDTH] IN BLOOD BY AUTOMATED COUNT: 12.5 % (ref 11.5–14.5)
GFR SERPL CREATININE-BSD FRML MDRD: 65 ML/MIN/1.73
GLOBULIN UR ELPH-MCNC: 3.5 GM/DL
GLUCOSE BLD-MCNC: 114 MG/DL (ref 70–110)
HCT VFR BLD AUTO: 34.6 % (ref 37–47)
HGB BLD-MCNC: 11.3 G/DL (ref 12–16)
IMM GRANULOCYTES # BLD: 0.04 10*3/MM3 (ref 0–0.03)
IMM GRANULOCYTES NFR BLD: 0.4 % (ref 0–0.5)
LYMPHOCYTES # BLD AUTO: 2.66 10*3/MM3 (ref 1–3)
LYMPHOCYTES NFR BLD AUTO: 26.6 % (ref 16–46)
MCH RBC QN AUTO: 30.7 PG (ref 27–33)
MCHC RBC AUTO-ENTMCNC: 32.7 G/DL (ref 33–37)
MCV RBC AUTO: 94 FL (ref 80–94)
MONOCYTES # BLD AUTO: 1.1 10*3/MM3 (ref 0.1–0.9)
MONOCYTES NFR BLD AUTO: 11 % (ref 0–12)
NEUTROPHILS # BLD AUTO: 5.97 10*3/MM3 (ref 1.4–6.5)
NEUTROPHILS NFR BLD AUTO: 59.8 % (ref 40–75)
OSMOLALITY SERPL CALC.SUM OF ELEC: 281.8 MOSM/KG (ref 273–305)
PLATELET # BLD AUTO: 328 10*3/MM3 (ref 130–400)
PMV BLD AUTO: 10.7 FL (ref 6–10)
POTASSIUM BLD-SCNC: 3.8 MMOL/L (ref 3.5–5.3)
PROT SERPL-MCNC: 7.6 G/DL (ref 6–8)
RBC # BLD AUTO: 3.68 10*6/MM3 (ref 4.2–5.4)
SODIUM BLD-SCNC: 140 MMOL/L (ref 135–153)
WBC NRBC COR # BLD: 9.99 10*3/MM3 (ref 4.5–12.5)

## 2017-12-28 PROCEDURE — 80053 COMPREHEN METABOLIC PANEL: CPT | Performed by: INTERNAL MEDICINE

## 2017-12-28 PROCEDURE — 86140 C-REACTIVE PROTEIN: CPT | Performed by: INTERNAL MEDICINE

## 2017-12-28 PROCEDURE — 85025 COMPLETE CBC W/AUTO DIFF WBC: CPT | Performed by: INTERNAL MEDICINE

## 2018-01-12 ENCOUNTER — LAB REQUISITION (OUTPATIENT)
Dept: LAB | Facility: HOSPITAL | Age: 73
End: 2018-01-12

## 2018-01-12 DIAGNOSIS — A41.9 SEPSIS (HCC): ICD-10-CM

## 2018-01-12 LAB
ALBUMIN SERPL-MCNC: 3.9 G/DL (ref 3.4–4.8)
ALBUMIN/GLOB SERPL: 1.3 G/DL (ref 1.5–2.5)
ALP SERPL-CCNC: 88 U/L (ref 35–104)
ALT SERPL W P-5'-P-CCNC: 11 U/L (ref 10–36)
ANION GAP SERPL CALCULATED.3IONS-SCNC: 4.8 MMOL/L (ref 3.6–11.2)
AST SERPL-CCNC: 14 U/L (ref 10–30)
BASOPHILS # BLD AUTO: 0.02 10*3/MM3 (ref 0–0.3)
BASOPHILS NFR BLD AUTO: 0.2 % (ref 0–2)
BILIRUB SERPL-MCNC: 0.2 MG/DL (ref 0.2–1.8)
BUN BLD-MCNC: 27 MG/DL (ref 7–21)
BUN/CREAT SERPL: 31.4 (ref 7–25)
CALCIUM SPEC-SCNC: 9 MG/DL (ref 7.7–10)
CHLORIDE SERPL-SCNC: 113 MMOL/L (ref 99–112)
CO2 SERPL-SCNC: 23.2 MMOL/L (ref 24.3–31.9)
CREAT BLD-MCNC: 0.86 MG/DL (ref 0.43–1.29)
CRP SERPL-MCNC: 0.7 MG/DL (ref 0–0.99)
DEPRECATED RDW RBC AUTO: 43.6 FL (ref 37–54)
EOSINOPHIL # BLD AUTO: 0.5 10*3/MM3 (ref 0–0.7)
EOSINOPHIL NFR BLD AUTO: 5.6 % (ref 0–7)
ERYTHROCYTE [DISTWIDTH] IN BLOOD BY AUTOMATED COUNT: 12.7 % (ref 11.5–14.5)
GFR SERPL CREATININE-BSD FRML MDRD: 65 ML/MIN/1.73
GLOBULIN UR ELPH-MCNC: 3.1 GM/DL
GLUCOSE BLD-MCNC: 93 MG/DL (ref 70–110)
HCT VFR BLD AUTO: 35.8 % (ref 37–47)
HGB BLD-MCNC: 11.5 G/DL (ref 12–16)
IMM GRANULOCYTES # BLD: 0.02 10*3/MM3 (ref 0–0.03)
IMM GRANULOCYTES NFR BLD: 0.2 % (ref 0–0.5)
LYMPHOCYTES # BLD AUTO: 2.21 10*3/MM3 (ref 1–3)
LYMPHOCYTES NFR BLD AUTO: 24.9 % (ref 16–46)
MCH RBC QN AUTO: 31.5 PG (ref 27–33)
MCHC RBC AUTO-ENTMCNC: 32.1 G/DL (ref 33–37)
MCV RBC AUTO: 98.1 FL (ref 80–94)
MONOCYTES # BLD AUTO: 1.03 10*3/MM3 (ref 0.1–0.9)
MONOCYTES NFR BLD AUTO: 11.6 % (ref 0–12)
NEUTROPHILS # BLD AUTO: 5.1 10*3/MM3 (ref 1.4–6.5)
NEUTROPHILS NFR BLD AUTO: 57.5 % (ref 40–75)
OSMOLALITY SERPL CALC.SUM OF ELEC: 286.1 MOSM/KG (ref 273–305)
PLATELET # BLD AUTO: 240 10*3/MM3 (ref 130–400)
PMV BLD AUTO: 10.6 FL (ref 6–10)
POTASSIUM BLD-SCNC: 4.5 MMOL/L (ref 3.5–5.3)
PROT SERPL-MCNC: 7 G/DL (ref 6–8)
RBC # BLD AUTO: 3.65 10*6/MM3 (ref 4.2–5.4)
SODIUM BLD-SCNC: 141 MMOL/L (ref 135–153)
WBC NRBC COR # BLD: 8.88 10*3/MM3 (ref 4.5–12.5)

## 2018-01-12 PROCEDURE — 80053 COMPREHEN METABOLIC PANEL: CPT | Performed by: INTERNAL MEDICINE

## 2018-01-12 PROCEDURE — 85025 COMPLETE CBC W/AUTO DIFF WBC: CPT | Performed by: INTERNAL MEDICINE

## 2018-01-12 PROCEDURE — 86140 C-REACTIVE PROTEIN: CPT | Performed by: INTERNAL MEDICINE

## 2018-01-18 ENCOUNTER — TRANSCRIBE ORDERS (OUTPATIENT)
Dept: ADMINISTRATIVE | Facility: HOSPITAL | Age: 73
End: 2018-01-18

## 2018-01-18 ENCOUNTER — LAB REQUISITION (OUTPATIENT)
Dept: LAB | Facility: HOSPITAL | Age: 73
End: 2018-01-18

## 2018-01-18 DIAGNOSIS — J18.9 PNEUMONIA: ICD-10-CM

## 2018-01-18 DIAGNOSIS — E04.1 THYROID NODULE: Primary | ICD-10-CM

## 2018-01-18 LAB
ALBUMIN SERPL-MCNC: 4.3 G/DL (ref 3.4–4.8)
ALBUMIN/GLOB SERPL: 1.3 G/DL (ref 1.5–2.5)
ALP SERPL-CCNC: 100 U/L (ref 35–104)
ALT SERPL W P-5'-P-CCNC: 17 U/L (ref 10–36)
ANION GAP SERPL CALCULATED.3IONS-SCNC: 8.7 MMOL/L (ref 3.6–11.2)
AST SERPL-CCNC: 21 U/L (ref 10–30)
BASOPHILS # BLD AUTO: 0.03 10*3/MM3 (ref 0–0.3)
BASOPHILS NFR BLD AUTO: 0.2 % (ref 0–2)
BILIRUB SERPL-MCNC: 0.2 MG/DL (ref 0.2–1.8)
BUN BLD-MCNC: 29 MG/DL (ref 7–21)
BUN/CREAT SERPL: 28.7 (ref 7–25)
CALCIUM SPEC-SCNC: 9.5 MG/DL (ref 7.7–10)
CHLORIDE SERPL-SCNC: 109 MMOL/L (ref 99–112)
CO2 SERPL-SCNC: 22.3 MMOL/L (ref 24.3–31.9)
CREAT BLD-MCNC: 1.01 MG/DL (ref 0.43–1.29)
CRP SERPL-MCNC: 0.92 MG/DL (ref 0–0.99)
DEPRECATED RDW RBC AUTO: 46 FL (ref 37–54)
EOSINOPHIL # BLD AUTO: 0.46 10*3/MM3 (ref 0–0.7)
EOSINOPHIL NFR BLD AUTO: 3.6 % (ref 0–7)
ERYTHROCYTE [DISTWIDTH] IN BLOOD BY AUTOMATED COUNT: 13.1 % (ref 11.5–14.5)
GFR SERPL CREATININE-BSD FRML MDRD: 54 ML/MIN/1.73
GLOBULIN UR ELPH-MCNC: 3.3 GM/DL
GLUCOSE BLD-MCNC: 66 MG/DL (ref 70–110)
HCT VFR BLD AUTO: 38.7 % (ref 37–47)
HGB BLD-MCNC: 12.4 G/DL (ref 12–16)
IMM GRANULOCYTES # BLD: 0.05 10*3/MM3 (ref 0–0.03)
IMM GRANULOCYTES NFR BLD: 0.4 % (ref 0–0.5)
LYMPHOCYTES # BLD AUTO: 2.68 10*3/MM3 (ref 1–3)
LYMPHOCYTES NFR BLD AUTO: 21.1 % (ref 16–46)
MCH RBC QN AUTO: 31 PG (ref 27–33)
MCHC RBC AUTO-ENTMCNC: 32 G/DL (ref 33–37)
MCV RBC AUTO: 96.8 FL (ref 80–94)
MONOCYTES # BLD AUTO: 1.49 10*3/MM3 (ref 0.1–0.9)
MONOCYTES NFR BLD AUTO: 11.7 % (ref 0–12)
NEUTROPHILS # BLD AUTO: 8.02 10*3/MM3 (ref 1.4–6.5)
NEUTROPHILS NFR BLD AUTO: 63 % (ref 40–75)
OSMOLALITY SERPL CALC.SUM OF ELEC: 283.4 MOSM/KG (ref 273–305)
PLATELET # BLD AUTO: 275 10*3/MM3 (ref 130–400)
PMV BLD AUTO: 10.6 FL (ref 6–10)
POTASSIUM BLD-SCNC: 4.4 MMOL/L (ref 3.5–5.3)
PROT SERPL-MCNC: 7.6 G/DL (ref 6–8)
RBC # BLD AUTO: 4 10*6/MM3 (ref 4.2–5.4)
SODIUM BLD-SCNC: 140 MMOL/L (ref 135–153)
WBC NRBC COR # BLD: 12.73 10*3/MM3 (ref 4.5–12.5)

## 2018-01-18 PROCEDURE — 80053 COMPREHEN METABOLIC PANEL: CPT | Performed by: INTERNAL MEDICINE

## 2018-01-18 PROCEDURE — 86140 C-REACTIVE PROTEIN: CPT | Performed by: INTERNAL MEDICINE

## 2018-01-18 PROCEDURE — 85025 COMPLETE CBC W/AUTO DIFF WBC: CPT | Performed by: INTERNAL MEDICINE

## 2018-01-26 ENCOUNTER — APPOINTMENT (OUTPATIENT)
Dept: ULTRASOUND IMAGING | Facility: HOSPITAL | Age: 73
End: 2018-01-26
Attending: INTERNAL MEDICINE

## 2018-06-25 ENCOUNTER — HOSPITAL ENCOUNTER (OUTPATIENT)
Dept: MAMMOGRAPHY | Facility: HOSPITAL | Age: 73
Discharge: HOME | End: 2018-06-25

## 2018-07-18 ENCOUNTER — APPOINTMENT (OUTPATIENT)
Dept: GENERAL RADIOLOGY | Facility: HOSPITAL | Age: 73
End: 2018-07-18

## 2018-07-18 ENCOUNTER — APPOINTMENT (OUTPATIENT)
Dept: CT IMAGING | Facility: HOSPITAL | Age: 73
End: 2018-07-18

## 2018-07-18 ENCOUNTER — HOSPITAL ENCOUNTER (EMERGENCY)
Facility: HOSPITAL | Age: 73
Discharge: ANOTHER HOSPITAL | End: 2018-07-18
Attending: EMERGENCY MEDICINE | Admitting: EMERGENCY MEDICINE

## 2018-07-18 VITALS
BODY MASS INDEX: 34.15 KG/M2 | OXYGEN SATURATION: 97 % | SYSTOLIC BLOOD PRESSURE: 110 MMHG | RESPIRATION RATE: 18 BRPM | DIASTOLIC BLOOD PRESSURE: 68 MMHG | HEART RATE: 65 BPM | HEIGHT: 64 IN | WEIGHT: 200 LBS | TEMPERATURE: 98.4 F

## 2018-07-18 DIAGNOSIS — N20.1 RIGHT URETERAL STONE: ICD-10-CM

## 2018-07-18 DIAGNOSIS — S12.301A CLOSED NONDISPLACED FRACTURE OF FOURTH CERVICAL VERTEBRA, UNSPECIFIED FRACTURE MORPHOLOGY, INITIAL ENCOUNTER (HCC): Primary | ICD-10-CM

## 2018-07-18 LAB
ALBUMIN SERPL-MCNC: 4 G/DL (ref 3.4–4.8)
ALBUMIN/GLOB SERPL: 1.2 G/DL (ref 1.5–2.5)
ALP SERPL-CCNC: 80 U/L (ref 35–104)
ALT SERPL W P-5'-P-CCNC: 18 U/L (ref 10–36)
AMYLASE SERPL-CCNC: 60 U/L (ref 28–100)
ANION GAP SERPL CALCULATED.3IONS-SCNC: 8.1 MMOL/L (ref 3.6–11.2)
AST SERPL-CCNC: 21 U/L (ref 10–30)
BASOPHILS # BLD AUTO: 0.03 10*3/MM3 (ref 0–0.3)
BASOPHILS NFR BLD AUTO: 0.3 % (ref 0–2)
BILIRUB SERPL-MCNC: 0.1 MG/DL (ref 0.2–1.8)
BUN BLD-MCNC: 34 MG/DL (ref 7–21)
BUN/CREAT SERPL: 22.1 (ref 7–25)
CALCIUM SPEC-SCNC: 9.3 MG/DL (ref 7.7–10)
CHLORIDE SERPL-SCNC: 112 MMOL/L (ref 99–112)
CO2 SERPL-SCNC: 21.9 MMOL/L (ref 24.3–31.9)
CREAT BLD-MCNC: 1.54 MG/DL (ref 0.43–1.29)
DEPRECATED RDW RBC AUTO: 41.5 FL (ref 37–54)
EOSINOPHIL # BLD AUTO: 0.59 10*3/MM3 (ref 0–0.7)
EOSINOPHIL NFR BLD AUTO: 5.4 % (ref 0–7)
ERYTHROCYTE [DISTWIDTH] IN BLOOD BY AUTOMATED COUNT: 12.2 % (ref 11.5–14.5)
GFR SERPL CREATININE-BSD FRML MDRD: 33 ML/MIN/1.73
GLOBULIN UR ELPH-MCNC: 3.4 GM/DL
GLUCOSE BLD-MCNC: 89 MG/DL (ref 70–110)
HCT VFR BLD AUTO: 36.5 % (ref 37–47)
HGB BLD-MCNC: 12 G/DL (ref 12–16)
IMM GRANULOCYTES # BLD: 0.02 10*3/MM3 (ref 0–0.03)
IMM GRANULOCYTES NFR BLD: 0.2 % (ref 0–0.5)
LIPASE SERPL-CCNC: 25 U/L (ref 13–60)
LYMPHOCYTES # BLD AUTO: 3.16 10*3/MM3 (ref 1–3)
LYMPHOCYTES NFR BLD AUTO: 28.7 % (ref 16–46)
MCH RBC QN AUTO: 31.7 PG (ref 27–33)
MCHC RBC AUTO-ENTMCNC: 32.9 G/DL (ref 33–37)
MCV RBC AUTO: 96.6 FL (ref 80–94)
MONOCYTES # BLD AUTO: 1.31 10*3/MM3 (ref 0.1–0.9)
MONOCYTES NFR BLD AUTO: 11.9 % (ref 0–12)
NEUTROPHILS # BLD AUTO: 5.89 10*3/MM3 (ref 1.4–6.5)
NEUTROPHILS NFR BLD AUTO: 53.5 % (ref 40–75)
OSMOLALITY SERPL CALC.SUM OF ELEC: 290.2 MOSM/KG (ref 273–305)
PLATELET # BLD AUTO: 240 10*3/MM3 (ref 130–400)
PMV BLD AUTO: 10.1 FL (ref 6–10)
POTASSIUM BLD-SCNC: 4.1 MMOL/L (ref 3.5–5.3)
PROT SERPL-MCNC: 7.4 G/DL (ref 6–8)
RBC # BLD AUTO: 3.78 10*6/MM3 (ref 4.2–5.4)
SODIUM BLD-SCNC: 142 MMOL/L (ref 135–153)
TROPONIN I SERPL-MCNC: <0.006 NG/ML
WBC NRBC COR # BLD: 11 10*3/MM3 (ref 4.5–12.5)

## 2018-07-18 PROCEDURE — 72125 CT NECK SPINE W/O DYE: CPT

## 2018-07-18 PROCEDURE — 73552 X-RAY EXAM OF FEMUR 2/>: CPT | Performed by: RADIOLOGY

## 2018-07-18 PROCEDURE — 71045 X-RAY EXAM CHEST 1 VIEW: CPT

## 2018-07-18 PROCEDURE — 73080 X-RAY EXAM OF ELBOW: CPT | Performed by: RADIOLOGY

## 2018-07-18 PROCEDURE — 25010000002 ONDANSETRON PER 1 MG

## 2018-07-18 PROCEDURE — 70450 CT HEAD/BRAIN W/O DYE: CPT | Performed by: RADIOLOGY

## 2018-07-18 PROCEDURE — 70450 CT HEAD/BRAIN W/O DYE: CPT

## 2018-07-18 PROCEDURE — 72170 X-RAY EXAM OF PELVIS: CPT | Performed by: RADIOLOGY

## 2018-07-18 PROCEDURE — 72128 CT CHEST SPINE W/O DYE: CPT

## 2018-07-18 PROCEDURE — 25010000002 MORPHINE PER 10 MG

## 2018-07-18 PROCEDURE — 82150 ASSAY OF AMYLASE: CPT | Performed by: EMERGENCY MEDICINE

## 2018-07-18 PROCEDURE — 72125 CT NECK SPINE W/O DYE: CPT | Performed by: RADIOLOGY

## 2018-07-18 PROCEDURE — 96375 TX/PRO/DX INJ NEW DRUG ADDON: CPT

## 2018-07-18 PROCEDURE — 73552 X-RAY EXAM OF FEMUR 2/>: CPT

## 2018-07-18 PROCEDURE — 72131 CT LUMBAR SPINE W/O DYE: CPT

## 2018-07-18 PROCEDURE — 72128 CT CHEST SPINE W/O DYE: CPT | Performed by: RADIOLOGY

## 2018-07-18 PROCEDURE — 74176 CT ABD & PELVIS W/O CONTRAST: CPT | Performed by: RADIOLOGY

## 2018-07-18 PROCEDURE — 96374 THER/PROPH/DIAG INJ IV PUSH: CPT

## 2018-07-18 PROCEDURE — 73080 X-RAY EXAM OF ELBOW: CPT

## 2018-07-18 PROCEDURE — 72170 X-RAY EXAM OF PELVIS: CPT

## 2018-07-18 PROCEDURE — 74176 CT ABD & PELVIS W/O CONTRAST: CPT

## 2018-07-18 PROCEDURE — 84484 ASSAY OF TROPONIN QUANT: CPT | Performed by: EMERGENCY MEDICINE

## 2018-07-18 PROCEDURE — 72131 CT LUMBAR SPINE W/O DYE: CPT | Performed by: RADIOLOGY

## 2018-07-18 PROCEDURE — 36415 COLL VENOUS BLD VENIPUNCTURE: CPT

## 2018-07-18 PROCEDURE — 71045 X-RAY EXAM CHEST 1 VIEW: CPT | Performed by: RADIOLOGY

## 2018-07-18 PROCEDURE — 93005 ELECTROCARDIOGRAM TRACING: CPT | Performed by: EMERGENCY MEDICINE

## 2018-07-18 PROCEDURE — 99285 EMERGENCY DEPT VISIT HI MDM: CPT

## 2018-07-18 PROCEDURE — 80053 COMPREHEN METABOLIC PANEL: CPT | Performed by: EMERGENCY MEDICINE

## 2018-07-18 PROCEDURE — 83690 ASSAY OF LIPASE: CPT | Performed by: EMERGENCY MEDICINE

## 2018-07-18 PROCEDURE — 85025 COMPLETE CBC W/AUTO DIFF WBC: CPT | Performed by: EMERGENCY MEDICINE

## 2018-07-18 PROCEDURE — 96361 HYDRATE IV INFUSION ADD-ON: CPT

## 2018-07-18 RX ORDER — SODIUM CHLORIDE 9 MG/ML
125 INJECTION, SOLUTION INTRAVENOUS CONTINUOUS
Status: DISCONTINUED | OUTPATIENT
Start: 2018-07-18 | End: 2018-07-18 | Stop reason: HOSPADM

## 2018-07-18 RX ORDER — ONDANSETRON 2 MG/ML
4 INJECTION INTRAMUSCULAR; INTRAVENOUS EVERY 6 HOURS PRN
Status: DISCONTINUED | OUTPATIENT
Start: 2018-07-18 | End: 2018-07-18 | Stop reason: HOSPADM

## 2018-07-18 RX ORDER — SODIUM CHLORIDE 0.9 % (FLUSH) 0.9 %
10 SYRINGE (ML) INJECTION AS NEEDED
Status: DISCONTINUED | OUTPATIENT
Start: 2018-07-18 | End: 2018-07-18 | Stop reason: HOSPADM

## 2018-07-18 RX ORDER — ONDANSETRON 2 MG/ML
INJECTION INTRAMUSCULAR; INTRAVENOUS
Status: COMPLETED
Start: 2018-07-18 | End: 2018-07-18

## 2018-07-18 RX ADMIN — ONDANSETRON 4 MG: 2 INJECTION INTRAMUSCULAR; INTRAVENOUS at 19:53

## 2018-07-18 RX ADMIN — ONDANSETRON 4 MG: 2 INJECTION, SOLUTION INTRAMUSCULAR; INTRAVENOUS at 19:53

## 2018-07-18 RX ADMIN — Medication 2 MG: at 19:52

## 2018-07-18 RX ADMIN — SODIUM CHLORIDE 125 ML/HR: 9 INJECTION, SOLUTION INTRAVENOUS at 16:30

## 2018-07-18 RX ADMIN — MORPHINE SULFATE 2 MG: 4 INJECTION, SOLUTION INTRAMUSCULAR; INTRAVENOUS at 19:52

## 2018-07-18 RX ADMIN — SODIUM CHLORIDE 500 ML: 9 INJECTION, SOLUTION INTRAVENOUS at 16:20

## 2018-08-01 ENCOUNTER — OFFICE VISIT (OUTPATIENT)
Dept: UROLOGY | Facility: CLINIC | Age: 73
End: 2018-08-01

## 2018-08-01 DIAGNOSIS — N20.1 URETERAL STONE: Primary | ICD-10-CM

## 2018-08-01 PROCEDURE — 99203 OFFICE O/P NEW LOW 30 MIN: CPT | Performed by: UROLOGY

## 2018-08-01 RX ORDER — HYDROCODONE BITARTRATE AND ACETAMINOPHEN 5; 325 MG/1; MG/1
TABLET ORAL
Qty: 20 TABLET | Refills: 0 | Status: ON HOLD | OUTPATIENT
Start: 2018-08-01 | End: 2018-10-19

## 2018-08-01 RX ORDER — TAMSULOSIN HYDROCHLORIDE 0.4 MG/1
1 CAPSULE ORAL NIGHTLY
Qty: 30 CAPSULE | Refills: 11 | Status: ON HOLD | OUTPATIENT
Start: 2018-08-01 | End: 2018-10-19

## 2018-08-01 NOTE — PROGRESS NOTES
Chief Complaint:          Pain all over    HPI:   72 y.o. female.  Pt has a 3 mm stone at the right uvj. With hydroureter.  She has not had any stone surgery.  HPI      Past Medical History:        Past Medical History:   Diagnosis Date   • Arthritis    • Asthma    • Degenerative disc disease, lumbar    • Diabetes mellitus (CMS/HCC)    • Fibromyalgia    • GERD (gastroesophageal reflux disease)    • Hyperlipidemia    • Hypertension    • Osteoporosis          Current Meds:     Current Outpatient Prescriptions   Medication Sig Dispense Refill   • acetaminophen-codeine (TYLENOL #3) 300-30 MG per tablet Take 1 tablet by mouth 2 (Two) Times a Day As Needed for Moderate Pain . 60 tablet 0   • ALPRAZolam (XANAX) 0.5 MG tablet Take 0.5 mg by mouth 2 (Two) Times a Day As Needed.     • amLODIPine (NORVASC) 5 MG tablet Take 5 mg by mouth Daily.     • aspirin 81 MG EC tablet Take 81 mg by mouth Daily.     • baclofen (LIORESAL) 10 MG tablet Take 10 mg by mouth Every Night.     • buPROPion XL (WELLBUTRIN XL) 300 MG 24 hr tablet Take 300 mg by mouth Every Morning.     • celecoxib (CELEBREX) 200 MG capsule Take 200 mg by mouth 2 (Two) Times a Day.     • cycloSPORINE (RESTASIS) 0.05 % ophthalmic emulsion Administer 1 drop to both eyes 2 (Two) Times a Day.     • DULoxetine (CYMBALTA) 60 MG capsule Take 60 mg by mouth Daily.     • gabapentin (NEURONTIN) 400 MG capsule Take 400 mg by mouth 3 (Three) Times a Day.     • ipratropium-albuterol (DUO-NEB) 0.5-2.5 mg/mL nebulizer Take 3 mL by nebulization Every 6 (Six) Hours. 360 mL 3   • losartan (COZAAR) 100 MG tablet Take 100 mg by mouth Daily.     • metoprolol succinate XL (TOPROL-XL) 25 MG 24 hr tablet Take 25 mg by mouth Daily.     • omeprazole (priLOSEC) 20 MG capsule Take 20 mg by mouth 2 (Two) Times a Day Before Meals.     • oxybutynin XL (DITROPAN-XL) 10 MG 24 hr tablet Take 10 mg by mouth Daily.     • polyethylene glycol (MIRALAX) packet Take 17 g by mouth Daily.     • topiramate  (TOPAMAX) 100 MG tablet Take 100 mg by mouth 2 (Two) Times a Day.       No current facility-administered medications for this visit.         Allergies:      No Known Allergies     Past Surgical History:     Past Surgical History:   Procedure Laterality Date   • APPENDECTOMY     • ESOPHAGEAL DILATATION     • TOTAL SHOULDER REPLACEMENT Bilateral    • TUBAL ABDOMINAL LIGATION           Social History:     Social History     Social History   • Marital status:      Spouse name: N/A   • Number of children: N/A   • Years of education: N/A     Occupational History   • Not on file.     Social History Main Topics   • Smoking status: Former Smoker   • Smokeless tobacco: Not on file   • Alcohol use No   • Drug use: No   • Sexual activity: Defer     Other Topics Concern   • Not on file     Social History Narrative   • No narrative on file       Family History:     Family History   Problem Relation Age of Onset   • Breast cancer Sister        Review of Systems:     Review of Systems   Constitutional: Positive for fatigue.   HENT: Negative for sinus pain.    Eyes: Positive for pain.   Respiratory: Positive for chest tightness.    Cardiovascular: Positive for chest pain.   Gastrointestinal: Positive for abdominal pain.   Endocrine: Negative for heat intolerance.   Genitourinary: Positive for flank pain. Negative for frequency.   Musculoskeletal: Positive for back pain.   Skin: Negative for rash.   Allergic/Immunologic: Negative for food allergies.   Neurological: Positive for headaches.   Hematological: Bruises/bleeds easily.   Psychiatric/Behavioral: The patient is nervous/anxious.        I have reviewed the follow portions of the patient's history and confirmed they are accurate today:  allergies, current medications, past family history, past medical history, past social history, past surgical history, problem list and ROS  Physical Exam:     Physical Exam   Constitutional: She appears well-developed.   HENT:   Head:  Normocephalic.   Eyes: Pupils are equal, round, and reactive to light.   Neck: Normal range of motion.   Cardiovascular: Normal rate.    Pulmonary/Chest: Effort normal.   Abdominal: Soft.       There were no vitals taken for this visit.   Procedure:         Assessment:     Encounter Diagnosis   Name Primary?   • Ureteral stone Yes     No orders of the defined types were placed in this encounter.      Plan:   I think the patient has a 70% chance of passing her stone without surgery.  Will give her 3 weeks to try and pass her stone.  Will rx norco. And flomax  Will consider repeating the stone study ct scan in a month or so.    Patient's There is no height or weight on file to calculate BMI. BMI is above normal parameters. Recommendations include: educational material.      Counseling was given to patient for the following topics impressions as follows: ureteral stone. The interim medical history and current results were reviewed.  A treatment plan with follow-up was made for Ureteral stone [N20.1].  This document has been electronically signed by Darren Blackman MD August 1, 2018 4:50 PM

## 2018-08-24 ENCOUNTER — EPISODE CHANGES (OUTPATIENT)
Dept: CASE MANAGEMENT | Facility: OTHER | Age: 73
End: 2018-08-24

## 2018-09-12 ENCOUNTER — OFFICE VISIT (OUTPATIENT)
Dept: UROLOGY | Facility: CLINIC | Age: 73
End: 2018-09-12

## 2018-09-12 VITALS — HEIGHT: 64 IN | WEIGHT: 200 LBS | BODY MASS INDEX: 34.15 KG/M2

## 2018-09-12 DIAGNOSIS — N20.1 URETERAL STONE: Primary | ICD-10-CM

## 2018-09-12 PROCEDURE — 99213 OFFICE O/P EST LOW 20 MIN: CPT | Performed by: UROLOGY

## 2018-09-12 NOTE — PROGRESS NOTES
Chief Complaint:          Stones.    HPI:   72 y.o. female.  She had a 3 mm stone at the right uvj 3 weeks ago.  Pt has had some pain on both sides.  Pt has had pain 2 to 3 times a weeks for a brief period of pain that only lasts 5 minutes.  HPI      Past Medical History:        Past Medical History:   Diagnosis Date   • Arthritis    • Asthma    • Degenerative disc disease, lumbar    • Diabetes mellitus (CMS/HCC)    • Fibromyalgia    • GERD (gastroesophageal reflux disease)    • Hyperlipidemia    • Hypertension    • Osteoporosis          Current Meds:     Current Outpatient Prescriptions   Medication Sig Dispense Refill   • acetaminophen-codeine (TYLENOL #3) 300-30 MG per tablet Take 1 tablet by mouth 2 (Two) Times a Day As Needed for Moderate Pain . 60 tablet 0   • ALPRAZolam (XANAX) 0.5 MG tablet Take 0.5 mg by mouth 2 (Two) Times a Day As Needed.     • amLODIPine (NORVASC) 5 MG tablet Take 5 mg by mouth Daily.     • aspirin 81 MG EC tablet Take 81 mg by mouth Daily.     • baclofen (LIORESAL) 10 MG tablet Take 10 mg by mouth Every Night.     • buPROPion XL (WELLBUTRIN XL) 300 MG 24 hr tablet Take 300 mg by mouth Every Morning.     • celecoxib (CELEBREX) 200 MG capsule Take 200 mg by mouth 2 (Two) Times a Day.     • cycloSPORINE (RESTASIS) 0.05 % ophthalmic emulsion Administer 1 drop to both eyes 2 (Two) Times a Day.     • DULoxetine (CYMBALTA) 60 MG capsule Take 60 mg by mouth Daily.     • gabapentin (NEURONTIN) 400 MG capsule Take 400 mg by mouth 3 (Three) Times a Day.     • HYDROcodone-acetaminophen (NORCO) 5-325 MG per tablet 1 to 2 Tablets Every 6 Hours as Needed for PAIN 20 tablet 0   • ipratropium-albuterol (DUO-NEB) 0.5-2.5 mg/mL nebulizer Take 3 mL by nebulization Every 6 (Six) Hours. 360 mL 3   • losartan (COZAAR) 100 MG tablet Take 100 mg by mouth Daily.     • metoprolol succinate XL (TOPROL-XL) 25 MG 24 hr tablet Take 25 mg by mouth Daily.     • omeprazole (priLOSEC) 20 MG capsule Take 20 mg by mouth 2  (Two) Times a Day Before Meals.     • oxybutynin XL (DITROPAN-XL) 10 MG 24 hr tablet Take 10 mg by mouth Daily.     • polyethylene glycol (MIRALAX) packet Take 17 g by mouth Daily.     • tamsulosin (FLOMAX) 0.4 MG capsule 24 hr capsule Take 1 capsule by mouth Every Night. 30 capsule 11   • topiramate (TOPAMAX) 100 MG tablet Take 100 mg by mouth 2 (Two) Times a Day.       No current facility-administered medications for this visit.         Allergies:      No Known Allergies     Past Surgical History:     Past Surgical History:   Procedure Laterality Date   • APPENDECTOMY     • ESOPHAGEAL DILATATION     • TOTAL SHOULDER REPLACEMENT Bilateral    • TUBAL ABDOMINAL LIGATION           Social History:     Social History     Social History   • Marital status:      Spouse name: N/A   • Number of children: N/A   • Years of education: N/A     Occupational History   • Not on file.     Social History Main Topics   • Smoking status: Former Smoker   • Smokeless tobacco: Not on file   • Alcohol use No   • Drug use: No   • Sexual activity: Defer     Other Topics Concern   • Not on file     Social History Narrative   • No narrative on file       Family History:     Family History   Problem Relation Age of Onset   • Breast cancer Sister        Review of Systems:     Review of Systems   Constitutional: Negative for chills, fatigue and fever.   Respiratory: Negative for cough, shortness of breath and wheezing.    Cardiovascular: Negative for leg swelling.   Gastrointestinal: Negative for abdominal pain, nausea and vomiting.   Musculoskeletal: Negative for back pain and joint swelling.   Neurological: Negative for dizziness and headaches.   Psychiatric/Behavioral: Negative for confusion.       I have reviewed the follow portions of the patient's history and confirmed they are accurate today:  allergies, current medications, past family history, past medical history, past social history, past surgical history, problem list and  "ROS  Physical Exam:     Physical Exam   Constitutional: She is oriented to person, place, and time. She appears well-developed.   HENT:   Head: Normocephalic.   Right Ear: External ear normal.   Left Ear: External ear normal.   Nose: Nose normal.   Mouth/Throat: Oropharynx is clear and moist.   Eyes: Pupils are equal, round, and reactive to light. Conjunctivae and EOM are normal.   Neck: Normal range of motion. Neck supple. No tracheal deviation present. No thyromegaly present.   Cardiovascular: Normal heart sounds.  Exam reveals no gallop and no friction rub.    No murmur heard.  Pulmonary/Chest: Effort normal and breath sounds normal. No respiratory distress. She has no wheezes. She has no rales. She exhibits no tenderness.   Abdominal: Soft. Bowel sounds are normal. She exhibits no distension and no mass. There is no tenderness. There is no rebound and no guarding. No hernia.   Genitourinary: Vagina normal and uterus normal.   Musculoskeletal: She exhibits no edema.   Lymphadenopathy:     She has no cervical adenopathy.   Neurological: She is alert and oriented to person, place, and time. She displays normal reflexes. No cranial nerve deficit. She exhibits normal muscle tone. Coordination normal.   Skin: Skin is warm. No rash noted.   Psychiatric: She has a normal mood and affect. Judgment normal.       Ht 162.6 cm (64.02\")   Wt 90.7 kg (200 lb)   BMI 34.31 kg/m²    Procedure:         Assessment:     Encounter Diagnosis   Name Primary?   • Ureteral stone Yes     No orders of the defined types were placed in this encounter.      Plan:   I discussed how a right sided stone should not cause bilateral pain.  I will continue conservative therapy and will repeat her stone study ct scan in 2 weeks.    Patient's Body mass index is 34.31 kg/m². BMI is above normal parameters. Recommendations include: educational material.      Counseling was given to patient for the following topics impressions as follows: ureteral " stone. The interim medical history and current results were reviewed.  A treatment plan with follow-up was made for Ureteral stone [N20.1]. I spent 35 minutes face to face with Domonique Cummings and 80 percentage was spent in counseling.    This document has been electronically signed by Darren Blackman MD September 12, 2018 12:39 PM

## 2018-10-18 ENCOUNTER — HOSPITAL ENCOUNTER (INPATIENT)
Facility: HOSPITAL | Age: 73
LOS: 10 days | Discharge: NURSING, REHAB OR LONG-TERM CARE | End: 2018-10-31
Attending: EMERGENCY MEDICINE | Admitting: INTERNAL MEDICINE

## 2018-10-18 ENCOUNTER — APPOINTMENT (OUTPATIENT)
Dept: CT IMAGING | Facility: HOSPITAL | Age: 73
End: 2018-10-18

## 2018-10-18 ENCOUNTER — APPOINTMENT (OUTPATIENT)
Dept: GENERAL RADIOLOGY | Facility: HOSPITAL | Age: 73
End: 2018-10-18

## 2018-10-18 DIAGNOSIS — I20.9 ANGINA, CLASS IV (HCC): ICD-10-CM

## 2018-10-18 DIAGNOSIS — R44.1 VISUAL HALLUCINATIONS: Primary | ICD-10-CM

## 2018-10-18 LAB
A-A DO2: 18.6 MMHG (ref 0–300)
ALBUMIN SERPL-MCNC: 3.8 G/DL (ref 3.4–4.8)
ALBUMIN/GLOB SERPL: 1.1 G/DL (ref 1.5–2.5)
ALP SERPL-CCNC: 88 U/L (ref 35–104)
ALT SERPL W P-5'-P-CCNC: 10 U/L (ref 10–36)
ANION GAP SERPL CALCULATED.3IONS-SCNC: 9.7 MMOL/L (ref 3.6–11.2)
ARTERIAL PATENCY WRIST A: POSITIVE
AST SERPL-CCNC: 16 U/L (ref 10–30)
ATMOSPHERIC PRESS: 733 MMHG
BASE EXCESS BLDA CALC-SCNC: -2.5 MMOL/L
BASOPHILS # BLD AUTO: 0.02 10*3/MM3 (ref 0–0.3)
BASOPHILS NFR BLD AUTO: 0.2 % (ref 0–2)
BDY SITE: ABNORMAL
BILIRUB SERPL-MCNC: 0.1 MG/DL (ref 0.2–1.8)
BILIRUB UR QL STRIP: NEGATIVE
BODY TEMPERATURE: 98.6 C
BUN BLD-MCNC: 20 MG/DL (ref 7–21)
BUN/CREAT SERPL: 19.4 (ref 7–25)
CALCIUM SPEC-SCNC: 9.6 MG/DL (ref 7.7–10)
CHLORIDE SERPL-SCNC: 112 MMOL/L (ref 99–112)
CLARITY UR: CLEAR
CO2 SERPL-SCNC: 21.3 MMOL/L (ref 24.3–31.9)
COHGB MFR BLD: 1.1 % (ref 0–5)
COLOR UR: YELLOW
CREAT BLD-MCNC: 1.03 MG/DL (ref 0.43–1.29)
D-LACTATE SERPL-SCNC: 1.8 MMOL/L (ref 0.5–2)
DEPRECATED RDW RBC AUTO: 43.6 FL (ref 37–54)
EOSINOPHIL # BLD AUTO: 0.37 10*3/MM3 (ref 0–0.7)
EOSINOPHIL NFR BLD AUTO: 4.2 % (ref 0–7)
ERYTHROCYTE [DISTWIDTH] IN BLOOD BY AUTOMATED COUNT: 12.6 % (ref 11.5–14.5)
GFR SERPL CREATININE-BSD FRML MDRD: 52 ML/MIN/1.73
GLOBULIN UR ELPH-MCNC: 3.5 GM/DL
GLUCOSE BLD-MCNC: 101 MG/DL (ref 70–110)
GLUCOSE UR STRIP-MCNC: NEGATIVE MG/DL
HCO3 BLDA-SCNC: 21.4 MMOL/L (ref 22–26)
HCT VFR BLD AUTO: 35.6 % (ref 37–47)
HCT VFR BLD CALC: 36 % (ref 37–47)
HGB BLD-MCNC: 11.5 G/DL (ref 12–16)
HGB BLDA-MCNC: 12.1 G/DL (ref 12–16)
HGB UR QL STRIP.AUTO: NEGATIVE
HOROWITZ INDEX BLD+IHG-RTO: 21 %
IMM GRANULOCYTES # BLD: 0.02 10*3/MM3 (ref 0–0.03)
IMM GRANULOCYTES NFR BLD: 0.2 % (ref 0–0.5)
KETONES UR QL STRIP: NEGATIVE
LEUKOCYTE ESTERASE UR QL STRIP.AUTO: NEGATIVE
LYMPHOCYTES # BLD AUTO: 2.49 10*3/MM3 (ref 1–3)
LYMPHOCYTES NFR BLD AUTO: 28.1 % (ref 16–46)
MCH RBC QN AUTO: 30.7 PG (ref 27–33)
MCHC RBC AUTO-ENTMCNC: 32.3 G/DL (ref 33–37)
MCV RBC AUTO: 95.2 FL (ref 80–94)
METHGB BLD QL: 0.2 % (ref 0–3)
MODALITY: ABNORMAL
MONOCYTES # BLD AUTO: 0.95 10*3/MM3 (ref 0.1–0.9)
MONOCYTES NFR BLD AUTO: 10.7 % (ref 0–12)
NEUTROPHILS # BLD AUTO: 5 10*3/MM3 (ref 1.4–6.5)
NEUTROPHILS NFR BLD AUTO: 56.6 % (ref 40–75)
NITRITE UR QL STRIP: NEGATIVE
OSMOLALITY SERPL CALC.SUM OF ELEC: 287.7 MOSM/KG (ref 273–305)
OXYHGB MFR BLDV: 94.9 % (ref 85–100)
PCO2 BLDA: 34 MM HG (ref 35–45)
PH BLDA: 7.42 PH UNITS (ref 7.35–7.45)
PH UR STRIP.AUTO: <=5 [PH] (ref 5–8)
PLATELET # BLD AUTO: 191 10*3/MM3 (ref 130–400)
PMV BLD AUTO: 9.9 FL (ref 6–10)
PO2 BLDA: 84.7 MM HG (ref 80–100)
POTASSIUM BLD-SCNC: 4.1 MMOL/L (ref 3.5–5.3)
PROT SERPL-MCNC: 7.3 G/DL (ref 6–8)
PROT UR QL STRIP: NEGATIVE
RBC # BLD AUTO: 3.74 10*6/MM3 (ref 4.2–5.4)
SAO2 % BLDCOA: 96.1 % (ref 90–100)
SODIUM BLD-SCNC: 143 MMOL/L (ref 135–153)
SP GR UR STRIP: 1.02 (ref 1–1.03)
TROPONIN I SERPL-MCNC: <0.006 NG/ML
UROBILINOGEN UR QL STRIP: NORMAL
WBC NRBC COR # BLD: 8.85 10*3/MM3 (ref 4.5–12.5)

## 2018-10-18 PROCEDURE — 71045 X-RAY EXAM CHEST 1 VIEW: CPT | Performed by: RADIOLOGY

## 2018-10-18 PROCEDURE — 82805 BLOOD GASES W/O2 SATURATION: CPT | Performed by: EMERGENCY MEDICINE

## 2018-10-18 PROCEDURE — 84484 ASSAY OF TROPONIN QUANT: CPT | Performed by: EMERGENCY MEDICINE

## 2018-10-18 PROCEDURE — 83050 HGB METHEMOGLOBIN QUAN: CPT | Performed by: EMERGENCY MEDICINE

## 2018-10-18 PROCEDURE — 81003 URINALYSIS AUTO W/O SCOPE: CPT | Performed by: EMERGENCY MEDICINE

## 2018-10-18 PROCEDURE — 80053 COMPREHEN METABOLIC PANEL: CPT | Performed by: EMERGENCY MEDICINE

## 2018-10-18 PROCEDURE — 70450 CT HEAD/BRAIN W/O DYE: CPT | Performed by: RADIOLOGY

## 2018-10-18 PROCEDURE — 83605 ASSAY OF LACTIC ACID: CPT | Performed by: EMERGENCY MEDICINE

## 2018-10-18 PROCEDURE — P9612 CATHETERIZE FOR URINE SPEC: HCPCS

## 2018-10-18 PROCEDURE — G0378 HOSPITAL OBSERVATION PER HR: HCPCS

## 2018-10-18 PROCEDURE — 36415 COLL VENOUS BLD VENIPUNCTURE: CPT

## 2018-10-18 PROCEDURE — 87040 BLOOD CULTURE FOR BACTERIA: CPT | Performed by: EMERGENCY MEDICINE

## 2018-10-18 PROCEDURE — 70450 CT HEAD/BRAIN W/O DYE: CPT

## 2018-10-18 PROCEDURE — 93005 ELECTROCARDIOGRAM TRACING: CPT | Performed by: EMERGENCY MEDICINE

## 2018-10-18 PROCEDURE — 71045 X-RAY EXAM CHEST 1 VIEW: CPT

## 2018-10-18 PROCEDURE — 85025 COMPLETE CBC W/AUTO DIFF WBC: CPT | Performed by: EMERGENCY MEDICINE

## 2018-10-18 PROCEDURE — 99285 EMERGENCY DEPT VISIT HI MDM: CPT

## 2018-10-18 PROCEDURE — 82375 ASSAY CARBOXYHB QUANT: CPT | Performed by: EMERGENCY MEDICINE

## 2018-10-18 PROCEDURE — 36600 WITHDRAWAL OF ARTERIAL BLOOD: CPT | Performed by: EMERGENCY MEDICINE

## 2018-10-18 RX ORDER — SODIUM CHLORIDE 0.9 % (FLUSH) 0.9 %
10 SYRINGE (ML) INJECTION AS NEEDED
Status: DISCONTINUED | OUTPATIENT
Start: 2018-10-18 | End: 2018-10-31 | Stop reason: HOSPADM

## 2018-10-18 RX ORDER — SODIUM CHLORIDE 0.9 % (FLUSH) 0.9 %
3-10 SYRINGE (ML) INJECTION AS NEEDED
Status: DISCONTINUED | OUTPATIENT
Start: 2018-10-18 | End: 2018-10-31 | Stop reason: HOSPADM

## 2018-10-18 RX ORDER — ACETAMINOPHEN 325 MG/1
650 TABLET ORAL EVERY 4 HOURS PRN
Status: DISCONTINUED | OUTPATIENT
Start: 2018-10-18 | End: 2018-10-31 | Stop reason: HOSPADM

## 2018-10-18 RX ORDER — SODIUM CHLORIDE 9 MG/ML
100 INJECTION, SOLUTION INTRAVENOUS CONTINUOUS
Status: DISCONTINUED | OUTPATIENT
Start: 2018-10-19 | End: 2018-10-24

## 2018-10-18 RX ORDER — NITROGLYCERIN 0.4 MG/1
0.4 TABLET SUBLINGUAL
Status: DISCONTINUED | OUTPATIENT
Start: 2018-10-18 | End: 2018-10-31 | Stop reason: HOSPADM

## 2018-10-18 RX ORDER — SODIUM CHLORIDE 0.9 % (FLUSH) 0.9 %
3 SYRINGE (ML) INJECTION EVERY 12 HOURS SCHEDULED
Status: DISCONTINUED | OUTPATIENT
Start: 2018-10-19 | End: 2018-10-31 | Stop reason: HOSPADM

## 2018-10-19 ENCOUNTER — APPOINTMENT (OUTPATIENT)
Dept: CARDIOLOGY | Facility: HOSPITAL | Age: 73
End: 2018-10-19
Attending: INTERNAL MEDICINE

## 2018-10-19 ENCOUNTER — APPOINTMENT (OUTPATIENT)
Dept: CT IMAGING | Facility: HOSPITAL | Age: 73
End: 2018-10-19

## 2018-10-19 PROBLEM — R07.1 CHEST PAIN ON BREATHING: Status: ACTIVE | Noted: 2018-10-19

## 2018-10-19 LAB
ANION GAP SERPL CALCULATED.3IONS-SCNC: 6.9 MMOL/L (ref 3.6–11.2)
BASOPHILS # BLD AUTO: 0.02 10*3/MM3 (ref 0–0.3)
BASOPHILS NFR BLD AUTO: 0.2 % (ref 0–2)
BH CV ECHO MEAS - % IVS THICK: 95.8 %
BH CV ECHO MEAS - % LVPW THICK: 80 %
BH CV ECHO MEAS - ACS: 2.2 CM
BH CV ECHO MEAS - AO ROOT AREA (BSA CORRECTED): 1.7
BH CV ECHO MEAS - AO ROOT AREA: 8.2 CM^2
BH CV ECHO MEAS - AO ROOT DIAM: 3.2 CM
BH CV ECHO MEAS - BSA(HAYCOCK): 2.1 M^2
BH CV ECHO MEAS - BSA: 2 M^2
BH CV ECHO MEAS - BZI_BMI: 34.2 KILOGRAMS/M^2
BH CV ECHO MEAS - BZI_METRIC_HEIGHT: 162.6 CM
BH CV ECHO MEAS - BZI_METRIC_WEIGHT: 90.3 KG
BH CV ECHO MEAS - EDV(CUBED): 116 ML
BH CV ECHO MEAS - EDV(TEICH): 111.6 ML
BH CV ECHO MEAS - EF(CUBED): 94 %
BH CV ECHO MEAS - EF(TEICH): 89.9 %
BH CV ECHO MEAS - ESV(CUBED): 6.9 ML
BH CV ECHO MEAS - ESV(TEICH): 11.3 ML
BH CV ECHO MEAS - FS: 60.9 %
BH CV ECHO MEAS - IVS/LVPW: 0.96
BH CV ECHO MEAS - IVSD: 0.88 CM
BH CV ECHO MEAS - IVSS: 1.7 CM
BH CV ECHO MEAS - LA DIMENSION: 2.7 CM
BH CV ECHO MEAS - LA/AO: 0.84
BH CV ECHO MEAS - LV MASS(C)D: 151.3 GRAMS
BH CV ECHO MEAS - LV MASS(C)DI: 77.5 GRAMS/M^2
BH CV ECHO MEAS - LV MASS(C)S: 117.3 GRAMS
BH CV ECHO MEAS - LV MASS(C)SI: 60.1 GRAMS/M^2
BH CV ECHO MEAS - LVIDD: 4.9 CM
BH CV ECHO MEAS - LVIDS: 1.9 CM
BH CV ECHO MEAS - LVPWD: 0.92 CM
BH CV ECHO MEAS - LVPWS: 1.6 CM
BH CV ECHO MEAS - RVDD: 1.9 CM
BH CV ECHO MEAS - SI(CUBED): 55.9 ML/M^2
BH CV ECHO MEAS - SI(TEICH): 51.4 ML/M^2
BH CV ECHO MEAS - SV(CUBED): 109 ML
BH CV ECHO MEAS - SV(TEICH): 100.3 ML
BNP SERPL-MCNC: 124 PG/ML (ref 0–100)
BUN BLD-MCNC: 18 MG/DL (ref 7–21)
BUN/CREAT SERPL: 15.5 (ref 7–25)
CALCIUM SPEC-SCNC: 9.4 MG/DL (ref 7.7–10)
CHLORIDE SERPL-SCNC: 110 MMOL/L (ref 99–112)
CHOLEST SERPL-MCNC: 210 MG/DL (ref 0–200)
CK MB SERPL-CCNC: 2.56 NG/ML (ref 0–5)
CK MB SERPL-RTO: 3 % (ref 0–3)
CK SERPL-CCNC: 84 U/L (ref 24–173)
CO2 SERPL-SCNC: 26.1 MMOL/L (ref 24.3–31.9)
CREAT BLD-MCNC: 1.16 MG/DL (ref 0.43–1.29)
D DIMER PPP FEU-MCNC: 1.06 MCGFEU/ML (ref 0–0.5)
DEPRECATED RDW RBC AUTO: 41.6 FL (ref 37–54)
EOSINOPHIL # BLD AUTO: 0.42 10*3/MM3 (ref 0–0.7)
EOSINOPHIL NFR BLD AUTO: 4.1 % (ref 0–7)
ERYTHROCYTE [DISTWIDTH] IN BLOOD BY AUTOMATED COUNT: 12.3 % (ref 11.5–14.5)
GFR SERPL CREATININE-BSD FRML MDRD: 46 ML/MIN/1.73
GLUCOSE BLD-MCNC: 84 MG/DL (ref 70–110)
HBA1C MFR BLD: 6 % (ref 4.5–5.7)
HCT VFR BLD AUTO: 38.1 % (ref 37–47)
HDLC SERPL-MCNC: 54 MG/DL (ref 60–100)
HGB BLD-MCNC: 12.6 G/DL (ref 12–16)
IMM GRANULOCYTES # BLD: 0.01 10*3/MM3 (ref 0–0.03)
IMM GRANULOCYTES NFR BLD: 0.1 % (ref 0–0.5)
LDLC SERPL CALC-MCNC: 117 MG/DL (ref 0–100)
LDLC/HDLC SERPL: 2.16 {RATIO}
LYMPHOCYTES # BLD AUTO: 3.37 10*3/MM3 (ref 1–3)
LYMPHOCYTES NFR BLD AUTO: 32.7 % (ref 16–46)
MAGNESIUM SERPL-MCNC: 2.2 MG/DL (ref 1.7–2.6)
MAXIMAL PREDICTED HEART RATE: 146 BPM
MCH RBC QN AUTO: 31.6 PG (ref 27–33)
MCHC RBC AUTO-ENTMCNC: 33.1 G/DL (ref 33–37)
MCV RBC AUTO: 95.5 FL (ref 80–94)
MONOCYTES # BLD AUTO: 1.02 10*3/MM3 (ref 0.1–0.9)
MONOCYTES NFR BLD AUTO: 9.9 % (ref 0–12)
MYOGLOBIN SERPL-MCNC: 66 NG/ML (ref 0–109)
NEUTROPHILS # BLD AUTO: 5.48 10*3/MM3 (ref 1.4–6.5)
NEUTROPHILS NFR BLD AUTO: 53 % (ref 40–75)
OSMOLALITY SERPL CALC.SUM OF ELEC: 286.1 MOSM/KG (ref 273–305)
PLATELET # BLD AUTO: 251 10*3/MM3 (ref 130–400)
PMV BLD AUTO: 10.3 FL (ref 6–10)
POTASSIUM BLD-SCNC: 3.5 MMOL/L (ref 3.5–5.3)
RBC # BLD AUTO: 3.99 10*6/MM3 (ref 4.2–5.4)
SODIUM BLD-SCNC: 143 MMOL/L (ref 135–153)
STRESS TARGET HR: 124 BPM
TRIGL SERPL-MCNC: 197 MG/DL (ref 0–150)
TROPONIN I SERPL-MCNC: 0.01 NG/ML
TROPONIN I SERPL-MCNC: <0.006 NG/ML
TROPONIN I SERPL-MCNC: <0.006 NG/ML
TSH SERPL DL<=0.05 MIU/L-ACNC: 1.84 MIU/ML (ref 0.55–4.78)
VLDLC SERPL-MCNC: 39.4 MG/DL
WBC NRBC COR # BLD: 10.32 10*3/MM3 (ref 4.5–12.5)

## 2018-10-19 PROCEDURE — 0 IOPAMIDOL PER 1 ML: Performed by: INTERNAL MEDICINE

## 2018-10-19 PROCEDURE — 83036 HEMOGLOBIN GLYCOSYLATED A1C: CPT | Performed by: INTERNAL MEDICINE

## 2018-10-19 PROCEDURE — 82550 ASSAY OF CK (CPK): CPT | Performed by: INTERNAL MEDICINE

## 2018-10-19 PROCEDURE — 93005 ELECTROCARDIOGRAM TRACING: CPT | Performed by: INTERNAL MEDICINE

## 2018-10-19 PROCEDURE — 84484 ASSAY OF TROPONIN QUANT: CPT | Performed by: INTERNAL MEDICINE

## 2018-10-19 PROCEDURE — 25010000002 ENOXAPARIN PER 10 MG: Performed by: INTERNAL MEDICINE

## 2018-10-19 PROCEDURE — G0378 HOSPITAL OBSERVATION PER HR: HCPCS

## 2018-10-19 PROCEDURE — 71275 CT ANGIOGRAPHY CHEST: CPT

## 2018-10-19 PROCEDURE — 80048 BASIC METABOLIC PNL TOTAL CA: CPT | Performed by: INTERNAL MEDICINE

## 2018-10-19 PROCEDURE — 83874 ASSAY OF MYOGLOBIN: CPT | Performed by: INTERNAL MEDICINE

## 2018-10-19 PROCEDURE — 83880 ASSAY OF NATRIURETIC PEPTIDE: CPT | Performed by: INTERNAL MEDICINE

## 2018-10-19 PROCEDURE — 84443 ASSAY THYROID STIM HORMONE: CPT | Performed by: INTERNAL MEDICINE

## 2018-10-19 PROCEDURE — 85379 FIBRIN DEGRADATION QUANT: CPT | Performed by: INTERNAL MEDICINE

## 2018-10-19 PROCEDURE — 82553 CREATINE MB FRACTION: CPT | Performed by: INTERNAL MEDICINE

## 2018-10-19 PROCEDURE — 85025 COMPLETE CBC W/AUTO DIFF WBC: CPT | Performed by: INTERNAL MEDICINE

## 2018-10-19 PROCEDURE — 99222 1ST HOSP IP/OBS MODERATE 55: CPT | Performed by: INTERNAL MEDICINE

## 2018-10-19 PROCEDURE — 80061 LIPID PANEL: CPT | Performed by: INTERNAL MEDICINE

## 2018-10-19 PROCEDURE — 71275 CT ANGIOGRAPHY CHEST: CPT | Performed by: RADIOLOGY

## 2018-10-19 PROCEDURE — 83735 ASSAY OF MAGNESIUM: CPT | Performed by: INTERNAL MEDICINE

## 2018-10-19 PROCEDURE — 86141 C-REACTIVE PROTEIN HS: CPT | Performed by: INTERNAL MEDICINE

## 2018-10-19 PROCEDURE — 93306 TTE W/DOPPLER COMPLETE: CPT

## 2018-10-19 PROCEDURE — 99221 1ST HOSP IP/OBS SF/LOW 40: CPT | Performed by: PSYCHIATRY & NEUROLOGY

## 2018-10-19 PROCEDURE — 93306 TTE W/DOPPLER COMPLETE: CPT | Performed by: INTERNAL MEDICINE

## 2018-10-19 RX ORDER — CETIRIZINE HYDROCHLORIDE 10 MG/1
10 TABLET ORAL DAILY
Status: DISCONTINUED | OUTPATIENT
Start: 2018-10-19 | End: 2018-10-31 | Stop reason: HOSPADM

## 2018-10-19 RX ORDER — POLYETHYLENE GLYCOL 3350 17 G/17G
17 POWDER, FOR SOLUTION ORAL DAILY
Status: DISCONTINUED | OUTPATIENT
Start: 2018-10-19 | End: 2018-10-31 | Stop reason: HOSPADM

## 2018-10-19 RX ORDER — GABAPENTIN 400 MG/1
400 CAPSULE ORAL EVERY 8 HOURS SCHEDULED
Status: DISCONTINUED | OUTPATIENT
Start: 2018-10-19 | End: 2018-10-31 | Stop reason: HOSPADM

## 2018-10-19 RX ORDER — TOPIRAMATE 100 MG/1
100 TABLET, FILM COATED ORAL 2 TIMES DAILY
Status: DISCONTINUED | OUTPATIENT
Start: 2018-10-19 | End: 2018-10-31 | Stop reason: HOSPADM

## 2018-10-19 RX ORDER — BUPROPION HYDROCHLORIDE 150 MG/1
300 TABLET ORAL NIGHTLY
Status: DISCONTINUED | OUTPATIENT
Start: 2018-10-19 | End: 2018-10-31 | Stop reason: HOSPADM

## 2018-10-19 RX ORDER — MULTIVITAMIN
1 TABLET ORAL DAILY
Status: DISCONTINUED | OUTPATIENT
Start: 2018-10-19 | End: 2018-10-31 | Stop reason: HOSPADM

## 2018-10-19 RX ORDER — MULTIVITAMIN
1 TABLET ORAL DAILY
COMMUNITY

## 2018-10-19 RX ORDER — ACETAMINOPHEN AND CODEINE PHOSPHATE 300; 30 MG/1; MG/1
1 TABLET ORAL 2 TIMES DAILY PRN
Status: DISCONTINUED | OUTPATIENT
Start: 2018-10-19 | End: 2018-10-31 | Stop reason: HOSPADM

## 2018-10-19 RX ORDER — RANITIDINE 300 MG/1
300 TABLET ORAL 2 TIMES DAILY
Status: ON HOLD | COMMUNITY
End: 2018-10-31

## 2018-10-19 RX ORDER — DULOXETIN HYDROCHLORIDE 60 MG/1
60 CAPSULE, DELAYED RELEASE ORAL DAILY
Status: DISCONTINUED | OUTPATIENT
Start: 2018-10-19 | End: 2018-10-31 | Stop reason: HOSPADM

## 2018-10-19 RX ORDER — ALPRAZOLAM 0.5 MG/1
0.5 TABLET ORAL 2 TIMES DAILY PRN
Status: DISCONTINUED | OUTPATIENT
Start: 2018-10-19 | End: 2018-10-31 | Stop reason: HOSPADM

## 2018-10-19 RX ORDER — FAMOTIDINE 20 MG/1
40 TABLET, FILM COATED ORAL 2 TIMES DAILY
Status: DISCONTINUED | OUTPATIENT
Start: 2018-10-19 | End: 2018-10-31 | Stop reason: HOSPADM

## 2018-10-19 RX ORDER — BACLOFEN 10 MG/1
10 TABLET ORAL NIGHTLY
Status: DISCONTINUED | OUTPATIENT
Start: 2018-10-19 | End: 2018-10-31 | Stop reason: HOSPADM

## 2018-10-19 RX ORDER — ASPIRIN 325 MG
325 TABLET ORAL ONCE
Status: COMPLETED | OUTPATIENT
Start: 2018-10-19 | End: 2018-10-19

## 2018-10-19 RX ORDER — LORATADINE 10 MG/1
10 TABLET ORAL DAILY
COMMUNITY

## 2018-10-19 RX ORDER — AMLODIPINE BESYLATE 5 MG/1
5 TABLET ORAL DAILY
Status: DISCONTINUED | OUTPATIENT
Start: 2018-10-19 | End: 2018-10-21

## 2018-10-19 RX ORDER — ASPIRIN 81 MG/1
81 TABLET ORAL DAILY
Status: DISCONTINUED | OUTPATIENT
Start: 2018-10-19 | End: 2018-10-31 | Stop reason: HOSPADM

## 2018-10-19 RX ORDER — CELECOXIB 200 MG/1
200 CAPSULE ORAL DAILY
Status: DISCONTINUED | OUTPATIENT
Start: 2018-10-19 | End: 2018-10-31 | Stop reason: HOSPADM

## 2018-10-19 RX ORDER — OXYBUTYNIN CHLORIDE 5 MG/1
10 TABLET, EXTENDED RELEASE ORAL DAILY
Status: DISCONTINUED | OUTPATIENT
Start: 2018-10-19 | End: 2018-10-31 | Stop reason: HOSPADM

## 2018-10-19 RX ADMIN — IOPAMIDOL 100 ML: 755 INJECTION, SOLUTION INTRAVENOUS at 12:15

## 2018-10-19 RX ADMIN — CETIRIZINE HYDROCHLORIDE 10 MG: 10 TABLET, FILM COATED ORAL at 08:00

## 2018-10-19 RX ADMIN — Medication 1 TABLET: at 08:00

## 2018-10-19 RX ADMIN — GABAPENTIN 400 MG: 400 CAPSULE ORAL at 16:54

## 2018-10-19 RX ADMIN — GABAPENTIN 400 MG: 400 CAPSULE ORAL at 20:36

## 2018-10-19 RX ADMIN — METOPROLOL TARTRATE 25 MG: 25 TABLET, FILM COATED ORAL at 20:35

## 2018-10-19 RX ADMIN — FAMOTIDINE 40 MG: 20 TABLET, FILM COATED ORAL at 20:35

## 2018-10-19 RX ADMIN — ENOXAPARIN SODIUM 40 MG: 40 INJECTION SUBCUTANEOUS at 00:46

## 2018-10-19 RX ADMIN — ASPIRIN 325 MG: 325 TABLET ORAL at 03:49

## 2018-10-19 RX ADMIN — Medication 3 ML: at 00:51

## 2018-10-19 RX ADMIN — BUPROPION HYDROCHLORIDE 300 MG: 150 TABLET, FILM COATED, EXTENDED RELEASE ORAL at 20:35

## 2018-10-19 RX ADMIN — GABAPENTIN 400 MG: 400 CAPSULE ORAL at 08:01

## 2018-10-19 RX ADMIN — SODIUM CHLORIDE 100 ML/HR: 9 INJECTION, SOLUTION INTRAVENOUS at 00:46

## 2018-10-19 RX ADMIN — CELECOXIB 200 MG: 200 CAPSULE ORAL at 07:59

## 2018-10-19 RX ADMIN — OXYBUTYNIN CHLORIDE 10 MG: 5 TABLET, EXTENDED RELEASE ORAL at 07:59

## 2018-10-19 RX ADMIN — ENOXAPARIN SODIUM 40 MG: 40 INJECTION SUBCUTANEOUS at 23:32

## 2018-10-19 RX ADMIN — AMLODIPINE BESYLATE 5 MG: 5 TABLET ORAL at 07:59

## 2018-10-19 RX ADMIN — DULOXETINE HYDROCHLORIDE 60 MG: 60 CAPSULE, DELAYED RELEASE ORAL at 07:59

## 2018-10-19 RX ADMIN — POLYETHYLENE GLYCOL (3350) 17 G: 17 POWDER, FOR SOLUTION ORAL at 08:00

## 2018-10-19 RX ADMIN — TOPIRAMATE 100 MG: 100 TABLET, FILM COATED ORAL at 07:59

## 2018-10-19 RX ADMIN — SODIUM CHLORIDE 100 ML/HR: 9 INJECTION, SOLUTION INTRAVENOUS at 13:44

## 2018-10-19 RX ADMIN — FAMOTIDINE 40 MG: 20 TABLET, FILM COATED ORAL at 08:00

## 2018-10-19 RX ADMIN — ASPIRIN 81 MG: 81 TABLET, COATED ORAL at 08:00

## 2018-10-19 RX ADMIN — BACLOFEN 10 MG: 10 TABLET ORAL at 20:35

## 2018-10-19 RX ADMIN — ACETAMINOPHEN AND CODEINE PHOSPHATE 1 TABLET: 300; 30 TABLET ORAL at 20:36

## 2018-10-19 RX ADMIN — TOPIRAMATE 100 MG: 100 TABLET, FILM COATED ORAL at 20:36

## 2018-10-20 LAB — CRP SERPL HS-MCNC: 9.38 MG/L (ref 0–3)

## 2018-10-20 PROCEDURE — 99232 SBSQ HOSP IP/OBS MODERATE 35: CPT | Performed by: NURSE PRACTITIONER

## 2018-10-20 PROCEDURE — 94799 UNLISTED PULMONARY SVC/PX: CPT

## 2018-10-20 PROCEDURE — G0378 HOSPITAL OBSERVATION PER HR: HCPCS

## 2018-10-20 RX ADMIN — TOPIRAMATE 100 MG: 100 TABLET, FILM COATED ORAL at 09:34

## 2018-10-20 RX ADMIN — DULOXETINE HYDROCHLORIDE 60 MG: 60 CAPSULE, DELAYED RELEASE ORAL at 08:47

## 2018-10-20 RX ADMIN — ACETAMINOPHEN AND CODEINE PHOSPHATE 1 TABLET: 300; 30 TABLET ORAL at 22:13

## 2018-10-20 RX ADMIN — ACETAMINOPHEN AND CODEINE PHOSPHATE 1 TABLET: 300; 30 TABLET ORAL at 08:50

## 2018-10-20 RX ADMIN — ACETAMINOPHEN 650 MG: 325 TABLET ORAL at 13:14

## 2018-10-20 RX ADMIN — AMLODIPINE BESYLATE 5 MG: 5 TABLET ORAL at 08:47

## 2018-10-20 RX ADMIN — GABAPENTIN 400 MG: 400 CAPSULE ORAL at 20:06

## 2018-10-20 RX ADMIN — ASPIRIN 81 MG: 81 TABLET, COATED ORAL at 08:47

## 2018-10-20 RX ADMIN — CELECOXIB 200 MG: 200 CAPSULE ORAL at 08:47

## 2018-10-20 RX ADMIN — FAMOTIDINE 40 MG: 20 TABLET, FILM COATED ORAL at 08:47

## 2018-10-20 RX ADMIN — Medication 3 ML: at 08:53

## 2018-10-20 RX ADMIN — FAMOTIDINE 40 MG: 20 TABLET, FILM COATED ORAL at 20:06

## 2018-10-20 RX ADMIN — OXYBUTYNIN CHLORIDE 10 MG: 5 TABLET, EXTENDED RELEASE ORAL at 08:47

## 2018-10-20 RX ADMIN — Medication 3 ML: at 20:18

## 2018-10-20 RX ADMIN — CETIRIZINE HYDROCHLORIDE 10 MG: 10 TABLET, FILM COATED ORAL at 08:47

## 2018-10-20 RX ADMIN — POLYETHYLENE GLYCOL (3350) 17 G: 17 POWDER, FOR SOLUTION ORAL at 08:47

## 2018-10-20 RX ADMIN — BACLOFEN 10 MG: 10 TABLET ORAL at 20:07

## 2018-10-20 RX ADMIN — ALPRAZOLAM 0.5 MG: 0.5 TABLET ORAL at 22:13

## 2018-10-20 RX ADMIN — Medication 1 TABLET: at 08:47

## 2018-10-20 RX ADMIN — GABAPENTIN 400 MG: 400 CAPSULE ORAL at 13:14

## 2018-10-20 RX ADMIN — TOPIRAMATE 100 MG: 100 TABLET, FILM COATED ORAL at 20:08

## 2018-10-20 RX ADMIN — SODIUM CHLORIDE 100 ML/HR: 9 INJECTION, SOLUTION INTRAVENOUS at 05:47

## 2018-10-20 RX ADMIN — BUPROPION HYDROCHLORIDE 300 MG: 150 TABLET, FILM COATED, EXTENDED RELEASE ORAL at 20:09

## 2018-10-20 RX ADMIN — SODIUM CHLORIDE 100 ML/HR: 9 INJECTION, SOLUTION INTRAVENOUS at 15:47

## 2018-10-21 ENCOUNTER — APPOINTMENT (OUTPATIENT)
Dept: NUCLEAR MEDICINE | Facility: HOSPITAL | Age: 73
End: 2018-10-21

## 2018-10-21 ENCOUNTER — APPOINTMENT (OUTPATIENT)
Dept: CARDIOLOGY | Facility: HOSPITAL | Age: 73
End: 2018-10-21

## 2018-10-21 LAB
BH CV NUCLEAR PRIOR STUDY: 3
BH CV STRESS BP STAGE 1: NORMAL
BH CV STRESS BP STAGE 2: NORMAL
BH CV STRESS COMMENTS STAGE 1: NORMAL
BH CV STRESS COMMENTS STAGE 2: NORMAL
BH CV STRESS DOSE REGADENOSON STAGE 1: 0.4
BH CV STRESS DURATION MIN STAGE 1: 0
BH CV STRESS DURATION MIN STAGE 2: 4
BH CV STRESS DURATION SEC STAGE 1: 10
BH CV STRESS DURATION SEC STAGE 2: 0
BH CV STRESS HR STAGE 1: 76
BH CV STRESS HR STAGE 2: 88
BH CV STRESS PROTOCOL 1: NORMAL
BH CV STRESS RECOVERY BP: NORMAL MMHG
BH CV STRESS RECOVERY HR: 69 BPM
BH CV STRESS STAGE 1: 1
BH CV STRESS STAGE 2: 2
MAXIMAL PREDICTED HEART RATE: 146 BPM
PERCENT MAX PREDICTED HR: 62.33 %
STRESS BASELINE BP: NORMAL MMHG
STRESS BASELINE HR: 60 BPM
STRESS PERCENT HR: 73 %
STRESS POST PEAK BP: NORMAL MMHG
STRESS POST PEAK HR: 91 BPM
STRESS TARGET HR: 124 BPM

## 2018-10-21 PROCEDURE — 25010000002 ENOXAPARIN PER 10 MG: Performed by: INTERNAL MEDICINE

## 2018-10-21 PROCEDURE — 93018 CV STRESS TEST I&R ONLY: CPT | Performed by: INTERNAL MEDICINE

## 2018-10-21 PROCEDURE — 94799 UNLISTED PULMONARY SVC/PX: CPT

## 2018-10-21 PROCEDURE — 0 TECHNETIUM SESTAMIBI: Performed by: INTERNAL MEDICINE

## 2018-10-21 PROCEDURE — A9500 TC99M SESTAMIBI: HCPCS | Performed by: INTERNAL MEDICINE

## 2018-10-21 PROCEDURE — 99232 SBSQ HOSP IP/OBS MODERATE 35: CPT | Performed by: NURSE PRACTITIONER

## 2018-10-21 PROCEDURE — 25010000002 REGADENOSON 0.4 MG/5ML SOLUTION: Performed by: INTERNAL MEDICINE

## 2018-10-21 PROCEDURE — 78452 HT MUSCLE IMAGE SPECT MULT: CPT | Performed by: INTERNAL MEDICINE

## 2018-10-21 PROCEDURE — 25010000002 AMINOPHYLLINE PER 250 MG: Performed by: INTERNAL MEDICINE

## 2018-10-21 PROCEDURE — 78452 HT MUSCLE IMAGE SPECT MULT: CPT

## 2018-10-21 PROCEDURE — 93017 CV STRESS TEST TRACING ONLY: CPT

## 2018-10-21 RX ORDER — AMINOPHYLLINE DIHYDRATE 25 MG/ML
125 INJECTION, SOLUTION INTRAVENOUS
Status: COMPLETED | OUTPATIENT
Start: 2018-10-21 | End: 2018-10-21

## 2018-10-21 RX ADMIN — FAMOTIDINE 40 MG: 20 TABLET, FILM COATED ORAL at 10:54

## 2018-10-21 RX ADMIN — BACLOFEN 10 MG: 10 TABLET ORAL at 20:42

## 2018-10-21 RX ADMIN — ASPIRIN 81 MG: 81 TABLET, COATED ORAL at 10:54

## 2018-10-21 RX ADMIN — AMINOPHYLLINE 125 MG: 25 INJECTION, SOLUTION INTRAVENOUS at 10:30

## 2018-10-21 RX ADMIN — TECHNETIUM TC 99M SESTAMIBI 1 DOSE: 1 INJECTION INTRAVENOUS at 09:15

## 2018-10-21 RX ADMIN — TECHNETIUM TC 99M SESTAMIBI 1 DOSE: 1 INJECTION INTRAVENOUS at 08:09

## 2018-10-21 RX ADMIN — ENOXAPARIN SODIUM 40 MG: 40 INJECTION SUBCUTANEOUS at 00:46

## 2018-10-21 RX ADMIN — SODIUM CHLORIDE 100 ML/HR: 9 INJECTION, SOLUTION INTRAVENOUS at 13:27

## 2018-10-21 RX ADMIN — REGADENOSON 0.4 MG: 0.08 INJECTION, SOLUTION INTRAVENOUS at 10:00

## 2018-10-21 RX ADMIN — Medication 3 ML: at 11:00

## 2018-10-21 RX ADMIN — DULOXETINE HYDROCHLORIDE 60 MG: 60 CAPSULE, DELAYED RELEASE ORAL at 10:54

## 2018-10-21 RX ADMIN — Medication 1 TABLET: at 10:54

## 2018-10-21 RX ADMIN — BUPROPION HYDROCHLORIDE 300 MG: 150 TABLET, FILM COATED, EXTENDED RELEASE ORAL at 20:42

## 2018-10-21 RX ADMIN — SODIUM CHLORIDE 100 ML/HR: 9 INJECTION, SOLUTION INTRAVENOUS at 03:15

## 2018-10-21 RX ADMIN — ACETAMINOPHEN AND CODEINE PHOSPHATE 1 TABLET: 300; 30 TABLET ORAL at 10:57

## 2018-10-21 RX ADMIN — POLYETHYLENE GLYCOL (3350) 17 G: 17 POWDER, FOR SOLUTION ORAL at 10:53

## 2018-10-21 RX ADMIN — ALPRAZOLAM 0.5 MG: 0.5 TABLET ORAL at 22:10

## 2018-10-21 RX ADMIN — TOPIRAMATE 100 MG: 100 TABLET, FILM COATED ORAL at 20:42

## 2018-10-21 RX ADMIN — FAMOTIDINE 40 MG: 20 TABLET, FILM COATED ORAL at 20:42

## 2018-10-21 RX ADMIN — GABAPENTIN 400 MG: 400 CAPSULE ORAL at 20:42

## 2018-10-21 RX ADMIN — METOPROLOL TARTRATE 25 MG: 25 TABLET, FILM COATED ORAL at 20:42

## 2018-10-21 RX ADMIN — CETIRIZINE HYDROCHLORIDE 10 MG: 10 TABLET, FILM COATED ORAL at 10:54

## 2018-10-21 RX ADMIN — ACETAMINOPHEN AND CODEINE PHOSPHATE 1 TABLET: 300; 30 TABLET ORAL at 22:09

## 2018-10-21 RX ADMIN — TOPIRAMATE 100 MG: 100 TABLET, FILM COATED ORAL at 10:53

## 2018-10-21 RX ADMIN — GABAPENTIN 400 MG: 400 CAPSULE ORAL at 13:27

## 2018-10-21 RX ADMIN — OXYBUTYNIN CHLORIDE 10 MG: 5 TABLET, EXTENDED RELEASE ORAL at 10:53

## 2018-10-21 RX ADMIN — CELECOXIB 200 MG: 200 CAPSULE ORAL at 10:53

## 2018-10-22 PROCEDURE — 94799 UNLISTED PULMONARY SVC/PX: CPT

## 2018-10-22 PROCEDURE — 25010000002 ENOXAPARIN PER 10 MG: Performed by: INTERNAL MEDICINE

## 2018-10-22 PROCEDURE — 99232 SBSQ HOSP IP/OBS MODERATE 35: CPT | Performed by: INTERNAL MEDICINE

## 2018-10-22 RX ORDER — FLECAINIDE ACETATE 50 MG/1
50 TABLET ORAL EVERY 12 HOURS SCHEDULED
Status: DISCONTINUED | OUTPATIENT
Start: 2018-10-22 | End: 2018-10-31 | Stop reason: HOSPADM

## 2018-10-22 RX ADMIN — FLECAINIDE ACETATE 50 MG: 50 TABLET ORAL at 20:19

## 2018-10-22 RX ADMIN — SODIUM CHLORIDE 100 ML/HR: 9 INJECTION, SOLUTION INTRAVENOUS at 10:11

## 2018-10-22 RX ADMIN — ACETAMINOPHEN AND CODEINE PHOSPHATE 1 TABLET: 300; 30 TABLET ORAL at 22:43

## 2018-10-22 RX ADMIN — ENOXAPARIN SODIUM 40 MG: 40 INJECTION SUBCUTANEOUS at 00:06

## 2018-10-22 RX ADMIN — FAMOTIDINE 40 MG: 20 TABLET, FILM COATED ORAL at 20:19

## 2018-10-22 RX ADMIN — TOPIRAMATE 100 MG: 100 TABLET, FILM COATED ORAL at 20:19

## 2018-10-22 RX ADMIN — ALPRAZOLAM 0.5 MG: 0.5 TABLET ORAL at 10:12

## 2018-10-22 RX ADMIN — METOPROLOL TARTRATE 12.5 MG: 25 TABLET, FILM COATED ORAL at 20:18

## 2018-10-22 RX ADMIN — DULOXETINE HYDROCHLORIDE 60 MG: 60 CAPSULE, DELAYED RELEASE ORAL at 07:54

## 2018-10-22 RX ADMIN — SODIUM CHLORIDE 100 ML/HR: 9 INJECTION, SOLUTION INTRAVENOUS at 20:17

## 2018-10-22 RX ADMIN — SODIUM CHLORIDE 100 ML/HR: 9 INJECTION, SOLUTION INTRAVENOUS at 00:05

## 2018-10-22 RX ADMIN — BACLOFEN 10 MG: 10 TABLET ORAL at 20:18

## 2018-10-22 RX ADMIN — OXYBUTYNIN CHLORIDE 10 MG: 5 TABLET, EXTENDED RELEASE ORAL at 07:54

## 2018-10-22 RX ADMIN — METOPROLOL TARTRATE 25 MG: 25 TABLET, FILM COATED ORAL at 07:55

## 2018-10-22 RX ADMIN — BUPROPION HYDROCHLORIDE 300 MG: 150 TABLET, FILM COATED, EXTENDED RELEASE ORAL at 20:19

## 2018-10-22 RX ADMIN — ALPRAZOLAM 0.5 MG: 0.5 TABLET ORAL at 22:43

## 2018-10-22 RX ADMIN — POLYETHYLENE GLYCOL (3350) 17 G: 17 POWDER, FOR SOLUTION ORAL at 07:55

## 2018-10-22 RX ADMIN — Medication 1 TABLET: at 07:54

## 2018-10-22 RX ADMIN — GABAPENTIN 400 MG: 400 CAPSULE ORAL at 20:17

## 2018-10-22 RX ADMIN — GABAPENTIN 400 MG: 400 CAPSULE ORAL at 14:42

## 2018-10-22 RX ADMIN — GABAPENTIN 400 MG: 400 CAPSULE ORAL at 05:32

## 2018-10-22 RX ADMIN — CETIRIZINE HYDROCHLORIDE 10 MG: 10 TABLET, FILM COATED ORAL at 07:54

## 2018-10-22 RX ADMIN — TOPIRAMATE 100 MG: 100 TABLET, FILM COATED ORAL at 07:54

## 2018-10-22 RX ADMIN — ASPIRIN 81 MG: 81 TABLET, COATED ORAL at 07:54

## 2018-10-22 RX ADMIN — Medication 3 ML: at 07:55

## 2018-10-22 RX ADMIN — CELECOXIB 200 MG: 200 CAPSULE ORAL at 07:54

## 2018-10-22 RX ADMIN — Medication 3 ML: at 20:19

## 2018-10-22 RX ADMIN — FAMOTIDINE 40 MG: 20 TABLET, FILM COATED ORAL at 07:54

## 2018-10-22 RX ADMIN — ACETAMINOPHEN AND CODEINE PHOSPHATE 1 TABLET: 300; 30 TABLET ORAL at 10:12

## 2018-10-23 LAB
ANION GAP SERPL CALCULATED.3IONS-SCNC: 3.3 MMOL/L (ref 3.6–11.2)
BACTERIA SPEC AEROBE CULT: NORMAL
BACTERIA SPEC AEROBE CULT: NORMAL
BUN BLD-MCNC: 16 MG/DL (ref 7–21)
BUN/CREAT SERPL: 17 (ref 7–25)
CALCIUM SPEC-SCNC: 8.8 MG/DL (ref 7.7–10)
CHLORIDE SERPL-SCNC: 116 MMOL/L (ref 99–112)
CO2 SERPL-SCNC: 21.7 MMOL/L (ref 24.3–31.9)
CREAT BLD-MCNC: 0.94 MG/DL (ref 0.43–1.29)
GFR SERPL CREATININE-BSD FRML MDRD: 58 ML/MIN/1.73
GLUCOSE BLD-MCNC: 74 MG/DL (ref 70–110)
OSMOLALITY SERPL CALC.SUM OF ELEC: 281.1 MOSM/KG (ref 273–305)
POTASSIUM BLD-SCNC: 3.9 MMOL/L (ref 3.5–5.3)
SODIUM BLD-SCNC: 141 MMOL/L (ref 135–153)

## 2018-10-23 PROCEDURE — 93010 ELECTROCARDIOGRAM REPORT: CPT | Performed by: INTERNAL MEDICINE

## 2018-10-23 PROCEDURE — 99232 SBSQ HOSP IP/OBS MODERATE 35: CPT | Performed by: INTERNAL MEDICINE

## 2018-10-23 PROCEDURE — 25010000002 ENOXAPARIN PER 10 MG: Performed by: INTERNAL MEDICINE

## 2018-10-23 PROCEDURE — 93005 ELECTROCARDIOGRAM TRACING: CPT | Performed by: PHYSICIAN ASSISTANT

## 2018-10-23 PROCEDURE — 80048 BASIC METABOLIC PNL TOTAL CA: CPT | Performed by: INTERNAL MEDICINE

## 2018-10-23 RX ADMIN — ASPIRIN 81 MG: 81 TABLET, COATED ORAL at 08:38

## 2018-10-23 RX ADMIN — Medication 3 ML: at 08:39

## 2018-10-23 RX ADMIN — FLECAINIDE ACETATE 50 MG: 50 TABLET ORAL at 20:55

## 2018-10-23 RX ADMIN — GABAPENTIN 400 MG: 400 CAPSULE ORAL at 05:05

## 2018-10-23 RX ADMIN — GABAPENTIN 400 MG: 400 CAPSULE ORAL at 20:55

## 2018-10-23 RX ADMIN — ENOXAPARIN SODIUM 40 MG: 40 INJECTION SUBCUTANEOUS at 01:25

## 2018-10-23 RX ADMIN — TOPIRAMATE 100 MG: 100 TABLET, FILM COATED ORAL at 20:55

## 2018-10-23 RX ADMIN — ACETAMINOPHEN AND CODEINE PHOSPHATE 1 TABLET: 300; 30 TABLET ORAL at 11:23

## 2018-10-23 RX ADMIN — OXYBUTYNIN CHLORIDE 10 MG: 5 TABLET, EXTENDED RELEASE ORAL at 08:39

## 2018-10-23 RX ADMIN — CELECOXIB 200 MG: 200 CAPSULE ORAL at 08:38

## 2018-10-23 RX ADMIN — FLECAINIDE ACETATE 50 MG: 50 TABLET ORAL at 08:40

## 2018-10-23 RX ADMIN — ALPRAZOLAM 0.5 MG: 0.5 TABLET ORAL at 11:23

## 2018-10-23 RX ADMIN — GABAPENTIN 400 MG: 400 CAPSULE ORAL at 13:57

## 2018-10-23 RX ADMIN — Medication 3 ML: at 20:57

## 2018-10-23 RX ADMIN — SODIUM CHLORIDE 100 ML/HR: 9 INJECTION, SOLUTION INTRAVENOUS at 05:05

## 2018-10-23 RX ADMIN — DULOXETINE HYDROCHLORIDE 60 MG: 60 CAPSULE, DELAYED RELEASE ORAL at 08:38

## 2018-10-23 RX ADMIN — FAMOTIDINE 40 MG: 20 TABLET, FILM COATED ORAL at 08:38

## 2018-10-23 RX ADMIN — ACETAMINOPHEN 650 MG: 325 TABLET ORAL at 13:55

## 2018-10-23 RX ADMIN — BUPROPION HYDROCHLORIDE 300 MG: 150 TABLET, FILM COATED, EXTENDED RELEASE ORAL at 20:55

## 2018-10-23 RX ADMIN — BACLOFEN 10 MG: 10 TABLET ORAL at 20:55

## 2018-10-23 RX ADMIN — SODIUM CHLORIDE 100 ML/HR: 9 INJECTION, SOLUTION INTRAVENOUS at 16:05

## 2018-10-23 RX ADMIN — CETIRIZINE HYDROCHLORIDE 10 MG: 10 TABLET, FILM COATED ORAL at 08:39

## 2018-10-23 RX ADMIN — FAMOTIDINE 40 MG: 20 TABLET, FILM COATED ORAL at 20:55

## 2018-10-23 RX ADMIN — ACETAMINOPHEN AND CODEINE PHOSPHATE 1 TABLET: 300; 30 TABLET ORAL at 23:35

## 2018-10-23 RX ADMIN — Medication 1 TABLET: at 08:39

## 2018-10-23 RX ADMIN — POLYETHYLENE GLYCOL (3350) 17 G: 17 POWDER, FOR SOLUTION ORAL at 08:39

## 2018-10-23 RX ADMIN — TOPIRAMATE 100 MG: 100 TABLET, FILM COATED ORAL at 08:39

## 2018-10-23 RX ADMIN — ALPRAZOLAM 0.5 MG: 0.5 TABLET ORAL at 23:35

## 2018-10-24 PROCEDURE — 94799 UNLISTED PULMONARY SVC/PX: CPT

## 2018-10-24 PROCEDURE — 25010000002 ENOXAPARIN PER 10 MG: Performed by: INTERNAL MEDICINE

## 2018-10-24 PROCEDURE — 99231 SBSQ HOSP IP/OBS SF/LOW 25: CPT | Performed by: INTERNAL MEDICINE

## 2018-10-24 RX ORDER — ISOSORBIDE MONONITRATE 30 MG/1
30 TABLET, EXTENDED RELEASE ORAL
Status: DISCONTINUED | OUTPATIENT
Start: 2018-10-24 | End: 2018-10-29

## 2018-10-24 RX ADMIN — Medication 1 TABLET: at 09:15

## 2018-10-24 RX ADMIN — GABAPENTIN 400 MG: 400 CAPSULE ORAL at 21:07

## 2018-10-24 RX ADMIN — CELECOXIB 200 MG: 200 CAPSULE ORAL at 09:15

## 2018-10-24 RX ADMIN — ENOXAPARIN SODIUM 40 MG: 40 INJECTION SUBCUTANEOUS at 01:07

## 2018-10-24 RX ADMIN — GABAPENTIN 400 MG: 400 CAPSULE ORAL at 05:22

## 2018-10-24 RX ADMIN — FAMOTIDINE 40 MG: 20 TABLET, FILM COATED ORAL at 20:56

## 2018-10-24 RX ADMIN — DULOXETINE HYDROCHLORIDE 60 MG: 60 CAPSULE, DELAYED RELEASE ORAL at 09:15

## 2018-10-24 RX ADMIN — ASPIRIN 81 MG: 81 TABLET, COATED ORAL at 09:15

## 2018-10-24 RX ADMIN — SODIUM CHLORIDE 100 ML/HR: 9 INJECTION, SOLUTION INTRAVENOUS at 01:07

## 2018-10-24 RX ADMIN — CETIRIZINE HYDROCHLORIDE 10 MG: 10 TABLET, FILM COATED ORAL at 09:15

## 2018-10-24 RX ADMIN — TOPIRAMATE 100 MG: 100 TABLET, FILM COATED ORAL at 09:15

## 2018-10-24 RX ADMIN — BACLOFEN 10 MG: 10 TABLET ORAL at 20:56

## 2018-10-24 RX ADMIN — POLYETHYLENE GLYCOL (3350) 17 G: 17 POWDER, FOR SOLUTION ORAL at 09:15

## 2018-10-24 RX ADMIN — SODIUM CHLORIDE 100 ML/HR: 9 INJECTION, SOLUTION INTRAVENOUS at 15:32

## 2018-10-24 RX ADMIN — ALPRAZOLAM 0.5 MG: 0.5 TABLET ORAL at 13:34

## 2018-10-24 RX ADMIN — FLECAINIDE ACETATE 50 MG: 50 TABLET ORAL at 20:56

## 2018-10-24 RX ADMIN — FLECAINIDE ACETATE 50 MG: 50 TABLET ORAL at 09:15

## 2018-10-24 RX ADMIN — GABAPENTIN 400 MG: 400 CAPSULE ORAL at 13:53

## 2018-10-24 RX ADMIN — OXYBUTYNIN CHLORIDE 10 MG: 5 TABLET, EXTENDED RELEASE ORAL at 09:15

## 2018-10-24 RX ADMIN — TOPIRAMATE 100 MG: 100 TABLET, FILM COATED ORAL at 20:56

## 2018-10-24 RX ADMIN — BUPROPION HYDROCHLORIDE 300 MG: 150 TABLET, FILM COATED, EXTENDED RELEASE ORAL at 20:56

## 2018-10-24 RX ADMIN — FAMOTIDINE 40 MG: 20 TABLET, FILM COATED ORAL at 09:15

## 2018-10-24 RX ADMIN — ACETAMINOPHEN AND CODEINE PHOSPHATE 1 TABLET: 300; 30 TABLET ORAL at 21:09

## 2018-10-24 RX ADMIN — ISOSORBIDE MONONITRATE 30 MG: 30 TABLET, EXTENDED RELEASE ORAL at 13:53

## 2018-10-24 RX ADMIN — ACETAMINOPHEN AND CODEINE PHOSPHATE 1 TABLET: 300; 30 TABLET ORAL at 13:34

## 2018-10-25 ENCOUNTER — APPOINTMENT (OUTPATIENT)
Dept: CT IMAGING | Facility: HOSPITAL | Age: 73
End: 2018-10-25

## 2018-10-25 PROCEDURE — 99232 SBSQ HOSP IP/OBS MODERATE 35: CPT | Performed by: INTERNAL MEDICINE

## 2018-10-25 PROCEDURE — 25010000002 ENOXAPARIN PER 10 MG: Performed by: INTERNAL MEDICINE

## 2018-10-25 PROCEDURE — 70450 CT HEAD/BRAIN W/O DYE: CPT

## 2018-10-25 PROCEDURE — 70450 CT HEAD/BRAIN W/O DYE: CPT | Performed by: RADIOLOGY

## 2018-10-25 RX ORDER — AMLODIPINE BESYLATE 5 MG/1
5 TABLET ORAL
Status: DISCONTINUED | OUTPATIENT
Start: 2018-10-25 | End: 2018-10-31

## 2018-10-25 RX ADMIN — CETIRIZINE HYDROCHLORIDE 10 MG: 10 TABLET, FILM COATED ORAL at 09:02

## 2018-10-25 RX ADMIN — FAMOTIDINE 40 MG: 20 TABLET, FILM COATED ORAL at 20:56

## 2018-10-25 RX ADMIN — ENOXAPARIN SODIUM 40 MG: 40 INJECTION SUBCUTANEOUS at 02:23

## 2018-10-25 RX ADMIN — ACETAMINOPHEN AND CODEINE PHOSPHATE 1 TABLET: 300; 30 TABLET ORAL at 21:00

## 2018-10-25 RX ADMIN — CELECOXIB 200 MG: 200 CAPSULE ORAL at 09:03

## 2018-10-25 RX ADMIN — FLECAINIDE ACETATE 50 MG: 50 TABLET ORAL at 20:56

## 2018-10-25 RX ADMIN — BACLOFEN 10 MG: 10 TABLET ORAL at 20:56

## 2018-10-25 RX ADMIN — OXYBUTYNIN CHLORIDE 10 MG: 5 TABLET, EXTENDED RELEASE ORAL at 09:03

## 2018-10-25 RX ADMIN — POLYETHYLENE GLYCOL (3350) 17 G: 17 POWDER, FOR SOLUTION ORAL at 09:03

## 2018-10-25 RX ADMIN — ACETAMINOPHEN AND CODEINE PHOSPHATE 1 TABLET: 300; 30 TABLET ORAL at 09:09

## 2018-10-25 RX ADMIN — Medication 3 ML: at 20:57

## 2018-10-25 RX ADMIN — ISOSORBIDE MONONITRATE 30 MG: 30 TABLET, EXTENDED RELEASE ORAL at 09:02

## 2018-10-25 RX ADMIN — BUPROPION HYDROCHLORIDE 300 MG: 150 TABLET, FILM COATED, EXTENDED RELEASE ORAL at 20:56

## 2018-10-25 RX ADMIN — GABAPENTIN 400 MG: 400 CAPSULE ORAL at 05:45

## 2018-10-25 RX ADMIN — ALPRAZOLAM 0.5 MG: 0.5 TABLET ORAL at 09:09

## 2018-10-25 RX ADMIN — GABAPENTIN 400 MG: 400 CAPSULE ORAL at 13:46

## 2018-10-25 RX ADMIN — GABAPENTIN 400 MG: 400 CAPSULE ORAL at 20:57

## 2018-10-25 RX ADMIN — ALPRAZOLAM 0.5 MG: 0.5 TABLET ORAL at 21:00

## 2018-10-25 RX ADMIN — TOPIRAMATE 100 MG: 100 TABLET, FILM COATED ORAL at 09:03

## 2018-10-25 RX ADMIN — TOPIRAMATE 100 MG: 100 TABLET, FILM COATED ORAL at 20:56

## 2018-10-25 RX ADMIN — AMLODIPINE BESYLATE 5 MG: 5 TABLET ORAL at 09:56

## 2018-10-25 RX ADMIN — ASPIRIN 81 MG: 81 TABLET, COATED ORAL at 09:03

## 2018-10-25 RX ADMIN — FAMOTIDINE 40 MG: 20 TABLET, FILM COATED ORAL at 09:03

## 2018-10-25 RX ADMIN — Medication 1 TABLET: at 09:02

## 2018-10-25 RX ADMIN — Medication 3 ML: at 09:13

## 2018-10-25 RX ADMIN — DULOXETINE HYDROCHLORIDE 60 MG: 60 CAPSULE, DELAYED RELEASE ORAL at 09:03

## 2018-10-25 RX ADMIN — FLECAINIDE ACETATE 50 MG: 50 TABLET ORAL at 09:02

## 2018-10-26 PROCEDURE — 94799 UNLISTED PULMONARY SVC/PX: CPT

## 2018-10-26 PROCEDURE — 99232 SBSQ HOSP IP/OBS MODERATE 35: CPT | Performed by: INTERNAL MEDICINE

## 2018-10-26 PROCEDURE — 25010000002 ENOXAPARIN PER 10 MG: Performed by: INTERNAL MEDICINE

## 2018-10-26 RX ADMIN — FLECAINIDE ACETATE 50 MG: 50 TABLET ORAL at 21:51

## 2018-10-26 RX ADMIN — TOPIRAMATE 100 MG: 100 TABLET, FILM COATED ORAL at 08:59

## 2018-10-26 RX ADMIN — ASPIRIN 81 MG: 81 TABLET, COATED ORAL at 08:59

## 2018-10-26 RX ADMIN — ALPRAZOLAM 0.5 MG: 0.5 TABLET ORAL at 10:24

## 2018-10-26 RX ADMIN — GABAPENTIN 400 MG: 400 CAPSULE ORAL at 14:21

## 2018-10-26 RX ADMIN — OXYBUTYNIN CHLORIDE 10 MG: 5 TABLET, EXTENDED RELEASE ORAL at 08:59

## 2018-10-26 RX ADMIN — ISOSORBIDE MONONITRATE 30 MG: 30 TABLET, EXTENDED RELEASE ORAL at 08:58

## 2018-10-26 RX ADMIN — ENOXAPARIN SODIUM 40 MG: 40 INJECTION SUBCUTANEOUS at 01:53

## 2018-10-26 RX ADMIN — ACETAMINOPHEN 650 MG: 325 TABLET ORAL at 05:13

## 2018-10-26 RX ADMIN — BUPROPION HYDROCHLORIDE 300 MG: 150 TABLET, FILM COATED, EXTENDED RELEASE ORAL at 21:51

## 2018-10-26 RX ADMIN — ACETAMINOPHEN AND CODEINE PHOSPHATE 1 TABLET: 300; 30 TABLET ORAL at 10:24

## 2018-10-26 RX ADMIN — AMLODIPINE BESYLATE 5 MG: 5 TABLET ORAL at 08:58

## 2018-10-26 RX ADMIN — FAMOTIDINE 40 MG: 20 TABLET, FILM COATED ORAL at 21:51

## 2018-10-26 RX ADMIN — DULOXETINE HYDROCHLORIDE 60 MG: 60 CAPSULE, DELAYED RELEASE ORAL at 08:58

## 2018-10-26 RX ADMIN — BACLOFEN 10 MG: 10 TABLET ORAL at 21:51

## 2018-10-26 RX ADMIN — FAMOTIDINE 40 MG: 20 TABLET, FILM COATED ORAL at 08:58

## 2018-10-26 RX ADMIN — Medication 1 TABLET: at 08:58

## 2018-10-26 RX ADMIN — ACETAMINOPHEN AND CODEINE PHOSPHATE 1 TABLET: 300; 30 TABLET ORAL at 22:37

## 2018-10-26 RX ADMIN — POLYETHYLENE GLYCOL (3350) 17 G: 17 POWDER, FOR SOLUTION ORAL at 08:58

## 2018-10-26 RX ADMIN — GABAPENTIN 400 MG: 400 CAPSULE ORAL at 22:35

## 2018-10-26 RX ADMIN — CETIRIZINE HYDROCHLORIDE 10 MG: 10 TABLET, FILM COATED ORAL at 08:59

## 2018-10-26 RX ADMIN — ALPRAZOLAM 0.5 MG: 0.5 TABLET ORAL at 22:37

## 2018-10-26 RX ADMIN — Medication 3 ML: at 21:51

## 2018-10-26 RX ADMIN — GABAPENTIN 400 MG: 400 CAPSULE ORAL at 05:14

## 2018-10-26 RX ADMIN — FLECAINIDE ACETATE 50 MG: 50 TABLET ORAL at 08:59

## 2018-10-26 RX ADMIN — TOPIRAMATE 100 MG: 100 TABLET, FILM COATED ORAL at 21:51

## 2018-10-26 RX ADMIN — CELECOXIB 200 MG: 200 CAPSULE ORAL at 08:58

## 2018-10-27 PROCEDURE — 93005 ELECTROCARDIOGRAM TRACING: CPT | Performed by: INTERNAL MEDICINE

## 2018-10-27 PROCEDURE — 25010000002 ENOXAPARIN PER 10 MG: Performed by: INTERNAL MEDICINE

## 2018-10-27 PROCEDURE — 94799 UNLISTED PULMONARY SVC/PX: CPT

## 2018-10-27 PROCEDURE — 93010 ELECTROCARDIOGRAM REPORT: CPT | Performed by: INTERNAL MEDICINE

## 2018-10-27 RX ADMIN — TOPIRAMATE 100 MG: 100 TABLET, FILM COATED ORAL at 21:16

## 2018-10-27 RX ADMIN — ASPIRIN 81 MG: 81 TABLET, COATED ORAL at 08:33

## 2018-10-27 RX ADMIN — TOPIRAMATE 100 MG: 100 TABLET, FILM COATED ORAL at 08:33

## 2018-10-27 RX ADMIN — POLYETHYLENE GLYCOL (3350) 17 G: 17 POWDER, FOR SOLUTION ORAL at 08:33

## 2018-10-27 RX ADMIN — FLECAINIDE ACETATE 50 MG: 50 TABLET ORAL at 08:33

## 2018-10-27 RX ADMIN — ENOXAPARIN SODIUM 40 MG: 40 INJECTION SUBCUTANEOUS at 00:53

## 2018-10-27 RX ADMIN — DULOXETINE HYDROCHLORIDE 60 MG: 60 CAPSULE, DELAYED RELEASE ORAL at 08:33

## 2018-10-27 RX ADMIN — FAMOTIDINE 40 MG: 20 TABLET, FILM COATED ORAL at 08:33

## 2018-10-27 RX ADMIN — GABAPENTIN 400 MG: 400 CAPSULE ORAL at 21:15

## 2018-10-27 RX ADMIN — GABAPENTIN 400 MG: 400 CAPSULE ORAL at 06:05

## 2018-10-27 RX ADMIN — OXYBUTYNIN CHLORIDE 10 MG: 5 TABLET, EXTENDED RELEASE ORAL at 08:33

## 2018-10-27 RX ADMIN — ALPRAZOLAM 0.5 MG: 0.5 TABLET ORAL at 21:15

## 2018-10-27 RX ADMIN — GABAPENTIN 400 MG: 400 CAPSULE ORAL at 13:14

## 2018-10-27 RX ADMIN — BACLOFEN 10 MG: 10 TABLET ORAL at 21:15

## 2018-10-27 RX ADMIN — FAMOTIDINE 40 MG: 20 TABLET, FILM COATED ORAL at 21:15

## 2018-10-27 RX ADMIN — ISOSORBIDE MONONITRATE 30 MG: 30 TABLET, EXTENDED RELEASE ORAL at 08:33

## 2018-10-27 RX ADMIN — BUPROPION HYDROCHLORIDE 300 MG: 150 TABLET, FILM COATED, EXTENDED RELEASE ORAL at 21:15

## 2018-10-27 RX ADMIN — ACETAMINOPHEN AND CODEINE PHOSPHATE 1 TABLET: 300; 30 TABLET ORAL at 21:15

## 2018-10-27 RX ADMIN — CETIRIZINE HYDROCHLORIDE 10 MG: 10 TABLET, FILM COATED ORAL at 08:33

## 2018-10-27 RX ADMIN — FLECAINIDE ACETATE 50 MG: 50 TABLET ORAL at 21:15

## 2018-10-27 RX ADMIN — Medication 1 TABLET: at 08:33

## 2018-10-27 RX ADMIN — CELECOXIB 200 MG: 200 CAPSULE ORAL at 08:33

## 2018-10-27 RX ADMIN — AMLODIPINE BESYLATE 5 MG: 5 TABLET ORAL at 08:33

## 2018-10-28 PROCEDURE — 93005 ELECTROCARDIOGRAM TRACING: CPT | Performed by: INTERNAL MEDICINE

## 2018-10-28 PROCEDURE — 94799 UNLISTED PULMONARY SVC/PX: CPT

## 2018-10-28 PROCEDURE — 25010000002 ENOXAPARIN PER 10 MG: Performed by: INTERNAL MEDICINE

## 2018-10-28 PROCEDURE — 93010 ELECTROCARDIOGRAM REPORT: CPT | Performed by: INTERNAL MEDICINE

## 2018-10-28 RX ORDER — BUPRENORPHINE 5 UG/H
5 PATCH TRANSDERMAL WEEKLY
Status: DISCONTINUED | OUTPATIENT
Start: 2018-10-29 | End: 2018-10-30 | Stop reason: RX

## 2018-10-28 RX ADMIN — AMLODIPINE BESYLATE 5 MG: 5 TABLET ORAL at 09:12

## 2018-10-28 RX ADMIN — DULOXETINE HYDROCHLORIDE 60 MG: 60 CAPSULE, DELAYED RELEASE ORAL at 09:12

## 2018-10-28 RX ADMIN — Medication 1 TABLET: at 09:12

## 2018-10-28 RX ADMIN — BUPROPION HYDROCHLORIDE 300 MG: 150 TABLET, FILM COATED, EXTENDED RELEASE ORAL at 20:27

## 2018-10-28 RX ADMIN — BACLOFEN 10 MG: 10 TABLET ORAL at 20:27

## 2018-10-28 RX ADMIN — POLYETHYLENE GLYCOL (3350) 17 G: 17 POWDER, FOR SOLUTION ORAL at 09:12

## 2018-10-28 RX ADMIN — GABAPENTIN 400 MG: 400 CAPSULE ORAL at 20:30

## 2018-10-28 RX ADMIN — ACETAMINOPHEN AND CODEINE PHOSPHATE 1 TABLET: 300; 30 TABLET ORAL at 09:11

## 2018-10-28 RX ADMIN — GABAPENTIN 400 MG: 400 CAPSULE ORAL at 13:32

## 2018-10-28 RX ADMIN — FAMOTIDINE 40 MG: 20 TABLET, FILM COATED ORAL at 20:27

## 2018-10-28 RX ADMIN — ISOSORBIDE MONONITRATE 30 MG: 30 TABLET, EXTENDED RELEASE ORAL at 09:12

## 2018-10-28 RX ADMIN — FAMOTIDINE 40 MG: 20 TABLET, FILM COATED ORAL at 09:12

## 2018-10-28 RX ADMIN — OXYBUTYNIN CHLORIDE 10 MG: 5 TABLET, EXTENDED RELEASE ORAL at 09:12

## 2018-10-28 RX ADMIN — FLECAINIDE ACETATE 50 MG: 50 TABLET ORAL at 09:12

## 2018-10-28 RX ADMIN — GABAPENTIN 400 MG: 400 CAPSULE ORAL at 05:45

## 2018-10-28 RX ADMIN — ACETAMINOPHEN 650 MG: 325 TABLET ORAL at 13:32

## 2018-10-28 RX ADMIN — ALPRAZOLAM 0.5 MG: 0.5 TABLET ORAL at 20:27

## 2018-10-28 RX ADMIN — TOPIRAMATE 100 MG: 100 TABLET, FILM COATED ORAL at 09:12

## 2018-10-28 RX ADMIN — CELECOXIB 200 MG: 200 CAPSULE ORAL at 09:12

## 2018-10-28 RX ADMIN — TOPIRAMATE 100 MG: 100 TABLET, FILM COATED ORAL at 20:27

## 2018-10-28 RX ADMIN — ASPIRIN 81 MG: 81 TABLET, COATED ORAL at 09:12

## 2018-10-28 RX ADMIN — FLECAINIDE ACETATE 50 MG: 50 TABLET ORAL at 20:27

## 2018-10-28 RX ADMIN — ENOXAPARIN SODIUM 40 MG: 40 INJECTION SUBCUTANEOUS at 00:20

## 2018-10-28 RX ADMIN — ACETAMINOPHEN AND CODEINE PHOSPHATE 1 TABLET: 300; 30 TABLET ORAL at 20:27

## 2018-10-28 RX ADMIN — ALPRAZOLAM 0.5 MG: 0.5 TABLET ORAL at 09:17

## 2018-10-28 RX ADMIN — CETIRIZINE HYDROCHLORIDE 10 MG: 10 TABLET, FILM COATED ORAL at 09:12

## 2018-10-29 PROBLEM — I20.9 ANGINA, CLASS IV (HCC): Status: ACTIVE | Noted: 2018-10-18

## 2018-10-29 PROCEDURE — 99232 SBSQ HOSP IP/OBS MODERATE 35: CPT | Performed by: INTERNAL MEDICINE

## 2018-10-29 PROCEDURE — 94799 UNLISTED PULMONARY SVC/PX: CPT

## 2018-10-29 PROCEDURE — 93010 ELECTROCARDIOGRAM REPORT: CPT | Performed by: INTERNAL MEDICINE

## 2018-10-29 PROCEDURE — 25010000002 ENOXAPARIN PER 10 MG: Performed by: INTERNAL MEDICINE

## 2018-10-29 PROCEDURE — 93005 ELECTROCARDIOGRAM TRACING: CPT | Performed by: INTERNAL MEDICINE

## 2018-10-29 RX ADMIN — AMLODIPINE BESYLATE 5 MG: 5 TABLET ORAL at 09:26

## 2018-10-29 RX ADMIN — GABAPENTIN 400 MG: 400 CAPSULE ORAL at 13:29

## 2018-10-29 RX ADMIN — DULOXETINE HYDROCHLORIDE 60 MG: 60 CAPSULE, DELAYED RELEASE ORAL at 09:26

## 2018-10-29 RX ADMIN — ACETAMINOPHEN AND CODEINE PHOSPHATE 1 TABLET: 300; 30 TABLET ORAL at 20:56

## 2018-10-29 RX ADMIN — POLYETHYLENE GLYCOL (3350) 17 G: 17 POWDER, FOR SOLUTION ORAL at 09:26

## 2018-10-29 RX ADMIN — FAMOTIDINE 40 MG: 20 TABLET, FILM COATED ORAL at 20:56

## 2018-10-29 RX ADMIN — FAMOTIDINE 40 MG: 20 TABLET, FILM COATED ORAL at 09:26

## 2018-10-29 RX ADMIN — ACETAMINOPHEN AND CODEINE PHOSPHATE 1 TABLET: 300; 30 TABLET ORAL at 09:26

## 2018-10-29 RX ADMIN — ACETAMINOPHEN 650 MG: 325 TABLET ORAL at 13:29

## 2018-10-29 RX ADMIN — Medication 1 TABLET: at 09:26

## 2018-10-29 RX ADMIN — FLECAINIDE ACETATE 50 MG: 50 TABLET ORAL at 20:57

## 2018-10-29 RX ADMIN — Medication 3 ML: at 09:00

## 2018-10-29 RX ADMIN — GABAPENTIN 400 MG: 400 CAPSULE ORAL at 20:56

## 2018-10-29 RX ADMIN — BUPROPION HYDROCHLORIDE 300 MG: 150 TABLET, FILM COATED, EXTENDED RELEASE ORAL at 20:56

## 2018-10-29 RX ADMIN — CELECOXIB 200 MG: 200 CAPSULE ORAL at 09:26

## 2018-10-29 RX ADMIN — FLECAINIDE ACETATE 50 MG: 50 TABLET ORAL at 09:26

## 2018-10-29 RX ADMIN — BACLOFEN 10 MG: 10 TABLET ORAL at 20:56

## 2018-10-29 RX ADMIN — CETIRIZINE HYDROCHLORIDE 10 MG: 10 TABLET, FILM COATED ORAL at 09:26

## 2018-10-29 RX ADMIN — OXYBUTYNIN CHLORIDE 10 MG: 5 TABLET, EXTENDED RELEASE ORAL at 09:26

## 2018-10-29 RX ADMIN — ALPRAZOLAM 0.5 MG: 0.5 TABLET ORAL at 20:56

## 2018-10-29 RX ADMIN — ASPIRIN 81 MG: 81 TABLET, COATED ORAL at 09:26

## 2018-10-29 RX ADMIN — TOPIRAMATE 100 MG: 100 TABLET, FILM COATED ORAL at 20:56

## 2018-10-29 RX ADMIN — ENOXAPARIN SODIUM 40 MG: 40 INJECTION SUBCUTANEOUS at 00:49

## 2018-10-29 RX ADMIN — GABAPENTIN 400 MG: 400 CAPSULE ORAL at 05:31

## 2018-10-29 RX ADMIN — TOPIRAMATE 100 MG: 100 TABLET, FILM COATED ORAL at 09:26

## 2018-10-30 LAB
GLUCOSE BLDC GLUCOMTR-MCNC: 111 MG/DL (ref 70–130)
GLUCOSE BLDC GLUCOMTR-MCNC: 129 MG/DL (ref 70–130)
GLUCOSE BLDC GLUCOMTR-MCNC: 69 MG/DL (ref 70–130)
GLUCOSE BLDC GLUCOMTR-MCNC: 74 MG/DL (ref 70–130)
GLUCOSE BLDC GLUCOMTR-MCNC: 79 MG/DL (ref 70–130)
GLUCOSE BLDC GLUCOMTR-MCNC: 95 MG/DL (ref 70–130)

## 2018-10-30 PROCEDURE — C1760 CLOSURE DEV, VASC: HCPCS | Performed by: INTERNAL MEDICINE

## 2018-10-30 PROCEDURE — 93454 CORONARY ARTERY ANGIO S&I: CPT | Performed by: INTERNAL MEDICINE

## 2018-10-30 PROCEDURE — B2151ZZ FLUOROSCOPY OF LEFT HEART USING LOW OSMOLAR CONTRAST: ICD-10-PCS | Performed by: INTERNAL MEDICINE

## 2018-10-30 PROCEDURE — 25010000002 DIPHENHYDRAMINE PER 50 MG: Performed by: INTERNAL MEDICINE

## 2018-10-30 PROCEDURE — 94799 UNLISTED PULMONARY SVC/PX: CPT

## 2018-10-30 PROCEDURE — 4A023N7 MEASUREMENT OF CARDIAC SAMPLING AND PRESSURE, LEFT HEART, PERCUTANEOUS APPROACH: ICD-10-PCS | Performed by: INTERNAL MEDICINE

## 2018-10-30 PROCEDURE — C1894 INTRO/SHEATH, NON-LASER: HCPCS | Performed by: INTERNAL MEDICINE

## 2018-10-30 PROCEDURE — 0 IOPAMIDOL PER 1 ML: Performed by: INTERNAL MEDICINE

## 2018-10-30 PROCEDURE — 25010000002 HYDRALAZINE PER 20 MG: Performed by: INTERNAL MEDICINE

## 2018-10-30 PROCEDURE — 99024 POSTOP FOLLOW-UP VISIT: CPT | Performed by: INTERNAL MEDICINE

## 2018-10-30 PROCEDURE — B2111ZZ FLUOROSCOPY OF MULTIPLE CORONARY ARTERIES USING LOW OSMOLAR CONTRAST: ICD-10-PCS | Performed by: INTERNAL MEDICINE

## 2018-10-30 PROCEDURE — C1769 GUIDE WIRE: HCPCS | Performed by: INTERNAL MEDICINE

## 2018-10-30 PROCEDURE — 82962 GLUCOSE BLOOD TEST: CPT

## 2018-10-30 RX ORDER — BUPRENORPHINE 5 UG/H
PATCH TRANSDERMAL
Status: DISPENSED
Start: 2018-10-30 | End: 2018-10-31

## 2018-10-30 RX ORDER — NITROGLYCERIN 0.4 MG/1
TABLET SUBLINGUAL AS NEEDED
Status: DISCONTINUED | OUTPATIENT
Start: 2018-10-30 | End: 2018-10-30 | Stop reason: HOSPADM

## 2018-10-30 RX ORDER — DIAZEPAM 5 MG/1
5 TABLET ORAL ONCE
Status: COMPLETED | OUTPATIENT
Start: 2018-10-30 | End: 2018-10-30

## 2018-10-30 RX ORDER — LIDOCAINE HYDROCHLORIDE 20 MG/ML
INJECTION, SOLUTION INFILTRATION; PERINEURAL AS NEEDED
Status: DISCONTINUED | OUTPATIENT
Start: 2018-10-30 | End: 2018-10-30 | Stop reason: HOSPADM

## 2018-10-30 RX ORDER — HYDRALAZINE HYDROCHLORIDE 20 MG/ML
INJECTION INTRAMUSCULAR; INTRAVENOUS AS NEEDED
Status: DISCONTINUED | OUTPATIENT
Start: 2018-10-30 | End: 2018-10-30 | Stop reason: HOSPADM

## 2018-10-30 RX ORDER — BUPRENORPHINE 5 UG/H
1 PATCH TRANSDERMAL WEEKLY
Status: DISCONTINUED | OUTPATIENT
Start: 2018-10-30 | End: 2018-10-31 | Stop reason: HOSPADM

## 2018-10-30 RX ORDER — SODIUM CHLORIDE 9 MG/ML
INJECTION, SOLUTION INTRAVENOUS CONTINUOUS PRN
Status: COMPLETED | OUTPATIENT
Start: 2018-10-30 | End: 2018-10-30

## 2018-10-30 RX ORDER — SODIUM CHLORIDE 9 MG/ML
125 INJECTION, SOLUTION INTRAVENOUS CONTINUOUS
Status: DISPENSED | OUTPATIENT
Start: 2018-10-30 | End: 2018-10-30

## 2018-10-30 RX ORDER — DIPHENHYDRAMINE HYDROCHLORIDE 50 MG/ML
INJECTION INTRAMUSCULAR; INTRAVENOUS AS NEEDED
Status: DISCONTINUED | OUTPATIENT
Start: 2018-10-30 | End: 2018-10-30 | Stop reason: HOSPADM

## 2018-10-30 RX ADMIN — DIAZEPAM 5 MG: 5 TABLET ORAL at 09:34

## 2018-10-30 RX ADMIN — Medication 3 ML: at 13:22

## 2018-10-30 RX ADMIN — DULOXETINE HYDROCHLORIDE 60 MG: 60 CAPSULE, DELAYED RELEASE ORAL at 13:21

## 2018-10-30 RX ADMIN — BUPRENORPHINE 1 PATCH: 5 PATCH, EXTENDED RELEASE TRANSDERMAL at 17:32

## 2018-10-30 RX ADMIN — FAMOTIDINE 40 MG: 20 TABLET, FILM COATED ORAL at 20:43

## 2018-10-30 RX ADMIN — OXYBUTYNIN CHLORIDE 10 MG: 5 TABLET, EXTENDED RELEASE ORAL at 13:20

## 2018-10-30 RX ADMIN — Medication 3 ML: at 20:44

## 2018-10-30 RX ADMIN — BUPROPION HYDROCHLORIDE 300 MG: 150 TABLET, FILM COATED, EXTENDED RELEASE ORAL at 20:43

## 2018-10-30 RX ADMIN — SODIUM CHLORIDE 125 ML/HR: 9 INJECTION, SOLUTION INTRAVENOUS at 12:38

## 2018-10-30 RX ADMIN — ACETAMINOPHEN 650 MG: 325 TABLET ORAL at 13:05

## 2018-10-30 RX ADMIN — BACLOFEN 10 MG: 10 TABLET ORAL at 20:43

## 2018-10-30 RX ADMIN — GABAPENTIN 400 MG: 400 CAPSULE ORAL at 13:21

## 2018-10-30 RX ADMIN — ALPRAZOLAM 0.5 MG: 0.5 TABLET ORAL at 20:48

## 2018-10-30 RX ADMIN — GABAPENTIN 400 MG: 400 CAPSULE ORAL at 20:43

## 2018-10-30 RX ADMIN — TOPIRAMATE 100 MG: 100 TABLET, FILM COATED ORAL at 20:43

## 2018-10-30 RX ADMIN — FAMOTIDINE 40 MG: 20 TABLET, FILM COATED ORAL at 13:21

## 2018-10-30 RX ADMIN — Medication 1 TABLET: at 13:20

## 2018-10-30 RX ADMIN — FLECAINIDE ACETATE 50 MG: 50 TABLET ORAL at 20:43

## 2018-10-31 VITALS
TEMPERATURE: 98.8 F | BODY MASS INDEX: 32.91 KG/M2 | SYSTOLIC BLOOD PRESSURE: 143 MMHG | HEART RATE: 63 BPM | RESPIRATION RATE: 20 BRPM | DIASTOLIC BLOOD PRESSURE: 67 MMHG | HEIGHT: 64 IN | WEIGHT: 192.8 LBS | OXYGEN SATURATION: 96 %

## 2018-10-31 PROBLEM — R42 DIZZINESS: Status: ACTIVE | Noted: 2018-10-31

## 2018-10-31 PROBLEM — I20.9 ANGINA, CLASS IV: Status: RESOLVED | Noted: 2018-10-18 | Resolved: 2018-10-31

## 2018-10-31 PROBLEM — I10 ESSENTIAL HYPERTENSION: Status: ACTIVE | Noted: 2018-10-31

## 2018-10-31 PROBLEM — R44.1 VISUAL HALLUCINATIONS: Status: RESOLVED | Noted: 2018-10-18 | Resolved: 2018-10-31

## 2018-10-31 PROBLEM — G89.29 CHRONIC HEADACHES: Status: ACTIVE | Noted: 2018-10-31

## 2018-10-31 PROBLEM — J18.9 PNEUMONIA OF LEFT LOWER LOBE DUE TO INFECTIOUS ORGANISM: Status: RESOLVED | Noted: 2017-12-11 | Resolved: 2018-10-31

## 2018-10-31 PROBLEM — R07.1 CHEST PAIN ON BREATHING: Status: RESOLVED | Noted: 2018-10-19 | Resolved: 2018-10-31

## 2018-10-31 PROBLEM — R51.9 CHRONIC HEADACHES: Status: ACTIVE | Noted: 2018-10-31

## 2018-10-31 PROBLEM — J15.212 MRSA PNEUMONIA (HCC): Status: RESOLVED | Noted: 2017-12-18 | Resolved: 2018-10-31

## 2018-10-31 LAB
CHOLEST SERPL-MCNC: 182 MG/DL (ref 0–200)
GLUCOSE BLDC GLUCOMTR-MCNC: 98 MG/DL (ref 70–130)
HDLC SERPL-MCNC: 44 MG/DL (ref 60–100)
LDLC SERPL CALC-MCNC: 98 MG/DL (ref 0–100)
LDLC/HDLC SERPL: 2.24 {RATIO}
TRIGL SERPL-MCNC: 198 MG/DL (ref 0–150)
VLDLC SERPL-MCNC: 39.6 MG/DL

## 2018-10-31 PROCEDURE — 82962 GLUCOSE BLOOD TEST: CPT

## 2018-10-31 PROCEDURE — 99239 HOSP IP/OBS DSCHRG MGMT >30: CPT | Performed by: INTERNAL MEDICINE

## 2018-10-31 PROCEDURE — 80061 LIPID PANEL: CPT | Performed by: INTERNAL MEDICINE

## 2018-10-31 RX ORDER — AMLODIPINE BESYLATE 10 MG/1
10 TABLET ORAL
Status: DISCONTINUED | OUTPATIENT
Start: 2018-11-01 | End: 2018-10-31 | Stop reason: HOSPADM

## 2018-10-31 RX ORDER — RANITIDINE 300 MG/1
300 TABLET ORAL 2 TIMES DAILY
Qty: 60 TABLET | Refills: 2 | Status: ON HOLD | OUTPATIENT
Start: 2018-10-31 | End: 2022-02-13

## 2018-10-31 RX ORDER — BUPRENORPHINE 5 UG/H
1 PATCH TRANSDERMAL WEEKLY
Qty: 1 PATCH | Refills: 0 | Status: ON HOLD | OUTPATIENT
Start: 2018-11-06 | End: 2019-11-05

## 2018-10-31 RX ORDER — FLECAINIDE ACETATE 50 MG/1
50 TABLET ORAL EVERY 12 HOURS SCHEDULED
Qty: 60 TABLET | Refills: 3 | Status: SHIPPED | OUTPATIENT
Start: 2018-10-31

## 2018-10-31 RX ORDER — AMLODIPINE BESYLATE 10 MG/1
10 TABLET ORAL
Qty: 30 TABLET | Refills: 3 | Status: SHIPPED | OUTPATIENT
Start: 2018-11-01 | End: 2022-02-17 | Stop reason: HOSPADM

## 2018-10-31 RX ADMIN — ACETAMINOPHEN AND CODEINE PHOSPHATE 1 TABLET: 300; 30 TABLET ORAL at 02:49

## 2018-10-31 RX ADMIN — FAMOTIDINE 40 MG: 20 TABLET, FILM COATED ORAL at 08:18

## 2018-10-31 RX ADMIN — TOPIRAMATE 100 MG: 100 TABLET, FILM COATED ORAL at 08:18

## 2018-10-31 RX ADMIN — GABAPENTIN 400 MG: 400 CAPSULE ORAL at 05:47

## 2018-10-31 RX ADMIN — POLYETHYLENE GLYCOL (3350) 17 G: 17 POWDER, FOR SOLUTION ORAL at 08:19

## 2018-10-31 RX ADMIN — Medication 3 ML: at 08:20

## 2018-10-31 RX ADMIN — CETIRIZINE HYDROCHLORIDE 10 MG: 10 TABLET, FILM COATED ORAL at 08:18

## 2018-10-31 RX ADMIN — CELECOXIB 200 MG: 200 CAPSULE ORAL at 08:18

## 2018-10-31 RX ADMIN — AMLODIPINE BESYLATE 5 MG: 5 TABLET ORAL at 08:19

## 2018-10-31 RX ADMIN — Medication 1 TABLET: at 08:18

## 2018-10-31 RX ADMIN — OXYBUTYNIN CHLORIDE 10 MG: 5 TABLET, EXTENDED RELEASE ORAL at 08:18

## 2018-10-31 RX ADMIN — DULOXETINE HYDROCHLORIDE 60 MG: 60 CAPSULE, DELAYED RELEASE ORAL at 08:18

## 2018-10-31 RX ADMIN — ASPIRIN 81 MG: 81 TABLET, COATED ORAL at 08:18

## 2018-10-31 RX ADMIN — FLECAINIDE ACETATE 50 MG: 50 TABLET ORAL at 08:18

## 2018-11-01 ENCOUNTER — EPISODE CHANGES (OUTPATIENT)
Dept: CASE MANAGEMENT | Facility: OTHER | Age: 73
End: 2018-11-01

## 2018-11-01 ENCOUNTER — READMISSION MANAGEMENT (OUTPATIENT)
Dept: CALL CENTER | Facility: HOSPITAL | Age: 73
End: 2018-11-01

## 2018-11-01 ENCOUNTER — EPISODE CHANGES (OUTPATIENT)
Dept: SOCIAL WORK | Facility: HOSPITAL | Age: 73
End: 2018-11-01

## 2018-11-01 NOTE — OUTREACH NOTE
"Prep Survey      Responses   Facility patient discharged from?  [facility]   Is patient eligible?  No   What are the reasons patient is not eligible?  [facility]   Discharge diagnosis  Ventricular arrhythmias, frequent PVC\"s and short runs of nonsustained VT, recurrent chest pains, noncardiac inetiolgy, mild CAD, recurrent headaches. s/p Coronary angiography   Does the patient have one of the following disease processes/diagnoses(primary or secondary)?  Other   Prep survey completed?  Yes          Amber Casiano RN        "

## 2018-11-08 ENCOUNTER — PATIENT OUTREACH (OUTPATIENT)
Dept: CASE MANAGEMENT | Facility: OTHER | Age: 73
End: 2018-11-08

## 2018-11-08 ENCOUNTER — DOCUMENTATION (OUTPATIENT)
Dept: CARDIAC REHAB | Facility: HOSPITAL | Age: 73
End: 2018-11-08

## 2018-11-08 NOTE — PROGRESS NOTES
Cardiac rehab referral received on pt.  No qualifying dx noted at this time after reviewing notes from Dr. Pleitez Heart cath and he stated her chest pains are probably non cardiac related in etilogy  .  Qualifications for CR include coronary stenting, AMI (NSTEM or STEMI), stable angina, CABG, heart valve repair/replacment, heart transplant or stable CHF (with these specific qualifications, Left Ventricular Ejection Fraction of 35% or less, NYHA class II to IV symptoms despite optimal heart failure therapy for at least 6 weeks and has not had recent (?6 Weeks) or planned (? 6 months) major cardiovascular hospitalizations or procedures.  D/T pt being in hospital, does not meet criteria at this time.  please contact us at [phone] with questions.

## 2018-11-08 NOTE — OUTREACH NOTE
RN-CA attempted contact with  at SNF.  Unable to obtain information related to SNF vs LTC status of patient.  Patient discharged to [facility] from [facility].  Patient with potential hx of residence on [facility].

## 2018-11-10 NOTE — ED PROVIDER NOTES
Subjective   This is a contunuation addendum of encounter initiated by Dr Rodgers for same visit            Review of Systems    Past Medical History:   Diagnosis Date   • Arthritis    • Asthma    • Degenerative disc disease, lumbar    • Diabetes mellitus (CMS/HCC)    • Fibromyalgia    • GERD (gastroesophageal reflux disease)    • Hyperlipidemia    • Hypertension    • Osteoporosis        Allergies   Allergen Reactions   • Biaxin [Clarithromycin] Other (See Comments)     unknown       Past Surgical History:   Procedure Laterality Date   • APPENDECTOMY     • ESOPHAGEAL DILATATION     • TOTAL SHOULDER REPLACEMENT Bilateral    • TUBAL ABDOMINAL LIGATION         Family History   Problem Relation Age of Onset   • Breast cancer Sister        Social History     Socioeconomic History   • Marital status:      Spouse name: Not on file   • Number of children: Not on file   • Years of education: Not on file   • Highest education level: Not on file   Tobacco Use   • Smoking status: Former Smoker   • Smokeless tobacco: Never Used   Substance and Sexual Activity   • Alcohol use: No   • Drug use: No   • Sexual activity: Defer           Objective   Physical Exam    Procedures           ED Course  ED Course as of Nov 10 0619   Thu Oct 18, 2018   1928 Endorsed to Dr. Navarro at shift change.  [BC]   [address], accepting to Obs  [TZ]      ED Course User Index  [BC] Galo Rodgers MD  [TZ] Hipolito Navarro MD      CT Head Without Contrast   Final Result   No acute intracranial pathology. Nothing is seen on this exam to   specifically account for the patient's symptoms.       This report was finalized on 10/25/2018 9:37 AM by Dr. Chuckie Reyez MD.          CT Chest Pulmonary Embolism With Contrast   Final Result       1. No PE.    2. Bibasilar atelectasis and probably subpleural fibrosis. Cardiomegaly.           This report was finalized on 10/19/2018 11:17 AM by Dr. Tacho Olivas MD.          XR Chest 1 View    ED Interpretation   NAD.      Final Result   No radiographic evidence of acute cardiac or pulmonary   disease.       This report was finalized on 10/19/2018 8:10 AM by Dr. Tacho Olivas MD.          CT Head Without Contrast   ED Interpretation   CT head without IV contrast   Faxed report from [facility]   Impression: No acute intracranial findings.   Signed Hari Waddell M.D.      Final Result   Atrophy and chronic small vessel ischemic change, but there   are no acute intracranial abnormalities identified.           This report was finalized on 10/19/2018 8:09 AM by Dr. Tacho Olivas MD.            Labs Reviewed   COMPREHENSIVE METABOLIC PANEL - Abnormal; Notable for the following components:       Result Value    CO2 21.3 (*)     Total Bilirubin 0.1 (*)     eGFR Non  Amer 52 (*)     A/G Ratio 1.1 (*)     All other components within normal limits    Narrative:     The MDRD GFR formula is only valid for adults with stable renal function between ages 18 and 70.   BLOOD GAS, ARTERIAL - Abnormal; Notable for the following components:    pCO2, Arterial 34.0 (*)     HCO3, Arterial 21.4 (*)     Hematocrit, Blood Gas 36.0 (*)     All other components within normal limits   CBC WITH AUTO DIFFERENTIAL - Abnormal; Notable for the following components:    RBC 3.74 (*)     Hemoglobin 11.5 (*)     Hematocrit 35.6 (*)     MCV 95.2 (*)     MCHC 32.3 (*)     Monocytes, Absolute 0.95 (*)     All other components within normal limits   CBC WITH AUTO DIFFERENTIAL - Abnormal; Notable for the following components:    RBC 3.99 (*)     MCV 95.5 (*)     MPV 10.3 (*)     Lymphocytes, Absolute 3.37 (*)     Monocytes, Absolute 1.02 (*)     All other components within normal limits   BASIC METABOLIC PANEL - Abnormal; Notable for the following components:    eGFR Non  Amer 46 (*)     All other components within normal limits    Narrative:     The MDRD GFR formula is only valid for adults with stable renal function  between ages 18 and 70.   HEMOGLOBIN A1C - Abnormal; Notable for the following components:    Hemoglobin A1C 6.00 (*)     All other components within normal limits   LIPID PANEL - Abnormal; Notable for the following components:    Total Cholesterol 210 (*)     Triglycerides 197 (*)     HDL Cholesterol 54 (*)     LDL Cholesterol  117 (*)     All other components within normal limits    Narrative:     Cholesterol Reference Ranges  (U.S. Department of Health and Human Services ATP III Classifications)    Desirable          <200 mg/dL  Borderline High    200-239 mg/dL  High Risk          >240 mg/dL      Triglyceride Reference Ranges  (U.S. Department of Health and Human Services ATP III Classifications)    Normal           <150 mg/dL  Borderline High  150-199 mg/dL  High             200-499 mg/dL  Very High        >500 mg/dL    HDL Reference Ranges  (U.S. Department of Health and Human Services ATP III Classifcations)    Low     <40 mg/dl (major risk factor for CHD)  High    >60 mg/dl ('negative' risk factor for CHD)        LDL Reference Ranges  (U.S. Department of Health and Human Services ATP III Classifcations)    Optimal          <100 mg/dL  Near Optimal     100-129 mg/dL  Borderline High  130-159 mg/dL  High             160-189 mg/dL  Very High        >189 mg/dL   BNP (IN-HOUSE) - Abnormal; Notable for the following components:    .0 (*)     All other components within normal limits   HIGH SENSITIVITY CRP - Abnormal; Notable for the following components:    CRP, High Sensitivity 9.38 (*)     All other components within normal limits    Narrative:     Performed at:  [address] [place] [address]  [id number]  : Abraham Bailey PhD, Phone:  [phone]   D-DIMER, QUANTITATIVE - Abnormal; Notable for the following components:    D-Dimer, Quantitative 1.06 (*)     All other components within normal limits    Narrative:     d-Dimer assay result is to be used in conjunction with a  non-high clinical pretest probability (PTP) assessment tool to exclude PE and aid in diagnosis of VTE with cutoff of 0.5 MCGFEU/mL.   BASIC METABOLIC PANEL - Abnormal; Notable for the following components:    Chloride 116 (*)     CO2 21.7 (*)     eGFR Non  Amer 58 (*)     Anion Gap 3.3 (*)     All other components within normal limits    Narrative:     The MDRD GFR formula is only valid for adults with stable renal function between ages 18 and 70.   LIPID PANEL - Abnormal; Notable for the following components:    Triglycerides 198 (*)     HDL Cholesterol 44 (*)     All other components within normal limits    Narrative:     Cholesterol Reference Ranges  (U.S. Department of Health and Human Services ATP III Classifications)    Desirable          <200 mg/dL  Borderline High    200-239 mg/dL  High Risk          >240 mg/dL      Triglyceride Reference Ranges  (U.S. Department of Health and Human Services ATP III Classifications)    Normal           <150 mg/dL  Borderline High  150-199 mg/dL  High             200-499 mg/dL  Very High        >500 mg/dL    HDL Reference Ranges  (U.S. Department of Health and Human Services ATP III Classifcations)    Low     <40 mg/dl (major risk factor for CHD)  High    >60 mg/dl ('negative' risk factor for CHD)        LDL Reference Ranges  (U.S. Department of Health and Human Services ATP III Classifcations)    Optimal          <100 mg/dL  Near Optimal     100-129 mg/dL  Borderline High  130-159 mg/dL  High             160-189 mg/dL  Very High        >189 mg/dL   POCT GLUCOSE FINGERSTICK - Abnormal; Notable for the following components:    Glucose 69 (*)     All other components within normal limits    Narrative:     Meter: [id number] : [id number] Gibran Guerrero   BLOOD CULTURE - Normal   BLOOD CULTURE - Normal   URINALYSIS W/ CULTURE IF INDICATED - Normal    Narrative:     Urine microscopic not indicated.   TROPONIN (IN-HOUSE) - Normal    Narrative:     Ultra Troponin I  Reference Range:         <=0.039 ng/mL: Negative    0.04-0.779 ng/mL: Indeterminate Range. Suspicious of MI.  Clinical correlation required.       >=0.78  ng/mL: Consistent with myocardial injury.  Clinical correlation required.   LACTIC ACID, PLASMA - Normal   OSMOLALITY, CALCULATED - Normal   TSH - Normal   TROPONIN (IN-HOUSE) - Normal    Narrative:     Ultra Troponin I Reference Range:         <=0.039 ng/mL: Negative    0.04-0.779 ng/mL: Indeterminate Range. Suspicious of MI.  Clinical correlation required.       >=0.78  ng/mL: Consistent with myocardial injury.  Clinical correlation required.   TROPONIN (IN-HOUSE) - Normal    Narrative:     Ultra Troponin I Reference Range:         <=0.039 ng/mL: Negative    0.04-0.779 ng/mL: Indeterminate Range. Suspicious of MI.  Clinical correlation required.       >=0.78  ng/mL: Consistent with myocardial injury.  Clinical correlation required.   CK - Normal   CK MB - Normal   MYOGLOBIN, SERUM - Normal   MAGNESIUM - Normal   OSMOLALITY, CALCULATED - Normal   CKMB INDEX CALCULATION - Normal   TROPONIN (IN-HOUSE) - Normal    Narrative:     Ultra Troponin I Reference Range:         <=0.039 ng/mL: Negative    0.04-0.779 ng/mL: Indeterminate Range. Suspicious of MI.  Clinical correlation required.       >=0.78  ng/mL: Consistent with myocardial injury.  Clinical correlation required.   OSMOLALITY, CALCULATED - Normal   POCT GLUCOSE FINGERSTICK - Normal    Narrative:     Meter: [id number] : [id number] Gibran Gris Yolanda   POCT GLUCOSE FINGERSTICK - Normal    Narrative:     Meter: [id number] : [id number] Gibran Gris Yolanda   POCT GLUCOSE FINGERSTICK - Normal    Narrative:     Meter: [id number] : [id number] Gibran Gris Yolanda   POCT GLUCOSE FINGERSTICK - Normal    Narrative:     Meter: [id number] : [id number] Gibran Gris Yolanda   POCT GLUCOSE FINGERSTICK - Normal    Narrative:     Meter: [id number] : [id number] Gibran Gris Yolanda   POCT GLUCOSE  FINGERSTICK - Normal    Narrative:     Meter: [id number] : [id number] Gibran Guerrero   CBC AND DIFFERENTIAL    Narrative:     The following orders were created for panel order CBC & Differential.  Procedure                               Abnormality         Status                     ---------                               -----------         ------                     CBC Auto Differential[[id number]]        Abnormal            Final result                 Please view results for these tests on the individual orders.        Medication List      START taking these medications    Buprenorphine 5 MCG/HR patch weekly transdermal patch  Commonly known as:  BUTRANS  Place 1 patch on the skin as directed by provider 1 (One) Time Per Week.     flecainide 50 MG tablet  Commonly known as:  TAMBOCOR  Take 1 tablet by mouth Every 12 (Twelve) Hours.        CHANGE how you take these medications    amLODIPine 10 MG tablet  Commonly known as:  NORVASC  Take 1 tablet by mouth Daily.  What changed:    medication strength  how much to take  when to take this        CONTINUE taking these medications    acetaminophen-codeine 300-30 MG per tablet  Commonly known as:  TYLENOL #3  Take 1 tablet by mouth 2 (Two) Times a Day As Needed for Moderate Pain .     CELEBREX 200 MG capsule  Generic drug:  celecoxib     CYMBALTA 60 MG capsule  Generic drug:  DULoxetine     gabapentin 400 MG capsule  Commonly known as:  NEURONTIN     loratadine 10 MG tablet  Commonly known as:  CLARITIN     multivitamin tablet tablet     oxybutynin XL 10 MG 24 hr tablet  Commonly known as:  DITROPAN-XL     polyethylene glycol packet  Commonly known as:  MIRALAX     raNITIdine 300 MG tablet  Commonly known as:  ZANTAC  Take 1 tablet by mouth 2 (Two) Times a Day.     topiramate 100 MG tablet  Commonly known as:  TOPAMAX     WELLBUTRIN  MG 24 hr tablet  Generic drug:  buPROPion XL     XANAX 0.5 MG tablet  Generic drug:  ALPRAZolam        STOP taking these  medications    aspirin 81 MG EC tablet     baclofen 10 MG tablet  Commonly known as:  LIORESAL                    MDM  Number of Diagnoses or Management Options  Visual hallucinations: new and requires workup     Amount and/or Complexity of Data Reviewed  Clinical lab tests: reviewed  Tests in the radiology section of CPT®: reviewed  Discuss the patient with other providers: yes    Risk of Complications, Morbidity, and/or Mortality  Presenting problems: high  Diagnostic procedures: high  Management options: high    Patient Progress  Patient progress: stable        Final diagnoses:   Visual hallucinations            Hipolito Navarro MD  11/10/18 0619

## 2018-11-15 ENCOUNTER — PATIENT OUTREACH (OUTPATIENT)
Dept: CASE MANAGEMENT | Facility: OTHER | Age: 73
End: 2018-11-15

## 2018-11-15 NOTE — OUTREACH NOTE
Skilled Nursing Facility Discharge Flowsheet:     Skilled Nursing Facility Discharge Assessment 11/15/2018   Acute Facility Discharged From [place]   Acute Discharge Date 10/31/2018   Name of the Skilled Nursing Facility? [facility]   Tier Level of the Skilled Nursing Facility 2   Purpose of SNF Admission PT;OT   Estimated length of stay for the patient? TBD   Who is the insurance provider or payor of patient stay? Medicare   Progression of Patient? Per [facility] at [facility], patient is under Med A, no dc date set yet

## 2018-11-29 ENCOUNTER — PATIENT OUTREACH (OUTPATIENT)
Dept: CASE MANAGEMENT | Facility: OTHER | Age: 73
End: 2018-11-29

## 2018-11-29 NOTE — OUTREACH NOTE
Skilled Nursing Facility Discharge Flowsheet:     Skilled Nursing Facility Discharge Assessment 11/29/2018   Acute Facility Discharged From [place]   Acute Discharge Date 10/31/2018   Name of the Skilled Nursing Facility? [facility]   Tier Level of the Skilled Nursing Facility 2   Purpose of SNF Admission PT;OT   Estimated length of stay for the patient? TBD   Who is the insurance provider or payor of patient stay? Medicare   Progression of Patient? Per [facility] at [facility], patient is under Med A, no dc date set yet

## 2018-12-06 ENCOUNTER — PATIENT OUTREACH (OUTPATIENT)
Dept: CASE MANAGEMENT | Facility: OTHER | Age: 73
End: 2018-12-06

## 2018-12-06 NOTE — OUTREACH NOTE
Skilled Nursing Facility Discharge Flowsheet:     Skilled Nursing Facility Discharge Assessment 12/6/2018   Acute Facility Discharged From [place]   Acute Discharge Date 10/31/2018   Name of the Skilled Nursing Facility? [facility]   Tier Level of the Skilled Nursing Facility 2   Purpose of SNF Admission PT;OT   Estimated length of stay for the patient? TBD   Who is the insurance provider or payor of patient stay? Medicare   Progression of Patient? Per [facility] at [facility], patient is under Med A, no dc date set yet

## 2018-12-12 ENCOUNTER — OFFICE VISIT (OUTPATIENT)
Dept: CARDIOLOGY | Facility: CLINIC | Age: 73
End: 2018-12-12

## 2018-12-12 VITALS
WEIGHT: 205.6 LBS | RESPIRATION RATE: 18 BRPM | BODY MASS INDEX: 36.43 KG/M2 | DIASTOLIC BLOOD PRESSURE: 78 MMHG | OXYGEN SATURATION: 98 % | HEIGHT: 63 IN | SYSTOLIC BLOOD PRESSURE: 132 MMHG | HEART RATE: 69 BPM

## 2018-12-12 DIAGNOSIS — R42 DIZZINESS: ICD-10-CM

## 2018-12-12 DIAGNOSIS — G89.29 CHRONIC NONINTRACTABLE HEADACHE, UNSPECIFIED HEADACHE TYPE: ICD-10-CM

## 2018-12-12 DIAGNOSIS — R51.9 CHRONIC NONINTRACTABLE HEADACHE, UNSPECIFIED HEADACHE TYPE: ICD-10-CM

## 2018-12-12 DIAGNOSIS — I10 ESSENTIAL HYPERTENSION: Primary | ICD-10-CM

## 2018-12-12 PROCEDURE — 99213 OFFICE O/P EST LOW 20 MIN: CPT | Performed by: PHYSICIAN ASSISTANT

## 2018-12-12 NOTE — PROGRESS NOTES
Skinny Meehan MD  Domonique Cummings  1945  12/12/2018    Patient Active Problem List   Diagnosis   • Chronic headaches   • Dizziness   • Essential hypertension       Dear Skinny Meehan MD:    Subjective     History of Present Illness:    Chief Complaint   Patient presents with   • Hospital Follow-up   • Shortness of Breath       Domonique Cummings is a pleasant 73 y.o. female with a past medical history significant for essential hypertension, chronic headaches, dizziness, and ventricular dysrhythmias that resolved after initiating flecainide while in the hospital recently.  Patient comes in for hospital follow-up.    She states that she has been in rehabilitation she states is been doing okay but she wishes that she was home instead.  She does admit to some mild sharp chest pain however she had a left heart catheterization on 10/31/2018 and only showed mild plaquing.  She also admits to some shortness of breath but denies that it's worse from her baseline, she also admits to some palpitations that have been chronic as well.  Overall she does state that she's been doing well and has a very pleasant attitude.      Left heart catheterization on 10/31/2018  Results:  Coronary angiograms:  Left main coronary artery was normal and bifurcates into a medium sized left and descending and left circumflex system in usual fashion.     Left anterior descending coronary artery is somewhat tortuous in the mid and distal segments given a small diagonal branches with overall mild nonobstructive disease noted.     Left circumflex coronary artery gives a medium-sized obtuse marginal branch from the midsegment and a smaller posterior lateral branches distally.  There is overall mild plaquing disease noted in the left circumflex system.     Right coronary artery is dominant for posterior circulation.  It has also mild plaquing disease overall including the medium-sized PDA.     Conclusion: Ms. Cummings is a 73-year-old   female with recent class IV anginal symptoms.  Cardiac catheterization reveals:     Mild coronary artery disease with a right dominant system.     Recommendations: In view of minimal atherosclerotic disease, it appears that her chest pains are probably noncardiac in etiology.  For now continue to emphasize on risk factor modification as needed.        Arturo Inman M.D.[facility].    Allergies   Allergen Reactions   • Biaxin [Clarithromycin] Other (See Comments)     unknown   :      Current Outpatient Medications:   •  acetaminophen-codeine (TYLENOL #3) 300-30 MG per tablet, Take 1 tablet by mouth 2 (Two) Times a Day As Needed for Moderate Pain ., Disp: 60 tablet, Rfl: 0  •  ALPRAZolam (XANAX) 0.5 MG tablet, Take 0.5 mg by mouth 2 (Two) Times a Day As Needed., Disp: , Rfl:   •  amLODIPine (NORVASC) 10 MG tablet, Take 1 tablet by mouth Daily., Disp: 30 tablet, Rfl: 3  •  Buprenorphine (BUTRANS) 5 MCG/HR patch weekly transdermal patch, Place 1 patch on the skin as directed by provider 1 (One) Time Per Week., Disp: 1 patch, Rfl: 0  •  buPROPion XL (WELLBUTRIN XL) 300 MG 24 hr tablet, Take 300 mg by mouth Every Night., Disp: , Rfl:   •  celecoxib (CELEBREX) 200 MG capsule, Take 200 mg by mouth Daily., Disp: , Rfl:   •  DULoxetine (CYMBALTA) 60 MG capsule, Take 60 mg by mouth Daily., Disp: , Rfl:   •  flecainide (TAMBOCOR) 50 MG tablet, Take 1 tablet by mouth Every 12 (Twelve) Hours., Disp: 60 tablet, Rfl: 3  •  gabapentin (NEURONTIN) 400 MG capsule, Take 400 mg by mouth 3 (Three) Times a Day., Disp: , Rfl:   •  loratadine (CLARITIN) 10 MG tablet, Take 10 mg by mouth Daily., Disp: , Rfl:   •  multivitamin (DAILY DEBORAH) tablet tablet, Take 1 tablet by mouth Daily., Disp: , Rfl:   •  oxybutynin XL (DITROPAN-XL) 10 MG 24 hr tablet, Take 10 mg by mouth Daily., Disp: , Rfl:   •  polyethylene glycol (MIRALAX) packet, Take 17 g by mouth Daily., Disp: , Rfl:   •  raNITIdine (ZANTAC) 300 MG tablet, Take 1 tablet by mouth 2  "(Two) Times a Day., Disp: 60 tablet, Rfl: 2  •  topiramate (TOPAMAX) 100 MG tablet, Take 100 mg by mouth 2 (Two) Times a Day., Disp: , Rfl:       The following portions of the patient's history were reviewed and updated as appropriate: allergies, current medications, past family history, past medical history, past social history, past surgical history and problem list.    Social History     Tobacco Use   • Smoking status: Former Smoker   • Smokeless tobacco: Never Used   Substance Use Topics   • Alcohol use: No   • Drug use: No       Review of Systems   Constitution: Positive for weakness and malaise/fatigue.   Cardiovascular: Positive for chest pain, leg swelling, near-syncope and palpitations. Negative for claudication, cyanosis, dyspnea on exertion, irregular heartbeat, orthopnea, paroxysmal nocturnal dyspnea and syncope.   Respiratory: Negative for cough and shortness of breath.    Hematologic/Lymphatic: Negative for bleeding problem. Does not bruise/bleed easily.   Musculoskeletal: Positive for falls.   Gastrointestinal: Negative for nausea and vomiting.   Neurological: Positive for dizziness, headaches, light-headedness and loss of balance. Negative for focal weakness.   Psychiatric/Behavioral: The patient is nervous/anxious. The patient does not have insomnia.        Objective   Vitals:    12/12/18 1008   BP: 132/78   BP Location: Right arm   Patient Position: Sitting   Cuff Size: Adult   Pulse: 69   Resp: 18   SpO2: 98%   Weight: 93.3 kg (205 lb 9.6 oz)   Height: 160 cm (63\")     Body mass index is 36.42 kg/m².        Physical Exam   Constitutional: She is oriented to person, place, and time. She appears well-developed and well-nourished. No distress.   HENT:   Head: Normocephalic and atraumatic.   Cardiovascular: Normal rate, regular rhythm, normal heart sounds and intact distal pulses.   Pulmonary/Chest: Effort normal and breath sounds normal. No respiratory distress.   Musculoskeletal: She exhibits no " edema.   Neurological: She is alert and oriented to person, place, and time.   Skin: She is not diaphoretic.       Lab Results   Component Value Date     10/23/2018    K 3.9 10/23/2018     (H) 10/23/2018    CO2 21.7 (L) 10/23/2018    BUN 16 10/23/2018    CREATININE 0.94 10/23/2018    GLUCOSE 74 10/23/2018    CALCIUM 8.8 10/23/2018    AST 16 10/18/2018    ALT 10 10/18/2018    ALKPHOS 88 10/18/2018    LABIL2 1.2 (L) 04/22/2016     Lab Results   Component Value Date    CKTOTAL 84 10/19/2018     Lab Results   Component Value Date    WBC 10.32 10/19/2018    HGB 12.6 10/19/2018    HCT 38.1 10/19/2018     10/19/2018     Lab Results   Component Value Date    INR 0.98 12/11/2017    INR <0.90 04/26/2016    INR 0.93 06/24/2014     Lab Results   Component Value Date    MG 2.2 10/19/2018     Lab Results   Component Value Date    TSH 1.839 10/19/2018    CHLPL 167 12/28/2015    TRIG 198 (H) 10/31/2018    HDL 44 (L) 10/31/2018    LDL 98 10/31/2018      Lab Results   Component Value Date    .0 (H) 10/19/2018       During this visit the following were done:  Labs Reviewed [x]    Labs Ordered []    Radiology Reports Reviewed [x]    Radiology Ordered []    PCP Records Reviewed []    Referring Provider Records Reviewed []    ER Records Reviewed []    Hospital Records Reviewed []    History Obtained From Family []    Radiology Images Reviewed []    Other Reviewed []    Records Requested []       Procedures      Assessment/Plan    Diagnosis Plan   1. Essential hypertension  Basic Metabolic Panel    Magnesium   2. Chronic nonintractable headache, unspecified headache type     3. Dizziness                  Recommendations:  1. Continue amlodipine, flecainide.  2. Or check a BMP and magnesium since it has been a couple months and patient was recently started on the flecainide.  3. Follow-up in 3 months.      Return in about 3 months (around 3/12/2019).    As always, I appreciate very much the opportunity to  participate in the cardiovascular care of your patients.      With Best Regards,    BOBBY Figueroa disclaimer:  Much of this encounter note is an electronic transcription/translation of spoken language to printed text. The electronic translation of spoken language may permit erroneous, or at times, nonsensical words or phrases to be inadvertently transcribed; Although I have reviewed the note for such errors, some may still exist.

## 2018-12-20 ENCOUNTER — PATIENT OUTREACH (OUTPATIENT)
Dept: CASE MANAGEMENT | Facility: OTHER | Age: 73
End: 2018-12-20

## 2018-12-20 NOTE — OUTREACH NOTE
Skilled Nursing Facility Discharge Flowsheet:     Skilled Nursing Facility Discharge Assessment 12/20/2018   Acute Facility Discharged From [place]   Acute Discharge Date 10/31/2018   [facility] Sk[facility]   Tier Level of the Skilled Nursing Facility 2   Purpose of SNF Admission PT;OT   Estimated length of stay for the patient? TBD   Who is the insurance provider or payor of patient stay? Medicare   Progression of Patient? Per [place], patient is still under Medicare.

## 2018-12-27 ENCOUNTER — PATIENT OUTREACH (OUTPATIENT)
Dept: CASE MANAGEMENT | Facility: OTHER | Age: 73
End: 2018-12-27

## 2018-12-27 NOTE — OUTREACH NOTE
Skilled Nursing Facility Discharge Flowsheet:     Skilled Nursing Facility Discharge Assessment 12/27/2018   Acute Facility Discharged From [place]   Acute Discharge Date 10/31/2018   Name of the Skilled Nursing Facility? [facility]   Tier Level of the Skilled Nursing Facility 2   Purpose of SNF Admission PT;OT   Estimated length of stay for the patient? TBD   Who is the insurance provider or payor of patient stay? Medicare   Progression of Patient? Per [place], patient is still under Medicare.

## 2019-01-03 ENCOUNTER — PATIENT OUTREACH (OUTPATIENT)
Dept: CASE MANAGEMENT | Facility: OTHER | Age: 74
End: 2019-01-03

## 2019-01-03 NOTE — OUTREACH NOTE
Skilled Nursing Facility Discharge Flowsheet:     Skilled Nursing Facility Discharge Assessment 1/3/2019   Acute Facility Discharged From [place]   Acute Discharge Date 10/31/2018   Name of the Skilled Nursing Facility? [facility]   Tier Level of the Skilled Nursing Facility 2   Purpose of SNF Admission PT;OT   Estimated length of stay for the patient? TBD   Who is the insurance provider or payor of patient stay? Medicare   Progression of Patient? Per [place], patient is still under Medicare.

## 2019-01-10 ENCOUNTER — PATIENT OUTREACH (OUTPATIENT)
Dept: CASE MANAGEMENT | Facility: OTHER | Age: 74
End: 2019-01-10

## 2019-01-10 NOTE — OUTREACH NOTE
Skilled Nursing Facility Discharge Flowsheet:     Skilled Nursing Facility Discharge Assessment 1/10/2019   Acute Facility Discharged From [place]   Acute Discharge Date 10/31/2018   Name of the Skilled Nursing Facility? [facility]   Tier Level of the Skilled Nursing Facility 2   Purpose of SNF Admission PT;OT   Estimated length of stay for the patient? TBD   Who is the insurance provider or payor of patient stay? Medicare   Progression of Patient? Per G[place]ita, patient is still under Medicare.

## 2019-01-17 ENCOUNTER — PATIENT OUTREACH (OUTPATIENT)
Dept: CASE MANAGEMENT | Facility: OTHER | Age: 74
End: 2019-01-17

## 2019-01-17 NOTE — OUTREACH NOTE
Skilled Nursing Facility Discharge Flowsheet:     Skilled Nursing Facility Discharge Assessment 1/17/2019   Acute Facility Discharged From [place]   Acute Discharge Date 10/31/2018   Name of the Skilled Nursing Facility? [facility]   Tier Level of the Skilled Nursing Facility 2   Purpose of SNF Admission PT;OT   Estimated length of stay for the patient? last skilled day 1/23/19   Who is the insurance provider or payor of patient stay? Medicare   Progression of Patient? Per Gensavita, patient is still under Medicare until 1/23/19. Family is still deciding whether to take patient home or if she will remain for LTC

## 2019-01-24 ENCOUNTER — PATIENT OUTREACH (OUTPATIENT)
Dept: CASE MANAGEMENT | Facility: OTHER | Age: 74
End: 2019-01-24

## 2019-01-24 NOTE — OUTREACH NOTE
Skilled Nursing Facility Discharge Flowsheet:     Skilled Nursing Facility Discharge Assessment 1/24/2019   Acute Facility Discharged From [place]   Acute Discharge Date 10/31/2018   Name of the Skilled Nursing Facility? [facility]   Tier Level of the Skilled Nursing Facility 2   Purpose of SNF Admission PT;OT   Estimated length of stay for the patient? LTC   Who is the insurance provider or payor of patient stay? Medicaid   Progression of Patient? Per Halley, patient is staying for long term care   Skilled Nursing Discharge Date? 1/23/2019   Where was the patient discharged to? LTC

## 2019-02-14 ENCOUNTER — EPISODE CHANGES (OUTPATIENT)
Dept: CASE MANAGEMENT | Facility: OTHER | Age: 74
End: 2019-02-14

## 2019-03-26 ENCOUNTER — TRANSCRIBE ORDERS (OUTPATIENT)
Dept: ADMINISTRATIVE | Facility: HOSPITAL | Age: 74
End: 2019-03-26

## 2019-03-26 DIAGNOSIS — R20.8 INCREASED SENSITIVITY TO PAINFUL STIMULUS: Primary | ICD-10-CM

## 2019-04-02 ENCOUNTER — HOSPITAL ENCOUNTER (OUTPATIENT)
Dept: CT IMAGING | Facility: HOSPITAL | Age: 74
Discharge: HOME | End: 2019-04-02
Admitting: INTERNAL MEDICINE

## 2019-04-02 DIAGNOSIS — R20.8 INCREASED SENSITIVITY TO PAINFUL STIMULUS: ICD-10-CM

## 2019-04-02 LAB — CREAT BLDA-MCNC: 0.9 MG/DL (ref 0.6–1.3)

## 2019-04-02 PROCEDURE — 82565 ASSAY OF CREATININE: CPT

## 2019-04-02 PROCEDURE — 25010000002 IOPAMIDOL 61 % SOLUTION: Performed by: INTERNAL MEDICINE

## 2019-04-02 PROCEDURE — 74178 CT ABD&PLV WO CNTR FLWD CNTR: CPT | Performed by: RADIOLOGY

## 2019-04-02 PROCEDURE — 74178 CT ABD&PLV WO CNTR FLWD CNTR: CPT

## 2019-04-02 RX ADMIN — IOPAMIDOL 100 ML: 612 INJECTION, SOLUTION INTRAVENOUS at 08:49

## 2019-04-22 ENCOUNTER — TRANSCRIBE ORDERS (OUTPATIENT)
Dept: ADMINISTRATIVE | Facility: HOSPITAL | Age: 74
End: 2019-04-22

## 2019-04-22 DIAGNOSIS — R41.0 DISORIENTATION, UNSPECIFIED: Primary | ICD-10-CM

## 2019-04-26 ENCOUNTER — HOSPITAL ENCOUNTER (OUTPATIENT)
Dept: CT IMAGING | Facility: HOSPITAL | Age: 74
Discharge: HOME | End: 2019-04-26
Admitting: PSYCHIATRY & NEUROLOGY

## 2019-04-26 DIAGNOSIS — R41.0 DISORIENTATION, UNSPECIFIED: ICD-10-CM

## 2019-04-26 PROCEDURE — 70450 CT HEAD/BRAIN W/O DYE: CPT

## 2019-04-26 PROCEDURE — 70450 CT HEAD/BRAIN W/O DYE: CPT | Performed by: RADIOLOGY

## 2019-05-17 ENCOUNTER — LAB REQUISITION (OUTPATIENT)
Dept: LAB | Facility: HOSPITAL | Age: 74
End: 2019-05-17

## 2019-05-17 DIAGNOSIS — G47.51 CONFUSIONAL AROUSALS: ICD-10-CM

## 2019-05-17 LAB
BACTERIA UR QL AUTO: ABNORMAL /HPF
BILIRUB UR QL STRIP: NEGATIVE
CLARITY UR: ABNORMAL
COLOR UR: ABNORMAL
GLUCOSE UR STRIP-MCNC: NEGATIVE MG/DL
HGB UR QL STRIP.AUTO: NEGATIVE
HYALINE CASTS UR QL AUTO: ABNORMAL /LPF
KETONES UR QL STRIP: NEGATIVE
LEUKOCYTE ESTERASE UR QL STRIP.AUTO: ABNORMAL
NITRITE UR QL STRIP: POSITIVE
PH UR STRIP.AUTO: 5.5 [PH] (ref 5–8)
PROT UR QL STRIP: NEGATIVE
RBC # UR: ABNORMAL /HPF
REF LAB TEST METHOD: ABNORMAL
SP GR UR STRIP: 1.02 (ref 1–1.03)
SQUAMOUS #/AREA URNS HPF: ABNORMAL /HPF
UROBILINOGEN UR QL STRIP: ABNORMAL
WBC UR QL AUTO: ABNORMAL /HPF

## 2019-05-17 PROCEDURE — 87086 URINE CULTURE/COLONY COUNT: CPT | Performed by: INTERNAL MEDICINE

## 2019-05-17 PROCEDURE — 81001 URINALYSIS AUTO W/SCOPE: CPT | Performed by: INTERNAL MEDICINE

## 2019-05-17 PROCEDURE — 87077 CULTURE AEROBIC IDENTIFY: CPT | Performed by: INTERNAL MEDICINE

## 2019-05-17 PROCEDURE — 87186 SC STD MICRODIL/AGAR DIL: CPT | Performed by: INTERNAL MEDICINE

## 2019-05-19 LAB — BACTERIA SPEC AEROBE CULT: ABNORMAL

## 2019-05-20 ENCOUNTER — OFFICE VISIT (OUTPATIENT)
Dept: PSYCHIATRY | Facility: CLINIC | Age: 74
End: 2019-05-20

## 2019-05-20 ENCOUNTER — TELEPHONE (OUTPATIENT)
Dept: PSYCHIATRY | Facility: CLINIC | Age: 74
End: 2019-05-20

## 2019-05-20 VITALS
DIASTOLIC BLOOD PRESSURE: 74 MMHG | BODY MASS INDEX: 38.34 KG/M2 | HEART RATE: 66 BPM | SYSTOLIC BLOOD PRESSURE: 132 MMHG | WEIGHT: 216.4 LBS | HEIGHT: 63 IN

## 2019-05-20 DIAGNOSIS — F33.1 MODERATE EPISODE OF RECURRENT MAJOR DEPRESSIVE DISORDER (HCC): Primary | ICD-10-CM

## 2019-05-20 DIAGNOSIS — F28 OTHER PSYCHOTIC DISORDER NOT DUE TO SUBSTANCE OR KNOWN PHYSIOLOGICAL CONDITION (HCC): ICD-10-CM

## 2019-05-20 DIAGNOSIS — F03.91 DEMENTIA WITH BEHAVIORAL DISTURBANCE, UNSPECIFIED DEMENTIA TYPE: ICD-10-CM

## 2019-05-20 PROCEDURE — 90792 PSYCH DIAG EVAL W/MED SRVCS: CPT | Performed by: NURSE PRACTITIONER

## 2019-05-20 RX ORDER — OLANZAPINE 5 MG/1
5 TABLET ORAL NIGHTLY
Qty: 30 TABLET | Refills: 1 | Status: SHIPPED | OUTPATIENT
Start: 2019-05-20 | End: 2019-06-03 | Stop reason: SDUPTHER

## 2019-05-20 RX ORDER — BUPROPION HYDROCHLORIDE 150 MG/1
150 TABLET ORAL NIGHTLY
Qty: 30 TABLET | Refills: 1 | Status: SHIPPED | OUTPATIENT
Start: 2019-05-20 | End: 2019-06-10

## 2019-05-20 RX ORDER — DULOXETIN HYDROCHLORIDE 60 MG/1
60 CAPSULE, DELAYED RELEASE ORAL DAILY
Qty: 30 CAPSULE | Refills: 0 | Status: SHIPPED | OUTPATIENT
Start: 2019-05-20 | End: 2019-10-29 | Stop reason: SDUPTHER

## 2019-05-20 NOTE — TELEPHONE ENCOUNTER
Call her and tell her I called the other daughter and talked a long time and other daughter said she would talk to her and relay everything

## 2019-05-20 NOTE — TELEPHONE ENCOUNTER
Patient Daughter called she was unable to attend reji due to work she was wondering if you could call her about today's visit

## 2019-05-20 NOTE — PROGRESS NOTES
Subjective   Domonique Cummings is a 73 y.o. female who is here today for initial appointment to evaluate for medication options. Presents with staff member Latrice with whom she gives permission to speak in front of.  Resident at [facility]. Daughters Deepali and Dennise are durable POA.     Chief Complaint:  hallucinations    HPI: I have 4 kids, 5 grandkids and 1 is paralyzed from waist down and I continually worry about him.  Has been at [facility] for over a year. Staff member states pt was in rehab unit pt does not remember why and ended up on the 3rd floor in long term unit. Prior to this she states she lived at home and cared for self. Worse since moving to 3rd floor on longterm unit.  Pt states she is here because of her nerves.  Nursing home sent in note that says ;the following: Whom it May concern: Mrs. Cummings hallucinates frequently.  She sees family members in hallway when they are not there.  She cries in her room because she says her family never calls her.  She gets up herself in room and has fallen several times.  When she thinks she sees her family, she tries to yell at them she starts crying because she thinks they do not want anything to do with her.  She normally sleeps short periods during the daytime, then sits up at night until 430 to 5:00 AM.  She will take everything out of her niacin and dresser drawers, and puts them back in.  She complains about a lot of leg pain, has pain medication ordered and pain cream to apply topically.  She has stated several times that she has hurt her family arguing.    The staff member states she has a daughter that comes daily to see her.  Pt admits she has memory issues.  She states both her parents had alzheimers.  She states she is depressed.  Denies any suicidal or homicidal thoughts.  Does hear voices and see family members that she says staff tells her they are not there.  The voices always family members.  They are not scary voices or commanding voices.   "He gets frustrated when people tell her that there is no one there and this is when she gets very upset and has a near panic attack is what staff member says.  Patient will cry quite often nearly daily.  Her appetite is good.  There has not been any weight loss.  She does not sleep during the night and sits up as stated previous.Body mass index is 38.33 kg/m².  She and staff member both state that her urine has recently been checked with culture and has not shown any infection.  Patient does not have any fevers or any signs of infection.  No illness.  She denies any OCD behavior.  Denies any flashbacks to any previous traumatic events.  Denies nightmares.  Denies any symptoms of marizol. She has history of vent fibrillation and she is on tambocor for this.  No seizure disorder.  No previous head trauma. Pt states she does not attend any recreational things at [facility] they have nor does she play bingo etc as no one wants to play with her.  Staff member then asked her if she has tried to get someone to play and she says \"no\".  No mood issues.  No anger outbursts.  No physical aggression.  She does not like the hallucinations as it upsets her to think no one is there. Upon talking with daughter: she thinks pt has been on wellbutrin for years and really does not know as to why she was originally placed on it.     History of Present Illness    Past Psych History:  Memory problems says she went to comp care years ago cannot be more specific.     Previous Psych Meds:  Pt states she used to go to comp care said the  There \"about killed her\" putting her on prozac and kept upping the med and \"I was taking 8 prozacs a day\" and they fired him over it.       Substance Abuse:  None.     Social History:  Born and raised in [place]. Raised by 2 parents.  No history of any physical, sexual, or emotional abuse as a child.  Graduated HS.  No college.  Worked 9 years at [employer] in [place] [facility].   once nearly 50 years.  She is a .  " She has 4 children.  Has relationships with all of them.  No  history.  No incarcerations. No pending legal issues.  Taoist Yarsanism believes in God. Live at [facility] on long term unit.         Family Psychiatric History:  family history includes Breast cancer in her sister. older sister with dementia    Medical/Surgical History:  Past Medical History:   Diagnosis Date   • Arthritis    • Asthma    • Degenerative disc disease, lumbar    • Diabetes mellitus (CMS/HCC)    • Fibromyalgia    • GERD (gastroesophageal reflux disease)    • Hyperlipidemia    • Hypertension    • Osteoporosis      Past Surgical History:   Procedure Laterality Date   • APPENDECTOMY     • CARDIAC CATHETERIZATION N/A 10/30/2018    Procedure: Left Heart Cath;  Surgeon: Arturo Inman MD;  Location:  [facility] CATH INVASIVE LOCATION;  Service: Cardiology   • ESOPHAGEAL DILATATION     • TOTAL SHOULDER REPLACEMENT Bilateral    • TUBAL ABDOMINAL LIGATION         Allergies   Allergen Reactions   • Biaxin [Clarithromycin] Other (See Comments)     unknown           Current Medications:   Current Outpatient Medications   Medication Sig Dispense Refill   • acetaminophen-codeine (TYLENOL #3) 300-30 MG per tablet Take 1 tablet by mouth 2 (Two) Times a Day As Needed for Moderate Pain . 60 tablet 0   • ALPRAZolam (XANAX) 0.5 MG tablet Take 0.5 mg by mouth 2 (Two) Times a Day As Needed.     • amLODIPine (NORVASC) 10 MG tablet Take 1 tablet by mouth Daily. 30 tablet 3   • Buprenorphine (BUTRANS) 5 MCG/HR patch weekly transdermal patch Place 1 patch on the skin as directed by provider 1 (One) Time Per Week. 1 patch 0   • buPROPion XL (WELLBUTRIN XL) 150 MG 24 hr tablet Take 1 tablet by mouth Every Night. 30 tablet 1   • celecoxib (CELEBREX) 200 MG capsule Take 200 mg by mouth Daily.     • DULoxetine (CYMBALTA) 60 MG capsule Take 1 capsule by mouth Daily. 30 capsule 0   • flecainide (TAMBOCOR) 50 MG tablet Take 1 tablet by mouth Every 12 (Twelve) Hours. 60 tablet  3   • gabapentin (NEURONTIN) 400 MG capsule Take 400 mg by mouth 3 (Three) Times a Day.     • loratadine (CLARITIN) 10 MG tablet Take 10 mg by mouth Daily.     • multivitamin (DAILY DEBORAH) tablet tablet Take 1 tablet by mouth Daily.     • OLANZapine (zyPREXA) 5 MG tablet Take 1 tablet by mouth Every Night. 30 tablet 1   • oxybutynin XL (DITROPAN-XL) 10 MG 24 hr tablet Take 10 mg by mouth Daily.     • polyethylene glycol (MIRALAX) packet Take 17 g by mouth Daily.     • raNITIdine (ZANTAC) 300 MG tablet Take 1 tablet by mouth 2 (Two) Times a Day. 60 tablet 2   • topiramate (TOPAMAX) 100 MG tablet Take 100 mg by mouth 2 (Two) Times a Day.       No current facility-administered medications for this visit.          Review of Systems   Constitutional: Negative for activity change, appetite change and fatigue.   Eyes: Negative for visual disturbance.   Respiratory: Negative.    Cardiovascular: Negative.    Gastrointestinal: Negative for nausea.   Endocrine: Negative.    Genitourinary: Negative.    Musculoskeletal: Positive for arthralgias.   Skin: Negative.    Allergic/Immunologic: Negative.    Neurological: Positive for headaches. Negative for dizziness and seizures.   Hematological: Negative.    Psychiatric/Behavioral: Positive for decreased concentration, hallucinations and sleep disturbance. Negative for agitation, behavioral problems, confusion, dysphoric mood, self-injury and suicidal ideas. The patient is not nervous/anxious and is not hyperactive.     denies HEENT, cardiovascular, respiratory, liver, renal, GI/, endocrine, neuro, DERM, hematology, immunology, musculoskeletal disorders.    Objective   Physical Exam   Constitutional: She appears well-developed and well-nourished.   HENT:   Head: Normocephalic and atraumatic.   Neck: Normal range of motion.   Neurological: She is alert.   Psychiatric: She has a normal mood and affect. Her speech is normal and behavior is normal. Cognition and memory are impaired.  "  Says month is June, year is 2019, knows president, does not know name of nursing home she resides in.  Pleasant and cooperative   Vitals reviewed.    Blood pressure 132/74, pulse 66, height 160 cm (63\"), weight 98.2 kg (216 lb 6.4 oz).    Mental Status Exam:   Hygiene:   good  Cooperation:  Cooperative  Eye Contact:  Good  Psychomotor Behavior:  Appropriate  Affect:  Full range  Hopelessness: 2  Speech:  Normal  Thought Process:  Unable to demonstrate  Thought Content:  Unable to demonstrate  Suicidal:  None  Homicidal:  None  Hallucinations:  Auditory and Visual  Delusion:  None  Memory:  Deficits  Orientation:  Person  Reliability:  poor  Insight:  Poor  Judgement:  Poor  Impulse Control:  Fair  Physical/Medical Issues:  Yes vent tachycardia, htn, neuropathy, chronic arthralgias,DM      Short-term goals: Patient will be compliant with clinic appointments.  Patient will be engaged in therapy, medication compliant with minimal side effects. Patient  will report decrease of symptoms and frequency.    Long-term goals: Patient will have minimal symptoms of  with continued medication management. Patient will be compliant with treatment and appointments.       Problem list:   Strengths: family support and personality  Weaknesses: physical limitations and memory issues    Assessment/Plan   Problems Addressed this Visit     None      Visit Diagnoses     Moderate episode of recurrent major depressive disorder (CMS/HCC)    -  Primary    Relevant Medications    OLANZapine (zyPREXA) 5 MG tablet    buPROPion XL (WELLBUTRIN XL) 150 MG 24 hr tablet    DULoxetine (CYMBALTA) 60 MG capsule    Dementia with behavioral disturbance, unspecified dementia type        Relevant Medications    OLANZapine (zyPREXA) 5 MG tablet    buPROPion XL (WELLBUTRIN XL) 150 MG 24 hr tablet    DULoxetine (CYMBALTA) 60 MG capsule    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  Prognosis: Guarded dependent on medication/follow up and treatment plan compliance.  Mini mental status exam score 7  Geriatric Depression Scale score 7  Both scanned into epic.    I am sending a letter to [facility] requesting safety measures with dementia related diagnosis be put in place.    Daughter Deepali called and discussed entire plan of care.  Was informed of the risks of the antipsychotic category in the elderly and the black box warning of increased risk of stroke and even death.  She is also aware that this medication can cause weight gain, increased blood sugar, increased cholesterol and muscle movement disorders.  The patient was also informed of all this.  Patient and daughter both wish to proceed with care.  I also discussed my plan of treatment which includes the addition of the Zyprexa 5 mg.  I do not believe the Wellbutrin is helping her depression and the addition of the Zyprexa along with the Wellbutrin can increase risk for seizures so this was all discussed and I am decreasing her Wellbutrin down to 150 mg.  I am increasing the Cymbalta to 60 mg.  Further treatment plans include the addition of low-dose Aricept at some point however I wanted to see how the medication changes affected her first.  Also if patient does not sleep well then adding a low-dose Remeron at some point can also occur as it does not react with the Tambocor.  Daughter is well aware that patient has multiple comorbidities and that the Tambocor does interact with lots of medications.  Discussed that she is at high fall risk due to taking the benzodiazepine, pain patch, and the Neurontin.  Daughter states that the nursing home is in the process of taking her down off of the Butrans patch and daughter is also aware  that the addition of the Zyprexa can increase her risk for falls.  I did recommend pt get out and try to engage with some other residents for bingo etc to combat the depression.  On nursing home orders I am also having them check a CBC, CMP, TSH.     Patient is aware to contact the [facility] with any worsening of symptom.  Patient is agreeable to go to the ER or call 911 should they begin SI/HI.     Return in 4 weeks

## 2019-06-03 ENCOUNTER — TELEPHONE (OUTPATIENT)
Dept: PSYCHIATRY | Facility: CLINIC | Age: 74
End: 2019-06-03

## 2019-06-03 DIAGNOSIS — F33.1 MODERATE EPISODE OF RECURRENT MAJOR DEPRESSIVE DISORDER (HCC): ICD-10-CM

## 2019-06-03 DIAGNOSIS — F03.91 DEMENTIA WITH BEHAVIORAL DISTURBANCE, UNSPECIFIED DEMENTIA TYPE: ICD-10-CM

## 2019-06-03 DIAGNOSIS — F28 OTHER PSYCHOTIC DISORDER NOT DUE TO SUBSTANCE OR KNOWN PHYSIOLOGICAL CONDITION (HCC): ICD-10-CM

## 2019-06-03 RX ORDER — OLANZAPINE 2.5 MG/1
2.5 TABLET ORAL NIGHTLY
Qty: 30 TABLET | Refills: 1 | Status: SHIPPED | OUTPATIENT
Start: 2019-06-03

## 2019-06-03 NOTE — TELEPHONE ENCOUNTER
Call and tell her that insomnia is not usually a side effect of the med.  Tell her to give it a couple of days to see if it improves.  If it continues I will decrease the med to half.  Find out if the hallucinations have improved on the med

## 2019-06-03 NOTE — TELEPHONE ENCOUNTER
Daughter called stating that the Nurse has called her from the Nursing home stating that patient has been up for a couple days with out sleep and she is so grumpy , daughter was wondering if that could be related to the Medication .

## 2019-06-03 NOTE — TELEPHONE ENCOUNTER
Nurse just called me from the nursing home she state that patient is more emotional  Crying a lot more , and she is seen people that been dead for years

## 2019-06-10 ENCOUNTER — TELEPHONE (OUTPATIENT)
Dept: PSYCHIATRY | Facility: CLINIC | Age: 74
End: 2019-06-10

## 2019-06-10 DIAGNOSIS — F33.1 MODERATE EPISODE OF RECURRENT MAJOR DEPRESSIVE DISORDER (HCC): ICD-10-CM

## 2019-06-10 RX ORDER — BUPROPION HYDROCHLORIDE 75 MG/1
75 TABLET ORAL DAILY
Qty: 30 TABLET | Refills: 2 | Status: SHIPPED | OUTPATIENT
Start: 2019-06-10 | End: 2019-06-10 | Stop reason: SDUPTHER

## 2019-06-10 RX ORDER — MIRTAZAPINE 7.5 MG/1
7.5 TABLET, FILM COATED ORAL NIGHTLY
Qty: 30 TABLET | Refills: 2 | Status: SHIPPED | OUTPATIENT
Start: 2019-06-10 | End: 2019-06-10 | Stop reason: SDUPTHER

## 2019-06-10 RX ORDER — MIRTAZAPINE 7.5 MG/1
7.5 TABLET, FILM COATED ORAL NIGHTLY
Qty: 30 TABLET | Refills: 2 | Status: SHIPPED | OUTPATIENT
Start: 2019-06-10 | End: 2019-10-29

## 2019-06-10 RX ORDER — BUPROPION HYDROCHLORIDE 75 MG/1
75 TABLET ORAL DAILY
Qty: 30 TABLET | Refills: 2 | Status: SHIPPED | OUTPATIENT
Start: 2019-06-10 | End: 2019-07-01

## 2019-06-10 NOTE — PROGRESS NOTES
Spoke with bhargavi at NH.  See note.  Pt is up all night and sleeping during the day.  Crease in the Zyprexa dose has helped patient with her crying spells.  She is not having any negative side effects to the medication.  She is also taking her Wellbutrin at bedtime which I have never given the medication at that hour so I am going to go ahead and decrease the Wellbutrin to 75 mg and have them give it during the day.  I am also adding a low-dose Remeron for sleep.  I discussed this with Bhargavi and then also I called and spoke with patient's daughter Deepali and she is aware of all these changes and is in agreement with these as well.

## 2019-06-10 NOTE — TELEPHONE ENCOUNTER
Bhargavi Bear called about this patient she states that the crying spells has gotten better but she is not sleeping she is going through her personal stuff all night long and has had recent fall .

## 2019-07-01 ENCOUNTER — OFFICE VISIT (OUTPATIENT)
Dept: PSYCHIATRY | Facility: CLINIC | Age: 74
End: 2019-07-01

## 2019-07-01 VITALS
BODY MASS INDEX: 39.87 KG/M2 | HEART RATE: 74 BPM | SYSTOLIC BLOOD PRESSURE: 138 MMHG | HEIGHT: 63 IN | WEIGHT: 225 LBS | DIASTOLIC BLOOD PRESSURE: 88 MMHG

## 2019-07-01 DIAGNOSIS — F28 OTHER PSYCHOTIC DISORDER NOT DUE TO SUBSTANCE OR KNOWN PHYSIOLOGICAL CONDITION (HCC): ICD-10-CM

## 2019-07-01 DIAGNOSIS — F33.1 MODERATE EPISODE OF RECURRENT MAJOR DEPRESSIVE DISORDER (HCC): Primary | ICD-10-CM

## 2019-07-01 DIAGNOSIS — F03.91 DEMENTIA WITH BEHAVIORAL DISTURBANCE, UNSPECIFIED DEMENTIA TYPE: ICD-10-CM

## 2019-07-01 PROCEDURE — 99214 OFFICE O/P EST MOD 30 MIN: CPT | Performed by: NURSE PRACTITIONER

## 2019-07-01 NOTE — PROGRESS NOTES
Subjective   Domonique Cummings is a 73 y.o. female is here today for medication management follow-up.Presents with staff member Latrice with whom she gives permission to speak in front of.     Chief Complaint:  Recheck on depression and hallucinations    History of Present Illness: Presents for her follow-up appointment at the [facility].  Staff member states that she believes the patient is doing better.  Patient states that her depression and anxiety are still high.  She denies any suicidal thoughts or any homicidal thoughts.  She continues to see family members that are not there and here an occasional voice but she states that the frequency of this has improved.  Staff member states that patient is still staying awake in her bed till approximately 2:58 AM watching TV and then she will fall sleep patient sleeps until 9 AM or so.  This is an improvement from earlier amounts of sleep.  Patient states that she still has to get up frequently as she suffers from urinary incontinence and this is a continual problem at night.Body mass index is 39.87 kg/m².  Her appetite has increased and she does show a weight gain of 9 pounds since last office visit.  This is very upsetting to her as she states this makes her feel worse overall in general.  Is not having any anger outbursts.  Still has periods of confusion.  Continues to be very unstable on her feet and does not walk much only stands for transfers. She continues to verbalize that she stays depressed because her family will never hardly come see her and she has not seen any of her grandchildren for months.     The following portions of the patient's history were reviewed and updated as appropriate: allergies, current medications, past family history, past medical history, past social history, past surgical history and problem list.    Review of Systems   Constitutional: Negative for activity change, appetite change and fatigue.   HENT: Negative.   "  Eyes: Negative for visual disturbance.   Respiratory: Negative.    Cardiovascular: Negative.    Gastrointestinal: Positive for constipation and diarrhea. Negative for nausea.        Gerd   Endocrine: Negative.    Genitourinary: Negative.         Urinary incont   Musculoskeletal: Positive for arthralgias and myalgias.   Skin: Negative.    Allergic/Immunologic: Negative.    Neurological: Negative for dizziness, seizures and headaches.   Hematological: Negative.    Psychiatric/Behavioral: Positive for confusion, hallucinations and sleep disturbance. Negative for agitation, behavioral problems, decreased concentration, dysphoric mood, self-injury and suicidal ideas. The patient is not nervous/anxious and is not hyperactive.        Objective   Physical Exam   Constitutional: She appears well-developed and well-nourished.   HENT:   Head: Normocephalic and atraumatic.   Neck: Normal range of motion.   Neurological: She is alert.   Psychiatric: She has a normal mood and affect. Her speech is normal and behavior is normal. Cognition and memory are impaired.   Pleasant and cooperative   Vitals reviewed.    Blood pressure 138/88, pulse 74, height 160 cm (62.99\"), weight 102 kg (225 lb).    Medication List:   Current Outpatient Medications   Medication Sig Dispense Refill   • acetaminophen-codeine (TYLENOL #3) 300-30 MG per tablet Take 1 tablet by mouth 2 (Two) Times a Day As Needed for Moderate Pain . 60 tablet 0   • ALPRAZolam (XANAX) 0.5 MG tablet Take 0.5 mg by mouth 2 (Two) Times a Day As Needed.     • amLODIPine (NORVASC) 10 MG tablet Take 1 tablet by mouth Daily. 30 tablet 3   • Buprenorphine (BUTRANS) 5 MCG/HR patch weekly transdermal patch Place 1 patch on the skin as directed by provider 1 (One) Time Per Week. 1 patch 0   • celecoxib (CELEBREX) 200 MG capsule Take 200 mg by mouth Daily.     • diclofenac (VOLTAREN) 1 % gel gel As Needed.     • DULoxetine (CYMBALTA) 60 MG capsule Take 1 capsule by mouth Daily. 30 " capsule 0   • flecainide (TAMBOCOR) 50 MG tablet Take 1 tablet by mouth Every 12 (Twelve) Hours. 60 tablet 3   • gabapentin (NEURONTIN) 400 MG capsule Take 400 mg by mouth 3 (Three) Times a Day.     • loratadine (CLARITIN) 10 MG tablet Take 10 mg by mouth Daily.     • mirtazapine (REMERON) 7.5 MG tablet Take 1 tablet by mouth Every Night. 30 tablet 2   • multivitamin (DAILY DEBORAH) tablet tablet Take 1 tablet by mouth Daily.     • OLANZapine (zyPREXA) 2.5 MG tablet Take 1 tablet by mouth Every Night. 30 tablet 1   • oxybutynin XL (DITROPAN-XL) 10 MG 24 hr tablet Take 10 mg by mouth Daily.     • polyethylene glycol (MIRALAX) packet Take 17 g by mouth Daily.     • raNITIdine (ZANTAC) 300 MG tablet Take 1 tablet by mouth 2 (Two) Times a Day. 60 tablet 2   • topiramate (TOPAMAX) 100 MG tablet Take 100 mg by mouth 2 (Two) Times a Day.       No current facility-administered medications for this visit.        Mental Status Exam:   Hygiene:   good  Cooperation:  Cooperative  Eye Contact:  Good  Psychomotor Behavior:  Appropriate  Affect:  Full range  Hopelessness: Denies  Speech:  Normal  Thought Process:  Goal directed  Thought Content:  Normal  Suicidal:  None  Homicidal:  None  Hallucinations:  Auditory and Visual  Delusion:  Paranoid  Memory:  Deficits  Orientation:  Person and Place  Reliability:  poor  Insight:  Fair  Judgement:  Fair  Impulse Control:  Fair  Physical/Medical Issues:  Yes chronic pain, weakness, gerd, IBS    Assessment/Plan   Problems Addressed this Visit     None      Visit Diagnoses     Moderate episode of recurrent major depressive disorder (CMS/HCC)    -  Primary    Dementia with behavioral disturbance, unspecified dementia type        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  Prognosis: Guarded dependent on medication/follow up and treatment plan  compliance.      Discussed medication options. I am going to discontinue the wellbutrin as I do not believe it is helping.  I am continuing the zyprexa and remeron at current dosing as she is concerned with the weight gain.  She is aware that both of these meds can cause weight gain.  Reviewed the risks, benefits, and side effects of the medications; patient acknowledged and verbally consented.  Continuing efforts to promote the therapeutic alliance, address the patient's issues, and strengthen self awareness, insights, and coping skillsPatient is agreeable to call the [facility].  Patient is aware to call 911 or go to the nearest ER should begin having SI/HI. RTC 8-12 weeks.

## 2019-09-03 ENCOUNTER — OFFICE VISIT (OUTPATIENT)
Dept: PSYCHIATRY | Facility: CLINIC | Age: 74
End: 2019-09-03

## 2019-09-03 VITALS
BODY MASS INDEX: 41.46 KG/M2 | HEIGHT: 63 IN | HEART RATE: 84 BPM | DIASTOLIC BLOOD PRESSURE: 86 MMHG | SYSTOLIC BLOOD PRESSURE: 144 MMHG | WEIGHT: 234 LBS

## 2019-09-03 DIAGNOSIS — F28 OTHER PSYCHOTIC DISORDER NOT DUE TO SUBSTANCE OR KNOWN PHYSIOLOGICAL CONDITION (HCC): ICD-10-CM

## 2019-09-03 DIAGNOSIS — F03.91 DEMENTIA WITH BEHAVIORAL DISTURBANCE, UNSPECIFIED DEMENTIA TYPE: ICD-10-CM

## 2019-09-03 DIAGNOSIS — F33.1 MODERATE EPISODE OF RECURRENT MAJOR DEPRESSIVE DISORDER (HCC): Primary | ICD-10-CM

## 2019-09-03 PROCEDURE — 99214 OFFICE O/P EST MOD 30 MIN: CPT | Performed by: NURSE PRACTITIONER
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
## 2019-10-29 ENCOUNTER — OFFICE VISIT (OUTPATIENT)
Dept: PSYCHIATRY | Facility: CLINIC | Age: 74
End: 2019-10-29

## 2019-10-29 VITALS
BODY MASS INDEX: 43.05 KG/M2 | DIASTOLIC BLOOD PRESSURE: 84 MMHG | HEART RATE: 87 BPM | HEIGHT: 63 IN | WEIGHT: 243 LBS | SYSTOLIC BLOOD PRESSURE: 132 MMHG

## 2019-10-29 DIAGNOSIS — G47.33 OSA (OBSTRUCTIVE SLEEP APNEA): ICD-10-CM

## 2019-10-29 DIAGNOSIS — F33.1 MODERATE EPISODE OF RECURRENT MAJOR DEPRESSIVE DISORDER (HCC): Primary | ICD-10-CM

## 2019-10-29 DIAGNOSIS — F03.91 DEMENTIA WITH BEHAVIORAL DISTURBANCE, UNSPECIFIED DEMENTIA TYPE: ICD-10-CM

## 2019-10-29 PROCEDURE — 99214 OFFICE O/P EST MOD 30 MIN: CPT | Performed by: NURSE PRACTITIONER

## 2019-10-29 RX ORDER — DULOXETIN HYDROCHLORIDE 30 MG/1
90 CAPSULE, DELAYED RELEASE ORAL DAILY
Qty: 90 CAPSULE | Refills: 5 | Status: SHIPPED | OUTPATIENT
Start: 2019-10-29 | End: 2020-01-21 | Stop reason: DRUGHIGH

## 2019-10-29 NOTE — TREATMENT PLAN
Multi-Disciplinary Problems (from Behavioral Health Treatment Plan)    Active Problems     Problem: Depression  Start Date: 10/29/19    Problem Details:  The patient self-scales this problem as a 3 with 10 being the worst.       Goal Priority Start Date Expected End Date End Date    Patient will demonstrate the ability to initiate new constructive life skills outside of sessions on a consistent basis. -- 10/29/19 -- --    Goal Details:  Progress toward goal:  The patient self-scales their progress related to this goal as a 3 with 10 being the worst.       Goal Intervention Frequency Start Date End Date    Assist patient in setting attainable activities of daily living goals. PRN 10/29/19 --    Goal Intervention Frequency Start Date End Date    Provide education about depression Weekly 10/29/19 --    Intervention Details:  Duration of treatment until until remission of symptoms.       Goal Intervention Frequency Start Date End Date    Assist patient in developing healthy coping strategies. Weekly 10/29/19 --    Intervention Details:  Duration of treatment until until remission of symptoms.                          I have discussed and reviewed this treatment plan with the patient.

## 2019-10-29 NOTE — PROGRESS NOTES
Subjective   Domonique Cummings is a 74 y.o. female is here today for medication management follow-up.Presents with staff member Veena with whom she gives permission to speak in front of.     Chief Complaint:  Recheck on depression and hallucinations    History of Present Illness: [facility] to speak in front of.  Pt says her depression continues to be high.  She cries daily.  Staff concurs with this.l She continues to get irritated easily.  She denies any A/V hallucinations.  She denies any tremor.  She is staying awake every night till at least 2 AM.  Says she is in her room coloring or writing.  Says she will then sleep till 7 am.  Will sleep couple hours during the day and sometimes naps after dinner.  She takes the remeron at aprox 9 PM but it does not help with sleep.  Body mass index is 43.06 kg/m². shows weight gain of 9 lbs since last visit.  No current medical stressors.  No tremors.  No physical aggression to staff or residents.  Complains of AM headaches and fatigue all day.  Says she wore c pap years ago and her  did too and he stopped wearing his so she did too.  No real reason.          .     The following portions of the patient's history were reviewed and updated as appropriate: allergies, current medications, past family history, past medical history, past social history, past surgical history and problem list.    Review of Systems   Constitutional: Positive for fatigue. Negative for activity change and appetite change.   HENT: Negative.    Eyes: Negative for visual disturbance.   Respiratory: Negative.    Cardiovascular: Negative.    Gastrointestinal: Positive for constipation and diarrhea. Negative for nausea.        Gerd   Endocrine: Negative.    Genitourinary: Negative.         Urinary incont   Musculoskeletal: Positive for arthralgias and myalgias.   Skin: Negative.    Allergic/Immunologic: Negative.   "  Neurological: Negative for dizziness, seizures and headaches.   Hematological: Negative.    Psychiatric/Behavioral: Positive for confusion and sleep disturbance. Negative for agitation, behavioral problems, decreased concentration, dysphoric mood, hallucinations, self-injury and suicidal ideas. The patient is not nervous/anxious and is not hyperactive.        Objective   Physical Exam   Constitutional: She appears well-developed and well-nourished.   HENT:   Head: Normocephalic and atraumatic.   Neck: Normal range of motion.   Neurological: She is alert.   Psychiatric: She has a normal mood and affect. Her speech is normal and behavior is normal. Cognition and memory are impaired.   Pleasant and cooperative   Vitals reviewed.    Blood pressure 132/84, pulse 87, height 160 cm (62.99\"), weight 110 kg (243 lb).    Medication List:   Current Outpatient Medications   Medication Sig Dispense Refill   • acetaminophen-codeine (TYLENOL #3) 300-30 MG per tablet Take 1 tablet by mouth 2 (Two) Times a Day As Needed for Moderate Pain . 60 tablet 0   • ALPRAZolam (XANAX) 0.5 MG tablet Take 0.5 mg by mouth 2 (Two) Times a Day As Needed.     • amLODIPine (NORVASC) 10 MG tablet Take 1 tablet by mouth Daily. 30 tablet 3   • Buprenorphine (BUTRANS) 5 MCG/HR patch weekly transdermal patch Place 1 patch on the skin as directed by provider 1 (One) Time Per Week. 1 patch 0   • celecoxib (CELEBREX) 200 MG capsule Take 200 mg by mouth Daily.     • diclofenac (VOLTAREN) 1 % gel gel As Needed.     • DULoxetine (CYMBALTA) 30 MG capsule Take 3 capsules by mouth Daily. 90 capsule 5   • flecainide (TAMBOCOR) 50 MG tablet Take 1 tablet by mouth Every 12 (Twelve) Hours. 60 tablet 3   • gabapentin (NEURONTIN) 400 MG capsule Take 400 mg by mouth 3 (Three) Times a Day.     • loratadine (CLARITIN) 10 MG tablet Take 10 mg by mouth Daily.     • multivitamin (DAILY DEBORAH) tablet tablet Take 1 tablet by mouth Daily.     • OLANZapine (zyPREXA) 2.5 MG " tablet Take 1 tablet by mouth Every Night. 30 tablet 1   • oxybutynin XL (DITROPAN-XL) 10 MG 24 hr tablet Take 10 mg by mouth Daily.     • polyethylene glycol (MIRALAX) packet Take 17 g by mouth Daily.     • raNITIdine (ZANTAC) 300 MG tablet Take 1 tablet by mouth 2 (Two) Times a Day. 60 tablet 2   • topiramate (TOPAMAX) 100 MG tablet Take 100 mg by mouth 2 (Two) Times a Day.       No current facility-administered medications for this visit.        Mental Status Exam:   Hygiene:   good  Cooperation:  Cooperative  Eye Contact:  Good  Psychomotor Behavior:  Appropriate  Affect:  Full range  Hopelessness: Denies  Speech:  Normal  Thought Process:  Goal directed  Thought Content:  Normal  Suicidal:  None  Homicidal:  None  Hallucinations:  Auditory and Visual  Delusion:  Paranoid  Memory:  Deficits  Orientation:  Person and Place  Reliability:  poor  Insight:  Fair  Judgement:  Fair  Impulse Control:  Fair  Physical/Medical Issues:  Yes chronic pain, weakness, gerd, IBS    Assessment/Plan   Problems Addressed this Visit     None      Visit Diagnoses     Moderate episode of recurrent major depressive disorder (CMS/HCC)    -  Primary    Relevant Medications    DULoxetine (CYMBALTA) 30 MG capsule    Dementia with behavioral disturbance, unspecified dementia type (CMS/HCC)        Relevant Medications    DULoxetine (CYMBALTA) 30 MG capsule    ENE (obstructive sleep apnea)        Relevant Orders    Ambulatory Referral to Sleep Medicine        Functionality: pt having significant impairment in important areas of daily functioning.  Prognosis: Guarded dependent on medication/follow up and treatment plan compliance.  I am putting in an order for sleep study evaluation.  This can be contributing to her fatigue and AM headaches.  She takes the remeron aprox 9 pm and does not go to bed until 2 Am or later so it is obviously not helping.  May be contributing to her weight gain.  I am stopping it.  I will increase the cymbalta for  the depression.  RTC 6 weeks.      Continuing efforts to promote the therapeutic alliance, address the patient's issues, and strengthen self awareness, insights, and coping skillsPatient is agreeable to call the [facility].  Patient is aware to call 911 or go to the nearest ER should begin having SI/HI. RTC 6  weeks.

## 2019-11-05 ENCOUNTER — APPOINTMENT (OUTPATIENT)
Dept: GENERAL RADIOLOGY | Facility: HOSPITAL | Age: 74
End: 2019-11-05

## 2019-11-05 ENCOUNTER — APPOINTMENT (OUTPATIENT)
Dept: CT IMAGING | Facility: HOSPITAL | Age: 74
End: 2019-11-05

## 2019-11-05 ENCOUNTER — APPOINTMENT (OUTPATIENT)
Dept: ULTRASOUND IMAGING | Facility: HOSPITAL | Age: 74
End: 2019-11-05

## 2019-11-05 ENCOUNTER — HOSPITAL ENCOUNTER (INPATIENT)
Facility: HOSPITAL | Age: 74
LOS: 2 days | Discharge: NURSING, REHAB OR LONG-TERM CARE | End: 2019-11-07
Attending: EMERGENCY MEDICINE | Admitting: INTERNAL MEDICINE

## 2019-11-05 DIAGNOSIS — N39.0 URINARY TRACT INFECTION WITHOUT HEMATURIA, SITE UNSPECIFIED: ICD-10-CM

## 2019-11-05 DIAGNOSIS — A41.9 SEPSIS SECONDARY TO UTI (HCC): Primary | ICD-10-CM

## 2019-11-05 DIAGNOSIS — R09.02 HYPOXIA: ICD-10-CM

## 2019-11-05 DIAGNOSIS — N39.0 SEPSIS SECONDARY TO UTI (HCC): Primary | ICD-10-CM

## 2019-11-05 PROBLEM — R42 DIZZINESS: Status: RESOLVED | Noted: 2018-10-31 | Resolved: 2019-11-05

## 2019-11-05 PROBLEM — I10 ESSENTIAL HYPERTENSION: Chronic | Status: ACTIVE | Noted: 2018-10-31

## 2019-11-05 LAB
A-A DO2: 40.3 MMHG (ref 0–300)
ALBUMIN SERPL-MCNC: 3.12 G/DL (ref 3.5–5.2)
ALBUMIN SERPL-MCNC: 3.63 G/DL (ref 3.5–5.2)
ALBUMIN/GLOB SERPL: 0.8 G/DL
ALBUMIN/GLOB SERPL: 0.9 G/DL
ALP SERPL-CCNC: 123 U/L (ref 39–117)
ALP SERPL-CCNC: 145 U/L (ref 39–117)
ALT SERPL W P-5'-P-CCNC: 14 U/L (ref 1–33)
ALT SERPL W P-5'-P-CCNC: 16 U/L (ref 1–33)
ANION GAP SERPL CALCULATED.3IONS-SCNC: 12.2 MMOL/L (ref 5–15)
ANION GAP SERPL CALCULATED.3IONS-SCNC: 13.1 MMOL/L (ref 5–15)
ARTERIAL PATENCY WRIST A: POSITIVE
AST SERPL-CCNC: 20 U/L (ref 1–32)
AST SERPL-CCNC: 22 U/L (ref 1–32)
ATMOSPHERIC PRESS: 733 MMHG
BACTERIA BLD CULT: ABNORMAL
BACTERIA UR QL AUTO: ABNORMAL /HPF
BASE EXCESS BLDA CALC-SCNC: 2.5 MMOL/L (ref 0–2)
BASOPHILS # BLD AUTO: 0.04 10*3/MM3 (ref 0–0.2)
BASOPHILS # BLD AUTO: 0.07 10*3/MM3 (ref 0–0.2)
BASOPHILS NFR BLD AUTO: 0.1 % (ref 0–1.5)
BASOPHILS NFR BLD AUTO: 0.2 % (ref 0–1.5)
BDY SITE: ABNORMAL
BILIRUB SERPL-MCNC: 0.3 MG/DL (ref 0.2–1.2)
BILIRUB SERPL-MCNC: 0.3 MG/DL (ref 0.2–1.2)
BILIRUB UR QL STRIP: NEGATIVE
BODY TEMPERATURE: 0 C
BUN BLD-MCNC: 16 MG/DL (ref 8–23)
BUN BLD-MCNC: 18 MG/DL (ref 8–23)
BUN/CREAT SERPL: 18.8 (ref 7–25)
BUN/CREAT SERPL: 20.2 (ref 7–25)
CALCIUM SPEC-SCNC: 8.7 MG/DL (ref 8.6–10.5)
CALCIUM SPEC-SCNC: 9.1 MG/DL (ref 8.6–10.5)
CHLORIDE SERPL-SCNC: 100 MMOL/L (ref 98–107)
CHLORIDE SERPL-SCNC: 105 MMOL/L (ref 98–107)
CLARITY UR: ABNORMAL
CO2 BLDA-SCNC: 28.1 MMOL/L (ref 22–33)
CO2 SERPL-SCNC: 22.8 MMOL/L (ref 22–29)
CO2 SERPL-SCNC: 25.9 MMOL/L (ref 22–29)
COHGB MFR BLD: 0.9 % (ref 0–5)
COLOR UR: YELLOW
CREAT BLD-MCNC: 0.85 MG/DL (ref 0.57–1)
CREAT BLD-MCNC: 0.89 MG/DL (ref 0.57–1)
D DIMER PPP FEU-MCNC: 3.98 MCGFEU/ML (ref 0–0.5)
D-LACTATE SERPL-SCNC: 1.5 MMOL/L (ref 0.5–2)
D-LACTATE SERPL-SCNC: 2.6 MMOL/L (ref 0.5–2)
DEPRECATED RDW RBC AUTO: 43.6 FL (ref 37–54)
DEPRECATED RDW RBC AUTO: 45.1 FL (ref 37–54)
EOSINOPHIL # BLD AUTO: 0.01 10*3/MM3 (ref 0–0.4)
EOSINOPHIL # BLD AUTO: 0.03 10*3/MM3 (ref 0–0.4)
EOSINOPHIL NFR BLD AUTO: 0 % (ref 0.3–6.2)
EOSINOPHIL NFR BLD AUTO: 0.1 % (ref 0.3–6.2)
ERYTHROCYTE [DISTWIDTH] IN BLOOD BY AUTOMATED COUNT: 13.1 % (ref 12.3–15.4)
ERYTHROCYTE [DISTWIDTH] IN BLOOD BY AUTOMATED COUNT: 13.4 % (ref 12.3–15.4)
GFR SERPL CREATININE-BSD FRML MDRD: 62 ML/MIN/1.73
GFR SERPL CREATININE-BSD FRML MDRD: 65 ML/MIN/1.73
GLOBULIN UR ELPH-MCNC: 3.9 GM/DL
GLOBULIN UR ELPH-MCNC: 4 GM/DL
GLUCOSE BLD-MCNC: 145 MG/DL (ref 65–99)
GLUCOSE BLD-MCNC: 179 MG/DL (ref 65–99)
GLUCOSE UR STRIP-MCNC: NEGATIVE MG/DL
HBA1C MFR BLD: 6.4 % (ref 4.8–5.6)
HCO3 BLDA-SCNC: 26.9 MMOL/L (ref 20–26)
HCT VFR BLD AUTO: 34.5 % (ref 34–46.6)
HCT VFR BLD AUTO: 35.8 % (ref 34–46.6)
HCT VFR BLD CALC: 37 % (ref 38–51)
HGB BLD-MCNC: 11.1 G/DL (ref 12–15.9)
HGB BLD-MCNC: 11.8 G/DL (ref 12–15.9)
HGB BLDA-MCNC: 12.1 G/DL (ref 13.5–17.5)
HGB UR QL STRIP.AUTO: ABNORMAL
HOLD SPECIMEN: NORMAL
HOROWITZ INDEX BLD+IHG-RTO: 21 %
HYALINE CASTS UR QL AUTO: ABNORMAL /LPF
IMM GRANULOCYTES # BLD AUTO: 0.19 10*3/MM3 (ref 0–0.05)
IMM GRANULOCYTES # BLD AUTO: 0.2 10*3/MM3 (ref 0–0.05)
IMM GRANULOCYTES NFR BLD AUTO: 0.7 % (ref 0–0.5)
IMM GRANULOCYTES NFR BLD AUTO: 0.7 % (ref 0–0.5)
KETONES UR QL STRIP: NEGATIVE
LEUKOCYTE ESTERASE UR QL STRIP.AUTO: ABNORMAL
LYMPHOCYTES # BLD AUTO: 0.57 10*3/MM3 (ref 0.7–3.1)
LYMPHOCYTES # BLD AUTO: 1 10*3/MM3 (ref 0.7–3.1)
LYMPHOCYTES NFR BLD AUTO: 2.1 % (ref 19.6–45.3)
LYMPHOCYTES NFR BLD AUTO: 3.5 % (ref 19.6–45.3)
Lab: ABNORMAL
MCH RBC QN AUTO: 29.8 PG (ref 26.6–33)
MCH RBC QN AUTO: 29.9 PG (ref 26.6–33)
MCHC RBC AUTO-ENTMCNC: 32.2 G/DL (ref 31.5–35.7)
MCHC RBC AUTO-ENTMCNC: 33 G/DL (ref 31.5–35.7)
MCV RBC AUTO: 90.9 FL (ref 79–97)
MCV RBC AUTO: 92.7 FL (ref 79–97)
METHGB BLD QL: 0.8 % (ref 0–3)
MODALITY: ABNORMAL
MONOCYTES # BLD AUTO: 1.42 10*3/MM3 (ref 0.1–0.9)
MONOCYTES # BLD AUTO: 2.3 10*3/MM3 (ref 0.1–0.9)
MONOCYTES NFR BLD AUTO: 5 % (ref 5–12)
MONOCYTES NFR BLD AUTO: 8.4 % (ref 5–12)
NEUTROPHILS # BLD AUTO: 24.22 10*3/MM3 (ref 1.7–7)
NEUTROPHILS # BLD AUTO: 25.61 10*3/MM3 (ref 1.7–7)
NEUTROPHILS NFR BLD AUTO: 88.6 % (ref 42.7–76)
NEUTROPHILS NFR BLD AUTO: 90.6 % (ref 42.7–76)
NITRITE UR QL STRIP: POSITIVE
NOTE: ABNORMAL
NRBC BLD AUTO-RTO: 0 /100 WBC (ref 0–0.2)
NRBC BLD AUTO-RTO: 0 /100 WBC (ref 0–0.2)
NT-PROBNP SERPL-MCNC: 269.7 PG/ML (ref 5–1800)
OXYHGB MFR BLDV: 89.2 % (ref 94–99)
PCO2 BLDA: 40.1 MM HG (ref 35–45)
PCO2 TEMP ADJ BLD: ABNORMAL MM[HG]
PH BLDA: 7.43 PH UNITS (ref 7.35–7.45)
PH UR STRIP.AUTO: 6 [PH] (ref 5–8)
PH, TEMP CORRECTED: ABNORMAL
PLATELET # BLD AUTO: 201 10*3/MM3 (ref 140–450)
PLATELET # BLD AUTO: 211 10*3/MM3 (ref 140–450)
PMV BLD AUTO: 10.2 FL (ref 6–12)
PMV BLD AUTO: 9.3 FL (ref 6–12)
PO2 BLDA: 58.5 MM HG (ref 83–108)
PO2 TEMP ADJ BLD: ABNORMAL MM[HG]
POTASSIUM BLD-SCNC: 3.8 MMOL/L (ref 3.5–5.2)
POTASSIUM BLD-SCNC: 4.3 MMOL/L (ref 3.5–5.2)
PROT SERPL-MCNC: 7.1 G/DL (ref 6–8.5)
PROT SERPL-MCNC: 7.5 G/DL (ref 6–8.5)
PROT UR QL STRIP: ABNORMAL
RBC # BLD AUTO: 3.72 10*6/MM3 (ref 3.77–5.28)
RBC # BLD AUTO: 3.94 10*6/MM3 (ref 3.77–5.28)
RBC # UR: ABNORMAL /HPF
REF LAB TEST METHOD: ABNORMAL
SAO2 % BLDCOA: 90.7 % (ref 94–99)
SODIUM BLD-SCNC: 139 MMOL/L (ref 136–145)
SODIUM BLD-SCNC: 140 MMOL/L (ref 136–145)
SP GR UR STRIP: 1.02 (ref 1–1.03)
SQUAMOUS #/AREA URNS HPF: ABNORMAL /HPF
TROPONIN T SERPL-MCNC: <0.01 NG/ML (ref 0–0.03)
UROBILINOGEN UR QL STRIP: ABNORMAL
VENTILATOR MODE: ABNORMAL
WBC NRBC COR # BLD: 27.36 10*3/MM3 (ref 3.4–10.8)
WBC NRBC COR # BLD: 28.3 10*3/MM3 (ref 3.4–10.8)
WBC UR QL AUTO: ABNORMAL /HPF

## 2019-11-05 PROCEDURE — 0 IOVERSOL 74 % SOLUTION: Performed by: EMERGENCY MEDICINE

## 2019-11-05 PROCEDURE — 83605 ASSAY OF LACTIC ACID: CPT | Performed by: INTERNAL MEDICINE

## 2019-11-05 PROCEDURE — 93005 ELECTROCARDIOGRAM TRACING: CPT | Performed by: EMERGENCY MEDICINE

## 2019-11-05 PROCEDURE — 82375 ASSAY CARBOXYHB QUANT: CPT

## 2019-11-05 PROCEDURE — 85025 COMPLETE CBC W/AUTO DIFF WBC: CPT | Performed by: EMERGENCY MEDICINE

## 2019-11-05 PROCEDURE — 87150 DNA/RNA AMPLIFIED PROBE: CPT | Performed by: EMERGENCY MEDICINE

## 2019-11-05 PROCEDURE — P9612 CATHETERIZE FOR URINE SPEC: HCPCS

## 2019-11-05 PROCEDURE — 99223 1ST HOSP IP/OBS HIGH 75: CPT | Performed by: INTERNAL MEDICINE

## 2019-11-05 PROCEDURE — 87088 URINE BACTERIA CULTURE: CPT | Performed by: EMERGENCY MEDICINE

## 2019-11-05 PROCEDURE — 80053 COMPREHEN METABOLIC PANEL: CPT | Performed by: EMERGENCY MEDICINE

## 2019-11-05 PROCEDURE — 74230 X-RAY XM SWLNG FUNCJ C+: CPT | Performed by: RADIOLOGY

## 2019-11-05 PROCEDURE — 25010000002 LEVOFLOXACIN PER 250 MG: Performed by: EMERGENCY MEDICINE

## 2019-11-05 PROCEDURE — 81001 URINALYSIS AUTO W/SCOPE: CPT | Performed by: EMERGENCY MEDICINE

## 2019-11-05 PROCEDURE — 83050 HGB METHEMOGLOBIN QUAN: CPT

## 2019-11-05 PROCEDURE — 83605 ASSAY OF LACTIC ACID: CPT | Performed by: EMERGENCY MEDICINE

## 2019-11-05 PROCEDURE — 76705 ECHO EXAM OF ABDOMEN: CPT

## 2019-11-05 PROCEDURE — 85379 FIBRIN DEGRADATION QUANT: CPT | Performed by: EMERGENCY MEDICINE

## 2019-11-05 PROCEDURE — 74230 X-RAY XM SWLNG FUNCJ C+: CPT

## 2019-11-05 PROCEDURE — 36600 WITHDRAWAL OF ARTERIAL BLOOD: CPT

## 2019-11-05 PROCEDURE — 87186 SC STD MICRODIL/AGAR DIL: CPT | Performed by: EMERGENCY MEDICINE

## 2019-11-05 PROCEDURE — 83036 HEMOGLOBIN GLYCOSYLATED A1C: CPT | Performed by: INTERNAL MEDICINE

## 2019-11-05 PROCEDURE — 87086 URINE CULTURE/COLONY COUNT: CPT | Performed by: EMERGENCY MEDICINE

## 2019-11-05 PROCEDURE — 94799 UNLISTED PULMONARY SVC/PX: CPT

## 2019-11-05 PROCEDURE — 25010000002 CEFTRIAXONE: Performed by: INTERNAL MEDICINE

## 2019-11-05 PROCEDURE — 99285 EMERGENCY DEPT VISIT HI MDM: CPT

## 2019-11-05 PROCEDURE — 92611 MOTION FLUOROSCOPY/SWALLOW: CPT | Performed by: SPEECH-LANGUAGE PATHOLOGIST

## 2019-11-05 PROCEDURE — 25010000002 HEPARIN (PORCINE) PER 1000 UNITS: Performed by: INTERNAL MEDICINE

## 2019-11-05 PROCEDURE — 83880 ASSAY OF NATRIURETIC PEPTIDE: CPT | Performed by: EMERGENCY MEDICINE

## 2019-11-05 PROCEDURE — 36415 COLL VENOUS BLD VENIPUNCTURE: CPT

## 2019-11-05 PROCEDURE — 80053 COMPREHEN METABOLIC PANEL: CPT | Performed by: INTERNAL MEDICINE

## 2019-11-05 PROCEDURE — 94640 AIRWAY INHALATION TREATMENT: CPT

## 2019-11-05 PROCEDURE — 82805 BLOOD GASES W/O2 SATURATION: CPT

## 2019-11-05 PROCEDURE — 84484 ASSAY OF TROPONIN QUANT: CPT | Performed by: EMERGENCY MEDICINE

## 2019-11-05 PROCEDURE — 85025 COMPLETE CBC W/AUTO DIFF WBC: CPT | Performed by: INTERNAL MEDICINE

## 2019-11-05 PROCEDURE — 71275 CT ANGIOGRAPHY CHEST: CPT

## 2019-11-05 PROCEDURE — 71045 X-RAY EXAM CHEST 1 VIEW: CPT

## 2019-11-05 PROCEDURE — 87040 BLOOD CULTURE FOR BACTERIA: CPT | Performed by: EMERGENCY MEDICINE

## 2019-11-05 RX ORDER — ALPRAZOLAM 0.5 MG/1
0.5 TABLET ORAL EVERY 12 HOURS SCHEDULED
Status: DISCONTINUED | OUTPATIENT
Start: 2019-11-05 | End: 2019-11-07 | Stop reason: HOSPADM

## 2019-11-05 RX ORDER — I-VITE, TAB 1000-60-2MG (60/BT) 300MCG-200
1 TAB ORAL 2 TIMES DAILY
Status: CANCELLED | OUTPATIENT
Start: 2019-11-05

## 2019-11-05 RX ORDER — FLUTICASONE PROPIONATE 50 MCG
1 SPRAY, SUSPENSION (ML) NASAL DAILY
Status: DISCONTINUED | OUTPATIENT
Start: 2019-11-05 | End: 2019-11-07 | Stop reason: HOSPADM

## 2019-11-05 RX ORDER — CELECOXIB 200 MG/1
200 CAPSULE ORAL DAILY
Status: CANCELLED | OUTPATIENT
Start: 2019-11-05

## 2019-11-05 RX ORDER — CETIRIZINE HYDROCHLORIDE 10 MG/1
10 TABLET ORAL DAILY
Status: CANCELLED | OUTPATIENT
Start: 2019-11-05

## 2019-11-05 RX ORDER — CALCIUM CARBONATE 200(500)MG
1 TABLET,CHEWABLE ORAL EVERY 6 HOURS PRN
Status: DISCONTINUED | OUTPATIENT
Start: 2019-11-05 | End: 2019-11-07 | Stop reason: HOSPADM

## 2019-11-05 RX ORDER — IPRATROPIUM BROMIDE AND ALBUTEROL SULFATE 2.5; .5 MG/3ML; MG/3ML
3 SOLUTION RESPIRATORY (INHALATION) ONCE
Status: COMPLETED | OUTPATIENT
Start: 2019-11-05 | End: 2019-11-05

## 2019-11-05 RX ORDER — LACTULOSE 10 G/15ML
20 SOLUTION ORAL DAILY
Status: DISCONTINUED | OUTPATIENT
Start: 2019-11-05 | End: 2019-11-05

## 2019-11-05 RX ORDER — ONDANSETRON 4 MG/1
4 TABLET, ORALLY DISINTEGRATING ORAL EVERY 8 HOURS PRN
Status: CANCELLED | OUTPATIENT
Start: 2019-11-05

## 2019-11-05 RX ORDER — LEVETIRACETAM 500 MG/1
500 TABLET ORAL NIGHTLY
Status: DISCONTINUED | OUTPATIENT
Start: 2019-11-05 | End: 2019-11-07 | Stop reason: HOSPADM

## 2019-11-05 RX ORDER — HEPARIN SODIUM 5000 [USP'U]/ML
5000 INJECTION, SOLUTION INTRAVENOUS; SUBCUTANEOUS EVERY 12 HOURS SCHEDULED
Status: DISCONTINUED | OUTPATIENT
Start: 2019-11-05 | End: 2019-11-07 | Stop reason: HOSPADM

## 2019-11-05 RX ORDER — ACETAMINOPHEN 325 MG/1
650 TABLET ORAL ONCE
Status: COMPLETED | OUTPATIENT
Start: 2019-11-05 | End: 2019-11-05

## 2019-11-05 RX ORDER — CHOLECALCIFEROL (VITAMIN D3) 50 MCG
2000 TABLET ORAL DAILY
Status: ON HOLD | COMMUNITY
End: 2022-02-13

## 2019-11-05 RX ORDER — CETIRIZINE HYDROCHLORIDE 10 MG/1
5 TABLET ORAL DAILY
Status: DISCONTINUED | OUTPATIENT
Start: 2019-11-05 | End: 2019-11-07 | Stop reason: HOSPADM

## 2019-11-05 RX ORDER — ALPRAZOLAM 0.5 MG/1
0.5 TABLET ORAL 2 TIMES DAILY PRN
Status: CANCELLED | OUTPATIENT
Start: 2019-11-05

## 2019-11-05 RX ORDER — IPRATROPIUM BROMIDE AND ALBUTEROL SULFATE 2.5; .5 MG/3ML; MG/3ML
3 SOLUTION RESPIRATORY (INHALATION) EVERY 6 HOURS PRN
Status: DISCONTINUED | OUTPATIENT
Start: 2019-11-05 | End: 2019-11-07 | Stop reason: HOSPADM

## 2019-11-05 RX ORDER — SODIUM CHLORIDE 0.9 % (FLUSH) 0.9 %
10 SYRINGE (ML) INJECTION AS NEEDED
Status: DISCONTINUED | OUTPATIENT
Start: 2019-11-05 | End: 2019-11-07 | Stop reason: HOSPADM

## 2019-11-05 RX ORDER — SODIUM CHLORIDE 0.9 % (FLUSH) 0.9 %
10 SYRINGE (ML) INJECTION EVERY 12 HOURS SCHEDULED
Status: DISCONTINUED | OUTPATIENT
Start: 2019-11-05 | End: 2019-11-07 | Stop reason: HOSPADM

## 2019-11-05 RX ORDER — FLECAINIDE ACETATE 50 MG/1
50 TABLET ORAL EVERY 12 HOURS SCHEDULED
Status: DISCONTINUED | OUTPATIENT
Start: 2019-11-05 | End: 2019-11-07 | Stop reason: HOSPADM

## 2019-11-05 RX ORDER — LACTULOSE 10 G/15ML
20 SOLUTION ORAL DAILY
Status: DISCONTINUED | OUTPATIENT
Start: 2019-11-05 | End: 2019-11-07 | Stop reason: HOSPADM

## 2019-11-05 RX ORDER — CYCLOSPORINE 0.5 MG/ML
1 EMULSION OPHTHALMIC 2 TIMES DAILY PRN
COMMUNITY

## 2019-11-05 RX ORDER — OMEGA-3S/DHA/EPA/FISH OIL/D3 300MG-1000
2000 CAPSULE ORAL DAILY
Status: DISCONTINUED | OUTPATIENT
Start: 2019-11-05 | End: 2019-11-07 | Stop reason: HOSPADM

## 2019-11-05 RX ORDER — LACTULOSE 10 G/15ML
20 SOLUTION ORAL DAILY
COMMUNITY

## 2019-11-05 RX ORDER — NITROGLYCERIN 0.4 MG/1
0.4 TABLET SUBLINGUAL
Status: DISCONTINUED | OUTPATIENT
Start: 2019-11-05 | End: 2019-11-07 | Stop reason: HOSPADM

## 2019-11-05 RX ORDER — L.ACID,PARA/B.BIFIDUM/S.THERM 8B CELL
1 CAPSULE ORAL DAILY
Status: DISCONTINUED | OUTPATIENT
Start: 2019-11-05 | End: 2019-11-07 | Stop reason: HOSPADM

## 2019-11-05 RX ORDER — ONDANSETRON 4 MG/1
4 TABLET, ORALLY DISINTEGRATING ORAL EVERY 8 HOURS PRN
COMMUNITY
End: 2019-11-07 | Stop reason: HOSPADM

## 2019-11-05 RX ORDER — FLUTICASONE PROPIONATE 50 MCG
1 SPRAY, SUSPENSION (ML) NASAL DAILY
COMMUNITY

## 2019-11-05 RX ORDER — LEVETIRACETAM 500 MG/1
500 TABLET ORAL NIGHTLY
COMMUNITY

## 2019-11-05 RX ORDER — OXYBUTYNIN CHLORIDE 5 MG/1
10 TABLET, EXTENDED RELEASE ORAL DAILY
Status: DISCONTINUED | OUTPATIENT
Start: 2019-11-05 | End: 2019-11-07 | Stop reason: HOSPADM

## 2019-11-05 RX ORDER — LEVOFLOXACIN 5 MG/ML
750 INJECTION, SOLUTION INTRAVENOUS ONCE
Status: COMPLETED | OUTPATIENT
Start: 2019-11-05 | End: 2019-11-05

## 2019-11-05 RX ORDER — GABAPENTIN 400 MG/1
400 CAPSULE ORAL EVERY 8 HOURS SCHEDULED
Status: DISCONTINUED | OUTPATIENT
Start: 2019-11-05 | End: 2019-11-07 | Stop reason: HOSPADM

## 2019-11-05 RX ORDER — FAMOTIDINE 20 MG/1
40 TABLET, FILM COATED ORAL
Status: CANCELLED | OUTPATIENT
Start: 2019-11-05

## 2019-11-05 RX ORDER — CALCIUM CARBONATE 200(500)MG
1 TABLET,CHEWABLE ORAL EVERY 6 HOURS PRN
COMMUNITY

## 2019-11-05 RX ORDER — OLANZAPINE 2.5 MG/1
2.5 TABLET ORAL NIGHTLY
Status: DISCONTINUED | OUTPATIENT
Start: 2019-11-05 | End: 2019-11-07 | Stop reason: HOSPADM

## 2019-11-05 RX ORDER — GUAIFENESIN AND DEXTROMETHORPHAN HYDROBROMIDE 100; 10 MG/5ML; MG/5ML
10 SOLUTION ORAL EVERY 6 HOURS PRN
Status: CANCELLED | OUTPATIENT
Start: 2019-11-05

## 2019-11-05 RX ORDER — FUROSEMIDE 20 MG/1
20 TABLET ORAL DAILY
COMMUNITY
End: 2022-02-17 | Stop reason: HOSPADM

## 2019-11-05 RX ORDER — FUROSEMIDE 20 MG/1
20 TABLET ORAL DAILY
Status: CANCELLED | OUTPATIENT
Start: 2019-11-05

## 2019-11-05 RX ORDER — FAMOTIDINE 20 MG/1
20 TABLET, FILM COATED ORAL
Status: DISCONTINUED | OUTPATIENT
Start: 2019-11-05 | End: 2019-11-07 | Stop reason: HOSPADM

## 2019-11-05 RX ORDER — POTASSIUM CHLORIDE 750 MG/1
10 TABLET, FILM COATED, EXTENDED RELEASE ORAL DAILY
Status: CANCELLED | OUTPATIENT
Start: 2019-11-05

## 2019-11-05 RX ORDER — POTASSIUM CHLORIDE 750 MG/1
10 TABLET, EXTENDED RELEASE ORAL DAILY
COMMUNITY
End: 2022-02-17 | Stop reason: HOSPADM

## 2019-11-05 RX ORDER — HYDROCODONE BITARTRATE AND ACETAMINOPHEN 10; 325 MG/1; MG/1
1 TABLET ORAL EVERY 8 HOURS PRN
Status: ON HOLD | COMMUNITY
End: 2019-11-07 | Stop reason: SDUPTHER

## 2019-11-05 RX ORDER — MULTIVITAMIN
1 TABLET ORAL DAILY
Status: DISCONTINUED | OUTPATIENT
Start: 2019-11-05 | End: 2019-11-07 | Stop reason: HOSPADM

## 2019-11-05 RX ORDER — IPRATROPIUM BROMIDE AND ALBUTEROL SULFATE 2.5; .5 MG/3ML; MG/3ML
3 SOLUTION RESPIRATORY (INHALATION) EVERY 6 HOURS PRN
Status: ON HOLD | COMMUNITY
End: 2022-02-13

## 2019-11-05 RX ORDER — I-VITE, TAB 1000-60-2MG (60/BT) 300MCG-200
1 TAB ORAL 2 TIMES DAILY
Status: DISCONTINUED | OUTPATIENT
Start: 2019-11-05 | End: 2019-11-07 | Stop reason: HOSPADM

## 2019-11-05 RX ORDER — L.ACID,CASEI/B.ANIMAL/S.THERMO 16B CELL
1 CAPSULE ORAL DAILY
COMMUNITY

## 2019-11-05 RX ORDER — L.ACID,CASEI/B.ANIMAL/S.THERMO 16B CELL
1 CAPSULE ORAL DAILY
Status: CANCELLED | OUTPATIENT
Start: 2019-11-05

## 2019-11-05 RX ORDER — AMLODIPINE BESYLATE 10 MG/1
10 TABLET ORAL
Status: DISCONTINUED | OUTPATIENT
Start: 2019-11-05 | End: 2019-11-07 | Stop reason: HOSPADM

## 2019-11-05 RX ORDER — GUAIFENESIN AND DEXTROMETHORPHAN HYDROBROMIDE 100; 10 MG/5ML; MG/5ML
10 SOLUTION ORAL EVERY 6 HOURS PRN
COMMUNITY
End: 2019-11-07 | Stop reason: HOSPADM

## 2019-11-05 RX ORDER — HYDROCODONE BITARTRATE AND ACETAMINOPHEN 10; 325 MG/1; MG/1
1 TABLET ORAL EVERY 8 HOURS PRN
Status: DISCONTINUED | OUTPATIENT
Start: 2019-11-05 | End: 2019-11-07 | Stop reason: HOSPADM

## 2019-11-05 RX ADMIN — ALPRAZOLAM 0.5 MG: 0.5 TABLET ORAL at 11:42

## 2019-11-05 RX ADMIN — LEVETIRACETAM 500 MG: 500 TABLET, FILM COATED ORAL at 20:18

## 2019-11-05 RX ADMIN — I-VITE, TAB 1000-60-2MG (60/BT) 1 TABLET: TAB at 20:18

## 2019-11-05 RX ADMIN — LEVOFLOXACIN 750 MG: 5 INJECTION, SOLUTION INTRAVENOUS at 03:18

## 2019-11-05 RX ADMIN — Medication 1 CAPSULE: at 11:42

## 2019-11-05 RX ADMIN — HEPARIN SODIUM 5000 UNITS: 5000 INJECTION INTRAVENOUS; SUBCUTANEOUS at 20:18

## 2019-11-05 RX ADMIN — FLECAINIDE ACETATE 50 MG: 50 TABLET ORAL at 11:43

## 2019-11-05 RX ADMIN — ALPRAZOLAM 0.5 MG: 0.5 TABLET ORAL at 20:30

## 2019-11-05 RX ADMIN — FLECAINIDE ACETATE 50 MG: 50 TABLET ORAL at 20:18

## 2019-11-05 RX ADMIN — CEFTRIAXONE 2 G: 2 INJECTION, POWDER, FOR SOLUTION INTRAMUSCULAR; INTRAVENOUS at 08:16

## 2019-11-05 RX ADMIN — ACETAMINOPHEN 650 MG: 325 TABLET ORAL at 03:29

## 2019-11-05 RX ADMIN — Medication 1 TABLET: at 11:43

## 2019-11-05 RX ADMIN — GABAPENTIN 400 MG: 400 CAPSULE ORAL at 12:53

## 2019-11-05 RX ADMIN — FLUTICASONE PROPIONATE 1 SPRAY: 50 SPRAY, METERED NASAL at 11:43

## 2019-11-05 RX ADMIN — OXYBUTYNIN CHLORIDE 10 MG: 5 TABLET, EXTENDED RELEASE ORAL at 11:43

## 2019-11-05 RX ADMIN — SODIUM CHLORIDE 1677 ML: 9 INJECTION, SOLUTION INTRAVENOUS at 03:18

## 2019-11-05 RX ADMIN — IOVERSOL 95 ML: 741 INJECTION INTRA-ARTERIAL; INTRAVENOUS at 04:07

## 2019-11-05 RX ADMIN — GABAPENTIN 400 MG: 400 CAPSULE ORAL at 22:58

## 2019-11-05 RX ADMIN — OLANZAPINE 2.5 MG: 2.5 TABLET, FILM COATED ORAL at 20:18

## 2019-11-05 RX ADMIN — FAMOTIDINE 20 MG: 20 TABLET, FILM COATED ORAL at 08:16

## 2019-11-05 RX ADMIN — HEPARIN SODIUM 5000 UNITS: 5000 INJECTION INTRAVENOUS; SUBCUTANEOUS at 08:16

## 2019-11-05 RX ADMIN — CETIRIZINE HYDROCHLORIDE 5 MG: 10 TABLET, FILM COATED ORAL at 11:43

## 2019-11-05 RX ADMIN — SODIUM CHLORIDE, PRESERVATIVE FREE 10 ML: 5 INJECTION INTRAVENOUS at 08:17

## 2019-11-05 RX ADMIN — CHOLECALCIFEROL TAB 10 MCG (400 UNIT) 2000 UNITS: 10 TAB at 11:42

## 2019-11-05 RX ADMIN — FAMOTIDINE 20 MG: 20 TABLET, FILM COATED ORAL at 16:50

## 2019-11-05 RX ADMIN — I-VITE, TAB 1000-60-2MG (60/BT) 1 TABLET: TAB at 11:43

## 2019-11-05 RX ADMIN — AMLODIPINE BESYLATE 10 MG: 10 TABLET ORAL at 11:42

## 2019-11-05 RX ADMIN — DULOXETINE HYDROCHLORIDE 90 MG: 60 CAPSULE, DELAYED RELEASE ORAL at 11:43

## 2019-11-05 RX ADMIN — IPRATROPIUM BROMIDE AND ALBUTEROL SULFATE 3 ML: .5; 3 SOLUTION RESPIRATORY (INHALATION) at 01:42

## 2019-11-05 RX ADMIN — LACTULOSE 20 G: 10 SOLUTION ORAL at 11:42

## 2019-11-05 NOTE — ED NOTES
I went in to obtain the patient's lab work, ultrasound is currently in the patient's room.     Irving Freitas  11/05/19 0208

## 2019-11-05 NOTE — PLAN OF CARE
Problem: Pain, Chronic (Adult)  Goal: Identify Related Risk Factors and Signs and Symptoms  Outcome: Ongoing (interventions implemented as appropriate)   11/05/19 0648   Pain, Chronic (Adult)   Signs and Symptoms (Chronic Pain) altered time perception;fatigue/weakness     Goal: Acceptable Pain/Comfort Level and Functional Ability  Outcome: Ongoing (interventions implemented as appropriate)   11/05/19 0648   Pain, Chronic (Adult)   Acceptable Pain/Comfort Level and Functional Ability making progress toward outcome       Problem: Fall Risk (Adult)  Goal: Identify Related Risk Factors and Signs and Symptoms  Outcome: Ongoing (interventions implemented as appropriate)   11/05/19 0648   Fall Risk (Adult)   Related Risk Factors (Fall Risk) age-related changes;bladder function altered;gait/mobility problems;impaired vision;sleep pattern alteration;environment unfamiliar   Signs and Symptoms (Fall Risk) presence of risk factors     Goal: Absence of Fall  Outcome: Ongoing (interventions implemented as appropriate)   11/05/19 0648   Fall Risk (Adult)   Absence of Fall making progress toward outcome       Problem: Skin Injury Risk (Adult)  Goal: Identify Related Risk Factors and Signs and Symptoms  Outcome: Ongoing (interventions implemented as appropriate)   11/05/19 0648   Skin Injury Risk (Adult)   Related Risk Factors (Skin Injury Risk) advanced age;body weight extremes;edema;infection;mobility impaired;moisture     Goal: Skin Health and Integrity  Outcome: Ongoing (interventions implemented as appropriate)   11/05/19 0648   Skin Injury Risk (Adult)   Skin Health and Integrity making progress toward outcome       Problem: Urinary Tract Infection (Adult)  Goal: Signs and Symptoms of Listed Potential Problems Will be Absent, Minimized or Managed (Urinary Tract Infection)  Outcome: Ongoing (interventions implemented as appropriate)   11/05/19 0648   Goal/Outcome Evaluation   Problems Assessed (Urinary Tract Infection) all    Problems Present (UTI) fluid and electrolyte imbalance;infection progression       Problem: Patient Care Overview  Goal: Plan of Care Review  Outcome: Ongoing (interventions implemented as appropriate)   11/05/19 0735   Coping/Psychosocial   Plan of Care Reviewed With patient     Goal: Individualization and Mutuality  Outcome: Ongoing (interventions implemented as appropriate)    Goal: Discharge Needs Assessment  Outcome: Ongoing (interventions implemented as appropriate)   11/05/19 0600   Disability   Equipment Currently Used at Home wheelchair   Discharge Needs Assessment   Patient/Family Anticipates Transition to long term care facility   Patient/Family Anticipated Services at Transition skilled nursing   Transportation Anticipated health plan transportation     Goal: Interprofessional Rounds/Family Conf  Outcome: Ongoing (interventions implemented as appropriate)

## 2019-11-05 NOTE — MBS/VFSS/FEES
Acute Care - Speech Language Pathology   Swallow Initial Evaluation  Stu   MODIFIED BARIUM SWALLOW STUDY     Patient Name: Domonique Cummings  : 1945  MRN: [id number]  Today's Date: 2019      Admit Date: 2019    Domonique Cummings  presents to the radiology suite this am from 3341/2S to participate in an instrumental MBS to assess safety/efficacy of swallowing fnx, determine safest/least restrictive diet. Per MD note, pt w/ h/o dysphagia per pt's daughter. D/w ZOË Wong who provides verbal clearance for pt to be transported to radiology suite this pm via vess chair to participate.        Social History     Socioeconomic History   • Marital status:      Spouse name: Not on file   • Number of children: Not on file   • Years of education: Not on file   • Highest education level: Not on file   Tobacco Use   • Smoking status: Former Smoker   • Smokeless tobacco: Never Used   • Tobacco comment: She quit at least 30 years ago.   Substance and Sexual Activity   • Alcohol use: No   • Drug use: No   • Sexual activity: Defer        Chest xray on 19 revealed   FINDINGS:  Single portable AP view(s) of the chest.     There is mild cardiomegaly, but there is no consolidation or effusion or pneumothorax.     IMPRESSION:  No acute cardiopulmonary findings, no interval change.     Signer Name: Jesse Arce MD   Signed: 2019 4:32 AM   Workstation Name: [id number]    [facility]    Diet Orders (active) (From admission, onward)    Start     Ordered    19 1452  Diet Soft Texture; Chopped; Thin  Diet Effective Now     Comments:  NO STRAWS.  Meds whole in puree. Single Presentation.    19 1452          Ms. Cummings is observed on O2 via NC tolerating well.     Risks and benefits of the procedure are explained w/ verbalizing understanding/agreement to participate. Proceed per protocol.    Pt is positioned upright and centered in a soft strap supportive vess chair  to accept multiple po presentations of solid cracker, puree, honey thick, nectar thick, and thin liquids via spoon, cup and straw, along w/ whole placebo pill in puree. Pt is able to self feed.     All views are from the lateral plane.     Facial/oral structures are symmetrical upon observation w/o lingual deviation upon protrusion. Oral mucosa are moist, pink and clean. Secretions are clear, thin and well controlled. OROM/MARIA VICTORIA is generally delayed/weak to imitate oral postures. Gag is not assessed. Volitional cough is adequate in intensity, clear in quality, nonproductive. Vocal quality is adequate in intensity, clear in quality w/ intelligible speech. Pt is a/a and cooperative to particpate.  Pt  is oriented to person, place, and time, follows simple directives, and participates in simple conversational exchanges.     Upon po presentations, adequate bolus anticipation w/ good labial seal for bolus clearance via spoon bowl, cup rim stability and suction via straw.  Bolus formation, manipulation,  and control are generally weak w/ increased oral prep time/rotary mastication pattern. A-p transit is timely w/o oral residue. Tongue base retraction and linguavelar seal are incomplete w/ premature spillage to the bilateral pyriforms w/ nectar thick and thin consistencies. No laryngeal penetration or aspiration is evidenced before the swallow.     Pharyngeal swallow is delayed w/ mildly weak hyolaryngeal elevation and epiglottic inversion. Pharyngeal contraction is impaired w/ mild diffuse pharyngeal residue. Deep laryngeal penetration w/ thin liquids via straw w/ silent aspiration occurring during the swallow. Fleeting laryngeal penetration w/ thins via cup, no aspiration w/ multiple repeat presentations of thins via cup. No other laryngeal penetration or aspiration evidenced during or after the swallow.     Full esophageal sweep reveals no obvious mucosal abnormalities. Motility is mildly reduced w/o retrograde flow  noted.      Pt is able to manipulate and swallow whole barium pill in puree presentation w/o difficulty.      Visit Dx:     ICD-10-CM ICD-9-CM   1. Sepsis secondary to UTI (CMS/HCC) A41.9 038.9    N39.0 995.91     599.0   2. Urinary tract infection without hematuria, site unspecified N39.0 599.0   3. Hypoxia R09.02 799.02     Patient Active Problem List   Diagnosis   • Chronic headaches   • Essential hypertension   • Sepsis secondary to UTI (CMS/HCC)     Past Medical History:   Diagnosis Date   • Arthritis    • Asthma    • Degenerative disc disease, lumbar    • Diabetes mellitus (CMS/[facility])    • Fibromyalgia    • GERD (gastroesophageal reflux disease)    • Hyperlipidemia    • Hypertension    • ENE (obstructive sleep apnea)     Noncompliant with BiPAP/CPAP   • Osteoporosis      Past Surgical History:   Procedure Laterality Date   • APPENDECTOMY     • CARDIAC CATHETERIZATION N/A 10/30/2018    Procedure: Left Heart Cath;  Surgeon: Arturo Inman MD;  Location: [facility] CATH INVASIVE LOCATION;  Service: Cardiology   • ESOPHAGEAL DILATATION     • TOTAL SHOULDER REPLACEMENT Bilateral    • TUBAL ABDOMINAL LIGATION           EDUCATION  The patient has been educated in the following areas:   Dysphagia (Swallowing Impairment) Oral Care/Hydration Modified Diet Instruction.    SLP Recommendation and Plan    Impression: Per this evaluation Mrs. Cummings presents w/ mild oral phase, trace pharyngeal phase dysphagia. Deep laryngeal penetration w/ thin liquids via straw w/ silent aspiration occurring during the swallow. Fleeting laryngeal penetration w/ thins via cup, no aspiration w/ multiple repeat presentations of thins via cup. Increased oral prep time w/ solids. She is felt to most benefit from continued modified po diet of mechanical soft, chopped w/ thin liquids. NO STRAWS. Meds whole in puree/thins. Upright and centered for all po intake. [place] aspiration precautions. BEN precautions. Oral Care  protocol.    Recommendations:   1. Mechanical soft, chopped w/ thin liquids. NO STRAWS.  2. Meds whole in puree/thins.   3. BEN precautions.   4. Oral care protocol.  5. Upright and centered for all po intake  6. Universal aspiration precautions     SLP to f/u for diet safety/acceptance.      D/w pt results and recommendations w/ verbal understanding and agreement.     D/w ZOË Wong via telephone results and recommendations w/ verbal understanding and agreement.     Thank you for allowing me to participate in the care of your patient-  Angeli Villa M.S., CCC-SLP  Plan of Care Reviewed With: patient  Progress: improving  Outcome Summary: MBS completed this pm. Silent aspiraiton w/ thin liquids via straw only. Mulltiple presentations w/ thin liquids via cup w/ fleeting laryngeal penetration only. Will modify po diet to mechanical soft, chopped w/ thin liquids. NO STRAWS.           Time Calculation:   Time Calculation- SLP     Row Name 11/05/19 1545             Time Calculation- SLP    SLP - Next Appointment  11/06/19  -        User Key  (r) = Recorded By, (t) = Taken By, (c) = Cosigned By    Initials Name Provider Type     Angeli Villa MS CCC-SLP Speech and Language Pathologist          Therapy Charges for Today     Code Description Service Date Service Provider Modifiers Qty    [id number] HC ST MOTION FLUORO EVAL SWALLOW 8 11/5/2019 Angeli Villa, MS CCC-SLP GN 1               Anglei Villa MS CCC-SLP  11/5/2019

## 2019-11-05 NOTE — PROGRESS NOTES
Discharge Planning Assessment   Stu     Patient Name: Domonique Cummings  MRN: [id number]  Today's Date: 11/5/2019    Admit Date: 11/5/2019    Discharge Needs Assessment     Row Name 11/05/19 1343       Discharge Needs Assessment    Discharge Facility/Level of Care Needs  -- SS contacted [facility] per Khushi who states pt has a 14 day skilled bed hold.         Discharge Plan     Row Name 11/05/19 1345       Plan    Plan Pt was admitted from [facility]. SS contacted [facility] per Khushi who states pt has a 14 day skilled bed hold. SS to follow and assist as needed with discharge planning.         Destination      Service Provider Request Status Selected Services Address Phone Number Fax Number    [facility] ([facility]] Pending - No Request Sent N/A [address] [place] [address] [phone]     Demographic Summary     Row Name 11/05/19 1343       General Information    Referral Source  nursing    Reason for Consult  -- SS received consult [facility] resident.      DERREK Ortiz

## 2019-11-05 NOTE — ED PROVIDER NOTES
Subjective   74-year-old white female complains of shortness of breath.  Patient was sent from the nursing home for shortness of breath.  There she reportedly had low O2 sats into the 80s.  She complains of feeling short of breath since earlier this morning.  She also complains of some pain in her right chest and right upper quadrant of her abdomen.  She has cough productive of green sputum, but denies any fever or chills.            Review of Systems   All other systems reviewed and are negative.      Past Medical History:   Diagnosis Date   • Arthritis    • Asthma    • Degenerative disc disease, lumbar    • Diabetes mellitus (CMS/HCC)    • Fibromyalgia    • GERD (gastroesophageal reflux disease)    • Hyperlipidemia    • Hypertension    • Osteoporosis        Allergies   Allergen Reactions   • Biaxin [Clarithromycin] Other (See Comments)     unknown       Past Surgical History:   Procedure Laterality Date   • APPENDECTOMY     • CARDIAC CATHETERIZATION N/A 10/30/2018    Procedure: Left Heart Cath;  Surgeon: Arturo Inman MD;  Location: [facility] CATH INVASIVE LOCATION;  Service: Cardiology   • ESOPHAGEAL DILATATION     • TOTAL SHOULDER REPLACEMENT Bilateral    • TUBAL ABDOMINAL LIGATION         Family History   Problem Relation Age of Onset   • Breast cancer Sister        Social History     Socioeconomic History   • Marital status:      Spouse name: Not on file   • Number of children: Not on file   • Years of education: Not on file   • Highest education level: Not on file   Tobacco Use   • Smoking status: Former Smoker   • Smokeless tobacco: Never Used   Substance and Sexual Activity   • Alcohol use: No   • Drug use: No   • Sexual activity: Defer           Objective   Physical Exam   Constitutional: She is oriented to person, place, and time. She appears well-developed and well-nourished.   HENT:   Head: Normocephalic and atraumatic.   Eyes: Conjunctivae are normal.   Neck: No JVD present.   Cardiovascular:  Regular rhythm, normal heart sounds and intact distal pulses. Tachycardia present.   Pulmonary/Chest: No respiratory distress. She has no wheezes. She has no rhonchi. She has rales in the right middle field and the right lower field.   Abdominal: Soft. Bowel sounds are normal. She exhibits no distension. There is no tenderness.   Musculoskeletal: Normal range of motion. She exhibits edema (2+ bilateral lower extremities).   Neurological: She is alert and oriented to person, place, and time.   Skin: Skin is warm and dry.   Psychiatric: She has a normal mood and affect.   Nursing note and vitals reviewed.      Procedures  Results for orders placed or performed during the hospital encounter of 11/05/19   Blood Gas, Arterial With Co-Ox   Result Value Ref Range    Site Left Radial     Theo's Test Positive     pH, Arterial 7.435 7.350 - 7.450 pH units    pCO2, Arterial 40.1 35.0 - 45.0 mm Hg    pO2, Arterial 58.5 (L) 83.0 - 108.0 mm Hg    HCO3, Arterial 26.9 (H) 20.0 - 26.0 mmol/L    Base Excess, Arterial 2.5 (H) 0.0 - 2.0 mmol/L    O2 Saturation, Arterial 90.7 (L) 94.0 - 99.0 %    Hemoglobin, Blood Gas 12.1 (L) 13.5 - 17.5 g/dL    Hematocrit, Blood Gas 37.0 (L) 38.0 - 51.0 %    Oxyhemoglobin 89.2 (L) 94 - 99 %    Methemoglobin 0.80 0.00 - 3.00 %    Carboxyhemoglobin 0.9 0 - 5 %    A-a Gradiant 40.3 0.0 - 300.0 mmHg    CO2 Content 28.1 22 - 33 mmol/L    Temperature 0.0 C    Barometric Pressure for Blood Gas 733 mmHg    Modality Room Air     FIO2 21 %    Ventilator Mode NA     Note      Collected by [id number]     pH, Temp Corrected      pCO2, Temperature Corrected      pO2, Temperature Corrected     Comprehensive Metabolic Panel   Result Value Ref Range    Glucose 179 (H) 65 - 99 mg/dL    BUN 18 8 - 23 mg/dL    Creatinine 0.89 0.57 - 1.00 mg/dL    Sodium 139 136 - 145 mmol/L    Potassium 3.8 3.5 - 5.2 mmol/L    Chloride 100 98 - 107 mmol/L    CO2 25.9 22.0 - 29.0 mmol/L    Calcium 9.1 8.6 - 10.5 mg/dL    Total Protein 7.5  6.0 - 8.5 g/dL    Albumin 3.63 3.50 - 5.20 g/dL    ALT (SGPT) 16 1 - 33 U/L    AST (SGOT) 22 1 - 32 U/L    Alkaline Phosphatase 145 (H) 39 - 117 U/L    Total Bilirubin 0.3 0.2 - 1.2 mg/dL    eGFR Non African Amer 62 >60 mL/min/1.73    Globulin 3.9 gm/dL    A/G Ratio 0.9 g/dL    BUN/Creatinine Ratio 20.2 7.0 - 25.0    Anion Gap 13.1 5.0 - 15.0 mmol/L   BNP   Result Value Ref Range    proBNP 269.7 5.0-1,800.0 pg/mL   Troponin   Result Value Ref Range    Troponin T <0.010 0.000 - 0.030 ng/mL   Lactic Acid, Plasma   Result Value Ref Range    Lactate 2.6 (C) 0.5 - 2.0 mmol/L   D-dimer, Quantitative   Result Value Ref Range    D-Dimer, Quantitative 3.98 (C) 0.00 - 0.50 MCGFEU/mL   CBC Auto Differential   Result Value Ref Range    WBC 27.36 (H) 3.40 - 10.80 10*3/mm3    RBC 3.94 3.77 - 5.28 10*6/mm3    Hemoglobin 11.8 (L) 12.0 - 15.9 g/dL    Hematocrit 35.8 34.0 - 46.6 %    MCV 90.9 79.0 - 97.0 fL    MCH 29.9 26.6 - 33.0 pg    MCHC 33.0 31.5 - 35.7 g/dL    RDW 13.1 12.3 - 15.4 %    RDW-SD 43.6 37.0 - 54.0 fl    MPV 9.3 6.0 - 12.0 fL    Platelets 211 140 - 450 10*3/mm3    Neutrophil % 88.6 (H) 42.7 - 76.0 %    Lymphocyte % 2.1 (L) 19.6 - 45.3 %    Monocyte % 8.4 5.0 - 12.0 %    Eosinophil % 0.1 (L) 0.3 - 6.2 %    Basophil % 0.1 0.0 - 1.5 %    Immature Grans % 0.7 (H) 0.0 - 0.5 %    Neutrophils, Absolute 24.22 (H) 1.70 - 7.00 10*3/mm3    Lymphocytes, Absolute 0.57 (L) 0.70 - 3.10 10*3/mm3    Monocytes, Absolute 2.30 (H) 0.10 - 0.90 10*3/mm3    Eosinophils, Absolute 0.03 0.00 - 0.40 10*3/mm3    Basophils, Absolute 0.04 0.00 - 0.20 10*3/mm3    Immature Grans, Absolute 0.20 (H) 0.00 - 0.05 10*3/mm3    nRBC 0.0 0.0 - 0.2 /100 WBC   Lactic Acid, Reflex Timer (This will reflex a repeat order 3-3:15 hours after ordered.)   Result Value Ref Range    Extra Tube Hold for add-ons.    Urinalysis With Culture If Indicated - Urine, Catheter In/Out   Result Value Ref Range    Color, UA Yellow Yellow, Straw    Appearance, UA Turbid (A) Clear     pH, UA 6.0 5.0 - 8.0    Specific Gravity, UA 1.019 1.005 - 1.030    Glucose, UA Negative Negative    Ketones, UA Negative Negative    Bilirubin, UA Negative Negative    Blood, UA Moderate (2+) (A) Negative    Protein,  mg/dL (2+) (A) Negative    Leuk Esterase, UA Large (3+) (A) Negative    Nitrite, UA Positive (A) Negative    Urobilinogen, UA 1.0 E.U./dL 0.2 - 1.0 E.U./dL   Urinalysis, Microscopic Only - Urine, Catheter In/Out   Result Value Ref Range    RBC, UA 3-5 (A) None Seen, 0-2 /HPF    WBC, UA Too Numerous to Count (A) None Seen, 0-2 /HPF    Bacteria, UA 2+ (A) None Seen /HPF    Squamous Epithelial Cells, UA 3-6 (A) None Seen, 0-2 /HPF    Hyaline Casts, UA None Seen None Seen /LPF    Methodology Manual Light Microscopy               ED Course  ED Course as of Nov 05 0554   Tue Nov 05, 2019   0508 Discussed with Dr. Hardin.  Patient admitted, see orders.  [BC]      ED Course User Index  [BC] Galo Rodgers MD                  MDM  Number of Diagnoses or Management Options  Hypoxia:   Sepsis secondary to UTI (CMS/HCC):   Urinary tract infection without hematuria, site unspecified:      Amount and/or Complexity of Data Reviewed  Clinical lab tests: reviewed  Tests in the radiology section of CPT®: reviewed  Tests in the medicine section of CPT®: reviewed  Decide to obtain previous medical records or to obtain history from someone other than the patient: yes        Final diagnoses:   Sepsis secondary to UTI (CMS/HCC)   Urinary tract infection without hematuria, site unspecified   Hypoxia              Galo Rodgers MD  11/05/19 0597

## 2019-11-05 NOTE — H&P
"Hospitalist History and Physical    Patient Identification:  Name: Domonique Cummings  Age/Sex: 74 y.o. female  :  1945  MRN: [id number]         Primary Care Physician: Skinny Meehan MD    Chief Complaint   Patient presents with   • Shortness of Breath   • Chest Pain       History of Present Illness  Patient is a 74 y.o. female presents with the following: Shortness of air reported at the nursing home    The patient is a 74-year-old female, nursing home resident with past medical history significant for obstructive sleep apnea, noncompliant with CPAP, arthritis, hypertension and GERD who presents from the nursing home with reports of shortness of air.    Patient is currently sleeping soundly when examined in ER room 17. One of the patient's daughter's is present at bedside and provides history.  Patient apparently reported \"feeling bad\" yesterday morning at the nursing home.  The patient's daughter states that the patient will have frequent cough and has history of aspiration with esophageal stricture requiring dilatation x2.    The patient's daughter does not report any known fevers or chills.  The patient's daughter states that she thinks that the patient was given antibiotics approximately 2 weeks ago for a UTI.  She states that her mother has not reported any malodorous urine.  She does not report any frequency.  Patient's mother reported that her bowel movements have been normal per her daughter's report.  The patient has not had any recent falls.  The patient's daughter states that she has only been complaining of her normal \"aches and pains.\"  Patient does use a walker when ambulatory in her room at the nursing home.  Otherwise, for long distances she uses a wheelchair.    The patient's daughter does state that the patient reported that she has had some crampy abdominal pains recently that the patient thinks may be related to eating.  Otherwise, the patient has been in her usual state of health.  " The patient's daughter states that she has been a nursing home resident for approximately 1.5 years.    Emergency department, the patient's oxygen level was 89% via room air.  Temperature was 100.2 °F.  Heart rate 103, respiratory rate 24.  Blood pressure 148/86.  Arterial blood gas revealed a pH of 7.435, PCO2 40.1, PO2 58.5 with an oxygen saturation of 90.7% via room air.  Patient had a negative troponin T level and a negative proBNP level.  Her CMP was remarkable for glucose of 179 and alkaline phosphatase level of 145.  Her lactate was 2.6.  CBC was remarkable for white blood cell count of 27.36 with 88.6% neutrophils and 24.22 absolute neutrophils.  Urinalysis reveals moderate blood, positive nitrites and large leukocyte esterase with too numerous to count white blood cells and 2+ bacteria with only 3-6 squamous epithelial cells.  Patient's d-dimer was elevated so a CT scan of her chest with PE protocol was obtained in the emergency department.  The CT scan revealed some dependent atelectasis but was negative for infiltrate and also negative for pulmonary embolism.    Patient is to be admitted to the medical surgical floor with telemetry.    Past History:  Past Medical History:   Diagnosis Date   • Arthritis    • Asthma    • Degenerative disc disease, lumbar    • Diabetes mellitus (CMS/HCC)    • Fibromyalgia    • GERD (gastroesophageal reflux disease)    • Hyperlipidemia    • Hypertension    • ENE (obstructive sleep apnea)     Noncompliant with BiPAP/CPAP   • Osteoporosis      Past Surgical History:   Procedure Laterality Date   • APPENDECTOMY     • CARDIAC CATHETERIZATION N/A 10/30/2018    Procedure: Left Heart Cath;  Surgeon: Arturo Inman MD;  Location: [facility] CATH INVASIVE LOCATION;  Service: Cardiology   • ESOPHAGEAL DILATATION     • TOTAL SHOULDER REPLACEMENT Bilateral    • TUBAL ABDOMINAL LIGATION       Family History   Problem Relation Age of Onset   • Breast cancer Sister      Social History      Tobacco Use   • Smoking status: Former Smoker   • Smokeless tobacco: Never Used   • Tobacco comment: She quit at least 30 years ago.   Substance Use Topics   • Alcohol use: No   • Drug use: No     Medications Prior to Admission   Medication Sig Dispense Refill Last Dose   • acetaminophen-codeine (TYLENOL #3) 300-30 MG per tablet Take 1 tablet by mouth 2 (Two) Times a Day As Needed for Moderate Pain . 60 tablet 0 Taking   • ALPRAZolam (XANAX) 0.5 MG tablet Take 0.5 mg by mouth 2 (Two) Times a Day As Needed.   Taking   • amLODIPine (NORVASC) 10 MG tablet Take 1 tablet by mouth Daily. 30 tablet 3 Taking   • Buprenorphine (BUTRANS) 5 MCG/HR patch weekly transdermal patch Place 1 patch on the skin as directed by provider 1 (One) Time Per Week. 1 patch 0 Taking   • celecoxib (CELEBREX) 200 MG capsule Take 200 mg by mouth Daily.   Taking   • diclofenac (VOLTAREN) 1 % gel gel As Needed.   Taking   • DULoxetine (CYMBALTA) 30 MG capsule Take 3 capsules by mouth Daily. 90 capsule 5    • flecainide (TAMBOCOR) 50 MG tablet Take 1 tablet by mouth Every 12 (Twelve) Hours. 60 tablet 3 Taking   • gabapentin (NEURONTIN) 400 MG capsule Take 400 mg by mouth 3 (Three) Times a Day.   Taking   • loratadine (CLARITIN) 10 MG tablet Take 10 mg by mouth Daily.   Taking   • multivitamin (DAILY DEBORAH) tablet tablet Take 1 tablet by mouth Daily.   Taking   • OLANZapine (zyPREXA) 2.5 MG tablet Take 1 tablet by mouth Every Night. 30 tablet 1 Taking   • oxybutynin XL (DITROPAN-XL) 10 MG 24 hr tablet Take 10 mg by mouth Daily.   Taking   • polyethylene glycol (MIRALAX) packet Take 17 g by mouth Daily.   Taking   • raNITIdine (ZANTAC) 300 MG tablet Take 1 tablet by mouth 2 (Two) Times a Day. 60 tablet 2 Taking   • topiramate (TOPAMAX) 100 MG tablet Take 100 mg by mouth 2 (Two) Times a Day.   Taking     Allergies: Biaxin [clarithromycin]    Review of Systems:  Review of Systems   Unable to perform ROS: Other   Respiratory: Positive for cough and  choking.    Gastrointestinal: Positive for abdominal pain.   Genitourinary: Negative for difficulty urinating, dysuria and urgency.   Musculoskeletal: Positive for arthralgias, back pain and myalgias.     Sleeping soundly; ROS obtained from daughter     Vital Signs  Temp:  [98.8 °F (37.1 °C)-100.2 °F (37.9 °C)] 98.8 °F (37.1 °C)  Heart Rate:  [] 89  Resp:  [20-24] 20  BP: (133-148)/(81-92) 133/86  Body mass index is 40.9 kg/m².    Physical Exam:  Physical Exam   Constitutional: She appears well-developed and well-nourished. She is sleeping. No distress. Nasal cannula in place.   HENT:   Head: Normocephalic and atraumatic.   Mouth/Throat: Oropharynx is clear and moist. Mucous membranes are dry.   Neck: Neck supple. No tracheal deviation present.   Cardiovascular: Normal rate and regular rhythm. Exam reveals no gallop and no friction rub.   Murmur heard.   Systolic murmur is present with a grade of 2/6.  Pulses:       Posterior tibial pulses are 1+ on the right side, and 1+ on the left side.   Pulmonary/Chest: Accessory muscle usage present. No respiratory distress. She has decreased breath sounds in the right lower field and the left lower field. She has no wheezes. She has no rales.   Abdominal: Soft. She exhibits no distension. Bowel sounds are decreased. There is no tenderness. There is no guarding.   Musculoskeletal: She exhibits no tenderness.   Neurological:   Unable to assess.   Skin: Skin is warm and dry. No rash noted. No erythema.      Results Review:    Results from last 7 days   Lab Units 11/05/19  0130   PH, ARTERIAL pH units 7.435   PO2 ART mm Hg 58.5*   PCO2, ARTERIAL mm Hg 40.1   HCO3 ART mmol/L 26.9*       Results from last 7 days   Lab Units 11/05/19  0213   WBC 10*3/mm3 27.36*   HEMOGLOBIN g/dL 11.8*   HEMATOCRIT % 35.8   PLATELETS 10*3/mm3 211     Results from last 7 days   Lab Units 11/05/19  0213   SODIUM mmol/L 139   POTASSIUM mmol/L 3.8   CHLORIDE mmol/L 100   CO2 mmol/L 25.9   BUN mg/dL  18   CREATININE mg/dL 0.89   CALCIUM mg/dL 9.1   GLUCOSE mg/dL 179*     Results from last 7 days   Lab Units 11/05/19  0213   BILIRUBIN mg/dL 0.3   ALK PHOS U/L 145*   AST (SGOT) U/L 22   ALT (SGPT) U/L 16             Results from last 7 days   Lab Units 11/05/19  0213   TROPONIN T ng/mL <0.010         Imaging:    I have personally reviewed the EKG. pending official cardiology interpretation, however, per my view the patient has sinus tachycardia without acute ST-T wave changes.  Patient with occasional PACs.  Patient's  ms.    CT images have been reviewed.  Patient does not have any obvious infiltrates.  There is some at least bilateral atelectasis noted.  No large pulmonary emboli.    Imaging Results (Most Recent)     Procedure Component Value Units Date/Time    CT Chest Pulmonary Embolism [[id number]] Collected:  11/05/19 0436     Updated:  11/05/19 0438    Narrative:       INDICATION:   Right-sided chest pain and elevated d-dimer    TECHNIQUE:   CT angiogram of the chest with contrast. 3-D reformatted images were acquired.  Radiation dose reduction techniques included automated exposure control or exposure modulation based on body size. Radiation audit for number of CT and nuclear cardiology  exams performed in the last year:  2.      COMPARISON:   10/19/2018 chest CT    FINDINGS:   There is bilateral dependent atelectasis, but there is no consolidation or effusion or pneumothorax. There is no mediastinal mass. Images of the upper abdomen are unremarkable. The thoracic aorta is normal.    Bolus timing is adequate and there is no evidence of pulmonary embolism.    There are spinal degenerative changes but there is no acute bony abnormality.      Impression:         1. Adequate bolus timing.    2. No evidence of pulmonary embolism.    3. There is some dependent atelectasis but there is no acute pulmonary parenchymal process.    Signer Name: Jesse Arce MD   Signed: 11/5/2019 4:36 AM   Workstation Name:  [facility]    XR Chest 1 View [[id number]] Collected:  11/05/19 0432     Updated:  11/05/19 0434    Narrative:       CR Chest 1 Vw    INDICATION:   Short of air     COMPARISON:    10/18/2018    FINDINGS:  Single portable AP view(s) of the chest.    There is mild cardiomegaly, but there is no consolidation or effusion or pneumothorax.      Impression:       No acute cardiopulmonary findings, no interval change.    Signer Name: Jesse Arce MD   Signed: 11/5/2019 4:32 AM   Workstation Name: [facility]    US Gallbladder [[id number]] Collected:  11/05/19 0214     Updated:  11/05/19 0216    Narrative:       INDICATION:   Right upper quadrant abdominal pain    COMPARISON:   None available.    FINDINGS:  Suboptimal visualization, poor sonographic window.    Limited images of the gallbladder are grossly unremarkable without evidence of wall thickening or pericholecystic fluid. There is no evidence of cholelithiasis.    Common duct is normal in caliber.      Impression:       Limited due to body habitus, no convincing acute abnormality.    Signer Name: Jesse Arce MD   Signed: 11/5/2019 2:14 AM   Workstation Name: [facility]    [facility]          Assessment/Plan     -Severe sepsis, present upon admission with leukocytosis, tachycardia and lactate level of 2.6, likely secondary to an acute UTI with microscopic hematuria: Patient has been admitted to the medical surgical floor with telemetry.  I have started the patient on high-dose IV Rocephin while awaiting final urine culture results.  Trend lactic level until it has normalized.    -Cough with acute on chronic hypoxic respiratory failure in the setting of obstructive sleep apnea, noncompliant with CPAP: Patient with elevated d-dimer, however, her CT PE protocol was negative for pulmonary embolus.  Patient has been admitted with aspiration precautions and is  currently nothing by mouth while awaiting a speech therapy evaluation.  Continue with oxygen therapy and titrate for saturations greater than 90%.  Continue to monitor the patient's respiratory closely.  Should she have a productive cough, then will obtain a respiratory culture.  Once patient more awake will go ahead and order incentive spirometry.    -History of gastroesophageal reflux disease and esophageal stricture requiring dilatation.    -Morbid obesity with questionable obesity hypoventilation syndrome.    -Hyperglycemia with previous history of diabetes mellitus: I have requested a hemoglobin A1c level.    -History of arthritis, lumbar degenerative disc disease and fibromyalgia: Currently awaiting patient's home medication list.    -History of tobacco abuse.    DVT/GI prophylaxis: Subcutaneous heparin/again H2 blocker    Estimated Length of Stay: > 2 MNs    CODE: FULL/CPR    I discussed the patients findings and my recommendations with family      Sandraisaías Hardin DO  11/05/19  6:36 AM

## 2019-11-05 NOTE — PROGRESS NOTES
[facility]    73 yo female admitted earlier today by Dr. Hardin after having shortness of air at the nursing home.  She was found in the ED to have sepsis and a UTI.  I just received a phone call from pharmacy about the blood cultures being positive for E coli UTI.  So far, she still feels ill.  She denies chest pain and denies shortness of air.  She was then asking to use the bed pan and my evaluation was thus cut short.  Will consult ID for the bacteremia and repeat the labs in the morning.    Diagnoses:  -Sepsis that was present on admission (heart rate 103, respiratory rate 24, lactic acid 2.6, WBC 27,360) due to a UTI and E coli bacteremia  -Essential hypertension  -Type 2 diabetes mellitus  -Hyperlipidemia  -ENE, noncompliant with CPAP use at nighttime  -History of esophageal stricture x 2 with history of aspiration    Florence Rivera MD  11/05/19  4:53 PM

## 2019-11-05 NOTE — PLAN OF CARE
Problem: Pain, Chronic (Adult)  Goal: Identify Related Risk Factors and Signs and Symptoms  Outcome: Ongoing (interventions implemented as appropriate)   11/05/19 0648   Pain, Chronic (Adult)   Signs and Symptoms (Chronic Pain) altered time perception;fatigue/weakness     Goal: Acceptable Pain/Comfort Level and Functional Ability  Outcome: Ongoing (interventions implemented as appropriate)      Problem: Fall Risk (Adult)  Goal: Identify Related Risk Factors and Signs and Symptoms  Outcome: Ongoing (interventions implemented as appropriate)    Goal: Absence of Fall  Outcome: Ongoing (interventions implemented as appropriate)      Problem: Skin Injury Risk (Adult)  Goal: Identify Related Risk Factors and Signs and Symptoms  Outcome: Ongoing (interventions implemented as appropriate)    Goal: Skin Health and Integrity  Outcome: Ongoing (interventions implemented as appropriate)      Problem: Urinary Tract Infection (Adult)  Goal: Signs and Symptoms of Listed Potential Problems Will be Absent, Minimized or Managed (Urinary Tract Infection)  Outcome: Ongoing (interventions implemented as appropriate)

## 2019-11-05 NOTE — PLAN OF CARE
Problem: Patient Care Overview  Goal: Plan of Care Review  Outcome: Ongoing (interventions implemented as appropriate)   11/05/19 1495   Coping/Psychosocial   Plan of Care Reviewed With patient   Plan of Care Review   Progress improving   OTHER   Outcome Summary MBS completed this pm. Silent aspiraiton w/ thin liquids via straw only. Mulltiple presentations w/ thin liquids via cup w/ fleeting laryngeal penetration only. Will modify po diet to mechanical soft, chopped w/ thin liquids. NO STRAWS.

## 2019-11-05 NOTE — PROGRESS NOTES
Notified by microbiology of + BCID - blood for e.coli.  Dr. Rivera notified and ID consult initiated.  Will follow.

## 2019-11-06 LAB
ALBUMIN SERPL-MCNC: 2.84 G/DL (ref 3.5–5.2)
ALBUMIN/GLOB SERPL: 0.7 G/DL
ALP SERPL-CCNC: 128 U/L (ref 39–117)
ALT SERPL W P-5'-P-CCNC: 14 U/L (ref 1–33)
ANION GAP SERPL CALCULATED.3IONS-SCNC: 10.1 MMOL/L (ref 5–15)
AST SERPL-CCNC: 24 U/L (ref 1–32)
BASOPHILS # BLD AUTO: 0.04 10*3/MM3 (ref 0–0.2)
BASOPHILS NFR BLD AUTO: 0.2 % (ref 0–1.5)
BILIRUB SERPL-MCNC: 0.2 MG/DL (ref 0.2–1.2)
BUN BLD-MCNC: 11 MG/DL (ref 8–23)
BUN/CREAT SERPL: 16.9 (ref 7–25)
CALCIUM SPEC-SCNC: 8.7 MG/DL (ref 8.6–10.5)
CHLORIDE SERPL-SCNC: 105 MMOL/L (ref 98–107)
CO2 SERPL-SCNC: 25.9 MMOL/L (ref 22–29)
CREAT BLD-MCNC: 0.65 MG/DL (ref 0.57–1)
CRP SERPL-MCNC: 19.09 MG/DL (ref 0–0.5)
DEPRECATED RDW RBC AUTO: 48.2 FL (ref 37–54)
EOSINOPHIL # BLD AUTO: 0.08 10*3/MM3 (ref 0–0.4)
EOSINOPHIL NFR BLD AUTO: 0.4 % (ref 0.3–6.2)
ERYTHROCYTE [DISTWIDTH] IN BLOOD BY AUTOMATED COUNT: 13.6 % (ref 12.3–15.4)
GFR SERPL CREATININE-BSD FRML MDRD: 89 ML/MIN/1.73
GLOBULIN UR ELPH-MCNC: 4.3 GM/DL
GLUCOSE BLD-MCNC: 118 MG/DL (ref 65–99)
HCT VFR BLD AUTO: 35.6 % (ref 34–46.6)
HGB BLD-MCNC: 11 G/DL (ref 12–15.9)
IMM GRANULOCYTES # BLD AUTO: 0.1 10*3/MM3 (ref 0–0.05)
IMM GRANULOCYTES NFR BLD AUTO: 0.5 % (ref 0–0.5)
LYMPHOCYTES # BLD AUTO: 2.23 10*3/MM3 (ref 0.7–3.1)
LYMPHOCYTES NFR BLD AUTO: 10.5 % (ref 19.6–45.3)
MAGNESIUM SERPL-MCNC: 2.2 MG/DL (ref 1.6–2.4)
MCH RBC QN AUTO: 29.7 PG (ref 26.6–33)
MCHC RBC AUTO-ENTMCNC: 30.9 G/DL (ref 31.5–35.7)
MCV RBC AUTO: 96.2 FL (ref 79–97)
MONOCYTES # BLD AUTO: 2.6 10*3/MM3 (ref 0.1–0.9)
MONOCYTES NFR BLD AUTO: 12.2 % (ref 5–12)
NEUTROPHILS # BLD AUTO: 16.18 10*3/MM3 (ref 1.7–7)
NEUTROPHILS NFR BLD AUTO: 76.2 % (ref 42.7–76)
NRBC BLD AUTO-RTO: 0 /100 WBC (ref 0–0.2)
PHOSPHATE SERPL-MCNC: 3.2 MG/DL (ref 2.5–4.5)
PLATELET # BLD AUTO: 153 10*3/MM3 (ref 140–450)
PMV BLD AUTO: 9.9 FL (ref 6–12)
POTASSIUM BLD-SCNC: 3.9 MMOL/L (ref 3.5–5.2)
PROT SERPL-MCNC: 7.1 G/DL (ref 6–8.5)
RBC # BLD AUTO: 3.7 10*6/MM3 (ref 3.77–5.28)
SODIUM BLD-SCNC: 141 MMOL/L (ref 136–145)
WBC NRBC COR # BLD: 21.23 10*3/MM3 (ref 3.4–10.8)

## 2019-11-06 PROCEDURE — 85025 COMPLETE CBC W/AUTO DIFF WBC: CPT | Performed by: INTERNAL MEDICINE

## 2019-11-06 PROCEDURE — 94799 UNLISTED PULMONARY SVC/PX: CPT

## 2019-11-06 PROCEDURE — 83735 ASSAY OF MAGNESIUM: CPT | Performed by: INTERNAL MEDICINE

## 2019-11-06 PROCEDURE — 80053 COMPREHEN METABOLIC PANEL: CPT | Performed by: INTERNAL MEDICINE

## 2019-11-06 PROCEDURE — 84100 ASSAY OF PHOSPHORUS: CPT | Performed by: INTERNAL MEDICINE

## 2019-11-06 PROCEDURE — 25010000002 CEFTRIAXONE: Performed by: INTERNAL MEDICINE

## 2019-11-06 PROCEDURE — 99232 SBSQ HOSP IP/OBS MODERATE 35: CPT | Performed by: INTERNAL MEDICINE

## 2019-11-06 PROCEDURE — 25010000002 HEPARIN (PORCINE) PER 1000 UNITS: Performed by: INTERNAL MEDICINE

## 2019-11-06 PROCEDURE — 92526 ORAL FUNCTION THERAPY: CPT | Performed by: SPEECH-LANGUAGE PATHOLOGIST

## 2019-11-06 PROCEDURE — 86140 C-REACTIVE PROTEIN: CPT | Performed by: INTERNAL MEDICINE

## 2019-11-06 PROCEDURE — 87040 BLOOD CULTURE FOR BACTERIA: CPT | Performed by: NURSE PRACTITIONER

## 2019-11-06 RX ORDER — FUROSEMIDE 20 MG/1
20 TABLET ORAL DAILY
Status: DISCONTINUED | OUTPATIENT
Start: 2019-11-06 | End: 2019-11-07 | Stop reason: HOSPADM

## 2019-11-06 RX ADMIN — GABAPENTIN 400 MG: 400 CAPSULE ORAL at 05:58

## 2019-11-06 RX ADMIN — FLUTICASONE PROPIONATE 1 SPRAY: 50 SPRAY, METERED NASAL at 08:22

## 2019-11-06 RX ADMIN — FAMOTIDINE 20 MG: 20 TABLET, FILM COATED ORAL at 08:21

## 2019-11-06 RX ADMIN — LEVETIRACETAM 500 MG: 500 TABLET, FILM COATED ORAL at 20:11

## 2019-11-06 RX ADMIN — SODIUM CHLORIDE, PRESERVATIVE FREE 10 ML: 5 INJECTION INTRAVENOUS at 08:22

## 2019-11-06 RX ADMIN — FAMOTIDINE 20 MG: 20 TABLET, FILM COATED ORAL at 16:51

## 2019-11-06 RX ADMIN — I-VITE, TAB 1000-60-2MG (60/BT) 1 TABLET: TAB at 08:22

## 2019-11-06 RX ADMIN — ALPRAZOLAM 0.5 MG: 0.5 TABLET ORAL at 08:21

## 2019-11-06 RX ADMIN — SODIUM CHLORIDE, PRESERVATIVE FREE 10 ML: 5 INJECTION INTRAVENOUS at 20:10

## 2019-11-06 RX ADMIN — GABAPENTIN 400 MG: 400 CAPSULE ORAL at 22:06

## 2019-11-06 RX ADMIN — FLECAINIDE ACETATE 50 MG: 50 TABLET ORAL at 08:21

## 2019-11-06 RX ADMIN — GABAPENTIN 400 MG: 400 CAPSULE ORAL at 13:36

## 2019-11-06 RX ADMIN — Medication 1 TABLET: at 08:21

## 2019-11-06 RX ADMIN — CHOLECALCIFEROL TAB 10 MCG (400 UNIT) 2000 UNITS: 10 TAB at 08:21

## 2019-11-06 RX ADMIN — CETIRIZINE HYDROCHLORIDE 5 MG: 10 TABLET, FILM COATED ORAL at 08:21

## 2019-11-06 RX ADMIN — OLANZAPINE 2.5 MG: 2.5 TABLET, FILM COATED ORAL at 20:11

## 2019-11-06 RX ADMIN — AMLODIPINE BESYLATE 10 MG: 10 TABLET ORAL at 08:21

## 2019-11-06 RX ADMIN — Medication 1 CAPSULE: at 08:21

## 2019-11-06 RX ADMIN — HYDROCODONE BITARTRATE AND ACETAMINOPHEN 1 TABLET: 10; 325 TABLET ORAL at 22:54

## 2019-11-06 RX ADMIN — HEPARIN SODIUM 5000 UNITS: 5000 INJECTION INTRAVENOUS; SUBCUTANEOUS at 08:22

## 2019-11-06 RX ADMIN — FLECAINIDE ACETATE 50 MG: 50 TABLET ORAL at 20:11

## 2019-11-06 RX ADMIN — DULOXETINE HYDROCHLORIDE 90 MG: 60 CAPSULE, DELAYED RELEASE ORAL at 08:22

## 2019-11-06 RX ADMIN — OXYBUTYNIN CHLORIDE 10 MG: 5 TABLET, EXTENDED RELEASE ORAL at 08:21

## 2019-11-06 RX ADMIN — HYDROCODONE BITARTRATE AND ACETAMINOPHEN 1 TABLET: 10; 325 TABLET ORAL at 02:20

## 2019-11-06 RX ADMIN — LACTULOSE 20 G: 10 SOLUTION ORAL at 08:22

## 2019-11-06 RX ADMIN — FUROSEMIDE 20 MG: 20 TABLET ORAL at 16:51

## 2019-11-06 RX ADMIN — ALPRAZOLAM 0.5 MG: 0.5 TABLET ORAL at 20:11

## 2019-11-06 RX ADMIN — HEPARIN SODIUM 5000 UNITS: 5000 INJECTION INTRAVENOUS; SUBCUTANEOUS at 20:11

## 2019-11-06 RX ADMIN — I-VITE, TAB 1000-60-2MG (60/BT) 1 TABLET: TAB at 20:10

## 2019-11-06 RX ADMIN — CEFTRIAXONE 2 G: 2 INJECTION, POWDER, FOR SOLUTION INTRAMUSCULAR; INTRAVENOUS at 05:58

## 2019-11-06 NOTE — CONSULTS
INFECTIOUS DISEASE CONSULTATION REPORT        Patient Identification:  Name:  Domonique Cummings  Age:  74 y.o.  Sex:  female  :  1945  MRN:  [id number]   Visit Number:  [id number]  Primary Care Physician:  Skinny Meehan MD         Subjective       Subjective     History of present illness:      Thank you Dr. Rivera for allowing us to participate in the care of your patient.  As you well know, Ms. Domonique Cummings is a 74 y.o. female nursing home resident with past medical history significant for Obstructive sleep apnea, arthritis, hypertension, GERD, who presented to [facility] on 2019 for shortness of air. WBC improving at 21.23. CRP 19.09. Lactic acid 2.6 on admission, now normalized. Urinalysis positive with culture pending. Blood cultures from 19 2 out of 2 sets positive for E. Coli. Chest x-ray unremarkable. CT of chest reports no evidence of pulmonary embolism. Afebrile, no diarrhea.      Infectious Disease consultation was requested for antimicrobial management.      ---------------------------------------------------------------------------------------------------------------------     Review Of Systems:    Constitutional: no fever, chills and night sweats. No appetite change or unexpected weight change. No fatigue.  Eyes: no eye drainage, itching or redness.  HEENT: no mouth sores, dysphagia or nose bleed.  Respiratory: no for shortness of breath, cough or production of sputum.  Cardiovascular: no chest pain, no palpitations, no orthopnea.  Gastrointestinal: no nausea, vomiting or diarrhea. No abdominal pain, hematemesis or rectal bleeding.  Genitourinary: no dysuria or polyuria.  Hematologic/lymphatic: no lymph node abnormalities, no easy bruising or easy bleeding.  Musculoskeletal: no muscle or joint pain.  Skin: No rash and no itching.  Neurological: no loss of consciousness, no seizure, no headache.  Behavioral/Psych: no depression or  suicidal ideation.  Endocrine: no hot flashes.  Immunologic: negative.    ---------------------------------------------------------------------------------------------------------------------     Past Medical History    Past Medical History:   Diagnosis Date   • Arthritis    • Asthma    • Degenerative disc disease, lumbar    • Diabetes mellitus (CMS/[facility])    • Fibromyalgia    • GERD (gastroesophageal reflux disease)    • Hyperlipidemia    • Hypertension    • ENE (obstructive sleep apnea)     Noncompliant with BiPAP/CPAP   • Osteoporosis        Past Surgical History    Past Surgical History:   Procedure Laterality Date   • APPENDECTOMY     • CARDIAC CATHETERIZATION N/A 10/30/2018    Procedure: Left Heart Cath;  Surgeon: Arturo Inman MD;  Location: [facility] CATH INVASIVE LOCATION;  Service: Cardiology   • ESOPHAGEAL DILATATION     • TOTAL SHOULDER REPLACEMENT Bilateral    • TUBAL ABDOMINAL LIGATION         Family History    Family History   Problem Relation Age of Onset   • Breast cancer Sister        Social History    Social History     Tobacco Use   • Smoking status: Former Smoker   • Smokeless tobacco: Never Used   • Tobacco comment: She quit at least 30 years ago.   Substance Use Topics   • Alcohol use: No   • Drug use: No       Allergies    Biaxin [clarithromycin]  ---------------------------------------------------------------------------------------------------------------------     Home Medications:    Prior to Admission Medications     Prescriptions Last Dose Informant Patient Reported? Taking?    ALPRAZolam (XANAX) 0.5 MG tablet 11/4/2019 Nursing Home Yes Yes    Take 0.5 mg by mouth Every 12 (Twelve) Hours.    amLODIPine (NORVASC) 10 MG tablet 11/4/2019 Nursing Home No Yes    Take 1 tablet by mouth Daily.    celecoxib (CELEBREX) 200 MG capsule 11/4/2019 Nursing Home Yes Yes    Take 200 mg by mouth Daily.    Cholecalciferol (VITAMIN D) 50 MCG (2000 UT) tablet 11/4/2019 Nursing Home Yes Yes    Take 2,000  Units by mouth Daily.    DULoxetine (CYMBALTA) 30 MG capsule 11/4/2019 [facility] No Yes    Take 3 capsules by mouth Daily.    flecainide (TAMBOCOR) 50 MG tablet 11/4/2019 Nursing Home No Yes    Take 1 tablet by mouth Every 12 (Twelve) Hours.    fluticasone (FLONASE) 50 MCG/ACT nasal spray 11/4/2019 Nursing Home Yes Yes    1 spray into the nostril(s) as directed by provider Daily.    furosemide (LASIX) 20 MG tablet 11/4/2019 Nursing Home Yes Yes    Take 20 mg by mouth Daily.    gabapentin (NEURONTIN) 400 MG capsule 11/4/2019 Nursing Home Yes Yes    Take 400 mg by mouth 3 (Three) Times a Day.    lactulose (CHRONULAC) 10 GM/15ML solution 11/4/2019 Nursing Home Yes Yes    Take 20 g by mouth Daily.    levETIRAcetam (KEPPRA) 500 MG tablet 11/4/2019 Nursing Home Yes Yes    Take 500 mg by mouth Every Night.    loratadine (CLARITIN) 10 MG tablet 11/4/2019 Nursing Home Yes Yes    Take 10 mg by mouth Daily.    Multiple Vitamins-Minerals (ICAPS AREDS 2) capsule 11/4/2019 Nursing Home Yes Yes    Take 1 capsule by mouth 2 (Two) Times a Day.    multivitamin (DAILY DEBORAH) tablet tablet 11/4/2019 Nursing Home Yes Yes    Take 1 tablet by mouth Daily.    OLANZapine (zyPREXA) 2.5 MG tablet 11/4/2019 Nursing Home No Yes    Take 1 tablet by mouth Every Night.    oxybutynin XL (DITROPAN-XL) 10 MG 24 hr tablet 11/4/2019 Nursing Home Yes Yes    Take 10 mg by mouth Daily.    potassium chloride (K-DUR,KLOR-CON) 10 MEQ CR tablet 11/4/2019 Nursing Home Yes Yes    Take 10 mEq by mouth Daily.    Probiotic Product (RISAQUAD-2) capsule capsule 11/4/2019 Nursing Home Yes Yes    Take 1 capsule by mouth Daily.    Psyllium (REGULOID) 400 MG capsule 11/4/2019 Nursing Home Yes Yes    Take 2 capsules by mouth Daily.    raNITIdine (ZANTAC) 300 MG tablet 11/4/2019 Nursing Home No Yes    Take 1 tablet by mouth 2 (Two) Times a Day.    calcium carbonate (TUMS) 500 MG chewable tablet Unknown Nursing Home Yes No    Chew 1 tablet Every 6 (Six) Hours As Needed  for Indigestion or Heartburn.    cycloSPORINE (RESTASIS) 0.05 % ophthalmic emulsion Unknown Nursing Home Yes No    Administer 1 drop to both eyes 2 (Two) Times a Day As Needed (dry eye).    dextromethorphan-guaifenesin (ROBITUSSIN-DM)  MG/5ML syrup Unknown Nursing Home Yes No    Take 10 mL by mouth Every 6 (Six) Hours As Needed (cough).    HYDROcodone-acetaminophen (NORCO)  MG per tablet Unknown Nursing Home Yes No    Take 1 tablet by mouth Every 8 (Eight) Hours As Needed for Moderate Pain .    ipratropium-albuterol (DUO-NEB) 0.5-2.5 mg/3 ml nebulizer Unknown Nursing Home Yes No    Take 3 mL by nebulization Every 6 (Six) Hours As Needed for Wheezing.    ondansetron ODT (ZOFRAN-ODT) 4 MG disintegrating tablet Unknown Nursing Home Yes No    Take 4 mg by mouth Every 8 (Eight) Hours As Needed for Nausea or Vomiting.        ---------------------------------------------------------------------------------------------------------------------    Objective       Objective     Hospital Scheduled Meds:    ALPRAZolam 0.5 mg Oral Q12H   amLODIPine 10 mg Oral Q24H   ceftriaxone 2 g Intravenous Q24H   cetirizine 5 mg Oral Daily   cholecalciferol 2,000 Units Oral Daily   DULoxetine 90 mg Oral Daily   famotidine 20 mg Oral BID AC   flecainide 50 mg Oral Q12H   fluticasone 1 spray Each Nare Daily   gabapentin 400 mg Oral Q8H   heparin (porcine) 5,000 Units Subcutaneous Q12H   I-sophia 1 tablet Oral BID   lactobacillus acidophilus 1 capsule Oral Daily   lactulose 20 g Oral Daily   levETIRAcetam 500 mg Oral Nightly   multivitamin 1 tablet Oral Daily   OLANZapine 2.5 mg Oral Nightly   oxybutynin XL 10 mg Oral Daily   sodium chloride 10 mL Intravenous Q12H        ---------------------------------------------------------------------------------------------------------------------   Vital Signs:  Temp:  [98.1 °F (36.7 °C)-98.9 °F (37.2 °C)] 98.5 °F (36.9 °C)  Heart Rate:  [72-97] 89  Resp:  [17-20] 20  BP: (118-154)/(69-95)  118/69  Mean Arterial Pressure (Non-Invasive) for the past 24 hrs (Last 3 readings):   Noninvasive MAP (mmHg)   11/05/19 1420 111   11/05/19 1044 103     SpO2 Percentage    11/05/19 2300 11/06/19 0644 11/06/19 0654   SpO2: 97% 95% 95%     SpO2:  [95 %-97 %] 95 %  on  Flow (L/min):  [2] 2;   Device (Oxygen Therapy): nasal cannula    Body mass index is 40.9 kg/m².  Wt Readings from Last 3 Encounters:   11/05/19 110 kg (242 lb)   10/29/19 110 kg (243 lb)   09/03/19 106 kg (234 lb)     ---------------------------------------------------------------------------------------------------------------------     Physical Exam:    Constitutional:  Well-developed and well-nourished.  No respiratory distress. Morbidly obese.   HENT:  Head: Normocephalic and atraumatic.  Mouth:  Moist mucous membranes.    Eyes:  Conjunctivae and EOM are normal.  No scleral icterus.  Neck:  Neck supple.  No JVD present.    Cardiovascular:  Normal rate, regular rhythm and normal heart sounds with no murmur. No edema.  Pulmonary/Chest:  No respiratory distress, no wheezes, no crackles, with diminished lung sounds in the bases.  Abdominal:  Soft.  Bowel sounds are normal.  No distension and no tenderness.   Musculoskeletal:  No edema, no tenderness, and no deformity.  No swelling or redness of joints.  Neurological:  Alert and oriented to person, place, and time.  No facial droop.  No slurred speech.   Skin:  Skin is warm and dry.  No rash noted.  No pallor.   Psychiatric:  Normal mood and affect.  Behavior is normal.    ---------------------------------------------------------------------------------------------------------------------    Results from last 7 days   Lab Units 11/05/19 0213   TROPONIN T ng/mL <0.010     Results from last 7 days   Lab Units 11/05/19 0213   PROBNP pg/mL 269.7       Results from last 7 days   Lab Units 11/05/19  0130   PH, ARTERIAL pH units 7.435   PO2 ART mm Hg 58.5*   PCO2, ARTERIAL mm Hg 40.1   HCO3 ART mmol/L 26.9*      Results from last 7 days   Lab Units 11/06/19  0626 11/05/19  1739 11/05/19  1055 11/05/19  0656 11/05/19  0603 11/05/19  0213   CRP mg/dL 19.09*  --   --   --   --   --    LACTATE mmol/L  --  1.5 1.5  --  1.5 2.6*   WBC 10*3/mm3 21.23*  --   --  28.30*  --  27.36*   HEMOGLOBIN g/dL 11.0*  --   --  11.1*  --  11.8*   HEMATOCRIT % 35.6  --   --  34.5  --  35.8   MCV fL 96.2  --   --  92.7  --  90.9   MCHC g/dL 30.9*  --   --  32.2  --  33.0   PLATELETS 10*3/mm3 153  --   --  201  --  211     Results from last 7 days   Lab Units 11/06/19  0626 11/05/19  0656 11/05/19  0213   SODIUM mmol/L 141 140 139   POTASSIUM mmol/L 3.9 4.3 3.8   MAGNESIUM mg/dL 2.2  --   --    CHLORIDE mmol/L 105 105 100   CO2 mmol/L 25.9 22.8 25.9   BUN mg/dL 11 16 18   CREATININE mg/dL 0.65 0.85 0.89   EGFR IF NONAFRICN AM mL/min/1.73 89 65 62   CALCIUM mg/dL 8.7 8.7 9.1   GLUCOSE mg/dL 118* 145* 179*   ALBUMIN g/dL 2.84* 3.12* 3.63   BILIRUBIN mg/dL 0.2 0.3 0.3   ALK PHOS U/L 128* 123* 145*   AST (SGOT) U/L 24 20 22   ALT (SGPT) U/L 14 14 16   Estimated Creatinine Clearance: 74.3 mL/min (by C-G formula based on SCr of 0.65 mg/dL).  No results found for: AMMONIA    Hemoglobin A1C   Date/Time Value Ref Range Status   11/05/2019 0656 6.40 (H) 4.80 - 5.60 % Final     Lab Results   Component Value Date    HGBA1C 6.40 (H) 11/05/2019     Lab Results   Component Value Date    TSH 1.839 10/19/2018    FREET4 0.77 (L) 06/25/2014       Blood Culture   Date Value Ref Range Status   11/05/2019 Abnormal Stain (A)  Preliminary   11/05/2019 Abnormal Stain (A)  Preliminary                 Pain Management Panel     Pain Management Panel Latest Ref Rng & Units 12/11/2017    AMPHETAMINES SCREEN, URINE Negative Negative    BARBITURATES SCREEN Negative Negative    BENZODIAZEPINE SCREEN, URINE Negative Positive(A)    BUPRENORPHINEUR Negative Negative    COCAINE SCREEN, URINE Negative Negative    METHADONE SCREEN, URINE Negative Negative        I have personally  reviewed the above laboratory results.   ---------------------------------------------------------------------------------------------------------------------  Imaging Results (Last 7 Days)     Procedure Component Value Units Date/Time    FL Video Swallow [[id number]] Collected:  11/05/19 1511     Updated:  11/05/19 1534    Narrative:       FL VIDEO SWALLOW-     HISTORY:  R/O aspiration; A41.9-Sepsis, unspecified organism;  N39.0-Urinary tract infection, site not specified; N39.0-Urinary tract  infection, site not specified; R09.02-Hypoxemia        TECHNIQUE:   Speech therapy service was present. The patient was examined in the  sitting lateral position and was given several consistencies of barium.     FINDINGS: Deep laryngeal penetration w/ thin liquids via straw w/ silent  aspiration occurring during the swallow. Fleeting laryngeal penetration  w/ thin liquids via cup, NO ASPIRATION. Mild diffuse pharyngeal residue.  No other laryngeal penetration or aspiration.     FLUOROSCOPY TIME: 1.75 minutes.     Images: Cine loop was acquired       Impression:       Aspiration with thin liquids during the straw presentation.     For additional information please see the report provided by the speech  therapy service     This report was finalized on 11/5/2019 3:32 PM by Dr. Chuckie Reyez MD.       CT Chest Pulmonary Embolism [[id number]] Collected:  11/05/19 0436     Updated:  11/05/19 0438    Narrative:       INDICATION:   Right-sided chest pain and elevated d-dimer    TECHNIQUE:   CT angiogram of the chest with contrast. 3-D reformatted images were acquired.  Radiation dose reduction techniques included automated exposure control or exposure modulation based on body size. Radiation audit for number of CT and nuclear cardiology  exams performed in the last year:  2.      COMPARISON:   10/19/2018 chest CT    FINDINGS:   There is bilateral dependent atelectasis, but there is no consolidation or effusion or pneumothorax. There  is no mediastinal mass. Images of the upper abdomen are unremarkable. The thoracic aorta is normal.    Bolus timing is adequate and there is no evidence of pulmonary embolism.    There are spinal degenerative changes but there is no acute bony abnormality.      Impression:         1. Adequate bolus timing.    2. No evidence of pulmonary embolism.    3. There is some dependent atelectasis but there is no acute pulmonary parenchymal process.    Signer Name: Jesse Arce MD   Signed: 11/5/2019 4:36 AM   [facility]    XR Chest 1 View [[id number]] Collected:  11/05/19 0432     Updated:  11/05/19 0434    Narrative:       CR Chest 1 Vw    INDICATION:   Short of air     COMPARISON:    10/18/2018    FINDINGS:  Single portable AP view(s) of the chest.    There is mild cardiomegaly, but there is no consolidation or effusion or pneumothorax.      Impression:       No acute cardiopulmonary findings, no interval change.    Signer Name: Jesse Arce MD   Signed: 11/5/2019 4:32 AM   [facility]    US Gallbladder [[id number]] Collected:  11/05/19 0214     Updated:  11/05/19 0216    Narrative:       INDICATION:   Right upper quadrant abdominal pain    COMPARISON:   None available.    FINDINGS:  Suboptimal visualization, poor sonographic window.    Limited images of the gallbladder are grossly unremarkable without evidence of wall thickening or pericholecystic fluid. There is no evidence of cholelithiasis.    Common duct is normal in caliber.      Impression:       Limited due to body habitus, no convincing acute abnormality.    Signer Name: Jesse Arce MD   Signed: 11/5/2019 2:14 AM   [facility]        I have personally reviewed the above radiology results.    ---------------------------------------------------------------------------------------------------------------------      Assessment & Plan        Assessment/Plan       ASSESSMENT:    1. Severe sepsis with lactic acid greater than 2 on admission   2. Acute pyelonephritis    PLAN:    Patient presents with shortness of air. WBC improving at 21.23. CRP 19.09. Lactic acid 2.6 on admission, now normalized. Urinalysis positive with culture pending. Blood cultures from 11/5/19 2 out of 2 sets positive for E. Coli. Chest x-ray unremarkable. CT of chest reports no evidence of pulmonary embolism. Afebrile, no diarrhea.    For now recommend to continue current antibiotic regimen of Rocephin 2gm IV Q24H, pending finalization of culture results. Repeat blood cultures x2 ordered.     We will continue to follow CRP trend and culture results and adjust antibiotic coverage appropriately.    Again, thank you Dr. Rivera for allowing us to participate in the care of your patient and please feel free to call for any questions you may have.        Code Status:   Code Status and Medical Interventions:   Ordered at: 11/05/19 0516     Code Status:    CPR     Medical Interventions (Level of Support Prior to Arrest):    Full           Tasha Cruz, DARREN  11/06/19  9:55 AM

## 2019-11-06 NOTE — PLAN OF CARE
Problem: Pain, Chronic (Adult)  Goal: Identify Related Risk Factors and Signs and Symptoms  Outcome: Ongoing (interventions implemented as appropriate)   11/05/19 0648   Pain, Chronic (Adult)   Signs and Symptoms (Chronic Pain) altered time perception;fatigue/weakness     Goal: Acceptable Pain/Comfort Level and Functional Ability  Outcome: Ongoing (interventions implemented as appropriate)      Problem: Fall Risk (Adult)  Goal: Identify Related Risk Factors and Signs and Symptoms  Outcome: Ongoing (interventions implemented as appropriate)    Goal: Absence of Fall  Outcome: Ongoing (interventions implemented as appropriate)      Problem: Skin Injury Risk (Adult)  Goal: Identify Related Risk Factors and Signs and Symptoms  Outcome: Ongoing (interventions implemented as appropriate)    Goal: Skin Health and Integrity  Outcome: Ongoing (interventions implemented as appropriate)      Problem: Urinary Tract Infection (Adult)  Goal: Signs and Symptoms of Listed Potential Problems Will be Absent, Minimized or Managed (Urinary Tract Infection)  Outcome: Ongoing (interventions implemented as appropriate)      Problem: Patient Care Overview  Goal: Plan of Care Review  Outcome: Ongoing (interventions implemented as appropriate)

## 2019-11-06 NOTE — PROGRESS NOTES
Discharge Planning Assessment   Stu     Patient Name: Domonique Cummings  MRN: [id number]  Today's Date: 11/6/2019    Admit Date: 11/5/2019      Discharge Plan     Row Name 11/06/19 1500       Plan    Plan Pt is a resident at [facility] and had a 14 day skilled bed hold upon admission. SS to follow.         Destination      Service Provider Request Status Selected Services Address Phone Number Fax Number    [facility] Pending - No Request Sent N/A [address]  [place] [address] [phone]     DERREK Ortiz

## 2019-11-06 NOTE — PROGRESS NOTES
[facility] HOSPITALIST PROGRESS NOTE     Patient Identification:  Name:  Domonique Cummings  Age:  74 y.o.  Sex:  female  :  1945  MRN:  [id number]  Visit Number:  [id number]  ROOM: [address] [place] [address]     Primary Care Provider:  Skinny Meehan MD     Date of Admission: 2019    Length of stay in inpatient status:  1    Subjective     Chief Compliant:    Chief Complaint   Patient presents with   • Shortness of Breath   • Chest Pain       History of Presenting Illness:  73 yo female who presented to [facility] ED from the nursing home with shortness of air; she was found to have sepsis due to a UTI.  Blood cultures have grown E coli.  Today, she was eating lunch when I saw her and her daughter was at bedside.  She feels a little better today.  She denies chest pain but does have a cough occasionally that will cause her to produce some sputum that is non bloody.  She denies nausea, denies emesis, denies diarrhea.    Objective     Current Hospital Meds:  ALPRAZolam 0.5 mg Oral Q12H   amLODIPine 10 mg Oral Q24H   ceftriaxone 2 g Intravenous Q24H   cetirizine 5 mg Oral Daily   cholecalciferol 2,000 Units Oral Daily   DULoxetine 90 mg Oral Daily   famotidine 20 mg Oral BID AC   flecainide 50 mg Oral Q12H   fluticasone 1 spray Each Nare Daily   gabapentin 400 mg Oral Q8H   heparin (porcine) 5,000 Units Subcutaneous Q12H   I-sophia 1 tablet Oral BID   lactobacillus acidophilus 1 capsule Oral Daily   lactulose 20 g Oral Daily   levETIRAcetam 500 mg Oral Nightly   multivitamin 1 tablet Oral Daily   OLANZapine 2.5 mg Oral Nightly   oxybutynin XL 10 mg Oral Daily   sodium chloride 10 mL Intravenous Q12H        Current Antimicrobial Therapy:  Anti-Infectives (From admission, onward)    Ordered     Dose/Rate Route Frequency Start Stop    19 0613  cefTRIAXone (ROCEPHIN) 2 g/100 mL 0.9% NS VTB (FERN)     Ordering Provider:  Sandra Hardin, DO    2 g  over 30 Minutes Intravenous Every 24 Hours  11/05/19 0700 11/12/19 0659    11/05/19 0137  levoFLOXacin (LEVAQUIN) 750 mg/150 mL D5W (premix) (LEVAQUIN) 750 mg     Ordering Provider:  Galo Rodgers MD    750 mg  over 90 Minutes Intravenous Once 11/05/19 0139 11/05/19 0448        Current Diuretic Therapy:  Diuretics (From admission, onward)    None        ----------------------------------------------------------------------------------------------------------------------  Vital Signs:  Temp:  [98.1 °F (36.7 °C)-98.8 °F (37.1 °C)] 98.5 °F (36.9 °C)  Heart Rate:  [72-97] 89  Resp:  [17-20] 20  BP: (118-142)/(69-95) 118/69  SpO2:  [95 %-97 %] 95 %  on  Flow (L/min):  [2] 2;   Device (Oxygen Therapy): nasal cannula  Body mass index is 40.9 kg/m².    Wt Readings from Last 3 Encounters:   11/05/19 110 kg (242 lb)   10/29/19 110 kg (243 lb)   09/03/19 106 kg (234 lb)     Intake & Output (last 3 days)       11/03 0701 - 11/04 0700 11/04 0701 - 11/05 0700 11/05 0701 - 11/06 0700 11/06 0701 - 11/07 0700    P.O.   360 480    IV Piggyback  150      Total Intake(mL/kg)  150 (1.4) 360 (3.3) 480 (4.4)    Net  +150 +360 +480            Urine Unmeasured Occurrence   9 x 1 x    Stool Unmeasured Occurrence   3 x         Diet Soft Texture; Chopped; Thin  ----------------------------------------------------------------------------------------------------------------------  Physical Exam   Constitutional: She is oriented to person, place, and time. She appears well-developed and well-nourished. No distress.   HENT:   Head: Normocephalic and atraumatic.   Nose: Nose normal.   Eyes: Conjunctivae and EOM are normal. Pupils are equal, round, and reactive to light. Right eye exhibits no discharge. Left eye exhibits no discharge. No scleral icterus.   Neck: Normal range of motion. Neck supple.   Cardiovascular: Normal rate and regular rhythm.   No murmur heard.  Pulmonary/Chest: Effort normal and breath sounds normal. No respiratory distress.   Abdominal: Soft. Bowel sounds are normal.  She exhibits no distension.   Musculoskeletal: She exhibits no deformity.        Right lower leg: She exhibits edema.        Left lower leg: She exhibits edema.        Right foot: There is swelling.        Left foot: There is swelling.   Neurological: She is alert and oriented to person, place, and time. No cranial nerve deficit.   Skin: Skin is warm and dry. Capillary refill takes less than 2 seconds. No rash noted. No erythema. No pallor.   Psychiatric: She has a normal mood and affect. Her behavior is normal.   The edema mentioned above is minimally pitting and is mild.  ----------------------------------------------------------------------------------------------------------------------  Tele:  NS with heart rates 80-90's; I have personally reviewed/looked at the telemetry strips.  ----------------------------------------------------------------------------------------------------------------------  LABS:    CBC and coagulation:  Results from last 7 days   Lab Units 11/06/19  0626 11/05/19  1739 11/05/19  1055 11/05/19  0656 11/05/19  0603 11/05/19  0213   LACTATE mmol/L  --  1.5 1.5  --  1.5 2.6*   CRP mg/dL 19.09*  --   --   --   --   --    WBC 10*3/mm3 21.23*  --   --  28.30*  --  27.36*   HEMOGLOBIN g/dL 11.0*  --   --  11.1*  --  11.8*   HEMATOCRIT % 35.6  --   --  34.5  --  35.8   MCV fL 96.2  --   --  92.7  --  90.9   MCHC g/dL 30.9*  --   --  32.2  --  33.0   PLATELETS 10*3/mm3 153  --   --  201  --  211   D DIMER QUANT MCGFEU/mL  --   --   --   --   --  3.98*     Acid/base balance:  Results from last 7 days   Lab Units 11/05/19  0130   PH, ARTERIAL pH units 7.435   PO2 ART mm Hg 58.5*   PCO2, ARTERIAL mm Hg 40.1   HCO3 ART mmol/L 26.9*     Renal and electrolytes:  Results from last 7 days   Lab Units 11/06/19  0626 11/05/19  0656 11/05/19  0213   SODIUM mmol/L 141 140 139   POTASSIUM mmol/L 3.9 4.3 3.8   MAGNESIUM mg/dL 2.2  --   --    CHLORIDE mmol/L 105 105 100   CO2 mmol/L 25.9 22.8 25.9   BUN mg/dL  11 16 18   CREATININE mg/dL 0.65 0.85 0.89   EGFR IF NONAFRICN AM mL/min/1.73 89 65 62   CALCIUM mg/dL 8.7 8.7 9.1   PHOSPHORUS mg/dL 3.2  --   --    GLUCOSE mg/dL 118* 145* 179*     Estimated Creatinine Clearance: 74.3 mL/min (by C-G formula based on SCr of 0.65 mg/dL).    Liver and pancreatic function:  Results from last 7 days   Lab Units 11/06/19  0626 11/05/19  0656 11/05/19  0213   ALBUMIN g/dL 2.84* 3.12* 3.63   BILIRUBIN mg/dL 0.2 0.3 0.3   ALK PHOS U/L 128* 123* 145*   AST (SGOT) U/L 24 20 22   ALT (SGPT) U/L 14 14 16     Endocrine function:  Lab Results   Component Value Date    HGBA1C 6.40 (H) 11/05/2019     Lab Results   Component Value Date    TSH 1.839 10/19/2018    FREET4 0.77 (L) 06/25/2014     Cardiac:  Results from last 7 days   Lab Units 11/05/19  0213   TROPONIN T ng/mL <0.010   PROBNP pg/mL 269.7       Cultures:  Brief Urine Lab Results  (Last result in the past 365 days)      Color   Clarity   Blood   Leuk Est   Nitrite   Protein   CREAT   Urine HCG        11/05/19 0427 Yellow Turbid Moderate (2+) Large (3+) Positive 100 mg/dL (2+)             Lab Results   Component Value Date    BLOODCX Abnormal Stain (A) 11/05/2019    BLOODCX Abnormal Stain (A) 11/05/2019    BLOODCX No growth at 5 days 10/18/2018    BLOODCX No growth at 5 days 10/18/2018    BCIDPCR Escherichia coli. Identification by BCID PCR. (C) 11/05/2019       I have personally looked at the labs and they are summarized above.  ----------------------------------------------------------------------------------------------------------------------  Detailed radiology reports for the last 24 hours:    Imaging Results (Last 24 Hours)     Procedure Component Value Units Date/Time    FL Video Swallow [[id number]] Collected:  11/05/19 1511     Updated:  11/05/19 1534    Narrative:       FL VIDEO SWALLOW-     HISTORY:  R/O aspiration; A41.9-Sepsis, unspecified organism;  N39.0-Urinary tract infection, site not specified; N39.0-Urinary  tract  infection, site not specified; R09.02-Hypoxemia        TECHNIQUE:   Speech therapy service was present. The patient was examined in the  sitting lateral position and was given several consistencies of barium.     FINDINGS: Deep laryngeal penetration w/ thin liquids via straw w/ silent  aspiration occurring during the swallow. Fleeting laryngeal penetration  w/ thin liquids via cup, NO ASPIRATION. Mild diffuse pharyngeal residue.  No other laryngeal penetration or aspiration.     FLUOROSCOPY TIME: 1.75 minutes.     Images: Cine loop was acquired       Impression:       Aspiration with thin liquids during the straw presentation.     For additional information please see the report provided by the speech  therapy service     This report was finalized on 11/5/2019 3:32 PM by Dr. Chuckie Reyez MD.           I have personally looked at the radiology images and I have read the available final report.    Assessment & Plan      -Sepsis that was present on admission (heart rate 103, respiratory rate 24, lactic acid 2.6, WBC 27,360) due to a UTI and E coli bacteremia  -Essential hypertension  -Type 2 diabetes mellitus, hemoglobin A1C this admission is 6.4  -Hyperlipidemia  -ENE, noncompliant with CPAP use at nighttime  -History of esophageal stricture x 2 with history of aspiration  -Morbid obesity, body mass index is 40.9 kg/m².  -Mild oral phase, trace pharyngeal phase dysphagia (deep laryngeal penetration with thin liquids via straw with silent aspiration occurring during the swallow)  -Mild concentric hypertrophy    I appreciate the help of the infectious disease team; will continue the high dose rocephin and will repeat the labs in the morning so as to follow the inflammatory markers.  So far, the heart rates and the WBC has improved.  The blood pressures are stable as is the Cr and since she has some leg edema will restart her home Lasix.  The glucose levels are controlled and since the hemoglobin A1C is under 7,  so will only check the glucose levels as needed.  Diet needs to be without straws.  Will repeat the labs in the morning.     VTE Prophylaxis:   Mechanical Order History:     None      Pharmalogical Order History:     Ordered     Dose Route Frequency Stop    11/05/19 0613  heparin (porcine) 5000 UNIT/ML injection 5,000 Units      5,000 Units SC Every 12 Hours Scheduled --        Florence Rivera MD  [facility]  11/06/19  11:59 AM

## 2019-11-06 NOTE — PROGRESS NOTES
Acute Care - Speech Language Pathology   Swallow Diet Safety/Acceptance [facility]     Patient Name: Domonique Cummings  : 1945  MRN: [id number]  Today's Date: 2019               Admit Date: 2019    Visit Dx:      ICD-10-CM ICD-9-CM   1. Sepsis secondary to UTI (CMS/HCC) A41.9 038.9    N39.0 995.91     599.0   2. Urinary tract infection without hematuria, site unspecified N39.0 599.0   3. Hypoxia R09.02 799.02     Patient Active Problem List   Diagnosis   • Chronic headaches   • Essential hypertension   • Sepsis secondary to UTI (CMS/HCC)       Therapy Treatment     Domonique Cummings  is seen at bedside this pm  on  [id number]  to assess safety/efficacy of swallowing fnx, determine safest/least restrictive diet tolerance. Pt is s/p MBS yesterday w/ SLP recs of mechanical soft, chopped w/ thin liquids. NO STRAWS.    Social History     Socioeconomic History   • Marital status:      Spouse name: Not on file   • Number of children: Not on file   • Years of education: Not on file   • Highest education level: Not on file   Tobacco Use   • Smoking status: Former Smoker   • Smokeless tobacco: Never Used   • Tobacco comment: She quit at least 30 years ago.   Substance and Sexual Activity   • Alcohol use: No   • Drug use: No   • Sexual activity: Defer        Diet Orders (active) (From admission, onward)    Start     Ordered    19 1452  Diet Soft Texture; Chopped; Thin  Diet Effective Now     Comments:  NO STRAWS.  Meds whole in puree. Single Presentation.    19 1452          She is observed on O2 via NC tolerating well.     Pt is positioned upright and centered in bed to accept multiple po presentations of ice chips solid cracker, puree and thin liquids via spoon, cup.  Pt is able to self feed. Straw presentations deferred 2/2 observed silent aspiration w/ straws yesterday on MBS.    Facial/oral structures are symmetrical upon observation w/o lingual deviation upon protrusion. Oral mucosa are  moist, pink and clean. Secretions are clear, thin and well controlled. OROM/MARIA VICTORIA is generally delayed/weak to imitate oral postures. Gag is not assessed. Volitional cough is adequate in intensity, clear in quality, nonproductive. Vocal quality is adequate in intensity, clear in quality w/ intelligible speech. Pt is a/a and cooperative to particpate.  Pt  is oriented to person, place, and time, follows simple directives, and participates in simple conversational exchanges.     Upon po presentations, adequate bolus anticipation w/ good labial seal for bolus clearance via spoon bowl, cup rim stability and suction via straw.  Bolus formation, manipulation,  and control are generally weak w/ increased oral prep time/rotary mastication pattern. A-p transit is timely w/o oral residue.     Pharyngeal swallow is mildly delayed w/ trace hyolaryngeal elevation per palpation. No overt s/s aspiration evidenced across this evaluation. No silent aspiration suspected as pt is w/o changes in vocal quality, respirations or secretions post po presentations. Pt denies odynophagia.    Impression: Per this evaluation Mrs. Cummings presents w/ mild oral phase, trace pharyngeal phase dysphagia. NO overt s/s of aspiration w/ thin liquids via cup or spoon bowl. Mildly increased oral prep time w/ solids. She is felt to most benefit form continued modified po diet of mechanical soft, chopped w/ thin liquids. No straws please. Meds whole in puree/thins. Upright and centered for all po intake. Diagonal aspiration precautions. BEN precautions. Oral Care protocol.    Recommendations:   1. Mechanical soft, chopped w/ thin liquids. NO STRAWS.  2. Meds whole in puree/thins.   3. BEN precautions.   4. Oral care protocol.  5. Upright and centered for all po intake  6. Universal aspiration precautions    No further formal SLP f/u warranted at this time.    D/w pt results and recommendations w/ verbal agreement. Sign posted above HOB for family and staff  reference.    D/w RN results and recommendations w/ verbal agreement.    Thank you for allowing me to participate in the care of your patient-  Angeli Villa M.S., CCC-SLP    Outcome Summary    No further SLP f/u warranted at this time  EDUCATION  The patient has been educated in the following areas:   Dysphagia (Swallowing Impairment) Oral Care/Hydration Modified Diet Instruction.    SLP Recommendation and Plan    Continue modified po diet. Continue tx per poc.   Time Calculation:       Therapy Charges for Today     Code Description Service Date Service Provider Modifiers Qty    15101309981 HC ST MOTION FLUORO EVAL SWALLOW 8 11/5/2019 Angeli Vilal, MS CCC-SLP GN 1    80860083864 HC ST TREATMENT SWALLOW 2 11/6/2019 Angeli Villa, MS CCC-SLP GN 1                 Angeli Villa, MS CCC-SLP  11/6/2019

## 2019-11-06 NOTE — PLAN OF CARE
Problem: Pain, Chronic (Adult)  Goal: Identify Related Risk Factors and Signs and Symptoms  Outcome: Outcome(s) achieved Date Met: 11/06/19    Goal: Acceptable Pain/Comfort Level and Functional Ability  Outcome: Ongoing (interventions implemented as appropriate)      Problem: Fall Risk (Adult)  Goal: Identify Related Risk Factors and Signs and Symptoms  Outcome: Outcome(s) achieved Date Met: 11/06/19    Goal: Absence of Fall  Outcome: Ongoing (interventions implemented as appropriate)      Problem: Skin Injury Risk (Adult)  Goal: Identify Related Risk Factors and Signs and Symptoms  Outcome: Outcome(s) achieved Date Met: 11/06/19    Goal: Skin Health and Integrity  Outcome: Ongoing (interventions implemented as appropriate)      Problem: Urinary Tract Infection (Adult)  Goal: Signs and Symptoms of Listed Potential Problems Will be Absent, Minimized or Managed (Urinary Tract Infection)  Outcome: Ongoing (interventions implemented as appropriate)      Problem: Patient Care Overview  Goal: Plan of Care Review  Outcome: Outcome(s) achieved Date Met: 11/06/19    Goal: Discharge Needs Assessment  Outcome: Ongoing (interventions implemented as appropriate)

## 2019-11-06 NOTE — PLAN OF CARE
Problem: Patient Care Overview  Goal: Plan of Care Review  Outcome: Ongoing (interventions implemented as appropriate)   11/06/19 7785   Coping/Psychosocial   Plan of Care Reviewed With patient   Plan of Care Review   Progress improving   OTHER   Outcome Summary SLP f/u this pm for diet safety/acceptance. Pt continues to tolerate least restrictive level of po intake w/ mechanical sof,t chopped w/ thin liquids. No straws. No further SLP f/u warranted at this time.

## 2019-11-07 VITALS
SYSTOLIC BLOOD PRESSURE: 140 MMHG | OXYGEN SATURATION: 96 % | DIASTOLIC BLOOD PRESSURE: 64 MMHG | HEIGHT: 65 IN | BODY MASS INDEX: 40.32 KG/M2 | TEMPERATURE: 98.3 F | WEIGHT: 242 LBS | HEART RATE: 75 BPM | RESPIRATION RATE: 18 BRPM

## 2019-11-07 PROBLEM — N39.0 SEPSIS SECONDARY TO UTI (HCC): Status: RESOLVED | Noted: 2019-11-05 | Resolved: 2019-11-07

## 2019-11-07 PROBLEM — A41.9 SEPSIS SECONDARY TO UTI (HCC): Status: RESOLVED | Noted: 2019-11-05 | Resolved: 2019-11-07

## 2019-11-07 LAB
ALBUMIN SERPL-MCNC: 3.03 G/DL (ref 3.5–5.2)
ALBUMIN/GLOB SERPL: 0.7 G/DL
ALP SERPL-CCNC: 127 U/L (ref 39–117)
ALT SERPL W P-5'-P-CCNC: 15 U/L (ref 1–33)
ANION GAP SERPL CALCULATED.3IONS-SCNC: 11.8 MMOL/L (ref 5–15)
AST SERPL-CCNC: 19 U/L (ref 1–32)
BACTERIA SPEC AEROBE CULT: ABNORMAL
BASOPHILS # BLD AUTO: 0.03 10*3/MM3 (ref 0–0.2)
BASOPHILS NFR BLD AUTO: 0.2 % (ref 0–1.5)
BILIRUB SERPL-MCNC: 0.2 MG/DL (ref 0.2–1.2)
BUN BLD-MCNC: 12 MG/DL (ref 8–23)
BUN/CREAT SERPL: 15.6 (ref 7–25)
CALCIUM SPEC-SCNC: 9 MG/DL (ref 8.6–10.5)
CHLORIDE SERPL-SCNC: 103 MMOL/L (ref 98–107)
CO2 SERPL-SCNC: 25.2 MMOL/L (ref 22–29)
CREAT BLD-MCNC: 0.77 MG/DL (ref 0.57–1)
CRP SERPL-MCNC: 15.66 MG/DL (ref 0–0.5)
DEPRECATED RDW RBC AUTO: 44.9 FL (ref 37–54)
EOSINOPHIL # BLD AUTO: 0.18 10*3/MM3 (ref 0–0.4)
EOSINOPHIL NFR BLD AUTO: 1.1 % (ref 0.3–6.2)
ERYTHROCYTE [DISTWIDTH] IN BLOOD BY AUTOMATED COUNT: 13.2 % (ref 12.3–15.4)
GFR SERPL CREATININE-BSD FRML MDRD: 73 ML/MIN/1.73
GLOBULIN UR ELPH-MCNC: 4.5 GM/DL
GLUCOSE BLD-MCNC: 143 MG/DL (ref 65–99)
GRAM STN SPEC: ABNORMAL
HCT VFR BLD AUTO: 35.9 % (ref 34–46.6)
HGB BLD-MCNC: 11.1 G/DL (ref 12–15.9)
IMM GRANULOCYTES # BLD AUTO: 0.09 10*3/MM3 (ref 0–0.05)
IMM GRANULOCYTES NFR BLD AUTO: 0.5 % (ref 0–0.5)
LYMPHOCYTES # BLD AUTO: 2.56 10*3/MM3 (ref 0.7–3.1)
LYMPHOCYTES NFR BLD AUTO: 15.5 % (ref 19.6–45.3)
MAGNESIUM SERPL-MCNC: 2.3 MG/DL (ref 1.6–2.4)
MCH RBC QN AUTO: 28.8 PG (ref 26.6–33)
MCHC RBC AUTO-ENTMCNC: 30.9 G/DL (ref 31.5–35.7)
MCV RBC AUTO: 93.2 FL (ref 79–97)
MONOCYTES # BLD AUTO: 1.91 10*3/MM3 (ref 0.1–0.9)
MONOCYTES NFR BLD AUTO: 11.6 % (ref 5–12)
NEUTROPHILS # BLD AUTO: 11.75 10*3/MM3 (ref 1.7–7)
NEUTROPHILS NFR BLD AUTO: 71.1 % (ref 42.7–76)
NRBC BLD AUTO-RTO: 0 /100 WBC (ref 0–0.2)
PHOSPHATE SERPL-MCNC: 3.5 MG/DL (ref 2.5–4.5)
PLATELET # BLD AUTO: 186 10*3/MM3 (ref 140–450)
PMV BLD AUTO: 10.2 FL (ref 6–12)
POTASSIUM BLD-SCNC: 3.7 MMOL/L (ref 3.5–5.2)
PROT SERPL-MCNC: 7.5 G/DL (ref 6–8.5)
RBC # BLD AUTO: 3.85 10*6/MM3 (ref 3.77–5.28)
SODIUM BLD-SCNC: 140 MMOL/L (ref 136–145)
WBC NRBC COR # BLD: 16.52 10*3/MM3 (ref 3.4–10.8)

## 2019-11-07 PROCEDURE — 84100 ASSAY OF PHOSPHORUS: CPT | Performed by: INTERNAL MEDICINE

## 2019-11-07 PROCEDURE — 99239 HOSP IP/OBS DSCHRG MGMT >30: CPT | Performed by: INTERNAL MEDICINE

## 2019-11-07 PROCEDURE — 80053 COMPREHEN METABOLIC PANEL: CPT | Performed by: INTERNAL MEDICINE

## 2019-11-07 PROCEDURE — 86140 C-REACTIVE PROTEIN: CPT | Performed by: NURSE PRACTITIONER

## 2019-11-07 PROCEDURE — 25010000002 CEFTRIAXONE: Performed by: INTERNAL MEDICINE

## 2019-11-07 PROCEDURE — 85025 COMPLETE CBC W/AUTO DIFF WBC: CPT | Performed by: INTERNAL MEDICINE

## 2019-11-07 PROCEDURE — 94799 UNLISTED PULMONARY SVC/PX: CPT

## 2019-11-07 PROCEDURE — 25010000002 HEPARIN (PORCINE) PER 1000 UNITS: Performed by: INTERNAL MEDICINE

## 2019-11-07 PROCEDURE — 83735 ASSAY OF MAGNESIUM: CPT | Performed by: INTERNAL MEDICINE

## 2019-11-07 RX ORDER — HYDROCODONE BITARTRATE AND ACETAMINOPHEN 10; 325 MG/1; MG/1
1 TABLET ORAL EVERY 8 HOURS PRN
Qty: 9 TABLET | Refills: 0 | Status: ON HOLD | OUTPATIENT
Start: 2019-11-07 | End: 2022-02-17 | Stop reason: SDUPTHER

## 2019-11-07 RX ORDER — ALPRAZOLAM 0.5 MG/1
0.5 TABLET ORAL EVERY 12 HOURS
Qty: 6 TABLET | Refills: 0 | Status: ON HOLD | OUTPATIENT
Start: 2019-11-07 | End: 2022-02-13

## 2019-11-07 RX ORDER — GABAPENTIN 400 MG/1
400 CAPSULE ORAL 3 TIMES DAILY
Qty: 9 CAPSULE | Refills: 0 | Status: ON HOLD | OUTPATIENT
Start: 2019-11-07 | End: 2022-02-17 | Stop reason: SDUPTHER

## 2019-11-07 RX ADMIN — AMLODIPINE BESYLATE 10 MG: 10 TABLET ORAL at 08:28

## 2019-11-07 RX ADMIN — Medication 1 TABLET: at 08:28

## 2019-11-07 RX ADMIN — DULOXETINE HYDROCHLORIDE 90 MG: 60 CAPSULE, DELAYED RELEASE ORAL at 08:28

## 2019-11-07 RX ADMIN — FLUTICASONE PROPIONATE 1 SPRAY: 50 SPRAY, METERED NASAL at 08:29

## 2019-11-07 RX ADMIN — HEPARIN SODIUM 5000 UNITS: 5000 INJECTION INTRAVENOUS; SUBCUTANEOUS at 08:28

## 2019-11-07 RX ADMIN — GABAPENTIN 400 MG: 400 CAPSULE ORAL at 06:13

## 2019-11-07 RX ADMIN — CEFTRIAXONE 2 G: 2 INJECTION, POWDER, FOR SOLUTION INTRAMUSCULAR; INTRAVENOUS at 06:13

## 2019-11-07 RX ADMIN — FLECAINIDE ACETATE 50 MG: 50 TABLET ORAL at 08:28

## 2019-11-07 RX ADMIN — OXYBUTYNIN CHLORIDE 10 MG: 5 TABLET, EXTENDED RELEASE ORAL at 08:28

## 2019-11-07 RX ADMIN — FAMOTIDINE 20 MG: 20 TABLET, FILM COATED ORAL at 08:28

## 2019-11-07 RX ADMIN — SODIUM CHLORIDE, PRESERVATIVE FREE 10 ML: 5 INJECTION INTRAVENOUS at 08:29

## 2019-11-07 RX ADMIN — FUROSEMIDE 20 MG: 20 TABLET ORAL at 08:28

## 2019-11-07 RX ADMIN — CHOLECALCIFEROL TAB 10 MCG (400 UNIT) 2000 UNITS: 10 TAB at 08:28

## 2019-11-07 RX ADMIN — LACTULOSE 20 G: 10 SOLUTION ORAL at 08:28

## 2019-11-07 RX ADMIN — CETIRIZINE HYDROCHLORIDE 5 MG: 10 TABLET, FILM COATED ORAL at 08:28

## 2019-11-07 RX ADMIN — I-VITE, TAB 1000-60-2MG (60/BT) 1 TABLET: TAB at 08:28

## 2019-11-07 RX ADMIN — IPRATROPIUM BROMIDE AND ALBUTEROL SULFATE 3 ML: .5; 3 SOLUTION RESPIRATORY (INHALATION) at 07:43

## 2019-11-07 RX ADMIN — Medication 1 CAPSULE: at 08:28

## 2019-11-07 NOTE — PROGRESS NOTES
PROGRESS NOTE         Patient Identification:  Name:  Domonique Cummings  Age:  74 y.o.  Sex:  female  :  1945  MRN:  [id number]  Visit Number:  [id number]  Primary Care Provider:  Skinny Meehan MD      ----------------------------------------------------------------------------------------------------------------------  Subjective       Chief Complaints:    Shortness of Breath and Chest Pain        Interval History:      The patient was resting comfortably this morning with no complaints. CRP showing improvement as well as leukocytosis. Urine and blood cultures finalized as a fairly susceptible E coli. No fever or diarrhea reported overnight. Case discussed with Dr. Rivera.     Review of Systems:    Constitutional: no fever, chills and night sweats. No appetite change or unexpected weight change. No fatigue.  Eyes: no eye drainage, itching or redness.  HEENT: no mouth sores, dysphagia or nose bleed.  Respiratory: no for shortness of breath, cough or production of sputum.  Cardiovascular: no chest pain, no palpitations, no orthopnea.  Gastrointestinal: no nausea, vomiting or diarrhea. No abdominal pain, hematemesis or rectal bleeding.  Genitourinary: no dysuria or polyuria.  Hematologic/lymphatic: no lymph node abnormalities, no easy bruising or easy bleeding.  Musculoskeletal: no muscle or joint pain.  Skin: No rash and no itching.  Neurological: no loss of consciousness, no seizure, no headache.  Behavioral/Psych: no depression or suicidal ideation.  Endocrine: no hot flashes.  Immunologic: negative.  ----------------------------------------------------------------------------------------------------------------------      Objective       [facility] Meds:    ALPRAZolam 0.5 mg Oral Q12H   amLODIPine 10 mg Oral Q24H   ceftriaxone 2 g Intravenous Q24H   cetirizine 5 mg Oral Daily   cholecalciferol 2,000 Units Oral Daily   DULoxetine 90 mg Oral Daily   famotidine 20 mg Oral BID AC    flecainide 50 mg Oral Q12H   fluticasone 1 spray Each Nare Daily   furosemide 20 mg Oral Daily   gabapentin 400 mg Oral Q8H   heparin (porcine) 5,000 Units Subcutaneous Q12H   I-sophia 1 tablet Oral BID   lactobacillus acidophilus 1 capsule Oral Daily   lactulose 20 g Oral Daily   levETIRAcetam 500 mg Oral Nightly   multivitamin 1 tablet Oral Daily   OLANZapine 2.5 mg Oral Nightly   oxybutynin XL 10 mg Oral Daily   sodium chloride 10 mL Intravenous Q12H        ----------------------------------------------------------------------------------------------------------------------    Vital Signs:  Temp:  [97.8 °F (36.6 °C)-98.7 °F (37.1 °C)] 98.3 °F (36.8 °C)  Heart Rate:  [] 85  Resp:  [18-20] 20  BP: (123-141)/(65-79) 127/68  Mean Arterial Pressure (Non-Invasive) for the past 24 hrs (Last 3 readings):   Noninvasive MAP (mmHg)   11/07/19 0307 99     SpO2 Percentage    11/07/19 0307 11/07/19 0630 11/07/19 0743   SpO2: 95% 95% 96%     SpO2:  [95 %-96 %] 96 %  on  Flow (L/min):  [2] 2;   Device (Oxygen Therapy): nasal cannula    Body mass index is 40.9 kg/m².  Wt Readings from Last 3 Encounters:   11/05/19 110 kg (242 lb)   10/29/19 110 kg (243 lb)   09/03/19 106 kg (234 lb)        Intake/Output Summary (Last 24 hours) at 11/7/2019 1331  Last data filed at 11/7/2019 1300  Gross per 24 hour   Intake 720 ml   Output --   Net 720 ml     Diet Soft Texture; Chopped; Thin  ----------------------------------------------------------------------------------------------------------------------    Physical exam:    Constitutional:  Well-developed and well-nourished.  No respiratory distress. Morbidly obese.   HENT:  Head: Normocephalic and atraumatic.  Mouth:  Moist mucous membranes.    Eyes:  Conjunctivae and EOM are normal.  No scleral icterus.  Neck:  Neck supple.  No JVD present.    Cardiovascular:  Normal rate, regular rhythm and normal heart sounds with no murmur. No edema.  Pulmonary/Chest:  No respiratory distress, no  wheezes, no crackles, with diminished lung sounds in the bases.  Abdominal:  Soft.  Bowel sounds are normal.  No distension and no tenderness.   Musculoskeletal:  No edema, no tenderness, and no deformity.  No swelling or redness of joints.  Neurological:  Alert and oriented to person, place, and time.  No facial droop.  No slurred speech.   Skin:  Skin is warm and dry.  No rash noted.  No pallor.   Psychiatric:  Normal mood and affect.  Behavior is normal.      ----------------------------------------------------------------------------------------------------------------------  Results from last 7 days   Lab Units 11/05/19  0213   TROPONIN T ng/mL <0.010     Results from last 7 days   Lab Units 11/05/19  0213   PROBNP pg/mL 269.7       Results from last 7 days   Lab Units 11/05/19  0130   PH, ARTERIAL pH units 7.435   PO2 ART mm Hg 58.5*   PCO2, ARTERIAL mm Hg 40.1   HCO3 ART mmol/L 26.9*     Results from last 7 days   Lab Units 11/07/19  0431 11/06/19  0626 11/05/19  1739 11/05/19  1055 11/05/19  0656 11/05/19  0603   CRP mg/dL 15.66* 19.09*  --   --   --   --    LACTATE mmol/L  --   --  1.5 1.5  --  1.5   WBC 10*3/mm3 16.52* 21.23*  --   --  28.30*  --    HEMOGLOBIN g/dL 11.1* 11.0*  --   --  11.1*  --    HEMATOCRIT % 35.9 35.6  --   --  34.5  --    MCV fL 93.2 96.2  --   --  92.7  --    MCHC g/dL 30.9* 30.9*  --   --  32.2  --    PLATELETS 10*3/mm3 186 153  --   --  201  --      Results from last 7 days   Lab Units 11/07/19  0431 11/06/19  0626 11/05/19  0656   SODIUM mmol/L 140 141 140   POTASSIUM mmol/L 3.7 3.9 4.3   MAGNESIUM mg/dL 2.3 2.2  --    CHLORIDE mmol/L 103 105 105   CO2 mmol/L 25.2 25.9 22.8   BUN mg/dL 12 11 16   CREATININE mg/dL 0.77 0.65 0.85   EGFR IF NONAFRICN AM mL/min/1.73 73 89 65   CALCIUM mg/dL 9.0 8.7 8.7   GLUCOSE mg/dL 143* 118* 145*   ALBUMIN g/dL 3.03* 2.84* 3.12*   BILIRUBIN mg/dL 0.2 0.2 0.3   ALK PHOS U/L 127* 128* 123*   AST (SGOT) U/L 19 24 20   ALT (SGPT) U/L 15 14 14    Estimated Creatinine Clearance: 74.3 mL/min (by C-G formula based on SCr of 0.77 mg/dL).  No results found for: AMMONIA    Hemoglobin A1C   Date/Time Value Ref Range Status   11/05/2019 0656 6.40 (H) 4.80 - 5.60 % Final     Lab Results   Component Value Date    HGBA1C 6.40 (H) 11/05/2019     Lab Results   Component Value Date    TSH 1.839 10/19/2018    FREET4 0.77 (L) 06/25/2014       Blood Culture   Date Value Ref Range Status   11/06/2019 No growth at 24 hours  Preliminary   11/06/2019 No growth at 24 hours  Preliminary   11/05/2019 Escherichia coli (A)  Final     Comment:     Refer to previous blood culture collected on 11/05 at 0213 for VIOLA's.    11/05/2019 Escherichia coli (A)  Final     Urine Culture   Date Value Ref Range Status   11/05/2019 Escherichia coli (A)  Final              Pain Management Panel     Pain Management Panel Latest Ref Rng & Units 12/11/2017    AMPHETAMINES SCREEN, URINE Negative Negative    BARBITURATES SCREEN Negative Negative    BENZODIAZEPINE SCREEN, URINE Negative Positive(A)    BUPRENORPHINEUR Negative Negative    COCAINE SCREEN, URINE Negative Negative    METHADONE SCREEN, URINE Negative Negative          I have personally reviewed the above laboratory results.   ----------------------------------------------------------------------------------------------------------------------  Imaging Results (Last 24 Hours)     ** No results found for the last 24 hours. **        I have personally reviewed the above radiology results.   ----------------------------------------------------------------------------------------------------------------------    Assessment/Plan       Assessment/Plan     ASSESSMENT:    1. Severe sepsis with lactic acid greater than 2 on admission   2. Acute pyelonephritis     PLAN:     The patient was resting comfortably this morning with no complaints. CRP showing improvement as well as leukocytosis. Urine and blood cultures finalized as a fairly susceptible E  coli. No fever or diarrhea reported overnight. Case discussed with Dr. Rivera with possible discharge back to the nursing home. Would recommend a 10 day course of antibiotic therapy with Rocephin based on culture results through 11/15/19.     Blood cultures from 11/5/19 2 out of 2 sets positive for E. Coli. Chest x-ray unremarkable. CT of chest reports no evidence of pulmonary embolism.      Code Status:   Code Status and Medical Interventions:   Ordered at: 11/05/19 0516     Code Status:    CPR     Medical Interventions (Level of Support Prior to Arrest):    Full       Albania Collins PA-C  11/07/19  1:31 PM

## 2019-11-07 NOTE — DISCHARGE SUMMARY
[facility] HOSPITALISTS DISCHARGE SUMMARY    Patient Identification:  Name:  Domonique Cummings  Age:  74 y.o.  Sex:  female  :  1945  MRN:  [id number]  Visit Number:  [id number]    Date of Admission: 2019  Date of Discharge:  2019     PCP: Skinny Meehan MD    DISCHARGE DIAGNOSES  -Sepsis that was present on admission (heart rate 103, respiratory rate 24, lactic acid 2.6, WBC 27,360) due to a UTI and E coli bacteremia  -Acute hypoxic respiratory failure due to sepsis  -Microscopic hematuria as result of the above  -Prolonged QTc of 461 ms  -Essential hypertension  -Type 2 diabetes mellitus, hemoglobin A1C this admission is 6.4, mild hyperglycemia this admission as well  -Hyperlipidemia  -ENE, noncompliant with CPAP use at nighttime  -History of esophageal stricture x 2 with history of aspiration  -Morbid obesity, body mass index is 40.9 kg/m².  -Mild oral phase dysphagia and trace pharyngeal phase dysphagia (deep laryngeal penetration with thin liquids via straw with silent aspiration occurring during the swallow)  -Mild concentric hypertrophy  -History of tobacco smoking addiction  -Morbid obesity, BMI 40.9 kg/m²    CONSULTS   Dr. Cantu with infectious disease    PROCEDURES PERFORMED  None    HOSPITAL COURSE  Patient is a 74 y.o. female presented to [facility] complaining of shortness of breath and chest pain; she is a resident of a local nursing home.  In the emergency department, she was found to have a urine analysis consistent with urinary tract infection as well as signs and symptoms of sepsis.  As result, the patient was admitted to the medical surgical floor with telemetry for further evaluation and treatment.  Please see the admitting history and physical for further details.  Cultures were obtained in the patient was empirically started on high-dose Rocephin.  Blood cultures 2 out of 2 sets grew E. coli, as did the urine culture.  Infectious disease team was  consulted; they recommended continuing the high-dose Rocephin based on the susceptibilities and to continue the Rocephin through November 14, 2019.    Though the patient's presenting symptom was shortness of air, no pneumonia was seen on imaging.    Please note that the patient has a history of esophageal stricture and we asked speech therapy to evaluate her.  She did have some aspiration of thin liquids with straws but she did not with other presentations of thin liquids.  Thus, the patient needs to utilize a diet that does not involve straws.    On the day of discharge, the patient was back to her normal mentation.  She was able to participate in her activities of daily living but she was not able to perform these on her own.  She was tolerating her diet.  She thought that she had a little bit of shortness of air but this did improve with breathing treatments.      VITAL SIGNS:  Temp:  [97.8 °F (36.6 °C)-98.7 °F (37.1 °C)] 98.3 °F (36.8 °C)  Heart Rate:  [] 85  Resp:  [18-20] 20  BP: (123-141)/(65-79) 127/68  SpO2:  [95 %-96 %] 96 %  on  Flow (L/min):  [2] 2;   Device (Oxygen Therapy): nasal cannula    Body mass index is 40.9 kg/m².  Wt Readings from Last 3 Encounters:   11/05/19 110 kg (242 lb)   10/29/19 110 kg (243 lb)   09/03/19 106 kg (234 lb)       PHYSICAL EXAM:  Constitutional: She is oriented to person, place, and time. She appears well-developed and well-nourished. No distress.   HENT:   Head: Normocephalic and atraumatic.   Nose: Nose normal.   Eyes: Conjunctivae and EOM are normal. Pupils are equal, round, and reactive to light. Right eye exhibits no discharge. Left eye exhibits no discharge. No scleral icterus.   Neck: Normal range of motion. Neck supple.   Cardiovascular: Normal rate and regular rhythm.   No murmur heard.  Pulmonary/Chest: Effort normal and breath sounds normal. No respiratory distress.  No wheezing, no crackles.  Abdominal: Soft. Bowel sounds are normal. She exhibits no  distension.   Musculoskeletal: She exhibits no deformity.        Right lower leg: She exhibits edema that has improved.        Left lower leg: She exhibits edema that has improved.        Right foot: There is swelling that has improved.        Left foot: There is swelling that has improved.   Neurological: She is alert and oriented to person, place, and time. No cranial nerve deficit.  She does demonstrate generalized weakness throughout with no unilateral weakness.  She can follow commands.  Skin: Skin is warm and dry. Capillary refill takes less than 2 seconds. No rash noted. No erythema. No pallor.   Psychiatric: She has a normal mood and affect. Her behavior is normal.   The edema mentioned above is minimally pitting and is mild and has improved.      DISCHARGE DISPOSITION   Stable      DISCHARGE MEDICATIONS:     New Medications      Instructions Start Date   cefTRIAXone  Commonly known as:  ROCEPHIN   2 g, Intravenous, Every 24 Hours   Start Date:  11/8/2019        Continue These Medications      Instructions Start Date   ALPRAZolam 0.5 MG tablet  Commonly known as:  XANAX   0.5 mg, Oral, Every 12 Hours      amLODIPine 10 MG tablet  Commonly known as:  NORVASC   10 mg, Oral, Every 24 Hours Scheduled      calcium carbonate 500 MG chewable tablet  Commonly known as:  TUMS   1 tablet, Oral, Every 6 Hours PRN      cycloSPORINE 0.05 % ophthalmic emulsion  Commonly known as:  RESTASIS   1 drop, Both Eyes, 2 Times Daily PRN      DULoxetine 30 MG capsule  Commonly known as:  CYMBALTA   90 mg, Oral, Daily      flecainide 50 MG tablet  Commonly known as:  TAMBOCOR   50 mg, Oral, Every 12 Hours Scheduled      fluticasone 50 MCG/ACT nasal spray  Commonly known as:  FLONASE   1 spray, Nasal, Daily      furosemide 20 MG tablet  Commonly known as:  LASIX   20 mg, Oral, Daily      gabapentin 400 MG capsule  Commonly known as:  NEURONTIN   400 mg, Oral, 3 Times Daily      HYDROcodone-acetaminophen  MG per tablet  Commonly  known as:  NORCO   1 tablet, Oral, Every 8 Hours PRN      ICAPS AREDS 2 capsule   1 capsule, Oral, 2 Times Daily      ipratropium-albuterol 0.5-2.5 mg/3 ml nebulizer  Commonly known as:  DUO-NEB   3 mL, Nebulization, Every 6 Hours PRN      lactulose 10 GM/15ML solution  Commonly known as:  CHRONULAC   20 g, Oral, Daily      levETIRAcetam 500 MG tablet  Commonly known as:  KEPPRA   500 mg, Oral, Nightly      loratadine 10 MG tablet  Commonly known as:  CLARITIN   10 mg, Oral, Daily      multivitamin tablet tablet   1 tablet, Oral, Daily      OLANZapine 2.5 MG tablet  Commonly known as:  zyPREXA   2.5 mg, Oral, Nightly      oxybutynin XL 10 MG 24 hr tablet  Commonly known as:  DITROPAN-XL   10 mg, Oral, Daily      potassium chloride 10 MEQ CR tablet  Commonly known as:  K-DUR,KLOR-CON   10 mEq, Oral, Daily      raNITIdine 300 MG tablet  Commonly known as:  ZANTAC   300 mg, Oral, 2 Times Daily      REGULOID 400 MG capsule  Generic drug:  Psyllium   2 capsules, Oral, Daily      Risaquad-2 capsule capsule   1 capsule, Oral, Daily      Vitamin D 50 MCG (2000 UT) tablet   2,000 Units, Oral, Daily         Stop These Medications    CELEBREX 200 MG capsule  Generic drug:  celecoxib     dextromethorphan-guaifenesin  MG/5ML syrup  Commonly known as:  ROBITUSSIN-DM     ondansetron ODT 4 MG disintegrating tablet  Commonly known as:  ZOFRAN-ODT         Diet Instructions     Diet: Soft Texture; Thin Liquids, No Restrictions; Chopped      Discharge Diet:  Soft Texture    Fluid Consistency:  Thin Liquids, No Restrictions    Soft Options:  Chopped        Activity Instructions     Up WIth Assist          Additional Instructions for the Follow-ups that You Need to Schedule     Discharge Follow-up with PCP   As directed     Currently Documented PCP:    Skinny Meehan MD    PCP Phone Number:    [phone]     Follow Up Details:  With the nursing home doctor per their schedule or as soon as possible             TEST  RESULTS  PENDING AT DISCHARGE:   Order Current Status    Blood Culture - Blood, Arm, Right Preliminary result    Blood Culture - Blood, Hand, Right Preliminary result           CODE STATUS  Code Status and Medical Interventions:   Ordered at: 11/05/19 0516     Code Status:    CPR     Medical Interventions (Level of Support Prior to Arrest):    Full       Florence Rivera MD  [facility]  11/07/19  12:23 PM    Please note that this discharge summary required more than 30 minutes to complete.

## 2019-11-07 NOTE — PROGRESS NOTES
Discharge Planning Assessment  [facility]     Patient Name: Domonique Cummings  MRN: [id number]  Today's Date: 11/7/2019    Admit Date: 11/5/2019      Discharge Plan     Row Name 11/07/19 1530       Plan    Final Discharge Disposition Code  03 - skilled nursing facility (SNF)    Final Note Pt is being discharged back to [facility] today. SS notified [facility] per Khushi and pt has a bed available. SS contacted daughter, Dennise Flanagan at [phone] and she is agreeable for pt to be discharged to [facility] today. SS faxed information including prescriptions and AVS to [facility]. Nurse to call report to [facility]. SS completed EMS PCS form and will contact EMS when pt is ready for transport. SS to follow.         Destination - Selection Complete      Service Provider Request Status Selected Services Address Phone Number Fax Number    [facility] Selected Skilled Nursing [address] [place] [address] [phone]     DERREK Ortiz

## 2019-11-07 NOTE — PLAN OF CARE
Problem: Pain, Chronic (Adult)  Goal: Acceptable Pain/Comfort Level and Functional Ability  Outcome: Ongoing (interventions implemented as appropriate)      Problem: Fall Risk (Adult)  Goal: Identify Related Risk Factors and Signs and Symptoms  Outcome: Ongoing (interventions implemented as appropriate)    Goal: Absence of Fall  Outcome: Ongoing (interventions implemented as appropriate)      Problem: Skin Injury Risk (Adult)  Goal: Skin Health and Integrity  Outcome: Ongoing (interventions implemented as appropriate)      Problem: Patient Care Overview  Goal: Plan of Care Review  Outcome: Ongoing (interventions implemented as appropriate)

## 2019-11-07 NOTE — DISCHARGE PLACEMENT REQUEST
"Emiliano Domonique JOVANY (74 y.o. Female)     Date of Birth Social Security Number Address Home Phone MRN    1945  [address] [facility] [id number] [phone] [id number]    Anabaptist Marital Status          [facility]        Admission Date Admission Type Admitting Provider Attending Provider Department, Room/Bed    11/5/19 Emergency Sandra Hardin DO Perkins, Jimmye S, MD [address], 3341/2S    Discharge Date Discharge Disposition Discharge Destination         Skilled Nursing Facility (DC - External)              Attending Provider:  Florence Rivera MD    Allergies:  Biaxin [Clarithromycin]    Isolation:  None   Infection:  None   Code Status:  CPR    Ht:  163.8 cm (64.5\")   Wt:  110 kg (242 lb)    Admission Cmt:  None   Principal Problem:  Sepsis secondary to UTI (CMS/[facility]) [A41.9,N39.0]                 Active Insurance as of 11/5/2019     Primary Coverage     Payor Plan Insurance Group Employer/Plan Group    MEDICARE MEDICARE A & B      Payor Plan Address Payor Plan Phone Number Payor Plan Fax Number Effective Dates    [address] [phone]  10/1/2009 - None Entered    [facility] [id number]       Subscriber Name Subscriber Birth Date Member ID       KELBY HUSTONREY JOVANY 1945 [id number]           Secondary Coverage     Payor Plan Insurance Group Employer/Plan Group    [place]      Payor Plan Address Payor Plan Phone Number Payor Plan Fax Number Effective Dates    [address] [phone]  4/2/2019 - None Entered    [facility] [id number]       Subscriber Name Subscriber Birth Date Member ID       DOMONIQUE HUSTON 1945 [id number]                 Emergency Contacts      (Rel.) Home Phone Work Phone Mobile Phone    Deepali Gallegos (Daughter) -- -- [phone]    Dennise Flanagan (Daughter) [phone] -- --    EmilianoBenito (Son) [phone] -- --               History & Physical      Sandra Hardin DO at 11/05/19 0522      " "    Hospitalist History and Physical    Patient Identification:  Name: Domonique Cummings  Age/Sex: 74 y.o. female  :  1945  MRN: [id number]         Primary Care Physician: Skinny Meehan MD    Chief Complaint   Patient presents with   • Shortness of Breath   • Chest Pain       History of Present Illness  Patient is a 74 y.o. female presents with the following: Shortness of air reported at the nursing home    The patient is a 74-year-old female, nursing home resident with past medical history significant for obstructive sleep apnea, noncompliant with CPAP, arthritis, hypertension and GERD who presents from the nursing home with reports of shortness of air.    Patient is currently sleeping soundly when examined in ER room 17. One of the patient's daughter's is present at bedside and provides history.  Patient apparently reported \"feeling bad\" yesterday morning at the nursing home.  The patient's daughter states that the patient will have frequent cough and has history of aspiration with esophageal stricture requiring dilatation x2.    The patient's daughter does not report any known fevers or chills.  The patient's daughter states that she thinks that the patient was given antibiotics approximately 2 weeks ago for a UTI.  She states that her mother has not reported any malodorous urine.  She does not report any frequency.  Patient's mother reported that her bowel movements have been normal per her daughter's report.  The patient has not had any recent falls.  The patient's daughter states that she has only been complaining of her normal \"aches and pains.\"  Patient does use a walker when ambulatory in her room at the nursing home.  Otherwise, for long distances she uses a wheelchair.    The patient's daughter does state that the patient reported that she has had some crampy abdominal pains recently that the patient thinks may be related to eating.  Otherwise, the patient has been in her usual state of " health.  The patient's daughter states that she has been a nursing home resident for approximately 1.5 years.    Emergency department, the patient's oxygen level was 89% via room air.  Temperature was 100.2 °F.  Heart rate 103, respiratory rate 24.  Blood pressure 148/86.  Arterial blood gas revealed a pH of 7.435, PCO2 40.1, PO2 58.5 with an oxygen saturation of 90.7% via room air.  Patient had a negative troponin T level and a negative proBNP level.  Her CMP was remarkable for glucose of 179 and alkaline phosphatase level of 145.  Her lactate was 2.6.  CBC was remarkable for white blood cell count of 27.36 with 88.6% neutrophils and 24.22 absolute neutrophils.  Urinalysis reveals moderate blood, positive nitrites and large leukocyte esterase with too numerous to count white blood cells and 2+ bacteria with only 3-6 squamous epithelial cells.  Patient's d-dimer was elevated so a CT scan of her chest with PE protocol was obtained in the emergency department.  The CT scan revealed some dependent atelectasis but was negative for infiltrate and also negative for pulmonary embolism.    Patient is to be admitted to the medical surgical floor with telemetry.    Past History:  Past Medical History:   Diagnosis Date   • Arthritis    • Asthma    • Degenerative disc disease, lumbar    • Diabetes mellitus (CMS/HCC)    • Fibromyalgia    • GERD (gastroesophageal reflux disease)    • Hyperlipidemia    • Hypertension    • ENE (obstructive sleep apnea)     Noncompliant with BiPAP/CPAP   • Osteoporosis      Past Surgical History:   Procedure Laterality Date   • APPENDECTOMY     • CARDIAC CATHETERIZATION N/A 10/30/2018    Procedure: Left Heart Cath;  Surgeon: Arturo Inman MD;  Location: [facility] CATH INVASIVE LOCATION;  Service: Cardiology   • ESOPHAGEAL DILATATION     • TOTAL SHOULDER REPLACEMENT Bilateral    • TUBAL ABDOMINAL LIGATION       Family History   Problem Relation Age of Onset   • Breast cancer Sister      Social  History     Tobacco Use   • Smoking status: Former Smoker   • Smokeless tobacco: Never Used   • Tobacco comment: She quit at least 30 years ago.   Substance Use Topics   • Alcohol use: No   • Drug use: No     Medications Prior to Admission   Medication Sig Dispense Refill Last Dose   • acetaminophen-codeine (TYLENOL #3) 300-30 MG per tablet Take 1 tablet by mouth 2 (Two) Times a Day As Needed for Moderate Pain . 60 tablet 0 Taking   • ALPRAZolam (XANAX) 0.5 MG tablet Take 0.5 mg by mouth 2 (Two) Times a Day As Needed.   Taking   • amLODIPine (NORVASC) 10 MG tablet Take 1 tablet by mouth Daily. 30 tablet 3 Taking   • Buprenorphine (BUTRANS) 5 MCG/HR patch weekly transdermal patch Place 1 patch on the skin as directed by provider 1 (One) Time Per Week. 1 patch 0 Taking   • celecoxib (CELEBREX) 200 MG capsule Take 200 mg by mouth Daily.   Taking   • diclofenac (VOLTAREN) 1 % gel gel As Needed.   Taking   • DULoxetine (CYMBALTA) 30 MG capsule Take 3 capsules by mouth Daily. 90 capsule 5    • flecainide (TAMBOCOR) 50 MG tablet Take 1 tablet by mouth Every 12 (Twelve) Hours. 60 tablet 3 Taking   • gabapentin (NEURONTIN) 400 MG capsule Take 400 mg by mouth 3 (Three) Times a Day.   Taking   • loratadine (CLARITIN) 10 MG tablet Take 10 mg by mouth Daily.   Taking   • multivitamin (DAILY DEBORAH) tablet tablet Take 1 tablet by mouth Daily.   Taking   • OLANZapine (zyPREXA) 2.5 MG tablet Take 1 tablet by mouth Every Night. 30 tablet 1 Taking   • oxybutynin XL (DITROPAN-XL) 10 MG 24 hr tablet Take 10 mg by mouth Daily.   Taking   • polyethylene glycol (MIRALAX) packet Take 17 g by mouth Daily.   Taking   • raNITIdine (ZANTAC) 300 MG tablet Take 1 tablet by mouth 2 (Two) Times a Day. 60 tablet 2 Taking   • topiramate (TOPAMAX) 100 MG tablet Take 100 mg by mouth 2 (Two) Times a Day.   Taking     Allergies: Biaxin [clarithromycin]    Review of Systems:  Review of Systems   Unable to perform ROS: Other   Respiratory: Positive for  cough and choking.    Gastrointestinal: Positive for abdominal pain.   Genitourinary: Negative for difficulty urinating, dysuria and urgency.   Musculoskeletal: Positive for arthralgias, back pain and myalgias.     Sleeping soundly; ROS obtained from daughter     Vital Signs  Temp:  [98.8 °F (37.1 °C)-100.2 °F (37.9 °C)] 98.8 °F (37.1 °C)  Heart Rate:  [] 89  Resp:  [20-24] 20  BP: (133-148)/(81-92) 133/86  Body mass index is 40.9 kg/m².    Physical Exam:  Physical Exam   Constitutional: She appears well-developed and well-nourished. She is sleeping. No distress. Nasal cannula in place.   HENT:   Head: Normocephalic and atraumatic.   Mouth/Throat: Oropharynx is clear and moist. Mucous membranes are dry.   Neck: Neck supple. No tracheal deviation present.   Cardiovascular: Normal rate and regular rhythm. Exam reveals no gallop and no friction rub.   Murmur heard.   Systolic murmur is present with a grade of 2/6.  Pulses:       Posterior tibial pulses are 1+ on the right side, and 1+ on the left side.   Pulmonary/Chest: Accessory muscle usage present. No respiratory distress. She has decreased breath sounds in the right lower field and the left lower field. She has no wheezes. She has no rales.   Abdominal: Soft. She exhibits no distension. Bowel sounds are decreased. There is no tenderness. There is no guarding.   Musculoskeletal: She exhibits no tenderness.   Neurological:   Unable to assess.   Skin: Skin is warm and dry. No rash noted. No erythema.      Results Review:    Results from last 7 days   Lab Units 11/05/19  0130   PH, ARTERIAL pH units 7.435   PO2 ART mm Hg 58.5*   PCO2, ARTERIAL mm Hg 40.1   HCO3 ART mmol/L 26.9*       Results from last 7 days   Lab Units 11/05/19  0213   WBC 10*3/mm3 27.36*   HEMOGLOBIN g/dL 11.8*   HEMATOCRIT % 35.8   PLATELETS 10*3/mm3 211     Results from last 7 days   Lab Units 11/05/19  0213   SODIUM mmol/L 139   POTASSIUM mmol/L 3.8   CHLORIDE mmol/L 100   CO2 mmol/L 25.9    BUN mg/dL 18   CREATININE mg/dL 0.89   CALCIUM mg/dL 9.1   GLUCOSE mg/dL 179*     Results from last 7 days   Lab Units 11/05/19  0213   BILIRUBIN mg/dL 0.3   ALK PHOS U/L 145*   AST (SGOT) U/L 22   ALT (SGPT) U/L 16             Results from last 7 days   Lab Units 11/05/19  0213   TROPONIN T ng/mL <0.010         Imaging:    I have personally reviewed the EKG. pending official cardiology interpretation, however, per my view the patient has sinus tachycardia without acute ST-T wave changes.  Patient with occasional PACs.  Patient's  ms.    CT images have been reviewed.  Patient does not have any obvious infiltrates.  There is some at least bilateral atelectasis noted.  No large pulmonary emboli.    Imaging Results (Most Recent)     Procedure Component Value Units Date/Time    CT Chest Pulmonary Embolism [[id number]] Collected:  11/05/19 0436     Updated:  11/05/19 0438    Narrative:       INDICATION:   Right-sided chest pain and elevated d-dimer    TECHNIQUE:   CT angiogram of the chest with contrast. 3-D reformatted images were acquired.  Radiation dose reduction techniques included automated exposure control or exposure modulation based on body size. Radiation audit for number of CT and nuclear cardiology  exams performed in the last year:  2.      COMPARISON:   10/19/2018 chest CT    FINDINGS:   There is bilateral dependent atelectasis, but there is no consolidation or effusion or pneumothorax. There is no mediastinal mass. Images of the upper abdomen are unremarkable. The thoracic aorta is normal.    Bolus timing is adequate and there is no evidence of pulmonary embolism.    There are spinal degenerative changes but there is no acute bony abnormality.      Impression:         1. Adequate bolus timing.    2. No evidence of pulmonary embolism.    3. There is some dependent atelectasis but there is no acute pulmonary parenchymal process.    Signer Name: Jesse Arce MD   Signed: 11/5/2019 4:36 AM    Workstation Name: [facility]    [facility]    XR Chest 1 View [[id number]] Collected:  11/05/19 0432     Updated:  11/05/19 0434    Narrative:       CR Chest 1 Vw    INDICATION:   Short of air     COMPARISON:    10/18/2018    FINDINGS:  Single portable AP view(s) of the chest.    There is mild cardiomegaly, but there is no consolidation or effusion or pneumothorax.      Impression:       No acute cardiopulmonary findings, no interval change.    Signer Name: Jesse Arce MD   Signed: 11/5/2019 4:32 AM   Workstation Name: [facility]    [facility]    US Gallbladder [[id number]] Collected:  11/05/19 0214     Updated:  11/05/19 0216    Narrative:       INDICATION:   Right upper quadrant abdominal pain    COMPARISON:   None available.    FINDINGS:  Suboptimal visualization, poor sonographic window.    Limited images of the gallbladder are grossly unremarkable without evidence of wall thickening or pericholecystic fluid. There is no evidence of cholelithiasis.    Common duct is normal in caliber.      Impression:       Limited due to body habitus, no convincing acute abnormality.    Signer Name: Jesse Arce MD   Signed: 11/5/2019 2:14 AM   Workstation Name: [facility]    Radiology [facility]          Assessment/Plan     -Severe sepsis, present upon admission with leukocytosis, tachycardia and lactate level of 2.6, likely secondary to an acute UTI with microscopic hematuria: Patient has been admitted to the medical surgical floor with telemetry.  I have started the patient on high-dose IV Rocephin while awaiting final urine culture results.  Trend lactic level until it has normalized.    -Cough with acute on chronic hypoxic respiratory failure in the setting of obstructive sleep apnea, noncompliant with CPAP: Patient with elevated d-dimer, however, her CT PE protocol was negative for pulmonary embolus.  Patient has been admitted with aspiration  precautions and is currently nothing by mouth while awaiting a speech therapy evaluation.  Continue with oxygen therapy and titrate for saturations greater than 90%.  Continue to monitor the patient's respiratory closely.  Should she have a productive cough, then will obtain a respiratory culture.  Once patient more awake will go ahead and order incentive spirometry.    -History of gastroesophageal reflux disease and esophageal stricture requiring dilatation.    -Morbid obesity with questionable obesity hypoventilation syndrome.    -Hyperglycemia with previous history of diabetes mellitus: I have requested a hemoglobin A1c level.    -History of arthritis, lumbar degenerative disc disease and fibromyalgia: Currently awaiting patient's home medication list.    -History of tobacco abuse.    DVT/GI prophylaxis: Subcutaneous heparin/again H2 blocker    Estimated Length of Stay: > 2 MNs    CODE: FULL/CPR    I discussed the patients findings and my recommendations with family      Sandra Hardin DO  11/05/19  6:36 AM    Electronically signed by Sandra Hardin DO at 11/05/19 0652       Vital Signs (last day)     Date/Time   Temp   Temp src   Pulse   Resp   BP   Patient Position   SpO2    11/07/19 1100   98.3 (36.8)   Oral   85   20   127/68   Lying   --    11/07/19 0752   --   --   100   18   --   --   --    11/07/19 0743   --   --   105   18   --   --   96    11/07/19 0630   97.8 (36.6)   Oral   89   18   131/73   Lying   95    11/07/19 0307   98.7 (37.1)   Oral   87   20   138/79   Lying   95    11/06/19 1921   --   --   --   --   --   --   95    11/06/19 1859   98.6 (37)   Oral   85   18   123/71   Lying   --    11/06/19 1639   98.7 (37.1)   Oral   81   20   141/65   Lying   --    11/06/19 1213   98.2 (36.8)   Oral   90   18   145/77   Lying   94    11/06/19 0654   --   --   --   --   --   --   95    11/06/19 0644   98.5 (36.9)   Oral   89   20   118/69   Lying   95    11/06/19 0300   98.1 (36.7)   Oral   72    18   136/80   Lying   --              Oxygen Therapy (last day)     Date/Time   SpO2   Device (Oxygen Therapy)   Flow (L/min)   Oxygen Concentration (%)   ETCO2 (mmHg)    11/07/19 0830   --   nasal cannula   2   --   --    11/07/19 0743   96   nasal cannula   2   --   --    11/07/19 0630   95   --   --   --   --    11/07/19 0307   95   nasal cannula   2   --   --    11/06/19 2020   --   nasal cannula   2   --   --    11/06/19 1921   95   nasal cannula   2   --   --    11/06/19 1213   94   --   --   --   --    11/06/19 0800   --   nasal cannula   2   --   --    11/06/19 0654   95   nasal cannula   2   --   --    11/06/19 0644   95   --   --   --   --              Intake & Output (last day)       11/06 0701 - 11/07 0700 11/07 0701 - 11/08 0700    P.O. 1080 360    Total Intake(mL/kg) 1080 (9.8) 360 (3.3)    Net +1080 +360          Urine Unmeasured Occurrence 14 x 2 x    Stool Unmeasured Occurrence 4 x         Hospital Medications (active)       Dose Frequency Start End    ALPRAZolam (XANAX) tablet 0.5 mg 0.5 mg Every 12 Hours Scheduled 11/5/2019     Sig - Route: Take 1 tablet by mouth Every 12 (Twelve) Hours. - Oral    Cosign for Ordering: Accepted by Florence Rivera MD on 11/5/2019  2:24 PM    amLODIPine (NORVASC) tablet 10 mg 10 mg Every 24 Hours Scheduled 11/5/2019     Sig - Route: Take 1 tablet by mouth Daily. - Oral    Cosign for Ordering: Accepted by Florence Rivera MD on 11/5/2019  2:24 PM    calcium carbonate (TUMS) chewable tablet 500 mg (200 mg elemental) 1 tablet Every 6 Hours PRN 11/5/2019     Sig - Route: Chew 500 mg Every 6 (Six) Hours As Needed for Indigestion or Heartburn. - Oral    Cosign for Ordering: Accepted by Florence Rivera MD on 11/5/2019  2:24 PM    cefTRIAXone (ROCEPHIN) 2 g/100 mL 0.9% NS VTB (FERN) 2 g Every 24 Hours 11/5/2019 11/12/2019    Sig - Route: Infuse 100 mL into a venous catheter Daily. - Intravenous    cetirizine (zyrTEC) tablet 5 mg 5 mg Daily 11/5/2019     Sig - Route:  Take 0.5 tablets by mouth Daily. - Oral    Cosign for Ordering: Accepted by Florence Rivera MD on 11/5/2019  2:24 PM    cholecalciferol (VITAMIN D3) tablet 2,000 Units 2,000 Units Daily 11/5/2019     Sig - Route: Take 5 tablets by mouth Daily. - Oral    Cosign for Ordering: Accepted by Florence Rivera MD on 11/5/2019  2:24 PM    DULoxetine (CYMBALTA) DR capsule 90 mg 90 mg Daily 11/5/2019     Sig - Route: Take 90 mg by mouth Daily. - Oral    Cosign for Ordering: Accepted by Florence Rivera MD on 11/5/2019  2:24 PM    famotidine (PEPCID) tablet 20 mg 20 mg 2 Times Daily Before Meals 11/5/2019     Sig - Route: Take 1 tablet by mouth 2 (Two) Times a Day Before Meals. - Oral    flecainide (TAMBOCOR) tablet 50 mg 50 mg Every 12 Hours Scheduled 11/5/2019     Sig - Route: Take 1 tablet by mouth Every 12 (Twelve) Hours. - Oral    Cosign for Ordering: Accepted by Florence Rivera MD on 11/5/2019  2:24 PM    fluticasone (FLONASE) 50 MCG/ACT nasal spray 1 spray 1 spray Daily 11/5/2019     Sig - Route: 1 spray by Each Nare route Daily. - Each Nare    Cosign for Ordering: Accepted by Florence Rivera MD on 11/5/2019  2:24 PM    furosemide (LASIX) tablet 20 mg 20 mg Daily 11/6/2019     Sig - Route: Take 1 tablet by mouth Daily. - Oral    gabapentin (NEURONTIN) capsule 400 mg 400 mg Every 8 Hours Scheduled 11/5/2019     Sig - Route: Take 1 capsule by mouth Every 8 (Eight) Hours. - Oral    Cosign for Ordering: Accepted by Florence Rivera MD on 11/5/2019  2:24 PM    heparin (porcine) 5000 UNIT/ML injection 5,000 Units 5,000 Units Every 12 Hours Scheduled 11/5/2019     Sig - Route: Inject 1 mL under the skin into the appropriate area as directed Every 12 (Twelve) Hours. - Subcutaneous    HYDROcodone-acetaminophen (NORCO)  MG per tablet 1 tablet 1 tablet Every 8 Hours PRN 11/5/2019     Sig - Route: Take 1 tablet by mouth Every 8 (Eight) Hours As Needed for Moderate Pain . - Oral    Cosign for Ordering: Accepted  by Florence Rivera MD on 11/5/2019  2:24 PM    I-deborah tablet 1 tablet 1 tablet 2 Times Daily 11/5/2019     Sig - Route: Take 1 tablet by mouth 2 (Two) Times a Day. - Oral    Cosign for Ordering: Accepted by Florence Rivera MD on 11/5/2019  2:24 PM    ipratropium-albuterol (DUO-NEB) nebulizer solution 3 mL 3 mL Every 6 Hours PRN 11/5/2019     Sig - Route: Take 3 mL by nebulization Every 6 (Six) Hours As Needed for Wheezing. - Nebulization    Cosign for Ordering: Accepted by Florence Rivera MD on 11/5/2019  2:24 PM    lactobacillus acidophilus (RISAQUAD) capsule 1 capsule 1 capsule Daily 11/5/2019     Sig - Route: Take 1 capsule by mouth Daily. - Oral    Cosign for Ordering: Accepted by Florence Rivera MD on 11/5/2019  2:24 PM    lactulose (CHRONULAC) 10 GM/15ML solution 20 g 20 g Daily 11/5/2019     Sig - Route: Take 30 mL by mouth Daily. - Oral    levETIRAcetam (KEPPRA) tablet 500 mg 500 mg Nightly 11/5/2019     Sig - Route: Take 1 tablet by mouth Every Night. - Oral    Cosign for Ordering: Accepted by Florence Rivera MD on 11/5/2019  2:24 PM    multivitamin (DAILY DEBORAH) tablet 1 tablet 1 tablet Daily 11/5/2019     Sig - Route: Take 1 tablet by mouth Daily. - Oral    Cosign for Ordering: Accepted by Florence Rivera MD on 11/5/2019  2:24 PM    nitroglycerin (NITROSTAT) SL tablet 0.4 mg 0.4 mg Every 5 Minutes PRN 11/5/2019     Sig - Route: Place 1 tablet under the tongue Every 5 (Five) Minutes As Needed for Chest Pain (Only if SBP Greater Than 100). - Sublingual    OLANZapine (zyPREXA) tablet 2.5 mg 2.5 mg Nightly 11/5/2019     Sig - Route: Take 1 tablet by mouth Every Night. - Oral    Cosign for Ordering: Accepted by Florence Rivera MD on 11/5/2019  2:24 PM    oxybutynin XL (DITROPAN-XL) 24 hr tablet 10 mg 10 mg Daily 11/5/2019     Sig - Route: Take 2 tablets by mouth Daily. - Oral    Cosign for Ordering: Accepted by Florence Rivera MD on 11/5/2019  2:24 PM    Polyvinyl Alcohol-Povidone PF  "(HYPOTEARS) 1.4-0.6 % ophthalmic solution 1 drop 1 drop Every 4 Hours PRN 11/5/2019     Sig - Route: Administer 1 drop to both eyes Every 4 (Four) Hours As Needed for Dry Eyes. - Both Eyes    Cosign for Ordering: Accepted by Florence Rivera MD on 11/5/2019  2:24 PM    sodium chloride 0.9 % flush 10 mL 10 mL As Needed 11/5/2019     Sig - Route: Infuse 10 mL into a venous catheter As Needed for Line Care. - Intravenous    Linked Group 1:  \"And\" Linked Group Details        sodium chloride 0.9 % flush 10 mL 10 mL Every 12 Hours Scheduled 11/5/2019     Sig - Route: Infuse 10 mL into a venous catheter Every 12 (Twelve) Hours. - Intravenous    sodium chloride 0.9 % flush 10 mL 10 mL As Needed 11/5/2019     Sig - Route: Infuse 10 mL into a venous catheter As Needed for Line Care. - Intravenous            Lab Results (last 24 hours)     Procedure Component Value Units Date/Time    Blood Culture - Blood, Hand, Right [[id number]] Collected:  11/06/19 1117    Specimen:  Blood from Hand, Right Updated:  11/07/19 1130     Blood Culture No growth at 24 hours    Blood Culture - Blood, Arm, Right [[id number]] Collected:  11/06/19 1013    Specimen:  Blood from Arm, Right Updated:  11/07/19 1030     Blood Culture No growth at 24 hours    Blood Culture - Blood, Arm, Right [[id number]]  (Abnormal) Collected:  11/05/19 0236    Specimen:  Blood from Arm, Right Updated:  11/07/19 0703     Blood Culture Escherichia coli     Comment: Refer to previous blood culture collected on 11/05 at 0213 for VIOLA's.         Gram Stain Aerobic Bottle Gram negative bacilli      Anaerobic Bottle Gram negative bacilli    Narrative:       No BCID performed     Blood Culture - Blood, Arm, Right [[id number]]  (Abnormal)  (Susceptibility) Collected:  11/05/19 0213    Specimen:  Blood from Arm, Right Updated:  11/07/19 0700     Blood Culture Escherichia coli     Gram Stain Anaerobic Bottle Gram negative bacilli      Aerobic Bottle Gram negative bacilli    " Susceptibility      Escherichia coli     VIOLA     Ampicillin Resistant     Ampicillin + Sulbactam Resistant     Cefazolin Susceptible     Cefepime Susceptible     Ceftazidime Susceptible     Ceftriaxone Susceptible     Gentamicin Susceptible     Levofloxacin Susceptible     Piperacillin + Tazobactam Susceptible     Trimethoprim + Sulfamethoxazole Susceptible                    C-reactive Protein [[id number]]  (Abnormal) Collected:  11/07/19 0431    Specimen:  Blood Updated:  11/07/19 0539     C-Reactive Protein 15.66 mg/dL     Comprehensive Metabolic Panel [[id number]]  (Abnormal) Collected:  11/07/19 0431    Specimen:  Blood Updated:  11/07/19 0539     Glucose 143 mg/dL      BUN 12 mg/dL      Creatinine 0.77 mg/dL      Sodium 140 mmol/L      Potassium 3.7 mmol/L      Chloride 103 mmol/L      CO2 25.2 mmol/L      Calcium 9.0 mg/dL      Total Protein 7.5 g/dL      Albumin 3.03 g/dL      ALT (SGPT) 15 U/L      AST (SGOT) 19 U/L      Alkaline Phosphatase 127 U/L      Total Bilirubin 0.2 mg/dL      eGFR Non African Amer 73 mL/min/1.73      Globulin 4.5 gm/dL      A/G Ratio 0.7 g/dL      BUN/Creatinine Ratio 15.6     Anion Gap 11.8 mmol/L     Narrative:       GFR Normal >60  Chronic Kidney Disease <60  Kidney Failure <15    Phosphorus [[id number]]  (Normal) Collected:  11/07/19 0431    Specimen:  Blood Updated:  11/07/19 0539     Phosphorus 3.5 mg/dL     Magnesium [[id number]]  (Normal) Collected:  11/07/19 0431    Specimen:  Blood Updated:  11/07/19 0539     Magnesium 2.3 mg/dL     CBC & Differential [[id number]] Collected:  11/07/19 0431    Specimen:  Blood Updated:  11/07/19 0512    Narrative:       The following orders were created for panel order CBC & Differential.  Procedure                               Abnormality         Status                     ---------                               -----------         ------                     CBC Auto Differential[[id number]]        Abnormal            Final result                  Please view results for these tests on the individual orders.    CBC Auto Differential [[id number]]  (Abnormal) Collected:  11/07/19 0431    Specimen:  Blood Updated:  11/07/19 0512     WBC 16.52 10*3/mm3      RBC 3.85 10*6/mm3      Hemoglobin 11.1 g/dL      Hematocrit 35.9 %      MCV 93.2 fL      MCH 28.8 pg      MCHC 30.9 g/dL      RDW 13.2 %      RDW-SD 44.9 fl      MPV 10.2 fL      Platelets 186 10*3/mm3      Neutrophil % 71.1 %      Lymphocyte % 15.5 %      Monocyte % 11.6 %      Eosinophil % 1.1 %      Basophil % 0.2 %      Immature Grans % 0.5 %      Neutrophils, Absolute 11.75 10*3/mm3      Lymphocytes, Absolute 2.56 10*3/mm3      Monocytes, Absolute 1.91 10*3/mm3      Eosinophils, Absolute 0.18 10*3/mm3      Basophils, Absolute 0.03 10*3/mm3      Immature Grans, Absolute 0.09 10*3/mm3      nRBC 0.0 /100 WBC     Urine Culture - Urine, Urine, Catheter In/Out [[id number]]  (Abnormal)  (Susceptibility) Collected:  11/05/19 0427    Specimen:  Urine, Catheter In/Out Updated:  11/07/19 0421     Urine Culture Escherichia coli    Susceptibility      Escherichia coli     VIOLA     Ampicillin Resistant     Ampicillin + Sulbactam Resistant     Cefazolin Susceptible     Cefepime Susceptible     Ceftazidime Susceptible     Ceftriaxone Susceptible     Gentamicin Susceptible     Levofloxacin Susceptible     Nitrofurantoin Susceptible     Piperacillin + Tazobactam Susceptible     Tetracycline Susceptible     Trimethoprim + Sulfamethoxazole Susceptible                        Imaging Results (Last 24 Hours)     ** No results found for the last 24 hours. **        Orders (last 24 hrs)     Start     Ordered    11/08/19 0000  cefTRIAXone (ROCEPHIN) 2 g/100 mL 0.9% NS VTB (FERN)  Every 24 Hours      11/07/19 1223    11/07/19 1215  Discharge patient  Once      11/07/19 1223    11/07/19 0600  C-reactive Protein  Daily      11/06/19 1001    11/07/19 0600  CBC & Differential  Morning Draw      11/06/19 1320    11/07/19 0600   Comprehensive Metabolic Panel  Morning Draw      11/06/19 1320    11/07/19 0600  Phosphorus  Morning Draw      11/06/19 1320    11/07/19 0600  Magnesium  Morning Draw      11/06/19 1320    11/07/19 0600  CBC Auto Differential  PROCEDURE ONCE      11/07/19 0002    11/07/19 0000  ALPRAZolam (XANAX) 0.5 MG tablet  Every 12 Hours      11/07/19 1223    11/07/19 0000  HYDROcodone-acetaminophen (NORCO)  MG per tablet  Every 8 Hours PRN      11/07/19 1223    11/07/19 0000  gabapentin (NEURONTIN) 400 MG capsule  3 Times Daily      11/07/19 1223    11/07/19 0000  Up WIth Assist      11/07/19 1223    11/07/19 0000  Diet: Soft Texture; Thin Liquids, No Restrictions; Chopped      11/07/19 1223    11/07/19 0000  Discharge Follow-up with PCP      11/07/19 1223    11/06/19 1500  furosemide (LASIX) tablet 20 mg  Daily      11/06/19 1318    11/05/19 2100  levETIRAcetam (KEPPRA) tablet 500 mg  Nightly      11/05/19 1028    11/05/19 2100  OLANZapine (zyPREXA) tablet 2.5 mg  Nightly      11/05/19 1028    11/05/19 1200  amLODIPine (NORVASC) tablet 10 mg  Every 24 Hours Scheduled      11/05/19 1028    11/05/19 1200  cholecalciferol (VITAMIN D3) tablet 2,000 Units  Daily      11/05/19 1028    11/05/19 1200  DULoxetine (CYMBALTA) DR capsule 90 mg  Daily      11/05/19 1028    11/05/19 1200  flecainide (TAMBOCOR) tablet 50 mg  Every 12 Hours Scheduled      11/05/19 1028    11/05/19 1200  fluticasone (FLONASE) 50 MCG/ACT nasal spray 1 spray  Daily      11/05/19 1028    11/05/19 1200  gabapentin (NEURONTIN) capsule 400 mg  Every 8 Hours Scheduled      11/05/19 1028    11/05/19 1200  oxybutynin XL (DITROPAN-XL) 24 hr tablet 10 mg  Daily      11/05/19 1028    11/05/19 1200  multivitamin (DAILY DEBORAH) tablet 1 tablet  Daily      11/05/19 1028    11/05/19 1200  cetirizine (zyrTEC) tablet 5 mg  Daily      11/05/19 1028    11/05/19 1200  I-deborah tablet 1 tablet  2 Times Daily      11/05/19 1028    11/05/19 1200  lactobacillus acidophilus  (RISAQUAD) capsule 1 capsule  Daily      11/05/19 1028    11/05/19 1200  lactulose (CHRONULAC) 10 GM/15ML solution 20 g  Daily      11/05/19 1033    11/05/19 1115  ALPRAZolam (XANAX) tablet 0.5 mg  Every 12 Hours Scheduled      11/05/19 1028    11/05/19 1027  calcium carbonate (TUMS) chewable tablet 500 mg (200 mg elemental)  Every 6 Hours PRN      11/05/19 1028    11/05/19 1027  HYDROcodone-acetaminophen (NORCO)  MG per tablet 1 tablet  Every 8 Hours PRN      11/05/19 1028    11/05/19 1027  ipratropium-albuterol (DUO-NEB) nebulizer solution 3 mL  Every 6 Hours PRN      11/05/19 1028    11/05/19 1027  Polyvinyl Alcohol-Povidone PF (HYPOTEARS) 1.4-0.6 % ophthalmic solution 1 drop  Every 4 Hours PRN      11/05/19 1028    11/05/19 0900  sodium chloride 0.9 % flush 10 mL  Every 12 Hours Scheduled      11/05/19 0613    11/05/19 0900  heparin (porcine) 5000 UNIT/ML injection 5,000 Units  Every 12 Hours Scheduled      11/05/19 0613    11/05/19 0745  famotidine (PEPCID) tablet 20 mg  2 Times Daily Before Meals      11/05/19 0652    11/05/19 0700  cefTRIAXone (ROCEPHIN) 2 g/100 mL 0.9% NS VTB (FERN)  Every 24 Hours      11/05/19 0613    11/05/19 0613  sodium chloride 0.9 % flush 10 mL  As Needed      11/05/19 0613    11/05/19 0613  nitroglycerin (NITROSTAT) SL tablet 0.4 mg  Every 5 Minutes PRN      11/05/19 0613    11/05/19 0136  sodium chloride 0.9 % flush 10 mL  As Needed      11/05/19 0137    Unscheduled  Telemetry - Pulse Oximetry  Continuous PRN     Comments:  If Patient Develops Unresponsiveness, Acute Dyspnea, Cyanosis or Suspected Hypoxemia Start Continuous Pulse Ox Monitoring, Apply Oxygen & Notify Provider    11/05/19 0613    Unscheduled  ECG 12 Lead  As Needed     Comments:  Nurse to Release if Patient Expericences Acute Chest Pain or Dysrhythmias    11/05/19 0613    Unscheduled  Potassium  As Needed     Comments:  For Ventricular Arrhythmias      11/05/19 0613    Unscheduled  Magnesium  As Needed      Comments:  For Ventricular Arrhythmias      11/05/19 0613    Unscheduled  Troponin  As Needed     Comments:  For Chest Pain      11/05/19 0613    Unscheduled  Digoxin Level  As Needed     Comments:  For Atrial Arrhythmias      11/05/19 0613    Unscheduled  Blood Gas, Arterial  As Needed     Comments:  Per O2 PolicyNotify Physician      11/05/19 0613    Unscheduled  Up With Assistance  As Needed      11/05/19 0613    --  Psyllium (REGULOID) 400 MG capsule  Daily      11/05/19 0923    --  Probiotic Product (RISAQUAD-2) capsule capsule  Daily      11/05/19 0923    --  levETIRAcetam (KEPPRA) 500 MG tablet  Nightly      11/05/19 0923    --  lactulose (CHRONULAC) 10 GM/15ML solution  Daily      11/05/19 0923    --  potassium chloride (K-DUR,KLOR-CON) 10 MEQ CR tablet  Daily      11/05/19 0923    --  furosemide (LASIX) 20 MG tablet  Daily      11/05/19 0923    --  fluticasone (FLONASE) 50 MCG/ACT nasal spray  Daily      11/05/19 0923    --  Cholecalciferol (VITAMIN D) 50 MCG (2000 UT) tablet  Daily      11/05/19 0923    --  Multiple Vitamins-Minerals (ICAPS AREDS 2) capsule  2 Times Daily      11/05/19 0923    --  calcium carbonate (TUMS) 500 MG chewable tablet  Every 6 Hours PRN      11/05/19 0923    --  dextromethorphan-guaifenesin (ROBITUSSIN-DM)  MG/5ML syrup  Every 6 Hours PRN      11/05/19 0923    --  ipratropium-albuterol (DUO-NEB) 0.5-2.5 mg/3 ml nebulizer  Every 6 Hours PRN      11/05/19 0923    --  cycloSPORINE (RESTASIS) 0.05 % ophthalmic emulsion  2 Times Daily PRN      11/05/19 0923    --  ondansetron ODT (ZOFRAN-ODT) 4 MG disintegrating tablet  Every 8 Hours PRN      11/05/19 0923    --  HYDROcodone-acetaminophen (NORCO)  MG per tablet  Every 8 Hours PRN,   Status:  Discontinued      11/05/19 0923    --  SCANNED - TELEMETRY        11/05/19 0000    --  SCANNED - TELEMETRY        11/05/19 0000          Operative/Procedure Notes (most recent note)     No notes of this type exist for this encounter.            Physician Progress Notes (most recent note)      Albania Collins PA-C at 19 [id number]                     PROGRESS NOTE         Patient Identification:  Name:  Domonique Cummings  Age:  74 y.o.  Sex:  female  :  1945  MRN:  [id number]  Visit Number:  [id number]  Primary Care Provider:  Skinny Meehan MD      ----------------------------------------------------------------------------------------------------------------------  Subjective       Chief Complaints:    Shortness of Breath and Chest Pain        Interval History:      The patient was resting comfortably this morning with no complaints. CRP showing improvement as well as leukocytosis. Urine and blood cultures finalized as a fairly susceptible E coli. No fever or diarrhea reported overnight. Case discussed with Dr. Rivera.     Review of Systems:    Constitutional: no fever, chills and night sweats. No appetite change or unexpected weight change. No fatigue.  Eyes: no eye drainage, itching or redness.  HEENT: no mouth sores, dysphagia or nose bleed.  Respiratory: no for shortness of breath, cough or production of sputum.  Cardiovascular: no chest pain, no palpitations, no orthopnea.  Gastrointestinal: no nausea, vomiting or diarrhea. No abdominal pain, hematemesis or rectal bleeding.  Genitourinary: no dysuria or polyuria.  Hematologic/lymphatic: no lymph node abnormalities, no easy bruising or easy bleeding.  Musculoskeletal: no muscle or joint pain.  Skin: No rash and no itching.  Neurological: no loss of consciousness, no seizure, no headache.  Behavioral/Psych: no depression or suicidal ideation.  Endocrine: no hot flashes.  Immunologic: negative.  ----------------------------------------------------------------------------------------------------------------------      Objective       [facility] Meds:    ALPRAZolam 0.5 mg Oral Q12H   amLODIPine 10 mg Oral Q24H   ceftriaxone 2 g Intravenous Q24H   cetirizine 5 mg Oral Daily    cholecalciferol 2,000 Units Oral Daily   DULoxetine 90 mg Oral Daily   famotidine 20 mg Oral BID AC   flecainide 50 mg Oral Q12H   fluticasone 1 spray Each Nare Daily   furosemide 20 mg Oral Daily   gabapentin 400 mg Oral Q8H   heparin (porcine) 5,000 Units Subcutaneous Q12H   I-sophia 1 tablet Oral BID   lactobacillus acidophilus 1 capsule Oral Daily   lactulose 20 g Oral Daily   levETIRAcetam 500 mg Oral Nightly   multivitamin 1 tablet Oral Daily   OLANZapine 2.5 mg Oral Nightly   oxybutynin XL 10 mg Oral Daily   sodium chloride 10 mL Intravenous Q12H        ----------------------------------------------------------------------------------------------------------------------    Vital Signs:  Temp:  [97.8 °F (36.6 °C)-98.7 °F (37.1 °C)] 98.3 °F (36.8 °C)  Heart Rate:  [] 85  Resp:  [18-20] 20  BP: (123-141)/(65-79) 127/68  Mean Arterial Pressure (Non-Invasive) for the past 24 hrs (Last 3 readings):   Noninvasive MAP (mmHg)   11/07/19 0307 99     SpO2 Percentage    11/07/19 0307 11/07/19 0630 11/07/19 0743   SpO2: 95% 95% 96%     SpO2:  [95 %-96 %] 96 %  on  Flow (L/min):  [2] 2;   Device (Oxygen Therapy): nasal cannula    Body mass index is 40.9 kg/m².  Wt Readings from Last 3 Encounters:   11/05/19 110 kg (242 lb)   10/29/19 110 kg (243 lb)   09/03/19 106 kg (234 lb)        Intake/Output Summary (Last 24 hours) at 11/7/2019 1331  Last data filed at 11/7/2019 1300  Gross per 24 hour   Intake 720 ml   Output --   Net 720 ml     Diet Soft Texture; Chopped; Thin  ----------------------------------------------------------------------------------------------------------------------    Physical exam:    Constitutional:  Well-developed and well-nourished.  No respiratory distress. Morbidly obese.   HENT:  Head: Normocephalic and atraumatic.  Mouth:  Moist mucous membranes.    Eyes:  Conjunctivae and EOM are normal.  No scleral icterus.  Neck:  Neck supple.  No JVD present.    Cardiovascular:  Normal rate, regular  rhythm and normal heart sounds with no murmur. No edema.  Pulmonary/Chest:  No respiratory distress, no wheezes, no crackles, with diminished lung sounds in the bases.  Abdominal:  Soft.  Bowel sounds are normal.  No distension and no tenderness.   Musculoskeletal:  No edema, no tenderness, and no deformity.  No swelling or redness of joints.  Neurological:  Alert and oriented to person, place, and time.  No facial droop.  No slurred speech.   Skin:  Skin is warm and dry.  No rash noted.  No pallor.   Psychiatric:  Normal mood and affect.  Behavior is normal.      ----------------------------------------------------------------------------------------------------------------------  Results from last 7 days   Lab Units 11/05/19  0213   TROPONIN T ng/mL <0.010     Results from last 7 days   Lab Units 11/05/19  0213   PROBNP pg/mL 269.7       Results from last 7 days   Lab Units 11/05/19  0130   PH, ARTERIAL pH units 7.435   PO2 ART mm Hg 58.5*   PCO2, ARTERIAL mm Hg 40.1   HCO3 ART mmol/L 26.9*     Results from last 7 days   Lab Units 11/07/19  0431 11/06/19  0626 11/05/19  1739 11/05/19  1055 11/05/19  0656 11/05/19  0603   CRP mg/dL 15.66* 19.09*  --   --   --   --    LACTATE mmol/L  --   --  1.5 1.5  --  1.5   WBC 10*3/mm3 16.52* 21.23*  --   --  28.30*  --    HEMOGLOBIN g/dL 11.1* 11.0*  --   --  11.1*  --    HEMATOCRIT % 35.9 35.6  --   --  34.5  --    MCV fL 93.2 96.2  --   --  92.7  --    MCHC g/dL 30.9* 30.9*  --   --  32.2  --    PLATELETS 10*3/mm3 186 153  --   --  201  --      Results from last 7 days   Lab Units 11/07/19  0431 11/06/19  0626 11/05/19  0656   SODIUM mmol/L 140 141 140   POTASSIUM mmol/L 3.7 3.9 4.3   MAGNESIUM mg/dL 2.3 2.2  --    CHLORIDE mmol/L 103 105 105   CO2 mmol/L 25.2 25.9 22.8   BUN mg/dL 12 11 16   CREATININE mg/dL 0.77 0.65 0.85   EGFR IF NONAFRICN AM mL/min/1.73 73 89 65   CALCIUM mg/dL 9.0 8.7 8.7   GLUCOSE mg/dL 143* 118* 145*   ALBUMIN g/dL 3.03* 2.84* 3.12*   BILIRUBIN mg/dL  0.2 0.2 0.3   ALK PHOS U/L 127* 128* 123*   AST (SGOT) U/L 19 24 20   ALT (SGPT) U/L 15 14 14   Estimated Creatinine Clearance: 74.3 mL/min (by C-G formula based on SCr of 0.77 mg/dL).  No results found for: AMMONIA    Hemoglobin A1C   Date/Time Value Ref Range Status   11/05/2019 0656 6.40 (H) 4.80 - 5.60 % Final     Lab Results   Component Value Date    HGBA1C 6.40 (H) 11/05/2019     Lab Results   Component Value Date    TSH 1.839 10/19/2018    FREET4 0.77 (L) 06/25/2014       Blood Culture   Date Value Ref Range Status   11/06/2019 No growth at 24 hours  Preliminary   11/06/2019 No growth at 24 hours  Preliminary   11/05/2019 Escherichia coli (A)  Final     Comment:     Refer to previous blood culture collected on 11/05 at 0213 for VIOLA's.    11/05/2019 Escherichia coli (A)  Final     Urine Culture   Date Value Ref Range Status   11/05/2019 Escherichia coli (A)  Final              Pain Management Panel     Pain Management Panel Latest Ref Rng & Units 12/11/2017    AMPHETAMINES SCREEN, URINE Negative Negative    BARBITURATES SCREEN Negative Negative    BENZODIAZEPINE SCREEN, URINE Negative Positive(A)    BUPRENORPHINEUR Negative Negative    COCAINE SCREEN, URINE Negative Negative    METHADONE SCREEN, URINE Negative Negative          I have personally reviewed the above laboratory results.   ----------------------------------------------------------------------------------------------------------------------  Imaging Results (Last 24 Hours)     ** No results found for the last 24 hours. **        I have personally reviewed the above radiology results.   ----------------------------------------------------------------------------------------------------------------------    Assessment/Plan       Assessment/Plan     ASSESSMENT:    1. Severe sepsis with lactic acid greater than 2 on admission   2. Acute pyelonephritis     PLAN:     The patient was resting comfortably this morning with no complaints. CRP showing  improvement as well as leukocytosis. Urine and blood cultures finalized as a fairly susceptible E coli. No fever or diarrhea reported overnight. Case discussed with Dr. Rivera with possible discharge back to the nursing home. Would recommend a 10 day course of antibiotic therapy with Rocephin based on culture results through 11/15/19.     Blood cultures from 19 2 out of 2 sets positive for E. Coli. Chest x-ray unremarkable. CT of chest reports no evidence of pulmonary embolism.      Code Status:   Code Status and Medical Interventions:   Ordered at: 19 0516     Code Status:    CPR     Medical Interventions (Level of Support Prior to Arrest):    Full       Albania Collins PA-C  19  1:31 PM      Electronically signed by Albania Collins PA-C at 19 1335          Consult Notes (most recent note)      Terrance Cantu MD at 19 0954      Consult Orders    1. Inpatient Infectious Diseases Consult [[id number]] ordered by Florence Rivera MD at 19 1620                        INFECTIOUS DISEASE CONSULTATION REPORT        Patient Identification:  Name:  Domonique Cummings  Age:  74 y.o.  Sex:  female  :  1945  MRN:  [id number]   Visit Number:  [id number]  Primary Care Physician:  Skinny Meehan MD         Subjective       Subjective     History of present illness:      Thank you Dr. Rivera for allowing us to participate in the care of your patient.  As you well know, Ms. Domonique Cummings is a 74 y.o. female nursing home resident with past medical history significant for Obstructive sleep apnea, arthritis, hypertension, GERD, who presented to [facility] on 2019 for shortness of air. WBC improving at 21.23. CRP 19.09. Lactic acid 2.6 on admission, now normalized. Urinalysis positive with culture pending. Blood cultures from 19 2 out of 2 sets positive for E. Coli. Chest x-ray unremarkable. CT of chest reports no evidence of  pulmonary embolism. Afebrile, no diarrhea.      Infectious Disease consultation was requested for antimicrobial management.      ---------------------------------------------------------------------------------------------------------------------     Review Of Systems:    Constitutional: no fever, chills and night sweats. No appetite change or unexpected weight change. No fatigue.  Eyes: no eye drainage, itching or redness.  HEENT: no mouth sores, dysphagia or nose bleed.  Respiratory: no for shortness of breath, cough or production of sputum.  Cardiovascular: no chest pain, no palpitations, no orthopnea.  Gastrointestinal: no nausea, vomiting or diarrhea. No abdominal pain, hematemesis or rectal bleeding.  Genitourinary: no dysuria or polyuria.  Hematologic/lymphatic: no lymph node abnormalities, no easy bruising or easy bleeding.  Musculoskeletal: no muscle or joint pain.  Skin: No rash and no itching.  Neurological: no loss of consciousness, no seizure, no headache.  Behavioral/Psych: no depression or suicidal ideation.  Endocrine: no hot flashes.  Immunologic: negative.    ---------------------------------------------------------------------------------------------------------------------     Past Medical History    Past Medical History:   Diagnosis Date   • Arthritis    • Asthma    • Degenerative disc disease, lumbar    • Diabetes mellitus (CMS/HCC)    • Fibromyalgia    • GERD (gastroesophageal reflux disease)    • Hyperlipidemia    • Hypertension    • ENE (obstructive sleep apnea)     Noncompliant with BiPAP/CPAP   • Osteoporosis        Past Surgical History    Past Surgical History:   Procedure Laterality Date   • APPENDECTOMY     • CARDIAC CATHETERIZATION N/A 10/30/2018    Procedure: Left Heart Cath;  Surgeon: Arturo Inman MD;  Location: [facility] CATH INVASIVE LOCATION;  Service: Cardiology   • ESOPHAGEAL DILATATION     • TOTAL SHOULDER REPLACEMENT Bilateral    • TUBAL ABDOMINAL LIGATION         Family  History    Family History   Problem Relation Age of Onset   • Breast cancer Sister        Social History    Social History     Tobacco Use   • Smoking status: Former Smoker   • Smokeless tobacco: Never Used   • Tobacco comment: She quit at least 30 years ago.   Substance Use Topics   • Alcohol use: No   • Drug use: No       Allergies    Biaxin [clarithromycin]  ---------------------------------------------------------------------------------------------------------------------     Home Medications:    Prior to Admission Medications     Prescriptions Last Dose Informant Patient Reported? Taking?    ALPRAZolam (XANAX) 0.5 MG tablet 11/4/2019 Nursing Home Yes Yes    Take 0.5 mg by mouth Every 12 (Twelve) Hours.    amLODIPine (NORVASC) 10 MG tablet 11/4/2019 Nursing Home No Yes    Take 1 tablet by mouth Daily.    celecoxib (CELEBREX) 200 MG capsule 11/4/2019 Nursing Home Yes Yes    Take 200 mg by mouth Daily.    Cholecalciferol (VITAMIN D) 50 MCG (2000 UT) tablet 11/4/2019 Nursing Home Yes Yes    Take 2,000 Units by mouth Daily.    DULoxetine (CYMBALTA) 30 MG capsule 11/4/2019 [facility] No Yes    Take 3 capsules by mouth Daily.    flecainide (TAMBOCOR) 50 MG tablet 11/4/2019 Nursing Home No Yes    Take 1 tablet by mouth Every 12 (Twelve) Hours.    fluticasone (FLONASE) 50 MCG/ACT nasal spray 11/4/2019 Nursing Home Yes Yes    1 spray into the nostril(s) as directed by provider Daily.    furosemide (LASIX) 20 MG tablet 11/4/2019 Nursing Home Yes Yes    Take 20 mg by mouth Daily.    gabapentin (NEURONTIN) 400 MG capsule 11/4/2019 Nursing Home Yes Yes    Take 400 mg by mouth 3 (Three) Times a Day.    lactulose (CHRONULAC) 10 GM/15ML solution 11/4/2019 Nursing Home Yes Yes    Take 20 g by mouth Daily.    levETIRAcetam (KEPPRA) 500 MG tablet 11/4/2019 Nursing Home Yes Yes    Take 500 mg by mouth Every Night.    loratadine (CLARITIN) 10 MG tablet 11/4/2019 Nursing Home Yes Yes    Take 10 mg by mouth Daily.    Multiple  Vitamins-Minerals (ICAPS AREDS 2) capsule 11/4/2019 Nursing Home Yes Yes    Take 1 capsule by mouth 2 (Two) Times a Day.    multivitamin (DAILY DEBORAH) tablet tablet 11/4/2019 Nursing Home Yes Yes    Take 1 tablet by mouth Daily.    OLANZapine (zyPREXA) 2.5 MG tablet 11/4/2019 Nursing Home No Yes    Take 1 tablet by mouth Every Night.    oxybutynin XL (DITROPAN-XL) 10 MG 24 hr tablet 11/4/2019 Nursing Home Yes Yes    Take 10 mg by mouth Daily.    potassium chloride (K-DUR,KLOR-CON) 10 MEQ CR tablet 11/4/2019 Nursing Home Yes Yes    Take 10 mEq by mouth Daily.    Probiotic Product (RISAQUAD-2) capsule capsule 11/4/2019 Nursing Home Yes Yes    Take 1 capsule by mouth Daily.    Psyllium (REGULOID) 400 MG capsule 11/4/2019 Nursing Home Yes Yes    Take 2 capsules by mouth Daily.    raNITIdine (ZANTAC) 300 MG tablet 11/4/2019 Nursing Home No Yes    Take 1 tablet by mouth 2 (Two) Times a Day.    calcium carbonate (TUMS) 500 MG chewable tablet Unknown Nursing Home Yes No    Chew 1 tablet Every 6 (Six) Hours As Needed for Indigestion or Heartburn.    cycloSPORINE (RESTASIS) 0.05 % ophthalmic emulsion Unknown Nursing Home Yes No    Administer 1 drop to both eyes 2 (Two) Times a Day As Needed (dry eye).    dextromethorphan-guaifenesin (ROBITUSSIN-DM)  MG/5ML syrup Unknown Nursing Home Yes No    Take 10 mL by mouth Every 6 (Six) Hours As Needed (cough).    HYDROcodone-acetaminophen (NORCO)  MG per tablet Unknown Nursing Home Yes No    Take 1 tablet by mouth Every 8 (Eight) Hours As Needed for Moderate Pain .    ipratropium-albuterol (DUO-NEB) 0.5-2.5 mg/3 ml nebulizer Unknown Nursing Home Yes No    Take 3 mL by nebulization Every 6 (Six) Hours As Needed for Wheezing.    ondansetron ODT (ZOFRAN-ODT) 4 MG disintegrating tablet Unknown Nursing Home Yes No    Take 4 mg by mouth Every 8 (Eight) Hours As Needed for Nausea or Vomiting.         ---------------------------------------------------------------------------------------------------------------------    Objective       Objective     Hospital Scheduled Meds:    ALPRAZolam 0.5 mg Oral Q12H   amLODIPine 10 mg Oral Q24H   ceftriaxone 2 g Intravenous Q24H   cetirizine 5 mg Oral Daily   cholecalciferol 2,000 Units Oral Daily   DULoxetine 90 mg Oral Daily   famotidine 20 mg Oral BID AC   flecainide 50 mg Oral Q12H   fluticasone 1 spray Each Nare Daily   gabapentin 400 mg Oral Q8H   heparin (porcine) 5,000 Units Subcutaneous Q12H   I-sophia 1 tablet Oral BID   lactobacillus acidophilus 1 capsule Oral Daily   lactulose 20 g Oral Daily   levETIRAcetam 500 mg Oral Nightly   multivitamin 1 tablet Oral Daily   OLANZapine 2.5 mg Oral Nightly   oxybutynin XL 10 mg Oral Daily   sodium chloride 10 mL Intravenous Q12H        ---------------------------------------------------------------------------------------------------------------------   Vital Signs:  Temp:  [98.1 °F (36.7 °C)-98.9 °F (37.2 °C)] 98.5 °F (36.9 °C)  Heart Rate:  [72-97] 89  Resp:  [17-20] 20  BP: (118-154)/(69-95) 118/69  Mean Arterial Pressure (Non-Invasive) for the past 24 hrs (Last 3 readings):   Noninvasive MAP (mmHg)   11/05/19 1420 111   11/05/19 1044 103     SpO2 Percentage    11/05/19 2300 11/06/19 0644 11/06/19 0654   SpO2: 97% 95% 95%     SpO2:  [95 %-97 %] 95 %  on  Flow (L/min):  [2] 2;   Device (Oxygen Therapy): nasal cannula    Body mass index is 40.9 kg/m².  Wt Readings from Last 3 Encounters:   11/05/19 110 kg (242 lb)   10/29/19 110 kg (243 lb)   09/03/19 106 kg (234 lb)     ---------------------------------------------------------------------------------------------------------------------     Physical Exam:    Constitutional:  Well-developed and well-nourished.  No respiratory distress. Morbidly obese.   HENT:  Head: Normocephalic and atraumatic.  Mouth:  Moist mucous membranes.    Eyes:  Conjunctivae and EOM are normal.   No scleral icterus.  Neck:  Neck supple.  No JVD present.    Cardiovascular:  Normal rate, regular rhythm and normal heart sounds with no murmur. No edema.  Pulmonary/Chest:  No respiratory distress, no wheezes, no crackles, with diminished lung sounds in the bases.  Abdominal:  Soft.  Bowel sounds are normal.  No distension and no tenderness.   Musculoskeletal:  No edema, no tenderness, and no deformity.  No swelling or redness of joints.  Neurological:  Alert and oriented to person, place, and time.  No facial droop.  No slurred speech.   Skin:  Skin is warm and dry.  No rash noted.  No pallor.   Psychiatric:  Normal mood and affect.  Behavior is normal.    ---------------------------------------------------------------------------------------------------------------------    Results from last 7 days   Lab Units 11/05/19  0213   TROPONIN T ng/mL <0.010     Results from last 7 days   Lab Units 11/05/19  0213   PROBNP pg/mL 269.7       Results from last 7 days   Lab Units 11/05/19  0130   PH, ARTERIAL pH units 7.435   PO2 ART mm Hg 58.5*   PCO2, ARTERIAL mm Hg 40.1   HCO3 ART mmol/L 26.9*     Results from last 7 days   Lab Units 11/06/19  0626 11/05/19  1739 11/05/19  1055 11/05/19  0656 11/05/19  0603 11/05/19  0213   CRP mg/dL 19.09*  --   --   --   --   --    LACTATE mmol/L  --  1.5 1.5  --  1.5 2.6*   WBC 10*3/mm3 21.23*  --   --  28.30*  --  27.36*   HEMOGLOBIN g/dL 11.0*  --   --  11.1*  --  11.8*   HEMATOCRIT % 35.6  --   --  34.5  --  35.8   MCV fL 96.2  --   --  92.7  --  90.9   MCHC g/dL 30.9*  --   --  32.2  --  33.0   PLATELETS 10*3/mm3 153  --   --  201  --  211     Results from last 7 days   Lab Units 11/06/19  0626 11/05/19  0656 11/05/19  0213   SODIUM mmol/L 141 140 139   POTASSIUM mmol/L 3.9 4.3 3.8   MAGNESIUM mg/dL 2.2  --   --    CHLORIDE mmol/L 105 105 100   CO2 mmol/L 25.9 22.8 25.9   BUN mg/dL 11 16 18   CREATININE mg/dL 0.65 0.85 0.89   EGFR IF NONAFRICN AM mL/min/1.73 89 65 62   CALCIUM  mg/dL 8.7 8.7 9.1   GLUCOSE mg/dL 118* 145* 179*   ALBUMIN g/dL 2.84* 3.12* 3.63   BILIRUBIN mg/dL 0.2 0.3 0.3   ALK PHOS U/L 128* 123* 145*   AST (SGOT) U/L 24 20 22   ALT (SGPT) U/L 14 14 16   Estimated Creatinine Clearance: 74.3 mL/min (by C-G formula based on SCr of 0.65 mg/dL).  No results found for: AMMONIA    Hemoglobin A1C   Date/Time Value Ref Range Status   11/05/2019 0656 6.40 (H) 4.80 - 5.60 % Final     Lab Results   Component Value Date    HGBA1C 6.40 (H) 11/05/2019     Lab Results   Component Value Date    TSH 1.839 10/19/2018    FREET4 0.77 (L) 06/25/2014       Blood Culture   Date Value Ref Range Status   11/05/2019 Abnormal Stain (A)  Preliminary   11/05/2019 Abnormal Stain (A)  Preliminary                 Pain Management Panel     Pain Management Panel Latest Ref Rng & Units 12/11/2017    AMPHETAMINES SCREEN, URINE Negative Negative    BARBITURATES SCREEN Negative Negative    BENZODIAZEPINE SCREEN, URINE Negative Positive(A)    BUPRENORPHINEUR Negative Negative    COCAINE SCREEN, URINE Negative Negative    METHADONE SCREEN, URINE Negative Negative        I have personally reviewed the above laboratory results.   ---------------------------------------------------------------------------------------------------------------------  Imaging Results (Last 7 Days)     Procedure Component Value Units Date/Time    FL Video Swallow [[id number]] Collected:  11/05/19 1511     Updated:  11/05/19 1534    Narrative:       FL VIDEO SWALLOW-     HISTORY:  R/O aspiration; A41.9-Sepsis, unspecified organism;  N39.0-Urinary tract infection, site not specified; N39.0-Urinary tract  infection, site not specified; R09.02-Hypoxemia        TECHNIQUE:   Speech therapy service was present. The patient was examined in the  sitting lateral position and was given several consistencies of barium.     FINDINGS: Deep laryngeal penetration w/ thin liquids via straw w/ silent  aspiration occurring during the swallow. Fleeting  laryngeal penetration  w/ thin liquids via cup, NO ASPIRATION. Mild diffuse pharyngeal residue.  No other laryngeal penetration or aspiration.     FLUOROSCOPY TIME: 1.75 minutes.     Images: Cine loop was acquired       Impression:       Aspiration with thin liquids during the straw presentation.     For additional information please see the report provided by the speech  therapy service     This report was finalized on 11/5/2019 3:32 PM by Dr. Chuckie Reyez MD.       CT Chest Pulmonary Embolism [[id number]] Collected:  11/05/19 0436     Updated:  11/05/19 0438    Narrative:       INDICATION:   Right-sided chest pain and elevated d-dimer    TECHNIQUE:   CT angiogram of the chest with contrast. 3-D reformatted images were acquired.  Radiation dose reduction techniques included automated exposure control or exposure modulation based on body size. Radiation audit for number of CT and nuclear cardiology  exams performed in the last year:  2.      COMPARISON:   10/19/2018 chest CT    FINDINGS:   There is bilateral dependent atelectasis, but there is no consolidation or effusion or pneumothorax. There is no mediastinal mass. Images of the upper abdomen are unremarkable. The thoracic aorta is normal.    Bolus timing is adequate and there is no evidence of pulmonary embolism.    There are spinal degenerative changes but there is no acute bony abnormality.      Impression:         1. Adequate bolus timing.    2. No evidence of pulmonary embolism.    3. There is some dependent atelectasis but there is no acute pulmonary parenchymal process.    Signer Name: Jesse Arce MD   Signed: 11/5/2019 4:36 AM   Workstation Name: [email or web address]-    [facility]    XR Chest 1 View [[id number]] Collected:  11/05/19 0432     Updated:  11/05/19 0434    Narrative:       CR Chest 1 Vw    INDICATION:   Short of air     COMPARISON:    10/18/2018    FINDINGS:  Single portable AP view(s) of the chest.    There is mild  cardiomegaly, but there is no consolidation or effusion or pneumothorax.      Impression:       No acute cardiopulmonary findings, no interval change.    Signer Name: Jesse Arce MD   Signed: 11/5/2019 4:32 AM   Workstation Name: [email or web address]    [facility] Gallbladder [[id number]] Collected:  11/05/19 0214     Updated:  11/05/19 0216    Narrative:       INDICATION:   Right upper quadrant abdominal pain    COMPARISON:   None available.    FINDINGS:  Suboptimal visualization, poor sonographic window.    Limited images of the gallbladder are grossly unremarkable without evidence of wall thickening or pericholecystic fluid. There is no evidence of cholelithiasis.    Common duct is normal in caliber.      Impression:       Limited due to body habitus, no convincing acute abnormality.    Signer Name: Jesse Arce MD   Signed: 11/5/2019 2:14 AM   Workstation Name: [email or web address]-    [facility]        I have personally reviewed the above radiology results.   ---------------------------------------------------------------------------------------------------------------------      Assessment & Plan        Assessment/Plan       ASSESSMENT:    1. Severe sepsis with lactic acid greater than 2 on admission   2. Acute pyelonephritis    PLAN:    Patient presents with shortness of air. WBC improving at 21.23. CRP 19.09. Lactic acid 2.6 on admission, now normalized. Urinalysis positive with culture pending. Blood cultures from 11/5/19 2 out of 2 sets positive for E. Coli. Chest x-ray unremarkable. CT of chest reports no evidence of pulmonary embolism. Afebrile, no diarrhea.    For now recommend to continue current antibiotic regimen of Rocephin 2gm IV Q24H, pending finalization of culture results. Repeat blood cultures x2 ordered.     We will continue to follow CRP trend and culture results and adjust antibiotic coverage appropriately.    Again, thank you Dr. Rivera for  allowing us to participate in the care of your patient and please feel free to call for any questions you may have.        Code Status:   Code Status and Medical Interventions:   Ordered at: 19 0516     Code Status:    CPR     Medical Interventions (Level of Support Prior to Arrest):    Full           Tasha CruzDARREN  19  9:55 AM    Electronically signed by Terrance Cantu MD at 19 1141       Nutrition Notes (most recent note)     No notes of this type exist for this encounter.        Physical Therapy Notes (most recent note)     No notes of this type exist for this encounter.        Occupational Therapy Notes (most recent note)     No notes of this type exist for this encounter.           Speech Language Pathology Notes (most recent note)      Angeli Villa, MS CCC-SLP at 19 1535          Acute Care - Speech Language Pathology   Swallow Diet Safety/Acceptance  Stu     Patient Name: Domonique Cummings  : 1945  MRN: [id number]  Today's Date: 2019               Admit Date: 2019    Visit Dx:      ICD-10-CM ICD-9-CM   1. Sepsis secondary to UTI (CMS/HCC) A41.9 038.9    N39.0 995.91     599.0   2. Urinary tract infection without hematuria, site unspecified N39.0 599.0   3. Hypoxia R09.02 799.02     Patient Active Problem List   Diagnosis   • Chronic headaches   • Essential hypertension   • Sepsis secondary to UTI (CMS/HCC)       Therapy Treatment     Domonique Cummings  is seen at bedside this pm  on  [id number]  to assess safety/efficacy of swallowing fnx, determine safest/least restrictive diet tolerance. Pt is s/p MBS yesterday w/ SLP recs of mechanical soft, chopped w/ thin liquids. NO STRAWS.    Social History     Socioeconomic History   • Marital status:      Spouse name: Not on file   • Number of children: Not on file   • Years of education: Not on file   • Highest education level: Not on file   Tobacco Use   • Smoking status: Former Smoker   • Smokeless  tobacco: Never Used   • Tobacco comment: She quit at least 30 years ago.   Substance and Sexual Activity   • Alcohol use: No   • Drug use: No   • Sexual activity: Defer        Diet Orders (active) (From admission, onward)    Start     Ordered    11/05/19 1452  Diet Soft Texture; Chopped; Thin  Diet Effective Now     Comments:  NO STRAWS.  Meds whole in puree. Single Presentation.    11/05/19 1452          She is observed on O2 via NC tolerating well.     Pt is positioned upright and centered in bed to accept multiple po presentations of ice chips solid cracker, puree and thin liquids via spoon, cup.  Pt is able to self feed. Straw presentations deferred 2/2 observed silent aspiration w/ straws yesterday on [facility] [place].    Facial/oral structures are symmetrical upon observation w/o lingual deviation upon protrusion. Oral mucosa are moist, pink and clean. Secretions are clear, thin and well controlled. OROM/MARIA VICTORIA is generally delayed/weak to imitate oral postures. Gag is not assessed. Volitional cough is adequate in intensity, clear in quality, nonproductive. Vocal quality is adequate in intensity, clear in quality w/ intelligible speech. Pt is a/a and cooperative to particpate.  Pt  is oriented to person, place, and time, follows simple directives, and participates in simple conversational exchanges.     Upon po presentations, adequate bolus anticipation w/ good labial seal for bolus clearance via spoon bowl, cup rim stability and suction via straw.  Bolus formation, manipulation,  and control are generally weak w/ increased oral prep time/rotary mastication pattern. A-p transit is timely w/o oral residue.     Pharyngeal swallow is mildly delayed w/ trace hyolaryngeal elevation per palpation. No overt s/s aspiration evidenced across this evaluation. No silent aspiration suspected as pt is w/o changes in vocal quality, respirations or secretions post po presentations. Pt denies odynophagia.    Impression: Per this evaluation  Mrs. Cummings presents w/ mild oral phase, trace pharyngeal phase dysphagia. NO overt s/s of aspiration w/ thin liquids via cup or spoon bowl. Mildly increased oral prep time w/ solids. She is felt to most benefit form continued modified po diet of mechanical soft, chopped w/ thin liquids. No straws please. Meds whole in puree/thins. Upright and centered for all po intake. [place] aspiration precautions. BEN precautions. Oral Care protocol.    Recommendations:   1. Mechanical soft, chopped w/ thin liquids. NO STRAWS.  2. Meds whole in puree/thins.   3. BEN precautions.   4. Oral care protocol.  5. Upright and centered for all po intake  6. Universal aspiration precautions    No further formal SLP f/u warranted at this time.    D/w pt results and recommendations w/ verbal agreement. Sign posted above HOB for family and staff reference.    D/w RN results and recommendations w/ verbal agreement.    Thank you for allowing me to participate in the care of your patient-  VENESSA Day.S., CCC-SLP    Outcome Summary    No further SLP f/u warranted at this time  EDUCATION  The patient has been educated in the following areas:   Dysphagia (Swallowing Impairment) Oral Care/Hydration Modified Diet Instruction.    SLP Recommendation and Plan    Continue modified po diet. Continue tx per poc.   Time Calculation:       Therapy Charges for Today     Code Description Service Date Service Provider Modifiers Qty    20871193152 HC ST MOTION FLUORO EVAL SWALLOW 8 11/5/2019 Angeli Villa, MS CCC-SLP GN 1    [id number] HC ST TREATMENT SWALLOW 2 11/6/2019 Angeli Villa MS CCC-SLP GN 1                 Angeli Villa MS CCC-SLP  11/6/2019    Electronically signed by Angeli Villa MS CCC-SLP at 11/06/19 1540       Respiratory Therapy Notes (most recent note)     No notes of this type exist for this encounter.        ADL Documentation (most recent)      Most Recent Value   Presence of Pain  denies pain/discomfort    Transferring  4 - completely dependent   Toileting  4 - completely dependent   Bathing  4 - completely dependent   Dressing  4 - completely dependent   Eating  0 - independent   Communication  0 - understands/communicates without difficulty   Swallowing  0 - swallows foods/liquids without difficulty   Equipment Currently Used at Home  wheelchair          Discharge Summary     No notes of this type exist for this encounter.        Discharge Order (From admission, onward)    Start     Ordered    11/07/19 1215  Discharge patient  Once     Expected Discharge Date:  11/07/19    Discharge Disposition:  Skilled Nursing Facility (DC - External)    Physician of Record for Attribution - Please select from Treatment Team:  SEVEN KNOX [[id number]]    Review needed by CMO to determine Physician of Record:  No       Question Answer Comment   Physician of Record for Attribution - Please select from Treatment Team SEVEN KNOX    Review needed by CMO to determine Physician of Record No        11/07/19 4935

## 2019-11-07 NOTE — PLAN OF CARE
Problem: Pain, Chronic (Adult)  Goal: Acceptable Pain/Comfort Level and Functional Ability  Outcome: Ongoing (interventions implemented as appropriate)      Problem: Fall Risk (Adult)  Goal: Identify Related Risk Factors and Signs and Symptoms  Outcome: Outcome(s) achieved Date Met: 11/07/19    Goal: Absence of Fall  Outcome: Ongoing (interventions implemented as appropriate)      Problem: Skin Injury Risk (Adult)  Goal: Skin Health and Integrity  Outcome: Ongoing (interventions implemented as appropriate)      Problem: Urinary Tract Infection (Adult)  Goal: Signs and Symptoms of Listed Potential Problems Will be Absent, Minimized or Managed (Urinary Tract Infection)  Outcome: Ongoing (interventions implemented as appropriate)      Problem: Patient Care Overview  Goal: Plan of Care Review  Outcome: Ongoing (interventions implemented as appropriate)

## 2019-11-08 ENCOUNTER — PATIENT OUTREACH (OUTPATIENT)
Dept: CASE MANAGEMENT | Facility: OTHER | Age: 74
End: 2019-11-08

## 2019-11-08 NOTE — OUTREACH NOTE
Care Coordination Note    RN-ACM outreach to [facility].  Patient was d/c from [facility] on 11/07/2019 back to nursing facility.  Patient d/c meds include course of IV antibiotics.  Patient remains LTC after two midnight acute admission.      Katty Rouse RN  Community Care Coordinator    11/8/2019, 10:26 AM

## 2019-11-11 LAB
BACTERIA SPEC AEROBE CULT: NORMAL
BACTERIA SPEC AEROBE CULT: NORMAL

## 2019-12-04 ENCOUNTER — OFFICE VISIT (OUTPATIENT)
Dept: PSYCHIATRY | Facility: CLINIC | Age: 74
End: 2019-12-04

## 2019-12-04 VITALS
HEIGHT: 65 IN | BODY MASS INDEX: 39.97 KG/M2 | WEIGHT: 239.9 LBS | SYSTOLIC BLOOD PRESSURE: 144 MMHG | HEART RATE: 77 BPM | DIASTOLIC BLOOD PRESSURE: 98 MMHG

## 2019-12-04 DIAGNOSIS — R94.31 PROLONGED QT INTERVAL: ICD-10-CM

## 2019-12-04 DIAGNOSIS — F03.91 DEMENTIA WITH BEHAVIORAL DISTURBANCE, UNSPECIFIED DEMENTIA TYPE: ICD-10-CM

## 2019-12-04 DIAGNOSIS — F33.1 MODERATE EPISODE OF RECURRENT MAJOR DEPRESSIVE DISORDER (HCC): Primary | ICD-10-CM

## 2019-12-04 DIAGNOSIS — G47.33 OSA (OBSTRUCTIVE SLEEP APNEA): ICD-10-CM

## 2019-12-04 PROCEDURE — 99214 OFFICE O/P EST MOD 30 MIN: CPT | Performed by: NURSE PRACTITIONER

## 2019-12-04 NOTE — PROGRESS NOTES
"      Subjective   Domonique Cummings is a 74 y.o. female is here today for medication management follow-up.Presents with staff member Veena with whom she gives permission to speak in front of.     Chief Complaint:  Recheck on depression and hallucinations    History of Present Illness: pt presents with staff from nursing home [facility]  Radha with whom she gives permission to speak in front of. Pt has been to the ER with UTI sepsis since last visit.  This has resolved.  She states she is still depressed.  She is very talkative and engaging.  Says she stays up till aprox 1 A M at times.  Is getting at least 7 hours sleep according to her.  Naps at times during the day.  Says her sleep is not an issue for her it is \"more of an issue for staff\".  Note from nursing home says pts hallucinations have improved and that she just does not sleep well at night.  Body mass index is 40.54 kg/m². no negative side effects to the meds.           .     The following portions of the patient's history were reviewed and updated as appropriate: allergies, current medications, past family history, past medical history, past social history, past surgical history and problem list.    Review of Systems   Constitutional: Positive for fatigue. Negative for activity change and appetite change.   HENT: Negative.    Eyes: Negative for visual disturbance.   Respiratory: Negative.    Cardiovascular: Negative.    Gastrointestinal: Positive for constipation and diarrhea. Negative for nausea.        Gerd   Endocrine: Negative.    Genitourinary: Negative.         Urinary incont   Musculoskeletal: Positive for arthralgias and myalgias.   Skin: Negative.    Allergic/Immunologic: Negative.    Neurological: Negative for dizziness, seizures and headaches.   Hematological: Negative.    Psychiatric/Behavioral: Positive for confusion and sleep disturbance. Negative for agitation, behavioral problems, decreased concentration, dysphoric mood, hallucinations, " "self-injury and suicidal ideas. The patient is not nervous/anxious and is not hyperactive.        Objective   Physical Exam   Constitutional: She appears well-developed and well-nourished.   HENT:   Head: Normocephalic and atraumatic.   Neck: Normal range of motion.   Neurological: She is alert.   Psychiatric: She has a normal mood and affect. Her speech is normal and behavior is normal. Cognition and memory are impaired.   Pleasant and cooperative   Vitals reviewed.    Blood pressure 144/98, pulse 77, height 163.8 cm (64.5\"), weight 109 kg (239 lb 14.4 oz).    Medication List:   Current Outpatient Medications   Medication Sig Dispense Refill   • ALPRAZolam (XANAX) 0.5 MG tablet Take 1 tablet by mouth Every 12 (Twelve) Hours. 6 tablet 0   • amLODIPine (NORVASC) 10 MG tablet Take 1 tablet by mouth Daily. 30 tablet 3   • calcium carbonate (TUMS) 500 MG chewable tablet Chew 1 tablet Every 6 (Six) Hours As Needed for Indigestion or Heartburn.     • Cholecalciferol (VITAMIN D) 50 MCG (2000 UT) tablet Take 2,000 Units by mouth Daily.     • cycloSPORINE (RESTASIS) 0.05 % ophthalmic emulsion Administer 1 drop to both eyes 2 (Two) Times a Day As Needed (dry eye).     • DULoxetine (CYMBALTA) 30 MG capsule Take 3 capsules by mouth Daily. 90 capsule 5   • flecainide (TAMBOCOR) 50 MG tablet Take 1 tablet by mouth Every 12 (Twelve) Hours. 60 tablet 3   • fluticasone (FLONASE) 50 MCG/ACT nasal spray 1 spray into the nostril(s) as directed by provider Daily.     • furosemide (LASIX) 20 MG tablet Take 20 mg by mouth Daily.     • gabapentin (NEURONTIN) 400 MG capsule Take 1 capsule by mouth 3 (Three) Times a Day. 9 capsule 0   • HYDROcodone-acetaminophen (NORCO)  MG per tablet Take 1 tablet by mouth Every 8 (Eight) Hours As Needed for Moderate Pain . 9 tablet 0   • ipratropium-albuterol (DUO-NEB) 0.5-2.5 mg/3 ml nebulizer Take 3 mL by nebulization Every 6 (Six) Hours As Needed for Wheezing.     • lactulose (CHRONULAC) 10 " GM/15ML solution Take 20 g by mouth Daily.     • levETIRAcetam (KEPPRA) 500 MG tablet Take 500 mg by mouth Every Night.     • loratadine (CLARITIN) 10 MG tablet Take 10 mg by mouth Daily.     • Multiple Vitamins-Minerals (ICAPS AREDS 2) capsule Take 1 capsule by mouth 2 (Two) Times a Day.     • multivitamin (DAILY DEBORAH) tablet tablet Take 1 tablet by mouth Daily.     • OLANZapine (zyPREXA) 2.5 MG tablet Take 1 tablet by mouth Every Night. 30 tablet 1   • oxybutynin XL (DITROPAN-XL) 10 MG 24 hr tablet Take 10 mg by mouth Daily.     • potassium chloride (K-DUR,KLOR-CON) 10 MEQ CR tablet Take 10 mEq by mouth Daily.     • Probiotic Product (RISAQUAD-2) capsule capsule Take 1 capsule by mouth Daily.     • Psyllium (REGULOID) 400 MG capsule Take 2 capsules by mouth Daily.     • raNITIdine (ZANTAC) 300 MG tablet Take 1 tablet by mouth 2 (Two) Times a Day. 60 tablet 2     No current facility-administered medications for this visit.        Mental Status Exam:   Hygiene:   good  Cooperation:  Cooperative  Eye Contact:  Good  Psychomotor Behavior:  Appropriate  Affect:  Full range  Hopelessness: Denies  Speech:  Normal  Thought Process:  Goal directed  Thought Content:  Normal  Suicidal:  None  Homicidal:  None  Hallucinations:  Auditory and Visual  Delusion:  Paranoid  Memory:  Deficits  Orientation:  Person and Place  Reliability:  poor  Insight:  Fair  Judgement:  Fair  Impulse Control:  Fair  Physical/Medical Issues:  Yes chronic pain, weakness, gerd, IBS    Assessment/Plan   Problems Addressed this Visit     None      Visit Diagnoses     Moderate episode of recurrent major depressive disorder (CMS/HCC)    -  Primary    Dementia with behavioral disturbance, unspecified dementia type (CMS/HCC)        ENE (obstructive sleep apnea)        Prolonged QT interval        Relevant Orders    ECG 12 Lead        Functionality: pt having significant impairment in important areas of daily functioning.  Prognosis: Guarded dependent on  medication/follow up and treatment plan compliance.  I have ordered an EKG to be performed next week.  Going to decrease the cymbalta to 60mg as she did demonstrate prolonged QT on her EKG at the hospital.  Continue the zyprexa at current dosage.       Continuing efforts to promote the therapeutic alliance, address the patient's issues, and strengthen self awareness, insights, and coping skillsPatient is agreeable to call the [facility].  Patient is aware to call 911 or go to the nearest ER should begin having SI/HI. RTC 8  weeks.

## 2019-12-16 ENCOUNTER — HOSPITAL ENCOUNTER (OUTPATIENT)
Dept: RESPIRATORY THERAPY | Facility: HOSPITAL | Age: 74
Discharge: HOME | End: 2019-12-16
Admitting: INTERNAL MEDICINE

## 2019-12-16 ENCOUNTER — TRANSCRIBE ORDERS (OUTPATIENT)
Dept: ADMINISTRATIVE | Facility: HOSPITAL | Age: 74
End: 2019-12-16

## 2019-12-16 DIAGNOSIS — I47.29 PAROXYSMAL VENTRICULAR TACHYCARDIA (HCC): ICD-10-CM

## 2019-12-16 DIAGNOSIS — I47.29 PAROXYSMAL VENTRICULAR TACHYCARDIA (HCC): Primary | ICD-10-CM

## 2019-12-16 PROCEDURE — 93005 ELECTROCARDIOGRAM TRACING: CPT | Performed by: INTERNAL MEDICINE

## 2019-12-16 PROCEDURE — 93010 ELECTROCARDIOGRAM REPORT: CPT | Performed by: INTERNAL MEDICINE

## 2020-01-21 ENCOUNTER — OFFICE VISIT (OUTPATIENT)
Dept: UROLOGY | Facility: CLINIC | Age: 75
End: 2020-01-21

## 2020-01-21 VITALS — WEIGHT: 239 LBS | BODY MASS INDEX: 39.82 KG/M2 | HEIGHT: 65 IN

## 2020-01-21 DIAGNOSIS — N39.0 RECURRENT UTI: ICD-10-CM

## 2020-01-21 DIAGNOSIS — N39.46 MIXED STRESS AND URGE URINARY INCONTINENCE: Primary | ICD-10-CM

## 2020-01-21 PROCEDURE — 99214 OFFICE O/P EST MOD 30 MIN: CPT | Performed by: UROLOGY

## 2020-01-21 RX ORDER — DULOXETIN HYDROCHLORIDE 60 MG/1
60 CAPSULE, DELAYED RELEASE ORAL DAILY
COMMUNITY
Start: 2020-01-18

## 2020-01-21 NOTE — PROGRESS NOTES
Chief Complaint:          Chief Complaint   Patient presents with   • Urinary Incontinence       HPI:   74 y.o. female referred for urinary incontinence she gets up 4-5 times at night.  She is never had a stroke.  She is never had surgery.  She is a poor historian.  She has lactulose for bowel movement every 10 days.  She is allergy allergies to Biaxin.  She is been on Myrbetriq but she thought her blood pressure went up and it did not help review of her med sheet shows she is on chronic oxybutynin she also does not think it helps she has recurrent infections but she is not sure the treatment type frequency or symptomatology she is on Zyprexa for psychosis.  She is morbidly obese.  We I cannot really get a handle on exactly what is going on I am going to initiate an upper and lower tract investigation      Past Medical History:        Past Medical History:   Diagnosis Date   • Arthritis    • Asthma    • Degenerative disc disease, lumbar    • Diabetes mellitus (CMS/HCC)    • Fibromyalgia    • GERD (gastroesophageal reflux disease)    • Hyperlipidemia    • Hypertension    • ENE (obstructive sleep apnea)     Noncompliant with BiPAP/CPAP   • Osteoporosis          Current Meds:     Current Outpatient Medications   Medication Sig Dispense Refill   • ALPRAZolam (XANAX) 0.5 MG tablet Take 1 tablet by mouth Every 12 (Twelve) Hours. 6 tablet 0   • amLODIPine (NORVASC) 10 MG tablet Take 1 tablet by mouth Daily. 30 tablet 3   • calcium carbonate (TUMS) 500 MG chewable tablet Chew 1 tablet Every 6 (Six) Hours As Needed for Indigestion or Heartburn.     • Cholecalciferol (VITAMIN D) 50 MCG (2000 UT) tablet Take 2,000 Units by mouth Daily.     • cycloSPORINE (RESTASIS) 0.05 % ophthalmic emulsion Administer 1 drop to both eyes 2 (Two) Times a Day As Needed (dry eye).     • DULoxetine (CYMBALTA) 60 MG capsule      • flecainide (TAMBOCOR) 50 MG tablet Take 1 tablet by mouth Every 12 (Twelve) Hours. 60 tablet 3   • fluticasone  (FLONASE) 50 MCG/ACT nasal spray 1 spray into the nostril(s) as directed by provider Daily.     • furosemide (LASIX) 20 MG tablet Take 20 mg by mouth Daily.     • gabapentin (NEURONTIN) 400 MG capsule Take 1 capsule by mouth 3 (Three) Times a Day. 9 capsule 0   • HYDROcodone-acetaminophen (NORCO)  MG per tablet Take 1 tablet by mouth Every 8 (Eight) Hours As Needed for Moderate Pain . 9 tablet 0   • ipratropium-albuterol (DUO-NEB) 0.5-2.5 mg/3 ml nebulizer Take 3 mL by nebulization Every 6 (Six) Hours As Needed for Wheezing.     • lactulose (CHRONULAC) 10 GM/15ML solution Take 20 g by mouth Daily.     • levETIRAcetam (KEPPRA) 500 MG tablet Take 500 mg by mouth Every Night.     • loratadine (CLARITIN) 10 MG tablet Take 10 mg by mouth Daily.     • Multiple Vitamins-Minerals (ICAPS AREDS 2) capsule Take 1 capsule by mouth 2 (Two) Times a Day.     • multivitamin (DAILY DEBORAH) tablet tablet Take 1 tablet by mouth Daily.     • OLANZapine (zyPREXA) 2.5 MG tablet Take 1 tablet by mouth Every Night. 30 tablet 1   • oxybutynin XL (DITROPAN-XL) 10 MG 24 hr tablet Take 10 mg by mouth Daily.     • potassium chloride (K-DUR,KLOR-CON) 10 MEQ CR tablet Take 10 mEq by mouth Daily.     • Probiotic Product (RISAQUAD-2) capsule capsule Take 1 capsule by mouth Daily.     • Psyllium (REGULOID) 400 MG capsule Take 2 capsules by mouth Daily.     • raNITIdine (ZANTAC) 300 MG tablet Take 1 tablet by mouth 2 (Two) Times a Day. 60 tablet 2     No current facility-administered medications for this visit.         Allergies:      Allergies   Allergen Reactions   • Biaxin [Clarithromycin] Other (See Comments)     unknown        Past Surgical History:     Past Surgical History:   Procedure Laterality Date   • APPENDECTOMY     • CARDIAC CATHETERIZATION N/A 10/30/2018    Procedure: Left Heart Cath;  Surgeon: Arturo Inman MD;  Location: [facility] INVASIVE LOCATION;  Service: Cardiology   • ESOPHAGEAL DILATATION     • TOTAL SHOULDER  REPLACEMENT Bilateral    • TUBAL ABDOMINAL LIGATION           Social History:     Social History     Socioeconomic History   • Marital status:      Spouse name: Not on file   • Number of children: Not on file   • Years of education: Not on file   • Highest education level: Not on file   Tobacco Use   • Smoking status: Former Smoker   • Smokeless tobacco: Never Used   • Tobacco comment: She quit at least 30 years ago.   Substance and Sexual Activity   • Alcohol use: No   • Drug use: No   • Sexual activity: Defer       Family History:     Family History   Problem Relation Age of Onset   • Breast cancer Sister        Review of Systems:     Review of Systems   Constitutional: Negative.  Negative for activity change, appetite change, chills, diaphoresis, fatigue, fever and unexpected weight change.   HENT: Negative for congestion, dental problem, drooling, ear discharge, ear pain, facial swelling, hearing loss, mouth sores, nosebleeds, postnasal drip, rhinorrhea, sinus pressure, sneezing, sore throat, tinnitus, trouble swallowing and voice change.    Eyes: Negative.  Negative for photophobia, pain, discharge, redness, itching and visual disturbance.   Respiratory: Negative.  Negative for apnea, cough, choking, chest tightness, shortness of breath, wheezing and stridor.    Cardiovascular: Negative.  Negative for chest pain, palpitations and leg swelling.   Gastrointestinal: Negative.  Negative for abdominal distention, abdominal pain, anal bleeding, blood in stool, constipation, diarrhea, nausea, rectal pain and vomiting.   Endocrine: Negative.  Negative for cold intolerance, heat intolerance, polydipsia, polyphagia and polyuria.   Genitourinary: Negative for frequency, hematuria and urgency.   Musculoskeletal: Negative.  Negative for arthralgias, back pain, gait problem, joint swelling, myalgias, neck pain and neck stiffness.   Skin: Negative.  Negative for color change, pallor, rash and wound.      Allergic/Immunologic: Negative.  Negative for environmental allergies, food allergies and immunocompromised state.   Neurological: Negative.  Negative for dizziness, tremors, seizures, syncope, facial asymmetry, speech difficulty, weakness, light-headedness, numbness and headaches.   Hematological: Negative.  Negative for adenopathy. Does not bruise/bleed easily.   Psychiatric/Behavioral: Negative for agitation, behavioral problems, confusion, decreased concentration, dysphoric mood, hallucinations, self-injury, sleep disturbance and suicidal ideas. The patient is not nervous/anxious and is not hyperactive.    All other systems reviewed and are negative.      Physical Exam:     Physical Exam   Constitutional: She appears well-developed and well-nourished.   HENT:   Head: Normocephalic and atraumatic.   Right Ear: External ear normal.   Left Ear: External ear normal.   Mouth/Throat: Oropharynx is clear and moist.   Eyes: Pupils are equal, round, and reactive to light. Conjunctivae are normal.   Cardiovascular: Normal rate, regular rhythm, normal heart sounds and intact distal pulses.   Pulmonary/Chest: Effort normal and breath sounds normal.   Abdominal: Soft. Bowel sounds are normal. She exhibits no distension and no mass. There is no tenderness. There is no rebound and no guarding.   Genitourinary: No vaginal discharge found.   Musculoskeletal: Normal range of motion.   Neurological: She is alert. She has normal reflexes.   Skin: Skin is warm and dry.   Psychiatric: She has a normal mood and affect. Her behavior is normal. Judgment and thought content normal.       I have reviewed the following portions of the patient's history: allergies, current medications, past family history, past medical history, past social history, past surgical history, problem list and ROS and confirm it's accurate.      Procedure:       Assessment/Plan:   Urinary incontinence:  Patient was diagnosed with urinary incontinence.  We  discussed treatable and non-treatable causes of both stress and urge urinary incontinence.  With regards to stress urinary incontinence we discussed its relationship to childbirth and pelvic health.  We discussed the grading of stress incontinence with trying to quantitate the number of pads used.  We talked about leaking urine with laughing, lifting, coughing, and sexual intercourse.  Talked about the urge component and the concept of mixed incontinence where upon the stress treatable at the urge may exist and that 50% of the time the urge will resolve with treatment of the stress incontinence.  We talked with the diagnostic workup including a postvoid residual urine, urine and even a simple cystometrogram.  I discussed the findings that may be neurologically related including commonly seen with multiple sclerosis, Parkinson's disease, and stroke.  I talked about the various therapeutic options including anticholinergics, beta 3 agonists, and alpha blockade if there is a component of obstruction.  I discussed t Recurrent urinary tract infections-patient has been referred and diagnosed with recurrent urinary tract infections.  We discussed the types of organisms that are found in the urinary tract indicating that the vast majority are results of the patient's own gastrointestinal shantell.  We discussed how many of the antibiotics that are utilized can actually exacerbate these infections by creating resistant organisms and there is only a very few antibiotics that are concentrated in the urine and do not affect the rectal reservoir nor cause recurrent yeast vaginitis.  We discussed the risk factors for recurrent infections being intercourse in younger patients and atrophic changes in older patients.  We discussed the symptoms that are found including pain, pressure, burning, frequency, urgency suprapubic pain and painful intercourse.  I discussed upper tract symptoms including fevers, chills, and indicated the workup  would be much more aggressive if the patient were to present with recurrent infections in the face of upper tract symptomatology such as fever.  I discussed the history of vesicoureteral reflux in young patients and finally chronic renal scarring as a result of such.  I recommend concomitant probiotics with treatment with antibiotics to protect the rectal reservoir including over-the-counter yogurt preparations to clemente oral pills containing the appropriate probiotics.  Gonsalo effects of anti-cholinergic including dry mouth, double vision etc. 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      Patient's Body mass index is 40.39 kg/m². BMI is above normal parameters. Recommendations include: educational material.              This document has been electronically signed by RITA TOLENTINO MD January 21, 2020 10:14 AM

## 2020-01-22 PROBLEM — N39.0 RECURRENT UTI: Status: ACTIVE | Noted: 2020-01-22

## 2020-01-22 PROBLEM — N39.46 MIXED STRESS AND URGE URINARY INCONTINENCE: Status: ACTIVE | Noted: 2020-01-22

## 2020-02-07 NOTE — PROGRESS NOTES
LOS: 5 days       Chief Complaint :  Shortness of breath   Subjective     Interval History:     Patient Complaints:  Domonique Cummings  describes a slow gradual improvement.  She notes her cough, congestion and shortness of breath are slowly improving.  She remains too weak to perform her activities of daily living.  She is able to sleep in a chair this morning.  She denies any chest pain, palpitations, fevers or chills.  Telemetry is consistent with sinus bradycardia with heart rate of 54 this morning.  She does continue with complaints of cough and congestion.   Nurses have described an uneventful night with no acute events.    Review of Systems-unchanged from recent history and physical  Objective     Vital Signs  Temp:  [98 °F (36.7 °C)-98.4 °F (36.9 °C)] 98.2 °F (36.8 °C)  Heart Rate:  [46-76] 52  Resp:  [18-20] 20  BP: (120-133)/(64-71) 133/71    Physical Exam    Constitutional: She is oriented to person, place, and time.   Obese white female who is alert and talkative in no obvious distress at rest.   HENT:   Head: Normocephalic and atraumatic.   Right Ear: External ear normal.   Left Ear: External ear normal.   Nose: Nose normal.   Mouth/Throat: Oropharynx is clear and moist. No oropharyngeal exudate.   Eyes: Conjunctivae and EOM are normal. Pupils are equal, round, and reactive to light. Right eye exhibits no discharge. Left eye exhibits no discharge. No scleral icterus.   Neck: Normal range of motion. Neck supple. No JVD present. No tracheal deviation present. No thyromegaly present.   Cardiovascular: Normal rate, regular rhythm, normal heart sounds and intact distal pulses.  Exam reveals no gallop and no friction rub.    No murmur heard.  Pulmonary/Chest: Effort normal. No stridor.  Trace expiratory wheezing   Scattered rhonchi bilaterally, diminished breath sounds at lung bases.   Abdominal: Soft. Bowel sounds are normal. She exhibits no distension and no mass. There is no tenderness. There is no  guarding. No hernia.   Genitourinary:   Genitourinary Comments: No Freitas catheter   Neurological: She is alert and oriented to person, place, and time. She displays normal reflexes. No cranial nerve deficit. Coordination normal.   Skin: Skin is warm and dry. No rash noted. No erythema. No pallor.   Psychiatric: She has a normal mood and affect. Her behavior is normal. Judgment and thought content normal.   Nursing note and vitals reviewed.      Results Review:     I reviewed the patient's new clinical results  : See Below  MEDICATIONS:    amLODIPine 5 mg Oral Q24H   aspirin 81 mg Oral Daily   cefdinir 300 mg Oral Q12H   doxycycline 100 mg Oral Q12H   DULoxetine 60 mg Oral Daily   enoxaparin 40 mg Subcutaneous Daily   gabapentin 400 mg Oral TID   ipratropium-albuterol 3 mL Nebulization Q6H - RT   losartan 100 mg Oral Q24H   metoprolol succinate XL 25 mg Oral Q24H   oxybutynin XL 10 mg Oral Daily   pantoprazole 40 mg Oral Q AM   polyethylene glycol 17 g Oral Daily   polyvinyl alcohol-povidone 1 drop Both Eyes Q12H   Risaquad-2 1 capsule Oral Daily   sodium chloride 10 mL Intracatheter Q12H   sodium chloride 10 mL Intracatheter Q12H   topiramate 100 mg Oral Q12H       LABS:        Results from last 7 days  Lab Units 12/15/17  0104 12/13/17  0058 12/12/17  0543 12/12/17  0147 12/11/17  1346 12/11/17  1333   CRP mg/dL 3.73* 9.14*  --  11.48*  --   --    LACTATE mmol/L  --   --   --   --  0.9  --    WBC 10*3/mm3 9.05 9.96 15.38*  --   --  16.75*   HEMOGLOBIN g/dL 10.5* 10.3* 10.9*  --   --  12.8   HEMATOCRIT % 33.9* 33.1* 34.5*  --   --  37.9   MCV fL 99.1* 97.9* 98.6*  --   --  96.4*   MCHC g/dL 31.0* 31.1* 31.6*  --   --  33.8   PLATELETS 10*3/mm3 194 170 177  --   --  203   INR   --   --   --   --   --  0.98       Results from last 7 days  Lab Units 12/11/17  1413   PH, ARTERIAL pH units 7.431   PO2 ART mm Hg 60.7*   PCO2, ARTERIAL mm Hg 29.9*   HCO3 ART mmol/L 19.4*       Results from last 7 days  Lab Units  12/15/17  0104 12/13/17  0058 12/12/17  0147 12/11/17  1333   SODIUM mmol/L 140 140 142 140   POTASSIUM mmol/L 3.8 3.7 3.9 3.8   MAGNESIUM mg/dL  --  1.9 2.1 2.0   CHLORIDE mmol/L 111 112 111 111   CO2 mmol/L 22.2* 23.1* 20.8* 20.8*   BUN mg/dL 13 16 18 14   CREATININE mg/dL 0.76 0.79 0.80 0.85   EGFR IF NONAFRICN AM mL/min/1.73 75 71 70 66   CALCIUM mg/dL 9.4 8.7 8.7 9.4   GLUCOSE mg/dL 105 104 96 133*   ALBUMIN g/dL 3.60 3.50 3.90 4.20   BILIRUBIN mg/dL 0.2 0.2 0.3 0.4   ALK PHOS U/L 77 77 97 102   AST (SGOT) U/L 31* 18 23 20   ALT (SGPT) U/L 24 16 17 15   Estimated Creatinine Clearance: 70.1 mL/min (by C-G formula based on Cr of 0.76).  No results found for: AMMONIA      Blood Culture   Date Value Ref Range Status   12/11/2017 No growth at less than 24 hours  Preliminary   12/11/2017 No growth at less than 24 hours  Preliminary                  Assessment/Plan    1) pneumonia of left lower lobe   - C-reactive protein and white blood cell count improving.  Symptoms also improving,  mycoplasma pneumonia and legionella studies negative.  Flu swab negative.  Blood cultures with no growth.  2) sepsis  3) metabolic encephalopathy-present at the time of admission  4) leukocytosis  5) anemia-multifactorial  6) DVT prophylaxis-subcutaneous Lovenox   See orders entered.     Skinny Meehan MD  12/16/17  8:56 AM   yes

## 2020-06-22 ENCOUNTER — LAB REQUISITION (OUTPATIENT)
Dept: LAB | Facility: HOSPITAL | Age: 75
End: 2020-06-22

## 2020-06-22 DIAGNOSIS — J18.8 OTHER PNEUMONIA, UNSPECIFIED ORGANISM: ICD-10-CM

## 2020-06-22 LAB
ALBUMIN SERPL-MCNC: 3.71 G/DL (ref 3.5–5.2)
ALBUMIN/GLOB SERPL: 1.1 G/DL
ALP SERPL-CCNC: 117 U/L (ref 39–117)
ALT SERPL W P-5'-P-CCNC: 13 U/L (ref 1–33)
ANION GAP SERPL CALCULATED.3IONS-SCNC: 8.3 MMOL/L (ref 5–15)
AST SERPL-CCNC: 15 U/L (ref 1–32)
BASOPHILS # BLD AUTO: 0.03 10*3/MM3 (ref 0–0.2)
BASOPHILS NFR BLD AUTO: 0.2 % (ref 0–1.5)
BILIRUB SERPL-MCNC: 0.3 MG/DL (ref 0.2–1.2)
BUN BLD-MCNC: 13 MG/DL (ref 8–23)
BUN/CREAT SERPL: 15.9 (ref 7–25)
CALCIUM SPEC-SCNC: 8.9 MG/DL (ref 8.6–10.5)
CHLORIDE SERPL-SCNC: 98 MMOL/L (ref 98–107)
CK SERPL-CCNC: 50 U/L (ref 20–180)
CO2 SERPL-SCNC: 30.7 MMOL/L (ref 22–29)
CREAT BLD-MCNC: 0.82 MG/DL (ref 0.57–1)
CRP SERPL-MCNC: 2.26 MG/DL (ref 0–0.5)
DEPRECATED RDW RBC AUTO: 44.4 FL (ref 37–54)
EOSINOPHIL # BLD AUTO: 0.13 10*3/MM3 (ref 0–0.4)
EOSINOPHIL NFR BLD AUTO: 1.1 % (ref 0.3–6.2)
ERYTHROCYTE [DISTWIDTH] IN BLOOD BY AUTOMATED COUNT: 12.7 % (ref 12.3–15.4)
FERRITIN SERPL-MCNC: 65.79 NG/ML (ref 13–150)
GFR SERPL CREATININE-BSD FRML MDRD: 68 ML/MIN/1.73
GLOBULIN UR ELPH-MCNC: 3.4 GM/DL
GLUCOSE BLD-MCNC: 175 MG/DL (ref 65–99)
HCT VFR BLD AUTO: 36.3 % (ref 34–46.6)
HGB BLD-MCNC: 11.4 G/DL (ref 12–15.9)
IMM GRANULOCYTES # BLD AUTO: 0.04 10*3/MM3 (ref 0–0.05)
IMM GRANULOCYTES NFR BLD AUTO: 0.3 % (ref 0–0.5)
LDH SERPL-CCNC: 246 U/L (ref 135–214)
LYMPHOCYTES # BLD AUTO: 2.54 10*3/MM3 (ref 0.7–3.1)
LYMPHOCYTES NFR BLD AUTO: 21.1 % (ref 19.6–45.3)
MCH RBC QN AUTO: 30.3 PG (ref 26.6–33)
MCHC RBC AUTO-ENTMCNC: 31.4 G/DL (ref 31.5–35.7)
MCV RBC AUTO: 96.5 FL (ref 79–97)
MONOCYTES # BLD AUTO: 1.07 10*3/MM3 (ref 0.1–0.9)
MONOCYTES NFR BLD AUTO: 8.9 % (ref 5–12)
NEUTROPHILS # BLD AUTO: 8.22 10*3/MM3 (ref 1.7–7)
NEUTROPHILS NFR BLD AUTO: 68.4 % (ref 42.7–76)
NRBC BLD AUTO-RTO: 0 /100 WBC (ref 0–0.2)
PLATELET # BLD AUTO: 211 10*3/MM3 (ref 140–450)
PMV BLD AUTO: 10.4 FL (ref 6–12)
POTASSIUM BLD-SCNC: 4.3 MMOL/L (ref 3.5–5.2)
PROT SERPL-MCNC: 7.1 G/DL (ref 6–8.5)
RBC # BLD AUTO: 3.76 10*6/MM3 (ref 3.77–5.28)
SARS-COV-2 RDRP RESP QL NAA+PROBE: NOT DETECTED
SODIUM BLD-SCNC: 137 MMOL/L (ref 136–145)
WBC NRBC COR # BLD: 12.03 10*3/MM3 (ref 3.4–10.8)

## 2020-06-22 PROCEDURE — 87635 SARS-COV-2 COVID-19 AMP PRB: CPT | Performed by: INTERNAL MEDICINE

## 2020-06-22 PROCEDURE — 80053 COMPREHEN METABOLIC PANEL: CPT | Performed by: INTERNAL MEDICINE

## 2020-06-22 PROCEDURE — 85025 COMPLETE CBC W/AUTO DIFF WBC: CPT | Performed by: INTERNAL MEDICINE

## 2020-06-22 PROCEDURE — 82728 ASSAY OF FERRITIN: CPT | Performed by: INTERNAL MEDICINE

## 2020-06-22 PROCEDURE — 83615 LACTATE (LD) (LDH) ENZYME: CPT | Performed by: INTERNAL MEDICINE

## 2020-06-22 PROCEDURE — 84145 PROCALCITONIN (PCT): CPT | Performed by: INTERNAL MEDICINE

## 2020-06-22 PROCEDURE — 86140 C-REACTIVE PROTEIN: CPT | Performed by: INTERNAL MEDICINE

## 2020-06-22 PROCEDURE — 82550 ASSAY OF CK (CPK): CPT | Performed by: INTERNAL MEDICINE

## 2020-06-23 LAB — PROCALCITONIN SERPL-MCNC: 0.06 NG/ML (ref 0.1–0.25)

## 2021-03-06 ENCOUNTER — LAB REQUISITION (OUTPATIENT)
Dept: LAB | Facility: HOSPITAL | Age: 76
End: 2021-03-06

## 2021-03-06 DIAGNOSIS — F03.90 UNSPECIFIED DEMENTIA WITHOUT BEHAVIORAL DISTURBANCE: ICD-10-CM

## 2021-03-06 LAB
BACTERIA UR QL AUTO: ABNORMAL /HPF
BILIRUB UR QL STRIP: NEGATIVE
CLARITY UR: ABNORMAL
COLOR UR: YELLOW
GLUCOSE UR STRIP-MCNC: NEGATIVE MG/DL
HGB UR QL STRIP.AUTO: ABNORMAL
HYALINE CASTS UR QL AUTO: ABNORMAL /LPF
KETONES UR QL STRIP: NEGATIVE
LEUKOCYTE ESTERASE UR QL STRIP.AUTO: ABNORMAL
NITRITE UR QL STRIP: NEGATIVE
PH UR STRIP.AUTO: 7.5 [PH] (ref 5–8)
PROT UR QL STRIP: NEGATIVE
RBC # UR: ABNORMAL /HPF
REF LAB TEST METHOD: ABNORMAL
SP GR UR STRIP: 1.01 (ref 1–1.03)
SQUAMOUS #/AREA URNS HPF: ABNORMAL /HPF
UROBILINOGEN UR QL STRIP: ABNORMAL
WBC UR QL AUTO: ABNORMAL /HPF

## 2021-03-06 PROCEDURE — 87086 URINE CULTURE/COLONY COUNT: CPT | Performed by: INTERNAL MEDICINE

## 2021-03-06 PROCEDURE — 81001 URINALYSIS AUTO W/SCOPE: CPT | Performed by: INTERNAL MEDICINE

## 2021-03-06 PROCEDURE — 87077 CULTURE AEROBIC IDENTIFY: CPT | Performed by: INTERNAL MEDICINE

## 2021-03-06 PROCEDURE — 87186 SC STD MICRODIL/AGAR DIL: CPT | Performed by: INTERNAL MEDICINE

## 2021-03-08 LAB — BACTERIA SPEC AEROBE CULT: ABNORMAL

## 2021-03-20 ENCOUNTER — APPOINTMENT (OUTPATIENT)
Dept: CT IMAGING | Facility: HOSPITAL | Age: 76
End: 2021-03-20

## 2021-03-20 ENCOUNTER — HOSPITAL ENCOUNTER (EMERGENCY)
Facility: HOSPITAL | Age: 76
Discharge: NURSING, REHAB OR LONG-TERM CARE | End: 2021-03-20
Attending: STUDENT IN AN ORGANIZED HEALTH CARE EDUCATION/TRAINING PROGRAM | Admitting: STUDENT IN AN ORGANIZED HEALTH CARE EDUCATION/TRAINING PROGRAM

## 2021-03-20 VITALS
HEART RATE: 91 BPM | RESPIRATION RATE: 20 BRPM | HEIGHT: 62 IN | OXYGEN SATURATION: 98 % | WEIGHT: 245 LBS | TEMPERATURE: 97.5 F | BODY MASS INDEX: 45.08 KG/M2 | SYSTOLIC BLOOD PRESSURE: 171 MMHG | DIASTOLIC BLOOD PRESSURE: 90 MMHG

## 2021-03-20 DIAGNOSIS — S16.1XXA STRAIN OF NECK MUSCLE, INITIAL ENCOUNTER: ICD-10-CM

## 2021-03-20 DIAGNOSIS — S09.90XA INJURY OF HEAD, INITIAL ENCOUNTER: Primary | ICD-10-CM

## 2021-03-20 PROCEDURE — 70450 CT HEAD/BRAIN W/O DYE: CPT

## 2021-03-20 PROCEDURE — 72125 CT NECK SPINE W/O DYE: CPT

## 2021-03-20 PROCEDURE — 99283 EMERGENCY DEPT VISIT LOW MDM: CPT

## 2021-03-20 NOTE — ED NOTES
Attempt to call report to nursing home at this time no answer     Evan Cristina, RN  03/20/21 2229

## 2021-03-20 NOTE — ED NOTES
Called [facility] for transport back to [facility].      Aneesh Schwartz  03/20/21 [id number]

## 2021-03-20 NOTE — ED PROVIDER NOTES
Subjective   66-year-old white female presents secondary to fall with head injury and persistent headache.  Patient was at the nursing home.  She apparently fell out of wheelchair and struck her head on a tile floor.  She does complain of neck pain.  She denies any numbness tingling or pain in her arms or legs.  No back pain.  No preceding chest pain pressure tightness or squeezing.  No abdominal pain.  No neurologic change.  She denies any syncope.  No other complaints this time.          Review of Systems   Constitutional: Negative.  Negative for fever.   Respiratory: Negative.    Cardiovascular: Negative.  Negative for chest pain.   Gastrointestinal: Negative.  Negative for abdominal pain.   Endocrine: Negative.    Genitourinary: Negative.  Negative for dysuria.   Skin: Negative.    Neurological: Positive for headaches.   Psychiatric/Behavioral: Negative.    All other systems reviewed and are negative.      Past Medical History:   Diagnosis Date   • Arthritis    • Asthma    • Degenerative disc disease, lumbar    • Diabetes mellitus (CMS/HCC)    • Fibromyalgia    • GERD (gastroesophageal reflux disease)    • Hyperlipidemia    • Hypertension    • ENE (obstructive sleep apnea)     Noncompliant with BiPAP/CPAP   • Osteoporosis        Allergies   Allergen Reactions   • Biaxin [Clarithromycin] Other (See Comments)     unknown       Past Surgical History:   Procedure Laterality Date   • APPENDECTOMY     • CARDIAC CATHETERIZATION N/A 10/30/2018    Procedure: Left Heart Cath;  Surgeon: Arturo Inman MD;  Location: [facility] INVASIVE LOCATION;  Service: Cardiology   • ESOPHAGEAL DILATATION     • TOTAL SHOULDER REPLACEMENT Bilateral    • TUBAL ABDOMINAL LIGATION         Family History   Problem Relation Age of Onset   • Breast cancer Sister        Social History     Socioeconomic History   • Marital status:      Spouse name: Not on file   • Number of children: Not on file   • Years of education: Not on file   •  Highest education level: Not on file   Tobacco Use   • Smoking status: Former Smoker   • Smokeless tobacco: Never Used   • Tobacco comment: She quit at least 30 years ago.   Substance and Sexual Activity   • Alcohol use: No   • Drug use: No   • Sexual activity: Defer           Objective   Physical Exam  Vitals and nursing note reviewed.   Constitutional:       General: She is not in acute distress.     Appearance: She is well-developed. She is not diaphoretic.   HENT:      Head: Normocephalic and atraumatic.      Right Ear: External ear normal.      Left Ear: External ear normal.      Nose: Nose normal.   Eyes:      Conjunctiva/sclera: Conjunctivae normal.      Pupils: Pupils are equal, round, and reactive to light.   Neck:      Vascular: No JVD.      Trachea: No tracheal deviation.   Cardiovascular:      Rate and Rhythm: Normal rate and regular rhythm.      Heart sounds: Normal heart sounds. No murmur heard.     Pulmonary:      Effort: Pulmonary effort is normal. No respiratory distress.      Breath sounds: Normal breath sounds. No wheezing.   Abdominal:      General: Bowel sounds are normal.      Palpations: Abdomen is soft.      Tenderness: There is no abdominal tenderness.   Musculoskeletal:         General: No deformity. Normal range of motion.      Cervical back: Normal range of motion and neck supple.   Skin:     General: Skin is warm and dry.      Coloration: Skin is not pale.      Findings: No erythema or rash.   Neurological:      Mental Status: She is alert and oriented to person, place, and time.      Cranial Nerves: No cranial nerve deficit.   Psychiatric:         Behavior: Behavior normal.         Thought Content: Thought content normal.         Procedures           ED Course                                           MDM  Number of Diagnoses or Management Options  Injury of head, initial encounter: new and requires workup  Strain of neck muscle, initial encounter: new and requires workup     Amount  and/or Complexity of Data Reviewed  Tests in the radiology section of CPT®: ordered and reviewed  Independent visualization of images, tracings, or specimens: yes    Risk of Complications, Morbidity, and/or Mortality  Presenting problems: moderate        Final diagnoses:   Injury of head, initial encounter   Strain of neck muscle, initial encounter       ED Disposition  ED Disposition     ED Disposition Condition Comment    Discharge Stable           Skinny Meehan MD  [address] [facility] [id number] [phone]    In 2 days  if persists         Medication List      No changes were made to your prescriptions during this visit.          Juancarlos Pulido PA  03/20/21 1812

## 2021-07-17 ENCOUNTER — LAB REQUISITION (OUTPATIENT)
Dept: LAB | Facility: HOSPITAL | Age: 76
End: 2021-07-17

## 2021-07-17 DIAGNOSIS — R30.0 DYSURIA: ICD-10-CM

## 2021-07-17 LAB
BACTERIA UR QL AUTO: ABNORMAL /HPF
BILIRUB UR QL STRIP: NEGATIVE
CLARITY UR: CLEAR
COLOR UR: YELLOW
GLUCOSE UR STRIP-MCNC: NEGATIVE MG/DL
HGB UR QL STRIP.AUTO: NEGATIVE
HYALINE CASTS UR QL AUTO: ABNORMAL /LPF
KETONES UR QL STRIP: NEGATIVE
LEUKOCYTE ESTERASE UR QL STRIP.AUTO: ABNORMAL
NITRITE UR QL STRIP: POSITIVE
PH UR STRIP.AUTO: 7 [PH] (ref 5–8)
PROT UR QL STRIP: NEGATIVE
RBC # UR: ABNORMAL /HPF
REF LAB TEST METHOD: ABNORMAL
SP GR UR STRIP: 1.01 (ref 1–1.03)
SQUAMOUS #/AREA URNS HPF: ABNORMAL /HPF
UROBILINOGEN UR QL STRIP: ABNORMAL
WBC UR QL AUTO: ABNORMAL /HPF

## 2021-07-17 PROCEDURE — 87077 CULTURE AEROBIC IDENTIFY: CPT | Performed by: INTERNAL MEDICINE

## 2021-07-17 PROCEDURE — 87086 URINE CULTURE/COLONY COUNT: CPT | Performed by: INTERNAL MEDICINE

## 2021-07-17 PROCEDURE — 87186 SC STD MICRODIL/AGAR DIL: CPT | Performed by: INTERNAL MEDICINE

## 2021-07-17 PROCEDURE — 81001 URINALYSIS AUTO W/SCOPE: CPT | Performed by: INTERNAL MEDICINE

## 2021-07-19 LAB — BACTERIA SPEC AEROBE CULT: ABNORMAL

## 2021-11-06 ENCOUNTER — LAB (OUTPATIENT)
Dept: LAB | Facility: HOSPITAL | Age: 76
End: 2021-11-06

## 2021-11-06 ENCOUNTER — TRANSCRIBE ORDERS (OUTPATIENT)
Dept: ADMINISTRATIVE | Facility: HOSPITAL | Age: 76
End: 2021-11-06

## 2021-11-06 DIAGNOSIS — R53.1 WEAKNESS: ICD-10-CM

## 2021-11-06 DIAGNOSIS — H52.10: ICD-10-CM

## 2021-11-06 DIAGNOSIS — I10 HYPERTENSION, UNSPECIFIED TYPE: ICD-10-CM

## 2021-11-06 DIAGNOSIS — R53.1 WEAKNESS: Primary | ICD-10-CM

## 2021-11-06 LAB
ALBUMIN SERPL-MCNC: 3.9 G/DL (ref 3.5–5.2)
ALBUMIN/GLOB SERPL: 1 G/DL
ALP SERPL-CCNC: 123 U/L (ref 39–117)
ALT SERPL W P-5'-P-CCNC: 12 U/L (ref 1–33)
ANION GAP SERPL CALCULATED.3IONS-SCNC: 13.1 MMOL/L (ref 5–15)
AST SERPL-CCNC: 17 U/L (ref 1–32)
BACTERIA UR QL AUTO: ABNORMAL /HPF
BILIRUB SERPL-MCNC: 0.3 MG/DL (ref 0–1.2)
BILIRUB UR QL STRIP: NEGATIVE
BUN SERPL-MCNC: 10 MG/DL (ref 8–23)
BUN/CREAT SERPL: 16.7 (ref 7–25)
CALCIUM SPEC-SCNC: 9 MG/DL (ref 8.6–10.5)
CHLORIDE SERPL-SCNC: 101 MMOL/L (ref 98–107)
CLARITY UR: ABNORMAL
CO2 SERPL-SCNC: 25.9 MMOL/L (ref 22–29)
COLOR UR: ABNORMAL
CREAT SERPL-MCNC: 0.6 MG/DL (ref 0.57–1)
DEPRECATED RDW RBC AUTO: 40 FL (ref 37–54)
EOSINOPHIL # BLD MANUAL: 0.39 10*3/MM3 (ref 0–0.4)
EOSINOPHIL NFR BLD MANUAL: 3.2 % (ref 0.3–6.2)
ERYTHROCYTE [DISTWIDTH] IN BLOOD BY AUTOMATED COUNT: 12.3 % (ref 12.3–15.4)
GFR SERPL CREATININE-BSD FRML MDRD: 97 ML/MIN/1.73
GLOBULIN UR ELPH-MCNC: 4.1 GM/DL
GLUCOSE SERPL-MCNC: 115 MG/DL (ref 65–99)
GLUCOSE UR STRIP-MCNC: NEGATIVE MG/DL
HCT VFR BLD AUTO: 39.5 % (ref 34–46.6)
HGB BLD-MCNC: 13.3 G/DL (ref 12–15.9)
HGB UR QL STRIP.AUTO: NEGATIVE
HYALINE CASTS UR QL AUTO: ABNORMAL /LPF
KETONES UR QL STRIP: ABNORMAL
LEUKOCYTE ESTERASE UR QL STRIP.AUTO: ABNORMAL
LYMPHOCYTES # BLD MANUAL: 2.58 10*3/MM3 (ref 0.7–3.1)
LYMPHOCYTES NFR BLD MANUAL: 21.3 % (ref 19.6–45.3)
LYMPHOCYTES NFR BLD MANUAL: 7.4 % (ref 5–12)
MCH RBC QN AUTO: 30 PG (ref 26.6–33)
MCHC RBC AUTO-ENTMCNC: 33.7 G/DL (ref 31.5–35.7)
MCV RBC AUTO: 89.2 FL (ref 79–97)
MONOCYTES # BLD AUTO: 0.9 10*3/MM3 (ref 0.1–0.9)
NEUTROPHILS # BLD AUTO: 8.25 10*3/MM3 (ref 1.7–7)
NEUTROPHILS NFR BLD MANUAL: 68.1 % (ref 42.7–76)
NITRITE UR QL STRIP: POSITIVE
PH UR STRIP.AUTO: 6 [PH] (ref 5–8)
PLAT MORPH BLD: NORMAL
PLATELET # BLD AUTO: 279 10*3/MM3 (ref 140–450)
PMV BLD AUTO: 9.9 FL (ref 6–12)
POTASSIUM SERPL-SCNC: 4.2 MMOL/L (ref 3.5–5.2)
PROT SERPL-MCNC: 8 G/DL (ref 6–8.5)
PROT UR QL STRIP: NEGATIVE
RBC # BLD AUTO: 4.43 10*6/MM3 (ref 3.77–5.28)
RBC # UR: ABNORMAL /HPF
RBC MORPH BLD: NORMAL
REF LAB TEST METHOD: ABNORMAL
SODIUM SERPL-SCNC: 140 MMOL/L (ref 136–145)
SP GR UR STRIP: 1.02 (ref 1–1.03)
SQUAMOUS #/AREA URNS HPF: ABNORMAL /HPF
UROBILINOGEN UR QL STRIP: ABNORMAL
WBC # BLD AUTO: 12.12 10*3/MM3 (ref 3.4–10.8)
WBC MORPH BLD: NORMAL
WBC UR QL AUTO: ABNORMAL /HPF

## 2021-11-06 PROCEDURE — 81001 URINALYSIS AUTO W/SCOPE: CPT

## 2021-11-06 PROCEDURE — 36415 COLL VENOUS BLD VENIPUNCTURE: CPT

## 2021-11-06 PROCEDURE — 87077 CULTURE AEROBIC IDENTIFY: CPT

## 2021-11-06 PROCEDURE — 85007 BL SMEAR W/DIFF WBC COUNT: CPT

## 2021-11-06 PROCEDURE — 87186 SC STD MICRODIL/AGAR DIL: CPT

## 2021-11-06 PROCEDURE — 80053 COMPREHEN METABOLIC PANEL: CPT

## 2021-11-06 PROCEDURE — 85027 COMPLETE CBC AUTOMATED: CPT

## 2021-11-06 PROCEDURE — 87086 URINE CULTURE/COLONY COUNT: CPT

## 2021-11-08 LAB — BACTERIA SPEC AEROBE CULT: ABNORMAL

## 2021-12-07 ENCOUNTER — HOSPITAL ENCOUNTER (EMERGENCY)
Facility: HOSPITAL | Age: 76
Discharge: HOME | End: 2021-12-07
Attending: EMERGENCY MEDICINE | Admitting: EMERGENCY MEDICINE

## 2021-12-07 ENCOUNTER — APPOINTMENT (OUTPATIENT)
Dept: GENERAL RADIOLOGY | Facility: HOSPITAL | Age: 76
End: 2021-12-07

## 2021-12-07 VITALS
HEART RATE: 90 BPM | HEIGHT: 60 IN | BODY MASS INDEX: 46.33 KG/M2 | SYSTOLIC BLOOD PRESSURE: 158 MMHG | DIASTOLIC BLOOD PRESSURE: 75 MMHG | TEMPERATURE: 97.9 F | RESPIRATION RATE: 20 BRPM | OXYGEN SATURATION: 96 % | WEIGHT: 236 LBS

## 2021-12-07 DIAGNOSIS — E78.5 HYPERLIPIDEMIA, UNSPECIFIED HYPERLIPIDEMIA TYPE: ICD-10-CM

## 2021-12-07 DIAGNOSIS — I10 PRIMARY HYPERTENSION: Primary | ICD-10-CM

## 2021-12-07 LAB
ALBUMIN SERPL-MCNC: 3.46 G/DL (ref 3.5–5.2)
ALBUMIN/GLOB SERPL: 0.9 G/DL
ALP SERPL-CCNC: 124 U/L (ref 39–117)
ALT SERPL W P-5'-P-CCNC: 11 U/L (ref 1–33)
ANION GAP SERPL CALCULATED.3IONS-SCNC: 9.3 MMOL/L (ref 5–15)
AST SERPL-CCNC: 14 U/L (ref 1–32)
BASOPHILS # BLD AUTO: 0.04 10*3/MM3 (ref 0–0.2)
BASOPHILS NFR BLD AUTO: 0.3 % (ref 0–1.5)
BILIRUB SERPL-MCNC: 0.3 MG/DL (ref 0–1.2)
BUN SERPL-MCNC: 11 MG/DL (ref 8–23)
BUN/CREAT SERPL: 16.4 (ref 7–25)
CALCIUM SPEC-SCNC: 8.6 MG/DL (ref 8.6–10.5)
CHLORIDE SERPL-SCNC: 100 MMOL/L (ref 98–107)
CO2 SERPL-SCNC: 29.7 MMOL/L (ref 22–29)
CREAT SERPL-MCNC: 0.67 MG/DL (ref 0.57–1)
DEPRECATED RDW RBC AUTO: 42.1 FL (ref 37–54)
EOSINOPHIL # BLD AUTO: 0.22 10*3/MM3 (ref 0–0.4)
EOSINOPHIL NFR BLD AUTO: 1.9 % (ref 0.3–6.2)
ERYTHROCYTE [DISTWIDTH] IN BLOOD BY AUTOMATED COUNT: 12.4 % (ref 12.3–15.4)
FLUAV SUBTYP SPEC NAA+PROBE: NOT DETECTED
FLUBV RNA ISLT QL NAA+PROBE: NOT DETECTED
GFR SERPL CREATININE-BSD FRML MDRD: 85 ML/MIN/1.73
GLOBULIN UR ELPH-MCNC: 4 GM/DL
GLUCOSE SERPL-MCNC: 128 MG/DL (ref 65–99)
HCT VFR BLD AUTO: 39 % (ref 34–46.6)
HGB BLD-MCNC: 12.5 G/DL (ref 12–15.9)
HOLD SPECIMEN: NORMAL
HOLD SPECIMEN: NORMAL
IMM GRANULOCYTES # BLD AUTO: 0.05 10*3/MM3 (ref 0–0.05)
IMM GRANULOCYTES NFR BLD AUTO: 0.4 % (ref 0–0.5)
LYMPHOCYTES # BLD AUTO: 2.26 10*3/MM3 (ref 0.7–3.1)
LYMPHOCYTES NFR BLD AUTO: 19.1 % (ref 19.6–45.3)
MCH RBC QN AUTO: 29.8 PG (ref 26.6–33)
MCHC RBC AUTO-ENTMCNC: 32.1 G/DL (ref 31.5–35.7)
MCV RBC AUTO: 92.9 FL (ref 79–97)
MONOCYTES # BLD AUTO: 1.08 10*3/MM3 (ref 0.1–0.9)
MONOCYTES NFR BLD AUTO: 9.1 % (ref 5–12)
NEUTROPHILS NFR BLD AUTO: 69.2 % (ref 42.7–76)
NEUTROPHILS NFR BLD AUTO: 8.16 10*3/MM3 (ref 1.7–7)
NRBC BLD AUTO-RTO: 0 /100 WBC (ref 0–0.2)
NT-PROBNP SERPL-MCNC: 270 PG/ML (ref 0–1800)
PLAT MORPH BLD: NORMAL
PLATELET # BLD AUTO: 198 10*3/MM3 (ref 140–450)
PMV BLD AUTO: 10.1 FL (ref 6–12)
POTASSIUM SERPL-SCNC: 3.7 MMOL/L (ref 3.5–5.2)
PROT SERPL-MCNC: 7.5 G/DL (ref 6–8.5)
RBC # BLD AUTO: 4.2 10*6/MM3 (ref 3.77–5.28)
RBC MORPH BLD: NORMAL
SARS-COV-2 RNA PNL SPEC NAA+PROBE: NOT DETECTED
SODIUM SERPL-SCNC: 139 MMOL/L (ref 136–145)
TROPONIN T SERPL-MCNC: <0.01 NG/ML (ref 0–0.03)
WBC NRBC COR # BLD: 11.81 10*3/MM3 (ref 3.4–10.8)
WHOLE BLOOD HOLD SPECIMEN: NORMAL
WHOLE BLOOD HOLD SPECIMEN: NORMAL

## 2021-12-07 PROCEDURE — 84484 ASSAY OF TROPONIN QUANT: CPT | Performed by: EMERGENCY MEDICINE

## 2021-12-07 PROCEDURE — 93005 ELECTROCARDIOGRAM TRACING: CPT | Performed by: EMERGENCY MEDICINE

## 2021-12-07 PROCEDURE — 93010 ELECTROCARDIOGRAM REPORT: CPT | Performed by: INTERNAL MEDICINE

## 2021-12-07 PROCEDURE — 87636 SARSCOV2 & INF A&B AMP PRB: CPT | Performed by: EMERGENCY MEDICINE

## 2021-12-07 PROCEDURE — 85025 COMPLETE CBC W/AUTO DIFF WBC: CPT | Performed by: EMERGENCY MEDICINE

## 2021-12-07 PROCEDURE — 83880 ASSAY OF NATRIURETIC PEPTIDE: CPT | Performed by: EMERGENCY MEDICINE

## 2021-12-07 PROCEDURE — 71045 X-RAY EXAM CHEST 1 VIEW: CPT

## 2021-12-07 PROCEDURE — 85007 BL SMEAR W/DIFF WBC COUNT: CPT | Performed by: EMERGENCY MEDICINE

## 2021-12-07 PROCEDURE — 36415 COLL VENOUS BLD VENIPUNCTURE: CPT

## 2021-12-07 PROCEDURE — 99284 EMERGENCY DEPT VISIT MOD MDM: CPT

## 2021-12-07 PROCEDURE — 80053 COMPREHEN METABOLIC PANEL: CPT | Performed by: EMERGENCY MEDICINE

## 2021-12-07 RX ORDER — SODIUM CHLORIDE 0.9 % (FLUSH) 0.9 %
10 SYRINGE (ML) INJECTION AS NEEDED
Status: DISCONTINUED | OUTPATIENT
Start: 2021-12-07 | End: 2021-12-08 | Stop reason: HOSPADM

## 2021-12-08 LAB
QT INTERVAL: 370 MS
QT INTERVAL: 376 MS
QTC INTERVAL: 459 MS
QTC INTERVAL: 462 MS

## 2021-12-08 NOTE — ED PROVIDER NOTES
Subjective   Patient presents to [facility] with shortness of breath and generalized weakness      Weakness - Generalized  Severity:  Mild  Onset quality:  Gradual  Timing:  Constant  Progression:  Worsening  Chronicity:  Recurrent  Context: change in medication and dehydration    Relieved by:  Nothing  Worsened by:  Standing and activity  Associated symptoms: shortness of breath        Review of Systems   Constitutional: Positive for activity change.   HENT: Negative.    Eyes: Negative.    Respiratory: Positive for shortness of breath.    Cardiovascular: Positive for palpitations.   Gastrointestinal: Negative.    Endocrine: Negative.    Genitourinary: Negative.    Musculoskeletal: Negative.    Skin: Negative.    Allergic/Immunologic: Negative.    Hematological: Negative.    Psychiatric/Behavioral: Negative.        Past Medical History:   Diagnosis Date   • Arthritis    • Asthma    • Degenerative disc disease, lumbar    • Diabetes mellitus (HCC)    • Fibromyalgia    • GERD (gastroesophageal reflux disease)    • Hyperlipidemia    • Hypertension    • ENE (obstructive sleep apnea)     Noncompliant with BiPAP/CPAP   • Osteoporosis        Allergies   Allergen Reactions   • Biaxin [Clarithromycin] Other (See Comments)     unknown       Past Surgical History:   Procedure Laterality Date   • APPENDECTOMY     • CARDIAC CATHETERIZATION N/A 10/30/2018    Procedure: Left Heart Cath;  Surgeon: Arturo Inman MD;  Location: [facility] INVASIVE LOCATION;  Service: Cardiology   • ESOPHAGEAL DILATATION     • TOTAL SHOULDER REPLACEMENT Bilateral    • TUBAL ABDOMINAL LIGATION         Family History   Problem Relation Age of Onset   • Breast cancer Sister        Social History     Socioeconomic History   • Marital status:    Tobacco Use   • Smoking status: Former Smoker   • Smokeless tobacco: Never Used   • Tobacco comment: She quit at least 30 years ago.   Substance and Sexual Activity   • Alcohol use: No   • Drug use: No   • Sexual  activity: Defer           Objective   Physical Exam  Vitals and nursing note reviewed.   Constitutional:       Appearance: She is well-developed. She is obese.   HENT:      Head: Normocephalic.      Mouth/Throat:      Mouth: Mucous membranes are moist.   Eyes:      Pupils: Pupils are equal, round, and reactive to light.   Cardiovascular:      Rate and Rhythm: Normal rate.   Pulmonary:      Effort: Pulmonary effort is normal.      Breath sounds: Examination of the right-upper field reveals wheezing. Examination of the left-upper field reveals wheezing. Wheezing present.   Abdominal:      General: Bowel sounds are normal.      Palpations: Abdomen is soft.   Musculoskeletal:      Cervical back: Normal range of motion.   Skin:     General: Skin is warm.      Capillary Refill: Capillary refill takes less than 2 seconds.   Neurological:      General: No focal deficit present.      Mental Status: She is alert.   Psychiatric:         Mood and Affect: Mood normal.         Procedures           ED Course  ED Course as of 12/07/21 2053   Tue Dec 07, 2021   1742 EKG noted sinus rhythm with first-degree AV block. 94 bpm. Premature atrial complexes noted. Cannot rule out inferior infarct of indeterminate age. No acute ST elevation noted. [SF]      ED Course User Index  [SF] Shankar Ellsworth, DO                                                 [facility]    Final diagnoses:   Primary hypertension   Hyperlipidemia, unspecified hyperlipidemia type       ED Disposition  ED Disposition     ED Disposition Condition Comment    Discharge Stable           Skinny Meehan MD  [address] [facility] [address] [phone]    Schedule an appointment as soon as possible for a visit   If symptoms worsen         Medication List      No changes were made to your prescriptions during this visit.          Gabo Lovett MD  12/07/21 2053

## 2022-01-13 ENCOUNTER — LAB REQUISITION (OUTPATIENT)
Dept: LAB | Facility: HOSPITAL | Age: 77
End: 2022-01-13

## 2022-01-13 DIAGNOSIS — R63.4 ABNORMAL WEIGHT LOSS: ICD-10-CM

## 2022-01-13 LAB
ANION GAP SERPL CALCULATED.3IONS-SCNC: 12 MMOL/L (ref 5–15)
BASOPHILS # BLD AUTO: 0.02 10*3/MM3 (ref 0–0.2)
BASOPHILS NFR BLD AUTO: 0.2 % (ref 0–1.5)
BUN SERPL-MCNC: 13 MG/DL (ref 8–23)
BUN/CREAT SERPL: 19.1 (ref 7–25)
CALCIUM SPEC-SCNC: 9.1 MG/DL (ref 8.6–10.5)
CHLORIDE SERPL-SCNC: 100 MMOL/L (ref 98–107)
CO2 SERPL-SCNC: 30 MMOL/L (ref 22–29)
CREAT SERPL-MCNC: 0.68 MG/DL (ref 0.57–1)
DEPRECATED RDW RBC AUTO: 43.1 FL (ref 37–54)
EOSINOPHIL # BLD AUTO: 0.18 10*3/MM3 (ref 0–0.4)
EOSINOPHIL NFR BLD AUTO: 1.7 % (ref 0.3–6.2)
ERYTHROCYTE [DISTWIDTH] IN BLOOD BY AUTOMATED COUNT: 12.5 % (ref 12.3–15.4)
GFR SERPL CREATININE-BSD FRML MDRD: 84 ML/MIN/1.73
GLUCOSE SERPL-MCNC: 153 MG/DL (ref 65–99)
HCT VFR BLD AUTO: 39.9 % (ref 34–46.6)
HGB BLD-MCNC: 13 G/DL (ref 12–15.9)
IMM GRANULOCYTES # BLD AUTO: 0.04 10*3/MM3 (ref 0–0.05)
IMM GRANULOCYTES NFR BLD AUTO: 0.4 % (ref 0–0.5)
LYMPHOCYTES # BLD AUTO: 2.14 10*3/MM3 (ref 0.7–3.1)
LYMPHOCYTES NFR BLD AUTO: 20 % (ref 19.6–45.3)
MCH RBC QN AUTO: 30.2 PG (ref 26.6–33)
MCHC RBC AUTO-ENTMCNC: 32.6 G/DL (ref 31.5–35.7)
MCV RBC AUTO: 92.6 FL (ref 79–97)
MONOCYTES # BLD AUTO: 0.96 10*3/MM3 (ref 0.1–0.9)
MONOCYTES NFR BLD AUTO: 9 % (ref 5–12)
NEUTROPHILS NFR BLD AUTO: 68.7 % (ref 42.7–76)
NEUTROPHILS NFR BLD AUTO: 7.36 10*3/MM3 (ref 1.7–7)
NRBC BLD AUTO-RTO: 0 /100 WBC (ref 0–0.2)
PLATELET # BLD AUTO: 254 10*3/MM3 (ref 140–450)
PMV BLD AUTO: 9.9 FL (ref 6–12)
POTASSIUM SERPL-SCNC: 4.1 MMOL/L (ref 3.5–5.2)
RBC # BLD AUTO: 4.31 10*6/MM3 (ref 3.77–5.28)
SODIUM SERPL-SCNC: 142 MMOL/L (ref 136–145)
WBC NRBC COR # BLD: 10.7 10*3/MM3 (ref 3.4–10.8)

## 2022-01-13 PROCEDURE — 85025 COMPLETE CBC W/AUTO DIFF WBC: CPT | Performed by: INTERNAL MEDICINE

## 2022-01-13 PROCEDURE — 80048 BASIC METABOLIC PNL TOTAL CA: CPT | Performed by: INTERNAL MEDICINE

## 2022-02-12 ENCOUNTER — APPOINTMENT (OUTPATIENT)
Dept: CT IMAGING | Facility: HOSPITAL | Age: 77
End: 2022-02-12

## 2022-02-12 ENCOUNTER — HOSPITAL ENCOUNTER (INPATIENT)
Facility: HOSPITAL | Age: 77
LOS: 5 days | Discharge: NURSING, REHAB OR LONG-TERM CARE | End: 2022-02-17
Attending: EMERGENCY MEDICINE | Admitting: INTERNAL MEDICINE

## 2022-02-12 ENCOUNTER — APPOINTMENT (OUTPATIENT)
Dept: GENERAL RADIOLOGY | Facility: HOSPITAL | Age: 77
End: 2022-02-12

## 2022-02-12 DIAGNOSIS — G44.221 CHRONIC TENSION-TYPE HEADACHE, INTRACTABLE: ICD-10-CM

## 2022-02-12 DIAGNOSIS — J18.9 PNEUMONIA OF BOTH LOWER LOBES DUE TO INFECTIOUS ORGANISM: Primary | ICD-10-CM

## 2022-02-12 DIAGNOSIS — A41.9 SEPSIS WITHOUT ACUTE ORGAN DYSFUNCTION, DUE TO UNSPECIFIED ORGANISM: ICD-10-CM

## 2022-02-12 DIAGNOSIS — F41.1 GAD (GENERALIZED ANXIETY DISORDER): ICD-10-CM

## 2022-02-12 DIAGNOSIS — M79.2 NEUROPATHIC PAIN: ICD-10-CM

## 2022-02-12 DIAGNOSIS — N30.01 ACUTE CYSTITIS WITH HEMATURIA: ICD-10-CM

## 2022-02-12 LAB
ALBUMIN SERPL-MCNC: 3.35 G/DL (ref 3.5–5.2)
ALBUMIN/GLOB SERPL: 0.8 G/DL
ALP SERPL-CCNC: 115 U/L (ref 39–117)
ALT SERPL W P-5'-P-CCNC: 17 U/L (ref 1–33)
ANION GAP SERPL CALCULATED.3IONS-SCNC: 12.1 MMOL/L (ref 5–15)
AST SERPL-CCNC: 23 U/L (ref 1–32)
BACTERIA UR QL AUTO: ABNORMAL /HPF
BASOPHILS # BLD AUTO: 0.04 10*3/MM3 (ref 0–0.2)
BASOPHILS NFR BLD AUTO: 0.2 % (ref 0–1.5)
BILIRUB SERPL-MCNC: 0.4 MG/DL (ref 0–1.2)
BILIRUB UR QL STRIP: NEGATIVE
BUN SERPL-MCNC: 14 MG/DL (ref 8–23)
BUN/CREAT SERPL: 16.3 (ref 7–25)
CALCIUM SPEC-SCNC: 8.5 MG/DL (ref 8.6–10.5)
CHLORIDE SERPL-SCNC: 99 MMOL/L (ref 98–107)
CLARITY UR: ABNORMAL
CO2 SERPL-SCNC: 23.9 MMOL/L (ref 22–29)
COLOR UR: ABNORMAL
CREAT SERPL-MCNC: 0.86 MG/DL (ref 0.57–1)
CRP SERPL-MCNC: 18.66 MG/DL (ref 0–0.5)
D-LACTATE SERPL-SCNC: 1 MMOL/L (ref 0.5–2)
DEPRECATED RDW RBC AUTO: 43.2 FL (ref 37–54)
EOSINOPHIL # BLD AUTO: 0.01 10*3/MM3 (ref 0–0.4)
EOSINOPHIL NFR BLD AUTO: 0 % (ref 0.3–6.2)
ERYTHROCYTE [DISTWIDTH] IN BLOOD BY AUTOMATED COUNT: 12.8 % (ref 12.3–15.4)
FLUAV RNA RESP QL NAA+PROBE: NOT DETECTED
FLUBV RNA RESP QL NAA+PROBE: NOT DETECTED
GFR SERPL CREATININE-BSD FRML MDRD: 64 ML/MIN/1.73
GLOBULIN UR ELPH-MCNC: 4.1 GM/DL
GLUCOSE BLDC GLUCOMTR-MCNC: 182 MG/DL (ref 70–130)
GLUCOSE SERPL-MCNC: 198 MG/DL (ref 65–99)
GLUCOSE UR STRIP-MCNC: NEGATIVE MG/DL
HCT VFR BLD AUTO: 36 % (ref 34–46.6)
HGB BLD-MCNC: 11.9 G/DL (ref 12–15.9)
HGB UR QL STRIP.AUTO: ABNORMAL
HYALINE CASTS UR QL AUTO: ABNORMAL /LPF
IMM GRANULOCYTES # BLD AUTO: 0.14 10*3/MM3 (ref 0–0.05)
IMM GRANULOCYTES NFR BLD AUTO: 0.6 % (ref 0–0.5)
KETONES UR QL STRIP: ABNORMAL
LEUKOCYTE ESTERASE UR QL STRIP.AUTO: ABNORMAL
LYMPHOCYTES # BLD AUTO: 1.61 10*3/MM3 (ref 0.7–3.1)
LYMPHOCYTES NFR BLD AUTO: 6.7 % (ref 19.6–45.3)
MCH RBC QN AUTO: 30.6 PG (ref 26.6–33)
MCHC RBC AUTO-ENTMCNC: 33.1 G/DL (ref 31.5–35.7)
MCV RBC AUTO: 92.5 FL (ref 79–97)
MONOCYTES # BLD AUTO: 3.94 10*3/MM3 (ref 0.1–0.9)
MONOCYTES NFR BLD AUTO: 16.3 % (ref 5–12)
NEUTROPHILS NFR BLD AUTO: 18.47 10*3/MM3 (ref 1.7–7)
NEUTROPHILS NFR BLD AUTO: 76.2 % (ref 42.7–76)
NITRITE UR QL STRIP: POSITIVE
NRBC BLD AUTO-RTO: 0 /100 WBC (ref 0–0.2)
NT-PROBNP SERPL-MCNC: 295.7 PG/ML (ref 0–1800)
PH UR STRIP.AUTO: 5.5 [PH] (ref 5–8)
PLATELET # BLD AUTO: 213 10*3/MM3 (ref 140–450)
PMV BLD AUTO: 10 FL (ref 6–12)
POTASSIUM SERPL-SCNC: 4.1 MMOL/L (ref 3.5–5.2)
PROT SERPL-MCNC: 7.4 G/DL (ref 6–8.5)
PROT UR QL STRIP: ABNORMAL
QT INTERVAL: 334 MS
QTC INTERVAL: 445 MS
RBC # BLD AUTO: 3.89 10*6/MM3 (ref 3.77–5.28)
RBC # UR STRIP: ABNORMAL /HPF
REF LAB TEST METHOD: ABNORMAL
SARS-COV-2 RNA RESP QL NAA+PROBE: NOT DETECTED
SODIUM SERPL-SCNC: 135 MMOL/L (ref 136–145)
SP GR UR STRIP: 1.02 (ref 1–1.03)
SQUAMOUS #/AREA URNS HPF: ABNORMAL /HPF
TROPONIN T SERPL-MCNC: <0.01 NG/ML (ref 0–0.03)
UROBILINOGEN UR QL STRIP: ABNORMAL
WBC # UR STRIP: ABNORMAL /HPF
WBC NRBC COR # BLD: 24.21 10*3/MM3 (ref 3.4–10.8)

## 2022-02-12 PROCEDURE — 82962 GLUCOSE BLOOD TEST: CPT

## 2022-02-12 PROCEDURE — 81001 URINALYSIS AUTO W/SCOPE: CPT | Performed by: PHYSICIAN ASSISTANT

## 2022-02-12 PROCEDURE — 86140 C-REACTIVE PROTEIN: CPT | Performed by: PHYSICIAN ASSISTANT

## 2022-02-12 PROCEDURE — 71250 CT THORAX DX C-: CPT

## 2022-02-12 PROCEDURE — 83880 ASSAY OF NATRIURETIC PEPTIDE: CPT | Performed by: PHYSICIAN ASSISTANT

## 2022-02-12 PROCEDURE — 87086 URINE CULTURE/COLONY COUNT: CPT | Performed by: PHYSICIAN ASSISTANT

## 2022-02-12 PROCEDURE — 84484 ASSAY OF TROPONIN QUANT: CPT | Performed by: PHYSICIAN ASSISTANT

## 2022-02-12 PROCEDURE — 99223 1ST HOSP IP/OBS HIGH 75: CPT | Performed by: INTERNAL MEDICINE

## 2022-02-12 PROCEDURE — 87077 CULTURE AEROBIC IDENTIFY: CPT | Performed by: PHYSICIAN ASSISTANT

## 2022-02-12 PROCEDURE — 73523 X-RAY EXAM HIPS BI 5/> VIEWS: CPT

## 2022-02-12 PROCEDURE — 87186 SC STD MICRODIL/AGAR DIL: CPT | Performed by: PHYSICIAN ASSISTANT

## 2022-02-12 PROCEDURE — 72128 CT CHEST SPINE W/O DYE: CPT

## 2022-02-12 PROCEDURE — 25010000002 PIPERACILLIN SOD-TAZOBACTAM PER 1 G: Performed by: PHYSICIAN ASSISTANT

## 2022-02-12 PROCEDURE — 72125 CT NECK SPINE W/O DYE: CPT

## 2022-02-12 PROCEDURE — 85025 COMPLETE CBC W/AUTO DIFF WBC: CPT | Performed by: PHYSICIAN ASSISTANT

## 2022-02-12 PROCEDURE — 87636 SARSCOV2 & INF A&B AMP PRB: CPT | Performed by: PHYSICIAN ASSISTANT

## 2022-02-12 PROCEDURE — 70450 CT HEAD/BRAIN W/O DYE: CPT

## 2022-02-12 PROCEDURE — 72131 CT LUMBAR SPINE W/O DYE: CPT

## 2022-02-12 PROCEDURE — 80053 COMPREHEN METABOLIC PANEL: CPT | Performed by: PHYSICIAN ASSISTANT

## 2022-02-12 PROCEDURE — 87150 DNA/RNA AMPLIFIED PROBE: CPT | Performed by: PHYSICIAN ASSISTANT

## 2022-02-12 PROCEDURE — 71045 X-RAY EXAM CHEST 1 VIEW: CPT

## 2022-02-12 PROCEDURE — 83605 ASSAY OF LACTIC ACID: CPT | Performed by: PHYSICIAN ASSISTANT

## 2022-02-12 PROCEDURE — 87040 BLOOD CULTURE FOR BACTERIA: CPT | Performed by: PHYSICIAN ASSISTANT

## 2022-02-12 PROCEDURE — 93005 ELECTROCARDIOGRAM TRACING: CPT | Performed by: PHYSICIAN ASSISTANT

## 2022-02-12 PROCEDURE — 99285 EMERGENCY DEPT VISIT HI MDM: CPT

## 2022-02-12 RX ORDER — SODIUM CHLORIDE 0.9 % (FLUSH) 0.9 %
3 SYRINGE (ML) INJECTION EVERY 12 HOURS SCHEDULED
Status: DISCONTINUED | OUTPATIENT
Start: 2022-02-12 | End: 2022-02-17 | Stop reason: HOSPADM

## 2022-02-12 RX ORDER — ACETAMINOPHEN 500 MG
TABLET ORAL
Status: COMPLETED
Start: 2022-02-12 | End: 2022-02-12

## 2022-02-12 RX ORDER — SODIUM CHLORIDE 0.9 % (FLUSH) 0.9 %
3-10 SYRINGE (ML) INJECTION AS NEEDED
Status: DISCONTINUED | OUTPATIENT
Start: 2022-02-12 | End: 2022-02-17 | Stop reason: HOSPADM

## 2022-02-12 RX ORDER — ACETAMINOPHEN 500 MG
1000 TABLET ORAL ONCE
Status: COMPLETED | OUTPATIENT
Start: 2022-02-12 | End: 2022-02-12

## 2022-02-12 RX ORDER — SODIUM CHLORIDE 0.9 % (FLUSH) 0.9 %
10 SYRINGE (ML) INJECTION AS NEEDED
Status: DISCONTINUED | OUTPATIENT
Start: 2022-02-12 | End: 2022-02-17 | Stop reason: HOSPADM

## 2022-02-12 RX ORDER — NITROGLYCERIN 0.4 MG/1
0.4 TABLET SUBLINGUAL
Status: DISCONTINUED | OUTPATIENT
Start: 2022-02-12 | End: 2022-02-17 | Stop reason: HOSPADM

## 2022-02-12 RX ORDER — HEPARIN SODIUM 5000 [USP'U]/ML
5000 INJECTION, SOLUTION INTRAVENOUS; SUBCUTANEOUS EVERY 12 HOURS SCHEDULED
Status: DISCONTINUED | OUTPATIENT
Start: 2022-02-12 | End: 2022-02-17 | Stop reason: HOSPADM

## 2022-02-12 RX ADMIN — ACETAMINOPHEN 1000 MG: 500 TABLET ORAL at 20:28

## 2022-02-12 RX ADMIN — SODIUM CHLORIDE 1000 ML: 9 INJECTION, SOLUTION INTRAVENOUS at 18:17

## 2022-02-12 RX ADMIN — Medication 1000 MG: at 20:28

## 2022-02-12 RX ADMIN — PIPERACILLIN SODIUM AND TAZOBACTAM SODIUM 3.38 G: 3; .375 INJECTION, POWDER, LYOPHILIZED, FOR SOLUTION INTRAVENOUS at 18:18

## 2022-02-12 NOTE — ED PROVIDER NOTES
"Subjective   This is a 76 year old female patient who presents to the ER with chief complaint of fall. PMH of HTN, HLD, DM, GERD, OA. Patient was at the NH when she got up to walk and got dizzy, lost her balance and fell landing on her knees and feet. She now says she has pain \"all over.\" No numbness/tingling. No facial asymmetry or slurred speech.           Review of Systems   Constitutional: Negative.  Negative for fever.   HENT: Negative.    Respiratory: Negative.    Cardiovascular: Negative.  Negative for chest pain.   Gastrointestinal: Negative.  Negative for abdominal pain.   Endocrine: Negative.    Genitourinary: Negative.  Negative for dysuria.   Musculoskeletal: Positive for back pain, myalgias and neck pain. Negative for arthralgias, gait problem, joint swelling and neck stiffness.   Skin: Negative.    Neurological: Negative.    Psychiatric/Behavioral: Negative.    All other systems reviewed and are negative.      Past Medical History:   Diagnosis Date   • Arthritis    • Asthma    • Degenerative disc disease, lumbar    • Diabetes mellitus (HCC)    • Fibromyalgia    • GERD (gastroesophageal reflux disease)    • Hyperlipidemia    • Hypertension    • ENE (obstructive sleep apnea)     Noncompliant with BiPAP/CPAP   • Osteoporosis        Allergies   Allergen Reactions   • Biaxin [Clarithromycin] Other (See Comments)     unknown       Past Surgical History:   Procedure Laterality Date   • APPENDECTOMY     • CARDIAC CATHETERIZATION N/A 10/30/2018    Procedure: Left Heart Cath;  Surgeon: Arturo Inman MD;  Location: [facility] INVASIVE LOCATION;  Service: Cardiology   • ESOPHAGEAL DILATATION     • TOTAL SHOULDER REPLACEMENT Bilateral    • TUBAL ABDOMINAL LIGATION         Family History   Problem Relation Age of Onset   • Breast cancer Sister        Social History     Socioeconomic History   • Marital status:    Tobacco Use   • Smoking status: Former Smoker   • Smokeless tobacco: Never Used   • Tobacco " comment: She quit at least 30 years ago.   Substance and Sexual Activity   • Alcohol use: No   • Drug use: No   • Sexual activity: Defer           Objective   Physical Exam  Vitals and nursing note reviewed.   Constitutional:       General: She is not in acute distress.     Appearance: She is well-developed. She is not diaphoretic.   HENT:      Head: Normocephalic and atraumatic.      Right Ear: External ear normal.      Left Ear: External ear normal.      Nose: Nose normal.   Eyes:      Conjunctiva/sclera: Conjunctivae normal.      Pupils: Pupils are equal, round, and reactive to light.   Neck:      Vascular: No JVD.      Trachea: No tracheal deviation.   Cardiovascular:      Rate and Rhythm: Normal rate and regular rhythm.      Heart sounds: Normal heart sounds. No murmur heard.      Pulmonary:      Effort: Pulmonary effort is normal. No respiratory distress.      Breath sounds: Normal breath sounds. No wheezing.   Abdominal:      General: Bowel sounds are normal.      Palpations: Abdomen is soft.      Tenderness: There is no abdominal tenderness.   Musculoskeletal:         General: Signs of injury present. No swelling, tenderness or deformity. Normal range of motion.      Cervical back: Normal range of motion and neck supple.   Skin:     General: Skin is warm and dry.      Coloration: Skin is not pale.      Findings: No erythema or rash.   Neurological:      General: No focal deficit present.      Mental Status: She is alert and oriented to person, place, and time. Mental status is at baseline.      Cranial Nerves: No cranial nerve deficit.      Sensory: No sensory deficit.      Motor: No weakness.      Coordination: Coordination normal.      Gait: Gait normal.      Deep Tendon Reflexes: Reflexes normal.      Comments: Normal EOMs and pupillary reflexes bilaterally. Can move all 4 extremities. Good  and plantar flexion strength bilaterally. No facial asymmetry or slurred speech.    Psychiatric:          Behavior: Behavior normal.         Thought Content: Thought content normal.         Procedures           ED Course  ED Course as of 02/12/22 2111   Sat Feb 12, 2022   1725 XR Hips Bilateral With or Without Pelvis 5 View  IMPRESSION:  1. No acute fracture or dislocation either hip or pelvis. Bilateral hip osteoarthritis.     Signer Name: Elke Joseph MD   Signed: 2/12/2022 5:17 PM   Workstation Name: [facility]    Radiology [facility] [MM]   1725 XR Chest 1 View  IMPRESSION:  Worsening in the appearance the chest. Films technically limited. There is moderate cardiac silhouette enlargement and there are bibasilar infiltrates that appear to be new from prior. Please correlate for clinical evidence for aspiration or pneumonia  with follow-up to clearing recommended. Additionally there is thickening of the central bronchovascular soft tissues the could reflect asthma or bronchitis. Please exclude any clinical concern for mild volume overload. Follow-up recommended..     Signer Name: Elke Joseph MD   Signed: 2/12/2022 5:15 PM   Workstation Name: [facility]    Radiology [facility] [MM]   1753 CT Head Without Contrast  IMPRESSION:  Senescent change. No acute intracranial injury.     Signer Name: Elke Joseph MD   Signed: 2/12/2022 5:47 PM   Workstation Name: [facility]    Radiology [facility] [MM]   1804 CT Cervical Spine Without Contrast  IMPRESSION:  No acute fracture or traumatic malalignment is suspected in the cervical spine. Redemonstration of extensive cervical degenerative changes and acquired fusion at C4-5     Signer Name: Elke Joseph MD   Signed: 2/12/2022 5:59 PM   Workstation Name: [facility]    Radiology [facility] [MM]   1806 CT Thoracic Spine Without Contrast  IMPRESSION:  No acute fracture or traumatic malalignment is suspected in the thoracic spine. Extensive degenerative changes are present.     Signer Name: Elke Joseph MD   Signed: 2/12/2022  6:02 PM   Workstation Name: [facility]    [facility] [MM]   1842 CT Lumbar Spine Without Contrast  IMPRESSION:  No acute fracture or traumatic malalignment is suspected in the lumbar spine.  2. There is extensive lumbar degenerative change with multilevel canal stenosis and foraminal impingement. If more information is needed, this could be further evaluated with an MRI if the patient is candidate.     Signer Name: Elke Joseph MD   Signed: 2/12/2022 6:08 PM   Workstation Name: [facility]  [MM]   1842 CT Chest Without Contrast Diagnostic  IMPRESSION:  No displaced rib fracture is suspected. No pleural effusion or pneumothorax. Study limited by lack of IV contrast media and arm positioning.  2. Dependent atelectasis. There is some peripheral increase in interstitial markings that could indicate some underlying pulmonary fibrosis. This is similar to prior. There is also thickening of the central bronchovascular soft tissues suggestive of  underlying asthma or bronchitis.     Signer Name: Elke Joseph MD   Signed: 2/12/2022 6:14 PM   Workstation Name: [facility]    [facility]iology [facility] [MM]   2111 Spoke with Dr. Rivera and she has accepted the patient in admission.  [MM]      ED Course User Index  [MM] Tabitha Coronel PA                                                 MDM  Number of Diagnoses or Management Options     Amount and/or Complexity of Data Reviewed  Clinical lab tests: ordered and reviewed  Tests in the radiology section of CPT®: ordered and reviewed  Decide to obtain previous medical records or to obtain history from someone other than the patient: yes  Discuss the patient with other providers: yes        Final diagnoses:   Pneumonia of both lower lobes due to infectious organism   Sepsis without acute organ dysfunction, due to unspecified organism (HCC)   Acute cystitis with hematuria       ED Disposition  ED Disposition     ED Disposition Condition Comment     Decision to Admit            No follow-up provider specified.       Medication List      No changes were made to your prescriptions during this visit.          Tabitha Coronel PA  02/12/22 1654

## 2022-02-13 PROBLEM — N39.0 RECURRENT UTI: Status: RESOLVED | Noted: 2020-01-22 | Resolved: 2022-02-13

## 2022-02-13 LAB
ALBUMIN SERPL-MCNC: 2.95 G/DL (ref 3.5–5.2)
ALBUMIN/GLOB SERPL: 0.8 G/DL
ALP SERPL-CCNC: 132 U/L (ref 39–117)
ALT SERPL W P-5'-P-CCNC: 18 U/L (ref 1–33)
ANION GAP SERPL CALCULATED.3IONS-SCNC: 10.8 MMOL/L (ref 5–15)
AST SERPL-CCNC: 20 U/L (ref 1–32)
BACTERIA BLD CULT: ABNORMAL
BILIRUB SERPL-MCNC: 0.4 MG/DL (ref 0–1.2)
BOTTLE TYPE: ABNORMAL
BUN SERPL-MCNC: 14 MG/DL (ref 8–23)
BUN/CREAT SERPL: 15.9 (ref 7–25)
CALCIUM SPEC-SCNC: 8.4 MG/DL (ref 8.6–10.5)
CHLORIDE SERPL-SCNC: 102 MMOL/L (ref 98–107)
CO2 SERPL-SCNC: 24.2 MMOL/L (ref 22–29)
CREAT SERPL-MCNC: 0.88 MG/DL (ref 0.57–1)
DEPRECATED RDW RBC AUTO: 44.3 FL (ref 37–54)
ERYTHROCYTE [DISTWIDTH] IN BLOOD BY AUTOMATED COUNT: 13 % (ref 12.3–15.4)
GFR SERPL CREATININE-BSD FRML MDRD: 62 ML/MIN/1.73
GLOBULIN UR ELPH-MCNC: 3.9 GM/DL
GLUCOSE BLDC GLUCOMTR-MCNC: 148 MG/DL (ref 70–130)
GLUCOSE BLDC GLUCOMTR-MCNC: 148 MG/DL (ref 70–130)
GLUCOSE BLDC GLUCOMTR-MCNC: 169 MG/DL (ref 70–130)
GLUCOSE BLDC GLUCOMTR-MCNC: 196 MG/DL (ref 70–130)
GLUCOSE SERPL-MCNC: 205 MG/DL (ref 65–99)
HBA1C MFR BLD: 7.3 % (ref 4.8–5.6)
HCT VFR BLD AUTO: 33.9 % (ref 34–46.6)
HGB BLD-MCNC: 11.1 G/DL (ref 12–15.9)
LYMPHOCYTES # BLD MANUAL: 0.38 10*3/MM3 (ref 0.7–3.1)
LYMPHOCYTES NFR BLD MANUAL: 4 % (ref 5–12)
M PNEUMO IGM SER QL: NEGATIVE
MAGNESIUM SERPL-MCNC: 1.9 MG/DL (ref 1.6–2.4)
MCH RBC QN AUTO: 30.4 PG (ref 26.6–33)
MCHC RBC AUTO-ENTMCNC: 32.7 G/DL (ref 31.5–35.7)
MCV RBC AUTO: 92.9 FL (ref 79–97)
MONOCYTES # BLD: 1.51 10*3/MM3 (ref 0.1–0.9)
MRSA DNA SPEC QL NAA+PROBE: NEGATIVE
NEUTROPHILS # BLD AUTO: 35.9 10*3/MM3 (ref 1.7–7)
NEUTROPHILS NFR BLD MANUAL: 95 % (ref 42.7–76)
PHOSPHATE SERPL-MCNC: 2.8 MG/DL (ref 2.5–4.5)
PLAT MORPH BLD: NORMAL
PLATELET # BLD AUTO: 187 10*3/MM3 (ref 140–450)
PMV BLD AUTO: 10.2 FL (ref 6–12)
POTASSIUM SERPL-SCNC: 3.7 MMOL/L (ref 3.5–5.2)
PROT SERPL-MCNC: 6.8 G/DL (ref 6–8.5)
RBC # BLD AUTO: 3.65 10*6/MM3 (ref 3.77–5.28)
RBC MORPH BLD: NORMAL
S AUREUS DNA SPEC QL NAA+PROBE: NEGATIVE
SCAN SLIDE: NORMAL
SODIUM SERPL-SCNC: 137 MMOL/L (ref 136–145)
TSH SERPL DL<=0.05 MIU/L-ACNC: 0.77 UIU/ML (ref 0.27–4.2)
VARIANT LYMPHS NFR BLD MANUAL: 1 % (ref 19.6–45.3)
WBC NRBC COR # BLD: 37.79 10*3/MM3 (ref 3.4–10.8)

## 2022-02-13 PROCEDURE — 82962 GLUCOSE BLOOD TEST: CPT

## 2022-02-13 PROCEDURE — 87640 STAPH A DNA AMP PROBE: CPT | Performed by: INTERNAL MEDICINE

## 2022-02-13 PROCEDURE — 85007 BL SMEAR W/DIFF WBC COUNT: CPT | Performed by: INTERNAL MEDICINE

## 2022-02-13 PROCEDURE — 63710000001 INSULIN ASPART PER 5 UNITS: Performed by: INTERNAL MEDICINE

## 2022-02-13 PROCEDURE — 25010000002 MEROPENEM PER 100 MG: Performed by: INTERNAL MEDICINE

## 2022-02-13 PROCEDURE — 25010000002 VANCOMYCIN 5 G RECONSTITUTED SOLUTION: Performed by: INTERNAL MEDICINE

## 2022-02-13 PROCEDURE — 94799 UNLISTED PULMONARY SVC/PX: CPT

## 2022-02-13 PROCEDURE — 84443 ASSAY THYROID STIM HORMONE: CPT | Performed by: INTERNAL MEDICINE

## 2022-02-13 PROCEDURE — 25010000002 HEPARIN (PORCINE) PER 1000 UNITS: Performed by: INTERNAL MEDICINE

## 2022-02-13 PROCEDURE — 87641 MR-STAPH DNA AMP PROBE: CPT | Performed by: INTERNAL MEDICINE

## 2022-02-13 PROCEDURE — 25010000002 MEROPENEM PER 100 MG: Performed by: PHYSICIAN ASSISTANT

## 2022-02-13 PROCEDURE — 86738 MYCOPLASMA ANTIBODY: CPT | Performed by: INTERNAL MEDICINE

## 2022-02-13 PROCEDURE — 84100 ASSAY OF PHOSPHORUS: CPT | Performed by: INTERNAL MEDICINE

## 2022-02-13 PROCEDURE — 80053 COMPREHEN METABOLIC PANEL: CPT | Performed by: INTERNAL MEDICINE

## 2022-02-13 PROCEDURE — 83036 HEMOGLOBIN GLYCOSYLATED A1C: CPT | Performed by: INTERNAL MEDICINE

## 2022-02-13 PROCEDURE — 99233 SBSQ HOSP IP/OBS HIGH 50: CPT | Performed by: INTERNAL MEDICINE

## 2022-02-13 PROCEDURE — 85025 COMPLETE CBC W/AUTO DIFF WBC: CPT | Performed by: INTERNAL MEDICINE

## 2022-02-13 PROCEDURE — 83735 ASSAY OF MAGNESIUM: CPT | Performed by: INTERNAL MEDICINE

## 2022-02-13 RX ORDER — L.ACID,CASEI/B.ANIMAL/S.THERMO 16B CELL
1 CAPSULE ORAL DAILY
Status: DISCONTINUED | OUTPATIENT
Start: 2022-02-13 | End: 2022-02-13

## 2022-02-13 RX ORDER — POTASSIUM CHLORIDE 7.45 MG/ML
10 INJECTION INTRAVENOUS
Status: DISCONTINUED | OUTPATIENT
Start: 2022-02-13 | End: 2022-02-17 | Stop reason: HOSPADM

## 2022-02-13 RX ORDER — DEXTROSE MONOHYDRATE 25 G/50ML
25 INJECTION, SOLUTION INTRAVENOUS
Status: DISCONTINUED | OUTPATIENT
Start: 2022-02-13 | End: 2022-02-17 | Stop reason: HOSPADM

## 2022-02-13 RX ORDER — OXYBUTYNIN CHLORIDE 5 MG/1
10 TABLET, EXTENDED RELEASE ORAL DAILY
Status: DISCONTINUED | OUTPATIENT
Start: 2022-02-13 | End: 2022-02-17 | Stop reason: HOSPADM

## 2022-02-13 RX ORDER — LACTULOSE 10 G/15ML
20 SOLUTION ORAL 2 TIMES DAILY PRN
COMMUNITY

## 2022-02-13 RX ORDER — NICOTINE POLACRILEX 4 MG
15 LOZENGE BUCCAL
Status: DISCONTINUED | OUTPATIENT
Start: 2022-02-13 | End: 2022-02-17 | Stop reason: HOSPADM

## 2022-02-13 RX ORDER — OLANZAPINE 2.5 MG/1
2.5 TABLET ORAL NIGHTLY
Status: DISCONTINUED | OUTPATIENT
Start: 2022-02-13 | End: 2022-02-17 | Stop reason: HOSPADM

## 2022-02-13 RX ORDER — NYSTATIN 100000 [USP'U]/G
1 POWDER TOPICAL 2 TIMES DAILY
COMMUNITY

## 2022-02-13 RX ORDER — LACTULOSE 10 G/15ML
20 SOLUTION ORAL DAILY
Status: DISCONTINUED | OUTPATIENT
Start: 2022-02-13 | End: 2022-02-17 | Stop reason: HOSPADM

## 2022-02-13 RX ORDER — GUAIFENESIN 600 MG/1
600 TABLET, EXTENDED RELEASE ORAL 2 TIMES DAILY
Status: DISCONTINUED | OUTPATIENT
Start: 2022-02-13 | End: 2022-02-17 | Stop reason: HOSPADM

## 2022-02-13 RX ORDER — ALPRAZOLAM 0.25 MG/1
0.25 TABLET ORAL DAILY
Status: DISCONTINUED | OUTPATIENT
Start: 2022-02-13 | End: 2022-02-17 | Stop reason: HOSPADM

## 2022-02-13 RX ORDER — OMEPRAZOLE 40 MG/1
40 CAPSULE, DELAYED RELEASE ORAL DAILY
COMMUNITY

## 2022-02-13 RX ORDER — FUROSEMIDE 20 MG/1
20 TABLET ORAL DAILY
Status: CANCELLED | OUTPATIENT
Start: 2022-02-13

## 2022-02-13 RX ORDER — MAGNESIUM SULFATE HEPTAHYDRATE 40 MG/ML
2 INJECTION, SOLUTION INTRAVENOUS AS NEEDED
Status: DISCONTINUED | OUTPATIENT
Start: 2022-02-13 | End: 2022-02-17 | Stop reason: HOSPADM

## 2022-02-13 RX ORDER — DOXEPIN HYDROCHLORIDE 10 MG/1
10 CAPSULE ORAL NIGHTLY
COMMUNITY

## 2022-02-13 RX ORDER — DOXEPIN HYDROCHLORIDE 10 MG/1
10 CAPSULE ORAL NIGHTLY
Status: DISCONTINUED | OUTPATIENT
Start: 2022-02-13 | End: 2022-02-17 | Stop reason: HOSPADM

## 2022-02-13 RX ORDER — GARLIC EXTRACT 500 MG
1 CAPSULE ORAL DAILY
Status: DISCONTINUED | OUTPATIENT
Start: 2022-02-13 | End: 2022-02-17 | Stop reason: HOSPADM

## 2022-02-13 RX ORDER — ALBUTEROL SULFATE 90 UG/1
2 AEROSOL, METERED RESPIRATORY (INHALATION) EVERY 8 HOURS PRN
COMMUNITY

## 2022-02-13 RX ORDER — AMLODIPINE BESYLATE 10 MG/1
10 TABLET ORAL
Status: CANCELLED | OUTPATIENT
Start: 2022-02-13

## 2022-02-13 RX ORDER — MAGNESIUM SULFATE HEPTAHYDRATE 40 MG/ML
4 INJECTION, SOLUTION INTRAVENOUS AS NEEDED
Status: DISCONTINUED | OUTPATIENT
Start: 2022-02-13 | End: 2022-02-17 | Stop reason: HOSPADM

## 2022-02-13 RX ORDER — PANTOPRAZOLE SODIUM 40 MG/1
40 TABLET, DELAYED RELEASE ORAL EVERY MORNING
Status: DISCONTINUED | OUTPATIENT
Start: 2022-02-13 | End: 2022-02-17 | Stop reason: HOSPADM

## 2022-02-13 RX ORDER — NYSTATIN 100000 [USP'U]/G
1 POWDER TOPICAL 2 TIMES DAILY
Status: DISCONTINUED | OUTPATIENT
Start: 2022-02-13 | End: 2022-02-17 | Stop reason: HOSPADM

## 2022-02-13 RX ORDER — ECHINACEA PURPUREA EXTRACT 125 MG
1 TABLET ORAL
Status: DISCONTINUED | OUTPATIENT
Start: 2022-02-13 | End: 2022-02-17 | Stop reason: HOSPADM

## 2022-02-13 RX ORDER — FLUTICASONE PROPIONATE 50 MCG
1 SPRAY, SUSPENSION (ML) NASAL DAILY
Status: DISCONTINUED | OUTPATIENT
Start: 2022-02-13 | End: 2022-02-17 | Stop reason: HOSPADM

## 2022-02-13 RX ORDER — GABAPENTIN 400 MG/1
400 CAPSULE ORAL 3 TIMES DAILY
Status: DISCONTINUED | OUTPATIENT
Start: 2022-02-13 | End: 2022-02-17 | Stop reason: HOSPADM

## 2022-02-13 RX ORDER — POTASSIUM CHLORIDE 750 MG/1
10 TABLET, FILM COATED, EXTENDED RELEASE ORAL DAILY
Status: CANCELLED | OUTPATIENT
Start: 2022-02-13

## 2022-02-13 RX ORDER — GUAIFENESIN 600 MG/1
600 TABLET, EXTENDED RELEASE ORAL 2 TIMES DAILY
COMMUNITY

## 2022-02-13 RX ORDER — FLECAINIDE ACETATE 50 MG/1
50 TABLET ORAL EVERY 12 HOURS SCHEDULED
Status: DISCONTINUED | OUTPATIENT
Start: 2022-02-13 | End: 2022-02-17 | Stop reason: HOSPADM

## 2022-02-13 RX ORDER — POTASSIUM CHLORIDE 1.5 G/1.77G
40 POWDER, FOR SOLUTION ORAL AS NEEDED
Status: DISCONTINUED | OUTPATIENT
Start: 2022-02-13 | End: 2022-02-17 | Stop reason: HOSPADM

## 2022-02-13 RX ORDER — ALPRAZOLAM 0.5 MG/1
0.5 TABLET ORAL NIGHTLY
Status: DISCONTINUED | OUTPATIENT
Start: 2022-02-13 | End: 2022-02-17 | Stop reason: HOSPADM

## 2022-02-13 RX ORDER — ALBUTEROL SULFATE 2.5 MG/3ML
2.5 SOLUTION RESPIRATORY (INHALATION) EVERY 8 HOURS PRN
Status: DISCONTINUED | OUTPATIENT
Start: 2022-02-13 | End: 2022-02-17 | Stop reason: HOSPADM

## 2022-02-13 RX ORDER — LACTULOSE 10 G/15ML
20 SOLUTION ORAL 2 TIMES DAILY PRN
Status: DISCONTINUED | OUTPATIENT
Start: 2022-02-13 | End: 2022-02-17 | Stop reason: HOSPADM

## 2022-02-13 RX ORDER — CETIRIZINE HYDROCHLORIDE 10 MG/1
10 TABLET ORAL DAILY
Status: DISCONTINUED | OUTPATIENT
Start: 2022-02-13 | End: 2022-02-17 | Stop reason: HOSPADM

## 2022-02-13 RX ORDER — HYDROCODONE BITARTRATE AND ACETAMINOPHEN 10; 325 MG/1; MG/1
1 TABLET ORAL EVERY 8 HOURS PRN
Status: DISCONTINUED | OUTPATIENT
Start: 2022-02-13 | End: 2022-02-17 | Stop reason: HOSPADM

## 2022-02-13 RX ORDER — ECHINACEA PURPUREA EXTRACT 125 MG
1 TABLET ORAL
COMMUNITY

## 2022-02-13 RX ORDER — LEVETIRACETAM 500 MG/1
500 TABLET ORAL NIGHTLY
Status: DISCONTINUED | OUTPATIENT
Start: 2022-02-13 | End: 2022-02-17 | Stop reason: HOSPADM

## 2022-02-13 RX ORDER — MULTIPLE VITAMINS W/ MINERALS TAB 9MG-400MCG
1 TAB ORAL 2 TIMES DAILY
Status: DISCONTINUED | OUTPATIENT
Start: 2022-02-13 | End: 2022-02-17 | Stop reason: HOSPADM

## 2022-02-13 RX ORDER — CALCIUM CARBONATE 200(500)MG
1 TABLET,CHEWABLE ORAL EVERY 6 HOURS PRN
Status: DISCONTINUED | OUTPATIENT
Start: 2022-02-13 | End: 2022-02-17 | Stop reason: HOSPADM

## 2022-02-13 RX ORDER — POTASSIUM CHLORIDE 20 MEQ/1
40 TABLET, EXTENDED RELEASE ORAL AS NEEDED
Status: DISCONTINUED | OUTPATIENT
Start: 2022-02-13 | End: 2022-02-17 | Stop reason: HOSPADM

## 2022-02-13 RX ORDER — DIPHENOXYLATE HYDROCHLORIDE AND ATROPINE SULFATE 2.5; .025 MG/1; MG/1
1 TABLET ORAL DAILY
Status: DISCONTINUED | OUTPATIENT
Start: 2022-02-13 | End: 2022-02-17 | Stop reason: HOSPADM

## 2022-02-13 RX ORDER — ALPRAZOLAM 0.25 MG/1
0.25 TABLET ORAL DAILY
Status: ON HOLD | COMMUNITY
End: 2022-02-17 | Stop reason: SDUPTHER

## 2022-02-13 RX ORDER — DULOXETIN HYDROCHLORIDE 60 MG/1
60 CAPSULE, DELAYED RELEASE ORAL DAILY
Status: DISCONTINUED | OUTPATIENT
Start: 2022-02-13 | End: 2022-02-17 | Stop reason: HOSPADM

## 2022-02-13 RX ORDER — ALPRAZOLAM 0.5 MG/1
0.5 TABLET ORAL NIGHTLY
Status: ON HOLD | COMMUNITY
End: 2022-02-17 | Stop reason: SDUPTHER

## 2022-02-13 RX ADMIN — Medication 1 TABLET: at 19:31

## 2022-02-13 RX ADMIN — CETIRIZINE HYDROCHLORIDE 10 MG: 10 TABLET, FILM COATED ORAL at 09:14

## 2022-02-13 RX ADMIN — OXYBUTYNIN CHLORIDE 10 MG: 5 TABLET, EXTENDED RELEASE ORAL at 09:14

## 2022-02-13 RX ADMIN — NYSTATIN 1 APPLICATION: 100000 POWDER TOPICAL at 19:28

## 2022-02-13 RX ADMIN — LACTULOSE 20 G: 10 SOLUTION ORAL at 09:15

## 2022-02-13 RX ADMIN — Medication 1 TABLET: at 09:14

## 2022-02-13 RX ADMIN — SODIUM CHLORIDE, PRESERVATIVE FREE 3 ML: 5 INJECTION INTRAVENOUS at 02:19

## 2022-02-13 RX ADMIN — FLUTICASONE PROPIONATE 1 SPRAY: 50 SPRAY, METERED NASAL at 09:15

## 2022-02-13 RX ADMIN — HEPARIN SODIUM 5000 UNITS: 5000 INJECTION INTRAVENOUS; SUBCUTANEOUS at 09:14

## 2022-02-13 RX ADMIN — FLECAINIDE ACETATE 50 MG: 50 TABLET ORAL at 19:30

## 2022-02-13 RX ADMIN — ALPRAZOLAM 0.5 MG: 0.5 TABLET ORAL at 19:29

## 2022-02-13 RX ADMIN — LEVETIRACETAM 500 MG: 500 TABLET, FILM COATED ORAL at 19:30

## 2022-02-13 RX ADMIN — MEROPENEM 1 G: 1 INJECTION, POWDER, FOR SOLUTION INTRAVENOUS at 09:15

## 2022-02-13 RX ADMIN — NYSTATIN 1 APPLICATION: 100000 POWDER TOPICAL at 09:15

## 2022-02-13 RX ADMIN — Medication 1 CAPSULE: at 09:14

## 2022-02-13 RX ADMIN — PANTOPRAZOLE SODIUM 40 MG: 40 TABLET, DELAYED RELEASE ORAL at 05:43

## 2022-02-13 RX ADMIN — GABAPENTIN 400 MG: 400 CAPSULE ORAL at 19:31

## 2022-02-13 RX ADMIN — OLANZAPINE 2.5 MG: 2.5 TABLET, FILM COATED ORAL at 19:30

## 2022-02-13 RX ADMIN — SODIUM CHLORIDE, PRESERVATIVE FREE 3 ML: 5 INJECTION INTRAVENOUS at 09:16

## 2022-02-13 RX ADMIN — ALPRAZOLAM 0.25 MG: 0.25 TABLET ORAL at 09:15

## 2022-02-13 RX ADMIN — MEROPENEM 1 G: 1 INJECTION, POWDER, FOR SOLUTION INTRAVENOUS at 04:18

## 2022-02-13 RX ADMIN — GUAIFENESIN 600 MG: 600 TABLET, EXTENDED RELEASE ORAL at 09:14

## 2022-02-13 RX ADMIN — MEROPENEM 1 G: 1 INJECTION, POWDER, FOR SOLUTION INTRAVENOUS at 17:39

## 2022-02-13 RX ADMIN — HEPARIN SODIUM 5000 UNITS: 5000 INJECTION INTRAVENOUS; SUBCUTANEOUS at 02:19

## 2022-02-13 RX ADMIN — HEPARIN SODIUM 5000 UNITS: 5000 INJECTION INTRAVENOUS; SUBCUTANEOUS at 19:29

## 2022-02-13 RX ADMIN — GABAPENTIN 400 MG: 400 CAPSULE ORAL at 09:14

## 2022-02-13 RX ADMIN — HYDROCODONE BITARTRATE AND ACETAMINOPHEN 1 TABLET: 10; 325 TABLET ORAL at 16:03

## 2022-02-13 RX ADMIN — GABAPENTIN 400 MG: 400 CAPSULE ORAL at 16:03

## 2022-02-13 RX ADMIN — DOXEPIN HYDROCHLORIDE 10 MG: 10 CAPSULE ORAL at 19:30

## 2022-02-13 RX ADMIN — FLECAINIDE ACETATE 50 MG: 50 TABLET ORAL at 09:14

## 2022-02-13 RX ADMIN — GUAIFENESIN 600 MG: 600 TABLET, EXTENDED RELEASE ORAL at 19:30

## 2022-02-13 RX ADMIN — Medication 5000 UNITS: at 09:14

## 2022-02-13 RX ADMIN — VANCOMYCIN HYDROCHLORIDE 2000 MG: 5 INJECTION, POWDER, LYOPHILIZED, FOR SOLUTION INTRAVENOUS at 06:48

## 2022-02-13 RX ADMIN — HYDROCODONE BITARTRATE AND ACETAMINOPHEN 1 TABLET: 10; 325 TABLET ORAL at 04:17

## 2022-02-13 RX ADMIN — DULOXETINE HYDROCHLORIDE 60 MG: 60 CAPSULE, DELAYED RELEASE ORAL at 09:14

## 2022-02-13 RX ADMIN — MEROPENEM 1 G: 1 INJECTION, POWDER, FOR SOLUTION INTRAVENOUS at 21:30

## 2022-02-13 RX ADMIN — HYDROCODONE BITARTRATE AND ACETAMINOPHEN 1 TABLET: 10; 325 TABLET ORAL at 21:37

## 2022-02-13 RX ADMIN — INSULIN ASPART 2 UNITS: 100 INJECTION, SOLUTION INTRAVENOUS; SUBCUTANEOUS at 18:40

## 2022-02-13 NOTE — PROGRESS NOTES
Pharmacokinetics Service Note:    Ms. Cummings has been evaluated for vancomycin dosing to treat her sepsis and pneumonia with prior history of MRSA pneumonia.  She received a 2 gm loading dose around 0700.  Based on her estimated ClCr ~ 59 mL/min, demographics, and predicted pharmacokinetic parameters, will continue dosing at 1.25 gm q24hrs from tomorrow AM to target an AUC range of 400-600 mg/L.hr.  Will plan to obtain a level Tuesday AM if vancomycin therapy is to continue.     Thank you.  Samia Brown, Pharm.D.  2/13/2022  10:26 EST

## 2022-02-13 NOTE — PROGRESS NOTES
[facility]     Progress Note    Patient Name: Domonique Cummings  : 1945  MRN: [id number]  Primary Care Physician:  Skinny Meehan MD  Date of admission: 2022    Subjective   Subjective     Chief Complaint: 76-year-old female being seen in follow-up for sepsis    History of Present Illness  Patient Reports feeling well today.    Uses 3 lpm at nursing home and is currently saturating in the low to mid 90s on this during my exam.    Review of Systems  Patient reports subjective fever at the nursing facility. Denies n/v. No significant cough or soa. No abdominal pain.     Objective   Objective     Vitals:   Temp:  [98.2 °F (36.8 °C)-103.1 °F (39.5 °C)] 98.6 °F (37 °C)  Heart Rate:  [] 86  Resp:  [20] 20  BP: (103-158)/(60-89) 103/60  Flow (L/min):  [3] 3    Physical Exam  Constitutional:       General: She is not in acute distress.     Appearance: She is well-developed.      Interventions: Nasal cannula in place.      Comments: 3 LPM NC   HENT:      Head: Normocephalic and atraumatic.   Eyes:      Conjunctiva/sclera: Conjunctivae normal.   Neck:      Trachea: No tracheal deviation.   Cardiovascular:      Rate and Rhythm: Normal rate and regular rhythm.      Heart sounds: No murmur heard.  No friction rub. No gallop.    Pulmonary:      Effort: No respiratory distress.      Breath sounds: Examination of the right-lower field reveals rales. Examination of the left-lower field reveals rales. Rales present. No wheezing.   Abdominal:      General: Bowel sounds are normal. There is no distension.      Palpations: Abdomen is soft.      Tenderness: There is no abdominal tenderness. There is no guarding.   Musculoskeletal:      Right lower leg: No edema.      Left lower leg: No edema.   Skin:     General: Skin is warm and dry.      Findings: No erythema or rash.   Neurological:      Mental Status: She is alert.      Cranial Nerves: No cranial nerve deficit.      Comments: Oriented to self and place; can  follow commands        Result Review    Result Review:  I have personally reviewed the results from the time of this admission to 2/13/2022 09:43 EST and agree with these findings:  [x]  Laboratory  [x]  Microbiology  []  Radiology  []  EKG/Telemetry   []  Cardiology/Vascular   []  Pathology  []  Old records  []  Other:  Most notable findings include: WBC worsened at 37.79    CT lumbar spine without contrast:  IMPRESSION:  No acute fracture or traumatic malalignment is suspected in the lumbar spine.  2. There is extensive lumbar degenerative change with multilevel canal stenosis and foraminal impingement. If more information is needed, this could be further evaluated with an MRI if the patient is candidate.    CT thoracic spine without contrast:  IMPRESSION:  No acute fracture or traumatic malalignment is suspected in the thoracic spine. Extensive degenerative changes are present.    CT cervical spine without contrast:  IMPRESSION:  No acute fracture or traumatic malalignment is suspected in the cervical spine. Redemonstration of extensive cervical degenerative changes and acquired fusion at C4-5    CT chest without contrast:  IMPRESSION:  No displaced rib fracture is suspected. No pleural effusion or pneumothorax. Study limited by lack of IV contrast media and arm positioning.  2. Dependent atelectasis. There is some peripheral increase in interstitial markings that could indicate some underlying pulmonary fibrosis. This is similar to prior. There is also thickening of the central bronchovascular soft tissues suggestive of  underlying asthma or bronchitis.    Urine: >100,000 CFU/ml GNR    Blood cultures: E.coli 2/2; final sensitivities pending    Assessment/Plan   Assessment / Plan     -Sepsis, present on admission  -E.coli bacteremia  -E.coli UTI  -History of ESBL coli bacteremia and UTI  -History of MRSA pneumonia in 2017    For now, continue with empiric antibiotic therapy with Merrem.  Consider discontinuing  the pharmacy dose vancomycin.  I have consulted infectious disease for further evaluation and recommendations.  Plan to repeat blood cultures in a.m.  Repeat CBC in a.m. and follow temperature curve.    -Chronic hypoxic respiratory failure  -History of obstructive sleep apnea, noncompliant with CPAP  -History of esophageal stricture x2 with history of aspiration and mild oral phase dysphagia and trace pharyngeal phase dysphagia    Patient currently stable on 3 L/min nasal cannula which she reports is her usual oxygen requirement at the nursing facility.  Continue her current diet.  Aspiration precautions.  Patient does not appear to have pneumonia based on CT chest findings.  There is mention of some pulmonary increase in interstitial markings that could indicate some underlying pulmonary fibrosis.  This appears stable and similar to previous.  Patient does have some thickening of the central bronchovascular soft tissues suggestive of underlying asthma or bronchitis.    -Diabetes mellitus type 2 non-insulin-dependent with A1c 7.3%    Will place patient on Accu-Cheks and low-dose sliding scale insulin as needed.    -History of essential hypertension  -History of hyperlipidemia  -Morbid obesity  -History of tobacco use  -Generalized anxiety disorder  -Chronic low back pain  -GERD  -Dementia  -Suspect history of seizures as patient is on Keppra    Home antihypertensive medications were held upon admission; blood pressure is stable and will plan to resume with holding parameters.  Patient's home Xanax has been reordered as well.  Continue home narcotic with holding parameters as well. Continue PPI.     DVT prophylaxis:  Medical DVT prophylaxis orders are present./subcutaneous heparin    CODE STATUS:    Level Of Support Discussed With: Patient  Code Status (Patient has no pulse and is not breathing): CPR (Attempt to Resuscitate)  Medical Interventions (Patient has pulse or is breathing): Full Support    Disposition:  I  expect patient to be discharged back to the nursing home when medically stable.    Sandra Horacio Hardin, DO

## 2022-02-13 NOTE — PLAN OF CARE
Goal Outcome Evaluation:  Covid 19 surge documentation reduction.     Patient resting in bed. No complaints at this time. Vital signs stable. Will continue to monitor and follow plan of care.

## 2022-02-13 NOTE — ED NOTES
Pt became anxious and seemed disoriented. Pt was alert but was not oriented to place/time/situation. Pt had been previously completely oriented. FS glucose checked 182. Temp checked-103.1. informed provider, tylenol ordered.     Cornell Montero RN  02/12/22 2044

## 2022-02-13 NOTE — CONSULTS
INFECTIOUS DISEASE CONSULTATION REPORT        Patient Identification:  Name:  Domonique Cummings  Age:  76 y.o.  Sex:  female  :  1945  MRN:  [id number]   Visit Number:  [id number]  Primary Care Physician:  Skinny Meehan MD       LOS: 1 day        Subjective       Subjective     History of present illness:      Thank you Dr. Hardin for allowing us to participate in the care of your patient.  As you well know, Ms. Domonique Cummings is a 76 y.o. female nursing home resident with past medical history significant for dementia, hypertension, hyperlipidemia, type 2 diabetes mellitus, ESBL E. coli UTI, MRSA pneumonia, and E. coli bacteremia, who presented to [facility] Emergency Department on 2022 for a fall at the nursing home.    Today, the patient is sitting up in bed on 3 L nasal cannula in no apparent distress.  Although patient has dementia, she is awake, alert, and oriented today and able to describe a history of UTIs, the last of which she reports was several months ago, which is consistent with our records.  Admits to fever, chills, fatigue, and appetite change.  Admits to mild shortness of breath, she reports is no worse than usual.  Denies cough or production of sputum.  Admits to abdominal pain, with suprapubic tenderness.  Denies dysuria or polyuria.  Admits to nausea.  Denies vomiting or or diarrhea.  Afebrile, no diarrhea.  Lactic acid on 2022 was normal.  WBC is elevated at 37.79.  Urinalysis on 2022 was positive with WBCs too numerous to count and 4+ bacteria.  Urine culture is in progress.  2 out of 2 blood cultures on 2022 are so far showing gram-negative bacilli with E. coli on BC ID.  CT chest on 2022 showed no displaced rib fracture suspected.  No pleural effusion or pneumothorax.  Study limited by lack of IV contrast medium and arm positioning.  Dependent atelectasis.  There is some peripheral increase in interstitial markings that could indicate  some underlying pulmonary fibrosis.  This is similar to prior.  There is also thickening of the central bronchovascular soft tissue suggestive of underlying asthma or bronchitis.  Chest x-ray on 2/12/2022 showed worsening in the appearance of the chest.  Films technically limited.  There is moderate cardiac silhouette enlargement and there are bibasilar infiltrates that appear to be new from prior.  Please correlate for clinical evidence of aspiration or pneumonia with follow-up to clearing recommended.  Additionally there is thickening of the central bronchovascular soft tissues that could reflect asthma or bronchitis.  Please exclude any clinical correlation for mild volume overload.  Mycoplasma pneumoniae antibody on 2/13/2022 is negative.  COVID-19 and flu A/B PCR on 2/12/2022 was negative.    Infectious Disease consultation was requested for antimicrobial management.      ---------------------------------------------------------------------------------------------------------------------     Review Of Systems:    Constitutional: no night sweats. No unexpected weight change. No fatigue.  Fever, fatigue, chills, and appetite change.  Eyes: no eye drainage, itching or redness.  HEENT: no mouth sores, dysphagia or nose bleed.  Respiratory: no for shortness of breath, cough or production of sputum.  Cardiovascular: no chest pain, no palpitations, no orthopnea.  Gastrointestinal: no vomiting or diarrhea. No hematemesis or rectal bleeding.  Suprapubic pain.  Nausea.  Genitourinary: no dysuria or polyuria.  Hematologic/lymphatic: no lymph node abnormalities, no easy bruising or easy bleeding.  Musculoskeletal: no muscle or joint pain.  Skin: No rash and no itching.  Neurological: no loss of consciousness, no seizure, no headache.  Behavioral/Psych: no depression or suicidal ideation.  Endocrine: no hot flashes.  Immunologic:  negative.    ---------------------------------------------------------------------------------------------------------------------     Past Medical History    Past Medical History:   Diagnosis Date    Arthritis     Asthma     Chronic headaches 10/31/2018    Degenerative disc disease, lumbar     Dementia ([facility])     E coli bacteremia 11/2019    Not ESBL    Fibromyalgia     GERD (gastroesophageal reflux disease)     Hyperlipidemia     Hypertension     Mixed stress and urge urinary incontinence 1/22/2020    MRSA pneumonia ([facility]) 12/18/2017    ENE (obstructive sleep apnea)     Noncompliant with BiPAP/CPAP    Osteoporosis     Type 2 diabetes mellitus ([facility])     UTI due to extended-spectrum beta lactamase (ESBL) producing Escherichia coli 2021    In both July and November of 2021       Past Surgical History    Past Surgical History:   Procedure Laterality Date    APPENDECTOMY      CARDIAC CATHETERIZATION N/A 10/30/2018    Procedure: Left Heart Cath;  Surgeon: Arturo Inman MD;  Location: [facility] CATH INVASIVE LOCATION;  Service: Cardiology    ESOPHAGEAL DILATATION      TOTAL SHOULDER REPLACEMENT Bilateral     TUBAL ABDOMINAL LIGATION         Family History    Family History   Problem Relation Age of Onset    Breast cancer Sister        Social History    Social History     Tobacco Use    Smoking status: Former Smoker    Smokeless tobacco: Never Used    Tobacco comment: She quit at least 30 years ago.   Substance Use Topics    Alcohol use: No    Drug use: No       Allergies    Biaxin [clarithromycin]  ---------------------------------------------------------------------------------------------------------------------     Home Medications:    Prior to Admission Medications       Prescriptions Last Dose Informant Patient Reported? Taking?    albuterol sulfate  (90 Base) MCG/ACT inhaler Unknown Nursing Home Yes No    Inhale 2 puffs Every 8 (Eight) Hours As Needed for Wheezing.    ALPRAZolam (XANAX) 0.25 MG tablet Unknown  Nursing Home Yes No    Take 0.25 mg by mouth Daily.    ALPRAZolam (XANAX) 0.5 MG tablet Unknown Nursing Home Yes No    Take 0.5 mg by mouth Every Night.    amLODIPine (NORVASC) 10 MG tablet Unknown Nursing Home No No    Take 1 tablet by mouth Daily.    calcium carbonate (TUMS) 500 MG chewable tablet Unknown Nursing Home Yes No    Chew 1 tablet Every 6 (Six) Hours As Needed for Indigestion or Heartburn.    cycloSPORINE (RESTASIS) 0.05 % ophthalmic emulsion Unknown Nursing Home Yes No    Administer 1 drop to both eyes 2 (Two) Times a Day As Needed (dry eye).    Diclofenac Sodium (VOLTAREN) 1 % gel gel Unknown Nursing Home Yes No    Apply 4 g topically to the appropriate area as directed 4 (Four) Times a Day As Needed (Arthritis Pain). Shoulders and Knees    doxepin (SINEquan) 10 MG capsule Unknown Nursing Home Yes No    Take 10 mg by mouth Every Night.    DULoxetine (CYMBALTA) 60 MG capsule Unknown Nursing Home Yes No    Take 60 mg by mouth Daily.    flecainide (TAMBOCOR) 50 MG tablet Unknown Nursing Home No No    Take 1 tablet by mouth Every 12 (Twelve) Hours.    fluticasone (FLONASE) 50 MCG/ACT nasal spray Unknown Nursing Home Yes No    1 spray into the nostril(s) as directed by provider Daily.    furosemide (LASIX) 20 MG tablet Unknown Nursing Home Yes No    Take 20 mg by mouth Daily.    gabapentin (NEURONTIN) 400 MG capsule Unknown Nursing Home No No    Take 1 capsule by mouth 3 (Three) Times a Day.    guaiFENesin (MUCINEX) 600 MG 12 hr tablet Unknown Nursing Home Yes No    Take 600 mg by mouth 2 (Two) Times a Day.    HYDROcodone-acetaminophen (NORCO)  MG per tablet Unknown Nursing Home No No    Take 1 tablet by mouth Every 8 (Eight) Hours As Needed for Moderate Pain .    lactulose (CHRONULAC) 10 GM/15ML solution Unknown Nursing Home Yes No    Take 20 g by mouth Daily.    lactulose (CHRONULAC) 10 GM/15ML solution Unknown Nursing Home Yes No    Take 20 g by mouth 2 (Two) Times a Day As Needed (Constipation).     levETIRAcetam (KEPPRA) 500 MG tablet Unknown Nursing Home Yes No    Take 500 mg by mouth Every Night.    loratadine (CLARITIN) 10 MG tablet Unknown Nursing Home Yes No    Take 10 mg by mouth Daily.    Multiple Vitamins-Minerals (ICAPS AREDS 2) capsule Unknown Nursing Home Yes No    Take 1 capsule by mouth 2 (Two) Times a Day.    multivitamin (DAILY DEBORAH) tablet tablet Unknown Nursing Home Yes No    Take 1 tablet by mouth Daily.    nystatin (MYCOSTATIN) 281799 UNIT/GM powder Unknown Nursing Home Yes No    Apply 1 application topically to the appropriate area as directed 2 (Two) Times a Day. Apply underneath abdominal folds.    OLANZapine (zyPREXA) 2.5 MG tablet Unknown Nursing Home No No    Take 1 tablet by mouth Every Night.    omeprazole (priLOSEC) 40 MG capsule Unknown Nursing Home Yes No    Take 40 mg by mouth Daily.    oxybutynin XL (DITROPAN-XL) 10 MG 24 hr tablet Unknown Nursing Home Yes No    Take 10 mg by mouth Daily.    potassium chloride (K-DUR,KLOR-CON) 10 MEQ CR tablet Unknown Nursing Home Yes No    Take 10 mEq by mouth Daily.    Probiotic Product (RISAQUAD-2) capsule capsule Unknown Nursing Home Yes No    Take 1 capsule by mouth Daily.    Psyllium (REGULOID) 400 MG capsule Unknown Nursing Home Yes No    Take 2 capsules by mouth Daily.    sodium chloride 0.65 % nasal spray Unknown Nursing Home Yes No    1 spray into the nostril(s) as directed by provider Every 1 (One) Hour As Needed for Congestion.    vitamin D3 125 MCG (5000 UT) capsule capsule Unknown Nursing Home Yes No    Take 5,000 Units by mouth Daily.          ---------------------------------------------------------------------------------------------------------------------    Objective       Objective     Hospital Scheduled Meds:  Acidophilus/Pectin, 1 capsule, Oral, Daily  ALPRAZolam, 0.25 mg, Oral, Daily  ALPRAZolam, 0.5 mg, Oral, Nightly  cetirizine, 10 mg, Oral, Daily  doxepin, 10 mg, Oral, Nightly  DULoxetine, 60 mg, Oral,  Daily  flecainide, 50 mg, Oral, Q12H  fluticasone, 1 spray, Nasal, Daily  gabapentin, 400 mg, Oral, TID  guaiFENesin, 600 mg, Oral, BID  heparin (porcine), 5,000 Units, Subcutaneous, Q12H  lactulose, 20 g, Oral, Daily  levETIRAcetam, 500 mg, Oral, Nightly  meropenem, 1 g, Intravenous, Q8H  multivitamin, 1 tablet, Oral, Daily  multivitamin with minerals, 1 tablet, Oral, BID  nystatin, 1 application, Topical, BID  OLANZapine, 2.5 mg, Oral, Nightly  oxybutynin XL, 10 mg, Oral, Daily  pantoprazole, 40 mg, Oral, QAM  sodium chloride, 3 mL, Intravenous, Q12H  [START ON 2/14/2022] vancomycin, 1,250 mg, Intravenous, Q24H  vitamin D3, 5,000 Units, Oral, Daily         ---------------------------------------------------------------------------------------------------------------------   Vital Signs:  Temp:  [98.2 °F (36.8 °C)-103.1 °F (39.5 °C)] 98.3 °F (36.8 °C)  Heart Rate:  [] 89  Resp:  [18-20] 18  BP: (103-158)/(52-89) 103/52  Mean Arterial Pressure (Non-Invasive) for the past 24 hrs (Last 3 readings):   Noninvasive MAP (mmHg)   02/13/22 0953 71   02/13/22 0606 78   02/12/22 2237 85     SpO2 Percentage    02/13/22 0606 02/13/22 0701 02/13/22 0953   SpO2: 97% 97% 97%     SpO2:  [93 %-97 %] 97 %  on  Flow (L/min):  [3] 3;   Device (Oxygen Therapy): nasal cannula    Body mass index is 43.19 kg/m².  Wt Readings from Last 3 Encounters:   02/13/22 99.8 kg (220 lb)   12/07/21 107 kg (236 lb)   03/20/21 111 kg (245 lb)     ---------------------------------------------------------------------------------------------------------------------     Physical Exam:    Constitutional: Elderly  female is resting comfortably in bed in no apparent distress.  HENT:  Head: Normocephalic and atraumatic.  Mouth:  Moist mucous membranes.    Eyes:  Conjunctivae and EOM are normal.  No scleral icterus.  Neck:  Neck supple.  No JVD present.    Cardiovascular:  Normal rate, regular rhythm and normal heart sounds with no murmur. No  edema.  Pulmonary/Chest:  No respiratory distress, no wheezes, no crackles, with normal breath sounds and good air movement.  Abdominal:  Soft.  Bowel sounds are normal.  No distension and mild tenderness to palpation.  Musculoskeletal:  No edema, no tenderness, and no deformity.  No swelling or redness of joints.  Neurological:  Alert and oriented to person, place, and time.  No facial droop.  No slurred speech.   Skin:  Skin is warm and dry.  No rash noted.  No pallor.   Psychiatric:  Normal mood and affect.  Behavior is normal.    ---------------------------------------------------------------------------------------------------------------------    Results from last 7 days   Lab Units 02/12/22  1648   TROPONIN T ng/mL <0.010     Results from last 7 days   Lab Units 02/12/22  1648   PROBNP pg/mL 295.7         Results from last 7 days   Lab Units 02/13/22  0405 02/12/22  1808 02/12/22  1648   CRP mg/dL  --   --  18.66*   LACTATE mmol/L  --  1.0  --    WBC 10*3/mm3 37.79*  --  24.21*   HEMOGLOBIN g/dL 11.1*  --  11.9*   HEMATOCRIT % 33.9*  --  36.0   MCV fL 92.9  --  92.5   MCHC g/dL 32.7  --  33.1   PLATELETS 10*3/mm3 187  --  213     Results from last 7 days   Lab Units 02/13/22  0405 02/12/22  1648   SODIUM mmol/L 137 135*   POTASSIUM mmol/L 3.7 4.1   MAGNESIUM mg/dL 1.9  --    CHLORIDE mmol/L 102 99   CO2 mmol/L 24.2 23.9   BUN mg/dL 14 14   CREATININE mg/dL 0.88 0.86   EGFR IF NONAFRICN AM mL/min/1.73 62 64   CALCIUM mg/dL 8.4* 8.5*   GLUCOSE mg/dL 205* 198*   ALBUMIN g/dL 2.95* 3.35*   BILIRUBIN mg/dL 0.4 0.4   ALK PHOS U/L 132* 115   AST (SGOT) U/L 20 23   ALT (SGPT) U/L 18 17   Estimated Creatinine Clearance: 59.2 mL/min (by C-G formula based on SCr of 0.88 mg/dL).  No results found for: AMMONIA    Hemoglobin A1C   Date/Time Value Ref Range Status   02/13/2022 0405 7.30 (H) 4.80 - 5.60 % Final     Glucose   Date/Time Value Ref Range Status   02/13/2022 0951 148 (H) 70 - 130 mg/dL Final     Comment:      Meter: [id number] : [id number] CATHERINE HOLLIS   02/13/2022 0656 148 (H) 70 - 130 mg/dL Final     Comment:     Meter: [id number] : [id number] CATHERINE HOLLIS   02/12/2022 2017 182 (H) 70 - 130 mg/dL Final     Comment:     Meter: [id number] : [id number] Cornell Montero     Lab Results   Component Value Date    HGBA1C 7.30 (H) 02/13/2022     Lab Results   Component Value Date    TSH 0.770 02/13/2022    FREET4 0.77 (L) 06/25/2014       Blood Culture   Date Value Ref Range Status   02/12/2022 Abnormal Stain (C)  Preliminary   02/12/2022 Abnormal Stain (C)  Preliminary     No results found for: URINECX  No results found for: WOUNDCX  No results found for: STOOLCX  No results found for: RESPCX  Pain Management Panel       Pain Management Panel Latest Ref Rng & Units 12/11/2017    AMPHETAMINES SCREEN, URINE Negative Negative    BARBITURATES SCREEN Negative Negative    BENZODIAZEPINE SCREEN, URINE Negative Positive(A)    BUPRENORPHINEUR Negative Negative    COCAINE SCREEN, URINE Negative Negative    METHADONE SCREEN, URINE Negative Negative          I have personally reviewed the above laboratory results.   ---------------------------------------------------------------------------------------------------------------------  Imaging Results (Last 7 Days)       Procedure Component Value Units Date/Time    CT Chest Without Contrast Diagnostic [[id number]] Collected: 02/12/22 1814     Updated: 02/12/22 1816    Narrative:      INDICATION:    Patient fell and has bilateral rib pain.    TECHNIQUE:   CT of the chest without contrast. Coronal and sagittal reconstructions were obtained.  Radiation dose reduction techniques included automated exposure control or exposure modulation based on body size. Radiation audit for number of CT and nuclear  cardiology exams performed in the last year: 2.      COMPARISON:    Chest x-ray today. CT chest PE protocol from November 2019.    FINDINGS:  Study is limited by patient's arms at side and  streak artifact from bilateral shoulder arthroplasties. No displaced rib fracture is seen. There is dependent atelectasis with underlying chronic lung disease and thickening of the central bronchovascular  soft tissues. There are bilateral areas of air-trapping. There is no pneumothorax. There is no pleural effusion. There are peripheral areas of interstitial prominence which could indicate underlying pulmonary fibrosis overall more apparent inferiorly.  Lack of contrast limits the study. There are coronary artery calcifications. Heart is mildly enlarged. No pericardial or pleural effusion is suspected.      Impression:      No displaced rib fracture is suspected. No pleural effusion or pneumothorax. Study limited by lack of IV contrast media and arm positioning.  2. Dependent atelectasis. There is some peripheral increase in interstitial markings that could indicate some underlying pulmonary fibrosis. This is similar to prior. There is also thickening of the central bronchovascular soft tissues suggestive of  underlying asthma or bronchitis.    Signer Name: Elke Joseph MD   Signed: 2/12/2022 6:14 PM   Workstation Name: [email or web address]    [facility]    CT Lumbar Spine Without Contrast [[id number]] Collected: 02/12/22 1808     Updated: 02/12/22 1810    Narrative:      INDICATION:    Patient fell with back injury. Low back pain.    TECHNIQUE:   CT of the lumbar spine without contrast. Coronal and sagittal reconstructions were obtained.  Radiation dose reduction techniques included automated exposure control or exposure modulation based on body size. Radiation audit for number of CT and nuclear  cardiology exams performed in the last year 2    COMPARISON:    There is a previous    FINDINGS:  There is levoconvexed lumbar scoliosis. Sagittal alignment is normal. No acute fracture is suspected. No bone destruction.    At T12-L1 there is right paramedian osteophyte formation and left side facet  degenerative change with mild effacement of the thecal sac likely. There is mild left-sided bony foraminal narrowing.    At L1-2, there is endplate spondylosis loss of disc height and vacuum disc formation. There is mild right greater than left facet degenerative change. There is approximately moderate bony canal stenosis. Soft tissues are difficult to see due to the  patient's size and technical factors. There is bilateral bony foraminal narrowing.    At L2-3, there is endplate spondylosis loss of disc height endplate sclerosis and vacuum disc formation with facet arthritis asymmetric on the right. There is at least moderate bony canal stenosis. Again the soft tissues are poorly evaluated. There is  severe right and moderate left-sided bony foraminal narrowing.    At L3-4, there is severe right-sided facet arthritis. There is endplate spondylosis loss of disc height and vacuum disc formation. There is asymmetric endplate spondylosis to the right side laterally. There is at least mild canal stenosis and severe  right and mild left foraminal narrowing.    At L4-5, there is severe facet arthritis bilaterally with likely ligamentum flavum thickening. There is endplate spondylosis vacuum disc formation and endplate sclerosis and severe loss of disc height. There is severe foraminal narrowing bilaterally left  greater than right and likely severe canal stenosis.    At L5-S1, there is severe bilateral facet degenerative change worse on the left. There is vacuum disc formation with endplate spondylosis and sclerosis and severe loss of disc height. There is bilateral foraminal impingement which is probably fairly  severe and at least mild canal stenosis. Soft tissues poorly evaluated.    There our atherosclerotic vascular calcifications.      Impression:      No acute fracture or traumatic malalignment is suspected in the lumbar spine.  2. There is extensive lumbar degenerative change with multilevel canal stenosis and  foraminal impingement. If more information is needed, this could be further evaluated with an MRI if the patient is candidate.    Signer Name: Elke Joseph MD   Signed: 2/12/2022 6:08 PM   Workstation [facility]: [email or web address]    [facility]    CT Thoracic Spine Without Contrast [[id number]] Collected: 02/12/22 1802     Updated: 02/12/22 1805    Narrative:      INDICATION:    Back injury after a fall.    TECHNIQUE:   CT of the thoracic spine without contrast. Coronal and sagittal reconstructions were obtained.  Radiation dose reduction techniques included automated exposure control or exposure modulation based on body size. Radiation audit for number of CT and  nuclear cardiology exams performed in the last year: 2.      COMPARISON:    None available.    FINDINGS:  There is thoracolumbar dextroconvex scoliosis. Sagittal alignment is essentially normal. There is multiple level endplate spondylosis loss of disc height endplate sclerosis and Schmorl's node formation. There is mild vacuum disc formation at multiple  levels also. There is no evidence for acute fracture or traumatic malalignment. There is multiple level costovertebral joint arthritis. There is no bony canal stenosis. There is dependent atelectasis.      Impression:      No acute fracture or traumatic malalignment is suspected in the thoracic spine. Extensive degenerative changes are present.    Signer Name: Elke Joseph MD   Signed: 2/12/2022 6:02 PM   Workstation Name: [facility]    [facility]    CT Cervical Spine Without Contrast [[id number]] Collected: 02/12/22 1759     Updated: 02/12/22 1801    Narrative:      INDICATION:    Patient fell with neck injury    TECHNIQUE:   CT of the cervical spine without contrast. Coronal and sagittal reconstructions were obtained.  Radiation dose reduction techniques included automated exposure control or exposure modulation based on body size. Radiation audit for number of CT  and  nuclear cardiology exams performed in the last year: 2.      COMPARISON:    Cervical spine CT from 3/20/2021.    FINDINGS:  There is acquired fusion at C4-5 with mild reversal of cervical lordosis that is not changed. There is no evidence for acute-appearing cervical spine fracture. There is mild levoconvexed scoliosis. There is multiple level endplate spondylosis and loss of  disc height most prominent at C5-6 and C6-7 and C7-T1. There is multilevel uncovertebral osteophyte formation and facet arthritis with multiple level bony foraminal compromise. There is mild bony canal stenosis at C4-5, C5-6, and on the left-sided C6-7.  There is no prevertebral fluid collection suspected. Findings similar to prior.      Impression:      No acute fracture or traumatic malalignment is suspected in the cervical spine. Redemonstration of extensive cervical degenerative changes and acquired fusion at C4-5    Signer Name: Elke Joseph MD   Signed: 2/12/2022 5:59 PM   Workstation Name: [email or web address]    [facility]    CT Head Without Contrast [[id number]] Collected: 02/12/22 1747     Updated: 02/12/22 1749    Narrative:      HISTORY:   Fell with head injury today    TECHNIQUE:   CT head without contrast. Radiation dose reduction techniques included automated exposure control or exposure modulation based on body size. Radiation audit for number of CT and nuclear cardiology exams performed in the last year: 2.      COMPARISON:   Head CT 3/20/2021    FINDINGS:   There is no displaced calvarial fracture. The mastoid air cells are clear. The visualized paranasal sinuses are clear. There is no evidence for acute intracranial hemorrhage or extra-axial fluid collection. The basilar cisterns are patent. There is mild  generalized atrophy and mild to moderate white matter low-attenuation that is nonspecific but likely due to small vessel disease. Small basal ganglionic and thalamic lacuna likely. Streak artifact partly  obscures the brainstem. No acute cortical infarct  or intracranial mass effect. There has been cataract surgery bilaterally.      Impression:      Senescent change. No acute intracranial injury.    Signer Name: Elke Joseph MD   Signed: 2/12/2022 5:47 PM   Workstation Name: [facility]    [facility]    XR Hips Bilateral With or Without Pelvis 5 View [[id number]] Collected: 02/12/22 1717     Updated: 02/12/22 1719    Narrative:      AP pelvis and bilateral hips.    HISTORY: Bilateral hip pain after a fall.    FINDINGS: Frontal view of the pelvis, AP and frog-leg views of the right hip, and AP and frog-leg oblique views of the left hip submitted for review.    Pelvis: There is degenerative change in the visualized lower lumbar spine. No acute pelvic fracture is suspected.    Right hip: No acute fracture or dislocation right hip. Mild osteoarthritis.    Left hip: No acute fracture or dislocation left hip. Mild osteoarthritis.    No osseous destructive lesion suspected.      Impression:      1. No acute fracture or dislocation either hip or pelvis. Bilateral hip osteoarthritis.    Signer Name: Elke Joseph MD   Signed: 2/12/2022 5:17 PM   Workstation Name: [facility]    [facility]    XR Chest 1 View [[id number]] Collected: 02/12/22 1715     Updated: 02/12/22 1717    Narrative:      CR Chest 1 Vw    INDICATION:   Dizziness     COMPARISON:    Chest x-ray 12/7/2021    FINDINGS:  Portable AP view(s) of the chest.  Film is quite lordotic. There is elevation of the right hemidiaphragm. There is moderate cardiac silhouette enlargement. There is increase in the bronchovascular and interstitial markings. Additionally there is more  focal airspace disease at the medial left greater than right lung base. Please correlate for clinical evidence of aspiration or pneumonia. Please correlate for any concern for volume overload versus underlying bronchitis. There is no pneumothorax  or  obvious pleural effusion.      Impression:      Worsening in the appearance the chest. Films technically limited. There is moderate cardiac silhouette enlargement and there are bibasilar infiltrates that appear to be new from prior. Please correlate for clinical evidence for aspiration or pneumonia  with follow-up to clearing recommended. Additionally there is thickening of the central bronchovascular soft tissues the could reflect asthma or bronchitis. Please exclude any clinical concern for mild volume overload. Follow-up recommended..    Signer Name: Elke Joseph MD   Signed: 2/12/2022 5:15 PM   Workstation Name: [id number]    [facility]          I have personally reviewed the above radiology results.   ---------------------------------------------------------------------------------------------------------------------      Assessment & Plan        Assessment/Plan       ASSESSMENT:    1.  Sepsis  2.  UTI  3.  Bacteremia    PLAN:    The patient  presented to [facility] Emergency Department on 2/12/2022 for a fall at the nursing home.    Today, the patient is sitting up in bed on 3 L nasal cannula in no apparent distress.  Although patient has dementia, she is awake, alert, and oriented today and able to describe a history of UTIs, the last of which she reports was several months ago, which is consistent with our records.  Admits to fever, chills, fatigue, and appetite change.  Admits to mild shortness of breath, she reports is no worse than usual.  Denies cough or production of sputum.  Admits to abdominal pain, with suprapubic tenderness.  Denies dysuria or polyuria.  Admits to nausea.  Denies vomiting or or diarrhea.  Afebrile, no diarrhea.  Lactic acid on 2/12/2022 was normal.  WBC is elevated at 37.79.  Urinalysis on 2/12/2022 was positive with WBCs too numerous to count and 4+ bacteria.  Urine culture is in progress.  2 out of 2 blood cultures on 2/12/2022 are so far  showing gram-negative bacilli with E. coli on BC ID.  CT chest on 2/12/2022 showed no displaced rib fracture suspected.  No pleural effusion or pneumothorax.  Study limited by lack of IV contrast medium and arm positioning.  Dependent atelectasis.  There is some peripheral increase in interstitial markings that could indicate some underlying pulmonary fibrosis.  This is similar to prior.  There is also thickening of the central bronchovascular soft tissue suggestive of underlying asthma or bronchitis.  Chest x-ray on 2/12/2022 showed worsening in the appearance of the chest.  Films technically limited.  There is moderate cardiac silhouette enlargement and there are bibasilar infiltrates that appear to be new from prior.  Please correlate for clinical evidence of aspiration or pneumonia with follow-up to clearing recommended.  Additionally there is thickening of the central bronchovascular soft tissues that could reflect asthma or bronchitis.  Please exclude any clinical correlation for mild volume overload.  Mycoplasma pneumoniae antibody on 2/13/2022 is negative.  COVID-19 and flu A/B PCR on 2/12/2022 was negative.    As patient has a history of ESBL E. coli UTIs, Merrem is appropriate until culture results are available. CT chest appears to be more consistent with pulmonary fibrosis rather than pneumonia and vancomycin was discontinued at this time. Patient has no complaints of worsening shortness of breath or cough and lung exam was clear to auscultation. For now, we will monitor closely. We will continue to follow closely and adjust antibiotic therapy as appropriate.    Again, thank you Dr. Hardin for allowing us to participate in the care of your patient and please feel free to call for any questions you may have.    Code Status:   Code Status and Medical Interventions:   Ordered at: 02/12/22 2124     Level Of Support Discussed With:    Patient     Code Status (Patient has no pulse and is not breathing):    CPR  (Attempt to Resuscitate)     Medical Interventions (Patient has pulse or is breathing):    Full Support       RYAN Mujica  02/13/22  10:28 EST

## 2022-02-14 LAB
ANION GAP SERPL CALCULATED.3IONS-SCNC: 9 MMOL/L (ref 5–15)
BACTERIA SPEC AEROBE CULT: ABNORMAL
BASOPHILS # BLD AUTO: 0.04 10*3/MM3 (ref 0–0.2)
BASOPHILS NFR BLD AUTO: 0.2 % (ref 0–1.5)
BUN SERPL-MCNC: 15 MG/DL (ref 8–23)
BUN/CREAT SERPL: 16.3 (ref 7–25)
CALCIUM SPEC-SCNC: 8 MG/DL (ref 8.6–10.5)
CHLORIDE SERPL-SCNC: 100 MMOL/L (ref 98–107)
CO2 SERPL-SCNC: 24 MMOL/L (ref 22–29)
CREAT SERPL-MCNC: 0.92 MG/DL (ref 0.57–1)
DEPRECATED RDW RBC AUTO: 44.9 FL (ref 37–54)
EOSINOPHIL # BLD AUTO: 0.12 10*3/MM3 (ref 0–0.4)
EOSINOPHIL NFR BLD AUTO: 0.5 % (ref 0.3–6.2)
ERYTHROCYTE [DISTWIDTH] IN BLOOD BY AUTOMATED COUNT: 13 % (ref 12.3–15.4)
GFR SERPL CREATININE-BSD FRML MDRD: 59 ML/MIN/1.73
GLUCOSE BLDC GLUCOMTR-MCNC: 124 MG/DL (ref 70–130)
GLUCOSE BLDC GLUCOMTR-MCNC: 125 MG/DL (ref 70–130)
GLUCOSE BLDC GLUCOMTR-MCNC: 129 MG/DL (ref 70–130)
GLUCOSE BLDC GLUCOMTR-MCNC: 131 MG/DL (ref 70–130)
GLUCOSE BLDC GLUCOMTR-MCNC: 161 MG/DL (ref 70–130)
GLUCOSE SERPL-MCNC: 163 MG/DL (ref 65–99)
HCT VFR BLD AUTO: 32.9 % (ref 34–46.6)
HGB BLD-MCNC: 10.6 G/DL (ref 12–15.9)
IMM GRANULOCYTES # BLD AUTO: 0.1 10*3/MM3 (ref 0–0.05)
IMM GRANULOCYTES NFR BLD AUTO: 0.4 % (ref 0–0.5)
LYMPHOCYTES # BLD AUTO: 1.84 10*3/MM3 (ref 0.7–3.1)
LYMPHOCYTES NFR BLD AUTO: 7.7 % (ref 19.6–45.3)
MCH RBC QN AUTO: 30.5 PG (ref 26.6–33)
MCHC RBC AUTO-ENTMCNC: 32.2 G/DL (ref 31.5–35.7)
MCV RBC AUTO: 94.8 FL (ref 79–97)
MONOCYTES # BLD AUTO: 3.37 10*3/MM3 (ref 0.1–0.9)
MONOCYTES NFR BLD AUTO: 14.1 % (ref 5–12)
NEUTROPHILS NFR BLD AUTO: 18.4 10*3/MM3 (ref 1.7–7)
NEUTROPHILS NFR BLD AUTO: 77.1 % (ref 42.7–76)
NRBC BLD AUTO-RTO: 0 /100 WBC (ref 0–0.2)
PLATELET # BLD AUTO: 182 10*3/MM3 (ref 140–450)
PMV BLD AUTO: 10.2 FL (ref 6–12)
POTASSIUM SERPL-SCNC: 3.4 MMOL/L (ref 3.5–5.2)
RBC # BLD AUTO: 3.47 10*6/MM3 (ref 3.77–5.28)
SODIUM SERPL-SCNC: 133 MMOL/L (ref 136–145)
WBC NRBC COR # BLD: 23.87 10*3/MM3 (ref 3.4–10.8)

## 2022-02-14 PROCEDURE — 82962 GLUCOSE BLOOD TEST: CPT

## 2022-02-14 PROCEDURE — 25010000002 MEROPENEM PER 100 MG: Performed by: PHYSICIAN ASSISTANT

## 2022-02-14 PROCEDURE — 87040 BLOOD CULTURE FOR BACTERIA: CPT | Performed by: INTERNAL MEDICINE

## 2022-02-14 PROCEDURE — 94799 UNLISTED PULMONARY SVC/PX: CPT

## 2022-02-14 PROCEDURE — 80048 BASIC METABOLIC PNL TOTAL CA: CPT | Performed by: INTERNAL MEDICINE

## 2022-02-14 PROCEDURE — 63710000001 INSULIN ASPART PER 5 UNITS: Performed by: INTERNAL MEDICINE

## 2022-02-14 PROCEDURE — 25010000002 HEPARIN (PORCINE) PER 1000 UNITS: Performed by: INTERNAL MEDICINE

## 2022-02-14 PROCEDURE — 85025 COMPLETE CBC W/AUTO DIFF WBC: CPT | Performed by: INTERNAL MEDICINE

## 2022-02-14 PROCEDURE — 99231 SBSQ HOSP IP/OBS SF/LOW 25: CPT | Performed by: FAMILY MEDICINE

## 2022-02-14 RX ORDER — POTASSIUM CHLORIDE 20 MEQ/1
40 TABLET, EXTENDED RELEASE ORAL EVERY 4 HOURS
Status: COMPLETED | OUTPATIENT
Start: 2022-02-14 | End: 2022-02-14

## 2022-02-14 RX ADMIN — POTASSIUM CHLORIDE 40 MEQ: 1500 TABLET, EXTENDED RELEASE ORAL at 20:17

## 2022-02-14 RX ADMIN — DICLOFENAC: 10 GEL TOPICAL at 20:18

## 2022-02-14 RX ADMIN — Medication 1 TABLET: at 09:58

## 2022-02-14 RX ADMIN — Medication 1 CAPSULE: at 09:57

## 2022-02-14 RX ADMIN — SODIUM CHLORIDE, PRESERVATIVE FREE 3 ML: 5 INJECTION INTRAVENOUS at 09:58

## 2022-02-14 RX ADMIN — GUAIFENESIN 600 MG: 600 TABLET, EXTENDED RELEASE ORAL at 20:17

## 2022-02-14 RX ADMIN — CETIRIZINE HYDROCHLORIDE 10 MG: 10 TABLET, FILM COATED ORAL at 09:57

## 2022-02-14 RX ADMIN — FLECAINIDE ACETATE 50 MG: 50 TABLET ORAL at 09:57

## 2022-02-14 RX ADMIN — HYDROCODONE BITARTRATE AND ACETAMINOPHEN 1 TABLET: 10; 325 TABLET ORAL at 12:15

## 2022-02-14 RX ADMIN — FLECAINIDE ACETATE 50 MG: 50 TABLET ORAL at 20:18

## 2022-02-14 RX ADMIN — INSULIN ASPART 2 UNITS: 100 INJECTION, SOLUTION INTRAVENOUS; SUBCUTANEOUS at 12:14

## 2022-02-14 RX ADMIN — FLUTICASONE PROPIONATE 1 SPRAY: 50 SPRAY, METERED NASAL at 09:58

## 2022-02-14 RX ADMIN — MEROPENEM 1 G: 1 INJECTION, POWDER, FOR SOLUTION INTRAVENOUS at 09:57

## 2022-02-14 RX ADMIN — ALPRAZOLAM 0.25 MG: 0.25 TABLET ORAL at 09:57

## 2022-02-14 RX ADMIN — NYSTATIN 1 APPLICATION: 100000 POWDER TOPICAL at 09:57

## 2022-02-14 RX ADMIN — SODIUM CHLORIDE, PRESERVATIVE FREE 3 ML: 5 INJECTION INTRAVENOUS at 20:19

## 2022-02-14 RX ADMIN — Medication 1 TABLET: at 09:57

## 2022-02-14 RX ADMIN — OLANZAPINE 2.5 MG: 2.5 TABLET, FILM COATED ORAL at 20:18

## 2022-02-14 RX ADMIN — DULOXETINE HYDROCHLORIDE 60 MG: 60 CAPSULE, DELAYED RELEASE ORAL at 09:57

## 2022-02-14 RX ADMIN — Medication 5000 UNITS: at 09:57

## 2022-02-14 RX ADMIN — GABAPENTIN 400 MG: 400 CAPSULE ORAL at 17:23

## 2022-02-14 RX ADMIN — GUAIFENESIN 600 MG: 600 TABLET, EXTENDED RELEASE ORAL at 09:57

## 2022-02-14 RX ADMIN — MEROPENEM 1 G: 1 INJECTION, POWDER, FOR SOLUTION INTRAVENOUS at 17:23

## 2022-02-14 RX ADMIN — POTASSIUM CHLORIDE 40 MEQ: 1500 TABLET, EXTENDED RELEASE ORAL at 17:23

## 2022-02-14 RX ADMIN — OXYBUTYNIN CHLORIDE 10 MG: 5 TABLET, EXTENDED RELEASE ORAL at 12:15

## 2022-02-14 RX ADMIN — LACTULOSE 20 G: 10 SOLUTION ORAL at 09:58

## 2022-02-14 RX ADMIN — DICLOFENAC: 10 GEL TOPICAL at 15:56

## 2022-02-14 RX ADMIN — HEPARIN SODIUM 5000 UNITS: 5000 INJECTION INTRAVENOUS; SUBCUTANEOUS at 09:57

## 2022-02-14 RX ADMIN — NYSTATIN 1 APPLICATION: 100000 POWDER TOPICAL at 20:18

## 2022-02-14 RX ADMIN — ALPRAZOLAM 0.5 MG: 0.5 TABLET ORAL at 20:17

## 2022-02-14 RX ADMIN — Medication 1 TABLET: at 20:18

## 2022-02-14 RX ADMIN — LEVETIRACETAM 500 MG: 500 TABLET, FILM COATED ORAL at 20:18

## 2022-02-14 RX ADMIN — HEPARIN SODIUM 5000 UNITS: 5000 INJECTION INTRAVENOUS; SUBCUTANEOUS at 20:17

## 2022-02-14 RX ADMIN — DOXEPIN HYDROCHLORIDE 10 MG: 10 CAPSULE ORAL at 20:18

## 2022-02-14 RX ADMIN — GABAPENTIN 400 MG: 400 CAPSULE ORAL at 09:58

## 2022-02-14 NOTE — PLAN OF CARE
Goal Outcome Evaluation:      Patient resting in bed. Seemed more confused and lethargic this evening. Vital signs and blood glucose stable. MD Kevin aware. No new orders at this time. Will continue to monitor and follow plan of care.

## 2022-02-14 NOTE — CASE MANAGEMENT/SOCIAL WORK
Discharge Planning Assessment   Stu     Patient Name: Domonique Cummings  MRN: [id number]  Today's Date: 2/14/2022    Admit Date: 2/12/2022       Discharge Plan     Row Name 02/14/22 1121       Plan    Plan Pt admitted on 2/12/22 from [facility].  Pt has a skilled bed reserved at [facility] until further notice per Khushi.  SS will follow and assist with discharge back to nursing home.                    MOLLY MendezW

## 2022-02-14 NOTE — PROGRESS NOTES
PROGRESS NOTE         Patient Identification:  Name:  Domonique Cummings  Age:  76 y.o.  Sex:  female  :  1945  MRN:  [id number]  Visit Number:  [id number]  Primary Care Provider:  Skinny Meehan MD         LOS: 2 days       ----------------------------------------------------------------------------------------------------------------------  Subjective       Chief Complaints:    Fall        Interval History:      The patient is resting comfortably in bed on 3 L nasal cannula in no apparent distress.  Continues to have some abdominal and right flank pain and a mildly productive cough.  Low-grade fever this morning.  No diarrhea.  WBC is significantly improved at 23.87.  Urine culture on 2022 is so far showing greater than 100,000 colonies of gram-negative bacilli.  2 out of 2 blood cultures on 2022 are so far showing gram-negative bacilli with BC ID finalized as E. coli.  Repeat blood cultures are in process.    Review of Systems:    Constitutional: no fever, chills and night sweats.  Generalized fatigue and appetite change.  Eyes: no eye drainage, itching or redness.    HEENT: no mouth sores, dysphagia or nose bleed.  Respiratory: no for shortness of breath.  Mildly productive cough.  Cardiovascular: no chest pain, no palpitations, no orthopnea.  Gastrointestinal: no nausea, vomiting or diarrhea. No hematemesis or rectal bleeding.  Abdominal and right flank pain.  Nausea.  Genitourinary: no dysuria or polyuria.  Hematologic/lymphatic: no lymph node abnormalities, no easy bruising or easy bleeding.  Musculoskeletal: no muscle or joint pain.  Skin: No rash and no itching.  Neurological: no loss of consciousness, no seizure, no headache.  Behavioral/Psych: no depression or suicidal ideation.  Endocrine: no hot flashes.  Immunologic: negative.    ----------------------------------------------------------------------------------------------------------------------      Objective        Current Hospital Meds:  Acidophilus/Pectin, 1 capsule, Oral, Daily  ALPRAZolam, 0.25 mg, Oral, Daily  ALPRAZolam, 0.5 mg, Oral, Nightly  cetirizine, 10 mg, Oral, Daily  doxepin, 10 mg, Oral, Nightly  DULoxetine, 60 mg, Oral, Daily  flecainide, 50 mg, Oral, Q12H  fluticasone, 1 spray, Nasal, Daily  gabapentin, 400 mg, Oral, TID  guaiFENesin, 600 mg, Oral, BID  heparin (porcine), 5,000 Units, Subcutaneous, Q12H  insulin aspart, 0-7 Units, Subcutaneous, TID AC  lactulose, 20 g, Oral, Daily  levETIRAcetam, 500 mg, Oral, Nightly  meropenem, 1 g, Intravenous, Q8H  multivitamin, 1 tablet, Oral, Daily  multivitamin with minerals, 1 tablet, Oral, BID  nystatin, 1 application, Topical, BID  OLANZapine, 2.5 mg, Oral, Nightly  oxybutynin XL, 10 mg, Oral, Daily  pantoprazole, 40 mg, Oral, QAM  sodium chloride, 3 mL, Intravenous, Q12H  vitamin D3, 5,000 Units, Oral, Daily         ----------------------------------------------------------------------------------------------------------------------    Vital Signs:  Temp:  [98.3 °F (36.8 °C)-100.6 °F (38.1 °C)] 100.6 °F (38.1 °C)  Heart Rate:  [] 102  Resp:  [18] 18  BP: (115-133)/(56-79) 122/69  Mean Arterial Pressure (Non-Invasive) for the past 24 hrs (Last 3 readings):   Noninvasive MAP (mmHg)   02/14/22 1033 87   02/14/22 0653 97   02/14/22 0125 95     SpO2 Percentage    02/14/22 0653 02/14/22 0710 02/14/22 1033   SpO2: 92% 94% 92%     SpO2:  [92 %-99 %] 92 %  on  Flow (L/min):  [3] 3;   Device (Oxygen Therapy): nasal cannula    Body mass index is 44.35 kg/m².  Wt Readings from Last 3 Encounters:   02/14/22 102 kg (225 lb 14.4 oz)   12/07/21 107 kg (236 lb)   03/20/21 111 kg (245 lb)        Intake/Output Summary (Last 24 hours) at 2/14/2022 1200  Last data filed at 2/14/2022 1033  Gross per 24 hour   Intake 950 ml   Output 1650 ml   Net -700 ml     Diet  Regular  ----------------------------------------------------------------------------------------------------------------------      Physical Exam:    Constitutional: Elderly  female is resting comfortably in bed in no apparent distress.  HENT:  Head: Normocephalic and atraumatic.  Mouth:  Moist mucous membranes.    Eyes:  Conjunctivae and EOM are normal.  No scleral icterus.  Neck:  Neck supple.  No JVD present.    Cardiovascular:  Normal rate, regular rhythm and normal heart sounds with no murmur. No edema.  Pulmonary/Chest:  No respiratory distress, no wheezes, no crackles, with normal breath sounds and good air movement.  Abdominal:  Soft.  Bowel sounds are normal.  No distension and mild tenderness to palpation.  Musculoskeletal:  No edema, no tenderness, and no deformity.  No swelling or redness of joints.  Neurological:  Alert and oriented to person, place, and time.  No facial droop.  No slurred speech.   Skin:  Skin is warm and dry.  No rash noted.  No pallor.   Psychiatric:  Normal mood and affect.  Behavior is normal.  ----------------------------------------------------------------------------------------------------------------------  Results from last 7 days   Lab Units 02/12/22  1648   TROPONIN T ng/mL <0.010     Results from last 7 days   Lab Units 02/12/22  1648   PROBNP pg/mL 295.7         Results from last 7 days   Lab Units 02/14/22  0113 02/13/22  0405 02/12/22  1808 02/12/22  1648   CRP mg/dL  --   --   --  18.66*   LACTATE mmol/L  --   --  1.0  --    WBC 10*3/mm3 23.87* 37.79*  --  24.21*   HEMOGLOBIN g/dL 10.6* 11.1*  --  11.9*   HEMATOCRIT % 32.9* 33.9*  --  36.0   MCV fL 94.8 92.9  --  92.5   MCHC g/dL 32.2 32.7  --  33.1   PLATELETS 10*3/mm3 182 187  --  213     Results from last 7 days   Lab Units 02/14/22  0113 02/13/22  0405 02/12/22  1648   SODIUM mmol/L 133* 137 135*   POTASSIUM mmol/L 3.4* 3.7 4.1   MAGNESIUM mg/dL  --  1.9  --    CHLORIDE mmol/L 100 102 99   CO2 mmol/L 24.0  24.2 23.9   BUN mg/dL 15 14 14   CREATININE mg/dL 0.92 0.88 0.86   EGFR IF NONAFRICN AM mL/min/1.73 59* 62 64   CALCIUM mg/dL 8.0* 8.4* 8.5*   GLUCOSE mg/dL 163* 205* 198*   ALBUMIN g/dL  --  2.95* 3.35*   BILIRUBIN mg/dL  --  0.4 0.4   ALK PHOS U/L  --  132* 115   AST (SGOT) U/L  --  20 23   ALT (SGPT) U/L  --  18 17   Estimated Creatinine Clearance: 57.3 mL/min (by C-G formula based on SCr of 0.92 mg/dL).  No results found for: AMMONIA    Hemoglobin A1C   Date/Time Value Ref Range Status   02/13/2022 0405 7.30 (H) 4.80 - 5.60 % Final     Glucose   Date/Time Value Ref Range Status   02/14/2022 1032 161 (H) 70 - 130 mg/dL Final     Comment:     Meter: [id number] : [id number] PHIL DICKERSONPORT   02/14/2022 0652 131 (H) 70 - 130 mg/dL Final     Comment:     Meter: [id number] : [id number] PHIL DICKERSONPORT   02/13/2022 1833 169 (H) 70 - 130 mg/dL Final     Comment:     Meter: [id number] : [id number] TRINI PINEDA   02/13/2022 1645 196 (H) 70 - 130 mg/dL Final     Comment:     Meter: [id number] : [id number] CATHERINE HOLLIS   02/13/2022 0951 148 (H) 70 - 130 mg/dL Final     Comment:     Meter: [id number] : [id number] CATHERINE HOLLIS   02/13/2022 0656 148 (H) 70 - 130 mg/dL Final     Comment:     Meter: [id number] : [id number] CATHERINE HOLLIS   02/12/2022 2017 182 (H) 70 - 130 mg/dL Final     Comment:     Meter: [id number] : [id number] Cornell Montero     Lab Results   Component Value Date    HGBA1C 7.30 (H) 02/13/2022     Lab Results   Component Value Date    TSH 0.770 02/13/2022    FREET4 0.77 (L) 06/25/2014       Blood Culture   Date Value Ref Range Status   02/12/2022 Gram Negative Bacilli (C)  Preliminary   02/12/2022 Gram Negative Bacilli (C)  Preliminary     Urine Culture   Date Value Ref Range Status   02/12/2022 >100,000 CFU/mL Gram Negative Bacilli (A)  Preliminary     No results found for: WOUNDCX  No results found for: STOOLCX  No results found for: RESPCX  Pain Management Panel       Pain Management  Panel Latest Ref Rng & Units 12/11/2017    AMPHETAMINES SCREEN, URINE Negative Negative    BARBITURATES SCREEN Negative Negative    BENZODIAZEPINE SCREEN, URINE Negative Positive(A)    BUPRENORPHINEUR Negative Negative    COCAINE SCREEN, URINE Negative Negative    METHADONE SCREEN, URINE Negative Negative              ----------------------------------------------------------------------------------------------------------------------  Imaging Results (Last 24 Hours)       ** No results found for the last 24 hours. **            ----------------------------------------------------------------------------------------------------------------------    Assessment/Plan       Assessment/Plan     ASSESSMENT:    1.  Sepsis  2.  UTI  3.  Bacteremia    PLAN:    The patient is resting comfortably in bed on 3 L nasal cannula in no apparent distress.  Continues to have some abdominal and right flank pain and a mildly productive cough.  Low-grade fever this morning.  No diarrhea.  WBC is significantly improved at 23.87.  Urine culture on 2/12/2022 is so far showing greater than 100,000 colonies of gram-negative bacilli.  2 out of 2 blood cultures on 2/12/2022 are so far showing gram-negative bacilli with BC ID finalized as E. coli.  Repeat blood cultures are in process.    Urinalysis on 2/12/2022 was positive with WBCs too numerous to count and 4+ bacteria. CT chest on 2/12/2022 showed no displaced rib fracture suspected.  No pleural effusion or pneumothorax.  Study limited by lack of IV contrast medium and arm positioning.  Dependent atelectasis.  There is some peripheral increase in interstitial markings that could indicate some underlying pulmonary fibrosis.  This is similar to prior.  There is also thickening of the central bronchovascular soft tissue suggestive of underlying asthma or bronchitis.  Chest x-ray on 2/12/2022 showed worsening in the appearance of the chest.  Films technically limited.  There is moderate cardiac  silhouette enlargement and there are bibasilar infiltrates that appear to be new from prior.  Please correlate for clinical evidence of aspiration or pneumonia with follow-up to clearing recommended.  Additionally there is thickening of the central bronchovascular soft tissues that could reflect asthma or bronchitis.  Please exclude any clinical correlation for mild volume overload.  Mycoplasma pneumoniae antibody on 2/13/2022 is negative.  COVID-19 and flu A/B PCR on 2/12/2022 was negative.     As patient has a history of ESBL E. coli UTIs, Merrem is appropriate until culture results are available. CT chest appears to be more consistent with pulmonary fibrosis rather than pneumonia and vancomycin was discontinued on 2/13/2022. Patient has no complaints of worsening shortness of breath or cough and lung exam was clear to auscultation. For now, we will monitor closely. We will continue to follow closely and adjust antibiotic therapy as appropriate.       Code Status:   Code Status and Medical Interventions:   Ordered at: 02/12/22 2477     Level Of Support Discussed With:    Patient     Code Status (Patient has no pulse and is not breathing):    CPR (Attempt to Resuscitate)     Medical Interventions (Patient has pulse or is breathing):    Full Support       RYAN Mujica  02/14/22  12:00 EST

## 2022-02-14 NOTE — PROGRESS NOTES
[facility] HOSPITALIST PROGRESS NOTE     Patient Identification:  Name:  Domonique Cummings  Age:  76 y.o.  Sex:  female  :  1945  MRN:  [id number]  Visit Number:  [id number]  ROOM: [address] [place] [address]     Primary Care Provider:  Skinny Meehan MD    Length of stay in inpatient status:  2    Subjective     Chief Compliant:    Chief Complaint   Patient presents with   • Fall       History of Presenting Illness: Patient is a 76-year-old female with severe sepsis with E. coli bacteremia likely seeding from urinary tract infection.  Patient states she still has some minimal cough that is mildly productive.  Patient states she just feels generally weak and did have a low-grade fever earlier this morning.  Patient has no nausea at this time but does complain of some mild right flank pain.  No new complaints otherwise    Objective     Current Hospital Meds:Acidophilus/Pectin, 1 capsule, Oral, Daily  ALPRAZolam, 0.25 mg, Oral, Daily  ALPRAZolam, 0.5 mg, Oral, Nightly  cetirizine, 10 mg, Oral, Daily  doxepin, 10 mg, Oral, Nightly  DULoxetine, 60 mg, Oral, Daily  flecainide, 50 mg, Oral, Q12H  fluticasone, 1 spray, Nasal, Daily  gabapentin, 400 mg, Oral, TID  guaiFENesin, 600 mg, Oral, BID  heparin (porcine), 5,000 Units, Subcutaneous, Q12H  insulin aspart, 0-7 Units, Subcutaneous, TID AC  lactulose, 20 g, Oral, Daily  levETIRAcetam, 500 mg, Oral, Nightly  meropenem, 1 g, Intravenous, Q8H  multivitamin, 1 tablet, Oral, Daily  multivitamin with minerals, 1 tablet, Oral, BID  nystatin, 1 application, Topical, BID  OLANZapine, 2.5 mg, Oral, Nightly  oxybutynin XL, 10 mg, Oral, Daily  pantoprazole, 40 mg, Oral, QAM  sodium chloride, 3 mL, Intravenous, Q12H  vitamin D3, 5,000 Units, Oral, Daily       ----------------------------------------------------------------------------------------------------------------------  Vital Signs:  Temp:  [98.3 °F (36.8 °C)-100.6 °F (38.1 °C)] 100.6 °F (38.1 °C)  Heart Rate:   [] 102  Resp:  [18] 18  BP: (115-133)/(56-79) 122/69  SpO2:  [92 %-99 %] 92 %  on  Flow (L/min):  [3] 3;   Device (Oxygen Therapy): nasal cannula  Body mass index is 44.35 kg/m².    Wt Readings from Last 3 Encounters:   02/14/22 102 kg (225 lb 14.4 oz)   12/07/21 107 kg (236 lb)   03/20/21 111 kg (245 lb)     Intake & Output (last 3 days)       02/11 0701  02/12 0700 02/12 0701 02/13 0700 02/13 0701 02/14 0700 02/14 0701  02/15 0700    P.O.   980 120    IV Piggyback  1100      Total Intake(mL/kg)  1100 (11) 980 (9.6) 120 (1.2)    Urine (mL/kg/hr)   1100 (0.4) 550 (1.2)    Total Output   1100 550    Net  +1100 -120 -430                Diet Regular  ----------------------------------------------------------------------------------------------------------------------  Physical exam:  Constitutional:   Obese female no acute distress  HEENT: Normocephalic atraumatic  Neck: Supple   Cardiovascular: Regular rate and rhythm  Pulmonary/Chest: Fairly clear to auscultation but decreased breath sounds in the bases  Abdominal:  .  Positive bowel sounds soft  Musculoskeletal: No arthropathy  Neurological: No focal deficits  Skin: No rash  Peripheral vascular:  Genitourinary:  ----------------------------------------------------------------------------------------------------------------------    Last echocardiogram:  Results for orders placed during the hospital encounter of 10/18/18    Adult Transthoracic Echo Complete W/ Cont if Necessary Per Protocol    Interpretation Summary  · Left ventricular wall thickness is consistent with mild concentric hypertrophy.  · Normal left ventricular cavity size noted. All left ventricular wall segments contract normally.  · Estimated EF appears to be in the range of 61 - 65%.  · The valve appears trileaflet. The aortic valve is abnormal in structure. The valve exhibits sclerosis. No aortic valve regurgitation is present. Mild aortic valve stenosis is present.  · The mitral valve is  normal in structure. No mitral valve regurgitation is present. No significant mitral valve stenosis is present.  · Tricuspid valve not well visualized.  · There is no evidence of pericardial effusion.    ----------------------------------------------------------------------------------------------------------------------  Results from last 7 days   Lab Units 02/14/22 0113 02/13/22 0405 02/12/22  1808 02/12/22  1648   CRP mg/dL  --   --   --  18.66*   LACTATE mmol/L  --   --  1.0  --    WBC 10*3/mm3 23.87* 37.79*  --  24.21*   HEMOGLOBIN g/dL 10.6* 11.1*  --  11.9*   HEMATOCRIT % 32.9* 33.9*  --  36.0   MCV fL 94.8 92.9  --  92.5   MCHC g/dL 32.2 32.7  --  33.1   PLATELETS 10*3/mm3 182 187  --  213         Results from last 7 days   Lab Units 02/14/22 0113 02/13/22 0405 02/12/22  1648   SODIUM mmol/L 133* 137 135*   POTASSIUM mmol/L 3.4* 3.7 4.1   MAGNESIUM mg/dL  --  1.9  --    CHLORIDE mmol/L 100 102 99   CO2 mmol/L 24.0 24.2 23.9   BUN mg/dL 15 14 14   CREATININE mg/dL 0.92 0.88 0.86   EGFR IF NONAFRICN AM mL/min/1.73 59* 62 64   CALCIUM mg/dL 8.0* 8.4* 8.5*   PHOSPHORUS mg/dL  --  2.8  --    GLUCOSE mg/dL 163* 205* 198*   ALBUMIN g/dL  --  2.95* 3.35*   BILIRUBIN mg/dL  --  0.4 0.4   ALK PHOS U/L  --  132* 115   AST (SGOT) U/L  --  20 23   ALT (SGPT) U/L  --  18 17   Estimated Creatinine Clearance: 57.3 mL/min (by C-G formula based on SCr of 0.92 mg/dL).  No results found for: AMMONIA  Results from last 7 days   Lab Units 02/12/22  1648   TROPONIN T ng/mL <0.010     Results from last 7 days   Lab Units 02/12/22  1648   PROBNP pg/mL 295.7         Hemoglobin A1C   Date/Time Value Ref Range Status   02/13/2022 0405 7.30 (H) 4.80 - 5.60 % Final     Glucose   Date/Time Value Ref Range Status   02/14/2022 1032 161 (H) 70 - 130 mg/dL Final     Comment:     Meter: [id number] : [id number] PHIL JANE   02/14/2022 0652 131 (H) 70 - 130 mg/dL Final     Comment:     Meter: [id number] : [id number] PHIL  JANE   02/13/2022 1833 169 (H) 70 - 130 mg/dL Final     Comment:     Meter: [id number] : [id number] TRINI PINEDA   02/13/2022 1645 196 (H) 70 - 130 mg/dL Final     Comment:     Meter: [id number] : [id number] CATHERINE HOLLIS   02/13/2022 0951 148 (H) 70 - 130 mg/dL Final     Comment:     Meter: [id number] : [id number] [facility]   02/13/2022 0656 148 (H) 70 - 130 mg/dL Final     Comment:     Meter: [id number] : [id number] [facility]   02/12/2022 2017 182 (H) 70 - 130 mg/dL Final     Comment:     Meter: [id number] : [id number] [facility] Montero     Lab Results   Component Value Date    TSH 0.770 02/13/2022    FREET4 0.77 (L) 06/25/2014     No results found for: PREGTESTUR, PREGSERUM, HCG, HCGQUANT  Pain Management Panel     Pain Management Panel Latest Ref Rng & Units 12/11/2017    AMPHETAMINES SCREEN, URINE Negative Negative    BARBITURATES SCREEN Negative Negative    BENZODIAZEPINE SCREEN, URINE Negative Positive(A)    BUPRENORPHINEUR Negative Negative    COCAINE SCREEN, URINE Negative Negative    METHADONE SCREEN, URINE Negative Negative        Brief Urine Lab Results  (Last result in the past 365 days)      Color   Clarity   Blood   Leuk Est   Nitrite   Protein   CREAT   Urine HCG        02/12/22 1837 Dark Yellow   Turbid   Small (1+)   Moderate (2+)   Positive   100 mg/dL (2+)               Blood Culture   Date Value Ref Range Status   02/12/2022 Abnormal Stain (C)  Preliminary   02/12/2022 Abnormal Stain (C)  Preliminary     Results from last 7 days   Lab Units 02/12/22  1837   NITRITE UA  Positive*   WBC UA /HPF Too Numerous to Count*   BACTERIA UA /HPF 4+*   SQUAM EPITHEL UA /HPF 0-2   URINECX  >100,000 CFU/mL Gram Negative Bacilli*     Urine Culture   Date Value Ref Range Status   02/12/2022 >100,000 CFU/mL Gram Negative Bacilli (A)  Preliminary     No results found for: WOUNDCX  No results found for: STOOLCX  Results from last 7 days   Lab Units 02/12/22  1808 02/12/22  1648   LACTATE mmol/L  1.0  --    CRP mg/dL  --  18.66*       I have personally looked at the labs and they are summarized above.  ----------------------------------------------------------------------------------------------------------------------  Detailed radiology reports for the last 24 hours:    Imaging Results (Last 24 Hours)     ** No results found for the last 24 hours. **        Final impressions for the last 30 days of radiology reports:    CT Head Without Contrast    Result Date: 2/12/2022  Senescent change. No acute intracranial injury. Signer Name: Elke Joseph MD  Signed: 2/12/2022 5:47 PM  Workstation Name: [facility]  Radiology [facility]    CT Chest Without Contrast Diagnostic    Result Date: 2/12/2022  No displaced rib fracture is suspected. No pleural effusion or pneumothorax. Study limited by lack of IV contrast media and arm positioning. 2. Dependent atelectasis. There is some peripheral increase in interstitial markings that could indicate some underlying pulmonary fibrosis. This is similar to prior. There is also thickening of the central bronchovascular soft tissues suggestive of underlying asthma or bronchitis. Signer Name: Elke Joseph MD  Signed: 2/12/2022 6:14 PM  Workstation Name: [facility]  Radiology [facility]    CT Cervical Spine Without Contrast    Result Date: 2/12/2022  No acute fracture or traumatic malalignment is suspected in the cervical spine. Redemonstration of extensive cervical degenerative changes and acquired fusion at C4-5 Signer Name: Elke Joseph MD  Signed: 2/12/2022 5:59 PM  Workstation Name: [facility]  Radiology [facility]    CT Thoracic Spine Without Contrast    Result Date: 2/12/2022  No acute fracture or traumatic malalignment is suspected in the thoracic spine. Extensive degenerative changes are present. Signer Name: Elke Joseph MD  Signed: 2/12/2022 6:02 PM  Workstation Name: [id number]  [facility]    CT Lumbar  Spine Without Contrast    Result Date: 2/12/2022  No acute fracture or traumatic malalignment is suspected in the lumbar spine. 2. There is extensive lumbar degenerative change with multilevel canal stenosis and foraminal impingement. If more information is needed, this could be further evaluated with an MRI if the patient is candidate. Signer Name: Elke Joseph MD  Signed: 2/12/2022 6:08 PM  Workstation Name: [facility]  Radiology [facility]    XR Chest 1 View    Result Date: 2/12/2022  Worsening in the appearance the chest. Films technically limited. There is moderate cardiac silhouette enlargement and there are bibasilar infiltrates that appear to be new from prior. Please correlate for clinical evidence for aspiration or pneumonia with follow-up to clearing recommended. Additionally there is thickening of the central bronchovascular soft tissues the could reflect asthma or bronchitis. Please exclude any clinical concern for mild volume overload. Follow-up recommended.. Signer Name: Elke Joseph MD  Signed: 2/12/2022 5:15 PM  Workstation Name: [facility]  Radiology [facility]    XR Hips Bilateral With or Without Pelvis 5 View    Result Date: 2/12/2022  1. No acute fracture or dislocation either hip or pelvis. Bilateral hip osteoarthritis. Signer Name: Elke oJseph MD  Signed: 2/12/2022 5:17 PM  Workstation Name: [facility]  Radiology [facility]    I have personally looked at the radiology images and read the final radiology report.    Assessment & Plan    Severe sepsis with gram-negative bacilli bacteremia (E. coli).  Patient also has grown greater than 100,000 units of gram-negative bacilli urine culture.  Has past history of extended spectrum beta-lactamase organisms.  Patient currently on IV meropenem.    Questionable pneumonia--last x-ray shows chronic interstitial changes.  Sometimes consistent with possible bronchitis.  Patient should have adequate coverage at this  time    Chronic hypoxic respiratory failure--stable continue O2 at 3 L per nasal cannula at this time    Obstructive sleep apnea noncompliant with CPAP    History of esophageal stricture x2 and has had past history of dysphagia.    Diabetes mellitus reasonably well-controlled continue current treatment    Hypertension--well-controlled    Hyperlipidemia  Morbid obesity  Anxiety neurosis  Lumbago  GERD  Dementia  Seizure disorder--patient on Keppra    VTE Prophylaxis:   Mechanical Order History:     None      Pharmalogical Order History:      Ordered     Dose Route Frequency Stop    02/12/22 1364  heparin (porcine) 5000 UNIT/ML injection 5,000 Units         5,000 Units SC Every 12 Hours Scheduled --                The patient is high risk due to the following diagnoses/reasons: Gram-negative bacilli sepsis/bacteremia  Dipso: Nursing Home  Howard Brown MD  [facility] Hospitalist  02/14/22  11:33 EST

## 2022-02-15 ENCOUNTER — APPOINTMENT (OUTPATIENT)
Dept: ULTRASOUND IMAGING | Facility: HOSPITAL | Age: 77
End: 2022-02-15

## 2022-02-15 LAB
ANION GAP SERPL CALCULATED.3IONS-SCNC: 9.4 MMOL/L (ref 5–15)
BACTERIA SPEC AEROBE CULT: ABNORMAL
BACTERIA SPEC AEROBE CULT: ABNORMAL
BASOPHILS # BLD AUTO: 0.03 10*3/MM3 (ref 0–0.2)
BASOPHILS NFR BLD AUTO: 0.3 % (ref 0–1.5)
BUN SERPL-MCNC: 16 MG/DL (ref 8–23)
BUN/CREAT SERPL: 19 (ref 7–25)
CALCIUM SPEC-SCNC: 8.9 MG/DL (ref 8.6–10.5)
CHLORIDE SERPL-SCNC: 104 MMOL/L (ref 98–107)
CO2 SERPL-SCNC: 26.6 MMOL/L (ref 22–29)
CREAT SERPL-MCNC: 0.84 MG/DL (ref 0.57–1)
CRP SERPL-MCNC: 28.19 MG/DL (ref 0–0.5)
DEPRECATED RDW RBC AUTO: 45.8 FL (ref 37–54)
EOSINOPHIL # BLD AUTO: 0.19 10*3/MM3 (ref 0–0.4)
EOSINOPHIL NFR BLD AUTO: 1.8 % (ref 0.3–6.2)
ERYTHROCYTE [DISTWIDTH] IN BLOOD BY AUTOMATED COUNT: 13 % (ref 12.3–15.4)
GFR SERPL CREATININE-BSD FRML MDRD: 66 ML/MIN/1.73
GLUCOSE BLDC GLUCOMTR-MCNC: 123 MG/DL (ref 70–130)
GLUCOSE BLDC GLUCOMTR-MCNC: 131 MG/DL (ref 70–130)
GLUCOSE BLDC GLUCOMTR-MCNC: 159 MG/DL (ref 70–130)
GLUCOSE BLDC GLUCOMTR-MCNC: 218 MG/DL (ref 70–130)
GLUCOSE SERPL-MCNC: 134 MG/DL (ref 65–99)
GRAM STN SPEC: ABNORMAL
GRAM STN SPEC: ABNORMAL
HCT VFR BLD AUTO: 36.1 % (ref 34–46.6)
HGB BLD-MCNC: 11.3 G/DL (ref 12–15.9)
IMM GRANULOCYTES # BLD AUTO: 0.04 10*3/MM3 (ref 0–0.05)
IMM GRANULOCYTES NFR BLD AUTO: 0.4 % (ref 0–0.5)
ISOLATED FROM: ABNORMAL
ISOLATED FROM: ABNORMAL
LYMPHOCYTES # BLD AUTO: 1.48 10*3/MM3 (ref 0.7–3.1)
LYMPHOCYTES NFR BLD AUTO: 14.1 % (ref 19.6–45.3)
MCH RBC QN AUTO: 30 PG (ref 26.6–33)
MCHC RBC AUTO-ENTMCNC: 31.3 G/DL (ref 31.5–35.7)
MCV RBC AUTO: 95.8 FL (ref 79–97)
MONOCYTES # BLD AUTO: 1.38 10*3/MM3 (ref 0.1–0.9)
MONOCYTES NFR BLD AUTO: 13.1 % (ref 5–12)
NEUTROPHILS NFR BLD AUTO: 7.39 10*3/MM3 (ref 1.7–7)
NEUTROPHILS NFR BLD AUTO: 70.3 % (ref 42.7–76)
NRBC BLD AUTO-RTO: 0 /100 WBC (ref 0–0.2)
PLATELET # BLD AUTO: 179 10*3/MM3 (ref 140–450)
PMV BLD AUTO: 10.3 FL (ref 6–12)
POTASSIUM SERPL-SCNC: 4.5 MMOL/L (ref 3.5–5.2)
RBC # BLD AUTO: 3.77 10*6/MM3 (ref 3.77–5.28)
SODIUM SERPL-SCNC: 140 MMOL/L (ref 136–145)
WBC NRBC COR # BLD: 10.51 10*3/MM3 (ref 3.4–10.8)

## 2022-02-15 PROCEDURE — 25010000002 MEROPENEM PER 100 MG: Performed by: PHYSICIAN ASSISTANT

## 2022-02-15 PROCEDURE — 93970 EXTREMITY STUDY: CPT | Performed by: RADIOLOGY

## 2022-02-15 PROCEDURE — 94799 UNLISTED PULMONARY SVC/PX: CPT

## 2022-02-15 PROCEDURE — 85025 COMPLETE CBC W/AUTO DIFF WBC: CPT | Performed by: FAMILY MEDICINE

## 2022-02-15 PROCEDURE — 25010000002 HEPARIN (PORCINE) PER 1000 UNITS: Performed by: INTERNAL MEDICINE

## 2022-02-15 PROCEDURE — 93970 EXTREMITY STUDY: CPT

## 2022-02-15 PROCEDURE — 63710000001 INSULIN ASPART PER 5 UNITS: Performed by: INTERNAL MEDICINE

## 2022-02-15 PROCEDURE — 86140 C-REACTIVE PROTEIN: CPT | Performed by: FAMILY MEDICINE

## 2022-02-15 PROCEDURE — 80048 BASIC METABOLIC PNL TOTAL CA: CPT | Performed by: FAMILY MEDICINE

## 2022-02-15 PROCEDURE — 25010000002 CEFTRIAXONE PER 250 MG: Performed by: FAMILY MEDICINE

## 2022-02-15 PROCEDURE — 99232 SBSQ HOSP IP/OBS MODERATE 35: CPT | Performed by: FAMILY MEDICINE

## 2022-02-15 PROCEDURE — 82962 GLUCOSE BLOOD TEST: CPT

## 2022-02-15 RX ADMIN — ALPRAZOLAM 0.25 MG: 0.25 TABLET ORAL at 08:56

## 2022-02-15 RX ADMIN — FLECAINIDE ACETATE 50 MG: 50 TABLET ORAL at 19:35

## 2022-02-15 RX ADMIN — Medication 1 TABLET: at 08:59

## 2022-02-15 RX ADMIN — GABAPENTIN 400 MG: 400 CAPSULE ORAL at 08:57

## 2022-02-15 RX ADMIN — MEROPENEM 1 G: 1 INJECTION, POWDER, FOR SOLUTION INTRAVENOUS at 02:09

## 2022-02-15 RX ADMIN — GUAIFENESIN 600 MG: 600 TABLET, EXTENDED RELEASE ORAL at 08:57

## 2022-02-15 RX ADMIN — PANTOPRAZOLE SODIUM 40 MG: 40 TABLET, DELAYED RELEASE ORAL at 05:05

## 2022-02-15 RX ADMIN — WATER 2 G: 1 INJECTION, SOLUTION INTRAVENOUS at 10:16

## 2022-02-15 RX ADMIN — SODIUM CHLORIDE, PRESERVATIVE FREE 3 ML: 5 INJECTION INTRAVENOUS at 10:14

## 2022-02-15 RX ADMIN — SODIUM CHLORIDE, PRESERVATIVE FREE 3 ML: 5 INJECTION INTRAVENOUS at 19:36

## 2022-02-15 RX ADMIN — LACTULOSE 20 G: 10 SOLUTION ORAL at 08:55

## 2022-02-15 RX ADMIN — HYDROCODONE BITARTRATE AND ACETAMINOPHEN 1 TABLET: 10; 325 TABLET ORAL at 11:56

## 2022-02-15 RX ADMIN — DOXEPIN HYDROCHLORIDE 10 MG: 10 CAPSULE ORAL at 19:35

## 2022-02-15 RX ADMIN — GUAIFENESIN 600 MG: 600 TABLET, EXTENDED RELEASE ORAL at 19:35

## 2022-02-15 RX ADMIN — DICLOFENAC: 10 GEL TOPICAL at 10:14

## 2022-02-15 RX ADMIN — Medication 5000 UNITS: at 08:56

## 2022-02-15 RX ADMIN — NYSTATIN 1 APPLICATION: 100000 POWDER TOPICAL at 19:41

## 2022-02-15 RX ADMIN — HEPARIN SODIUM 5000 UNITS: 5000 INJECTION INTRAVENOUS; SUBCUTANEOUS at 09:00

## 2022-02-15 RX ADMIN — CETIRIZINE HYDROCHLORIDE 10 MG: 10 TABLET, FILM COATED ORAL at 08:57

## 2022-02-15 RX ADMIN — Medication 1 TABLET: at 19:35

## 2022-02-15 RX ADMIN — Medication 1 TABLET: at 08:56

## 2022-02-15 RX ADMIN — FLUTICASONE PROPIONATE 1 SPRAY: 50 SPRAY, METERED NASAL at 10:14

## 2022-02-15 RX ADMIN — OLANZAPINE 2.5 MG: 2.5 TABLET, FILM COATED ORAL at 19:36

## 2022-02-15 RX ADMIN — NYSTATIN 1 APPLICATION: 100000 POWDER TOPICAL at 10:16

## 2022-02-15 RX ADMIN — Medication 1 CAPSULE: at 08:58

## 2022-02-15 RX ADMIN — INSULIN ASPART 3 UNITS: 100 INJECTION, SOLUTION INTRAVENOUS; SUBCUTANEOUS at 11:57

## 2022-02-15 RX ADMIN — DULOXETINE HYDROCHLORIDE 60 MG: 60 CAPSULE, DELAYED RELEASE ORAL at 08:57

## 2022-02-15 RX ADMIN — HEPARIN SODIUM 5000 UNITS: 5000 INJECTION INTRAVENOUS; SUBCUTANEOUS at 19:36

## 2022-02-15 RX ADMIN — LEVETIRACETAM 500 MG: 500 TABLET, FILM COATED ORAL at 19:35

## 2022-02-15 RX ADMIN — FLECAINIDE ACETATE 50 MG: 50 TABLET ORAL at 08:59

## 2022-02-15 RX ADMIN — GABAPENTIN 400 MG: 400 CAPSULE ORAL at 17:35

## 2022-02-15 RX ADMIN — DICLOFENAC: 10 GEL TOPICAL at 19:41

## 2022-02-15 RX ADMIN — OXYBUTYNIN CHLORIDE 10 MG: 5 TABLET, EXTENDED RELEASE ORAL at 08:58

## 2022-02-15 RX ADMIN — ALBUTEROL SULFATE 2.5 MG: 2.5 SOLUTION RESPIRATORY (INHALATION) at 18:34

## 2022-02-15 RX ADMIN — ALPRAZOLAM 0.5 MG: 0.5 TABLET ORAL at 19:36

## 2022-02-15 NOTE — PLAN OF CARE
Goal Outcome Evaluation:  Plan of Care Reviewed With: patient        Progress: no change  Outcome Summary: Patient currently resting in bed. VS stable. She has not had any complaints this shift. No distress noted. Will continue to monitor and follow plan of care.

## 2022-02-15 NOTE — PROGRESS NOTES
PROGRESS NOTE         Patient Identification:  Name:  Domonique Cummings  Age:  76 y.o.  Sex:  female  :  1945  MRN:  [id number]  Visit Number:  [id number]  Primary Care Provider:  Skinny Meehan MD         LOS: 3 days       ----------------------------------------------------------------------------------------------------------------------  Subjective       Chief Complaints:    Fall        Interval History:      The patient is sitting up in bed on 3 L nasal cannula in no apparent distress. Reports that she is feeling somewhat better today but complains of bilateral lower extremity pain without evidence of swelling, erythema, or warmth. Patient does have generalized aches and pains. Afebrile, no diarrhea. CRP is worsened at 28.19. Leukocytosis is resolved. Urine culture on 2022 finalized is greater than 100,000 colonies of E. coli. 2 out of 2 blood cultures on 2022 are so far showing gram-negative bacilli with BC ID finalized as E. coli. Blood cultures on 2022 are so far showing no growth.     Review of Systems:    Constitutional: no fever, chills and night sweats. No fatigue.  Eyes: no eye drainage, itching or redness.    HEENT: no mouth sores, dysphagia or nose bleed.  Respiratory: no for shortness of breath.  Mildly productive cough.  Cardiovascular: no chest pain, no palpitations, no orthopnea.  Gastrointestinal: no nausea, vomiting or diarrhea. No hematemesis or rectal bleeding.  Abdominal and right flank pain.    Genitourinary: no dysuria or polyuria.  Hematologic/lymphatic: no lymph node abnormalities, no easy bruising or easy bleeding.  Musculoskeletal: Bilateral lower extremity pain.  Skin: No rash and no itching.  Neurological: no loss of consciousness, no seizure, no headache.  Behavioral/Psych: no depression or suicidal ideation.  Endocrine: no hot flashes.  Immunologic:  negative.    ----------------------------------------------------------------------------------------------------------------------      Objective       [facility] [place] Meds:  Acidophilus/Pectin, 1 capsule, Oral, Daily  ALPRAZolam, 0.25 mg, Oral, Daily  ALPRAZolam, 0.5 mg, Oral, Nightly  cefTRIAXone, 2 g, Intravenous, Q24H  cetirizine, 10 mg, Oral, Daily  Diclofenac Sodium, , Topical, BID  doxepin, 10 mg, Oral, Nightly  DULoxetine, 60 mg, Oral, Daily  flecainide, 50 mg, Oral, Q12H  fluticasone, 1 spray, Nasal, Daily  gabapentin, 400 mg, Oral, TID  guaiFENesin, 600 mg, Oral, BID  heparin (porcine), 5,000 Units, Subcutaneous, Q12H  insulin aspart, 0-7 Units, Subcutaneous, TID AC  lactulose, 20 g, Oral, Daily  levETIRAcetam, 500 mg, Oral, Nightly  multivitamin, 1 tablet, Oral, Daily  multivitamin with minerals, 1 tablet, Oral, BID  nystatin, 1 application, Topical, BID  OLANZapine, 2.5 mg, Oral, Nightly  oxybutynin XL, 10 mg, Oral, Daily  pantoprazole, 40 mg, Oral, QAM  sodium chloride, 3 mL, Intravenous, Q12H  vitamin D3, 5,000 Units, Oral, Daily         ----------------------------------------------------------------------------------------------------------------------    Vital Signs:  Temp:  [97.5 °F (36.4 °C)-98.3 °F (36.8 °C)] 97.5 °F (36.4 °C)  Heart Rate:  [87-98] 91  Resp:  [18-20] 20  BP: (130-153)/(65-78) 143/71  Mean Arterial Pressure (Non-Invasive) for the past 24 hrs (Last 3 readings):   Noninvasive MAP (mmHg)   02/15/22 1026 97   02/15/22 0605 99   02/15/22 0115 95     SpO2 Percentage    02/15/22 0605 02/15/22 0702 02/15/22 1026   SpO2: 93% 97% 94%     SpO2:  [93 %-97 %] 94 %  on  Flow (L/min):  [3] 3;   Device (Oxygen Therapy): nasal cannula    Body mass index is 44.72 kg/m².  Wt Readings from Last 3 Encounters:   02/15/22 103 kg (227 lb 12.8 oz)   12/07/21 107 kg (236 lb)   03/20/21 111 kg (245 lb)        Intake/Output Summary (Last 24 hours) at 2/15/2022 1058  Last data filed at 2/15/2022 0859  Gross  per 24 hour   Intake 710 ml   Output 950 ml   Net -240 ml     Diet Regular  ----------------------------------------------------------------------------------------------------------------------      Physical Exam:    Constitutional: Elderly  female is resting comfortably in bed in no apparent distress.  HENT:  Head: Normocephalic and atraumatic.  Mouth:  Moist mucous membranes.    Eyes:  Conjunctivae and EOM are normal.  No scleral icterus.  Neck:  Neck supple.  No JVD present.    Cardiovascular:  Normal rate, regular rhythm and normal heart sounds with no murmur. No edema.  Pulmonary/Chest:  No respiratory distress, no wheezes, no crackles, with normal breath sounds and good air movement.  Abdominal:  Soft.  Bowel sounds are normal.  No distension and mild tenderness to palpation.  Musculoskeletal:  No edema, no tenderness, and no deformity.  No swelling or redness of joints.  Neurological:  Alert and oriented to person, place, and time.  No facial droop.  No slurred speech.   Skin:  Skin is warm and dry.  No rash noted.  No pallor.   Psychiatric:  Normal mood and affect.  Behavior is normal.  ----------------------------------------------------------------------------------------------------------------------  Results from last 7 days   Lab Units 02/12/22  1648   TROPONIN T ng/mL <0.010     Results from last 7 days   Lab Units 02/12/22  1648   PROBNP pg/mL 295.7         Results from last 7 days   Lab Units 02/15/22  0258 02/14/22  0113 02/13/22  0405 02/12/22  1808 02/12/22  1648 02/12/22  1648   CRP mg/dL 28.19*  --   --   --   --  18.66*   LACTATE mmol/L  --   --   --  1.0  --   --    WBC 10*3/mm3 10.51 23.87* 37.79*  --    < > 24.21*   HEMOGLOBIN g/dL 11.3* 10.6* 11.1*  --    < > 11.9*   HEMATOCRIT % 36.1 32.9* 33.9*  --    < > 36.0   MCV fL 95.8 94.8 92.9  --    < > 92.5   MCHC g/dL 31.3* 32.2 32.7  --    < > 33.1   PLATELETS 10*3/mm3 179 182 187  --    < > 213    < > = values in this interval not  displayed.     Results from last 7 days   Lab Units 02/15/22  0258 02/14/22  0113 02/13/22  0405 02/12/22  1648 02/12/22  1648   SODIUM mmol/L 140 133* 137   < > 135*   POTASSIUM mmol/L 4.5 3.4* 3.7   < > 4.1   MAGNESIUM mg/dL  --   --  1.9  --   --    CHLORIDE mmol/L 104 100 102   < > 99   CO2 mmol/L 26.6 24.0 24.2   < > 23.9   BUN mg/dL 16 15 14   < > 14   CREATININE mg/dL 0.84 0.92 0.88   < > 0.86   EGFR IF NONAFRICN AM mL/min/1.73 66 59* 62   < > 64   CALCIUM mg/dL 8.9 8.0* 8.4*   < > 8.5*   GLUCOSE mg/dL 134* 163* 205*   < > 198*   ALBUMIN g/dL  --   --  2.95*  --  3.35*   BILIRUBIN mg/dL  --   --  0.4  --  0.4   ALK PHOS U/L  --   --  132*  --  115   AST (SGOT) U/L  --   --  20  --  23   ALT (SGPT) U/L  --   --  18  --  17    < > = values in this interval not displayed.   Estimated Creatinine Clearance: 63.1 mL/min (by C-G formula based on SCr of 0.84 mg/dL).  No results found for: AMMONIA    Hemoglobin A1C   Date/Time Value Ref Range Status   02/13/2022 0405 7.30 (H) 4.80 - 5.60 % Final     Glucose   Date/Time Value Ref Range Status   02/15/2022 1034 218 (H) 70 - 130 mg/dL Final     Comment:     Meter: [id number] : [id number] SHANIQUE BATES   02/15/2022 0611 131 (H) 70 - 130 mg/dL Final     Comment:     Meter: [id number] : [id number] SHANIQUE BATES   02/14/2022 1830 129 70 - 130 mg/dL Final     Comment:     Meter: [id number] : [id number] TRINI PINEDA   02/14/2022 1732 125 70 - 130 mg/dL Final     Comment:     Meter: [id number] : [id number] GARY FRANK   02/14/2022 1623 124 70 - 130 mg/dL Final     Comment:     Meter: [id number] : [id number] PHIL DICKERSONPORT   02/14/2022 1032 161 (H) 70 - 130 mg/dL Final     Comment:     Meter: [id number] : [id number] PHIL DICKERSONPORT   02/14/2022 0652 131 (H) 70 - 130 mg/dL Final     Comment:     Meter: [id number] : [id number] PHIL DICKERSONPORT   02/13/2022 1833 169 (H) 70 - 130 mg/dL Final     Comment:     Meter: [id number] : [id number]  TRINI PINEDA     Lab Results   Component Value Date    HGBA1C 7.30 (H) 02/13/2022     Lab Results   Component Value Date    TSH 0.770 02/13/2022    FREET4 0.77 (L) 06/25/2014       Blood Culture   Date Value Ref Range Status   02/12/2022 Gram Negative Bacilli (C)  Preliminary   02/12/2022 Gram Negative Bacilli (C)  Preliminary     Urine Culture   Date Value Ref Range Status   02/12/2022 >100,000 CFU/mL Gram Negative Bacilli (A)  Preliminary     No results found for: WOUNDCX  No results found for: STOOLCX  No results found for: RESPCX  Pain Management Panel       Pain Management Panel Latest Ref Rng & Units 12/11/2017    AMPHETAMINES SCREEN, URINE Negative Negative    BARBITURATES SCREEN Negative Negative    BENZODIAZEPINE SCREEN, URINE Negative Positive(A)    BUPRENORPHINEUR Negative Negative    COCAINE SCREEN, URINE Negative Negative    METHADONE SCREEN, URINE Negative Negative              ----------------------------------------------------------------------------------------------------------------------  Imaging Results (Last 24 Hours)       ** No results found for the last 24 hours. **            ----------------------------------------------------------------------------------------------------------------------    Assessment/Plan       Assessment/Plan     ASSESSMENT:    1.  Sepsis  2.  UTI  3.  Bacteremia    PLAN:    The patient is sitting up in bed on 3 L nasal cannula in no apparent distress. Reports that she is feeling somewhat better today but complains of bilateral lower extremity pain without evidence of swelling, erythema, or warmth. Patient does have generalized aches and pains. Afebrile, no diarrhea. CRP is worsened at 28.19. Leukocytosis is resolved. Urine culture on 2/12/2022 finalized is greater than 100,000 colonies of E. coli. 2 out of 2 blood cultures on 2/12/2022 are so far showing gram-negative bacilli with BC ID finalized as E. coli. Blood cultures on 2/14/2022 are so far showing no  growth.     Urinalysis on 2/12/2022 was positive with WBCs too numerous to count and 4+ bacteria. CT chest on 2/12/2022 showed no displaced rib fracture suspected.  No pleural effusion or pneumothorax.  Study limited by lack of IV contrast medium and arm positioning.  Dependent atelectasis.  There is some peripheral increase in interstitial markings that could indicate some underlying pulmonary fibrosis.  This is similar to prior.  There is also thickening of the central bronchovascular soft tissue suggestive of underlying asthma or bronchitis.  Chest x-ray on 2/12/2022 showed worsening in the appearance of the chest.  Films technically limited.  There is moderate cardiac silhouette enlargement and there are bibasilar infiltrates that appear to be new from prior.  Please correlate for clinical evidence of aspiration or pneumonia with follow-up to clearing recommended.  Additionally there is thickening of the central bronchovascular soft tissues that could reflect asthma or bronchitis.  Please exclude any clinical correlation for mild volume overload.  Mycoplasma pneumoniae antibody on 2/13/2022 is negative.  COVID-19 and flu A/B PCR on 2/12/2022 was negative.     As urine culture results have now finalized as E. coli, we are agreeable with de-escalation of coverage to Rocephin 2 g IV every 24 hours. We will continue to follow closely.     Code Status:   Code Status and Medical Interventions:   Ordered at: 02/12/22 2124     Level Of Support Discussed With:    Patient     Code Status (Patient has no pulse and is not breathing):    CPR (Attempt to Resuscitate)     Medical Interventions (Patient has pulse or is breathing):    Full Support       RYAN Mujica  02/15/22  10:58 EST

## 2022-02-15 NOTE — PLAN OF CARE
Goal Outcome Evaluation:    Patient is resting in bed with no s/s of distress. Patient has had periods of confusion throughout the day, and has tried to climb out of bed. PRN pain medication has been given and patient is calm and cooperative at this time. Will continue with plan of care.

## 2022-02-15 NOTE — PROGRESS NOTES
[facility] HOSPITALIST PROGRESS NOTE     Patient Identification:  Name:  Domonique Cummings  Age:  76 y.o.  Sex:  female  :  1945  MRN:  [id number]  Visit Number:  [id number]  ROOM: [address] [place] [address]     Primary Care Provider:  Skinyn Meehan MD    Length of stay in inpatient status:  3    Subjective     Chief Compliant:    Chief Complaint   Patient presents with   • Fall       History of Presenting Illness: 76-year-old female with severe sepsis with E. coli bacteremia likely seeding from UTI.  Patient states that she is feeling some better but does complain of bilateral lower extremity pain this morning in the calf muscles bilaterally.  Patient does have generalized aches and pains as well.  No new complaints otherwise    Objective     Current Hospital Meds:Acidophilus/Pectin, 1 capsule, Oral, Daily  ALPRAZolam, 0.25 mg, Oral, Daily  ALPRAZolam, 0.5 mg, Oral, Nightly  cefTRIAXone, 2 g, Intravenous, Q24H  cetirizine, 10 mg, Oral, Daily  Diclofenac Sodium, , Topical, BID  doxepin, 10 mg, Oral, Nightly  DULoxetine, 60 mg, Oral, Daily  flecainide, 50 mg, Oral, Q12H  fluticasone, 1 spray, Nasal, Daily  gabapentin, 400 mg, Oral, TID  guaiFENesin, 600 mg, Oral, BID  heparin (porcine), 5,000 Units, Subcutaneous, Q12H  insulin aspart, 0-7 Units, Subcutaneous, TID AC  lactulose, 20 g, Oral, Daily  levETIRAcetam, 500 mg, Oral, Nightly  multivitamin, 1 tablet, Oral, Daily  multivitamin with minerals, 1 tablet, Oral, BID  nystatin, 1 application, Topical, BID  OLANZapine, 2.5 mg, Oral, Nightly  oxybutynin XL, 10 mg, Oral, Daily  pantoprazole, 40 mg, Oral, QAM  sodium chloride, 3 mL, Intravenous, Q12H  vitamin D3, 5,000 Units, Oral, Daily       ----------------------------------------------------------------------------------------------------------------------  Vital Signs:  Temp:  [97.9 °F (36.6 °C)-100.6 °F (38.1 °C)] 98.3 °F (36.8 °C)  Heart Rate:  [] 93  Resp:  [18-20] 20  BP: (122-153)/(65-78)  147/71  SpO2:  [92 %-97 %] 97 %  on  Flow (L/min):  [3] 3;   Device (Oxygen Therapy): nasal cannula  Body mass index is 44.72 kg/m².    Wt Readings from Last 3 Encounters:   02/15/22 103 kg (227 lb 12.8 oz)   12/07/21 107 kg (236 lb)   03/20/21 111 kg (245 lb)     Intake & Output (last 3 days)       02/12 0701 02/13 0700 02/13 0701 02/14 0700 02/14 0701  02/15 0700 02/15 0701 02/16 0700    P.O.  980 480 350    IV Piggyback 1100       Total Intake(mL/kg) 1100 (11) 980 (9.6) 480 (4.7) 350 (3.4)    Urine (mL/kg/hr)  1100 (0.4) 1500 (0.6)     Total Output  1100 1500     Net +1100 -120 -1020 +350            Urine Unmeasured Occurrence   1 x         Diet Regular  ----------------------------------------------------------------------------------------------------------------------  Physical exam:  Constitutional:   Overweight elderly female in no distress  HEENT: Normocephalic atraumatic  Neck:    Supple  Cardiovascular: Regular rate and rhythm  Pulmonary/Chest: Clear to auscultation  Abdominal: Positive bowel sounds soft.   Musculoskeletal: No arthropathy; does have positive Homans' sign bilaterally in lower extremities  Neurological: No focal deficits  Skin: No erythema   peripheral vascular: No edema in lower extremities  Genitourinary:  ----------------------------------------------------------------------------------------------------------------------    Last echocardiogram:  Results for orders placed during the hospital encounter of 10/18/18    Adult Transthoracic Echo Complete W/ Cont if Necessary Per Protocol    Interpretation Summary  · Left ventricular wall thickness is consistent with mild concentric hypertrophy.  · Normal left ventricular cavity size noted. All left ventricular wall segments contract normally.  · Estimated EF appears to be in the range of 61 - 65%.  · The valve appears trileaflet. The aortic valve is abnormal in structure. The valve exhibits sclerosis. No aortic valve regurgitation is  present. Mild aortic valve stenosis is present.  · The mitral valve is normal in structure. No mitral valve regurgitation is present. No significant mitral valve stenosis is present.  · Tricuspid valve not well visualized.  · There is no evidence of pericardial effusion.    ----------------------------------------------------------------------------------------------------------------------  Results from last 7 days   Lab Units 02/15/22  0258 02/14/22  0113 02/13/22  0405 02/12/22  1808 02/12/22  1648 02/12/22  1648   CRP mg/dL 28.19*  --   --   --   --  18.66*   LACTATE mmol/L  --   --   --  1.0  --   --    WBC 10*3/mm3 10.51 23.87* 37.79*  --    < > 24.21*   HEMOGLOBIN g/dL 11.3* 10.6* 11.1*  --    < > 11.9*   HEMATOCRIT % 36.1 32.9* 33.9*  --    < > 36.0   MCV fL 95.8 94.8 92.9  --    < > 92.5   MCHC g/dL 31.3* 32.2 32.7  --    < > 33.1   PLATELETS 10*3/mm3 179 182 187  --    < > 213    < > = values in this interval not displayed.         Results from last 7 days   Lab Units 02/15/22  0258 02/14/22  0113 02/13/22  0405 02/12/22  1648 02/12/22  1648   SODIUM mmol/L 140 133* 137   < > 135*   POTASSIUM mmol/L 4.5 3.4* 3.7   < > 4.1   MAGNESIUM mg/dL  --   --  1.9  --   --    CHLORIDE mmol/L 104 100 102   < > 99   CO2 mmol/L 26.6 24.0 24.2   < > 23.9   BUN mg/dL 16 15 14   < > 14   CREATININE mg/dL 0.84 0.92 0.88   < > 0.86   EGFR IF NONAFRICN AM mL/min/1.73 66 59* 62   < > 64   CALCIUM mg/dL 8.9 8.0* 8.4*   < > 8.5*   PHOSPHORUS mg/dL  --   --  2.8  --   --    GLUCOSE mg/dL 134* 163* 205*   < > 198*   ALBUMIN g/dL  --   --  2.95*  --  3.35*   BILIRUBIN mg/dL  --   --  0.4  --  0.4   ALK PHOS U/L  --   --  132*  --  115   AST (SGOT) U/L  --   --  20  --  23   ALT (SGPT) U/L  --   --  18  --  17    < > = values in this interval not displayed.   Estimated Creatinine Clearance: 63.1 mL/min (by C-G formula based on SCr of 0.84 mg/dL).  No results found for: AMMONIA  Results from last 7 days   Lab Units 02/12/22  5089    TROPONIN T ng/mL <0.010     Results from last 7 days   Lab Units 02/12/22  1648   PROBNP pg/mL 295.7         Hemoglobin A1C   Date/Time Value Ref Range Status   02/13/2022 0405 7.30 (H) 4.80 - 5.60 % Final     Glucose   Date/Time Value Ref Range Status   02/15/2022 0611 131 (H) 70 - 130 mg/dL Final     Comment:     Meter: [id number] : [id number] SHANIQUE BATES   02/14/2022 1830 129 70 - 130 mg/dL Final     Comment:     Meter: [id number] : [id number] TRINI PINEDA   02/14/2022 1732 125 70 - 130 mg/dL Final     Comment:     Meter: [id number] : [id number] GARYJASMINA FOURNIERARA   02/14/2022 1623 124 70 - 130 mg/dL Final     Comment:     Meter: [id number] : [id number] PHIL JANE   02/14/2022 1032 161 (H) 70 - 130 mg/dL Final     Comment:     Meter: [id number] : [id number] PHIL DICKERSONPORT   02/14/2022 0652 131 (H) 70 - 130 mg/dL Final     Comment:     Meter: [id number] : [id number] PHIL DICKERSONPORT   02/13/2022 1833 169 (H) 70 - 130 mg/dL Final     Comment:     Meter: [id number] : [id number] TRINI PINEDA   02/13/2022 1645 196 (H) 70 - 130 mg/dL Final     Comment:     Meter: [id number] : [id number] CATHERINE HOLLIS     Lab Results   Component Value Date    TSH 0.770 02/13/2022    FREET4 0.77 (L) 06/25/2014     No results found for: PREGTESTUR, PREGSERUM, HCG, HCGQUANT  Pain Management Panel     Pain Management Panel Latest Ref Rng & Units 12/11/2017    AMPHETAMINES SCREEN, URINE Negative Negative    BARBITURATES SCREEN Negative Negative    BENZODIAZEPINE SCREEN, URINE Negative Positive(A)    BUPRENORPHINEUR Negative Negative    COCAINE SCREEN, URINE Negative Negative    METHADONE SCREEN, URINE Negative Negative        Brief Urine Lab Results  (Last result in the past 365 days)      Color   Clarity   Blood   Leuk Est   Nitrite   Protein   CREAT   Urine HCG        02/12/22 1837 Dark Yellow   Turbid   Small (1+)   Moderate (2+)   Positive   100 mg/dL (2+)               Blood Culture   Date Value  Ref Range Status   02/14/2022 No growth at 24 hours  Preliminary   02/14/2022 No growth at 24 hours  Preliminary   02/12/2022 Gram Negative Bacilli (C)  Preliminary   02/12/2022 Gram Negative Bacilli (C)  Preliminary     Results from last 7 days   Lab Units 02/12/22  1837   NITRITE UA  Positive*   WBC UA /HPF Too Numerous to Count*   BACTERIA UA /HPF 4+*   SQUAM EPITHEL UA /HPF 0-2   URINECX  >100,000 CFU/mL Escherichia coli*     Urine Culture   Date Value Ref Range Status   02/12/2022 >100,000 CFU/mL Escherichia coli (A)  Final     No results found for: WOUNDCX  No results found for: STOOLCX  Results from last 7 days   Lab Units 02/15/22  0258 02/12/22  1808 02/12/22  1648   LACTATE mmol/L  --  1.0  --    CRP mg/dL 28.19*  --  18.66*       I have personally looked at the labs and they are summarized above.  ----------------------------------------------------------------------------------------------------------------------  Detailed radiology reports for the last 24 hours:    Imaging Results (Last 24 Hours)     ** No results found for the last 24 hours. **        Final impressions for the last 30 days of radiology reports:    CT Head Without Contrast    Result Date: 2/12/2022  Senescent change. No acute intracranial injury. Signer Name: Elke Joseph MD  Signed: 2/12/2022 5:47 PM  Workstation Name: [email or web address]  [facility]    CT Chest Without Contrast Diagnostic    Result Date: 2/12/2022  No displaced rib fracture is suspected. No pleural effusion or pneumothorax. Study limited by lack of IV contrast media and arm positioning. 2. Dependent atelectasis. There is some peripheral increase in interstitial markings that could indicate some underlying pulmonary fibrosis. This is similar to prior. There is also thickening of the central bronchovascular soft tissues suggestive of underlying asthma or bronchitis. Signer Name: Elke Joseph MD  Signed: 2/12/2022 6:14 PM  Workstation Name: [email or web address]   [facility]    CT Cervical Spine Without Contrast    Result Date: 2/12/2022  No acute fracture or traumatic malalignment is suspected in the cervical spine. Redemonstration of extensive cervical degenerative changes and acquired fusion at C4-5 Signer Name: Elke Joseph MD  Signed: 2/12/2022 5:59 PM  Workstation Name: [email or web address]    CT Thoracic Spine Without Contrast    Result Date: 2/12/2022  No acute fracture or traumatic malalignment is suspected in the thoracic spine. Extensive degenerative changes are present. Signer Name: Elke Joseph MD  Signed: 2/12/2022 6:02 PM  Workstation Name: [facility]  [facility]    CT Lumbar Spine Without Contrast    Result Date: 2/12/2022  No acute fracture or traumatic malalignment is suspected in the lumbar spine. 2. There is extensive lumbar degenerative change with multilevel canal stenosis and foraminal impingement. If more information is needed, this could be further evaluated with an MRI if the patient is candidate. Signer Name: Elke Joseph MD  Signed: 2/12/2022 6:08 PM  Workstation Name: [facility]  [facility]    XR Chest 1 View    Result Date: 2/12/2022  Worsening in the appearance the chest. Films technically limited. There is moderate cardiac silhouette enlargement and there are bibasilar infiltrates that appear to be new from prior. Please correlate for clinical evidence for aspiration or pneumonia with follow-up to clearing recommended. Additionally there is thickening of the central bronchovascular soft tissues the could reflect asthma or bronchitis. Please exclude any clinical concern for mild volume overload. Follow-up recommended.. Signer Name: Elke Joseph MD  Signed: 2/12/2022 5:15 PM  Workstation Name: [facility]  [facility]    XR Hips Bilateral With or Without Pelvis 5 View    Result Date: 2/12/2022  1. No acute fracture or  dislocation either hip or pelvis. Bilateral hip osteoarthritis. Signer Name: Elke Joseph MD  Signed: 2/12/2022 5:17 PM  Workstation Name: [email or web address]  [facility]    I have personally looked at the radiology images and read the final radiology report.    Assessment & Plan    Severe sepsis with gram-negative bacteremia--E. coli.  E. coli sensitivity report shows that it is sensitive to Rocephin.  Will de-escalate antibiotics today from IV Merrem to IV Rocephin.  Patient's white count has normalized.  Patient still has markedly elevated CRP level.    Chronic hypoxic respiratory failure--stable continue O2 at current rate.  Doing well.    Obstructive sleep apnea noncompliant with CPAP    History of esophageal stricture x2 past history of dysphagia--patient has had treatment for this issue.  Patient doing well at this time    Diabetes mellitus--well-controlled    Hypertension--reasonably well-controlled    Seizure disorder continue Keppra    Morbid obesity    Bilateral lower extremity pain--positive Homans' sign on examination.  Will arrange for ultrasound to rule out DVT    Anxiety neurosis stable    GERD stable    VTE Prophylaxis:   Mechanical Order History:     None      Pharmalogical Order History:      Ordered     Dose Route Frequency Stop    02/12/22 4377  heparin (porcine) 5000 UNIT/ML injection 5,000 Units         5,000 Units SC Every 12 Hours Scheduled --                The patient is high risk due to the following diagnoses/reasons: E. coli sepsis  Dipso: Nursing home  Howard Brown MD  [facility] Hospitalist  02/15/22  09:05 EST

## 2022-02-16 LAB
ANION GAP SERPL CALCULATED.3IONS-SCNC: 8.7 MMOL/L (ref 5–15)
BASOPHILS # BLD AUTO: 0.02 10*3/MM3 (ref 0–0.2)
BASOPHILS NFR BLD AUTO: 0.2 % (ref 0–1.5)
BUN SERPL-MCNC: 13 MG/DL (ref 8–23)
BUN/CREAT SERPL: 19.7 (ref 7–25)
CALCIUM SPEC-SCNC: 9.1 MG/DL (ref 8.6–10.5)
CHLORIDE SERPL-SCNC: 102 MMOL/L (ref 98–107)
CO2 SERPL-SCNC: 28.3 MMOL/L (ref 22–29)
CREAT SERPL-MCNC: 0.66 MG/DL (ref 0.57–1)
DEPRECATED RDW RBC AUTO: 44.1 FL (ref 37–54)
EOSINOPHIL # BLD AUTO: 0.34 10*3/MM3 (ref 0–0.4)
EOSINOPHIL NFR BLD AUTO: 3.2 % (ref 0.3–6.2)
ERYTHROCYTE [DISTWIDTH] IN BLOOD BY AUTOMATED COUNT: 12.7 % (ref 12.3–15.4)
FLUAV RNA RESP QL NAA+PROBE: NOT DETECTED
FLUBV RNA RESP QL NAA+PROBE: NOT DETECTED
GFR SERPL CREATININE-BSD FRML MDRD: 87 ML/MIN/1.73
GLUCOSE BLDC GLUCOMTR-MCNC: 131 MG/DL (ref 70–130)
GLUCOSE BLDC GLUCOMTR-MCNC: 135 MG/DL (ref 70–130)
GLUCOSE BLDC GLUCOMTR-MCNC: 234 MG/DL (ref 70–130)
GLUCOSE BLDC GLUCOMTR-MCNC: 237 MG/DL (ref 70–130)
GLUCOSE SERPL-MCNC: 144 MG/DL (ref 65–99)
HCT VFR BLD AUTO: 36 % (ref 34–46.6)
HGB BLD-MCNC: 11.3 G/DL (ref 12–15.9)
IMM GRANULOCYTES # BLD AUTO: 0.06 10*3/MM3 (ref 0–0.05)
IMM GRANULOCYTES NFR BLD AUTO: 0.6 % (ref 0–0.5)
LYMPHOCYTES # BLD AUTO: 1.81 10*3/MM3 (ref 0.7–3.1)
LYMPHOCYTES NFR BLD AUTO: 16.8 % (ref 19.6–45.3)
MCH RBC QN AUTO: 29.7 PG (ref 26.6–33)
MCHC RBC AUTO-ENTMCNC: 31.4 G/DL (ref 31.5–35.7)
MCV RBC AUTO: 94.5 FL (ref 79–97)
MONOCYTES # BLD AUTO: 1.51 10*3/MM3 (ref 0.1–0.9)
MONOCYTES NFR BLD AUTO: 14 % (ref 5–12)
NEUTROPHILS NFR BLD AUTO: 65.2 % (ref 42.7–76)
NEUTROPHILS NFR BLD AUTO: 7.04 10*3/MM3 (ref 1.7–7)
NRBC BLD AUTO-RTO: 0 /100 WBC (ref 0–0.2)
PLATELET # BLD AUTO: 231 10*3/MM3 (ref 140–450)
PMV BLD AUTO: 9.6 FL (ref 6–12)
POTASSIUM SERPL-SCNC: 4.4 MMOL/L (ref 3.5–5.2)
RBC # BLD AUTO: 3.81 10*6/MM3 (ref 3.77–5.28)
SARS-COV-2 RNA RESP QL NAA+PROBE: NOT DETECTED
SODIUM SERPL-SCNC: 139 MMOL/L (ref 136–145)
WBC NRBC COR # BLD: 10.78 10*3/MM3 (ref 3.4–10.8)

## 2022-02-16 PROCEDURE — 82962 GLUCOSE BLOOD TEST: CPT

## 2022-02-16 PROCEDURE — 94799 UNLISTED PULMONARY SVC/PX: CPT

## 2022-02-16 PROCEDURE — 87636 SARSCOV2 & INF A&B AMP PRB: CPT | Performed by: FAMILY MEDICINE

## 2022-02-16 PROCEDURE — 25010000002 HEPARIN (PORCINE) PER 1000 UNITS: Performed by: INTERNAL MEDICINE

## 2022-02-16 PROCEDURE — 80048 BASIC METABOLIC PNL TOTAL CA: CPT | Performed by: FAMILY MEDICINE

## 2022-02-16 PROCEDURE — 25010000002 CEFTRIAXONE PER 250 MG: Performed by: FAMILY MEDICINE

## 2022-02-16 PROCEDURE — 85025 COMPLETE CBC W/AUTO DIFF WBC: CPT | Performed by: FAMILY MEDICINE

## 2022-02-16 PROCEDURE — 63710000001 INSULIN ASPART PER 5 UNITS: Performed by: INTERNAL MEDICINE

## 2022-02-16 RX ORDER — CEFDINIR 300 MG/1
300 CAPSULE ORAL EVERY 12 HOURS SCHEDULED
Status: DISCONTINUED | OUTPATIENT
Start: 2022-02-16 | End: 2022-02-17 | Stop reason: HOSPADM

## 2022-02-16 RX ADMIN — GABAPENTIN 400 MG: 400 CAPSULE ORAL at 16:33

## 2022-02-16 RX ADMIN — HEPARIN SODIUM 5000 UNITS: 5000 INJECTION INTRAVENOUS; SUBCUTANEOUS at 08:54

## 2022-02-16 RX ADMIN — ALPRAZOLAM 0.5 MG: 0.5 TABLET ORAL at 21:20

## 2022-02-16 RX ADMIN — GABAPENTIN 400 MG: 400 CAPSULE ORAL at 08:53

## 2022-02-16 RX ADMIN — HYDROCODONE BITARTRATE AND ACETAMINOPHEN 1 TABLET: 10; 325 TABLET ORAL at 14:28

## 2022-02-16 RX ADMIN — ALPRAZOLAM 0.25 MG: 0.25 TABLET ORAL at 08:52

## 2022-02-16 RX ADMIN — INSULIN ASPART 3 UNITS: 100 INJECTION, SOLUTION INTRAVENOUS; SUBCUTANEOUS at 16:33

## 2022-02-16 RX ADMIN — DICLOFENAC: 10 GEL TOPICAL at 08:54

## 2022-02-16 RX ADMIN — FLECAINIDE ACETATE 50 MG: 50 TABLET ORAL at 08:53

## 2022-02-16 RX ADMIN — NYSTATIN 1 APPLICATION: 100000 POWDER TOPICAL at 08:53

## 2022-02-16 RX ADMIN — FLUTICASONE PROPIONATE 1 SPRAY: 50 SPRAY, METERED NASAL at 08:54

## 2022-02-16 RX ADMIN — SODIUM CHLORIDE, PRESERVATIVE FREE 10 ML: 5 INJECTION INTRAVENOUS at 08:58

## 2022-02-16 RX ADMIN — Medication 1 TABLET: at 08:53

## 2022-02-16 RX ADMIN — GUAIFENESIN 600 MG: 600 TABLET, EXTENDED RELEASE ORAL at 21:20

## 2022-02-16 RX ADMIN — Medication 1 CAPSULE: at 08:53

## 2022-02-16 RX ADMIN — Medication 1 TABLET: at 21:20

## 2022-02-16 RX ADMIN — CEFDINIR 300 MG: 300 CAPSULE ORAL at 21:23

## 2022-02-16 RX ADMIN — FLECAINIDE ACETATE 50 MG: 50 TABLET ORAL at 21:21

## 2022-02-16 RX ADMIN — GABAPENTIN 400 MG: 400 CAPSULE ORAL at 21:21

## 2022-02-16 RX ADMIN — HEPARIN SODIUM 5000 UNITS: 5000 INJECTION INTRAVENOUS; SUBCUTANEOUS at 21:21

## 2022-02-16 RX ADMIN — CETIRIZINE HYDROCHLORIDE 10 MG: 10 TABLET, FILM COATED ORAL at 08:53

## 2022-02-16 RX ADMIN — DICLOFENAC: 10 GEL TOPICAL at 21:23

## 2022-02-16 RX ADMIN — OXYBUTYNIN CHLORIDE 10 MG: 5 TABLET, EXTENDED RELEASE ORAL at 08:51

## 2022-02-16 RX ADMIN — LEVETIRACETAM 500 MG: 500 TABLET, FILM COATED ORAL at 21:21

## 2022-02-16 RX ADMIN — HYDROCODONE BITARTRATE AND ACETAMINOPHEN 1 TABLET: 10; 325 TABLET ORAL at 23:10

## 2022-02-16 RX ADMIN — Medication 5000 UNITS: at 08:52

## 2022-02-16 RX ADMIN — LACTULOSE 20 G: 10 SOLUTION ORAL at 08:53

## 2022-02-16 RX ADMIN — WATER 2 G: 1 INJECTION, SOLUTION INTRAVENOUS at 10:31

## 2022-02-16 RX ADMIN — DOXEPIN HYDROCHLORIDE 10 MG: 10 CAPSULE ORAL at 23:10

## 2022-02-16 RX ADMIN — GUAIFENESIN 600 MG: 600 TABLET, EXTENDED RELEASE ORAL at 08:52

## 2022-02-16 RX ADMIN — NYSTATIN 1 APPLICATION: 100000 POWDER TOPICAL at 21:23

## 2022-02-16 RX ADMIN — OLANZAPINE 2.5 MG: 2.5 TABLET, FILM COATED ORAL at 21:20

## 2022-02-16 RX ADMIN — DULOXETINE HYDROCHLORIDE 60 MG: 60 CAPSULE, DELAYED RELEASE ORAL at 08:53

## 2022-02-16 RX ADMIN — GABAPENTIN 400 MG: 400 CAPSULE ORAL at 01:30

## 2022-02-16 RX ADMIN — PANTOPRAZOLE SODIUM 40 MG: 40 TABLET, DELAYED RELEASE ORAL at 04:57

## 2022-02-16 RX ADMIN — SODIUM CHLORIDE, PRESERVATIVE FREE 3 ML: 5 INJECTION INTRAVENOUS at 10:28

## 2022-02-16 NOTE — PLAN OF CARE
Goal Outcome Evaluation:  Plan of Care Reviewed With: patient        Progress: no change  Outcome Summary: Patient has been A/Ox3 this shift. She has not had any complaints. VS stable. No distress noted. Will continue to monitor and follow plan of care.

## 2022-02-16 NOTE — DISCHARGE PLACEMENT REQUEST
"Domonique Huston (76 y.o. Female)             Date of Birth Social Security Number Address Home Phone MRN    1945  [address] [place] [id number] [phone] [id number]    [employer] Marital Status             Voodoo        Admission Date Admission Type Admitting Provider Attending Provider Department, Room/Bed    2/12/22 Emergency Florence Rivera MD Hays, Ray G, MD [address], 3309/2S    Discharge Date Discharge Disposition Discharge Destination                         Attending Provider: Howard Brown MD    Allergies: Biaxin [Clarithromycin]    Isolation: None   Infection: COVID Screen (preop/placement) (02/16/22)   Code Status: CPR   Advance Care Planning Activity    Ht: 152 cm (59.84\")   Wt: 102 kg (225 lb 6.4 oz)    Admission Cmt: None   Principal Problem: Pneumonia of both lower lobes due to infectious organism [J18.9]                 Active Insurance as of 2/12/2022     Primary Coverage     Payor Plan Insurance Group Employer/Plan Group    MEDICARE MEDICARE A & B      Payor Plan Address Payor Plan Phone Number Payor Plan Fax Number Effective Dates    [address] [phone]  10/1/2009 - None Entered    [facility] [id number]       Subscriber Name Subscriber Birth Date Member ID       DOMONIQUE HUSTON 1945 [id number]           Secondary Coverage     Payor Plan Insurance Group Employer/Plan Group    [place]      Payor Plan Address Payor Plan Phone Number Payor Plan Fax Number Effective Dates    [address] [phone]  4/2/2019 - None Entered    [place] [address]       Subscriber Name Subscriber Birth Date Member ID       DOMONIQUE HUSTON 1945 [id number]                 Emergency Contacts      (Rel.) Home Phone Work Phone Mobile Phone    Deepali Gallegos (Daughter) -- -- [phone]    Dennise Flanagan (Daughter) [phone] -- --    EmilianoBenito (Son) [phone] -- --            Emergency Contact Information  "    Name Relation Home Work Mobile    Deepali Gallegos Daughter   [phone]    Dennise Flanagan Daughter [phone]      Benito Cummings Son [phone]            Insurance Information                MEDICARE/MEDICARE A & B Phone: [phone]    Subscriber: Domonique Cummings JOVANY Subscriber#: [id number]    Group#: -- Precert#: --        [place] Phone: [phone]    Subscriber: Domonique Cummings Subscriber#: [id number]    Group#: -- Precert#: --          Treatment Team  Chat With All Active Members    Provider Relationship Specialty Contact    Howard Brown MD  Attending --  [phone]    Yvette Preston RN  Registered Nurse --     Terrance Cantu MD  Consulting Physician Infectious Diseases  [phone]    Marion Hooks, ABELINO  Respiratory Therapist --  [phone]    Sameer Stahl  Patient Care Technician --  [phone]    Lupe Ordonez PA  Physician Assistant Physician Assistant  [phone]    Florence Rivera MD  Consulting Physician Hospitalist  [phone]          Problem List           Codes Noted - Resolved       Hospital    * (Principal) Pneumonia of both lower lobes due to infectious organism ICD-10-CM: J18.9  ICD-9-CM: 486 2022 - Present       Non-Hospital    Mixed stress and urge urinary incontinence ICD-10-CM: N39.46  ICD-9-CM: 788.33 2020 - Present    Chronic headaches ICD-10-CM: R51.9, G89.29  ICD-9-CM: 784.0 10/31/2018 - Present    Essential hypertension (Chronic) ICD-10-CM: I10  ICD-9-CM: 401.9 10/31/2018 - Present             History & Physical      Florence Rivera MD at 22 [phone]              [facility] HOSPITALIST HISTORY AND PHYSICAL    Patient Identification:  Name:  Domonique Cummings  Age:  76 y.o.  Sex:  female  :  1945  MRN:  [id number]   Visit Number:  [id number]  Admit Date: 2022   Room number:  3313/2S  Primary Care Physician:  Skinny Meehan MD    Date of Admission: 2022     Subjective  "    Chief complaint:    Chief Complaint   Patient presents with   • Fall     History of presenting illness:  76 y.o. female who was admitted on 2/12/2022 from the nursing home via the ED for a fall.  The patient has a past medical history of dementia, essential hypertension, hyperlipidemia, and type 2 diabetes.  Patient has a past infectious disease history that consist of non-ESBL E. coli bacteremia, ESBL E. coli UTI, and MRSA pneumonia.  The patient states that she fell at the nursing home, but she is not able to give me any details.  Because of the patient's dementia, I am unable to obtain a history from her.  Per the emergency department records, the patient stood up to walk and became dizzy, lost her balance, and fell, landing on her knees and feet.  She then started complaining of \"pain all over\".  Patient denied chest pain and trouble breathing.  However, she was unable to provide any other HPI details.  Please note that no family members are at bedside.  In the emergency department, the urinalysis was consistent with a urinary tract infection.  She also met criteria for sepsis.  She tested negative for COVID-19.  Thus, the patient was admitted to the medical surgical floor for further evaluation and treatment; please note that she is on the telemetry floor due to lack of medical surgical beds.  ---------------------------------------------------------------------------------------------------------------------   Review of Systems   Unable to perform ROS: Dementia     ---------------------------------------------------------------------------------------------------------------------   Past Medical History:   Diagnosis Date   • Arthritis    • Asthma    • Chronic headaches 10/31/2018   • Degenerative disc disease, lumbar    • Dementia (HCC)    • E coli bacteremia 11/2019    Not ESBL   • Fibromyalgia    • GERD (gastroesophageal reflux disease)    • Hyperlipidemia    • Hypertension    • Mixed stress and urge " urinary incontinence 1/22/2020   • MRSA pneumonia ([facility]) 12/18/2017   • ENE (obstructive sleep apnea)     Noncompliant with BiPAP/CPAP   • Osteoporosis    • Type 2 diabetes mellitus ([facility])    • UTI due to extended-spectrum beta lactamase (ESBL) producing Escherichia coli 2021    In both July and November of 2021     Past Surgical History:   Procedure Laterality Date   • APPENDECTOMY     • CARDIAC CATHETERIZATION N/A 10/30/2018    Procedure: Left Heart Cath;  Surgeon: Arturo Inman MD;  Location: [facility] CATH INVASIVE LOCATION;  Service: Cardiology   • ESOPHAGEAL DILATATION     • TOTAL SHOULDER REPLACEMENT Bilateral    • TUBAL ABDOMINAL LIGATION       Family History   Problem Relation Age of Onset   • Breast cancer Sister      Social History     Socioeconomic History   • Marital status:    Tobacco Use   • Smoking status: Former Smoker   • Smokeless tobacco: Never Used   • Tobacco comment: She quit at least 30 years ago.   Substance and Sexual Activity   • Alcohol use: No   • Drug use: No   • Sexual activity: Defer     ---------------------------------------------------------------------------------------------------------------------   Allergies:  Biaxin [clarithromycin]  ---------------------------------------------------------------------------------------------------------------------   Medications below are reported home medications pulling from within the system; at this time, these medications have not been reconciled unless otherwise specified and are in the verification process for further verifcation as current home medications.      Prior to Admission Medications     Prescriptions Last Dose Informant Patient Reported? Taking?    ALPRAZolam (XANAX) 0.5 MG tablet   No No    Take 1 tablet by mouth Every 12 (Twelve) Hours.    amLODIPine (NORVASC) 10 MG tablet  Nursing Home No No    Take 1 tablet by mouth Daily.    calcium carbonate (TUMS) 500 MG chewable tablet  Nursing Home Yes No    Chew 1 tablet Every  6 (Six) Hours As Needed for Indigestion or Heartburn.    Cholecalciferol (VITAMIN D) 50 MCG (2000 UT) tablet  Nursing Home Yes No    Take 2,000 Units by mouth Daily.    cycloSPORINE (RESTASIS) 0.05 % ophthalmic emulsion  Nursing Home Yes No    Administer 1 drop to both eyes 2 (Two) Times a Day As Needed (dry eye).    DULoxetine (CYMBALTA) 60 MG capsule   Yes No    flecainide (TAMBOCOR) 50 MG tablet  Nursing Home No No    Take 1 tablet by mouth Every 12 (Twelve) Hours.    fluticasone (FLONASE) 50 MCG/ACT nasal spray  Nursing Home Yes No    1 spray into the nostril(s) as directed by provider Daily.    furosemide (LASIX) 20 MG tablet  Nursing Home Yes No    Take 20 mg by mouth Daily.    gabapentin (NEURONTIN) 400 MG capsule   No No    Take 1 capsule by mouth 3 (Three) Times a Day.    HYDROcodone-acetaminophen (NORCO)  MG per tablet   No No    Take 1 tablet by mouth Every 8 (Eight) Hours As Needed for Moderate Pain .    ipratropium-albuterol (DUO-NEB) 0.5-2.5 mg/3 ml nebulizer  Nursing Home Yes No    Take 3 mL by nebulization Every 6 (Six) Hours As Needed for Wheezing.    lactulose (CHRONULAC) 10 GM/15ML solution  Nursing Home Yes No    Take 20 g by mouth Daily.    levETIRAcetam (KEPPRA) 500 MG tablet  Nursing Home Yes No    Take 500 mg by mouth Every Night.    loratadine (CLARITIN) 10 MG tablet  Nursing Home Yes No    Take 10 mg by mouth Daily.    Multiple Vitamins-Minerals (ICAPS AREDS 2) capsule  Nursing Home Yes No    Take 1 capsule by mouth 2 (Two) Times a Day.    multivitamin (DAILY DEBORAH) tablet tablet  Nursing Home Yes No    Take 1 tablet by mouth Daily.    OLANZapine (zyPREXA) 2.5 MG tablet  Nursing Home No No    Take 1 tablet by mouth Every Night.    oxybutynin XL (DITROPAN-XL) 10 MG 24 hr tablet  Nursing Home Yes No    Take 10 mg by mouth Daily.    potassium chloride (K-DUR,KLOR-CON) 10 MEQ CR tablet  Nursing Home Yes No    Take 10 mEq by mouth Daily.    Probiotic Product (RISAQUAD-2) capsule capsule   Nursing Home Yes No    Take 1 capsule by mouth Daily.    Psyllium (REGULOID) 400 MG capsule  Nursing Home Yes No    Take 2 capsules by mouth Daily.    raNITIdine (ZANTAC) 300 MG tablet  Nursing Home No No    Take 1 tablet by mouth 2 (Two) Times a Day.        Objective     Vital Signs:  Temp:  [98.2 °F (36.8 °C)-103.1 °F (39.5 °C)] 99.3 °F (37.4 °C)  Heart Rate:  [104-120] 104  Resp:  [20] 20  BP: (109-158)/(69-89) 109/69    Mean Arterial Pressure (Non-Invasive) for the past 24 hrs (Last 3 readings):   Noninvasive MAP (mmHg)   02/12/22 2237 85   02/12/22 2133 84   02/12/22 2130 90     SpO2:  [93 %-97 %] 94 %  on  Flow (L/min):  [3] 3;   Device (Oxygen Therapy): nasal cannula  Body mass index is 43.19 kg/m².    Wt Readings from Last 3 Encounters:   02/12/22 99.8 kg (220 lb)   12/07/21 107 kg (236 lb)   03/20/21 111 kg (245 lb)      ----------------------------------------------------------------------------------------------------------------------  Physical Exam  Vitals reviewed.   Constitutional:       Appearance: Normal appearance. She is well-developed. She is morbidly obese. She is not ill-appearing, toxic-appearing or diaphoretic.      Interventions: Nasal cannula in place.   HENT:      Head: Normocephalic and atraumatic.      Right Ear: External ear normal.      Left Ear: External ear normal.      Nose: Nose normal.   Eyes:      General: No scleral icterus.        Right eye: No discharge.         Left eye: No discharge.      Conjunctiva/sclera: Conjunctivae normal.      Pupils: Pupils are equal, round, and reactive to light.   Cardiovascular:      Rate and Rhythm: Normal rate and regular rhythm.      Pulses: Normal pulses.      Heart sounds: No murmur heard.      Pulmonary:      Effort: Pulmonary effort is normal. No respiratory distress.      Breath sounds: Normal breath sounds. No wheezing or rales.   Abdominal:      General: Bowel sounds are normal. There is no distension.      Palpations: Abdomen is soft.    Musculoskeletal:         General: No swelling, deformity or signs of injury.   Skin:     Capillary Refill: Capillary refill takes less than 2 seconds.      Coloration: Skin is not jaundiced or pale.      Findings: No bruising.   Neurological:      Mental Status: She is alert. Mental status is at baseline. She is disoriented.      Cranial Nerves: No cranial nerve deficit or dysarthria.   Psychiatric:         Mood and Affect: Mood normal.         Behavior: Behavior normal. Behavior is cooperative.     ---------------------------------------------------------------------------------------------------------------------  EKG:  Sinus tachycardia with heart rate of 107 and QTc of 445 ms.  There are inverted T waves in lead III and ST depression in lead aVf.  When compared to EKGs dated 12/7/2021 and 12/16/2019, the comparison EKGs look similar to the admission EKG.  Please note that I have personally looked at the EKG from this admission, the comparison EKGs in the medical records, and the above is my interpretation of this admission's EKG; I await the final cardiology read.    ECG 12 Lead   Final Result   Test Reason : dizziness   Blood Pressure :   */*   mmHG   Vent. Rate : 107 BPM     Atrial Rate : 107 BPM      P-R Int : 210 ms          QRS Dur : 106 ms       QT Int : 334 ms       P-R-T Axes :  -2  56  12 degrees      QTc Int : 445 ms      Sinus tachycardia with 1st degree AV block   Borderline QT interval   Confirmed by Marce Byrd ([id number]) on 2/12/2022 10:40:57 PM      Referred By:            Confirmed By: Marce Byrd          Telemetry:  NS with 80-90's.  Please note that I personally looked at the telemetry strips.      Last echocardiogram:  Results for orders placed during the hospital encounter of 10/18/18    Adult Transthoracic Echo Complete W/ Cont if Necessary Per Protocol    Interpretation Summary  · Left ventricular wall thickness is consistent with mild concentric hypertrophy.  · Normal left  ventricular cavity size noted. All left ventricular wall segments contract normally.  · Estimated EF appears to be in the range of 61 - 65%.  · The valve appears trileaflet. The aortic valve is abnormal in structure. The valve exhibits sclerosis. No aortic valve regurgitation is present. Mild aortic valve stenosis is present.  · The mitral valve is normal in structure. No mitral valve regurgitation is present. No significant mitral valve stenosis is present.  · Tricuspid valve not well visualized.  · There is no evidence of pericardial effusion.    I have personally read the ECHO final report.  --------------------------------------------------------------------------------------------------------------------  LABS:    CBC and coagulation:  Results from last 7 days   Lab Units 02/13/22  0405 02/12/22  1808 02/12/22  1648   LACTATE mmol/L  --  1.0  --    CRP mg/dL  --   --  18.66*   WBC 10*3/mm3 37.79*  --  24.21*   HEMOGLOBIN g/dL 11.1*  --  11.9*   HEMATOCRIT % 33.9*  --  36.0   MCV fL 92.9  --  92.5   MCHC g/dL 32.7  --  33.1   PLATELETS 10*3/mm3 187  --  213     Renal and electrolytes:  Results from last 7 days   Lab Units 02/13/22  0405 02/12/22  1648   SODIUM mmol/L 137 135*   POTASSIUM mmol/L 3.7 4.1   MAGNESIUM mg/dL 1.9  --    CHLORIDE mmol/L 102 99   CO2 mmol/L 24.2 23.9   BUN mg/dL 14 14   CREATININE mg/dL 0.88 0.86   EGFR IF NONAFRICN AM mL/min/1.73 62 64   CALCIUM mg/dL 8.4* 8.5*   PHOSPHORUS mg/dL 2.8  --    GLUCOSE mg/dL 205* 198*     Estimated Creatinine Clearance: 59.2 mL/min (by C-G formula based on SCr of 0.88 mg/dL).    Liver and pancreatic function:  Results from last 7 days   Lab Units 02/13/22  0405 02/12/22  1648   ALBUMIN g/dL 2.95* 3.35*   BILIRUBIN mg/dL 0.4 0.4   ALK PHOS U/L 132* 115   AST (SGOT) U/L 20 23   ALT (SGPT) U/L 18 17     Endocrine function:  Lab Results   Component Value Date    HGBA1C 7.30 (H) 02/13/2022     Point of care bedside glucose levels:  Results from last 7 days    Lab Units 02/12/22  2017   GLUCOSE mg/dL 182*     Lab Results   Component Value Date    TSH 0.770 02/13/2022    FREET4 0.77 (L) 06/25/2014     Cardiac:  Results from last 7 days   Lab Units 02/12/22  1648   TROPONIN T ng/mL <0.010   PROBNP pg/mL 295.7       Cultures:  Lab Results   Component Value Date    COLORU Dark Yellow (A) 02/12/2022    CLARITYU Turbid (A) 02/12/2022    PHUR 5.5 02/12/2022    GLUCOSEU Negative 02/12/2022    KETONESU Trace (A) 02/12/2022    BLOODU Small (1+) (A) 02/12/2022    NITRITEU Positive (A) 02/12/2022    LEUKOCYTESUR Moderate (2+) (A) 02/12/2022    BILIRUBINUR Negative 02/12/2022    UROBILINOGEN 1.0 E.U./dL 02/12/2022    RBCUA 6-12 (A) 02/12/2022    WBCUA Too Numerous to Count (A) 02/12/2022    BACTERIA 4+ (A) 02/12/2022     Microbiology Results (last 10 days)     Procedure Component Value - Date/Time    COVID-19 and FLU A/B PCR - Swab, Nasopharynx [[id number]]  (Normal) Collected: 02/12/22 1939    Lab Status: Final result Specimen: Swab from Nasopharynx Updated: 02/12/22 2005     COVID19 Not Detected     Influenza A PCR Not Detected     Influenza B PCR Not Detected    Narrative:      Fact sheet for providers: https://www.fda.gov/media/418028/download    Fact sheet for patients: https://www.fda.gov/media/158438/download    Test performed by PCR.        I have personally looked at the labs and they are summarized above.  ----------------------------------------------------------------------------------------------------------------------  Detailed radiology reports for the last 24 hours:    Imaging Results (Last 24 Hours)     Procedure Component Value Units Date/Time    CT Chest Without Contrast Diagnostic [[id number]] Collected: 02/12/22 1814     Updated: 02/12/22 1816    Narrative:      INDICATION:    Patient fell and has bilateral rib pain.    TECHNIQUE:   CT of the chest without contrast. Coronal and sagittal reconstructions were obtained.  Radiation dose reduction techniques included  automated exposure control or exposure modulation based on body size. Radiation audit for number of CT and nuclear  cardiology exams performed in the last year: 2.      COMPARISON:    Chest x-ray today. CT chest PE protocol from November 2019.    FINDINGS:  Study is limited by patient's arms at side and streak artifact from bilateral shoulder arthroplasties. No displaced rib fracture is seen. There is dependent atelectasis with underlying chronic lung disease and thickening of the central bronchovascular  soft tissues. There are bilateral areas of air-trapping. There is no pneumothorax. There is no pleural effusion. There are peripheral areas of interstitial prominence which could indicate underlying pulmonary fibrosis overall more apparent inferiorly.  Lack of contrast limits the study. There are coronary artery calcifications. Heart is mildly enlarged. No pericardial or pleural effusion is suspected.      Impression:      No displaced rib fracture is suspected. No pleural effusion or pneumothorax. Study limited by lack of IV contrast media and arm positioning.  2. Dependent atelectasis. There is some peripheral increase in interstitial markings that could indicate some underlying pulmonary fibrosis. This is similar to prior. There is also thickening of the central bronchovascular soft tissues suggestive of  underlying asthma or bronchitis.    Signer Name: Elke Joseph MD   Signed: 2/12/2022 6:14 PM   Workstation Name: [email or web address]    [facility]    CT Lumbar Spine Without Contrast [[id number]] Collected: 02/12/22 1808     Updated: 02/12/22 1810    Narrative:      INDICATION:    Patient fell with back injury. Low back pain.    TECHNIQUE:   CT of the lumbar spine without contrast. Coronal and sagittal reconstructions were obtained.  Radiation dose reduction techniques included automated exposure control or exposure modulation based on body size. Radiation audit for number of CT and  nuclear  cardiology exams performed in the last year 2    COMPARISON:    There is a previous    FINDINGS:  There is levoconvexed lumbar scoliosis. Sagittal alignment is normal. No acute fracture is suspected. No bone destruction.    At T12-L1 there is right paramedian osteophyte formation and left side facet degenerative change with mild effacement of the thecal sac likely. There is mild left-sided bony foraminal narrowing.    At L1-2, there is endplate spondylosis loss of disc height and vacuum disc formation. There is mild right greater than left facet degenerative change. There is approximately moderate bony canal stenosis. Soft tissues are difficult to see due to the  patient's size and technical factors. There is bilateral bony foraminal narrowing.    At L2-3, there is endplate spondylosis loss of disc height endplate sclerosis and vacuum disc formation with facet arthritis asymmetric on the right. There is at least moderate bony canal stenosis. Again the soft tissues are poorly evaluated. There is  severe right and moderate left-sided bony foraminal narrowing.    At L3-4, there is severe right-sided facet arthritis. There is endplate spondylosis loss of disc height and vacuum disc formation. There is asymmetric endplate spondylosis to the right side laterally. There is at least mild canal stenosis and severe  right and mild left foraminal narrowing.    At L4-5, there is severe facet arthritis bilaterally with likely ligamentum flavum thickening. There is endplate spondylosis vacuum disc formation and endplate sclerosis and severe loss of disc height. There is severe foraminal narrowing bilaterally left  greater than right and likely severe canal stenosis.    At L5-S1, there is severe bilateral facet degenerative change worse on the left. There is vacuum disc formation with endplate spondylosis and sclerosis and severe loss of disc height. There is bilateral foraminal impingement which is probably  fairly  severe and at least mild canal stenosis. Soft tissues poorly evaluated.    There our atherosclerotic vascular calcifications.      Impression:      No acute fracture or traumatic malalignment is suspected in the lumbar spine.  2. There is extensive lumbar degenerative change with multilevel canal stenosis and foraminal impingement. If more information is needed, this could be further evaluated with an MRI if the patient is candidate.    Signer Name: Elke Joseph MD   Signed: 2/12/2022 6:08 PM   Workstation Name: [facility]    [facility]    CT Thoracic Spine Without Contrast [[id number]] Collected: 02/12/22 1802     Updated: 02/12/22 1805    Narrative:      INDICATION:    Back injury after a fall.    TECHNIQUE:   CT of the thoracic spine without contrast. Coronal and sagittal reconstructions were obtained.  Radiation dose reduction techniques included automated exposure control or exposure modulation based on body size. Radiation audit for number of CT and  nuclear cardiology exams performed in the last year: 2.      COMPARISON:    None available.    FINDINGS:  There is thoracolumbar dextroconvex scoliosis. Sagittal alignment is essentially normal. There is multiple level endplate spondylosis loss of disc height endplate sclerosis and Schmorl's node formation. There is mild vacuum disc formation at multiple  levels also. There is no evidence for acute fracture or traumatic malalignment. There is multiple level costovertebral joint arthritis. There is no bony canal stenosis. There is dependent atelectasis.      Impression:      No acute fracture or traumatic malalignment is suspected in the thoracic spine. Extensive degenerative changes are present.    Signer Name: Elke Joseph MD   Signed: 2/12/2022 6:02 PM   Workstation Name: [facility]    CT Cervical Spine Without Contrast [[id number]] Collected: 02/12/22 1759     Updated: 02/12/22 1801     Narrative:      INDICATION:    Patient fell with neck injury    TECHNIQUE:   CT of the cervical spine without contrast. Coronal and sagittal reconstructions were obtained.  Radiation dose reduction techniques included automated exposure control or exposure modulation based on body size. Radiation audit for number of CT and  nuclear cardiology exams performed in the last year: 2.      COMPARISON:    Cervical spine CT from 3/20/2021.    FINDINGS:  There is acquired fusion at C4-5 with mild reversal of cervical lordosis that is not changed. There is no evidence for acute-appearing cervical spine fracture. There is mild levoconvexed scoliosis. There is multiple level endplate spondylosis and loss of  disc height most prominent at C5-6 and C6-7 and C7-T1. There is multilevel uncovertebral osteophyte formation and facet arthritis with multiple level bony foraminal compromise. There is mild bony canal stenosis at C4-5, C5-6, and on the left-sided C6-7.  There is no prevertebral fluid collection suspected. Findings similar to prior.      Impression:      No acute fracture or traumatic malalignment is suspected in the cervical spine. Redemonstration of extensive cervical degenerative changes and acquired fusion at C4-5    Signer Name: Elke Joseph MD   Signed: 2/12/2022 5:59 PM   Workstation [facility]: [email or web address]    [facility]    CT Head Without Contrast [[id number]] Collected: 02/12/22 1747     Updated: 02/12/22 1749    Narrative:      HISTORY:   Fell with head injury today    TECHNIQUE:   CT head without contrast. Radiation dose reduction techniques included automated exposure control or exposure modulation based on body size. Radiation audit for number of CT and nuclear cardiology exams performed in the last year: 2.      COMPARISON:   Head CT 3/20/2021    FINDINGS:   There is no displaced calvarial fracture. The mastoid air cells are clear. The visualized paranasal sinuses are clear. There is no  evidence for acute intracranial hemorrhage or extra-axial fluid collection. The basilar cisterns are patent. There is mild  generalized atrophy and mild to moderate white matter low-attenuation that is nonspecific but likely due to small vessel disease. Small basal ganglionic and thalamic lacuna likely. Streak artifact partly obscures the brainstem. No acute cortical infarct  or intracranial mass effect. There has been cataract surgery bilaterally.      Impression:      Senescent change. No acute intracranial injury.    Signer Name: Elke Joseph MD   Signed: 2/12/2022 5:47 PM   Workstation Name: [facility]    [facility]    XR Hips Bilateral With or Without Pelvis 5 View [[id number]] Collected: 02/12/22 1717     Updated: 02/12/22 1719    Narrative:      AP pelvis and bilateral hips.    HISTORY: Bilateral hip pain after a fall.    FINDINGS: Frontal view of the pelvis, AP and frog-leg views of the right hip, and AP and frog-leg oblique views of the left hip submitted for review.    Pelvis: There is degenerative change in the visualized lower lumbar spine. No acute pelvic fracture is suspected.    Right hip: No acute fracture or dislocation right hip. Mild osteoarthritis.    Left hip: No acute fracture or dislocation left hip. Mild osteoarthritis.    No osseous destructive lesion suspected.      Impression:      1. No acute fracture or dislocation either hip or pelvis. Bilateral hip osteoarthritis.    Signer Name: Elke Joseph MD   Signed: 2/12/2022 5:17 PM   Workstation Name: [facility]    [facility]    XR Chest 1 View [[id number]] Collected: 02/12/22 1715     Updated: 02/12/22 1717    Narrative:      CR Chest 1 Vw    INDICATION:   Dizziness     COMPARISON:    Chest x-ray 12/7/2021    FINDINGS:  Portable AP view(s) of the chest.  Film is quite lordotic. There is elevation of the right hemidiaphragm. There is moderate cardiac silhouette enlargement. There is increase in the  bronchovascular and interstitial markings. Additionally there is more  focal airspace disease at the medial left greater than right lung base. Please correlate for clinical evidence of aspiration or pneumonia. Please correlate for any concern for volume overload versus underlying bronchitis. There is no pneumothorax or  obvious pleural effusion.      Impression:      Worsening in the appearance the chest. Films technically limited. There is moderate cardiac silhouette enlargement and there are bibasilar infiltrates that appear to be new from prior. Please correlate for clinical evidence for aspiration or pneumonia  with follow-up to clearing recommended. Additionally there is thickening of the central bronchovascular soft tissues the could reflect asthma or bronchitis. Please exclude any clinical concern for mild volume overload. Follow-up recommended..    Signer Name: Elke Joseph MD   Signed: 2/12/2022 5:15 PM   Workstation Name: [email or web address]    [facility] [place]        I have personally looked at the radiology images and I have read the available final reports.    Assessment & Plan       -Sepsis and acute hypoxic respiratory failure, both of which were present on admission (temperature 103.1, heart rates 104-120, CRP 18.66, white blood cell count 24,210), due to a combination of UTI and bilateral pneumonia  -History of essential hypertension  -History of type 2 diabetes mellitus, hemoglobin A1C this admission is 6.4, mild hyperglycemia this admission as well  -History of hyperlipidemia  -History of ENE, noncompliant with CPAP use at nighttime  -History of esophageal stricture x 2 with history of aspiration and mild oral phase dysphagia and trace pharyngeal phase dysphagia (deep laryngeal penetration with thin liquids via straw with silent aspiration occurring during the swallow)  -Morbid obesity, body mass index is 43.19 kg/m².  -History of mild concentric hypertrophy  -History of tobacco smoking  addiction    Admitted to the telemetry floor.  We will place the patient on empiric meropenem and vancomycin for both the pneumonia and the UTI as she has a history of ESBL E. coli UTI and MRSA pneumonia.  Urine culture is already pending as are blood cultures.  I have ordered a sputum culture.  The patient's blood pressure seem to be trending down and thus I have held her home Norvasc and Lasix; I did review her echocardiogram from 2018 and she does not have any systolic heart failure.  We will continue to monitor her blood pressures closely and if the mean arterial blood pressure drops below 65 mmHg then we will readdress her medications.  For the type 2 diabetes mellitus, we will perform twice a day bedside blood glucose determinations as the patient is not on insulin at home; I have ordered a hemoglobin A1c.  I have also ordered the hypoglycemia protocol.  I will consult PT, OT, and speech, especially since patient has a history of aspiration and esophageal stricture.    VTE Prophylaxis:   Mechanical Order History:     None      Pharmalogical Order History:      Ordered     Dose Route Frequency Stop    02/12/22 8954  heparin (porcine) 5000 UNIT/ML injection 5,000 Units         5,000 Units SC Every 12 Hours Scheduled --              The patient is high risk due to the following diagnoses/reasons: Sepsis and acute hypoxic respiratory failure    Disposition: Undetermined at this time    Florence Rivera MD  [facility]  02/13/22  05:27 EST        Electronically signed by Florence Rivera MD at 02/13/22 0641       Lines, Drains & Airways     Active LDAs     Name Placement date Placement time Site Days    External Urinary Catheter 02/12/22  1922  --  3                  Current Facility-Administered Medications   Medication Dose Route Frequency Provider Last Rate Last Admin   • Acidophilus/Pectin capsule 1 capsule  1 capsule Oral Daily Florence Rivera MD   1 capsule at 02/16/22 0853   •  albuterol (PROVENTIL) nebulizer solution 0.083% 2.5 mg/3mL  2.5 mg Nebulization Q8H PRN Florence Rivera MD   2.5 mg at 02/15/22 1834   • ALPRAZolam (XANAX) tablet 0.25 mg  0.25 mg Oral Daily Florence Rivera MD   0.25 mg at 02/16/22 0852   • ALPRAZolam (XANAX) tablet 0.5 mg  0.5 mg Oral Nightly Florence Rivera MD   0.5 mg at 02/15/22 1936   • calcium carbonate (TUMS) chewable tablet 500 mg (200 mg elemental)  1 tablet Oral Q6H PRN Florence Rivera MD       • cefdinir (OMNICEF) capsule 300 mg  300 mg Oral Q12H Howard Brown MD       • cetirizine (zyrTEC) tablet 10 mg  10 mg Oral Daily Florence Rivera MD   10 mg at 02/16/22 0853   • dextrose (D50W) (25 g/50 mL) IV injection 25 g  25 g Intravenous Q15 Min PRN Florence Rivera MD       • dextrose (D50W) (25 g/50 mL) IV injection 25 g  25 g Intravenous Q15 Min PRN Sandra Hardin, DO       • dextrose (GLUTOSE) oral gel 15 g  15 g Oral Q15 Min PRN Florence Rivera MD       • dextrose (GLUTOSE) oral gel 15 g  15 g Oral Q15 Min PRN Sandra Hardin, DO       • Diclofenac Sodium (VOLTAREN) 1 % gel 4 g  4 g Topical 4x Daily PRN Florence Rivera MD       • Diclofenac Sodium (VOLTAREN) 1 % gel   Topical BID Howard Brown MD   Given at 02/16/22 0854   • doxepin (SINEquan) capsule 10 mg  10 mg Oral Nightly Florence Rivera MD   10 mg at 02/15/22 1935   • DULoxetine (CYMBALTA) DR capsule 60 mg  60 mg Oral Daily Florence Rivera MD   60 mg at 02/16/22 0853   • flecainide (TAMBOCOR) tablet 50 mg  50 mg Oral Q12H Florence Rivera MD   50 mg at 02/16/22 0853   • fluticasone (FLONASE) 50 MCG/ACT nasal spray 1 spray  1 spray Nasal Daily Florence Rivera MD   1 spray at 02/16/22 0854   • gabapentin (NEURONTIN) capsule 400 mg  400 mg Oral TID Florence Rivera MD   400 mg at 02/16/22 0853   • glucagon (human recombinant) (GLUCAGEN DIAGNOSTIC) injection 1 mg  1 mg Intramuscular Q15 Min PRN Florence Rivera MD       • glucagon (human recombinant)  (GLUCAGEN DIAGNOSTIC) injection 1 mg  1 mg Intramuscular Q15 Min PRN Sandra Hardin, DO       • guaiFENesin (MUCINEX) 12 hr tablet 600 mg  600 mg Oral BID Florence Rivera MD   600 mg at 02/16/22 0852   • heparin (porcine) 5000 UNIT/ML injection 5,000 Units  5,000 Units Subcutaneous Q12H Florence Rivera MD   5,000 Units at 02/16/22 0854   • HYDROcodone-acetaminophen (NORCO)  MG per tablet 1 tablet  1 tablet Oral Q8H PRN Florence Rivera MD   1 tablet at 02/15/22 1156   • insulin aspart (novoLOG) injection 0-7 Units  0-7 Units Subcutaneous TID AC Sandra Hardin DO   3 Units at 02/15/22 1157   • lactulose (CHRONULAC) 10 GM/15ML solution 20 g  20 g Oral Daily Florence Rivera MD   20 g at 02/16/22 0853   • lactulose (CHRONULAC) 10 GM/15ML solution 20 g  20 g Oral BID PRN Florence Rivera MD       • levETIRAcetam (KEPPRA) tablet 500 mg  500 mg Oral Nightly Florence Rivera MD   500 mg at 02/15/22 1935   • Magnesium Sulfate 2 gram Bolus, followed by 8 gram infusion (total Mg dose 10 grams)- Mg less than or equal to 1mg/dL  2 g Intravenous PRN Florence Rivera MD        Or   • Magnesium Sulfate 2 gram / 50mL Infusion (GIVE X 3 BAGS TO EQUAL 6GM TOTAL DOSE) - Mg 1.1 - 1.5 mg/dl  2 g Intravenous PRN Florence Rivera MD        Or   • Magnesium Sulfate 4 gram infusion- Mg 1.6-1.9 mg/dL  4 g Intravenous PRN Florence Rivera MD       • multivitamin (THERAGRAN) tablet 1 tablet  1 tablet Oral Daily Florence Rivera MD   1 tablet at 02/16/22 0853   • multivitamin with minerals 1 tablet  1 tablet Oral BID Florence Rivera MD   1 tablet at 02/16/22 0853   • nitroglycerin (NITROSTAT) SL tablet 0.4 mg  0.4 mg Sublingual Q5 Min PRN Florence Rivera MD       • nystatin (MYCOSTATIN) powder 1 application  1 application Topical BID Florence Rivera MD   1 application at 02/16/22 0853   • OLANZapine (zyPREXA) tablet 2.5 mg  2.5 mg Oral Nightly Florence Rivera MD   2.5 mg at 02/15/22 1936   •  oxybutynin XL (DITROPAN-XL) 24 hr tablet 10 mg  10 mg Oral Daily Florence Rivera MD   10 mg at 02/16/22 0851   • pantoprazole (PROTONIX) EC tablet 40 mg  40 mg Oral QAM Florence Rivera MD   40 mg at 02/16/22 0457   • potassium chloride (K-DUR,KLOR-CON) CR tablet 40 mEq  40 mEq Oral PRN Florence Rivera MD        Or   • potassium chloride (KLOR-CON) packet 40 mEq  40 mEq Oral PRN Florence Rivera MD        Or   • potassium chloride 10 mEq in 100 mL IVPB  10 mEq Intravenous Q1H PRN Florence Rivera MD       • sodium chloride 0.9 % flush 10 mL  10 mL Intravenous PRN Florence Rivera MD   10 mL at 02/16/22 0858   • sodium chloride 0.9 % flush 3 mL  3 mL Intravenous Q12H Florence Rivera MD   3 mL at 02/16/22 1028   • sodium chloride 0.9 % flush 3-10 mL  3-10 mL Intravenous PRN Florence Rivera MD       • sodium chloride nasal spray 1 spray  1 spray Nasal Q1H PRN Florence Rivera MD       • vitamin D3 capsule 5,000 Units  5,000 Units Oral Daily Florence Rivera MD   5,000 Units at 02/16/22 0852       Lab Results (last 24 hours)     Procedure Component Value Units Date/Time    POC Glucose Once [[id number]]  (Abnormal) Collected: 02/16/22 0959    Specimen: Blood Updated: 02/16/22 1034     Glucose 131 mg/dL      Comment: Meter: [id number] : [id number] AZUL NYE       POC Glucose Once [[id number]]  (Abnormal) Collected: 02/16/22 0641    Specimen: Blood Updated: 02/16/22 0740     Glucose 135 mg/dL      Comment: Meter: [id number] : [id number] AZUL NYE       Basic Metabolic Panel [[id number]]  (Abnormal) Collected: 02/16/22 0121    Specimen: Blood Updated: 02/16/22 0207     Glucose 144 mg/dL      BUN 13 mg/dL      Creatinine 0.66 mg/dL      Sodium 139 mmol/L      Potassium 4.4 mmol/L      Chloride 102 mmol/L      CO2 28.3 mmol/L      Calcium 9.1 mg/dL      eGFR Non African Amer 87 mL/min/1.73      BUN/Creatinine Ratio 19.7     Anion Gap 8.7 mmol/L     Narrative:      GFR Normal >60  Chronic  Kidney Disease <60  Kidney Failure <15      Blood Culture - Blood, Arm, Left [[id number]]  (Normal) Collected: 02/14/22 0113    Specimen: Blood from Arm, Left Updated: 02/16/22 0145     Blood Culture No growth at 2 days    Blood Culture - Blood, Hand, Right [[id number]]  (Normal) Collected: 02/14/22 0113    Specimen: Blood from Hand, Right Updated: 02/16/22 0145     Blood Culture No growth at 2 days    CBC & Differential [[id number]]  (Abnormal) Collected: 02/16/22 0121    Specimen: Blood Updated: 02/16/22 0144    Narrative:      The following orders were created for panel order CBC & Differential.  Procedure                               Abnormality         Status                     ---------                               -----------         ------                     CBC Auto Differential[[id number]]        Abnormal            Final result                 Please view results for these tests on the individual orders.    CBC Auto Differential [[id number]]  (Abnormal) Collected: 02/16/22 0121    Specimen: Blood Updated: 02/16/22 0144     WBC 10.78 10*3/mm3      RBC 3.81 10*6/mm3      Hemoglobin 11.3 g/dL      Hematocrit 36.0 %      MCV 94.5 fL      MCH 29.7 pg      MCHC 31.4 g/dL      RDW 12.7 %      RDW-SD 44.1 fl      MPV 9.6 fL      Platelets 231 10*3/mm3      Neutrophil % 65.2 %      Lymphocyte % 16.8 %      Monocyte % 14.0 %      Eosinophil % 3.2 %      Basophil % 0.2 %      Immature Grans % 0.6 %      Neutrophils, Absolute 7.04 10*3/mm3      Lymphocytes, Absolute 1.81 10*3/mm3      Monocytes, Absolute 1.51 10*3/mm3      Eosinophils, Absolute 0.34 10*3/mm3      Basophils, Absolute 0.02 10*3/mm3      Immature Grans, Absolute 0.06 10*3/mm3      nRBC 0.0 /100 WBC     POC Glucose Once [[id number]]  (Abnormal) Collected: 02/15/22 1809    Specimen: Blood Updated: 02/15/22 1815     Glucose 159 mg/dL      Comment: Meter: [id number] : [id number] Latrice Klein       POC Glucose Once [[id number]]  (Normal) Collected:  02/15/22 1604    Specimen: Blood Updated: 02/15/22 1610     Glucose 123 mg/dL      Comment: Meter: [id number] : [id number] SHANIQUE BATES           Orders (last 24 hrs)      Start     Ordered    02/16/22 1445  cefdinir (OMNICEF) capsule 300 mg  Every 12 Hours Scheduled         02/16/22 1353    02/16/22 1353  COVID PRE-OP / PRE-PROCEDURE SCREENING ORDER (NO ISOLATION) - Swab, Nasopharynx  Once         02/16/22 1352    02/16/22 1353  COVID-19 and FLU A/B PCR - Swab, Nasopharynx  PROCEDURE ONCE         02/16/22 1352    02/16/22 1035  POC Glucose Once  PROCEDURE ONCE         02/16/22 0959    02/16/22 0741  POC Glucose Once  PROCEDURE ONCE         02/16/22 0641    02/16/22 0600  CBC & Differential  Morning Draw         02/15/22 0905    02/16/22 0600  Basic Metabolic Panel  Morning Draw         02/15/22 0905    02/16/22 0600  CBC Auto Differential  PROCEDURE ONCE         02/15/22 2204    02/15/22 1816  POC Glucose Once  PROCEDURE ONCE         02/15/22 1809    02/15/22 1611  POC Glucose Once  PROCEDURE ONCE         02/15/22 1604    02/15/22 1000  cefTRIAXone (ROCEPHIN) in SWFI 2 GRAMS/20ml IV PUSH syringe  Every 24 Hours,   Status:  Discontinued         02/15/22 0904    02/14/22 1445  Diclofenac Sodium (VOLTAREN) 1 % gel  2 Times Daily         02/14/22 1340    02/14/22 0700  POC Glucose TID AC  3 Times Daily Before Meals       02/13/22 1734    02/13/22 2100  OLANZapine (zyPREXA) tablet 2.5 mg  Nightly         02/13/22 0349    02/13/22 2100  levETIRAcetam (KEPPRA) tablet 500 mg  Nightly         02/13/22 0349    02/13/22 2100  doxepin (SINEquan) capsule 10 mg  Nightly         02/13/22 0349    02/13/22 2100  ALPRAZolam (XANAX) tablet 0.5 mg  Nightly         02/13/22 0349    02/13/22 1830  insulin aspart (novoLOG) injection 0-7 Units  3 Times Daily Before Meals         02/13/22 1734    02/13/22 1734  dextrose (GLUTOSE) oral gel 15 g  Every 15 Minutes PRN         02/13/22 1734    02/13/22 1734  dextrose (D50W) (25 g/50  mL) IV injection 25 g  Every 15 Minutes PRN         02/13/22 1734    02/13/22 1734  glucagon (human recombinant) (GLUCAGEN DIAGNOSTIC) injection 1 mg  Every 15 Minutes PRN         02/13/22 1734    02/13/22 1000  Incentive Spirometry  Every 4 Hours While Awake       02/13/22 0941    02/13/22 0900  oxybutynin XL (DITROPAN-XL) 24 hr tablet 10 mg  Daily         02/13/22 0349    02/13/22 0900  cetirizine (zyrTEC) tablet 10 mg  Daily         02/13/22 0349    02/13/22 0900  multivitamin (THERAGRAN) tablet 1 tablet  Daily         02/13/22 0349    02/13/22 0900  flecainide (TAMBOCOR) tablet 50 mg  Every 12 Hours Scheduled         02/13/22 0349    02/13/22 0900  lactulose (CHRONULAC) 10 GM/15ML solution 20 g  Daily         02/13/22 0349    02/13/22 0900  fluticasone (FLONASE) 50 MCG/ACT nasal spray 1 spray  Daily         02/13/22 0349    02/13/22 0900  gabapentin (NEURONTIN) capsule 400 mg  3 Times Daily         02/13/22 0349    02/13/22 0900  DULoxetine (CYMBALTA) DR capsule 60 mg  Daily         02/13/22 0349    02/13/22 0900  guaiFENesin (MUCINEX) 12 hr tablet 600 mg  2 Times Daily         02/13/22 0349    02/13/22 0900  vitamin D3 capsule 5,000 Units  Daily         02/13/22 0349    02/13/22 0900  nystatin (MYCOSTATIN) powder 1 application  2 Times Daily         02/13/22 0349    02/13/22 0900  ALPRAZolam (XANAX) tablet 0.25 mg  Daily         02/13/22 0349    02/13/22 0900  multivitamin with minerals 1 tablet  2 Times Daily         02/13/22 0349    02/13/22 0900  Acidophilus/Pectin capsule 1 capsule  Daily         02/13/22 0358    02/13/22 0800  POC Glucose BID  2 Times Daily       02/13/22 0347    02/13/22 0700  pantoprazole (PROTONIX) EC tablet 40 mg  Every Morning         02/13/22 0349    02/13/22 0639  Patient Currently On Electrolyte Replacement Protocol - Please Refer to MAR for Protocol Details  Misc Nursing Order (Specify)  Daily      Comments: Patient Currently On Electrolyte Replacement Protocol - Please Refer to  "MAR for Protocol Details    02/13/22 0638    02/13/22 0638  Magnesium Sulfate 2 gram Bolus, followed by 8 gram infusion (total Mg dose 10 grams)- Mg less than or equal to 1mg/dL  As Needed        \"Or\" Linked Group Details    02/13/22 0638    02/13/22 0638  Magnesium Sulfate 2 gram / 50mL Infusion (GIVE X 3 BAGS TO EQUAL 6GM TOTAL DOSE) - Mg 1.1 - 1.5 mg/dl  As Needed        \"Or\" Linked Group Details    02/13/22 0638    02/13/22 0638  Magnesium Sulfate 4 gram infusion- Mg 1.6-1.9 mg/dL  As Needed        \"Or\" Linked Group Details    02/13/22 0638    02/13/22 0638  potassium chloride (K-DUR,KLOR-CON) CR tablet 40 mEq  As Needed        \"Or\" Linked Group Details    02/13/22 0638    02/13/22 0638  potassium chloride (KLOR-CON) packet 40 mEq  As Needed        \"Or\" Linked Group Details    02/13/22 0638    02/13/22 0638  potassium chloride 10 mEq in 100 mL IVPB  Every 1 Hour PRN        \"Or\" Linked Group Details    02/13/22 0638    02/13/22 0347  sodium chloride nasal spray 1 spray  Every 1 Hour PRN         02/13/22 0349    02/13/22 0347  albuterol (PROVENTIL) nebulizer solution 0.083% 2.5 mg/3mL  Every 8 Hours PRN         02/13/22 0349    02/13/22 0347  lactulose (CHRONULAC) 10 GM/15ML solution 20 g  2 Times Daily PRN         02/13/22 0349    02/13/22 0347  calcium carbonate (TUMS) chewable tablet 500 mg (200 mg elemental)  Every 6 Hours PRN         02/13/22 0349    02/13/22 0347  HYDROcodone-acetaminophen (NORCO)  MG per tablet 1 tablet  Every 8 Hours PRN         02/13/22 0349    02/13/22 0347  Diclofenac Sodium (VOLTAREN) 1 % gel 4 g  4 Times Daily PRN         02/13/22 0349    02/13/22 0347  dextrose (GLUTOSE) oral gel 15 g  Every 15 Minutes PRN         02/13/22 0347    02/13/22 0347  dextrose (D50W) (25 g/50 mL) IV injection 25 g  Every 15 Minutes PRN         02/13/22 0347    02/13/22 0347  glucagon (human recombinant) (GLUCAGEN DIAGNOSTIC) injection 1 mg  Every 15 Minutes PRN         02/13/22 0347    02/12/22 2330  " "sodium chloride 0.9 % flush 3 mL  Every 12 Hours Scheduled         02/12/22 2234 02/12/22 2330  heparin (porcine) 5000 UNIT/ML injection 5,000 Units  Every 12 Hours Scheduled         02/12/22 2234 02/12/22 2235  Intake & Output  Every Shift       02/12/22 2234 02/12/22 2234  sodium chloride 0.9 % flush 3-10 mL  As Needed         02/12/22 2234 02/12/22 2234  nitroglycerin (NITROSTAT) SL tablet 0.4 mg  Every 5 Minutes PRN         02/12/22 2234 02/12/22 1743  sodium chloride 0.9 % flush 10 mL  As Needed        \"And\" Linked Group Details    02/12/22 1743    Unscheduled  Oxygen Therapy- Nasal Cannula; Titrate for SPO2: 90% - 95%  Continuous PRN      Comments: If Patient Develops Unresponsiveness, Acute Dyspnea, Cyanosis or Suspected Hypoxemia Start Continuous Pulse Ox Monitoring, Apply Oxygen & Notify Provider    02/12/22 2234    Unscheduled  ECG 12 Lead  As Needed      Comments: Nurse to Release if Patient Expericences Acute Chest Pain or Dysrhythmias    02/12/22 2234    Unscheduled  Potassium  As Needed      Comments: For Ventricular Arrhythmias      02/12/22 2234    Unscheduled  Magnesium  As Needed      Comments: For Ventricular Arrhythmias      02/12/22 2234    Unscheduled  Troponin  As Needed      Comments: For Chest Pain      02/12/22 2234    Unscheduled  Blood Gas, Arterial -With Co-Ox Panel: Yes  As Needed      Comments: Per O2 PolicyNotify Physician      02/12/22 2234    Unscheduled  Magnesium  As Needed       02/13/22 0638    Unscheduled  Potassium  As Needed       02/13/22 0638    --  Diclofenac Sodium (VOLTAREN) 1 % gel gel  4 Times Daily PRN         02/13/22 0034    --  guaiFENesin (MUCINEX) 600 MG 12 hr tablet  2 Times Daily         02/13/22 0034    --  vitamin D3 125 MCG (5000 UT) capsule capsule  Daily         02/13/22 0034    --  nystatin (MYCOSTATIN) 002038 UNIT/GM powder  2 Times Daily         02/13/22 0034    --  sodium chloride 0.65 % nasal spray  Every 1 Hour PRN         02/13/22 " 34    --  omeprazole (priLOSEC) 40 MG capsule  Daily         22    --  albuterol sulfate  (90 Base) MCG/ACT inhaler  Every 8 Hours PRN         22    --  doxepin (SINEquan) 10 MG capsule  Nightly         22    --  ALPRAZolam (XANAX) 0.25 MG tablet  Daily         22    --  ALPRAZolam (XANAX) 0.5 MG tablet  Nightly         22    --  lactulose (CHRONULAC) 10 GM/15ML solution  2 Times Daily PRN         22    --  SCANNED - TELEMETRY           22    --  SCANNED - TELEMETRY           22    --  SCANNED - TELEMETRY           22    --  SCANNED - TELEMETRY           22    --  SCANNED - TELEMETRY           22    --  SCANNED - TELEMETRY           22 0000    --  SCANNED - TELEMETRY           22    --  SCANNED - TELEMETRY           22 0000                   Physician Progress Notes (last 24 hours)      Terrance Cantu MD at 22 1121                     PROGRESS NOTE         Patient Identification:  Name:  Domonique Cummings  Age:  76 y.o.  Sex:  female  :  1945  MRN:  [id number]  Visit Number:  [id number]  Primary Care Provider:  Skinny Meehan MD         LOS: 4 days       ----------------------------------------------------------------------------------------------------------------------  Subjective       Chief Complaints:    Fall        Interval History:      The patient is sitting up in bed on 2 L nasal cannula in no apparent distress.  Patient reports that she feels tired today, but has no new complaints at this time.  Continues to have abdominal pain and right flank pain.  Afebrile, no diarrhea.  WBC remains normal. 2 out of 2 blood cultures on 2022 finalized as E. coli. Blood cultures on 2022 are so far showing no growth.     Review of Systems:    Constitutional: no fever, chills and night sweats. No fatigue.  Eyes: no eye drainage, itching or  redness.    HEENT: no mouth sores, dysphagia or nose bleed.  Respiratory: no for shortness of breath.  Mildly productive cough.  Cardiovascular: no chest pain, no palpitations, no orthopnea.  Gastrointestinal: no nausea, vomiting or diarrhea. No hematemesis or rectal bleeding.  Abdominal and right flank pain.    Genitourinary: no dysuria or polyuria.  Hematologic/lymphatic: no lymph node abnormalities, no easy bruising or easy bleeding.  Musculoskeletal: Bilateral lower extremity pain.  Skin: No rash and no itching.  Neurological: no loss of consciousness, no seizure, no headache.  Behavioral/Psych: no depression or suicidal ideation.  Endocrine: no hot flashes.  Immunologic: negative.    ----------------------------------------------------------------------------------------------------------------------      Objective       Current Hospital Meds:  Acidophilus/Pectin, 1 capsule, Oral, Daily  ALPRAZolam, 0.25 mg, Oral, Daily  ALPRAZolam, 0.5 mg, Oral, Nightly  cefTRIAXone, 2 g, Intravenous, Q24H  cetirizine, 10 mg, Oral, Daily  Diclofenac Sodium, , Topical, BID  doxepin, 10 mg, Oral, Nightly  DULoxetine, 60 mg, Oral, Daily  flecainide, 50 mg, Oral, Q12H  fluticasone, 1 spray, Nasal, Daily  gabapentin, 400 mg, Oral, TID  guaiFENesin, 600 mg, Oral, BID  heparin (porcine), 5,000 Units, Subcutaneous, Q12H  insulin aspart, 0-7 Units, Subcutaneous, TID AC  lactulose, 20 g, Oral, Daily  levETIRAcetam, 500 mg, Oral, Nightly  multivitamin, 1 tablet, Oral, Daily  multivitamin with minerals, 1 tablet, Oral, BID  nystatin, 1 application, Topical, BID  OLANZapine, 2.5 mg, Oral, Nightly  oxybutynin XL, 10 mg, Oral, Daily  pantoprazole, 40 mg, Oral, QAM  sodium chloride, 3 mL, Intravenous, Q12H  vitamin D3, 5,000 Units, Oral, Daily         ----------------------------------------------------------------------------------------------------------------------    Vital Signs:  Temp:  [98 °F (36.7 °C)-98.7 °F (37.1 °C)] 98 °F (36.7  °C)  Heart Rate:  [81-90] 89  Resp:  [20] 20  BP: (136-156)/(75-89) 156/79  Mean Arterial Pressure (Non-Invasive) for the past 24 hrs (Last 3 readings):   Noninvasive MAP (mmHg)   02/16/22 0954 109   02/16/22 0638 94   02/16/22 0007 104     SpO2 Percentage    02/16/22 0638 02/16/22 0641 02/16/22 0954   SpO2: 96% 94% 96%     SpO2:  [94 %-96 %] 96 %  on  Flow (L/min):  [2] 2;   Device (Oxygen Therapy): nasal cannula    Body mass index is 44.25 kg/m².  Wt Readings from Last 3 Encounters:   02/16/22 102 kg (225 lb 6.4 oz)   12/07/21 107 kg (236 lb)   03/20/21 111 kg (245 lb)        Intake/Output Summary (Last 24 hours) at 2/16/2022 1121  Last data filed at 2/16/2022 0400  Gross per 24 hour   Intake 460 ml   Output 500 ml   Net -40 ml     Diet Regular  ----------------------------------------------------------------------------------------------------------------------      Physical Exam:    Constitutional: Elderly  female is resting comfortably in bed in no apparent distress.  HENT:  Head: Normocephalic and atraumatic.  Mouth:  Moist mucous membranes.    Eyes:  Conjunctivae and EOM are normal.  No scleral icterus.  Neck:  Neck supple.  No JVD present.    Cardiovascular:  Normal rate, regular rhythm and normal heart sounds with no murmur. No edema.  Pulmonary/Chest:  No respiratory distress, no wheezes, no crackles, with normal breath sounds and good air movement.  Abdominal:  Soft.  Bowel sounds are normal.  No distension and mild tenderness to palpation.  Musculoskeletal:  No edema, no tenderness, and no deformity.  No swelling or redness of joints.  Neurological:  Alert and oriented to person, place, and time.  No facial droop.  No slurred speech.   Skin:  Skin is warm and dry.  No rash noted.  No pallor.   Psychiatric:  Normal mood and affect.  Behavior is normal.  ----------------------------------------------------------------------------------------------------------------------  Results from last 7 days    Lab Units 02/12/22  1648   TROPONIN T ng/mL <0.010     Results from last 7 days   Lab Units 02/12/22  1648   PROBNP pg/mL 295.7         Results from last 7 days   Lab Units 02/16/22  0121 02/15/22  0258 02/14/22  0113 02/13/22  0405 02/12/22  1808 02/12/22  1648   CRP mg/dL  --  28.19*  --   --   --  18.66*   LACTATE mmol/L  --   --   --   --  1.0  --    WBC 10*3/mm3 10.78 10.51 23.87*   < >  --  24.21*   HEMOGLOBIN g/dL 11.3* 11.3* 10.6*   < >  --  11.9*   HEMATOCRIT % 36.0 36.1 32.9*   < >  --  36.0   MCV fL 94.5 95.8 94.8   < >  --  92.5   MCHC g/dL 31.4* 31.3* 32.2   < >  --  33.1   PLATELETS 10*3/mm3 231 179 182   < >  --  213    < > = values in this interval not displayed.     Results from last 7 days   Lab Units 02/16/22  0121 02/15/22  0258 02/14/22  0113 02/13/22  0405 02/13/22  0405 02/12/22  1648 02/12/22  1648   SODIUM mmol/L 139 140 133*   < > 137   < > 135*   POTASSIUM mmol/L 4.4 4.5 3.4*   < > 3.7   < > 4.1   MAGNESIUM mg/dL  --   --   --   --  1.9  --   --    CHLORIDE mmol/L 102 104 100   < > 102   < > 99   CO2 mmol/L 28.3 26.6 24.0   < > 24.2   < > 23.9   BUN mg/dL 13 16 15   < > 14   < > 14   CREATININE mg/dL 0.66 0.84 0.92   < > 0.88   < > 0.86   EGFR IF NONAFRICN AM mL/min/1.73 87 66 59*   < > 62   < > 64   CALCIUM mg/dL 9.1 8.9 8.0*   < > 8.4*   < > 8.5*   GLUCOSE mg/dL 144* 134* 163*   < > 205*   < > 198*   ALBUMIN g/dL  --   --   --   --  2.95*  --  3.35*   BILIRUBIN mg/dL  --   --   --   --  0.4  --  0.4   ALK PHOS U/L  --   --   --   --  132*  --  115   AST (SGOT) U/L  --   --   --   --  20  --  23   ALT (SGPT) U/L  --   --   --   --  18  --  17    < > = values in this interval not displayed.   Estimated Creatinine Clearance: 65.9 mL/min (by C-G formula based on SCr of 0.66 mg/dL).  No results found for: AMMONIA    Glucose   Date/Time Value Ref Range Status   02/16/2022 0959 131 (H) 70 - 130 mg/dL Final     Comment:     Meter: [id number] : [id number] AZUL NYE   02/16/2022 0641 135  (H) 70 - 130 mg/dL Final     Comment:     Meter: [id number] : [id number] AZUL NYE   02/15/2022 1809 159 (H) 70 - 130 mg/dL Final     Comment:     Meter: [id number] : [id number] Latrice Klein   02/15/2022 1604 123 70 - 130 mg/dL Final     Comment:     Meter: [id number] : [id number] SHANIQUE BATES   02/15/2022 1034 218 (H) 70 - 130 mg/dL Final     Comment:     Meter: [id number] : [id number] [facility]   02/15/2022 0611 131 (H) 70 - 130 mg/dL Final     Comment:     Meter: [id number] : [id number] [facility]   02/14/2022 1830 129 70 - 130 mg/dL Final     Comment:     Meter: [id number] : [id number] TRINI PINEDA   02/14/2022 1732 125 70 - 130 mg/dL Final     Comment:     Meter: [id number] : [id number] GARY FRANK     Lab Results   Component Value Date    HGBA1C 7.30 (H) 02/13/2022     Lab Results   Component Value Date    TSH 0.770 02/13/2022    FREET4 0.77 (L) 06/25/2014       Blood Culture   Date Value Ref Range Status   02/12/2022 Gram Negative Bacilli (C)  Preliminary   02/12/2022 Gram Negative Bacilli (C)  Preliminary     Urine Culture   Date Value Ref Range Status   02/12/2022 >100,000 CFU/mL Gram Negative Bacilli (A)  Preliminary     No results found for: WOUNDCX  No results found for: STOOLCX  No results found for: RESPCX  Pain Management Panel       Pain Management Panel Latest Ref Rng & Units 12/11/2017    AMPHETAMINES SCREEN, URINE Negative Negative    BARBITURATES SCREEN Negative Negative    BENZODIAZEPINE SCREEN, URINE Negative Positive(A)    BUPRENORPHINEUR Negative Negative    COCAINE SCREEN, URINE Negative Negative    METHADONE SCREEN, URINE Negative Negative              ----------------------------------------------------------------------------------------------------------------------  Imaging Results (Last 24 Hours)       Procedure Component Value Units Date/Time    US Venous Doppler Lower Extremity Bilateral (duplex) [[id number]] Collected: 02/16/22  0804     Updated: 02/16/22 0806    Narrative:      US VENOUS DOPPLER LOWER EXTREMITY BILATERAL (DUPLEX)-     REASON FOR EXAM:  bilateral leg pain; J18.9-Pneumonia, unspecified  organism; A41.9-Sepsis, unspecified organism; N30.01-Acute cystitis with  hematuria     Comparison:None     Multiple real-time color Doppler images were obtained. The deep veins  were well demonstrated sonographically. There is good color doppler  signal seen filling the deep veins. They  are completely compressible by  the ultrasound transducer. There was good spontaneous venous flow and  augmentation. There are no echoes seen along the deep veins to suggest  clot.             Impression:      No sonographic findings of DVT in the lower extremities     This report was finalized on 2/16/2022 8:04 AM by Dr. Josiah Barksdale II, MD.               ----------------------------------------------------------------------------------------------------------------------    Assessment/Plan       Assessment/Plan     ASSESSMENT:    1.  Sepsis  2.  UTI  3.  Bacteremia    PLAN:    The patient is sitting up in bed on 2 L nasal cannula in no apparent distress.  Patient reports that she feels tired today, but has no new complaints at this time.  Continues to have abdominal pain and right flank pain.  Afebrile, no diarrhea.  WBC remains normal. 2 out of 2 blood cultures on 2/12/2022 finalized as E. coli. Blood cultures on 2/14/2022 are so far showing no growth.     Urinalysis on 2/12/2022 was positive with WBCs too numerous to count and 4+ bacteria. Urine culture on 2/12/2022 finalized is greater than 100,000 colonies of E. coli. CT chest on 2/12/2022 showed no displaced rib fracture suspected.  No pleural effusion or pneumothorax.  Study limited by lack of IV contrast medium and arm positioning.  Dependent atelectasis.  There is some peripheral increase in interstitial markings that could indicate some underlying pulmonary fibrosis.  This is similar to prior.   There is also thickening of the central bronchovascular soft tissue suggestive of underlying asthma or bronchitis.  Chest x-ray on 2/12/2022 showed worsening in the appearance of the chest.  Films technically limited.  There is moderate cardiac silhouette enlargement and there are bibasilar infiltrates that appear to be new from prior.  Please correlate for clinical evidence of aspiration or pneumonia with follow-up to clearing recommended.  Additionally there is thickening of the central bronchovascular soft tissues that could reflect asthma or bronchitis.  Please exclude any clinical correlation for mild volume overload.  Mycoplasma pneumoniae antibody on 2/13/2022 is negative.  COVID-19 and flu A/B PCR on 2/12/2022 was negative.     As urine culture results have now finalized as E. coli, we are agreeable with de-escalation of coverage to Rocephin 2 g IV every 24 hours for a total of 10 days of antibiotic therapy through 2/22/2022.  Patient could be deescalated to [employer] if discharged. We will continue to follow closely.     Code Status:   Code Status and Medical Interventions:   Ordered at: 02/12/22 2124     Level Of Support Discussed With:    Patient     Code Status (Patient has no pulse and is not breathing):    CPR (Attempt to Resuscitate)     Medical Interventions (Patient has pulse or is breathing):    Full Support       RYAN Mujica  02/16/22  11:21 EST      Electronically signed by Terrance Cantu MD at 02/16/22 1308       Consult Notes (last 24 hours)  Notes from 02/15/22 1357 through 02/16/22 1357   No notes of this type exist for this encounter.         Physical Therapy Notes (last 24 hours)  Notes from 02/15/22 1357 through 02/16/22 1357   No notes exist for this encounter.         Occupational Therapy Notes (last 24 hours)  Notes from 02/15/22 1357 through 02/16/22 1357   No notes exist for this encounter.         ADL Documentation (last day)     Date/Time Transferring Toileting Bathing  Dressing Eating Communication Swallowing    02/16/22 0956 3 - assistive equipment and person 2 - assistive person 2 - assistive person 2 - assistive person 0 - independent 0 - understands/communicates without difficulty 0 - swallows foods/liquids without difficulty    02/15/22 1936 3 - assistive equipment and person 2 - assistive person 2 - assistive person 2 - assistive person 0 - independent 0 - understands/communicates without difficulty 0 - swallows foods/liquids without difficulty    02/15/22 0900 2 - assistive person 2 - assistive person 2 - assistive person 2 - assistive person 0 - independent 0 - understands/communicates without difficulty 0 - swallows foods/liquids without difficulty

## 2022-02-16 NOTE — PROGRESS NOTES
PROGRESS NOTE         Patient Identification:  Name:  Domonique Cummings  Age:  76 y.o.  Sex:  female  :  1945  MRN:  [id number]  Visit Number:  [id number]  Primary Care Provider:  Skinny Meehan MD         LOS: 4 days       ----------------------------------------------------------------------------------------------------------------------  Subjective       Chief Complaints:    Fall        Interval History:      The patient is sitting up in bed on 2 L nasal cannula in no apparent distress.  Patient reports that she feels tired today, but has no new complaints at this time.  Continues to have abdominal pain and right flank pain.  Afebrile, no diarrhea.  WBC remains normal. 2 out of 2 blood cultures on 2022 finalized as E. coli. Blood cultures on 2022 are so far showing no growth.     Review of Systems:    Constitutional: no fever, chills and night sweats. No fatigue.  Eyes: no eye drainage, itching or redness.    HEENT: no mouth sores, dysphagia or nose bleed.  Respiratory: no for shortness of breath.  Mildly productive cough.  Cardiovascular: no chest pain, no palpitations, no orthopnea.  Gastrointestinal: no nausea, vomiting or diarrhea. No hematemesis or rectal bleeding.  Abdominal and right flank pain.    Genitourinary: no dysuria or polyuria.  Hematologic/lymphatic: no lymph node abnormalities, no easy bruising or easy bleeding.  Musculoskeletal: Bilateral lower extremity pain.  Skin: No rash and no itching.  Neurological: no loss of consciousness, no seizure, no headache.  Behavioral/Psych: no depression or suicidal ideation.  Endocrine: no hot flashes.  Immunologic: negative.    ----------------------------------------------------------------------------------------------------------------------      Objective       [facility] Meds:  Acidophilus/Pectin, 1 capsule, Oral, Daily  ALPRAZolam, 0.25 mg, Oral, Daily  ALPRAZolam, 0.5 mg, Oral, Nightly  cefTRIAXone, 2 g,  Intravenous, Q24H  cetirizine, 10 mg, Oral, Daily  Diclofenac Sodium, , Topical, BID  doxepin, 10 mg, Oral, Nightly  DULoxetine, 60 mg, Oral, Daily  flecainide, 50 mg, Oral, Q12H  fluticasone, 1 spray, Nasal, Daily  gabapentin, 400 mg, Oral, TID  guaiFENesin, 600 mg, Oral, BID  heparin (porcine), 5,000 Units, Subcutaneous, Q12H  insulin aspart, 0-7 Units, Subcutaneous, TID AC  lactulose, 20 g, Oral, Daily  levETIRAcetam, 500 mg, Oral, Nightly  multivitamin, 1 tablet, Oral, Daily  multivitamin with minerals, 1 tablet, Oral, BID  nystatin, 1 application, Topical, BID  OLANZapine, 2.5 mg, Oral, Nightly  oxybutynin XL, 10 mg, Oral, Daily  pantoprazole, 40 mg, Oral, QAM  sodium chloride, 3 mL, Intravenous, Q12H  vitamin D3, 5,000 Units, Oral, Daily         ----------------------------------------------------------------------------------------------------------------------    Vital Signs:  Temp:  [98 °F (36.7 °C)-98.7 °F (37.1 °C)] 98 °F (36.7 °C)  Heart Rate:  [81-90] 89  Resp:  [20] 20  BP: (136-156)/(75-89) 156/79  Mean Arterial Pressure (Non-Invasive) for the past 24 hrs (Last 3 readings):   Noninvasive MAP (mmHg)   02/16/22 0954 109   02/16/22 0638 94   02/16/22 0007 104     SpO2 Percentage    02/16/22 0638 02/16/22 0641 02/16/22 0954   SpO2: 96% 94% 96%     SpO2:  [94 %-96 %] 96 %  on  Flow (L/min):  [2] 2;   Device (Oxygen Therapy): nasal cannula    Body mass index is 44.25 kg/m².  Wt Readings from Last 3 Encounters:   02/16/22 102 kg (225 lb 6.4 oz)   12/07/21 107 kg (236 lb)   03/20/21 111 kg (245 lb)        Intake/Output Summary (Last 24 hours) at 2/16/2022 1121  Last data filed at 2/16/2022 0400  Gross per 24 hour   Intake 460 ml   Output 500 ml   Net -40 ml     Diet Regular  ----------------------------------------------------------------------------------------------------------------------      Physical Exam:    Constitutional: Elderly  female is resting comfortably in bed in no apparent  distress.  HENT:  Head: Normocephalic and atraumatic.  Mouth:  Moist mucous membranes.    Eyes:  Conjunctivae and EOM are normal.  No scleral icterus.  Neck:  Neck supple.  No JVD present.    Cardiovascular:  Normal rate, regular rhythm and normal heart sounds with no murmur. No edema.  Pulmonary/Chest:  No respiratory distress, no wheezes, no crackles, with normal breath sounds and good air movement.  Abdominal:  Soft.  Bowel sounds are normal.  No distension and mild tenderness to palpation.  Musculoskeletal:  No edema, no tenderness, and no deformity.  No swelling or redness of joints.  Neurological:  Alert and oriented to person, place, and time.  No facial droop.  No slurred speech.   Skin:  Skin is warm and dry.  No rash noted.  No pallor.   Psychiatric:  Normal mood and affect.  Behavior is normal.  ----------------------------------------------------------------------------------------------------------------------  Results from last 7 days   Lab Units 02/12/22  1648   TROPONIN T ng/mL <0.010     Results from last 7 days   Lab Units 02/12/22  1648   PROBNP pg/mL 295.7         Results from last 7 days   Lab Units 02/16/22  0121 02/15/22  0258 02/14/22  0113 02/13/22  0405 02/12/22  1808 02/12/22  1648   CRP mg/dL  --  28.19*  --   --   --  18.66*   LACTATE mmol/L  --   --   --   --  1.0  --    WBC 10*3/mm3 10.78 10.51 23.87*   < >  --  24.21*   HEMOGLOBIN g/dL 11.3* 11.3* 10.6*   < >  --  11.9*   HEMATOCRIT % 36.0 36.1 32.9*   < >  --  36.0   MCV fL 94.5 95.8 94.8   < >  --  92.5   MCHC g/dL 31.4* 31.3* 32.2   < >  --  33.1   PLATELETS 10*3/mm3 231 179 182   < >  --  213    < > = values in this interval not displayed.     Results from last 7 days   Lab Units 02/16/22  0121 02/15/22  0258 02/14/22  0113 02/13/22  0405 02/13/22  0405 02/12/22  1648 02/12/22  1648   SODIUM mmol/L 139 140 133*   < > 137   < > 135*   POTASSIUM mmol/L 4.4 4.5 3.4*   < > 3.7   < > 4.1   MAGNESIUM mg/dL  --   --   --   --  1.9  --    --    CHLORIDE mmol/L 102 104 100   < > 102   < > 99   CO2 mmol/L 28.3 26.6 24.0   < > 24.2   < > 23.9   BUN mg/dL 13 16 15   < > 14   < > 14   CREATININE mg/dL 0.66 0.84 0.92   < > 0.88   < > 0.86   EGFR IF NONAFRICN AM mL/min/1.73 87 66 59*   < > 62   < > 64   CALCIUM mg/dL 9.1 8.9 8.0*   < > 8.4*   < > 8.5*   GLUCOSE mg/dL 144* 134* 163*   < > 205*   < > 198*   ALBUMIN g/dL  --   --   --   --  2.95*  --  3.35*   BILIRUBIN mg/dL  --   --   --   --  0.4  --  0.4   ALK PHOS U/L  --   --   --   --  132*  --  115   AST (SGOT) U/L  --   --   --   --  20  --  23   ALT (SGPT) U/L  --   --   --   --  18  --  17    < > = values in this interval not displayed.   Estimated Creatinine Clearance: 65.9 mL/min (by C-G formula based on SCr of 0.66 mg/dL).  No results found for: AMMONIA    Glucose   Date/Time Value Ref Range Status   02/16/2022 0959 131 (H) 70 - 130 mg/dL Final     Comment:     Meter: [id number] : [id number] AZUL NYE   02/16/2022 0641 135 (H) 70 - 130 mg/dL Final     Comment:     Meter: [id number] : [id number] AZUL NYE   02/15/2022 1809 159 (H) 70 - 130 mg/dL Final     Comment:     Meter: [id number] : [id number] Latrice Klein   02/15/2022 1604 123 70 - 130 mg/dL Final     Comment:     Meter: [id number] : [id number] SHANIQUE BATES   02/15/2022 1034 218 (H) 70 - 130 mg/dL Final     Comment:     Meter: [id number] : [id number] SHANIQUE BATES   02/15/2022 0611 131 (H) 70 - 130 mg/dL Final     Comment:     Meter: [id number] : [id number] SHANIQUE BATES   02/14/2022 1830 129 70 - 130 mg/dL Final     Comment:     Meter: [id number] : [id number] TRINI PINEDA   02/14/2022 1732 125 70 - 130 mg/dL Final     Comment:     Meter: [id number] : [id number] GARYJASMINA FOURNIERARA     Lab Results   Component Value Date    HGBA1C 7.30 (H) 02/13/2022     Lab Results   Component Value Date    TSH 0.770 02/13/2022    FREET4 0.77 (L) 06/25/2014       Blood Culture   Date Value Ref Range Status    02/12/2022 Gram Negative Bacilli (C)  Preliminary   02/12/2022 Gram Negative Bacilli (C)  Preliminary     Urine Culture   Date Value Ref Range Status   02/12/2022 >100,000 CFU/mL Gram Negative Bacilli (A)  Preliminary     No results found for: WOUNDCX  No results found for: STOOLCX  No results found for: RESPCX  Pain Management Panel       Pain Management Panel Latest Ref Rng & Units 12/11/2017    AMPHETAMINES SCREEN, URINE Negative Negative    BARBITURATES SCREEN Negative Negative    BENZODIAZEPINE SCREEN, URINE Negative Positive(A)    BUPRENORPHINEUR Negative Negative    COCAINE SCREEN, URINE Negative Negative    METHADONE SCREEN, URINE Negative Negative              ----------------------------------------------------------------------------------------------------------------------  Imaging Results (Last 24 Hours)       Procedure Component Value Units Date/Time    US Venous Doppler Lower Extremity Bilateral (duplex) [[id number]] Collected: 02/16/22 0804     Updated: 02/16/22 0806    Narrative:      US VENOUS DOPPLER LOWER EXTREMITY BILATERAL (DUPLEX)-     REASON FOR EXAM:  bilateral leg pain; J18.9-Pneumonia, unspecified  organism; A41.9-Sepsis, unspecified organism; N30.01-Acute cystitis with  hematuria     Comparison:None     Multiple real-time color Doppler images were obtained. The deep veins  were well demonstrated sonographically. There is good color doppler  signal seen filling the deep veins. They  are completely compressible by  the ultrasound transducer. There was good spontaneous venous flow and  augmentation. There are no echoes seen along the deep veins to suggest  clot.             Impression:      No sonographic findings of DVT in the lower extremities     This report was finalized on 2/16/2022 8:04 AM by Dr. Joisah Barksdale II, MD.               ----------------------------------------------------------------------------------------------------------------------    Assessment/Plan        Assessment/Plan     ASSESSMENT:    1.  Sepsis  2.  UTI  3.  Bacteremia    PLAN:    The patient is sitting up in bed on 2 L nasal cannula in no apparent distress.  Patient reports that she feels tired today, but has no new complaints at this time.  Continues to have abdominal pain and right flank pain.  Afebrile, no diarrhea.  WBC remains normal. 2 out of 2 blood cultures on 2/12/2022 finalized as E. coli. Blood cultures on 2/14/2022 are so far showing no growth.     Urinalysis on 2/12/2022 was positive with WBCs too numerous to count and 4+ bacteria. Urine culture on 2/12/2022 finalized is greater than 100,000 colonies of E. coli. CT chest on 2/12/2022 showed no displaced rib fracture suspected.  No pleural effusion or pneumothorax.  Study limited by lack of IV contrast medium and arm positioning.  Dependent atelectasis.  There is some peripheral increase in interstitial markings that could indicate some underlying pulmonary fibrosis.  This is similar to prior.  There is also thickening of the central bronchovascular soft tissue suggestive of underlying asthma or bronchitis.  Chest x-ray on 2/12/2022 showed worsening in the appearance of the chest.  Films technically limited.  There is moderate cardiac silhouette enlargement and there are bibasilar infiltrates that appear to be new from prior.  Please correlate for clinical evidence of aspiration or pneumonia with follow-up to clearing recommended.  Additionally there is thickening of the central bronchovascular soft tissues that could reflect asthma or bronchitis.  Please exclude any clinical correlation for mild volume overload.  Mycoplasma pneumoniae antibody on 2/13/2022 is negative.  COVID-19 and flu A/B PCR on 2/12/2022 was negative.     As urine culture results have now finalized as E. coli, we are agreeable with de-escalation of coverage to Rocephin 2 g IV every 24 hours for a total of 10 days of antibiotic therapy through 2/22/2022.  Patient could be  deescalated to [employer] if discharged. We will continue to follow closely.     Code Status:   Code Status and Medical Interventions:   Ordered at: 02/12/22 2124     Level Of Support Discussed With:    Patient     Code Status (Patient has no pulse and is not breathing):    CPR (Attempt to Resuscitate)     Medical Interventions (Patient has pulse or is breathing):    Full Support       RYAN Mujica  02/16/22  11:21 EST

## 2022-02-16 NOTE — PLAN OF CARE
Goal Outcome Evaluation:    Patient is resting in bed with no s/s of distress. Patient currently does not have IV access, MD Brown is aware. Pt has been switched to PO antibiotics at this time. Patient is a possible discharge in the AM. Will continue with current plan of care.

## 2022-02-16 NOTE — CASE MANAGEMENT/SOCIAL WORK
Discharge Planning Assessment   [facility]     Patient Name: Domonique Cummings  MRN: [id number]  Today's Date: 2/16/2022    Admit Date: 2/12/2022       Discharge Plan     Row Name 02/16/22 1033       Plan    Plan Pt admitted on 2/12/22 from [facility].  Pt has a skilled bed reserved at [facility] until further notice per Khushi.  SS will follow and assist with discharge back to nursing home.                MOLLY MendezW

## 2022-02-17 VITALS
WEIGHT: 219.3 LBS | RESPIRATION RATE: 20 BRPM | OXYGEN SATURATION: 96 % | TEMPERATURE: 98.7 F | SYSTOLIC BLOOD PRESSURE: 104 MMHG | HEART RATE: 92 BPM | HEIGHT: 60 IN | DIASTOLIC BLOOD PRESSURE: 56 MMHG | BODY MASS INDEX: 43.05 KG/M2

## 2022-02-17 LAB
GLUCOSE BLDC GLUCOMTR-MCNC: 125 MG/DL (ref 70–130)
GLUCOSE BLDC GLUCOMTR-MCNC: 181 MG/DL (ref 70–130)

## 2022-02-17 PROCEDURE — 63710000001 INSULIN ASPART PER 5 UNITS: Performed by: INTERNAL MEDICINE

## 2022-02-17 PROCEDURE — 99238 HOSP IP/OBS DSCHRG MGMT 30/<: CPT | Performed by: FAMILY MEDICINE

## 2022-02-17 PROCEDURE — 94799 UNLISTED PULMONARY SVC/PX: CPT

## 2022-02-17 PROCEDURE — 82962 GLUCOSE BLOOD TEST: CPT

## 2022-02-17 PROCEDURE — 25010000002 HEPARIN (PORCINE) PER 1000 UNITS: Performed by: INTERNAL MEDICINE

## 2022-02-17 RX ORDER — HYDROCODONE BITARTRATE AND ACETAMINOPHEN 10; 325 MG/1; MG/1
1 TABLET ORAL EVERY 8 HOURS PRN
Qty: 9 TABLET | Refills: 0 | Status: SHIPPED | OUTPATIENT
Start: 2022-02-17 | End: 2022-02-20

## 2022-02-17 RX ORDER — GABAPENTIN 400 MG/1
400 CAPSULE ORAL 3 TIMES DAILY
Qty: 9 CAPSULE | Refills: 0 | Status: SHIPPED | OUTPATIENT
Start: 2022-02-17 | End: 2022-02-20

## 2022-02-17 RX ORDER — ALPRAZOLAM 0.25 MG/1
0.25 TABLET ORAL DAILY
Qty: 3 TABLET | Refills: 0 | Status: SHIPPED | OUTPATIENT
Start: 2022-02-17 | End: 2022-02-20

## 2022-02-17 RX ORDER — ALPRAZOLAM 0.5 MG/1
0.5 TABLET ORAL NIGHTLY
Qty: 3 TABLET | Refills: 0 | Status: SHIPPED | OUTPATIENT
Start: 2022-02-17 | End: 2022-02-20

## 2022-02-17 RX ORDER — CEFDINIR 300 MG/1
300 CAPSULE ORAL EVERY 12 HOURS SCHEDULED
Qty: 12 CAPSULE | Refills: 0 | Status: SHIPPED | OUTPATIENT
Start: 2022-02-17 | End: 2022-02-23

## 2022-02-17 RX ADMIN — CEFDINIR 300 MG: 300 CAPSULE ORAL at 08:15

## 2022-02-17 RX ADMIN — GABAPENTIN 400 MG: 400 CAPSULE ORAL at 08:16

## 2022-02-17 RX ADMIN — HEPARIN SODIUM 5000 UNITS: 5000 INJECTION INTRAVENOUS; SUBCUTANEOUS at 08:17

## 2022-02-17 RX ADMIN — CETIRIZINE HYDROCHLORIDE 10 MG: 10 TABLET, FILM COATED ORAL at 08:14

## 2022-02-17 RX ADMIN — DULOXETINE HYDROCHLORIDE 60 MG: 60 CAPSULE, DELAYED RELEASE ORAL at 08:14

## 2022-02-17 RX ADMIN — ALPRAZOLAM 0.25 MG: 0.25 TABLET ORAL at 08:16

## 2022-02-17 RX ADMIN — LACTULOSE 20 G: 10 SOLUTION ORAL at 08:13

## 2022-02-17 RX ADMIN — FLECAINIDE ACETATE 50 MG: 50 TABLET ORAL at 08:15

## 2022-02-17 RX ADMIN — HYDROCODONE BITARTRATE AND ACETAMINOPHEN 1 TABLET: 10; 325 TABLET ORAL at 08:16

## 2022-02-17 RX ADMIN — FLUTICASONE PROPIONATE 1 SPRAY: 50 SPRAY, METERED NASAL at 08:18

## 2022-02-17 RX ADMIN — OXYBUTYNIN CHLORIDE 10 MG: 5 TABLET, EXTENDED RELEASE ORAL at 08:14

## 2022-02-17 RX ADMIN — Medication 1 TABLET: at 08:14

## 2022-02-17 RX ADMIN — INSULIN ASPART 2 UNITS: 100 INJECTION, SOLUTION INTRAVENOUS; SUBCUTANEOUS at 12:14

## 2022-02-17 RX ADMIN — GUAIFENESIN 600 MG: 600 TABLET, EXTENDED RELEASE ORAL at 08:14

## 2022-02-17 RX ADMIN — Medication 1 CAPSULE: at 08:16

## 2022-02-17 RX ADMIN — DICLOFENAC: 10 GEL TOPICAL at 08:18

## 2022-02-17 RX ADMIN — Medication 1 TABLET: at 08:16

## 2022-02-17 RX ADMIN — NYSTATIN 1 APPLICATION: 100000 POWDER TOPICAL at 08:18

## 2022-02-17 RX ADMIN — Medication 5000 UNITS: at 08:16

## 2022-02-17 NOTE — CASE MANAGEMENT/SOCIAL WORK
Discharge Planning Assessment  [facility]     Patient Name: Domonique Cummings  MRN: [id number]  Today's Date: 2/17/2022    Admit Date: 2/12/2022       Discharge Plan     Row Name 02/17/22 1124       Plan    Final Discharge Disposition Code 03 - skilled nursing facility (SNF)    Final Note Pt to be discharged back to [facility] on this date.  Facility aware and agreeable.  Pt daughter Deepali aware and agreeable.  Pt to be transported via EMS.  RN to call report to [facility].              Continued Care and Services - Admitted Since 2/12/2022     Destination     Service Provider Request Status Selected Services Address Phone Fax Patient Preferred    [facility]  Pending - Request Sent N/A [phone] [address] [phone] --                  Nayana Brooks, MOLLYW

## 2022-02-17 NOTE — CASE MANAGEMENT/SOCIAL WORK
Discharge Planning Assessment   [place]     Patient Name: Domonique Cummings  MRN: [id number]  Today's Date: 2/17/2022    Admit Date: 2/12/2022       Discharge Plan     Row Name 02/17/22 1348       Plan    Plan [facility] scheduled at [phone]    Row Name 02/17/22 1124       Plan    Final Discharge Disposition Code 03 - skilled nursing facility (SNF)    Final Note Pt to be discharged back to [facility] on this date.  Facility aware and agreeable.  Pt daughter Deepali aware and agreeable.  Pt to be transported via EMS.  RN to call report to [facility].              Continued Care and Services - Admitted Since 2/12/2022     Destination Coordination complete.    Service Provider Request Status Selected Services Address Phone Fax Patient Preferred    [facility]   Selected Skilled Nursing [address] [address] [phone] --              Expected Discharge Date and Time     Expected Discharge Date Expected Discharge Time    Feb 17, 2022         MOLLY MendezW

## 2022-02-17 NOTE — DISCHARGE PLACEMENT REQUEST
"Domonique Huston (76 y.o. Female)             Date of Birth Social Security Number Address Home Phone MRN    1945  [address] [place] [id number] [phone] [id number]    [employer] Marital Status             Yaz[employer]        Admission Date Admission Type Admitting Provider Attending Provider Department, Room/Bed    2/12/22 Emergency Florence Rivera MD Hays, Ray G, MD [address], 3309/2S    Discharge Date Discharge Disposition Discharge Destination           Skilled Nursing Facility (DC - External)              Attending Provider: Howard Brown MD    Allergies: Biaxin [Clarithromycin]    Isolation: None   Infection: None   Code Status: CPR   Advance Care Planning Activity    Ht: 152 cm (59.84\")   Wt: 99.5 kg (219 lb 4.8 oz)    Admission Cmt: None   Principal Problem: Pneumonia of both lower lobes due to infectious organism [J18.9]                 Active Insurance as of 2/12/2022     Primary Coverage     Payor Plan Insurance Group Employer/Plan Group    MEDICARE MEDICARE A & B      Payor Plan Address Payor Plan Phone Number Payor Plan Fax Number Effective Dates    [address] [phone]  10/1/2009 - None Entered    [facility] [id number]       Subscriber Name Subscriber Birth Date Member ID       DOMONIQUE HUSTON 1945 [id number]           Secondary Coverage     Payor Plan Insurance Group Employer/Plan Group    [place]      Payor Plan Address Payor Plan Phone Number Payor Plan Fax Number Effective Dates    [address] [phone]  4/2/2019 - None Entered    [facility] [id number]       Subscriber Name Subscriber Birth Date Member ID       DOMONIQUE HUSTON 1945 [id number]                 Emergency Contacts      (Rel.) Home Phone Work Phone Mobile Phone    Deepali Gallegos (Daughter) -- -- [phone]    Dennise Flanagan (Daughter) [phone] -- --    Benito Huston (Son) [phone] -- --               Physician Progress Notes " (last 24 hours)      Terrance Cantu MD at 22 1121                     PROGRESS NOTE         Patient Identification:  Name:  Domonique Cummings  Age:  76 y.o.  Sex:  female  :  1945  MRN:  [id number]  Visit Number:  [id number]  Primary Care Provider:  Skinny Meehan MD         LOS: 4 days       ----------------------------------------------------------------------------------------------------------------------  Subjective       Chief Complaints:    Fall        Interval History:      The patient is sitting up in bed on 2 L nasal cannula in no apparent distress.  Patient reports that she feels tired today, but has no new complaints at this time.  Continues to have abdominal pain and right flank pain.  Afebrile, no diarrhea.  WBC remains normal. 2 out of 2 blood cultures on 2022 finalized as E. coli. Blood cultures on 2022 are so far showing no growth.     Review of Systems:    Constitutional: no fever, chills and night sweats. No fatigue.  Eyes: no eye drainage, itching or redness.    HEENT: no mouth sores, dysphagia or nose bleed.  Respiratory: no for shortness of breath.  Mildly productive cough.  Cardiovascular: no chest pain, no palpitations, no orthopnea.  Gastrointestinal: no nausea, vomiting or diarrhea. No hematemesis or rectal bleeding.  Abdominal and right flank pain.    Genitourinary: no dysuria or polyuria.  Hematologic/lymphatic: no lymph node abnormalities, no easy bruising or easy bleeding.  Musculoskeletal: Bilateral lower extremity pain.  Skin: No rash and no itching.  Neurological: no loss of consciousness, no seizure, no headache.  Behavioral/Psych: no depression or suicidal ideation.  Endocrine: no hot flashes.  Immunologic: negative.    ----------------------------------------------------------------------------------------------------------------------      Objective       Current Hospital Meds:  Acidophilus/Pectin, 1 capsule, Oral, Daily  ALPRAZolam, 0.25 mg,  Oral, Daily  ALPRAZolam, 0.5 mg, Oral, Nightly  cefTRIAXone, 2 g, Intravenous, Q24H  cetirizine, 10 mg, Oral, Daily  Diclofenac Sodium, , Topical, BID  doxepin, 10 mg, Oral, Nightly  DULoxetine, 60 mg, Oral, Daily  flecainide, 50 mg, Oral, Q12H  fluticasone, 1 spray, Nasal, Daily  gabapentin, 400 mg, Oral, TID  guaiFENesin, 600 mg, Oral, BID  heparin (porcine), 5,000 Units, Subcutaneous, Q12H  insulin aspart, 0-7 Units, Subcutaneous, TID AC  lactulose, 20 g, Oral, Daily  levETIRAcetam, 500 mg, Oral, Nightly  multivitamin, 1 tablet, Oral, Daily  multivitamin with minerals, 1 tablet, Oral, BID  nystatin, 1 application, Topical, BID  OLANZapine, 2.5 mg, Oral, Nightly  oxybutynin XL, 10 mg, Oral, Daily  pantoprazole, 40 mg, Oral, QAM  sodium chloride, 3 mL, Intravenous, Q12H  vitamin D3, 5,000 Units, Oral, Daily         ----------------------------------------------------------------------------------------------------------------------    Vital Signs:  Temp:  [98 °F (36.7 °C)-98.7 °F (37.1 °C)] 98 °F (36.7 °C)  Heart Rate:  [81-90] 89  Resp:  [20] 20  BP: (136-156)/(75-89) 156/79  Mean Arterial Pressure (Non-Invasive) for the past 24 hrs (Last 3 readings):   Noninvasive MAP (mmHg)   02/16/22 0954 109   02/16/22 0638 94   02/16/22 0007 104     SpO2 Percentage    02/16/22 0638 02/16/22 0641 02/16/22 0954   SpO2: 96% 94% 96%     SpO2:  [94 %-96 %] 96 %  on  Flow (L/min):  [2] 2;   Device (Oxygen Therapy): nasal cannula    Body mass index is 44.25 kg/m².  Wt Readings from Last 3 Encounters:   02/16/22 102 kg (225 lb 6.4 oz)   12/07/21 107 kg (236 lb)   03/20/21 111 kg (245 lb)        Intake/Output Summary (Last 24 hours) at 2/16/2022 1121  Last data filed at 2/16/2022 0400  Gross per 24 hour   Intake 460 ml   Output 500 ml   Net -40 ml     Diet Regular  ----------------------------------------------------------------------------------------------------------------------      Physical Exam:    Constitutional: Elderly   female is resting comfortably in bed in no apparent distress.  HENT:  Head: Normocephalic and atraumatic.  Mouth:  Moist mucous membranes.    Eyes:  Conjunctivae and EOM are normal.  No scleral icterus.  Neck:  Neck supple.  No JVD present.    Cardiovascular:  Normal rate, regular rhythm and normal heart sounds with no murmur. No edema.  Pulmonary/Chest:  No respiratory distress, no wheezes, no crackles, with normal breath sounds and good air movement.  Abdominal:  Soft.  Bowel sounds are normal.  No distension and mild tenderness to palpation.  Musculoskeletal:  No edema, no tenderness, and no deformity.  No swelling or redness of joints.  Neurological:  Alert and oriented to person, place, and time.  No facial droop.  No slurred speech.   Skin:  Skin is warm and dry.  No rash noted.  No pallor.   Psychiatric:  Normal mood and affect.  Behavior is normal.  ----------------------------------------------------------------------------------------------------------------------  Results from last 7 days   Lab Units 02/12/22  1648   TROPONIN T ng/mL <0.010     Results from last 7 days   Lab Units 02/12/22  1648   PROBNP pg/mL 295.7         Results from last 7 days   Lab Units 02/16/22  0121 02/15/22  0258 02/14/22  0113 02/13/22  0405 02/12/22  1808 02/12/22  1648   CRP mg/dL  --  28.19*  --   --   --  18.66*   LACTATE mmol/L  --   --   --   --  1.0  --    WBC 10*3/mm3 10.78 10.51 23.87*   < >  --  24.21*   HEMOGLOBIN g/dL 11.3* 11.3* 10.6*   < >  --  11.9*   HEMATOCRIT % 36.0 36.1 32.9*   < >  --  36.0   MCV fL 94.5 95.8 94.8   < >  --  92.5   MCHC g/dL 31.4* 31.3* 32.2   < >  --  33.1   PLATELETS 10*3/mm3 231 179 182   < >  --  213    < > = values in this interval not displayed.     Results from last 7 days   Lab Units 02/16/22  0121 02/15/22  0258 02/14/22  0113 02/13/22  0405 02/13/22  0405 02/12/22  1648 02/12/22  1648   SODIUM mmol/L 139 140 133*   < > 137   < > 135*   POTASSIUM mmol/L 4.4 4.5 3.4*   < >  3.7   < > 4.1   MAGNESIUM mg/dL  --   --   --   --  1.9  --   --    CHLORIDE mmol/L 102 104 100   < > 102   < > 99   CO2 mmol/L 28.3 26.6 24.0   < > 24.2   < > 23.9   BUN mg/dL 13 16 15   < > 14   < > 14   CREATININE mg/dL 0.66 0.84 0.92   < > 0.88   < > 0.86   EGFR IF NONAFRICN AM mL/min/1.73 87 66 59*   < > 62   < > 64   CALCIUM mg/dL 9.1 8.9 8.0*   < > 8.4*   < > 8.5*   GLUCOSE mg/dL 144* 134* 163*   < > 205*   < > 198*   ALBUMIN g/dL  --   --   --   --  2.95*  --  3.35*   BILIRUBIN mg/dL  --   --   --   --  0.4  --  0.4   ALK PHOS U/L  --   --   --   --  132*  --  115   AST (SGOT) U/L  --   --   --   --  20  --  23   ALT (SGPT) U/L  --   --   --   --  18  --  17    < > = values in this interval not displayed.   Estimated Creatinine Clearance: 65.9 mL/min (by C-G formula based on SCr of 0.66 mg/dL).  No results found for: AMMONIA    Glucose   Date/Time Value Ref Range Status   02/16/2022 0959 131 (H) 70 - 130 mg/dL Final     Comment:     Meter: [id number] : [id number] AZUL NYE   02/16/2022 0641 135 (H) 70 - 130 mg/dL Final     Comment:     Meter: [id number] : [id number] AZUL NYE   02/15/2022 1809 159 (H) 70 - 130 mg/dL Final     Comment:     Meter: [id number] : [id number] Latrice Klein   02/15/2022 1604 123 70 - 130 mg/dL Final     Comment:     Meter: [id number] : [id number] SHANIQUE BATES   02/15/2022 1034 218 (H) 70 - 130 mg/dL Final     Comment:     Meter: [id number] : [id number] SHANIQUE BATES   02/15/2022 0611 131 (H) 70 - 130 mg/dL Final     Comment:     Meter: [id number] : [id number] SHANIQUE BATES   02/14/2022 1830 129 70 - 130 mg/dL Final     Comment:     Meter: [id number] : [id number] TRINI PINEDA   02/14/2022 1732 125 70 - 130 mg/dL Final     Comment:     Meter: [id number] : [id number] GARY FRANK     Lab Results   Component Value Date    HGBA1C 7.30 (H) 02/13/2022     Lab Results   Component Value Date    TSH 0.770 02/13/2022    FREET4 0.77 (L)  06/25/2014       Blood Culture   Date Value Ref Range Status   02/12/2022 Gram Negative Bacilli (C)  Preliminary   02/12/2022 Gram Negative Bacilli (C)  Preliminary     Urine Culture   Date Value Ref Range Status   02/12/2022 >100,000 CFU/mL Gram Negative Bacilli (A)  Preliminary     No results found for: WOUNDCX  No results found for: STOOLCX  No results found for: RESPCX  Pain Management Panel       Pain Management Panel Latest Ref Rng & Units 12/11/2017    AMPHETAMINES SCREEN, URINE Negative Negative    BARBITURATES SCREEN Negative Negative    BENZODIAZEPINE SCREEN, URINE Negative Positive(A)    BUPRENORPHINEUR Negative Negative    COCAINE SCREEN, URINE Negative Negative    METHADONE SCREEN, URINE Negative Negative              ----------------------------------------------------------------------------------------------------------------------  Imaging Results (Last 24 Hours)       Procedure Component Value Units Date/Time    US Venous Doppler Lower Extremity Bilateral (duplex) [[id number]] Collected: 02/16/22 0804     Updated: 02/16/22 0806    Narrative:      US VENOUS DOPPLER LOWER EXTREMITY BILATERAL (DUPLEX)-     REASON FOR EXAM:  bilateral leg pain; J18.9-Pneumonia, unspecified  organism; A41.9-Sepsis, unspecified organism; N30.01-Acute cystitis with  hematuria     Comparison:None     Multiple real-time color Doppler images were obtained. The deep veins  were well demonstrated sonographically. There is good color doppler  signal seen filling the deep veins. They  are completely compressible by  the ultrasound transducer. There was good spontaneous venous flow and  augmentation. There are no echoes seen along the deep veins to suggest  clot.             Impression:      No sonographic findings of DVT in the lower extremities     This report was finalized on 2/16/2022 8:04 AM by Dr. Josiah Barksdale II, MD.                ----------------------------------------------------------------------------------------------------------------------    Assessment/Plan       Assessment/Plan     ASSESSMENT:    1.  Sepsis  2.  UTI  3.  Bacteremia    PLAN:    The patient is sitting up in bed on 2 L nasal cannula in no apparent distress.  Patient reports that she feels tired today, but has no new complaints at this time.  Continues to have abdominal pain and right flank pain.  Afebrile, no diarrhea.  WBC remains normal. 2 out of 2 blood cultures on 2/12/2022 finalized as E. coli. Blood cultures on 2/14/2022 are so far showing no growth.     Urinalysis on 2/12/2022 was positive with WBCs too numerous to count and 4+ bacteria. Urine culture on 2/12/2022 finalized is greater than 100,000 colonies of E. coli. CT chest on 2/12/2022 showed no displaced rib fracture suspected.  No pleural effusion or pneumothorax.  Study limited by lack of IV contrast medium and arm positioning.  Dependent atelectasis.  There is some peripheral increase in interstitial markings that could indicate some underlying pulmonary fibrosis.  This is similar to prior.  There is also thickening of the central bronchovascular soft tissue suggestive of underlying asthma or bronchitis.  Chest x-ray on 2/12/2022 showed worsening in the appearance of the chest.  Films technically limited.  There is moderate cardiac silhouette enlargement and there are bibasilar infiltrates that appear to be new from prior.  Please correlate for clinical evidence of aspiration or pneumonia with follow-up to clearing recommended.  Additionally there is thickening of the central bronchovascular soft tissues that could reflect asthma or bronchitis.  Please exclude any clinical correlation for mild volume overload.  Mycoplasma pneumoniae antibody on 2/13/2022 is negative.  COVID-19 and flu A/B PCR on 2/12/2022 was negative.     As urine culture results have now finalized as E. coli, we are agreeable  "with de-escalation of coverage to Rocephin 2 g IV every 24 hours for a total of 10 days of antibiotic therapy through 2/22/2022.  Patient could be deescalated to Omnicef if discharged. We will continue to follow closely.     Code Status:   Code Status and Medical Interventions:   Ordered at: 02/12/22 2124     Level Of Support Discussed With:    Patient     Code Status (Patient has no pulse and is not breathing):    CPR (Attempt to Resuscitate)     Medical Interventions (Patient has pulse or is breathing):    Full Support       RYAN Mujica  02/16/22  11:21 EST      Electronically signed by Terrance Cantu MD at 02/16/22 1308       Consult Notes (last 24 hours)  Notes from 02/16/22 1041 through 02/17/22 1041   No notes of this type exist for this encounter.             After Visit Summary    Domonique Cummings  MRN: [id number]    Icon primary diagnosis          Pneumonia of both lower lobes due to infectious organism   Icon Date   2/12/2022 - 2/17/2022   Icon Location   [address]     Icon Checklist header      Your Next Steps      Icon ask where to get these medications   Ask     Icon Checkbox    Ask how to get these medications  1 insulin lispro      Icon do   Do     Icon Checkbox     these medications from [place] [address] [facility].  [phone]  - [phone] FX  1 cefdinir   Icon Checkbox     these medications from any pharmacy with your printed prescription  1 ALPRAZolam  2 HYDROcodone-acetaminophen      [employer] Sign-Up    Send messages to your doctor, view your test results, renew your prescriptions, schedule appointments, and more.     Go to [email or web address], click \"Sign Up Now\", and enter your personal activation code: [email or web address]. Activation code expires 3/14/2022.  After Visit Summary    Instructions     Icon medication changes this visit             Your medications have " changed    Icon medications to start taking   START taking:   cefdinir (OMNICEF)        insulin lispro (HumaLOG)         Icon medications to stop taking   STOP taking:   amLODIPine 10 MG tablet (NORVASC)        furosemide 20 MG tablet (LASIX)        potassium chloride 10 MEQ CR tablet (K-DUR,KLOR-CON)         Review your updated medication list below.          Your Next Steps  Icon ask where to get these medications   Ask  Icon do   Do  COVID-19 Vaccination Information  Why Get Vaccinated?  Building defenses against COVID-19 is a team effort, and you are a givens part of that team. Getting the COVID-19 vaccine adds one more layer of protection for you, your coworkers, and family. Here are ways you can build people’s confidence in the COVID-19 vaccines in your community and at home.     · Get vaccinated and enroll in the v-safe text messaging program to help CDC monitor vaccine safety.   · Tell others why you are getting vaccinated and encourage them to get vaccinated. Share your success story.    · Learn how to have conversations about COVID-19 vaccine with coworkers, family, and friends.  · https://www.cdc.gov/coronavirus/2019-ncov/vaccines/index.html     How do I schedule an appointment for a vaccine?  https://www.vaccines.gov/ helps you find locations that carry COVID-19 vaccines and their contact information. Because every location handles appointments differently, you will need to schedule your appointment directly with the location you choose.            If you have any questions about your recovery, please call the Psychiatric Nurse Call Center at [phone]. A registered nurse is available 24 hours a day 7 days a week to assist you.   If you have any COVID-19 related questions, please call [phone].       Icon activity      Activity instructions        Resume activity as tolerated.              Icon diet      Diet instructions    Diet: Consistent Carbohydrate, Cardiac   Discharge Diet:   Consistent  Carbohydrate   Cardiac                  What's Next    What's Next          Follow up with Skinny Meehan MD [address] [place] [place] [address]  [phone]       Your Allergies  Date Reviewed: 2022  Your Allergies   Allergen Reactions   Biaxin (Clarithromycin) Other (See Comments)   unknown     Patient Belongings Returned      Document Return of Belongings Flowsheet    Were the patient bedside belongings sent home? --   Medications Retrieved from Pharmacy & Sent Home --   Belongings Sent to Safe --   Belongings sent with: --   Belongings Retrieved from Security & Sent Home --           [email or web address] Signup    [facility] [employer] allows you to send messages to your doctor, view your test results, renew your prescriptions, schedule appointments, and more. To sign up, go to [employer] and click on the Sign Up Now link in the New User? box. Enter your [employer] Activation Code exactly as it appears below along with the last four digits of your Social Security Number and your Date of Birth () to complete the sign-up process. If you do not sign up before the expiration date, you must request a new code.     [employer] Activation Code: [email or web address]  Expires: 3/14/2022  4:39 PM     If you have questions, you can email [email or web address] or call [phone] to talk to our [employer] staff. Remember, [employer] is NOT to be used for urgent needs. For medical emergencies, dial 911.         Medication List    Medication List     Morning Afternoon Evening Bedtime As Needed    albuterol sulfate  (90 Base) MCG/ACT inhaler  Commonly known as: PROVENTIL HFA;VENTOLIN HFA;PROAIR HFA  Inhale 2 puffs Every 8 (Eight) Hours As Needed for Wheezing.  Last time this was given: Ask your nurse or doctor         * ALPRAZolam 0.25 MG tablet  Commonly known as: XANAX  Take 1 tablet by mouth Daily for 3 days.  For diagnoses: ADITYA (generalized anxiety disorder)  Last time this was given: Ask your nurse  or doctor         * ALPRAZolam 0.5 MG tablet  Commonly known as: XANAX  Take 1 tablet by mouth Every Night for 3 days.  For diagnoses: ADITYA (generalized anxiety disorder)  Last time this was given: Ask your nurse or doctor         calcium carbonate 500 MG chewable tablet  Commonly known as: TUMS  Chew 1 tablet Every 6 (Six) Hours As Needed for Indigestion or Heartburn.        Icon medications to start taking   cefdinir 300 MG capsule  Commonly known as: OMNICEF  Take 1 capsule by mouth Every 12 (Twelve) Hours for 12 doses. Indications: Infection with Dysregulated Inflammatory Response  For: Infection with Dysregulated Inflammatory Response  Last time this was given: 300 mg on February 17, 2022  8:15 AM         cycloSPORINE 0.05 % ophthalmic emulsion  Commonly known as: RESTASIS  Administer 1 drop to both eyes 2 (Two) Times a Day As Needed (dry eye).         Diclofenac Sodium 1 % gel gel  Commonly known as: VOLTAREN  Apply 4 g topically to the appropriate area as directed 4 (Four) Times a Day As Needed (Arthritis Pain). Shoulders and Knees  Last time this was given: February 17, 2022  8:18 AM         doxepin 10 MG capsule  Commonly known as: SINEquan  Take 10 mg by mouth Every Night.  Last time this was given: 10 mg on February 16, 2022 11:10 PM         DULoxetine 60 MG capsule  Commonly known as: CYMBALTA  Take 60 mg by mouth Daily.  Last time this was given: 60 mg on February 17, 2022  8:14 AM         flecainide 50 MG tablet  Commonly known as: TAMBOCOR  Take 1 tablet by mouth Every 12 (Twelve) Hours.  Last time this was given: 50 mg on February 17, 2022  8:15 AM         fluticasone 50 MCG/ACT nasal spray  Commonly known as: FLONASE  1 spray into the nostril(s) as directed by provider Daily.  Last time this was given: 1 spray on February 17, 2022  8:18 AM         gabapentin 400 MG capsule  Commonly known as: NEURONTIN  Take 1 capsule by mouth 3 (Three) Times a Day.  Last time this was given: 400 mg on February 17,  2022  8:16 AM         guaiFENesin 600 MG 12 hr tablet  Commonly known as: MUCINEX  Take 600 mg by mouth 2 (Two) Times a Day.  Last time this was given: 600 mg on February 17, 2022  8:14 AM         HYDROcodone-acetaminophen  MG per tablet  Commonly known as: NORCO  Take 1 tablet by mouth Every 8 (Eight) Hours As Needed for Moderate Pain for up to 3 days.  For diagnoses: Chronic tension-type headache, intractable  Last time this was given: 1 tablet on February 17, 2022  8:16 AM         ICaps Areds 2 capsule  Take 1 capsule by mouth 2 (Two) Times a Day.  Last time this was given: Ask your nurse or doctor        Icon medications to start taking   insulin lispro 100 UNIT/ML injection  Commonly known as: HumaLOG  Inject 5 Units under the skin into the appropriate area as directed 3 (Three) Times a Day Before Meals.         * lactulose 10 GM/15ML solution  Commonly known as: CHRONULAC  Take 20 g by mouth Daily.  Last time this was given: Ask your nurse or doctor         * lactulose 10 GM/15ML solution  Commonly known as: CHRONULAC  Take 20 g by mouth 2 (Two) Times a Day As Needed (Constipation).  Last time this was given: Ask your nurse or doctor         levETIRAcetam 500 MG tablet  Commonly known as: KEPPRA  Take 500 mg by mouth Every Night.  Last time this was given: 500 mg on February 16, 2022  9:21 PM         loratadine 10 MG tablet  Commonly known as: CLARITIN  Take 10 mg by mouth Daily.         multivitamin tablet tablet  Take 1 tablet by mouth Daily.  Generic drug: multivitamin  Last time this was given: 1 tablet on February 17, 2022  8:16 AM         nystatin 662325 UNIT/GM powder  Commonly known as: MYCOSTATIN  Apply 1 application topically to the appropriate area as directed 2 (Two) Times a Day. Apply underneath abdominal folds.  Last time this was given: 1 application on February 17, 2022  8:18 AM         OLANZapine 2.5 MG tablet  Commonly known as: zyPREXA  Take 1 tablet by mouth Every Night.  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         oxybutynin XL 10 MG 24 hr tablet  Commonly known as: DITROPAN-XL  Take 10 mg by mouth Daily.  Last time this was given: 10 mg on February 17, 2022  8:14 AM         Reguloid 400 MG capsule  Take 2 capsules by mouth Daily.  Generic drug: Psyllium         Risaquad-2 capsule capsule  Take 1 capsule by mouth Daily.         sodium chloride 0.65 % nasal spray  1 spray into the nostril(s) as directed by provider Every 1 (One) Hour As Needed for Congestion.  Last time this was given: Ask your nurse or doctor         vitamin D3 125 MCG (5000 UT) capsule capsule  Take 5,000 Units by mouth Daily.  Last time this was given: 5,000 Units on February 17, 2022  8:16 AM         (very important)  * This list has 4 medication(s) that are the same as other medications prescribed for you. Read the directions carefully, and ask your doctor or other care provider to review them with you.         Where to  your medications     Icon medication  information                     these medications at [facility] [place], [place] - [facility] - [phone] [facility] [phone] FX     cefdinir      Address: [address] [place], [place] [address] [address]   Phone: [phone]          Icon medication  information                     these medications from any pharmacy with your printed prescription     ALPRAZolam (2 prescriptions) • HYDROcodone-acetaminophen             Icon ask where to get these medications      Ask your doctor where to  these medications     • insulin lispro 100 UNIT/ML injection            Always carry an updated list of your medications with you. If there is an emergency, a  responder can quickly see what medications you are taking. Take this paperwork with you the next time you see your health care provider.            Patient Pass    No notes of this type exist for this encounter.  [facility]          Opioid Resource    If you or someone you know needs information on substance abuse, please visit   https://www.findhelpnowky.org/ for listings of facilities and resources across [place].      Stroke Symptoms    · Call 911 or have someone take you to the Emergency Department if you have any of the following:  · Sudden numbness or weakness of your face, arm or leg especially on one side of the body  · Sudden confusion, difficulty speaking or trouble understanding   · Changes in your vision or loss of sight in one eye  · Sudden severe headache with no known cause  · Sudden dizziness, trouble walking, loss of balance or coordination     It is important to seek emergency care right away if you have further stroke symptoms. If you get emergency help quickly, the powerful clot-dissolving medicines can reduce the disabilities caused by a stroke.      For more information:  American Stroke Association  4-378-7-STROKE  www.strokeassociation.org      Smoking Cessation    IF YOU SMOKE OR USE TOBACCO PLEASE READ THE FOLLOWING:  Why is smoking bad for me?  Smoking increases the risk of heart disease, lung disease, vascular disease, stroke, and cancer. If you smoke, STOP!     For more information:  Quit Now [place]  1-800-QUIT-NOW  https://[place].quitlogix.org/en-[place]/      Suicidal Feelings    If you feel like life is too tough and are thinking of suicide or injuring yourself, get help right away!  · Call 911  · Call a suicide hotline to speak to a counselor. 0-014-781-TALK or 8-378-CEUOZKW       Patient Experience    Thank you for choosing [facility]. You may receive a survey following your visit. Please take a moment to share what went well, where we need improvement, and which staff  members deserve recognition. We value your input.           ? ?                YOU ARE THE MOST IMPORTANT FACTOR IN YOUR RECOVERY.      Follow all instructions carefully.      I have reviewed my discharge instructions with my nurse, including the following information, if applicable:                 Information about my illness and diagnosis              Follow up appointments (including lab draws)              Wound Care              Equipment Needs              Medications (new and continuing) along with side effects              Preventative information such as vaccines and smoking cessations              Diet              Pain              I know when to contact my Doctor's office or seek emergency care        I want my nurse to describe the side effects of my medications: YES NO   If the answer is no, I understand the side effects of my medications: YES NO   My nurse described the side effects of my medications in a way that I could understand: YES NO   I have taken my personal belongings and my own medications with me at discharge: YES NO          I have received this information and my questions have been answered. I have discussed any concerns I see with this plan with the nurse or physician. I understand these instructions.     Signature of Patient or Responsible Person: _____________________________________     Date: _________________  Time: __________________     Signature of Healthcare Provider: _______________________________________  Date: _________________  Time: __________________              Discharge Summary      Howard Brown MD at [address] [place] [address] DISCHARGE SUMMARY    Patient Identification:  Name:  Domonique Cummings  Age:  76 y.o.  Sex:  female  :  1945  MRN:  [id number]  Visit Number:  [id number]    Date of Admission: 2022  Date of Discharge:  2022     PCP: Skinny Meehan MD    DISCHARGE DIAGNOSIS  Severe sepsis with E. coli  bacteremia, UTI    History of dementia  Hypertension  Hyperlipidemia  Diabetes mellitus  Morbid obesity with BMI of 43.05 kg/m²  History of obstructive sleep apnea  History of esophageal stricture x2 with history of dysphagia  Seizure disorder  Anxiety neurosis  Chronic pain    CONSULTS   Dr. Cantu    PROCEDURES PERFORMED      HOSPITAL COURSE  Patient is a 76 y.o. female presented to [facility]   emergency department from the nursing home for after a fall.  Patient was admitted on February 12 with a past history of dementia, hypertension, hyperlipidemia, diabetes mellitus.  Patient was not able to give details at the time of admission.  Patient states that when she stepped while she became dizzy lost her balance and fell.  Patient complained of pain all over.  Patient was tested for COVID-19 and this was negative.  Patient was found to have UTI and did have positive cultures for gram-negative rods.  Patient was initially placed on Merrem and vancomycin for both pneumonia and UTI she does have a history of ESBL E. coli UTIs.  Patient found to have like a set acute bacteremia with E. coli likely seeded from the urinary tract.  Patient was continued on Merrem and eventually a culture and sensitivity did show non-ESBL E. coli.  Patient was deescalated to [facility].  Patient has gradually improved during the admission.  Patient was febrile when she arrived and had findings consistent with severe sepsis.  Patient did have a white count of 24,000 when she arrived and CRP of 18.  At the time of discharge, patient with a white count of 10,700 BUN/creatinine of 16 and 0.84 patient was retested for Covid prior to transfer back to nursing home and it is negative.  Have recommended that patient continue Omnicef to complete treatment of infection.  Patient was seen by Dr. Cantu who recommended this treatment.      VITAL SIGNS:  Temp:  [98.2 °F (36.8 °C)-98.8 °F (37.1 °C)] (P) 98.8 °F (37.1 °C)  Heart Rate:  [61-95]  95  Resp:  [18-20] 20  BP: (126-146)/(61-75) 146/61  SpO2:  [93 %-97 %] 96 %  on  Flow (L/min):  [2] 2;   Device (Oxygen Therapy): nasal cannula    Body mass index is 43.05 kg/m².  Wt Readings from Last 3 Encounters:   02/17/22 99.5 kg (219 lb 4.8 oz)   12/07/21 107 kg (236 lb)   03/20/21 111 kg (245 lb)       PHYSICAL EXAM:  Constitutional: No acute distress  HEENT: Normocephalic atraumatic  Neck: Supple  Cardiovascular: Regular rate and rhythm  Pulmonary/Chest: Clear to auscultation  Abdominal: Positive bowel sounds soft.   Musculoskeletal: No arthropathy  Neurological: No focal deficits  Skin: No rash  Peripheral vascular:  Genitourinary::    DISCHARGE DISPOSITION   Stable    DISCHARGE MEDICATIONS:     Discharge Medications      New Medications      Instructions Start Date   cefdinir 300 MG capsule  Commonly known as: OMNICEF   300 mg, Oral, Every 12 Hours Scheduled         Continue These Medications      Instructions Start Date   albuterol sulfate  (90 Base) MCG/ACT inhaler  Commonly known as: PROVENTIL HFA;VENTOLIN HFA;PROAIR HFA   2 puffs, Inhalation, Every 8 Hours PRN      ALPRAZolam 0.25 MG tablet  Commonly known as: XANAX   0.25 mg, Oral, Daily      ALPRAZolam 0.5 MG tablet  Commonly known as: XANAX   0.5 mg, Oral, Nightly      calcium carbonate 500 MG chewable tablet  Commonly known as: TUMS   1 tablet, Oral, Every 6 Hours PRN      cycloSPORINE 0.05 % ophthalmic emulsion  Commonly known as: RESTASIS   1 drop, Both Eyes, 2 Times Daily PRN      Diclofenac Sodium 1 % gel gel  Commonly known as: VOLTAREN   4 g, Topical, 4 Times Daily PRN, Shoulders and Knees      doxepin 10 MG capsule  Commonly known as: SINEquan   10 mg, Oral, Nightly      DULoxetine 60 MG capsule  Commonly known as: CYMBALTA   60 mg, Oral, Daily      flecainide 50 MG tablet  Commonly known as: TAMBOCOR   50 mg, Oral, Every 12 Hours Scheduled      fluticasone 50 MCG/ACT nasal spray  Commonly known as: FLONASE   1 spray, Nasal, Daily       gabapentin 400 MG capsule  Commonly known as: NEURONTIN   400 mg, Oral, 3 Times Daily      guaiFENesin 600 MG 12 hr tablet  Commonly known as: MUCINEX   600 mg, Oral, 2 Times Daily      HYDROcodone-acetaminophen  MG per tablet  Commonly known as: NORCO   1 tablet, Oral, Every 8 Hours PRN      ICaps Areds 2 capsule   1 capsule, Oral, 2 Times Daily      lactulose 10 GM/15ML solution  Commonly known as: CHRONULAC   20 g, Oral, Daily      lactulose 10 GM/15ML solution  Commonly known as: CHRONULAC   20 g, Oral, 2 Times Daily PRN      levETIRAcetam 500 MG tablet  Commonly known as: KEPPRA   500 mg, Oral, Nightly      loratadine 10 MG tablet  Commonly known as: CLARITIN   10 mg, Oral, Daily      multivitamin tablet tablet  Generic drug: multivitamin   1 tablet, Oral, Daily      nystatin 028496 UNIT/GM powder  Commonly known as: MYCOSTATIN   1 application, Topical, 2 Times Daily, Apply underneath abdominal folds.      OLANZapine 2.5 MG tablet  Commonly known as: zyPREXA   2.5 mg, Oral, Nightly      omeprazole 40 MG capsule  Commonly known as: priLOSEC   40 mg, Oral, Daily      oxybutynin XL 10 MG 24 hr tablet  Commonly known as: DITROPAN-XL   10 mg, Oral, Daily      Reguloid 400 MG capsule  Generic drug: Psyllium   2 capsules, Oral, Daily      Risaquad-2 capsule capsule   1 capsule, Oral, Daily      sodium chloride 0.65 % nasal spray   1 spray, Nasal, Every 1 Hour PRN      vitamin D3 125 MCG (5000 UT) capsule capsule   5,000 Units, Oral, Daily         Stop These Medications    amLODIPine 10 MG tablet  Commonly known as: NORVASC     furosemide 20 MG tablet  Commonly known as: LASIX     potassium chloride 10 MEQ CR tablet  Commonly known as: K-DUR,KLOR-CON             Your medication list      START taking these medications      Instructions Last Dose Given Next Dose Due   cefdinir 300 MG capsule  Commonly known as: OMNICEF      Take 1 capsule by mouth Every 12 (Twelve) Hours for 12 doses. Indications: Infection with  Dysregulated Inflammatory Response          CONTINUE taking these medications      Instructions Last Dose Given Next Dose Due   albuterol sulfate  (90 Base) MCG/ACT inhaler  Commonly known as: PROVENTIL HFA;VENTOLIN HFA;PROAIR HFA      Inhale 2 puffs Every 8 (Eight) Hours As Needed for Wheezing.       ALPRAZolam 0.25 MG tablet  Commonly known as: XANAX      Take 1 tablet by mouth Daily for 3 days.       ALPRAZolam 0.5 MG tablet  Commonly known as: XANAX      Take 1 tablet by mouth Every Night for 3 days.       calcium carbonate 500 MG chewable tablet  Commonly known as: TUMS      Chew 1 tablet Every 6 (Six) Hours As Needed for Indigestion or Heartburn.       cycloSPORINE 0.05 % ophthalmic emulsion  Commonly known as: RESTASIS      Administer 1 drop to both eyes 2 (Two) Times a Day As Needed (dry eye).       Diclofenac Sodium 1 % gel gel  Commonly known as: VOLTAREN      Apply 4 g topically to the appropriate area as directed 4 (Four) Times a Day As Needed (Arthritis Pain). Shoulders and Knees       doxepin 10 MG capsule  Commonly known as: SINEquan      Take 10 mg by mouth Every Night.       DULoxetine 60 MG capsule  Commonly known as: CYMBALTA      Take 60 mg by mouth Daily.       flecainide 50 MG tablet  Commonly known as: TAMBOCOR      Take 1 tablet by mouth Every 12 (Twelve) Hours.       fluticasone 50 MCG/ACT nasal spray  Commonly known as: FLONASE      1 spray into the nostril(s) as directed by provider Daily.       gabapentin 400 MG capsule  Commonly known as: NEURONTIN      Take 1 capsule by mouth 3 (Three) Times a Day.       guaiFENesin 600 MG 12 hr tablet  Commonly known as: MUCINEX      Take 600 mg by mouth 2 (Two) Times a Day.       HYDROcodone-acetaminophen  MG per tablet  Commonly known as: NORCO      Take 1 tablet by mouth Every 8 (Eight) Hours As Needed for Moderate Pain  for up to 3 days.       ICaps Areds 2 capsule      Take 1 capsule by mouth 2 (Two) Times a Day.       lactulose 10  GM/15ML solution  Commonly known as: CHRONULAC      Take 20 g by mouth Daily.       lactulose 10 GM/15ML solution  Commonly known as: CHRONULAC      Take 20 g by mouth 2 (Two) Times a Day As Needed (Constipation).       levETIRAcetam 500 MG tablet  Commonly known as: KEPPRA      Take 500 mg by mouth Every Night.       loratadine 10 MG tablet  Commonly known as: CLARITIN      Take 10 mg by mouth Daily.       multivitamin tablet tablet  Generic drug: multivitamin      Take 1 tablet by mouth Daily.       nystatin [id number] UNIT/GM powder  Commonly known as: MYCOSTATIN      Apply 1 application topically to the appropriate area as directed 2 (Two) Times a Day. Apply underneath abdominal folds.       OLANZapine 2.5 MG tablet  Commonly known as: zyPREXA      Take 1 tablet by mouth Every Night.       omeprazole 40 MG capsule  Commonly known as: priLOSEC      Take 40 mg by mouth Daily.       oxybutynin XL 10 MG 24 hr tablet  Commonly known as: DITROPAN-XL      Take 10 mg by mouth Daily.       Reguloid 400 MG capsule  Generic drug: Psyllium      Take 2 capsules by mouth Daily.       Risaquad-2 capsule capsule      Take 1 capsule by mouth Daily.       sodium chloride 0.65 % nasal spray      1 spray into the nostril(s) as directed by provider Every 1 (One) Hour As Needed for Congestion.       vitamin D3 125 MCG (5000 UT) capsule capsule      Take 5,000 Units by mouth Daily.          STOP taking these medications    amLODIPine 10 MG tablet  Commonly known as: NORVASC        furosemide 20 MG tablet  Commonly known as: LASIX        potassium chloride 10 MEQ CR tablet  Commonly known as: DARELL-DUR,KLOR-ALEIDA              Where to Get Your Medications      These medications were sent to [facility] [place], [place] - [address] - [phone] [facility] [phone] FX  [address] [place] [address]    Phone: [phone]   · cefdinir 300 MG capsule     You can get these medications from any pharmacy    Bring a  paper prescription for each of these medications  · ALPRAZolam 0.25 MG tablet  · ALPRAZolam 0.5 MG tablet  · HYDROcodone-acetaminophen  MG per tablet         Diet Instructions     Diet: Consistent Carbohydrate, Cardiac      Discharge Diet:  Consistent Carbohydrate  Cardiac           No future appointments.   Follow-up Information     Skinny Meehan MD .    Specialty: Internal Medicine  Contact information:  [address] [place] [address]  [phone]                          TEST  RESULTS PENDING AT DISCHARGE  Pending Labs     Order Current Status    Blood Culture - Blood, Arm, Left Preliminary result    Blood Culture - Blood, Hand, Right Preliminary result           CODE STATUS  Code Status and Medical Interventions:   Ordered at: 02/12/22 2124     Level Of Support Discussed With:    Patient     Code Status (Patient has no pulse and is not breathing):    CPR (Attempt to Resuscitate)     Medical Interventions (Patient has pulse or is breathing):    Full Support       Howard Graham MD  [facility]  02/17/22  10:09 EST    Please note that this discharge summary required less than 30 minutes to complete.    Electronically signed by Howard Graham MD at 02/17/22 1015       Discharge Order (From admission, onward)     Start     Ordered    02/17/22 1001  Discharge patient  Once        Expected Discharge Date: 02/17/22    Expected Discharge Time: Midday    Discharge Disposition: Skilled Nursing Facility (DC - External)    Physician of Record for Attribution - Please select from Treatment Team: HOWARD GRAHAM [[id number]]    Review needed by CMO to determine Physician of Record: No       Question Answer Comment   Physician of Record for Attribution - Please select from Treatment Team HOWARD GRAHAM    Review needed by CMO to determine Physician of Record No        02/17/22 1006

## 2022-02-17 NOTE — PROGRESS NOTES
PROGRESS NOTE         Patient Identification:  Name:  Domonique Cummings  Age:  76 y.o.  Sex:  female  :  1945  MRN:  [id number]  Visit Number:  [id number]  Primary Care Provider:  Skinny Meehan MD         LOS: 5 days       ----------------------------------------------------------------------------------------------------------------------  Subjective       Chief Complaints:    Fall        Interval History:      Patient sitting up this morning eating breakfast.  No issues overnight.  Continues on 2 L per nasal cannula with no apparent distress.  COVID-19 PCR from 2022 negative.  Afebrile, no diarrhea.    Review of Systems:    Constitutional: no fever, chills and night sweats. No fatigue.  Eyes: no eye drainage, itching or redness.    HEENT: no mouth sores, dysphagia or nose bleed.  Respiratory: no for shortness of breath.  Mildly productive cough.  Cardiovascular: no chest pain, no palpitations, no orthopnea.  Gastrointestinal: no nausea, vomiting or diarrhea. No hematemesis or rectal bleeding.  Abdominal and right flank pain, improving.   Genitourinary: no dysuria or polyuria.  Hematologic/lymphatic: no lymph node abnormalities, no easy bruising or easy bleeding.  Musculoskeletal: Bilateral lower extremity pain.  Skin: No rash and no itching.  Neurological: no loss of consciousness, no seizure, no headache.  Behavioral/Psych: no depression or suicidal ideation.  Endocrine: no hot flashes.  Immunologic: negative.    ----------------------------------------------------------------------------------------------------------------------      Objective       Current [facility] Meds:  Acidophilus/Pectin, 1 capsule, Oral, Daily  ALPRAZolam, 0.25 mg, Oral, Daily  ALPRAZolam, 0.5 mg, Oral, Nightly  cefdinir, 300 mg, Oral, Q12H  cetirizine, 10 mg, Oral, Daily  Diclofenac Sodium, , Topical, BID  doxepin, 10 mg, Oral, Nightly  DULoxetine, 60 mg, Oral, Daily  flecainide, 50 mg, Oral, Q12H  fluticasone,  1 spray, Nasal, Daily  gabapentin, 400 mg, Oral, TID  guaiFENesin, 600 mg, Oral, BID  heparin (porcine), 5,000 Units, Subcutaneous, Q12H  insulin aspart, 0-7 Units, Subcutaneous, TID AC  lactulose, 20 g, Oral, Daily  levETIRAcetam, 500 mg, Oral, Nightly  multivitamin, 1 tablet, Oral, Daily  multivitamin with minerals, 1 tablet, Oral, BID  nystatin, 1 application, Topical, BID  OLANZapine, 2.5 mg, Oral, Nightly  oxybutynin XL, 10 mg, Oral, Daily  pantoprazole, 40 mg, Oral, QAM  sodium chloride, 3 mL, Intravenous, Q12H  vitamin D3, 5,000 Units, Oral, Daily         ----------------------------------------------------------------------------------------------------------------------    Vital Signs:  Temp:  [98.2 °F (36.8 °C)-98.8 °F (37.1 °C)] 98.7 °F (37.1 °C)  Heart Rate:  [61-95] 92  Resp:  [18-20] 20  BP: (104-146)/(56-75) 104/56  Mean Arterial Pressure (Non-Invasive) for the past 24 hrs (Last 3 readings):   Noninvasive MAP (mmHg)   02/17/22 1021 81   02/17/22 0700 92   02/17/22 0102 98     SpO2 Percentage    02/17/22 0716 02/17/22 0750 02/17/22 1021   SpO2: (P) 97% 96% 96%     SpO2:  [93 %-97 %] 96 %  on  Flow (L/min):  [2] 2;   Device (Oxygen Therapy): nasal cannula    Body mass index is 43.05 kg/m².  Wt Readings from Last 3 Encounters:   02/17/22 99.5 kg (219 lb 4.8 oz)   12/07/21 107 kg (236 lb)   03/20/21 111 kg (245 lb)        Intake/Output Summary (Last 24 hours) at 2/17/2022 1246  Last data filed at 2/17/2022 1021  Gross per 24 hour   Intake 1200 ml   Output 2400 ml   Net -1200 ml     Diet Regular  ----------------------------------------------------------------------------------------------------------------------      Physical Exam:    Constitutional: Elderly  female is resting comfortably in bed in no apparent distress.  HENT:  Head: Normocephalic and atraumatic.  Mouth:  Moist mucous membranes.    Eyes:  Conjunctivae and EOM are normal.  No scleral icterus.  Neck:  Neck supple.  No JVD present.     Cardiovascular:  Normal rate, regular rhythm and normal heart sounds with no murmur. No edema.  Pulmonary/Chest:  No respiratory distress, no wheezes, no crackles, with normal breath sounds and good air movement.  Abdominal:  Soft.  Bowel sounds are normal.  No distension and mild tenderness to palpation.  Musculoskeletal:  No edema, no tenderness, and no deformity.  No swelling or redness of joints.  Neurological:  Alert and oriented to person, place, and time.  No facial droop.  No slurred speech.   Skin:  Skin is warm and dry.  No rash noted.  No pallor.   Psychiatric:  Normal mood and affect.  Behavior is normal.  ----------------------------------------------------------------------------------------------------------------------  Results from last 7 days   Lab Units 02/12/22  1648   TROPONIN T ng/mL <0.010     Results from last 7 days   Lab Units 02/12/22  1648   PROBNP pg/mL 295.7         Results from last 7 days   Lab Units 02/16/22  0121 02/15/22  0258 02/14/22  0113 02/13/22  0405 02/12/22  1808 02/12/22  1648   CRP mg/dL  --  28.19*  --   --   --  18.66*   LACTATE mmol/L  --   --   --   --  1.0  --    WBC 10*3/mm3 10.78 10.51 23.87*   < >  --  24.21*   HEMOGLOBIN g/dL 11.3* 11.3* 10.6*   < >  --  11.9*   HEMATOCRIT % 36.0 36.1 32.9*   < >  --  36.0   MCV fL 94.5 95.8 94.8   < >  --  92.5   MCHC g/dL 31.4* 31.3* 32.2   < >  --  33.1   PLATELETS 10*3/mm3 231 179 182   < >  --  213    < > = values in this interval not displayed.     Results from last 7 days   Lab Units 02/16/22  0121 02/15/22  0258 02/14/22  0113 02/13/22  0405 02/13/22  0405 02/12/22  1648 02/12/22  1648   SODIUM mmol/L 139 140 133*   < > 137   < > 135*   POTASSIUM mmol/L 4.4 4.5 3.4*   < > 3.7   < > 4.1   MAGNESIUM mg/dL  --   --   --   --  1.9  --   --    CHLORIDE mmol/L 102 104 100   < > 102   < > 99   CO2 mmol/L 28.3 26.6 24.0   < > 24.2   < > 23.9   BUN mg/dL 13 16 15   < > 14   < > 14   CREATININE mg/dL 0.66 0.84 0.92   < > 0.88   <  > 0.86   EGFR IF NONAFRICN AM mL/min/1.73 87 66 59*   < > 62   < > 64   CALCIUM mg/dL 9.1 8.9 8.0*   < > 8.4*   < > 8.5*   GLUCOSE mg/dL 144* 134* 163*   < > 205*   < > 198*   ALBUMIN g/dL  --   --   --   --  2.95*  --  3.35*   BILIRUBIN mg/dL  --   --   --   --  0.4  --  0.4   ALK PHOS U/L  --   --   --   --  132*  --  115   AST (SGOT) U/L  --   --   --   --  20  --  23   ALT (SGPT) U/L  --   --   --   --  18  --  17    < > = values in this interval not displayed.   Estimated Creatinine Clearance: 65 mL/min (by C-G formula based on SCr of 0.66 mg/dL).  No results found for: AMMONIA    Glucose   Date/Time Value Ref Range Status   02/17/2022 1025 181 (H) 70 - 130 mg/dL Final     Comment:     Meter: [id number] : [id number] AZUL NYE   02/17/2022 0649 125 70 - 130 mg/dL Final     Comment:     Meter: [id number] : [id number] AZUL NYE   02/16/2022 1811 234 (H) 70 - 130 mg/dL Final     Comment:     Meter: [id number] : [id number] Latrice Klein   02/16/2022 1603 237 (H) 70 - 130 mg/dL Final     Comment:     Meter: [id number] : [id number] AZUL NYE   02/16/2022 0959 131 (H) 70 - 130 mg/dL Final     Comment:     Meter: [id number] : [id number] AZUL NYE   02/16/2022 0641 135 (H) 70 - 130 mg/dL Final     Comment:     Meter: [id number] : [id number] AZUL NYE   02/15/2022 1809 159 (H) 70 - 130 mg/dL Final     Comment:     Meter: [id number] : [id number] Latrice Klein   02/15/2022 1604 123 70 - 130 mg/dL Final     Comment:     Meter: [id number] : [id number] [facility]     Lab Results   Component Value Date    HGBA1C 7.30 (H) 02/13/2022     Lab Results   Component Value Date    TSH 0.770 02/13/2022    FREET4 0.77 (L) 06/25/2014       Blood Culture   Date Value Ref Range Status   02/12/2022 Gram Negative Bacilli (C)  Preliminary   02/12/2022 Gram Negative Bacilli (C)  Preliminary     Urine Culture   Date Value Ref Range Status   02/12/2022 >100,000 CFU/mL Gram Negative Bacilli (A)   Preliminary     No results found for: WOUNDCX  No results found for: STOOLCX  No results found for: RESPCX  Pain Management Panel       Pain Management Panel Latest Ref Rng & Units 12/11/2017    AMPHETAMINES SCREEN, URINE Negative Negative    BARBITURATES SCREEN Negative Negative    BENZODIAZEPINE SCREEN, URINE Negative Positive(A)    BUPRENORPHINEUR Negative Negative    COCAINE SCREEN, URINE Negative Negative    METHADONE SCREEN, URINE Negative Negative              ----------------------------------------------------------------------------------------------------------------------  Imaging Results (Last 24 Hours)       ** No results found for the last 24 hours. **            ----------------------------------------------------------------------------------------------------------------------    Assessment/Plan       Assessment/Plan     ASSESSMENT:    1.  Sepsis  2.  UTI  3.  Bacteremia    PLAN:    Patient sitting up this morning eating breakfast.  No issues overnight.  Continues on 2 L per nasal cannula with no apparent distress.  COVID-19 PCR from 2/16/2022 negative.  Afebrile, no diarrhea.    2 out of 2 blood cultures on 2/12/2022 finalized as E. coli. Blood cultures on 2/14/2022 are so far showing no growth.     Urinalysis on 2/12/2022 was positive with WBCs too numerous to count and 4+ bacteria. Urine culture on 2/12/2022 finalized is greater than 100,000 colonies of E. coli. CT chest on 2/12/2022 showed no displaced rib fracture suspected.  No pleural effusion or pneumothorax.  Study limited by lack of IV contrast medium and arm positioning.  Dependent atelectasis.  There is some peripheral increase in interstitial markings that could indicate some underlying pulmonary fibrosis.  This is similar to prior.  There is also thickening of the central bronchovascular soft tissue suggestive of underlying asthma or bronchitis.  Chest x-ray on 2/12/2022 showed worsening in the appearance of the chest.  Films  technically limited.  There is moderate cardiac silhouette enlargement and there are bibasilar infiltrates that appear to be new from prior.  Please correlate for clinical evidence of aspiration or pneumonia with follow-up to clearing recommended.  Additionally there is thickening of the central bronchovascular soft tissues that could reflect asthma or bronchitis.  Please exclude any clinical correlation for mild volume overload.  Mycoplasma pneumoniae antibody on 2/13/2022 is negative.  COVID-19 and flu A/B PCR on 2/12/2022 was negative.     Recommend to continue Rocephin 2 g IV every 24 hours for a total of 10 days of antibiotic therapy through 2/22/2022.  Patient could be deescalated to Omnicef if discharged. We will continue to follow closely.     Code Status:   Code Status and Medical Interventions:   Ordered at: 02/12/22 2124     Level Of Support Discussed With:    Patient     Code Status (Patient has no pulse and is not breathing):    CPR (Attempt to Resuscitate)     Medical Interventions (Patient has pulse or is breathing):    Full Support       DARREN Hubbard  02/17/22  12:46 EST

## 2022-02-17 NOTE — DISCHARGE SUMMARY
[facility] DISCHARGE SUMMARY    Patient Identification:  Name:  Domonique Cummings  Age:  76 y.o.  Sex:  female  :  1945  MRN:  [id number]  Visit Number:  [id number]    Date of Admission: 2022  Date of Discharge:  2022     PCP: Skinny Meehan MD    DISCHARGE DIAGNOSIS  Severe sepsis with E. coli bacteremia, UTI    History of dementia  Hypertension  Hyperlipidemia  Diabetes mellitus  Morbid obesity with BMI of 43.05 kg/m²  History of obstructive sleep apnea  History of esophageal stricture x2 with history of dysphagia  Seizure disorder  Anxiety neurosis  Chronic pain    CONSULTS   Dr. Cantu    PROCEDURES PERFORMED      HOSPITAL COURSE  Patient is a 76 y.o. female presented to [facility]   emergency department from the nursing home for after a fall.  Patient was admitted on  with a past history of dementia, hypertension, hyperlipidemia, diabetes mellitus.  Patient was not able to give details at the time of admission.  Patient states that when she stepped while she became dizzy lost her balance and fell.  Patient complained of pain all over.  Patient was tested for COVID-19 and this was negative.  Patient was found to have UTI and did have positive cultures for gram-negative rods.  Patient was initially placed on Merrem and vancomycin for both pneumonia and UTI she does have a history of ESBL E. coli UTIs.  Patient found to have like a set acute bacteremia with E. coli likely seeded from the urinary tract.  Patient was continued on Merrem and eventually a culture and sensitivity did show non-ESBL E. coli.  Patient was deescalated to Rocephin.  Patient has gradually improved during the admission.  Patient was febrile when she arrived and had findings consistent with severe sepsis.  Patient did have a white count of 24,000 when she arrived and CRP of 18.  At the time of discharge, patient with a white count of 10,700 BUN/creatinine of 16 and 0.84  patient was retested for Covid prior to transfer back to nursing home and it is negative.  Have recommended that patient continue Omnicef to complete treatment of infection.  Patient was seen by Dr. Cantu who recommended this treatment.      VITAL SIGNS:  Temp:  [98.2 °F (36.8 °C)-98.8 °F (37.1 °C)] (P) 98.8 °F (37.1 °C)  Heart Rate:  [61-95] 95  Resp:  [18-20] 20  BP: (126-146)/(61-75) 146/61  SpO2:  [93 %-97 %] 96 %  on  Flow (L/min):  [2] 2;   Device (Oxygen Therapy): nasal cannula    Body mass index is 43.05 kg/m².  Wt Readings from Last 3 Encounters:   02/17/22 99.5 kg (219 lb 4.8 oz)   12/07/21 107 kg (236 lb)   03/20/21 111 kg (245 lb)       PHYSICAL EXAM:  Constitutional: No acute distress  HEENT: Normocephalic atraumatic  Neck: Supple  Cardiovascular: Regular rate and rhythm  Pulmonary/Chest: Clear to auscultation  Abdominal: Positive bowel sounds soft.   Musculoskeletal: No arthropathy  Neurological: No focal deficits  Skin: No rash  Peripheral vascular:  Genitourinary::    DISCHARGE DISPOSITION   Stable    DISCHARGE MEDICATIONS:     Discharge Medications      New Medications      Instructions Start Date   cefdinir 300 MG capsule  Commonly known as: OMNICEF   300 mg, Oral, Every 12 Hours Scheduled         Continue These Medications      Instructions Start Date   albuterol sulfate  (90 Base) MCG/ACT inhaler  Commonly known as: PROVENTIL HFA;VENTOLIN HFA;PROAIR HFA   2 puffs, Inhalation, Every 8 Hours PRN      ALPRAZolam 0.25 MG tablet  Commonly known as: XANAX   0.25 mg, Oral, Daily      ALPRAZolam 0.5 MG tablet  Commonly known as: XANAX   0.5 mg, Oral, Nightly      calcium carbonate 500 MG chewable tablet  Commonly known as: TUMS   1 tablet, Oral, Every 6 Hours PRN      cycloSPORINE 0.05 % ophthalmic emulsion  Commonly known as: RESTASIS   1 drop, Both Eyes, 2 Times Daily PRN      Diclofenac Sodium 1 % gel gel  Commonly known as: VOLTAREN   4 g, Topical, 4 Times Daily PRN, Shoulders and Knees       doxepin 10 MG capsule  Commonly known as: SINEquan   10 mg, Oral, Nightly      DULoxetine 60 MG capsule  Commonly known as: CYMBALTA   60 mg, Oral, Daily      flecainide 50 MG tablet  Commonly known as: TAMBOCOR   50 mg, Oral, Every 12 Hours Scheduled      fluticasone 50 MCG/ACT nasal spray  Commonly known as: FLONASE   1 spray, Nasal, Daily      gabapentin 400 MG capsule  Commonly known as: NEURONTIN   400 mg, Oral, 3 Times Daily      guaiFENesin 600 MG 12 hr tablet  Commonly known as: MUCINEX   600 mg, Oral, 2 Times Daily      HYDROcodone-acetaminophen  MG per tablet  Commonly known as: NORCO   1 tablet, Oral, Every 8 Hours PRN      ICaps Areds 2 capsule   1 capsule, Oral, 2 Times Daily      lactulose 10 GM/15ML solution  Commonly known as: CHRONULAC   20 g, Oral, Daily      lactulose 10 GM/15ML solution  Commonly known as: CHRONULAC   20 g, Oral, 2 Times Daily PRN      levETIRAcetam 500 MG tablet  Commonly known as: KEPPRA   500 mg, Oral, Nightly      loratadine 10 MG tablet  Commonly known as: CLARITIN   10 mg, Oral, Daily      multivitamin tablet tablet  Generic drug: multivitamin   1 tablet, Oral, Daily      nystatin 564844 UNIT/GM powder  Commonly known as: MYCOSTATIN   1 application, Topical, 2 Times Daily, Apply underneath abdominal folds.      OLANZapine 2.5 MG tablet  Commonly known as: zyPREXA   2.5 mg, Oral, Nightly      omeprazole 40 MG capsule  Commonly known as: priLOSEC   40 mg, Oral, Daily      oxybutynin XL 10 MG 24 hr tablet  Commonly known as: DITROPAN-XL   10 mg, Oral, Daily      Reguloid 400 MG capsule  Generic drug: Psyllium   2 capsules, Oral, Daily      Risaquad-2 capsule capsule   1 capsule, Oral, Daily      sodium chloride 0.65 % nasal spray   1 spray, Nasal, Every 1 Hour PRN      vitamin D3 125 MCG (5000 UT) capsule capsule   5,000 Units, Oral, Daily         Stop These Medications    amLODIPine 10 MG tablet  Commonly known as: NORVASC     furosemide 20 MG tablet  Commonly known  as: LASIX     potassium chloride 10 MEQ CR tablet  Commonly known as: K-DUR,KLOR-CON             Your medication list      START taking these medications      Instructions Last Dose Given Next Dose Due   cefdinir 300 MG capsule  Commonly known as: OMNICEF      Take 1 capsule by mouth Every 12 (Twelve) Hours for 12 doses. Indications: Infection with Dysregulated Inflammatory Response          CONTINUE taking these medications      Instructions Last Dose Given Next Dose Due   albuterol sulfate  (90 Base) MCG/ACT inhaler  Commonly known as: PROVENTIL HFA;VENTOLIN HFA;PROAIR HFA      Inhale 2 puffs Every 8 (Eight) Hours As Needed for Wheezing.       ALPRAZolam 0.25 MG tablet  Commonly known as: XANAX      Take 1 tablet by mouth Daily for 3 days.       ALPRAZolam 0.5 MG tablet  Commonly known as: XANAX      Take 1 tablet by mouth Every Night for 3 days.       calcium carbonate 500 MG chewable tablet  Commonly known as: TUMS      Chew 1 tablet Every 6 (Six) Hours As Needed for Indigestion or Heartburn.       cycloSPORINE 0.05 % ophthalmic emulsion  Commonly known as: RESTASIS      Administer 1 drop to both eyes 2 (Two) Times a Day As Needed (dry eye).       Diclofenac Sodium 1 % gel gel  Commonly known as: VOLTAREN      Apply 4 g topically to the appropriate area as directed 4 (Four) Times a Day As Needed (Arthritis Pain). Shoulders and Knees       doxepin 10 MG capsule  Commonly known as: SINEquan      Take 10 mg by mouth Every Night.       DULoxetine 60 MG capsule  Commonly known as: CYMBALTA      Take 60 mg by mouth Daily.       flecainide 50 MG tablet  Commonly known as: TAMBOCOR      Take 1 tablet by mouth Every 12 (Twelve) Hours.       fluticasone 50 MCG/ACT nasal spray  Commonly known as: FLONASE      1 spray into the nostril(s) as directed by provider Daily.       gabapentin 400 MG capsule  Commonly known as: NEURONTIN      Take 1 capsule by mouth 3 (Three) Times a Day.       guaiFENesin 600 MG 12 hr  tablet  Commonly known as: MUCINEX      Take 600 mg by mouth 2 (Two) Times a Day.       HYDROcodone-acetaminophen  MG per tablet  Commonly known as: NORCO      Take 1 tablet by mouth Every 8 (Eight) Hours As Needed for Moderate Pain  for up to 3 days.       ICaps Areds 2 capsule      Take 1 capsule by mouth 2 (Two) Times a Day.       lactulose 10 GM/15ML solution  Commonly known as: CHRONULAC      Take 20 g by mouth Daily.       lactulose 10 GM/15ML solution  Commonly known as: CHRONULAC      Take 20 g by mouth 2 (Two) Times a Day As Needed (Constipation).       levETIRAcetam 500 MG tablet  Commonly known as: KEPPRA      Take 500 mg by mouth Every Night.       loratadine 10 MG tablet  Commonly known as: CLARITIN      Take 10 mg by mouth Daily.       multivitamin tablet tablet  Generic drug: multivitamin      Take 1 tablet by mouth Daily.       nystatin 018435 UNIT/GM powder  Commonly known as: MYCOSTATIN      Apply 1 application topically to the appropriate area as directed 2 (Two) Times a Day. Apply underneath abdominal folds.       OLANZapine 2.5 MG tablet  Commonly known as: zyPREXA      Take 1 tablet by mouth Every Night.       omeprazole 40 MG capsule  Commonly known as: priLOSEC      Take 40 mg by mouth Daily.       oxybutynin XL 10 MG 24 hr tablet  Commonly known as: DITROPAN-XL      Take 10 mg by mouth Daily.       Reguloid 400 MG capsule  Generic drug: Psyllium      Take 2 capsules by mouth Daily.       Risaquad-2 capsule capsule      Take 1 capsule by mouth Daily.       sodium chloride 0.65 % nasal spray      1 spray into the nostril(s) as directed by provider Every 1 (One) Hour As Needed for Congestion.       vitamin D3 125 MCG (5000 UT) capsule capsule      Take 5,000 Units by mouth Daily.          STOP taking these medications    amLODIPine 10 MG tablet  Commonly known as: NORVASC        furosemide 20 MG tablet  Commonly known as: LASIX        potassium chloride 10 MEQ CR tablet  Commonly known  as: RACHELE CHEN              Where to Get Your Medications      These medications were sent to [facility] - [place], [place] - [address] - [phone] [facility] [phone] FX  [address], [facility] [address]    Phone: [phone]   · cefdinir 300 MG capsule     You can get these medications from any pharmacy    Bring a paper prescription for each of these medications  · ALPRAZolam 0.25 MG tablet  · ALPRAZolam 0.5 MG tablet  · HYDROcodone-acetaminophen  MG per tablet         Diet Instructions     Diet: Consistent Carbohydrate, Cardiac      Discharge Diet:  Consistent Carbohydrate  Cardiac           No future appointments.   Follow-up Information     Skinny Meehan MD .    Specialty: Internal Medicine  Contact information:  [address] [place] [address] [phone]                          TEST  RESULTS PENDING AT DISCHARGE  Pending Labs     Order Current Status    Blood Culture - Blood, Arm, Left Preliminary result    Blood Culture - Blood, Hand, Right Preliminary result           CODE STATUS  Code Status and Medical Interventions:   Ordered at: 02/12/22 4650     Level Of Support Discussed With:    Patient     Code Status (Patient has no pulse and is not breathing):    CPR (Attempt to Resuscitate)     Medical Interventions (Patient has pulse or is breathing):    Full Support       Howard Brown MD  [facility]  02/17/22  10:09 EST    Please note that this discharge summary required less than 30 minutes to complete.

## 2022-02-19 LAB
BACTERIA SPEC AEROBE CULT: NORMAL
BACTERIA SPEC AEROBE CULT: NORMAL

## 2022-02-21 ENCOUNTER — PATIENT OUTREACH (OUTPATIENT)
Dept: CASE MANAGEMENT | Facility: OTHER | Age: 77
End: 2022-02-21

## 2022-02-21 NOTE — OUTREACH NOTE
Ambulatory Case Management Note    SNF Follow-up    Questions/Answers      Responses   Acute Facility Discharged From [facility]   Acute Discharge Date 02/17/22   Name of the Skilled Nursing Facility? [facility]   Purpose of SNF Admission LTC   Who is the insurance provider or payor of patient stay? Medicaid   Progression of Patient? Patient returned to facility for continued LTC following a 5 day acute event at [facility] (pneumonia).   Where was the patient discharged to? LTC          Katty Rouse RN  Ambulatory Case Management    2/21/2022, 10:08 EST

## 2022-02-26 ENCOUNTER — APPOINTMENT (OUTPATIENT)
Dept: CT IMAGING | Facility: HOSPITAL | Age: 77
End: 2022-02-26

## 2022-02-26 ENCOUNTER — HOSPITAL ENCOUNTER (EMERGENCY)
Facility: HOSPITAL | Age: 77
Discharge: HOME | End: 2022-02-26
Attending: STUDENT IN AN ORGANIZED HEALTH CARE EDUCATION/TRAINING PROGRAM | Admitting: STUDENT IN AN ORGANIZED HEALTH CARE EDUCATION/TRAINING PROGRAM

## 2022-02-26 ENCOUNTER — APPOINTMENT (OUTPATIENT)
Dept: GENERAL RADIOLOGY | Facility: HOSPITAL | Age: 77
End: 2022-02-26

## 2022-02-26 VITALS
HEIGHT: 65 IN | TEMPERATURE: 98.2 F | RESPIRATION RATE: 18 BRPM | OXYGEN SATURATION: 100 % | HEART RATE: 87 BPM | WEIGHT: 215 LBS | BODY MASS INDEX: 35.82 KG/M2 | DIASTOLIC BLOOD PRESSURE: 50 MMHG | SYSTOLIC BLOOD PRESSURE: 110 MMHG
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
## 2022-02-26 DIAGNOSIS — S70.01XA CONTUSION OF RIGHT HIP, INITIAL ENCOUNTER: Primary | ICD-10-CM

## 2022-02-26 PROCEDURE — 73090 X-RAY EXAM OF FOREARM: CPT

## 2022-02-26 PROCEDURE — 73700 CT LOWER EXTREMITY W/O DYE: CPT

## 2022-02-26 PROCEDURE — 93005 ELECTROCARDIOGRAM TRACING: CPT | Performed by: STUDENT IN AN ORGANIZED HEALTH CARE EDUCATION/TRAINING PROGRAM

## 2022-02-26 PROCEDURE — 99283 EMERGENCY DEPT VISIT LOW MDM: CPT

## 2022-02-26 PROCEDURE — 93010 ELECTROCARDIOGRAM REPORT: CPT | Performed by: INTERNAL MEDICINE

## 2022-02-26 PROCEDURE — 73502 X-RAY EXAM HIP UNI 2-3 VIEWS: CPT

## 2022-02-26 PROCEDURE — 73562 X-RAY EXAM OF KNEE 3: CPT

## 2022-02-26 PROCEDURE — 73030 X-RAY EXAM OF SHOULDER: CPT

## 2022-02-27 LAB
QT INTERVAL: 374 MS
QTC INTERVAL: 447 MS

## 2022-05-13 ENCOUNTER — APPOINTMENT (OUTPATIENT)
Dept: GENERAL RADIOLOGY | Facility: HOSPITAL | Age: 77
End: 2022-05-13

## 2022-05-13 ENCOUNTER — APPOINTMENT (OUTPATIENT)
Dept: CT IMAGING | Facility: HOSPITAL | Age: 77
End: 2022-05-13

## 2022-05-13 ENCOUNTER — HOSPITAL ENCOUNTER (EMERGENCY)
Facility: HOSPITAL | Age: 77
Discharge: NURSING, REHAB OR LONG-TERM CARE | End: 2022-05-13
Attending: EMERGENCY MEDICINE | Admitting: EMERGENCY MEDICINE

## 2022-05-13 VITALS
RESPIRATION RATE: 16 BRPM | OXYGEN SATURATION: 95 % | TEMPERATURE: 97 F | WEIGHT: 215 LBS | HEIGHT: 65 IN | DIASTOLIC BLOOD PRESSURE: 95 MMHG | SYSTOLIC BLOOD PRESSURE: 168 MMHG | HEART RATE: 89 BPM | BODY MASS INDEX: 35.82 KG/M2

## 2022-05-13 DIAGNOSIS — S16.1XXA STRAIN OF NECK MUSCLE, INITIAL ENCOUNTER: ICD-10-CM

## 2022-05-13 DIAGNOSIS — S09.90XA MINOR CLOSED HEAD INJURY: Primary | ICD-10-CM

## 2022-05-13 DIAGNOSIS — S30.0XXA CONTUSION OF COCCYX, INITIAL ENCOUNTER: ICD-10-CM

## 2022-05-13 DIAGNOSIS — S20.219A CONTUSION OF RIB, UNSPECIFIED LATERALITY, INITIAL ENCOUNTER: ICD-10-CM

## 2022-05-13 LAB
ALBUMIN SERPL-MCNC: 3.56 G/DL (ref 3.5–5.2)
ALBUMIN/GLOB SERPL: 0.9 G/DL
ALP SERPL-CCNC: 108 U/L (ref 39–117)
ALT SERPL W P-5'-P-CCNC: 10 U/L (ref 1–33)
ANION GAP SERPL CALCULATED.3IONS-SCNC: 10.5 MMOL/L (ref 5–15)
AST SERPL-CCNC: 13 U/L (ref 1–32)
BASOPHILS # BLD AUTO: 0.03 10*3/MM3 (ref 0–0.2)
BASOPHILS NFR BLD AUTO: 0.2 % (ref 0–1.5)
BILIRUB SERPL-MCNC: 0.3 MG/DL (ref 0–1.2)
BUN SERPL-MCNC: 12 MG/DL (ref 8–23)
BUN/CREAT SERPL: 16.9 (ref 7–25)
CALCIUM SPEC-SCNC: 9 MG/DL (ref 8.6–10.5)
CHLORIDE SERPL-SCNC: 102 MMOL/L (ref 98–107)
CO2 SERPL-SCNC: 25.5 MMOL/L (ref 22–29)
CREAT SERPL-MCNC: 0.71 MG/DL (ref 0.57–1)
DEPRECATED RDW RBC AUTO: 41.1 FL (ref 37–54)
EGFRCR SERPLBLD CKD-EPI 2021: 87.7 ML/MIN/1.73
EOSINOPHIL # BLD AUTO: 0.14 10*3/MM3 (ref 0–0.4)
EOSINOPHIL NFR BLD AUTO: 1.2 % (ref 0.3–6.2)
ERYTHROCYTE [DISTWIDTH] IN BLOOD BY AUTOMATED COUNT: 12.3 % (ref 12.3–15.4)
GLOBULIN UR ELPH-MCNC: 4 GM/DL
GLUCOSE SERPL-MCNC: 98 MG/DL (ref 65–99)
HCT VFR BLD AUTO: 37.6 % (ref 34–46.6)
HGB BLD-MCNC: 12.6 G/DL (ref 12–15.9)
HOLD SPECIMEN: NORMAL
HOLD SPECIMEN: NORMAL
IMM GRANULOCYTES # BLD AUTO: 0.03 10*3/MM3 (ref 0–0.05)
IMM GRANULOCYTES NFR BLD AUTO: 0.2 % (ref 0–0.5)
LYMPHOCYTES # BLD AUTO: 2.54 10*3/MM3 (ref 0.7–3.1)
LYMPHOCYTES NFR BLD AUTO: 21.1 % (ref 19.6–45.3)
MCH RBC QN AUTO: 30.6 PG (ref 26.6–33)
MCHC RBC AUTO-ENTMCNC: 33.5 G/DL (ref 31.5–35.7)
MCV RBC AUTO: 91.3 FL (ref 79–97)
MONOCYTES # BLD AUTO: 0.98 10*3/MM3 (ref 0.1–0.9)
MONOCYTES NFR BLD AUTO: 8.1 % (ref 5–12)
NEUTROPHILS NFR BLD AUTO: 69.2 % (ref 42.7–76)
NEUTROPHILS NFR BLD AUTO: 8.34 10*3/MM3 (ref 1.7–7)
NRBC BLD AUTO-RTO: 0 /100 WBC (ref 0–0.2)
PLATELET # BLD AUTO: 259 10*3/MM3 (ref 140–450)
PMV BLD AUTO: 9.5 FL (ref 6–12)
POTASSIUM SERPL-SCNC: 4.2 MMOL/L (ref 3.5–5.2)
PROT SERPL-MCNC: 7.6 G/DL (ref 6–8.5)
QT INTERVAL: 394 MS
QTC INTERVAL: 474 MS
RBC # BLD AUTO: 4.12 10*6/MM3 (ref 3.77–5.28)
SODIUM SERPL-SCNC: 138 MMOL/L (ref 136–145)
TROPONIN T SERPL-MCNC: <0.01 NG/ML (ref 0–0.03)
WBC NRBC COR # BLD: 12.06 10*3/MM3 (ref 3.4–10.8)
WHOLE BLOOD HOLD COAG: NORMAL
WHOLE BLOOD HOLD SPECIMEN: NORMAL

## 2022-05-13 PROCEDURE — 73523 X-RAY EXAM HIPS BI 5/> VIEWS: CPT | Performed by: RADIOLOGY

## 2022-05-13 PROCEDURE — 72125 CT NECK SPINE W/O DYE: CPT | Performed by: RADIOLOGY

## 2022-05-13 PROCEDURE — 99284 EMERGENCY DEPT VISIT MOD MDM: CPT

## 2022-05-13 PROCEDURE — 99283 EMERGENCY DEPT VISIT LOW MDM: CPT

## 2022-05-13 PROCEDURE — 72128 CT CHEST SPINE W/O DYE: CPT

## 2022-05-13 PROCEDURE — 36415 COLL VENOUS BLD VENIPUNCTURE: CPT

## 2022-05-13 PROCEDURE — 71111 X-RAY EXAM RIBS/CHEST4/> VWS: CPT

## 2022-05-13 PROCEDURE — 72131 CT LUMBAR SPINE W/O DYE: CPT

## 2022-05-13 PROCEDURE — 84484 ASSAY OF TROPONIN QUANT: CPT | Performed by: EMERGENCY MEDICINE

## 2022-05-13 PROCEDURE — 72131 CT LUMBAR SPINE W/O DYE: CPT | Performed by: RADIOLOGY

## 2022-05-13 PROCEDURE — 72220 X-RAY EXAM SACRUM TAILBONE: CPT

## 2022-05-13 PROCEDURE — 72220 X-RAY EXAM SACRUM TAILBONE: CPT | Performed by: RADIOLOGY

## 2022-05-13 PROCEDURE — 85025 COMPLETE CBC W/AUTO DIFF WBC: CPT | Performed by: EMERGENCY MEDICINE

## 2022-05-13 PROCEDURE — 93005 ELECTROCARDIOGRAM TRACING: CPT | Performed by: EMERGENCY MEDICINE

## 2022-05-13 PROCEDURE — 80053 COMPREHEN METABOLIC PANEL: CPT | Performed by: EMERGENCY MEDICINE

## 2022-05-13 PROCEDURE — 72125 CT NECK SPINE W/O DYE: CPT

## 2022-05-13 PROCEDURE — 72128 CT CHEST SPINE W/O DYE: CPT | Performed by: RADIOLOGY

## 2022-05-13 PROCEDURE — 70450 CT HEAD/BRAIN W/O DYE: CPT | Performed by: RADIOLOGY

## 2022-05-13 PROCEDURE — 71111 X-RAY EXAM RIBS/CHEST4/> VWS: CPT | Performed by: RADIOLOGY

## 2022-05-13 PROCEDURE — 73523 X-RAY EXAM HIPS BI 5/> VIEWS: CPT

## 2022-05-13 PROCEDURE — 70450 CT HEAD/BRAIN W/O DYE: CPT

## 2022-05-13 RX ORDER — HYDROCODONE BITARTRATE AND ACETAMINOPHEN 5; 325 MG/1; MG/1
1 TABLET ORAL ONCE
Status: COMPLETED | OUTPATIENT
Start: 2022-05-13 | End: 2022-05-13

## 2022-05-13 RX ORDER — HYDROCODONE BITARTRATE AND ACETAMINOPHEN 5; 325 MG/1; MG/1
1 TABLET ORAL EVERY 8 HOURS PRN
Qty: 9 TABLET | Refills: 0 | Status: SHIPPED | OUTPATIENT
Start: 2022-05-13 | End: 2022-05-16

## 2022-05-13 RX ADMIN — HYDROCODONE BITARTRATE AND ACETAMINOPHEN 1 TABLET: 5; 325 TABLET ORAL at 16:00

## 2022-05-13 NOTE — ED PROVIDER NOTES
Subjective   Patient is a 76-year-old female who presents from a local nursing home after a witnessed fall.  Reportedly the patient went to sit in her wheelchair, did not get fully up into the chair, sat on the edge instead.  Nursing home reported that the wheelchair went backwards and the patient went to the floor, initially striking her bottom and then the right side of her head.  Patient denies any loss of consciousness.  She reports a mild right-sided headache.  She reports pain in the back of her neck, to a lesser extent throughout her back.  She also reports pain in her ribs bilaterally, right greater than left, and in her tailbone.  She denies other symptoms, other injuries or other complaints.          Review of Systems   All other systems reviewed and are negative.      Past Medical History:   Diagnosis Date   • Arthritis    • Asthma    • Chronic headaches 10/31/2018   • Degenerative disc disease, lumbar    • Dementia ([facility])    • E coli bacteremia 11/2019    Not ESBL   • Fibromyalgia    • GERD (gastroesophageal reflux disease)    • Hyperlipidemia    • Hypertension    • Mixed stress and urge urinary incontinence 1/22/2020   • MRSA pneumonia ([facility]) 12/18/2017   • ENE (obstructive sleep apnea)     Noncompliant with BiPAP/CPAP   • Osteoporosis    • Type 2 diabetes mellitus ([facility])    • UTI due to extended-spectrum beta lactamase (ESBL) producing Escherichia coli 2021    In both July and November of 2021       Allergies   Allergen Reactions   • Biaxin [Clarithromycin] Other (See Comments)     unknown       Past Surgical History:   Procedure Laterality Date   • APPENDECTOMY     • CARDIAC CATHETERIZATION N/A 10/30/2018    Procedure: Left Heart Cath;  Surgeon: Arturo Inman MD;  Location: [facility] CATH INVASIVE LOCATION;  Service: Cardiology   • ESOPHAGEAL DILATATION     • TOTAL SHOULDER REPLACEMENT Bilateral    • TUBAL ABDOMINAL LIGATION         Family History   Problem Relation Age of Onset   • Breast cancer Sister         Social History     Socioeconomic History   • Marital status:    Tobacco Use   • Smoking status: Former Smoker   • Smokeless tobacco: Never Used   • Tobacco comment: She quit at least 30 years ago.   Substance and Sexual Activity   • Alcohol use: No   • Drug use: No   • Sexual activity: Defer           Objective   Physical Exam  Vitals and nursing note reviewed.   Constitutional:       General: She is not in acute distress.     Appearance: Normal appearance. She is well-developed. She is not ill-appearing, toxic-appearing or diaphoretic.      Comments: Pleasant elderly female, alert and oriented, in good spirits, in no apparent acute distress.   HENT:      Head: Normocephalic.      Comments: Mild tenderness right upper parietal area.  Eyes:      General: No scleral icterus.     Pupils: Pupils are equal, round, and reactive to light.   Neck:      Trachea: No tracheal deviation.   Cardiovascular:      Rate and Rhythm: Normal rate and regular rhythm.   Pulmonary:      Effort: Pulmonary effort is normal. No respiratory distress.      Breath sounds: Normal breath sounds.   Chest:      Comments: Mild tenderness right lateral mid ribs, no deformity, no other chest tenderness.  Abdominal:      General: Bowel sounds are normal.      Palpations: Abdomen is soft.      Tenderness: There is no abdominal tenderness. There is no guarding or rebound.   Musculoskeletal:         General: Normal range of motion.      Cervical back: Normal range of motion and neck supple. Tenderness (Mild diffuse posterior cervical tenderness) present. No rigidity.      Right lower leg: No edema.      Left lower leg: No edema.      Comments: Tenderness of the sacrum and coccyx.  No thoracic or lumbar tenderness.  Pelvis is stable and nontender.  The hips are nontender.  The remainder of all extremities are nontender.  No other musculoskeletal tenderness.   Skin:     General: Skin is warm and dry.      Capillary Refill: Capillary refill  takes less than 2 seconds.      Coloration: Skin is not pale.   Neurological:      General: No focal deficit present.      Mental Status: She is alert and oriented to person, place, and time.      GCS: GCS eye subscore is 4. GCS verbal subscore is 5. GCS motor subscore is 6.      Motor: No abnormal muscle tone.   Psychiatric:         Mood and Affect: Mood normal.         Behavior: Behavior normal.         Procedures   EKG shows sinus rhythm, rate 87.  NY interval 226, QRS duration 102, QTc 474 ms.  Nonspecific ST-T changes.  No evidence for STEMI.  XR Sacrum & Coccyx   Final Result     No acute findings in the sacrum or coccyx.       This report was finalized on 5/13/2022 3:42 PM by Dr. Tacho Olivas MD.          XR Hips Bilateral With or Without Pelvis 5 View   Final Result     No acute findings in the bilateral hips.       This report was finalized on 5/13/2022 3:42 PM by Dr. Tacho Olivas MD.          XR Ribs Bilateral 4+ View With PA Chest   Final Result     No acute findings in the chest or bilateral ribs.       This report was finalized on 5/13/2022 3:42 PM by Dr. Tacho Olivas MD.          CT Lumbar Spine Without Contrast   Final Result     Degenerative changes lumbar spine as described.       This report was finalized on 5/13/2022 3:44 PM by Dr. Tacho Olivas MD.          CT Thoracic Spine Without Contrast   Final Result     No acute findings in the thoracic spine.       This report was finalized on 5/13/2022 3:44 PM by Dr. Tacho Olivas MD.          CT Head Without Contrast   Final Result     Unremarkable exam demonstrating no CT evidence of acute intracranial   findings.       This report was finalized on 5/13/2022 3:43 PM by Dr. Tacho Olivas MD.          CT Cervical Spine Without Contrast   Final Result     Degenerative changes cervical spine as described.       This report was finalized on 5/13/2022 3:43 PM by Dr. Tacho Olivas MD.            Results for orders placed or performed during the hospital  encounter of 05/13/22   Comprehensive Metabolic Panel    Specimen: Arm, Left; Blood   Result Value Ref Range    Glucose 98 65 - 99 mg/dL    BUN 12 8 - 23 mg/dL    Creatinine 0.71 0.57 - 1.00 mg/dL    Sodium 138 136 - 145 mmol/L    Potassium 4.2 3.5 - 5.2 mmol/L    Chloride 102 98 - 107 mmol/L    CO2 25.5 22.0 - 29.0 mmol/L    Calcium 9.0 8.6 - 10.5 mg/dL    Total Protein 7.6 6.0 - 8.5 g/dL    Albumin 3.56 3.50 - 5.20 g/dL    ALT (SGPT) 10 1 - 33 U/L    AST (SGOT) 13 1 - 32 U/L    Alkaline Phosphatase 108 39 - 117 U/L    Total Bilirubin 0.3 0.0 - 1.2 mg/dL    Globulin 4.0 gm/dL    A/G Ratio 0.9 g/dL    BUN/Creatinine Ratio 16.9 7.0 - 25.0    Anion Gap 10.5 5.0 - 15.0 mmol/L    eGFR 87.7 >60.0 mL/min/1.73   Troponin    Specimen: Arm, Left; Blood   Result Value Ref Range    Troponin T <0.010 0.000 - 0.030 ng/mL   CBC Auto Differential    Specimen: Arm, Left; Blood   Result Value Ref Range    WBC 12.06 (H) 3.40 - 10.80 10*3/mm3    RBC 4.12 3.77 - 5.28 10*6/mm3    Hemoglobin 12.6 12.0 - 15.9 g/dL    Hematocrit 37.6 34.0 - 46.6 %    MCV 91.3 79.0 - 97.0 fL    MCH 30.6 26.6 - 33.0 pg    MCHC 33.5 31.5 - 35.7 g/dL    RDW 12.3 12.3 - 15.4 %    RDW-SD 41.1 37.0 - 54.0 fl    MPV 9.5 6.0 - 12.0 fL    Platelets 259 140 - 450 10*3/mm3    Neutrophil % 69.2 42.7 - 76.0 %    Lymphocyte % 21.1 19.6 - 45.3 %    Monocyte % 8.1 5.0 - 12.0 %    Eosinophil % 1.2 0.3 - 6.2 %    Basophil % 0.2 0.0 - 1.5 %    Immature Grans % 0.2 0.0 - 0.5 %    Neutrophils, Absolute 8.34 (H) 1.70 - 7.00 10*3/mm3    Lymphocytes, Absolute 2.54 0.70 - 3.10 10*3/mm3    Monocytes, Absolute 0.98 (H) 0.10 - 0.90 10*3/mm3    Eosinophils, Absolute 0.14 0.00 - 0.40 10*3/mm3    Basophils, Absolute 0.03 0.00 - 0.20 10*3/mm3    Immature Grans, Absolute 0.03 0.00 - 0.05 10*3/mm3    nRBC 0.0 0.0 - 0.2 /100 WBC   Green Top (Gel)   Result Value Ref Range    Extra Tube Hold for add-ons.    Lavender Top   Result Value Ref Range    Extra Tube hold for add-on    Gold Top - SST    Result Value Ref Range    Extra Tube Hold for add-ons.    Light Blue Top   Result Value Ref Range    Extra Tube Hold for add-ons.                 ED Course  ED Course as of 05/13/22 1934   Fri May 13, 2022   1924 Patient's emergency department stay has been uneventful.  She has shown no signs of acute distress.  We discussed her test results and her plan of care.  She voices understanding and agreement. [CM]      ED Course User Index  [CM] Eddi Kirkland MD                                                 [facility]    Final diagnoses:   Minor closed head injury   Strain of neck muscle, initial encounter   Contusion of rib, unspecified laterality, initial encounter   Contusion of coccyx, initial encounter       ED Disposition  ED Disposition     ED Disposition   Discharge    Condition   Stable    Comment   --             Skinny Meehan MD  [address] [facility] [id number] [phone]      Follow-up Monday for [facility] Emergency Department  [address] [place] [id number] [phone]  Go to   If symptoms worsen         Medication List      New Prescriptions    HYDROcodone-acetaminophen 5-325 MG per tablet  Commonly known as: NORCO  Take 1 tablet by mouth Every 8 (Eight) Hours As Needed (Pain) for up to 3 days.           Where to Get Your Medications      These medications were sent to [facility] [place], [place] - [facility] - [phone] [facility] [phone]   [address] [facility] [facility] [address]    Phone: [phone]   · HYDROcodone-acetaminophen 5-325 MG per tablet       Please note that portions of this note were completed with a voice recognition program.        Eddi Kirkland MD  05/13/22 1934

## 2022-05-13 NOTE — ED NOTES
[facility]ms has been called for transport back to nursing home. Daughter called and updated per pt request.

## 2022-05-13 NOTE — DISCHARGE INSTRUCTIONS
Follow-up with Dr. Meehan on Monday at the nursing [place].  Medications as prescribed.  Return to the emergency department right away if symptoms worsen/any problems.

## 2022-05-13 NOTE — ED NOTES
Pt being transported [facility]. Pt VSS, RR even and unlabored. PING.

## 2022-05-26 ENCOUNTER — OFFICE VISIT (OUTPATIENT)
Dept: UROLOGY | Facility: CLINIC | Age: 77
End: 2022-05-26

## 2022-05-26 VITALS — WEIGHT: 215 LBS | HEIGHT: 65 IN | BODY MASS INDEX: 35.82 KG/M2

## 2022-05-26 DIAGNOSIS — N39.46 MIXED STRESS AND URGE URINARY INCONTINENCE: Primary | ICD-10-CM

## 2022-05-26 PROCEDURE — 99213 OFFICE O/P EST LOW 20 MIN: CPT | Performed by: UROLOGY

## 2022-06-15 NOTE — PROGRESS NOTES
Chief Complaint:          Chief Complaint   Patient presents with   • Urinary Tract Infection     Recurrent    • Follow-up   • Urinary Frequency     PT said she is emptying but has to go and can't hold it within a few minutes        HPI:   76 y.o. female nursing home patient here for follow-up she has missed  5 appointment she gets up 5 times at night he does not wear catheter I Skylar finally set her up for a cystoscopy and complete the work-up.    Past Medical History:        Past Medical History:   Diagnosis Date   • Arthritis    • Asthma    • Chronic headaches 10/31/2018   • Degenerative disc disease, lumbar    • Dementia ([facility])    • E coli bacteremia 11/2019    Not ESBL   • Fibromyalgia    • GERD (gastroesophageal reflux disease)    • Hyperlipidemia    • Hypertension    • Mixed stress and urge urinary incontinence 1/22/2020   • MRSA pneumonia ([facility]) 12/18/2017   • ENE (obstructive sleep apnea)     Noncompliant with BiPAP/CPAP   • Osteoporosis    • Type 2 diabetes mellitus ([facility])    • UTI due to extended-spectrum beta lactamase (ESBL) producing Escherichia coli 2021    In both July and November of 2021         Current Meds:     Current Outpatient Medications   Medication Sig Dispense Refill   • albuterol sulfate  (90 Base) MCG/ACT inhaler Inhale 2 puffs Every 8 (Eight) Hours As Needed for Wheezing.     • calcium carbonate (TUMS) 500 MG chewable tablet Chew 1 tablet Every 6 (Six) Hours As Needed for Indigestion or Heartburn.     • cycloSPORINE (RESTASIS) 0.05 % ophthalmic emulsion Administer 1 drop to both eyes 2 (Two) Times a Day As Needed (dry eye).     • Diclofenac Sodium (VOLTAREN) 1 % gel gel Apply 4 g topically to the appropriate area as directed 4 (Four) Times a Day As Needed (Arthritis Pain). Shoulders and Knees     • doxepin (SINEquan) 10 MG capsule Take 10 mg by mouth Every Night.     • DULoxetine (CYMBALTA) 60 MG capsule Take 60 mg by mouth Daily.     • flecainide (TAMBOCOR) 50 MG tablet Take 1  tablet by mouth Every 12 (Twelve) Hours. 60 tablet 3   • fluticasone (FLONASE) 50 MCG/ACT nasal spray 1 spray into the nostril(s) as directed by provider Daily.     • gabapentin (NEURONTIN) 400 MG capsule Take 1 capsule by mouth 3 (Three) Times a Day for 3 days. 9 capsule 0   • guaiFENesin (MUCINEX) 600 MG 12 hr tablet Take 600 mg by mouth 2 (Two) Times a Day.     • insulin lispro (HumaLOG) 100 UNIT/ML injection Inject 5 Units under the skin into the appropriate area as directed 3 (Three) Times a Day Before Meals.  12   • lactulose (CHRONULAC) 10 GM/15ML solution Take 20 g by mouth Daily.     • lactulose (CHRONULAC) 10 GM/15ML solution Take 20 g by mouth 2 (Two) Times a Day As Needed (Constipation).     • levETIRAcetam (KEPPRA) 500 MG tablet Take 500 mg by mouth Every Night.     • loratadine (CLARITIN) 10 MG tablet Take 10 mg by mouth Daily.     • Multiple Vitamins-Minerals (ICAPS AREDS 2) capsule Take 1 capsule by mouth 2 (Two) Times a Day.     • multivitamin (DAILY DEBORAH) tablet tablet Take 1 tablet by mouth Daily.     • nystatin (MYCOSTATIN) 184571 UNIT/GM powder Apply 1 application topically to the appropriate area as directed 2 (Two) Times a Day. Apply underneath abdominal folds.     • OLANZapine (zyPREXA) 2.5 MG tablet Take 1 tablet by mouth Every Night. 30 tablet 1   • omeprazole (priLOSEC) 40 MG capsule Take 40 mg by mouth Daily.     • oxybutynin XL (DITROPAN-XL) 10 MG 24 hr tablet Take 10 mg by mouth Daily.     • Probiotic Product (RISAQUAD-2) capsule capsule Take 1 capsule by mouth Daily.     • Psyllium (REGULOID) 400 MG capsule Take 2 capsules by mouth Daily.     • sodium chloride 0.65 % nasal spray 1 spray into the nostril(s) as directed by provider Every 1 (One) Hour As Needed for Congestion.     • vitamin D3 125 MCG (5000 UT) capsule capsule Take 5,000 Units by mouth Daily.       No current facility-administered medications for this visit.        Allergies:      Allergies   Allergen Reactions   • Biaxin  [Clarithromycin] Other (See Comments)     unknown        Past Surgical History:     Past Surgical History:   Procedure Laterality Date   • APPENDECTOMY     • CARDIAC CATHETERIZATION N/A 10/30/2018    Procedure: Left Heart Cath;  Surgeon: Arturo Inman MD;  Location: [facility] CATH INVASIVE LOCATION;  Service: Cardiology   • ESOPHAGEAL DILATATION     • TOTAL SHOULDER REPLACEMENT Bilateral    • TUBAL ABDOMINAL LIGATION           Social History:     Social History     Socioeconomic History   • Marital status:    Tobacco Use   • Smoking status: Former Smoker   • Smokeless tobacco: Never Used   • Tobacco comment: She quit at least 30 years ago.   Substance and Sexual Activity   • Alcohol use: No   • Drug use: No   • Sexual activity: Defer       Family History:     Family History   Problem Relation Age of Onset   • Breast cancer Sister        Review of Systems:     Review of Systems   Constitutional: Negative.  Negative for activity change, appetite change, chills, diaphoresis, fatigue and unexpected weight change.   HENT: Negative for congestion, dental problem, drooling, ear discharge, ear pain, facial swelling, hearing loss, mouth sores, nosebleeds, postnasal drip, rhinorrhea, sinus pressure, sneezing, sore throat, tinnitus, trouble swallowing and voice change.    Eyes: Negative.  Negative for photophobia, pain, discharge, redness, itching and visual disturbance.   Respiratory: Negative.  Negative for apnea, cough, choking, chest tightness, shortness of breath, wheezing and stridor.    Cardiovascular: Negative.  Negative for chest pain, palpitations and leg swelling.   Gastrointestinal: Negative.  Negative for abdominal distention, abdominal pain, anal bleeding, blood in stool, constipation, diarrhea, nausea, rectal pain and vomiting.   Endocrine: Negative.  Negative for cold intolerance, heat intolerance, polydipsia, polyphagia and polyuria.   Musculoskeletal: Negative.  Negative for arthralgias, back pain,  gait problem, joint swelling, myalgias, neck pain and neck stiffness.   Skin: Negative.  Negative for color change, pallor, rash and wound.   Allergic/Immunologic: Negative.  Negative for environmental allergies, food allergies and immunocompromised state.   Neurological: Negative.  Negative for dizziness, tremors, seizures, syncope, facial asymmetry, speech difficulty, weakness, light-headedness, numbness and headaches.   Hematological: Negative.  Negative for adenopathy. Does not bruise/bleed easily.   Psychiatric/Behavioral: Negative for agitation, behavioral problems, confusion, decreased concentration, dysphoric mood, hallucinations, self-injury, sleep disturbance and suicidal ideas. The patient is not nervous/anxious and is not hyperactive.    All other systems reviewed and are negative.      Physical Exam:     Physical Exam  Constitutional:       Appearance: She is well-developed.   HENT:      Head: Normocephalic and atraumatic.      Right Ear: External ear normal.      Left Ear: External ear normal.   Eyes:      Conjunctiva/sclera: Conjunctivae normal.      Pupils: Pupils are equal, round, and reactive to light.   Cardiovascular:      Rate and Rhythm: Normal rate and regular rhythm.      Heart sounds: Normal heart sounds.   Pulmonary:      Effort: Pulmonary effort is normal.      Breath sounds: Normal breath sounds.   Abdominal:      General: Bowel sounds are normal. There is no distension.      Palpations: Abdomen is soft. There is no mass.      Tenderness: There is no abdominal tenderness. There is no guarding or rebound.   Genitourinary:     Vagina: No vaginal discharge.   Musculoskeletal:         General: Normal range of motion.   Skin:     General: Skin is warm and dry.   Neurological:      Mental Status: She is alert.      Deep Tendon Reflexes: Reflexes are normal and symmetric.   Psychiatric:         Behavior: Behavior normal.         Thought Content: Thought content normal.         Judgment: Judgment  normal.         I have reviewed the following portions of the patient's history: Allergies, current medications, past family history, past medical history, past social history, past surgical history, problem list, and ROS and confirm it is accurate.      Procedure:       Assessment/Plan:   Recurrent urinary tract infections-patient has been referred and diagnosed with recurrent urinary tract infections.  We discussed the types of organisms that are found in the urinary tract indicating that the vast majority are results of the patient's own gastrointestinal shantell.  We discussed how many of the antibiotics that are utilized can actually exacerbate these infections by creating resistant organisms and there is only very few antibiotics that are concentrated in the urine and do not affect the rectal reservoir nor cause recurrent yeast vaginitis.  We discussed the risk factors for recurrent infections being intercourse in younger patients and atrophic changes in older patients.  We discussed the symptoms that are found including pain, pressure, burning, frequency, urgency, suprapubic pain, and painful intercourse.  I discussed upper tract symptoms including fevers and chills and indicated the workup would be much more aggressive if the patient were to present with recurrent infections in the face of upper tract symptomatology such as fever.  I discussed the history of vesicoureteral reflux in young patients and finally chronic renal scarring as a result of such.  I recommend concomitant probiotics with treatment with antibiotics to protect the rectal reservoir including over-the-counter yogurt preparations to clemente oral pills containing the appropriate probiotics.  She has severe frequency I will set her up for lower tract investigation she previously missed 5 appointments.                    This document has been electronically signed by RITA TOLENTINO MD Constanza 15, 2022 17:10 EDT

## 2022-07-20 ENCOUNTER — APPOINTMENT (OUTPATIENT)
Dept: GENERAL RADIOLOGY | Facility: HOSPITAL | Age: 77
End: 2022-07-20

## 2022-07-20 ENCOUNTER — APPOINTMENT (OUTPATIENT)
Dept: CT IMAGING | Facility: HOSPITAL | Age: 77
End: 2022-07-20

## 2022-07-20 ENCOUNTER — HOSPITAL ENCOUNTER (EMERGENCY)
Facility: HOSPITAL | Age: 77
Discharge: HOME | End: 2022-07-20
Attending: EMERGENCY MEDICINE | Admitting: EMERGENCY MEDICINE

## 2022-07-20 VITALS
HEART RATE: 79 BPM | WEIGHT: 215 LBS | BODY MASS INDEX: 31.84 KG/M2 | DIASTOLIC BLOOD PRESSURE: 70 MMHG | HEIGHT: 69 IN | RESPIRATION RATE: 18 BRPM | TEMPERATURE: 97 F | SYSTOLIC BLOOD PRESSURE: 129 MMHG | OXYGEN SATURATION: 97 %

## 2022-07-20 DIAGNOSIS — M25.551 ACUTE PAIN OF RIGHT HIP: Primary | ICD-10-CM

## 2022-07-20 PROCEDURE — 70450 CT HEAD/BRAIN W/O DYE: CPT

## 2022-07-20 PROCEDURE — 73562 X-RAY EXAM OF KNEE 3: CPT

## 2022-07-20 PROCEDURE — 99284 EMERGENCY DEPT VISIT MOD MDM: CPT

## 2022-07-20 PROCEDURE — 73502 X-RAY EXAM HIP UNI 2-3 VIEWS: CPT

## 2022-07-20 PROCEDURE — 73502 X-RAY EXAM HIP UNI 2-3 VIEWS: CPT | Performed by: RADIOLOGY

## 2022-07-20 PROCEDURE — 73562 X-RAY EXAM OF KNEE 3: CPT | Performed by: RADIOLOGY

## 2022-07-20 PROCEDURE — 70450 CT HEAD/BRAIN W/O DYE: CPT | Performed by: RADIOLOGY

## 2022-07-20 RX ORDER — CLONIDINE HYDROCHLORIDE 0.1 MG/1
0.1 TABLET ORAL ONCE
Status: COMPLETED | OUTPATIENT
Start: 2022-07-20 | End: 2022-07-20

## 2022-07-20 RX ADMIN — CLONIDINE HYDROCHLORIDE 0.1 MG: 0.1 TABLET ORAL at 15:58

## 2022-07-20 RX ADMIN — CLONIDINE HYDROCHLORIDE 0.1 MG: 0.1 TABLET ORAL at 14:49

## 2022-07-20 NOTE — ED NOTES
Corky Cristina RN gave report to [facility] that patient will return after head CT completed if it's normal.

## 2022-07-20 NOTE — ED NOTES
Called report to nurse Singh at [facility] advised xray and CT were negative and pt returning to facility.

## 2022-07-20 NOTE — ED PROVIDER NOTES
Initial Anesthesia Post-op Note    Patient: Hawa Pineda  Procedure(s) Performed: LABOR ANALGESIA  Anesthesia type: L&D Epidural    Vitals Value Taken Time   Temp 98.2 07/03/21 0455   Pulse 96 07/03/21 0453   Resp 15 07/03/21 0453   SpO2 99 % 07/03/21 0453   /60 07/03/21 0445   Vitals shown include unvalidated device data.      Patient Location: bedside  Post-op Vital Signs:stable  Level of Consciousness: participates in exam, awake, alert, oriented and return to baseline  Respiratory Status: spontaneous ventilation  Cardiovascular stable  Hydration: euvolemic  Pain Management: well controlled  Handoff: Handoff to receiving clinician was performed and questions were answered  Vomiting: none  Nausea: None  Airway Patency:patent  Post-op Assessment: awake, alert, appropriately conversant, or baseline, no complications and patient tolerated procedure well with no complications      No complications documented.   Subjective   Patient fell, has pain knee and hip      Fall  Mechanism of injury: fall    Incident location:  Home  Fall:     Height of fall:  From wheelchair    Impact surface:  Hard floor    Entrapped after fall: no        Review of Systems   Constitutional: Negative.    HENT: Negative.    Eyes: Negative.    Respiratory: Negative.    Cardiovascular: Negative.    Gastrointestinal: Negative.    Endocrine: Negative.    Genitourinary: Negative.    Musculoskeletal: Positive for gait problem.   Skin: Negative.    Allergic/Immunologic: Negative.    Hematological: Negative.    Psychiatric/Behavioral: Negative.        Past Medical History:   Diagnosis Date   • Arthritis    • Asthma    • Chronic headaches 10/31/2018   • Degenerative disc disease, lumbar    • Dementia ([facility])    • E coli bacteremia 11/2019    Not ESBL   • Fibromyalgia    • GERD (gastroesophageal reflux disease)    • Hyperlipidemia    • Hypertension    • Mixed stress and urge urinary incontinence 1/22/2020   • MRSA pneumonia ([facility]) 12/18/2017   • ENE (obstructive sleep apnea)     Noncompliant with BiPAP/CPAP   • Osteoporosis    • Type 2 diabetes mellitus ([facility])    • UTI due to extended-spectrum beta lactamase (ESBL) producing Escherichia coli 2021    In both July and November of 2021       Allergies   Allergen Reactions   • Biaxin [Clarithromycin] Other (See Comments)     unknown       Past Surgical History:   Procedure Laterality Date   • APPENDECTOMY     • CARDIAC CATHETERIZATION N/A 10/30/2018    Procedure: Left Heart Cath;  Surgeon: Arturo Inman MD;  Location: [facility] INVASIVE LOCATION;  Service: Cardiology   • ESOPHAGEAL DILATATION     • TOTAL SHOULDER REPLACEMENT Bilateral    • TUBAL ABDOMINAL LIGATION         Family History   Problem Relation Age of Onset   • Breast cancer Sister        Social History     Socioeconomic History   • Marital status:    Tobacco Use   • Smoking status: Former Smoker   • Smokeless tobacco: Never Used   • Tobacco  comment: She quit at least 30 years ago.   Substance and Sexual Activity   • Alcohol use: No   • Drug use: No   • Sexual activity: Defer           Objective   Physical Exam  Vitals and nursing note reviewed.   Constitutional:       Appearance: Normal appearance.   HENT:      Head: Normocephalic.      Nose: Nose normal.      Mouth/Throat:      Mouth: Mucous membranes are moist.   Eyes:      Pupils: Pupils are equal, round, and reactive to light.   Cardiovascular:      Rate and Rhythm: Normal rate.      Pulses: Normal pulses.   Pulmonary:      Effort: Pulmonary effort is normal.   Abdominal:      General: Abdomen is flat.   Musculoskeletal:      Cervical back: Normal range of motion.      Comments: Tender right hip and knee   Skin:     General: Skin is warm.      Capillary Refill: Capillary refill takes less than 2 seconds.   Neurological:      General: No focal deficit present.      Mental Status: She is alert.   Psychiatric:         Mood and Affect: Mood normal.         Procedures           ED Course                                           MDM    Final diagnoses:   Acute pain of right hip       ED Disposition  ED Disposition     ED Disposition   Discharge    Condition   Stable    Comment   --             Skinny Meehan MD  [address] [id number]  [phone]    Schedule an appointment as soon as possible for a visit   If symptoms worsen         Medication List      No changes were made to your prescriptions during this visit.          Gabo Lovett MD  07/20/22 [id number]       Gabo Lovett MD  07/20/22 7169

## 2022-07-20 NOTE — ED NOTES
[facility] called for transport back to [facility]. Did not give ETA, just states they will send someone.

## 2022-09-22 ENCOUNTER — LAB REQUISITION (OUTPATIENT)
Dept: LAB | Facility: HOSPITAL | Age: 77
End: 2022-09-22

## 2022-09-22 DIAGNOSIS — R10.0 ACUTE ABDOMEN: ICD-10-CM

## 2022-09-22 DIAGNOSIS — I10 ESSENTIAL (PRIMARY) HYPERTENSION: ICD-10-CM

## 2022-09-22 DIAGNOSIS — M54.50 LOW BACK PAIN, UNSPECIFIED: ICD-10-CM

## 2022-09-22 LAB
ANION GAP SERPL CALCULATED.3IONS-SCNC: 11.4 MMOL/L (ref 5–15)
BACTERIA UR QL AUTO: ABNORMAL /HPF
BASOPHILS # BLD AUTO: 0.03 10*3/MM3 (ref 0–0.2)
BASOPHILS NFR BLD AUTO: 0.2 % (ref 0–1.5)
BILIRUB UR QL STRIP: NEGATIVE
BUN SERPL-MCNC: 15 MG/DL (ref 8–23)
BUN/CREAT SERPL: 19.7 (ref 7–25)
CALCIUM SPEC-SCNC: 9.5 MG/DL (ref 8.6–10.5)
CHLORIDE SERPL-SCNC: 101 MMOL/L (ref 98–107)
CLARITY UR: CLEAR
CO2 SERPL-SCNC: 27.6 MMOL/L (ref 22–29)
COLOR UR: YELLOW
CREAT SERPL-MCNC: 0.76 MG/DL (ref 0.57–1)
DEPRECATED RDW RBC AUTO: 42.2 FL (ref 37–54)
EGFRCR SERPLBLD CKD-EPI 2021: 80.8 ML/MIN/1.73
EOSINOPHIL # BLD AUTO: 0.16 10*3/MM3 (ref 0–0.4)
EOSINOPHIL NFR BLD AUTO: 1.2 % (ref 0.3–6.2)
ERYTHROCYTE [DISTWIDTH] IN BLOOD BY AUTOMATED COUNT: 12.2 % (ref 12.3–15.4)
GLUCOSE SERPL-MCNC: 142 MG/DL (ref 65–99)
GLUCOSE UR STRIP-MCNC: NEGATIVE MG/DL
HCT VFR BLD AUTO: 38 % (ref 34–46.6)
HGB BLD-MCNC: 12.6 G/DL (ref 12–15.9)
HGB UR QL STRIP.AUTO: NEGATIVE
HYALINE CASTS UR QL AUTO: ABNORMAL /LPF
IMM GRANULOCYTES # BLD AUTO: 0.03 10*3/MM3 (ref 0–0.05)
IMM GRANULOCYTES NFR BLD AUTO: 0.2 % (ref 0–0.5)
KETONES UR QL STRIP: NEGATIVE
LEUKOCYTE ESTERASE UR QL STRIP.AUTO: ABNORMAL
LYMPHOCYTES # BLD AUTO: 3.29 10*3/MM3 (ref 0.7–3.1)
LYMPHOCYTES NFR BLD AUTO: 25.5 % (ref 19.6–45.3)
MCH RBC QN AUTO: 31 PG (ref 26.6–33)
MCHC RBC AUTO-ENTMCNC: 33.2 G/DL (ref 31.5–35.7)
MCV RBC AUTO: 93.4 FL (ref 79–97)
MONOCYTES # BLD AUTO: 1 10*3/MM3 (ref 0.1–0.9)
MONOCYTES NFR BLD AUTO: 7.8 % (ref 5–12)
NEUTROPHILS NFR BLD AUTO: 65.1 % (ref 42.7–76)
NEUTROPHILS NFR BLD AUTO: 8.39 10*3/MM3 (ref 1.7–7)
NITRITE UR QL STRIP: NEGATIVE
NRBC BLD AUTO-RTO: 0 /100 WBC (ref 0–0.2)
PH UR STRIP.AUTO: 5.5 [PH] (ref 5–8)
PLATELET # BLD AUTO: 286 10*3/MM3 (ref 140–450)
PMV BLD AUTO: 10 FL (ref 6–12)
POTASSIUM SERPL-SCNC: 4.1 MMOL/L (ref 3.5–5.2)
PROT UR QL STRIP: NEGATIVE
RBC # BLD AUTO: 4.07 10*6/MM3 (ref 3.77–5.28)
RBC # UR STRIP: ABNORMAL /HPF
REF LAB TEST METHOD: ABNORMAL
SODIUM SERPL-SCNC: 140 MMOL/L (ref 136–145)
SP GR UR STRIP: 1.02 (ref 1–1.03)
SQUAMOUS #/AREA URNS HPF: ABNORMAL /HPF
UROBILINOGEN UR QL STRIP: ABNORMAL
WBC # UR STRIP: ABNORMAL /HPF
WBC NRBC COR # BLD: 12.9 10*3/MM3 (ref 3.4–10.8)

## 2022-09-22 PROCEDURE — 85025 COMPLETE CBC W/AUTO DIFF WBC: CPT | Performed by: INTERNAL MEDICINE

## 2022-09-22 PROCEDURE — 87077 CULTURE AEROBIC IDENTIFY: CPT | Performed by: PHYSICIAN ASSISTANT

## 2022-09-22 PROCEDURE — 80048 BASIC METABOLIC PNL TOTAL CA: CPT | Performed by: INTERNAL MEDICINE

## 2022-09-22 PROCEDURE — 87186 SC STD MICRODIL/AGAR DIL: CPT | Performed by: PHYSICIAN ASSISTANT

## 2022-09-22 PROCEDURE — 87086 URINE CULTURE/COLONY COUNT: CPT | Performed by: PHYSICIAN ASSISTANT

## 2022-09-22 PROCEDURE — 81001 URINALYSIS AUTO W/SCOPE: CPT | Performed by: PHYSICIAN ASSISTANT

## 2022-09-24 LAB — BACTERIA SPEC AEROBE CULT: ABNORMAL

## 2022-12-05 ENCOUNTER — PROCEDURE VISIT (OUTPATIENT)
Dept: UROLOGY | Facility: CLINIC | Age: 77
End: 2022-12-05

## 2022-12-05 VITALS — BODY MASS INDEX: 31.84 KG/M2 | HEIGHT: 69 IN | WEIGHT: 214.95 LBS

## 2022-12-05 DIAGNOSIS — N39.46 MIXED STRESS AND URGE URINARY INCONTINENCE: Primary | ICD-10-CM

## 2022-12-05 PROCEDURE — 52000 CYSTOURETHROSCOPY: CPT | Performed by: UROLOGY

## 2022-12-05 PROCEDURE — 51725 SIMPLE CYSTOMETROGRAM: CPT | Performed by: UROLOGY

## 2022-12-05 RX ORDER — GENTAMICIN SULFATE 40 MG/ML
80 INJECTION, SOLUTION INTRAMUSCULAR; INTRAVENOUS ONCE
Status: COMPLETED | OUTPATIENT
Start: 2022-12-05 | End: 2022-12-05

## 2022-12-05 RX ORDER — NITROFURANTOIN 25; 75 MG/1; MG/1
100 CAPSULE ORAL DAILY
Qty: 56 CAPSULE | Refills: 3 | Status: SHIPPED | OUTPATIENT
Start: 2022-12-05

## 2022-12-05 RX ADMIN — GENTAMICIN SULFATE 80 MG: 40 INJECTION, SOLUTION INTRAMUSCULAR; INTRAVENOUS at 13:04

## 2022-12-05 NOTE — PROGRESS NOTES
Chief Complaint:      Chief Complaint   Patient presents with   • cystoscopy       HPI:   77 y.o. female.  Here for cystoscopy.  She has significant leakage.    Past Medical History:     Past Medical History:   Diagnosis Date   • Arthritis    • Asthma    • Chronic headaches 10/31/2018   • Degenerative disc disease, lumbar    • Dementia ([facility])    • E coli bacteremia 11/2019    Not ESBL   • Fibromyalgia    • GERD (gastroesophageal reflux disease)    • Hyperlipidemia    • Hypertension    • Mixed stress and urge urinary incontinence 1/22/2020   • MRSA pneumonia ([facility]) 12/18/2017   • ENE (obstructive sleep apnea)     Noncompliant with BiPAP/CPAP   • Osteoporosis    • Type 2 diabetes mellitus ([facility])    • UTI due to extended-spectrum beta lactamase (ESBL) producing Escherichia coli 2021    In both July and November of 2021       Current Meds:     Current Outpatient Medications   Medication Sig Dispense Refill   • albuterol sulfate  (90 Base) MCG/ACT inhaler Inhale 2 puffs Every 8 (Eight) Hours As Needed for Wheezing.     • calcium carbonate (TUMS) 500 MG chewable tablet Chew 1 tablet Every 6 (Six) Hours As Needed for Indigestion or Heartburn.     • cycloSPORINE (RESTASIS) 0.05 % ophthalmic emulsion Administer 1 drop to both eyes 2 (Two) Times a Day As Needed (dry eye).     • Diclofenac Sodium (VOLTAREN) 1 % gel gel Apply 4 g topically to the appropriate area as directed 4 (Four) Times a Day As Needed (Arthritis Pain). Shoulders and Knees     • doxepin (SINEquan) 10 MG capsule Take 10 mg by mouth Every Night.     • DULoxetine (CYMBALTA) 60 MG capsule Take 60 mg by mouth Daily.     • flecainide (TAMBOCOR) 50 MG tablet Take 1 tablet by mouth Every 12 (Twelve) Hours. 60 tablet 3   • fluticasone (FLONASE) 50 MCG/ACT nasal spray 1 spray into the nostril(s) as directed by provider Daily.     • gabapentin (NEURONTIN) 400 MG capsule Take 1 capsule by mouth 3 (Three) Times a Day for 3 days. 9 capsule 0   • guaiFENesin (MUCINEX)  600 MG 12 hr tablet Take 600 mg by mouth 2 (Two) Times a Day.     • insulin lispro (HumaLOG) 100 UNIT/ML injection Inject 5 Units under the skin into the appropriate area as directed 3 (Three) Times a Day Before Meals.  12   • lactulose (CHRONULAC) 10 GM/15ML solution Take 20 g by mouth Daily.     • lactulose (CHRONULAC) 10 GM/15ML solution Take 20 g by mouth 2 (Two) Times a Day As Needed (Constipation).     • levETIRAcetam (KEPPRA) 500 MG tablet Take 500 mg by mouth Every Night.     • loratadine (CLARITIN) 10 MG tablet Take 10 mg by mouth Daily.     • Multiple Vitamins-Minerals (ICAPS AREDS 2) capsule Take 1 capsule by mouth 2 (Two) Times a Day.     • multivitamin (DAILY DEBORAH) tablet tablet Take 1 tablet by mouth Daily.     • nystatin (MYCOSTATIN) 099947 UNIT/GM powder Apply 1 application topically to the appropriate area as directed 2 (Two) Times a Day. Apply underneath abdominal folds.     • OLANZapine (zyPREXA) 2.5 MG tablet Take 1 tablet by mouth Every Night. 30 tablet 1   • omeprazole (priLOSEC) 40 MG capsule Take 40 mg by mouth Daily.     • oxybutynin XL (DITROPAN-XL) 10 MG 24 hr tablet Take 10 mg by mouth Daily.     • Probiotic Product (RISAQUAD-2) capsule capsule Take 1 capsule by mouth Daily.     • Psyllium (REGULOID) 400 MG capsule Take 2 capsules by mouth Daily.     • sodium chloride 0.65 % nasal spray 1 spray into the nostril(s) as directed by provider Every 1 (One) Hour As Needed for Congestion.     • vitamin D3 125 MCG (5000 UT) capsule capsule Take 5,000 Units by mouth Daily.       No current facility-administered medications for this visit.        Allergies:      Allergies   Allergen Reactions   • Biaxin [Clarithromycin] Other (See Comments)     unknown        Past Surgical History:     Past Surgical History:   Procedure Laterality Date   • APPENDECTOMY     • CARDIAC CATHETERIZATION N/A 10/30/2018    Procedure: Left Heart Cath;  Surgeon: Arturo Inman MD;  Location: [facility] INVASIVE LOCATION;   Service: Cardiology   • ESOPHAGEAL DILATATION     • TOTAL SHOULDER REPLACEMENT Bilateral    • TUBAL ABDOMINAL LIGATION         Social History:     Social History     Socioeconomic History   • Marital status:    Tobacco Use   • Smoking status: Former   • Smokeless tobacco: Never   • Tobacco comments:     She quit at least 30 years ago.   Substance and Sexual Activity   • Alcohol use: No   • Drug use: No   • Sexual activity: Defer       Family History:     Family History   Problem Relation Age of Onset   • Breast cancer Sister        Review of Systems:     Review of Systems   Constitutional: Negative.  Negative for activity change, appetite change, chills, diaphoresis, fatigue and unexpected weight change.   HENT: Negative for congestion, dental problem, drooling, ear discharge, ear pain, facial swelling, hearing loss, mouth sores, nosebleeds, postnasal drip, rhinorrhea, sinus pressure, sneezing, sore throat, tinnitus, trouble swallowing and voice change.    Eyes: Negative.  Negative for photophobia, pain, discharge, redness, itching and visual disturbance.   Respiratory: Negative.  Negative for apnea, cough, choking, chest tightness, shortness of breath, wheezing and stridor.    Cardiovascular: Negative.  Negative for chest pain, palpitations and leg swelling.   Gastrointestinal: Negative.  Negative for abdominal distention, abdominal pain, anal bleeding, blood in stool, constipation, diarrhea, nausea, rectal pain and vomiting.   Endocrine: Negative.  Negative for cold intolerance, heat intolerance, polydipsia, polyphagia and polyuria.   Genitourinary: Positive for difficulty urinating and frequency.   Musculoskeletal: Negative.  Negative for arthralgias, back pain, gait problem, joint swelling, myalgias, neck pain and neck stiffness.   Skin: Negative.  Negative for color change, pallor, rash and wound.   Allergic/Immunologic: Negative.  Negative for environmental allergies, food allergies and  immunocompromised state.   Neurological: Negative.  Negative for dizziness, tremors, seizures, syncope, facial asymmetry, speech difficulty, weakness, light-headedness, numbness and headaches.   Hematological: Negative.  Negative for adenopathy. Does not bruise/bleed easily.   Psychiatric/Behavioral: Negative for agitation, behavioral problems, confusion, decreased concentration, dysphoric mood, hallucinations, self-injury, sleep disturbance and suicidal ideas. The patient is not nervous/anxious and is not hyperactive.    All other systems reviewed and are negative.      Physical Exam:     Physical Exam  Constitutional:       Appearance: She is well-developed.   HENT:      Head: Normocephalic and atraumatic.      Right Ear: External ear normal.      Left Ear: External ear normal.   Eyes:      Conjunctiva/sclera: Conjunctivae normal.      Pupils: Pupils are equal, round, and reactive to light.   Cardiovascular:      Rate and Rhythm: Normal rate and regular rhythm.      Heart sounds: Normal heart sounds.   Pulmonary:      Effort: Pulmonary effort is normal.      Breath sounds: Normal breath sounds.   Abdominal:      General: Bowel sounds are normal. There is no distension.      Palpations: Abdomen is soft. There is no mass.      Tenderness: There is no abdominal tenderness. There is no guarding or rebound.   Genitourinary:     General: Normal vulva.      Vagina: No vaginal discharge.      Comments: No prolapse  Musculoskeletal:         General: Normal range of motion.   Skin:     General: Skin is warm and dry.   Neurological:      Mental Status: She is alert.      Deep Tendon Reflexes: Reflexes are normal and symmetric.   Psychiatric:         Behavior: Behavior normal.         Thought Content: Thought content normal.         Judgment: Judgment normal.         I have reviewed the following portions of the patient's history: Allergies, current medications, past family history, past medical history, past social history,  past surgical history, problem list, and ROS and confirm it is accurate.    Recent Image (CT and/or KUB):      CT Abdomen and Pelvis: Results for orders placed during the hospital encounter of 04/02/19    CT Abdomen Pelvis With & Without Contrast    Narrative  CT ABDOMEN PELVIS WITH AND WITHOUT CONTRAST-    REASON FOR EXAM: INCREASED PAIN; R20.8-Other disturbances of skin  sensation    COMPARISON: Today's CT is compared with the previous exam done in July 2018.    FINDINGS: Spiral scans were obtained from the diaphragms and extended  through the pelvis. The images that include the bases of the lung showed  no inflammatory changes or pleural fluid. The liver was normal in size  and shape, no focal lesions are identified in the liver. The spleen was  normal in size and homogeneous in density. There was no evidence of mass  or inflammation in the pancreas. The kidneys showed some thinning of the  cortical tissue. There was no hydronephrosis or calcification. The aorta  is normal in caliber. There is no ascites or free air. There is a small  fat-containing umbilical hernia that measures approximately 2 cm. In the  pelvis there were no masses or fluid collections.    Impression  A source of the patient's abdominal pain is not demonstrated  on today's exam.      The radiation dose reduction device was utilized for each scan per the  ALARA (as low as reasonably achievable) protocol.    This report was finalized on 4/2/2019 3:01 PM by Dr. Josiah Barksdale II, MD.       CT Stone Protocol: Results for orders placed during the hospital encounter of 01/01/17    CT Abdomen Pelvis Stone Protocol    Narrative  CT ABDOMEN PELVIS STONE PROTOCOL-    TECHNIQUE: Multiple axial CT images were obtained from lung bases  through pubic symphysis without administration of IV contrast.  Reformatted images in the coronal and/or sagittal plane(s) were  generated from the axial data set to facilitate diagnostic accuracy  and/or surgical  planning.  Oral Contrast:NONE.    Radiation dose reduction techniques were utilized per ALARA protocol.  Automated exposure control was initiated through either or [employer] or  [employer] software packages by  protocol.        Clinical information  Right flank pain    Comparison  NONE.    Findings  LOWER THORAX: Clear. No effusions.    ABDOMEN:    LIVER: Homogeneous. No focal hepatic mass or ductal dilatation.    GALLBLADDER: No dilation or stone identified.    PANCREAS: Unremarkable. No mass or ductal dilatation.    SPLEEN: Homogeneous. No splenomegaly.    ADRENALS: No mass.    KIDNEYS: No mass. No obstructive uropathy.  No evidence of  urolithiasis.    GI TRACT: ABUNDANT STOOL IS NOTED THROUGHOUT THE COLON.    PERITONEUM: No free air. No free fluid or loculated fluid  collections.    MESENTERY: Unremarkable.    LYMPH NODES: No lymphadenopathy.    VASCULATURE: ATHEROSCLEROTIC VASCULAR CALCIFICATION IS NOTED.    ABDOMINAL WALL: SMALL UMBILICAL HERNIA CONTAINING ONLY FAT.    OTHER: None.    PELVIS:    BLADDER: No focal mass or significant wall thickening    REPRODUCTIVE: Unremarkable as visualized.    APPENDIX: Nondistended. No surrounding inflammation.    BONES: No acute bony abnormality.    Impression  Impression:  Abundant stool throughout the colon.    This report was finalized on 1/2/2017 6:58 AM by Dr. Tacho Olivas MD.       KUB: No results found for this or any previous visit.       Labs (past 3 months):      Lab Requisition on 09/22/2022   Component Date Value Ref Range Status   • Color, UA 09/22/2022 Yellow  Yellow, Straw Final   • Appearance, UA 09/22/2022 Clear  Clear Final   • pH, UA 09/22/2022 5.5  5.0 - 8.0 Final   • Specific Gravity, UA 09/22/2022 1.019  1.005 - 1.030 Final   • Glucose, UA 09/22/2022 Negative  Negative Final   • Ketones, UA 09/22/2022 Negative  Negative Final   • Bilirubin, UA 09/22/2022 Negative  Negative Final   • Blood, UA 09/22/2022 Negative  Negative Final   • Protein,  UA 09/22/2022 Negative  Negative Final   • Leuk Esterase, UA 09/22/2022 Moderate (2+) (A)  Negative Final   • Nitrite, UA 09/22/2022 Negative  Negative Final   • Urobilinogen, UA 09/22/2022 1.0 E.U./dL  0.2 - 1.0 E.U./dL Final   • RBC, UA 09/22/2022 6-12 (A)  None Seen, 0-2 /HPF Final   • WBC, UA 09/22/2022 Too Numerous to Count (A)  None Seen, 0-2 /HPF Final   • Bacteria, UA 09/22/2022 None Seen  None Seen /HPF Final   • Squamous Epithelial Cells, UA 09/22/2022 0-2  None Seen, 0-2 /HPF Final   • Hyaline Casts, UA 09/22/2022 3-6  None Seen /LPF Final   • Methodology 09/22/2022 Automated Microscopy   Final   • Urine Culture 09/22/2022 25,000 CFU/mL Escherichia coli (A)   Final   Lab Requisition on 09/22/2022   Component Date Value Ref Range Status   • Glucose 09/22/2022 142 (H)  65 - 99 mg/dL Final   • BUN 09/22/2022 15  8 - 23 mg/dL Final   • Creatinine 09/22/2022 0.76  0.57 - 1.00 mg/dL Final   • Sodium 09/22/2022 140  136 - 145 mmol/L Final   • Potassium 09/22/2022 4.1  3.5 - 5.2 mmol/L Final   • Chloride 09/22/2022 101  98 - 107 mmol/L Final   • CO2 09/22/2022 27.6  22.0 - 29.0 mmol/L Final   • Calcium 09/22/2022 9.5  8.6 - 10.5 mg/dL Final   • BUN/Creatinine Ratio 09/22/2022 19.7  7.0 - 25.0 Final   • Anion Gap 09/22/2022 11.4  5.0 - 15.0 mmol/L Final   • eGFR 09/22/2022 80.8  >60.0 mL/min/1.73 Final    National Kidney Foundation and American Society of Nephrology (ASN) Task Force recommended calculation based on the Chronic Kidney Disease Epidemiology Collaboration (CKD-EPI) equation refit without adjustment for race.   Lab Requisition on 09/22/2022   Component Date Value Ref Range Status   • WBC 09/22/2022 12.90 (H)  3.40 - 10.80 10*3/mm3 Final   • RBC 09/22/2022 4.07  3.77 - 5.28 10*6/mm3 Final   • Hemoglobin 09/22/2022 12.6  12.0 - 15.9 g/dL Final   • Hematocrit 09/22/2022 38.0  34.0 - 46.6 % Final   • MCV 09/22/2022 93.4  79.0 - 97.0 fL Final   • MCH 09/22/2022 31.0  26.6 - 33.0 pg Final   • MCHC 09/22/2022  33.2  31.5 - 35.7 g/dL Final   • RDW 09/22/2022 12.2 (L)  12.3 - 15.4 % Final   • RDW-SD 09/22/2022 42.2  37.0 - 54.0 fl Final   • MPV 09/22/2022 10.0  6.0 - 12.0 fL Final   • Platelets 09/22/2022 286  140 - 450 10*3/mm3 Final   • Neutrophil % 09/22/2022 65.1  42.7 - 76.0 % Final   • Lymphocyte % 09/22/2022 25.5  19.6 - 45.3 % Final   • Monocyte % 09/22/2022 7.8  5.0 - 12.0 % Final   • Eosinophil % 09/22/2022 1.2  0.3 - 6.2 % Final   • Basophil % 09/22/2022 0.2  0.0 - 1.5 % Final   • Immature Grans % 09/22/2022 0.2  0.0 - 0.5 % Final   • Neutrophils, Absolute 09/22/2022 8.39 (H)  1.70 - 7.00 10*3/mm3 Final   • Lymphocytes, Absolute 09/22/2022 3.29 (H)  0.70 - 3.10 10*3/mm3 Final   • Monocytes, Absolute 09/22/2022 1.00 (H)  0.10 - 0.90 10*3/mm3 Final   • Eosinophils, Absolute 09/22/2022 0.16  0.00 - 0.40 10*3/mm3 Final   • Basophils, Absolute 09/22/2022 0.03  0.00 - 0.20 10*3/mm3 Final   • Immature Grans, Absolute 09/22/2022 0.03  0.00 - 0.05 10*3/mm3 Final   • nRBC 09/22/2022 0.0  0.0 - 0.2 /100 WBC Final        Procedure:   Cystoscopy:  Patient presents today for cystourethroscopy.  I went ahead and obtained an informed consent including the risks of anesthesia, bleeding, infection, etc.  After prepping and draping in a sterile fashion in the low dorsal lithotomy position, the urethra was gently anesthetized with 10 mL of 2% viscous Xylocaine jelly.  After an appropriate period of topical anesthesia, I used the Olympus digital 14 [place] flexible cystoscope to examine the anterior urethra which was completely normal.  The ureteral orifices were visualized and normal in position and configuration. There were no stones, tumors, or foreign bodies.  The blue light was enabled and was negative allowing us to see small mucosal lesions. The patient was given 80 mg of gentamicin in an intramuscular fashion as prophylaxis for the cystoscopy and released from the clinic.  Simple urodynamics-following a careful discussion of  the risks and benefits of simple urodynamics, which includes a 2% risk of a urinary tract infection, the patient was brought the operative suite after appropriate endoscopy and prepped and draped in a sterile fashion.  Using the spinal manometric technique for simple cystometrogram at 50 mL/m, first sensation was noted at 50  mL.  Patient developed fullness at 350  mL.  There were no  detrusor contractions.  Upon Valsalva elevation of the bladder neck revealed cessation of the obvious stress incontinence.  This is known as the Benny test which was positive.  This is a type III stress incontinence there was no evidence of other abnormalities during this investigation.    Assessment/Plan:   Urinary incontinence:  Patient was diagnosed with urinary incontinence.  We discussed treatable and non-treatable causes of both stress and urge urinary incontinence.  With regards to stress urinary incontinence, we discussed its relationship to childbirth and pelvic health.  We discussed the grading of stress incontinence with trying to quantitate the number of pads used.  We talked about leaking urine with laughing, lifting, coughing, and sexual intercourse.  Talked about the urge component and the concept of mixed incontinence where upon the stress treatable at the urge may exist and that 50% of the time the urge will resolve with treatment of the stress incontinence.  We talked with the diagnostic workup including a postvoid residual urine and even a simple cystometrogram.  I discussed the findings that may be neurologically related including commonly seen with multiple sclerosis, Parkinson's disease, and stroke.  I talked about the various therapeutic options including anticholinergics, beta 3 agonists, and alpha blockade if there is a component of obstruction.  I discussed the side effects of anticholinergic including dry mouth, double vision, etc.  I recommend bulk      Patient reports that she is  currently experiencing   symptoms of urinary incontinence.                    This document has been electronically signed by RITA TOLENTINO MD December 5, 2022 13:00 EST    Dictated Utilizing Dragon Dictation: Part of this note may be an electronic transcription/translation of spoken language to printed text using the Dragon Dictation System.

## 2022-12-13 ENCOUNTER — HOSPITAL ENCOUNTER (OUTPATIENT)
Facility: HOSPITAL | Age: 77
Setting detail: HOSPITAL OUTPATIENT SURGERY
End: 2022-12-13
Attending: UROLOGY | Admitting: UROLOGY
Payer: MEDICARE

## 2022-12-13 DIAGNOSIS — N39.46 MIXED STRESS AND URGE URINARY INCONTINENCE: Primary | ICD-10-CM

## 2022-12-13 RX ORDER — GENTAMICIN SULFATE 80 MG/100ML
80 INJECTION, SOLUTION INTRAVENOUS ONCE
Status: CANCELLED | OUTPATIENT
Start: 2022-12-13 | End: 2022-12-13

## 2022-12-22 ENCOUNTER — LAB REQUISITION (OUTPATIENT)
Dept: LAB | Facility: HOSPITAL | Age: 77
End: 2022-12-22

## 2022-12-22 DIAGNOSIS — R30.0 DYSURIA: ICD-10-CM

## 2022-12-22 LAB
BACTERIA UR QL AUTO: ABNORMAL /HPF
BILIRUB UR QL STRIP: NEGATIVE
CLARITY UR: CLEAR
COLOR UR: ABNORMAL
GLUCOSE UR STRIP-MCNC: NEGATIVE MG/DL
HGB UR QL STRIP.AUTO: NEGATIVE
HYALINE CASTS UR QL AUTO: ABNORMAL /LPF
KETONES UR QL STRIP: ABNORMAL
LEUKOCYTE ESTERASE UR QL STRIP.AUTO: NEGATIVE
MUCOUS THREADS URNS QL MICRO: ABNORMAL /HPF
NITRITE UR QL STRIP: NEGATIVE
PH UR STRIP.AUTO: 5.5 [PH] (ref 5–8)
PROT UR QL STRIP: ABNORMAL
RBC # UR STRIP: ABNORMAL /HPF
REF LAB TEST METHOD: ABNORMAL
SP GR UR STRIP: 1.03 (ref 1–1.03)
SQUAMOUS #/AREA URNS HPF: ABNORMAL /HPF
UROBILINOGEN UR QL STRIP: ABNORMAL
WBC # UR STRIP: ABNORMAL /HPF

## 2022-12-22 PROCEDURE — 81001 URINALYSIS AUTO W/SCOPE: CPT | Performed by: INTERNAL MEDICINE

## 2022-12-22 PROCEDURE — 87086 URINE CULTURE/COLONY COUNT: CPT | Performed by: INTERNAL MEDICINE

## 2022-12-24 LAB — BACTERIA SPEC AEROBE CULT: NO GROWTH

## 2023-03-06 ENCOUNTER — HOSPITAL ENCOUNTER (EMERGENCY)
Facility: HOSPITAL | Age: 78
Discharge: NURSING, REHAB OR LONG-TERM CARE | End: 2023-03-06
Attending: EMERGENCY MEDICINE | Admitting: EMERGENCY MEDICINE
Payer: MEDICARE

## 2023-03-06 ENCOUNTER — APPOINTMENT (OUTPATIENT)
Dept: CT IMAGING | Facility: HOSPITAL | Age: 78
End: 2023-03-06
Payer: MEDICARE

## 2023-03-06 VITALS
HEART RATE: 88 BPM | RESPIRATION RATE: 16 BRPM | SYSTOLIC BLOOD PRESSURE: 175 MMHG | BODY MASS INDEX: 30.81 KG/M2 | OXYGEN SATURATION: 93 % | TEMPERATURE: 98.5 F | DIASTOLIC BLOOD PRESSURE: 96 MMHG | WEIGHT: 208 LBS | HEIGHT: 69 IN

## 2023-03-06 DIAGNOSIS — S09.90XA INJURY OF HEAD, INITIAL ENCOUNTER: Primary | ICD-10-CM

## 2023-03-06 PROCEDURE — 99283 EMERGENCY DEPT VISIT LOW MDM: CPT

## 2023-03-06 PROCEDURE — 70450 CT HEAD/BRAIN W/O DYE: CPT

## 2023-03-06 RX ORDER — ACETAMINOPHEN 500 MG
1000 TABLET ORAL ONCE
Status: COMPLETED | OUTPATIENT
Start: 2023-03-06 | End: 2023-03-06

## 2023-03-06 RX ADMIN — ACETAMINOPHEN 1000 MG: 500 TABLET ORAL at 01:54

## 2023-03-06 NOTE — ED PROVIDER NOTES
Subjective   History of Present Illness  77-year-old female presents from [facility] nursing [place] with report of fall.  Patient did hit her head.  There was no loss of consciousness reported.  Patient does have a headache.  Patient is not nauseous.        Review of Systems   Constitutional: Negative.  Negative for fever.   Respiratory: Negative.    Cardiovascular: Negative.  Negative for chest pain.   Gastrointestinal: Negative.  Negative for abdominal pain.   Endocrine: Negative.    Genitourinary: Negative.  Negative for dysuria.   Skin: Negative.    Neurological: Positive for headaches.   Psychiatric/Behavioral: Negative.    All other systems reviewed and are negative.      Past Medical History:   Diagnosis Date   • Arthritis    • Asthma    • Chronic headaches 10/31/2018   • Degenerative disc disease, lumbar    • Dementia (Formerly [facility])    • E coli bacteremia 11/2019    Not ESBL   • Fibromyalgia    • GERD (gastroesophageal reflux disease)    • Hyperlipidemia    • Hypertension    • Mixed stress and urge urinary incontinence 1/22/2020   • MRSA pneumonia ([facility]ly [facility]) 12/18/2017   • ENE (obstructive sleep apnea)     Noncompliant with BiPAP/CPAP   • Osteoporosis    • Type 2 diabetes mellitus (Formerly [facility])    • UTI due to extended-spectrum beta lactamase (ESBL) producing Escherichia coli 2021    In both July and November of 2021       Allergies   Allergen Reactions   • Biaxin [Clarithromycin] Other (See Comments)     unknown       Past Surgical History:   Procedure Laterality Date   • APPENDECTOMY     • CARDIAC CATHETERIZATION N/A 10/30/2018    Procedure: Left Heart Cath;  Surgeon: Arturo Inman MD;  Location: [facility] CATH INVASIVE LOCATION;  Service: Cardiology   • ESOPHAGEAL DILATATION     • TOTAL SHOULDER REPLACEMENT Bilateral    • TUBAL ABDOMINAL LIGATION         Family History   Problem Relation Age of Onset   • Breast cancer Sister        Social History     Socioeconomic History   • Marital status:    Tobacco Use   • Smoking status:  Former   • Smokeless tobacco: Never   • Tobacco comments:     She quit at least 30 years ago.   Substance and Sexual Activity   • Alcohol use: No   • Drug use: No   • Sexual activity: Defer           Objective   Physical Exam  Vitals and nursing note reviewed.   Constitutional:       General: She is not in acute distress.     Appearance: She is well-developed. She is not diaphoretic.   HENT:      Head: Normocephalic and atraumatic.      Right Ear: External ear normal.      Left Ear: External ear normal.      Nose: Nose normal.   Eyes:      Conjunctiva/sclera: Conjunctivae normal.      Pupils: Pupils are equal, round, and reactive to light.   Neck:      Vascular: No JVD.      Trachea: No tracheal deviation.   Cardiovascular:      Rate and Rhythm: Normal rate.      Heart sounds: No murmur heard.  Pulmonary:      Effort: Pulmonary effort is normal. No respiratory distress.      Breath sounds: No wheezing.   Abdominal:      Palpations: Abdomen is soft.      Tenderness: There is no abdominal tenderness.   Musculoskeletal:         General: No deformity. Normal range of motion.      Cervical back: Normal range of motion and neck supple.   Skin:     General: Skin is warm and dry.      Coloration: Skin is not pale.      Findings: No erythema or rash.   Neurological:      Mental Status: She is alert and oriented to person, place, and time.      Cranial Nerves: No cranial nerve deficit.   Psychiatric:         Behavior: Behavior normal.         Thought Content: Thought content normal.         Procedures           ED Course  ED Course as of 03/06/23 0139   Mon Mar 06, 2023   0130 CT head rad interpreted:  Senescent changes without acute abnormality. [RB]      ED Course User Index  [RB] Roe Cordova II, PA                                           Medical Decision Making  77-year-old female with chief complaint of fall at nursing home.    Injury of head, initial encounter: acute illness or injury     Details: CT is unremarkable.   Patient will be given Tylenol for her headache and discharged back to the nursing home.  Amount and/or Complexity of Data Reviewed  Radiology: ordered.      Risk  OTC drugs.          Final diagnoses:   Injury of head, initial encounter       ED Disposition  ED Disposition     ED Disposition   Discharge    Condition   Stable    Comment   --             Skinny Meehan MD  [address] [facility] [id number] [phone]    Schedule an appointment as soon as possible for a visit            Medication List      No changes were made to your prescriptions during this visit.          Roe Cordova II, PA  03/06/23 0139

## 2023-03-06 NOTE — ED NOTES
Called [facility] spoke with Jam and requested a truck back to [facility]. He will dispatch it out.

## 2023-03-11 ENCOUNTER — APPOINTMENT (OUTPATIENT)
Dept: GENERAL RADIOLOGY | Facility: HOSPITAL | Age: 78
End: 2023-03-11
Payer: MEDICARE

## 2023-03-11 ENCOUNTER — APPOINTMENT (OUTPATIENT)
Dept: CT IMAGING | Facility: HOSPITAL | Age: 78
End: 2023-03-11
Payer: MEDICARE

## 2023-03-11 ENCOUNTER — HOSPITAL ENCOUNTER (EMERGENCY)
Facility: HOSPITAL | Age: 78
Discharge: HOME | End: 2023-03-11
Attending: STUDENT IN AN ORGANIZED HEALTH CARE EDUCATION/TRAINING PROGRAM | Admitting: STUDENT IN AN ORGANIZED HEALTH CARE EDUCATION/TRAINING PROGRAM
Payer: MEDICARE

## 2023-03-11 VITALS
TEMPERATURE: 99.5 F | OXYGEN SATURATION: 97 % | HEART RATE: 91 BPM | DIASTOLIC BLOOD PRESSURE: 83 MMHG | WEIGHT: 208 LBS | HEIGHT: 67 IN | BODY MASS INDEX: 32.65 KG/M2 | SYSTOLIC BLOOD PRESSURE: 142 MMHG | RESPIRATION RATE: 18 BRPM

## 2023-03-11 DIAGNOSIS — J18.9 PNEUMONIA OF RIGHT LUNG DUE TO INFECTIOUS ORGANISM, UNSPECIFIED PART OF LUNG: Primary | ICD-10-CM

## 2023-03-11 LAB
ALBUMIN SERPL-MCNC: 3.6 G/DL (ref 3.5–5.2)
ALBUMIN/GLOB SERPL: 0.8 G/DL
ALP SERPL-CCNC: 105 U/L (ref 39–117)
ALT SERPL W P-5'-P-CCNC: 12 U/L (ref 1–33)
ANION GAP SERPL CALCULATED.3IONS-SCNC: 6.9 MMOL/L (ref 5–15)
AST SERPL-CCNC: 15 U/L (ref 1–32)
BASOPHILS # BLD AUTO: 0.03 10*3/MM3 (ref 0–0.2)
BASOPHILS NFR BLD AUTO: 0.2 % (ref 0–1.5)
BILIRUB SERPL-MCNC: 0.3 MG/DL (ref 0–1.2)
BUN SERPL-MCNC: 13 MG/DL (ref 8–23)
BUN/CREAT SERPL: 19.4 (ref 7–25)
CALCIUM SPEC-SCNC: 9.1 MG/DL (ref 8.6–10.5)
CHLORIDE SERPL-SCNC: 102 MMOL/L (ref 98–107)
CO2 SERPL-SCNC: 32.1 MMOL/L (ref 22–29)
CREAT SERPL-MCNC: 0.67 MG/DL (ref 0.57–1)
CRP SERPL-MCNC: 6.74 MG/DL (ref 0–0.5)
D-LACTATE SERPL-SCNC: 1.1 MMOL/L (ref 0.5–2)
DEPRECATED RDW RBC AUTO: 43.8 FL (ref 37–54)
EGFRCR SERPLBLD CKD-EPI 2021: 89.6 ML/MIN/1.73
EOSINOPHIL # BLD AUTO: 0.35 10*3/MM3 (ref 0–0.4)
EOSINOPHIL NFR BLD AUTO: 2 % (ref 0.3–6.2)
ERYTHROCYTE [DISTWIDTH] IN BLOOD BY AUTOMATED COUNT: 12.3 % (ref 12.3–15.4)
FLUAV RNA RESP QL NAA+PROBE: NOT DETECTED
FLUBV RNA ISLT QL NAA+PROBE: NOT DETECTED
GEN 5 2HR TROPONIN T REFLEX: 16 NG/L
GLOBULIN UR ELPH-MCNC: 4.3 GM/DL
GLUCOSE SERPL-MCNC: 146 MG/DL (ref 65–99)
HCT VFR BLD AUTO: 39.1 % (ref 34–46.6)
HGB BLD-MCNC: 12.8 G/DL (ref 12–15.9)
HOLD SPECIMEN: NORMAL
HOLD SPECIMEN: NORMAL
IMM GRANULOCYTES # BLD AUTO: 0.07 10*3/MM3 (ref 0–0.05)
IMM GRANULOCYTES NFR BLD AUTO: 0.4 % (ref 0–0.5)
LYMPHOCYTES # BLD AUTO: 2.74 10*3/MM3 (ref 0.7–3.1)
LYMPHOCYTES NFR BLD AUTO: 15.6 % (ref 19.6–45.3)
MCH RBC QN AUTO: 31.7 PG (ref 26.6–33)
MCHC RBC AUTO-ENTMCNC: 32.7 G/DL (ref 31.5–35.7)
MCV RBC AUTO: 96.8 FL (ref 79–97)
MONOCYTES # BLD AUTO: 1.77 10*3/MM3 (ref 0.1–0.9)
MONOCYTES NFR BLD AUTO: 10.1 % (ref 5–12)
NEUTROPHILS NFR BLD AUTO: 12.57 10*3/MM3 (ref 1.7–7)
NEUTROPHILS NFR BLD AUTO: 71.7 % (ref 42.7–76)
NRBC BLD AUTO-RTO: 0 /100 WBC (ref 0–0.2)
PLATELET # BLD AUTO: 237 10*3/MM3 (ref 140–450)
PMV BLD AUTO: 9.7 FL (ref 6–12)
POTASSIUM SERPL-SCNC: 3.9 MMOL/L (ref 3.5–5.2)
PROCALCITONIN SERPL-MCNC: 0.05 NG/ML (ref 0–0.25)
PROT SERPL-MCNC: 7.9 G/DL (ref 6–8.5)
QT INTERVAL: 398 MS
QTC INTERVAL: 494 MS
RBC # BLD AUTO: 4.04 10*6/MM3 (ref 3.77–5.28)
SARS-COV-2 RNA RESP QL NAA+PROBE: NOT DETECTED
SODIUM SERPL-SCNC: 141 MMOL/L (ref 136–145)
TROPONIN T DELTA: 1 NG/L
TROPONIN T SERPL HS-MCNC: 15 NG/L
WBC NRBC COR # BLD: 17.53 10*3/MM3 (ref 3.4–10.8)
WHOLE BLOOD HOLD COAG: NORMAL
WHOLE BLOOD HOLD SPECIMEN: NORMAL

## 2023-03-11 PROCEDURE — 96365 THER/PROPH/DIAG IV INF INIT: CPT

## 2023-03-11 PROCEDURE — 87636 SARSCOV2 & INF A&B AMP PRB: CPT | Performed by: STUDENT IN AN ORGANIZED HEALTH CARE EDUCATION/TRAINING PROGRAM

## 2023-03-11 PROCEDURE — 80053 COMPREHEN METABOLIC PANEL: CPT | Performed by: STUDENT IN AN ORGANIZED HEALTH CARE EDUCATION/TRAINING PROGRAM

## 2023-03-11 PROCEDURE — 99284 EMERGENCY DEPT VISIT MOD MDM: CPT

## 2023-03-11 PROCEDURE — 87040 BLOOD CULTURE FOR BACTERIA: CPT | Performed by: STUDENT IN AN ORGANIZED HEALTH CARE EDUCATION/TRAINING PROGRAM

## 2023-03-11 PROCEDURE — 86140 C-REACTIVE PROTEIN: CPT | Performed by: STUDENT IN AN ORGANIZED HEALTH CARE EDUCATION/TRAINING PROGRAM

## 2023-03-11 PROCEDURE — 71250 CT THORAX DX C-: CPT

## 2023-03-11 PROCEDURE — 85025 COMPLETE CBC W/AUTO DIFF WBC: CPT | Performed by: STUDENT IN AN ORGANIZED HEALTH CARE EDUCATION/TRAINING PROGRAM

## 2023-03-11 PROCEDURE — 36415 COLL VENOUS BLD VENIPUNCTURE: CPT

## 2023-03-11 PROCEDURE — 83605 ASSAY OF LACTIC ACID: CPT | Performed by: STUDENT IN AN ORGANIZED HEALTH CARE EDUCATION/TRAINING PROGRAM

## 2023-03-11 PROCEDURE — 93005 ELECTROCARDIOGRAM TRACING: CPT | Performed by: STUDENT IN AN ORGANIZED HEALTH CARE EDUCATION/TRAINING PROGRAM

## 2023-03-11 PROCEDURE — 71045 X-RAY EXAM CHEST 1 VIEW: CPT

## 2023-03-11 PROCEDURE — 84145 PROCALCITONIN (PCT): CPT | Performed by: STUDENT IN AN ORGANIZED HEALTH CARE EDUCATION/TRAINING PROGRAM

## 2023-03-11 PROCEDURE — 93010 ELECTROCARDIOGRAM REPORT: CPT | Performed by: SPECIALIST

## 2023-03-11 PROCEDURE — 84484 ASSAY OF TROPONIN QUANT: CPT | Performed by: STUDENT IN AN ORGANIZED HEALTH CARE EDUCATION/TRAINING PROGRAM

## 2023-03-11 PROCEDURE — 25010000002 PIPERACILLIN SOD-TAZOBACTAM PER 1 G: Performed by: STUDENT IN AN ORGANIZED HEALTH CARE EDUCATION/TRAINING PROGRAM

## 2023-03-11 RX ORDER — ASPIRIN 81 MG/1
324 TABLET, CHEWABLE ORAL ONCE
Status: DISCONTINUED | OUTPATIENT
Start: 2023-03-11 | End: 2023-03-11 | Stop reason: HOSPADM

## 2023-03-11 RX ORDER — AMOXICILLIN AND CLAVULANATE POTASSIUM 875; 125 MG/1; MG/1
1 TABLET, FILM COATED ORAL 2 TIMES DAILY
Qty: 13 TABLET | Refills: 0 | Status: SHIPPED | OUTPATIENT
Start: 2023-03-11

## 2023-03-11 RX ORDER — SODIUM CHLORIDE 0.9 % (FLUSH) 0.9 %
10 SYRINGE (ML) INJECTION AS NEEDED
Status: DISCONTINUED | OUTPATIENT
Start: 2023-03-11 | End: 2023-03-11 | Stop reason: HOSPADM

## 2023-03-11 RX ADMIN — PIPERACILLIN SODIUM AND TAZOBACTAM SODIUM 3.38 G: 3; .375 INJECTION, POWDER, LYOPHILIZED, FOR SOLUTION INTRAVENOUS at 05:50

## 2023-03-11 NOTE — ED PROVIDER NOTES
Subjective   History of Present Illness  77-year-old female with past medical history of dementia, hypertension, hyperlipidemia presents with right-sided chest pain with radiation into the left chest.  Mild shortness of breath.  No cough or congestion.  No nausea or diaphoresis.  No obvious aggravating or alleviating factors.  Patient does wear oxygen continuously for her chronic lung disease.  Patient does have confusion at baseline.  Patient is oriented to place only.  No obvious focal deficits.  Vital stable.  Afebrile.        Review of Systems   Respiratory: Positive for shortness of breath.    Cardiovascular: Positive for chest pain.   All other systems reviewed and are negative.      Past Medical History:   Diagnosis Date   • Arthritis    • Asthma    • Chronic headaches 10/31/2018   • Degenerative disc disease, lumbar    • Dementia (Formerly [facility])    • E coli bacteremia 11/2019    Not ESBL   • Fibromyalgia    • GERD (gastroesophageal reflux disease)    • Hyperlipidemia    • Hypertension    • Mixed stress and urge urinary incontinence 1/22/2020   • MRSA pneumonia ([facility]) 12/18/2017   • ENE (obstructive sleep apnea)     Noncompliant with BiPAP/CPAP   • Osteoporosis    • Type 2 diabetes mellitus (Former[facility])    • UTI due to extended-spectrum beta lactamase (ESBL) producing Escherichia coli 2021    In both July and November of 2021       Allergies   Allergen Reactions   • Biaxin [Clarithromycin] Other (See Comments)     unknown       Past Surgical History:   Procedure Laterality Date   • APPENDECTOMY     • CARDIAC CATHETERIZATION N/A 10/30/2018    Procedure: Left Heart Cath;  Surgeon: Arturo Inman MD;  Location: [facility] INVASIVE LOCATION;  Service: Cardiology   • ESOPHAGEAL DILATATION     • TOTAL SHOULDER REPLACEMENT Bilateral    • TUBAL ABDOMINAL LIGATION         Family History   Problem Relation Age of Onset   • Breast cancer Sister        Social History     Socioeconomic History   • Marital status:    Tobacco Use    • Smoking status: Former   • Smokeless tobacco: Never   • Tobacco comments:     She quit at least 30 years ago.   Substance and Sexual Activity   • Alcohol use: No   • Drug use: No   • Sexual activity: Defer           Objective   Physical Exam  Constitutional:       General: She is not in acute distress.     Appearance: Normal appearance. She is not ill-appearing.   HENT:      Head: Normocephalic and atraumatic.      Right Ear: External ear normal.      Left Ear: External ear normal.      Nose: Nose normal.      Mouth/Throat:      Mouth: Mucous membranes are moist.   Eyes:      Extraocular Movements: Extraocular movements intact.      Pupils: Pupils are equal, round, and reactive to light.   Cardiovascular:      Rate and Rhythm: Normal rate and regular rhythm.      Heart sounds: No murmur heard.  Pulmonary:      Effort: Pulmonary effort is normal. No respiratory distress.      Breath sounds: Normal breath sounds. No wheezing.   Abdominal:      General: Bowel sounds are normal.      Palpations: Abdomen is soft.      Tenderness: There is no abdominal tenderness.   Musculoskeletal:         General: No deformity or signs of injury. Normal range of motion.      Cervical back: Normal range of motion and neck supple.   Skin:     General: Skin is warm and dry.      Findings: No erythema.   Neurological:      General: No focal deficit present.      Mental Status: She is alert and oriented to person, place, and time. Mental status is at baseline. She is confused.      Cranial Nerves: No cranial nerve deficit.   Psychiatric:         Mood and Affect: Mood normal.         Behavior: Behavior normal.         Thought Content: Thought content normal.         Procedures           ED Course  ED Course as of 03/11/23 0651   Sat Mar 11, 2023   0157 EKG none sinus rhythm.  First-degree block.  .  93 bpm.  No acute ST elevation.  QTc 494.    Electronically signed by Shankar Ellsworth DO, 03/11/23, 1:58 AM EST.   [SF]      ED Course  User Index  [SF] Shankar Ellsworth PIPER,       CT Chest Without Contrast Diagnostic    Result Date: 3/11/2023  There is a new 2.2 cm area of irregular density abutting the mediastinum in the suprahilar region on the right side. This could be dense pneumonia or mass. Otherwise there has been no change from last year's study Signer Name: Abel Carvajal MD  Signed: 3/11/2023 4:17 AM  Workstation Name: [facility]  Radiology [facility]    XR Chest 1 View    Result Date: 3/11/2023  No acute cardiopulmonary findings. Signer Name: Abel Carvajal MD  Signed: 3/11/2023 2:04 AM  Workstation Name: [facility]  Radiology [facility]      Results for orders placed or performed during the hospital encounter of 03/11/23   COVID-19 and FLU A/B PCR - Swab, Nasopharynx    Specimen: Nasopharynx; Swab   Result Value Ref Range    COVID19 Not Detected Not Detected - Ref. Range    Influenza A PCR Not Detected Not Detected    Influenza B PCR Not Detected Not Detected   Comprehensive Metabolic Panel    Specimen: Blood   Result Value Ref Range    Glucose 146 (H) 65 - 99 mg/dL    BUN 13 8 - 23 mg/dL    Creatinine 0.67 0.57 - 1.00 mg/dL    Sodium 141 136 - 145 mmol/L    Potassium 3.9 3.5 - 5.2 mmol/L    Chloride 102 98 - 107 mmol/L    CO2 32.1 (H) 22.0 - 29.0 mmol/L    Calcium 9.1 8.6 - 10.5 mg/dL    Total Protein 7.9 6.0 - 8.5 g/dL    Albumin 3.6 3.5 - 5.2 g/dL    ALT (SGPT) 12 1 - 33 U/L    AST (SGOT) 15 1 - 32 U/L    Alkaline Phosphatase 105 39 - 117 U/L    Total Bilirubin 0.3 0.0 - 1.2 mg/dL    Globulin 4.3 gm/dL    A/G Ratio 0.8 g/dL    BUN/Creatinine Ratio 19.4 7.0 - 25.0    Anion Gap 6.9 5.0 - 15.0 mmol/L    eGFR 89.6 >60.0 mL/min/1.73   High Sensitivity Troponin T    Specimen: Blood   Result Value Ref Range    HS Troponin T 15 (H) <10 ng/L   CBC Auto Differential    Specimen: Blood   Result Value Ref Range    WBC 17.53 (H) 3.40 - 10.80 10*3/mm3    RBC 4.04 3.77 - 5.28 10*6/mm3    Hemoglobin 12.8 12.0 - 15.9 g/dL     Hematocrit 39.1 34.0 - 46.6 %    MCV 96.8 79.0 - 97.0 fL    MCH 31.7 26.6 - 33.0 pg    MCHC 32.7 31.5 - 35.7 g/dL    RDW 12.3 12.3 - 15.4 %    RDW-SD 43.8 37.0 - 54.0 fl    MPV 9.7 6.0 - 12.0 fL    Platelets 237 140 - 450 10*3/mm3    Neutrophil % 71.7 42.7 - 76.0 %    Lymphocyte % 15.6 (L) 19.6 - 45.3 %    Monocyte % 10.1 5.0 - 12.0 %    Eosinophil % 2.0 0.3 - 6.2 %    Basophil % 0.2 0.0 - 1.5 %    Immature Grans % 0.4 0.0 - 0.5 %    Neutrophils, Absolute 12.57 (H) 1.70 - 7.00 10*3/mm3    Lymphocytes, Absolute 2.74 0.70 - 3.10 10*3/mm3    Monocytes, Absolute 1.77 (H) 0.10 - 0.90 10*3/mm3    Eosinophils, Absolute 0.35 0.00 - 0.40 10*3/mm3    Basophils, Absolute 0.03 0.00 - 0.20 10*3/mm3    Immature Grans, Absolute 0.07 (H) 0.00 - 0.05 10*3/mm3    nRBC 0.0 0.0 - 0.2 /100 WBC   High Sensitivity Troponin T 2Hr    Specimen: Hand, Left; Blood   Result Value Ref Range    HS Troponin T 16 (H) <10 ng/L    Troponin T Delta 1 >=-4 - <+4 ng/L   Lactic Acid, Plasma    Specimen: Arm, Left; Blood   Result Value Ref Range    Lactate 1.1 0.5 - 2.0 mmol/L   Procalcitonin    Specimen: Blood   Result Value Ref Range    Procalcitonin 0.05 0.00 - 0.25 ng/mL   C-reactive Protein    Specimen: Hand, Left; Blood   Result Value Ref Range    C-Reactive Protein 6.74 (H) 0.00 - 0.50 mg/dL   ECG 12 Lead Chest Pain   Result Value Ref Range    QT Interval 398 ms    QTC Interval 494 ms   Green Top (Gel)   Result Value Ref Range    Extra Tube Hold for add-ons.    Lavender Top   Result Value Ref Range    Extra Tube hold for add-on    Gold Top - SST   Result Value Ref Range    Extra Tube Hold for add-ons.    Light Blue Top   Result Value Ref Range    Extra Tube Hold for add-ons.                    CURB-65 Score: 2                        Medical Decision Making  CBC does not leukocytosis.  CMP is unremarkable.  EKG was unremarkable.  Troponin trended x2 and within normal limits.  CT chest without contrast noted concerns for right pneumonia versus lung  mass.  Patient has remained afebrile.  Patient is not tachycardic.  Procalcitonin is within normal limits.  Lactic acid is also within normal limits.      Patient is confused but is confused at baseline.  Curb 65 score is 2.  This is a moderate risk with consideration for inpatient versus outpatient therapy.  Although, score is slightly skewed given patient's baseline confusion with dementia.  Patient will have close monitoring given she is a nursing home patient.      I discussed this patient with the on-call hospitalist, Dr. Rivera, for consideration for admission.  Dr. Rivera noted that this patient could be discharged back to the nursing home with oral antibiotic therapy given she is not currently septic and clinically is stable.  She also noted that high-sensitivity troponins place the patient at low risk for ACS as well.  Patient's family was notified of this plan and agreeable.  Patient will be discharged back to the nursing home for further treatment and care on Augmentin.  Vital signs stable at discharge.    Pneumonia of right lung due to infectious organism, unspecified part of lung: complicated acute illness or injury  Amount and/or Complexity of Data Reviewed  Labs: ordered. Decision-making details documented in ED Course.  Radiology: ordered. Decision-making details documented in ED Course.  ECG/medicine tests: ordered.      Risk  OTC drugs.  Prescription drug management.          Final diagnoses:   Pneumonia of right lung due to infectious organism, unspecified part of lung       ED Disposition  ED Disposition     ED Disposition   Discharge    Condition   Stable    Comment   --             Skinny Meehan MD  [address] [facility] [address]  [phone]    In 1 week      [facility] Emergency Department  [address] [place] [id number] [phone]    If symptoms worsen         Medication List      New Prescriptions    amoxicillin-clavulanate 875-125 MG per  tablet  Commonly known as: AUGMENTIN  Take 1 tablet by mouth 2 (Two) Times a Day.           Where to Get Your Medications      These medications were sent to [facility] [place] [place] - [address] - [phone] [facility] [phone] FX  [address], [facility] [address]    Phone: [phone]   · amoxicillin-clavulanate 875-125 MG per tablet          Shankar Ellsworth,   03/11/23 0651

## 2023-03-11 NOTE — ED NOTES
Called daughter Rica and informed pt would be going back to   [facility] on oral antibioitics and f/u

## 2023-03-16 LAB
BACTERIA SPEC AEROBE CULT: NORMAL
BACTERIA SPEC AEROBE CULT: NORMAL

## 2023-04-14 ENCOUNTER — LAB REQUISITION (OUTPATIENT)
Dept: LAB | Facility: HOSPITAL | Age: 78
End: 2023-04-14
Payer: MEDICARE

## 2023-04-14 DIAGNOSIS — R30.0 DYSURIA: ICD-10-CM

## 2023-04-14 DIAGNOSIS — I10 ESSENTIAL (PRIMARY) HYPERTENSION: ICD-10-CM

## 2023-04-14 DIAGNOSIS — D64.9 ANEMIA, UNSPECIFIED: ICD-10-CM

## 2023-04-14 LAB
ALBUMIN SERPL-MCNC: 3.6 G/DL (ref 3.5–5.2)
ALBUMIN/GLOB SERPL: 1.1 G/DL
ALP SERPL-CCNC: 99 U/L (ref 39–117)
ALT SERPL W P-5'-P-CCNC: 8 U/L (ref 1–33)
ANION GAP SERPL CALCULATED.3IONS-SCNC: 9.8 MMOL/L (ref 5–15)
AST SERPL-CCNC: 12 U/L (ref 1–32)
BACTERIA UR QL AUTO: ABNORMAL /HPF
BASOPHILS # BLD AUTO: 0.03 10*3/MM3 (ref 0–0.2)
BASOPHILS NFR BLD AUTO: 0.3 % (ref 0–1.5)
BILIRUB SERPL-MCNC: 0.3 MG/DL (ref 0–1.2)
BILIRUB UR QL STRIP: NEGATIVE
BUN SERPL-MCNC: 12 MG/DL (ref 8–23)
BUN/CREAT SERPL: 16.7 (ref 7–25)
CALCIUM SPEC-SCNC: 9.3 MG/DL (ref 8.6–10.5)
CHLORIDE SERPL-SCNC: 104 MMOL/L (ref 98–107)
CLARITY UR: ABNORMAL
CO2 SERPL-SCNC: 29.2 MMOL/L (ref 22–29)
COLOR UR: YELLOW
CREAT SERPL-MCNC: 0.72 MG/DL (ref 0.57–1)
DEPRECATED RDW RBC AUTO: 43.6 FL (ref 37–54)
EGFRCR SERPLBLD CKD-EPI 2021: 85.7 ML/MIN/1.73
EOSINOPHIL # BLD AUTO: 0.21 10*3/MM3 (ref 0–0.4)
EOSINOPHIL NFR BLD AUTO: 1.8 % (ref 0.3–6.2)
ERYTHROCYTE [DISTWIDTH] IN BLOOD BY AUTOMATED COUNT: 12.2 % (ref 12.3–15.4)
GLOBULIN UR ELPH-MCNC: 3.4 GM/DL
GLUCOSE SERPL-MCNC: 162 MG/DL (ref 65–99)
GLUCOSE UR STRIP-MCNC: NEGATIVE MG/DL
HCT VFR BLD AUTO: 39 % (ref 34–46.6)
HGB BLD-MCNC: 12.7 G/DL (ref 12–15.9)
HGB UR QL STRIP.AUTO: ABNORMAL
HYALINE CASTS UR QL AUTO: ABNORMAL /LPF
IMM GRANULOCYTES # BLD AUTO: 0.04 10*3/MM3 (ref 0–0.05)
IMM GRANULOCYTES NFR BLD AUTO: 0.3 % (ref 0–0.5)
KETONES UR QL STRIP: NEGATIVE
LEUKOCYTE ESTERASE UR QL STRIP.AUTO: ABNORMAL
LYMPHOCYTES # BLD AUTO: 2.57 10*3/MM3 (ref 0.7–3.1)
LYMPHOCYTES NFR BLD AUTO: 21.4 % (ref 19.6–45.3)
MCH RBC QN AUTO: 31.5 PG (ref 26.6–33)
MCHC RBC AUTO-ENTMCNC: 32.6 G/DL (ref 31.5–35.7)
MCV RBC AUTO: 96.8 FL (ref 79–97)
MONOCYTES # BLD AUTO: 0.95 10*3/MM3 (ref 0.1–0.9)
MONOCYTES NFR BLD AUTO: 7.9 % (ref 5–12)
NEUTROPHILS NFR BLD AUTO: 68.3 % (ref 42.7–76)
NEUTROPHILS NFR BLD AUTO: 8.2 10*3/MM3 (ref 1.7–7)
NITRITE UR QL STRIP: NEGATIVE
NRBC BLD AUTO-RTO: 0 /100 WBC (ref 0–0.2)
PH UR STRIP.AUTO: 6 [PH] (ref 5–8)
PLATELET # BLD AUTO: 248 10*3/MM3 (ref 140–450)
PMV BLD AUTO: 10.4 FL (ref 6–12)
POTASSIUM SERPL-SCNC: 4.3 MMOL/L (ref 3.5–5.2)
PROT SERPL-MCNC: 7 G/DL (ref 6–8.5)
PROT UR QL STRIP: NEGATIVE
RBC # BLD AUTO: 4.03 10*6/MM3 (ref 3.77–5.28)
RBC # UR STRIP: ABNORMAL /HPF
REF LAB TEST METHOD: ABNORMAL
SODIUM SERPL-SCNC: 143 MMOL/L (ref 136–145)
SP GR UR STRIP: 1.02 (ref 1–1.03)
SQUAMOUS #/AREA URNS HPF: ABNORMAL /HPF
UROBILINOGEN UR QL STRIP: ABNORMAL
WBC # UR STRIP: ABNORMAL /HPF
WBC NRBC COR # BLD: 12 10*3/MM3 (ref 3.4–10.8)

## 2023-04-14 PROCEDURE — 87186 SC STD MICRODIL/AGAR DIL: CPT | Performed by: INTERNAL MEDICINE

## 2023-04-14 PROCEDURE — 85025 COMPLETE CBC W/AUTO DIFF WBC: CPT | Performed by: INTERNAL MEDICINE

## 2023-04-14 PROCEDURE — 87086 URINE CULTURE/COLONY COUNT: CPT | Performed by: INTERNAL MEDICINE

## 2023-04-14 PROCEDURE — 81001 URINALYSIS AUTO W/SCOPE: CPT | Performed by: INTERNAL MEDICINE

## 2023-04-14 PROCEDURE — 80053 COMPREHEN METABOLIC PANEL: CPT | Performed by: INTERNAL MEDICINE

## 2023-04-14 PROCEDURE — 87088 URINE BACTERIA CULTURE: CPT | Performed by: INTERNAL MEDICINE

## 2023-04-16 LAB — BACTERIA SPEC AEROBE CULT: ABNORMAL

## 2023-07-18 ENCOUNTER — HOSPITAL ENCOUNTER (EMERGENCY)
Facility: HOSPITAL | Age: 78
Discharge: NURSING, REHAB OR LONG-TERM CARE | End: 2023-07-18
Attending: STUDENT IN AN ORGANIZED HEALTH CARE EDUCATION/TRAINING PROGRAM | Admitting: STUDENT IN AN ORGANIZED HEALTH CARE EDUCATION/TRAINING PROGRAM
Payer: MEDICARE

## 2023-07-18 ENCOUNTER — APPOINTMENT (OUTPATIENT)
Dept: GENERAL RADIOLOGY | Facility: HOSPITAL | Age: 78
End: 2023-07-18
Payer: MEDICARE

## 2023-07-18 VITALS
TEMPERATURE: 98.2 F | RESPIRATION RATE: 17 BRPM | OXYGEN SATURATION: 97 % | HEART RATE: 95 BPM | SYSTOLIC BLOOD PRESSURE: 114 MMHG | WEIGHT: 210 LBS | DIASTOLIC BLOOD PRESSURE: 80 MMHG | BODY MASS INDEX: 32.96 KG/M2 | HEIGHT: 67 IN

## 2023-07-18 DIAGNOSIS — R41.0 EPISODIC CONFUSION: ICD-10-CM

## 2023-07-18 DIAGNOSIS — Z87.440 HISTORY OF UTI: Primary | ICD-10-CM

## 2023-07-18 LAB
ALBUMIN SERPL-MCNC: 3.6 G/DL (ref 3.5–5.2)
ALBUMIN/GLOB SERPL: 0.9 G/DL
ALP SERPL-CCNC: 106 U/L (ref 39–117)
ALT SERPL W P-5'-P-CCNC: 15 U/L (ref 1–33)
ANION GAP SERPL CALCULATED.3IONS-SCNC: 10.1 MMOL/L (ref 5–15)
AST SERPL-CCNC: 18 U/L (ref 1–32)
BACTERIA UR QL AUTO: ABNORMAL /HPF
BASOPHILS # BLD AUTO: 0.03 10*3/MM3 (ref 0–0.2)
BASOPHILS NFR BLD AUTO: 0.2 % (ref 0–1.5)
BILIRUB SERPL-MCNC: 0.4 MG/DL (ref 0–1.2)
BILIRUB UR QL STRIP: NEGATIVE
BUN SERPL-MCNC: 14 MG/DL (ref 8–23)
BUN/CREAT SERPL: 17.9 (ref 7–25)
CALCIUM SPEC-SCNC: 9.5 MG/DL (ref 8.6–10.5)
CHLORIDE SERPL-SCNC: 105 MMOL/L (ref 98–107)
CLARITY UR: CLEAR
CO2 SERPL-SCNC: 27.9 MMOL/L (ref 22–29)
COLOR UR: ABNORMAL
CREAT SERPL-MCNC: 0.78 MG/DL (ref 0.57–1)
D-LACTATE SERPL-SCNC: 1.5 MMOL/L (ref 0.5–2)
DEPRECATED RDW RBC AUTO: 42.4 FL (ref 37–54)
EGFRCR SERPLBLD CKD-EPI 2021: 77.9 ML/MIN/1.73
EOSINOPHIL # BLD AUTO: 0.01 10*3/MM3 (ref 0–0.4)
EOSINOPHIL NFR BLD AUTO: 0.1 % (ref 0.3–6.2)
ERYTHROCYTE [DISTWIDTH] IN BLOOD BY AUTOMATED COUNT: 12.2 % (ref 12.3–15.4)
GLOBULIN UR ELPH-MCNC: 4 GM/DL
GLUCOSE SERPL-MCNC: 129 MG/DL (ref 65–99)
GLUCOSE UR STRIP-MCNC: NEGATIVE MG/DL
HCT VFR BLD AUTO: 40.5 % (ref 34–46.6)
HGB BLD-MCNC: 13.1 G/DL (ref 12–15.9)
HGB UR QL STRIP.AUTO: NEGATIVE
HOLD SPECIMEN: NORMAL
HOLD SPECIMEN: NORMAL
HYALINE CASTS UR QL AUTO: ABNORMAL /LPF
IMM GRANULOCYTES # BLD AUTO: 0.03 10*3/MM3 (ref 0–0.05)
IMM GRANULOCYTES NFR BLD AUTO: 0.2 % (ref 0–0.5)
KETONES UR QL STRIP: NEGATIVE
LEUKOCYTE ESTERASE UR QL STRIP.AUTO: ABNORMAL
LYMPHOCYTES # BLD AUTO: 2.01 10*3/MM3 (ref 0.7–3.1)
LYMPHOCYTES NFR BLD AUTO: 15.1 % (ref 19.6–45.3)
MCH RBC QN AUTO: 30.7 PG (ref 26.6–33)
MCHC RBC AUTO-ENTMCNC: 32.3 G/DL (ref 31.5–35.7)
MCV RBC AUTO: 94.8 FL (ref 79–97)
MONOCYTES # BLD AUTO: 1.07 10*3/MM3 (ref 0.1–0.9)
MONOCYTES NFR BLD AUTO: 8 % (ref 5–12)
NEUTROPHILS NFR BLD AUTO: 10.2 10*3/MM3 (ref 1.7–7)
NEUTROPHILS NFR BLD AUTO: 76.4 % (ref 42.7–76)
NITRITE UR QL STRIP: NEGATIVE
NRBC BLD AUTO-RTO: 0 /100 WBC (ref 0–0.2)
PH UR STRIP.AUTO: 7 [PH] (ref 5–8)
PLATELET # BLD AUTO: 244 10*3/MM3 (ref 140–450)
PMV BLD AUTO: 10 FL (ref 6–12)
POTASSIUM SERPL-SCNC: 4.1 MMOL/L (ref 3.5–5.2)
PROT SERPL-MCNC: 7.6 G/DL (ref 6–8.5)
PROT UR QL STRIP: ABNORMAL
RBC # BLD AUTO: 4.27 10*6/MM3 (ref 3.77–5.28)
RBC # UR STRIP: ABNORMAL /HPF
REF LAB TEST METHOD: ABNORMAL
SODIUM SERPL-SCNC: 143 MMOL/L (ref 136–145)
SP GR UR STRIP: 1.02 (ref 1–1.03)
SQUAMOUS #/AREA URNS HPF: ABNORMAL /HPF
UROBILINOGEN UR QL STRIP: ABNORMAL
WBC # UR STRIP: ABNORMAL /HPF
WBC NRBC COR # BLD: 13.35 10*3/MM3 (ref 3.4–10.8)
WHOLE BLOOD HOLD COAG: NORMAL
WHOLE BLOOD HOLD SPECIMEN: NORMAL

## 2023-07-18 PROCEDURE — 71045 X-RAY EXAM CHEST 1 VIEW: CPT

## 2023-07-18 PROCEDURE — 81001 URINALYSIS AUTO W/SCOPE: CPT | Performed by: STUDENT IN AN ORGANIZED HEALTH CARE EDUCATION/TRAINING PROGRAM

## 2023-07-18 PROCEDURE — 80053 COMPREHEN METABOLIC PANEL: CPT | Performed by: STUDENT IN AN ORGANIZED HEALTH CARE EDUCATION/TRAINING PROGRAM

## 2023-07-18 PROCEDURE — 83605 ASSAY OF LACTIC ACID: CPT | Performed by: STUDENT IN AN ORGANIZED HEALTH CARE EDUCATION/TRAINING PROGRAM

## 2023-07-18 PROCEDURE — 99283 EMERGENCY DEPT VISIT LOW MDM: CPT

## 2023-07-18 PROCEDURE — 85025 COMPLETE CBC W/AUTO DIFF WBC: CPT | Performed by: STUDENT IN AN ORGANIZED HEALTH CARE EDUCATION/TRAINING PROGRAM

## 2023-07-18 PROCEDURE — 71045 X-RAY EXAM CHEST 1 VIEW: CPT | Performed by: RADIOLOGY

## 2023-07-18 PROCEDURE — 36415 COLL VENOUS BLD VENIPUNCTURE: CPT

## 2023-07-18 NOTE — ED NOTES
Patient states that she urinated before she was brought to ER. Patient instructed to notify RN when she feels the need to urinate for a urine specimen, pt and daughter voice understanding

## 2023-07-18 NOTE — ED PROVIDER NOTES
Subjective   History of Present Illness  77-year-old female presents from [facility] recently and being treated outpatient for urinary tract infection.  Patient was sent over to be evaluated for confusion.  And generalized weakness.  Patient is afebrile and hemodynamically stable on arrival.    Patient's daughter is present at bedside and states that she initially was placed on cefdinir however urine culture came back positive for Proteus mirabilis and they discontinued cefdinir this morning and placed her on ciprofloxacin.  She had 1 dose since urine culture has been reviewed.  Patient denies abdominal pain nausea or vomiting.      Review of Systems    Past Medical History:   Diagnosis Date    Arthritis     Asthma     Chronic headaches 10/31/2018    Degenerative disc disease, lumbar     Dementia     E coli bacteremia 11/2019    Not ESBL    Fibromyalgia     GERD (gastroesophageal reflux disease)     Hyperlipidemia     Hypertension     Mixed stress and urge urinary incontinence 1/22/2020    MRSA pneumonia 12/18/2017    ENE (obstructive sleep apnea)     Noncompliant with BiPAP/CPAP    Osteoporosis     Type 2 diabetes mellitus     UTI due to extended-spectrum beta lactamase (ESBL) producing Escherichia coli 2021    In both July and November of 2021       Allergies   Allergen Reactions    Biaxin [Clarithromycin] Other (See Comments)     unknown       Past Surgical History:   Procedure Laterality Date    APPENDECTOMY      CARDIAC CATHETERIZATION N/A 10/30/2018    Procedure: Left Heart Cath;  Surgeon: Arturo Inman MD;  Location: [facility] CATH INVASIVE LOCATION;  Service: Cardiology    ESOPHAGEAL DILATATION      TOTAL SHOULDER REPLACEMENT Bilateral     TUBAL ABDOMINAL LIGATION         Family History   Problem Relation Age of Onset    Breast cancer Sister        Social History     Socioeconomic History    Marital status:    Tobacco Use    Smoking status: Former    Smokeless tobacco: Never    Tobacco comments:     She quit  at least 30 years ago.   Substance and Sexual Activity    Alcohol use: No    Drug use: No    Sexual activity: Defer           Objective   Physical Exam  Vitals and nursing note reviewed.   Constitutional:       General: She is not in acute distress.     Appearance: She is well-developed. She is obese. She is not diaphoretic.   HENT:      Head: Normocephalic and atraumatic.      Right Ear: External ear normal.      Left Ear: External ear normal.      Nose: Nose normal.   Eyes:      Conjunctiva/sclera: Conjunctivae normal.      Pupils: Pupils are equal, round, and reactive to light.   Neck:      Vascular: No JVD.      Trachea: No tracheal deviation.   Cardiovascular:      Rate and Rhythm: Normal rate and regular rhythm.      Heart sounds: Normal heart sounds. No murmur heard.  Pulmonary:      Effort: Pulmonary effort is normal. No respiratory distress.      Breath sounds: Normal breath sounds. No wheezing.   Abdominal:      General: Bowel sounds are normal.      Palpations: Abdomen is soft.      Tenderness: There is no abdominal tenderness.   Musculoskeletal:         General: No deformity. Normal range of motion.      Cervical back: Normal range of motion and neck supple.   Skin:     General: Skin is warm and dry.      Coloration: Skin is not pale.      Findings: No erythema or rash.   Neurological:      Mental Status: She is alert and oriented to person, place, and time.      Cranial Nerves: No cranial nerve deficit.   Psychiatric:      Comments: Patient is intermittently pleasantly confused.       Procedures       Results for orders placed or performed during the hospital encounter of 07/18/23   Comprehensive Metabolic Panel    Specimen: Hand, Left; Blood   Result Value Ref Range    Glucose 129 (H) 65 - 99 mg/dL    BUN 14 8 - 23 mg/dL    Creatinine 0.78 0.57 - 1.00 mg/dL    Sodium 143 136 - 145 mmol/L    Potassium 4.1 3.5 - 5.2 mmol/L    Chloride 105 98 - 107 mmol/L    CO2 27.9 22.0 - 29.0 mmol/L    Calcium 9.5 8.6  - 10.5 mg/dL    Total Protein 7.6 6.0 - 8.5 g/dL    Albumin 3.6 3.5 - 5.2 g/dL    ALT (SGPT) 15 1 - 33 U/L    AST (SGOT) 18 1 - 32 U/L    Alkaline Phosphatase 106 39 - 117 U/L    Total Bilirubin 0.4 0.0 - 1.2 mg/dL    Globulin 4.0 gm/dL    A/G Ratio 0.9 g/dL    BUN/Creatinine Ratio 17.9 7.0 - 25.0    Anion Gap 10.1 5.0 - 15.0 mmol/L    eGFR 77.9 >60.0 mL/min/1.73   Lactic Acid, Plasma    Specimen: Hand, Left; Blood   Result Value Ref Range    Lactate 1.5 0.5 - 2.0 mmol/L   CBC Auto Differential    Specimen: Hand, Left; Blood   Result Value Ref Range    WBC 13.35 (H) 3.40 - 10.80 10*3/mm3    RBC 4.27 3.77 - 5.28 10*6/mm3    Hemoglobin 13.1 12.0 - 15.9 g/dL    Hematocrit 40.5 34.0 - 46.6 %    MCV 94.8 79.0 - 97.0 fL    MCH 30.7 26.6 - 33.0 pg    MCHC 32.3 31.5 - 35.7 g/dL    RDW 12.2 (L) 12.3 - 15.4 %    RDW-SD 42.4 37.0 - 54.0 fl    MPV 10.0 6.0 - 12.0 fL    Platelets 244 140 - 450 10*3/mm3    Neutrophil % 76.4 (H) 42.7 - 76.0 %    Lymphocyte % 15.1 (L) 19.6 - 45.3 %    Monocyte % 8.0 5.0 - 12.0 %    Eosinophil % 0.1 (L) 0.3 - 6.2 %    Basophil % 0.2 0.0 - 1.5 %    Immature Grans % 0.2 0.0 - 0.5 %    Neutrophils, Absolute 10.20 (H) 1.70 - 7.00 10*3/mm3    Lymphocytes, Absolute 2.01 0.70 - 3.10 10*3/mm3    Monocytes, Absolute 1.07 (H) 0.10 - 0.90 10*3/mm3    Eosinophils, Absolute 0.01 0.00 - 0.40 10*3/mm3    Basophils, Absolute 0.03 0.00 - 0.20 10*3/mm3    Immature Grans, Absolute 0.03 0.00 - 0.05 10*3/mm3    nRBC 0.0 0.0 - 0.2 /100 WBC   Green Top (Gel)   Result Value Ref Range    Extra Tube Hold for add-ons.    Lavender Top   Result Value Ref Range    Extra Tube hold for add-on    Gold Top - SST   Result Value Ref Range    Extra Tube Hold for add-ons.    Light Blue Top   Result Value Ref Range    Extra Tube Hold for add-ons.            ED Course  ED Course as of 07/18/23 1423   Tue Jul 18, 2023   1404 Discharge instructions  Continue oral antibiotic change that was performed at nursing facility.  Patient apparently  had Proteus mirabilis that was not sensitive to cefdinir.  That was discontinued continue ciprofloxacin.  Patient worsens on oral antibiotic therapy turn to the ER. [LK]      ED Course User Index  [LK] Rika Turner DO      -                                     Medical Decision Making  Problems Addressed:  Episodic confusion: complicated acute illness or injury  History of UTI: complicated acute illness or injury    Amount and/or Complexity of Data Reviewed  Labs: ordered.  Radiology: ordered.        Final diagnoses:   Episodic confusion   History of UTI       ED Disposition  ED Disposition       ED Disposition   Discharge    Condition   Stable    Comment   --               Skinny Meehan MD  [address] [facility] [id number] [phone]               Medication List      No changes were made to your prescriptions during this visit.            Rika Turner DO  07/18/23 1426

## 2023-07-18 NOTE — DISCHARGE INSTRUCTIONS
Continue oral antibiotic change that was performed at nursing facility.  Patient apparently had Proteus mirabilis that was not sensitive to cefdinir.  That was discontinued continue ciprofloxacin.  Patient worsens on oral antibiotic therapy turn to the ER.

## 2023-07-18 NOTE — ED NOTES
Patient placed on bedpan at this time. Patient instructed to hit call light when finished. Patient voices understanding.

## 2023-09-22 ENCOUNTER — APPOINTMENT (OUTPATIENT)
Dept: CT IMAGING | Facility: HOSPITAL | Age: 78
End: 2023-09-22
Payer: MEDICARE

## 2023-09-22 ENCOUNTER — APPOINTMENT (OUTPATIENT)
Dept: ULTRASOUND IMAGING | Facility: HOSPITAL | Age: 78
End: 2023-09-22
Payer: MEDICARE

## 2023-09-22 ENCOUNTER — HOSPITAL ENCOUNTER (EMERGENCY)
Facility: HOSPITAL | Age: 78
Discharge: HOME | End: 2023-09-22
Attending: STUDENT IN AN ORGANIZED HEALTH CARE EDUCATION/TRAINING PROGRAM
Payer: MEDICARE

## 2023-09-22 VITALS
TEMPERATURE: 98.2 F | RESPIRATION RATE: 18 BRPM | DIASTOLIC BLOOD PRESSURE: 84 MMHG | BODY MASS INDEX: 34.53 KG/M2 | WEIGHT: 220 LBS | SYSTOLIC BLOOD PRESSURE: 184 MMHG | HEIGHT: 67 IN | OXYGEN SATURATION: 99 % | HEART RATE: 83 BPM

## 2023-09-22 DIAGNOSIS — S39.012A STRAIN OF LUMBAR REGION, INITIAL ENCOUNTER: Primary | ICD-10-CM

## 2023-09-22 DIAGNOSIS — N83.209 CYST OF OVARY, UNSPECIFIED LATERALITY: ICD-10-CM
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
## 2023-09-22 PROCEDURE — 72131 CT LUMBAR SPINE W/O DYE: CPT

## 2023-09-22 PROCEDURE — 74176 CT ABD & PELVIS W/O CONTRAST: CPT | Performed by: RADIOLOGY

## 2023-09-22 PROCEDURE — 72131 CT LUMBAR SPINE W/O DYE: CPT | Performed by: RADIOLOGY

## 2023-09-22 PROCEDURE — 76856 US EXAM PELVIC COMPLETE: CPT | Performed by: RADIOLOGY

## 2023-09-22 PROCEDURE — 74176 CT ABD & PELVIS W/O CONTRAST: CPT

## 2023-09-22 PROCEDURE — 72128 CT CHEST SPINE W/O DYE: CPT

## 2023-09-22 PROCEDURE — 72128 CT CHEST SPINE W/O DYE: CPT | Performed by: RADIOLOGY

## 2023-09-22 PROCEDURE — 99284 EMERGENCY DEPT VISIT MOD MDM: CPT

## 2023-09-22 PROCEDURE — 76856 US EXAM PELVIC COMPLETE: CPT

## 2023-09-22 RX ORDER — HYDRALAZINE HYDROCHLORIDE 10 MG/1
10 TABLET, FILM COATED ORAL ONCE
Status: COMPLETED | OUTPATIENT
Start: 2023-09-22 | End: 2023-09-22

## 2023-09-22 RX ORDER — HYDROCODONE BITARTRATE AND ACETAMINOPHEN 7.5; 325 MG/1; MG/1
1 TABLET ORAL ONCE
Status: COMPLETED | OUTPATIENT
Start: 2023-09-22 | End: 2023-09-22

## 2023-09-22 RX ADMIN — HYDROCODONE BITARTRATE AND ACETAMINOPHEN 1 TABLET: 7.5; 325 TABLET ORAL at 02:33

## 2023-09-22 RX ADMIN — HYDRALAZINE HYDROCHLORIDE 10 MG: 10 TABLET, FILM COATED ORAL at 05:45

## 2023-09-22 NOTE — ED NOTES
Pt daughter, Deepali called for update. No needs or concerns at this time, stated we can call her if we need anything.

## 2023-10-02 NOTE — ED PROVIDER NOTES
Subjective   History of Present Illness  Patient is a 77-year-old female with significant past medical history positive for arthritis, asthma, dementia, GERD, fibromyalgia, hypertension presenting to the ER after a fall at the nursing home.  Patient resides at [facility].  Patient was found sitting in the floor at the end of her bed.  Nursing home staff does not report witnessing a fall.  Patient does complain of pain all over.  Patient denies hitting her head but has a history of dementia.  Patient denies any additional symptoms at this time.    History provided by:  Patient   used: No      Review of Systems   Constitutional: Negative.  Negative for fever.   HENT: Negative.     Respiratory: Negative.     Cardiovascular: Negative.  Negative for chest pain.   Gastrointestinal: Negative.  Negative for abdominal pain.   Endocrine: Negative.    Genitourinary: Negative.  Negative for dysuria.   Musculoskeletal:  Positive for myalgias.   Skin: Negative.    Neurological: Negative.    Psychiatric/Behavioral: Negative.     All other systems reviewed and are negative.    Past Medical History:   Diagnosis Date    Arthritis     Asthma     Chronic headaches 10/31/2018    Degenerative disc disease, lumbar     Dementia     E coli bacteremia 11/2019    Not ESBL    Fibromyalgia     GERD (gastroesophageal reflux disease)     Hyperlipidemia     Hypertension     Mixed stress and urge urinary incontinence 1/22/2020    MRSA pneumonia 12/18/2017    ENE (obstructive sleep apnea)     Noncompliant with BiPAP/CPAP    Osteoporosis     Type 2 diabetes mellitus     UTI due to extended-spectrum beta lactamase (ESBL) producing Escherichia coli 2021    In both July and November of 2021       Allergies   Allergen Reactions    Biaxin [Clarithromycin] Other (See Comments)     unknown       Past Surgical History:   Procedure Laterality Date    APPENDECTOMY      CARDIAC CATHETERIZATION N/A 10/30/2018    Procedure: Left Heart Cath;  Surgeon:  Arturo Inman MD;  Location: [facility] INVASIVE LOCATION;  Service: Cardiology    ESOPHAGEAL DILATATION      TOTAL SHOULDER REPLACEMENT Bilateral     TUBAL ABDOMINAL LIGATION         Family History   Problem Relation Age of Onset    Breast cancer Sister        Social History     Socioeconomic History    Marital status:    Tobacco Use    Smoking status: Former    Smokeless tobacco: Never    Tobacco comments:     She quit at least 30 years ago.   Substance and Sexual Activity    Alcohol use: No    Drug use: No    Sexual activity: Defer           Objective   Physical Exam  Vitals and nursing note reviewed.   Constitutional:       General: She is not in acute distress.     Appearance: She is well-developed. She is not diaphoretic.   HENT:      Head: Normocephalic and atraumatic.      Right Ear: External ear normal.      Left Ear: External ear normal.      Nose: Nose normal.   Eyes:      Conjunctiva/sclera: Conjunctivae normal.      Pupils: Pupils are equal, round, and reactive to light.   Neck:      Vascular: No JVD.      Trachea: No tracheal deviation.   Cardiovascular:      Rate and Rhythm: Normal rate and regular rhythm.      Heart sounds: Normal heart sounds. No murmur heard.  Pulmonary:      Effort: Pulmonary effort is normal. No respiratory distress.      Breath sounds: Normal breath sounds. No wheezing.   Abdominal:      General: Bowel sounds are normal.      Palpations: Abdomen is soft.      Tenderness: There is no abdominal tenderness.   Musculoskeletal:         General: No deformity. Normal range of motion.      Cervical back: Normal range of motion and neck supple.   Skin:     General: Skin is warm and dry.      Coloration: Skin is not pale.      Findings: No erythema or rash.   Neurological:      Mental Status: She is alert. Mental status is at baseline.      Cranial Nerves: No cranial nerve deficit.   Psychiatric:         Behavior: Behavior normal.         Thought Content: Thought content  normal.       Procedures           ED Course  ED Course as of 10/02/23 1043   Fri Sep 22, 2023   0708 CT Abdomen Pelvis Without Contrast []   0708 CT Lumbar Spine Without Contrast []   0708 CT Thoracic Spine Without Contrast []   0717 US Pelvis Complete []      ED Course User Index  [] Nicole Baker, DARREN                                           Medical Decision Making  Problems Addressed:  Cyst of ovary, unspecified laterality: complicated acute illness or injury  Fall, initial encounter: complicated acute illness or injury  Strain of lumbar region, initial encounter: complicated acute illness or injury    Amount and/or Complexity of Data Reviewed  Radiology: ordered. Decision-making details documented in ED Course.    Risk  Prescription drug management.        Final diagnoses:   Strain of lumbar region, initial encounter   Fall, initial encounter   Cyst of ovary, unspecified laterality       ED Disposition  ED Disposition       ED Disposition   Discharge    Condition   Stable    Comment   --               Skinny Meehan MD  [address] [facility] [address]  [phone]    Schedule an appointment as soon as possible for a visit in 2 days  As needed, If symptoms worsen         Medication List      No changes were made to your prescriptions during this visit.            Nicole Baker, DARREN  10/02/23 1044

## 2023-10-20 ENCOUNTER — HOSPITAL ENCOUNTER (EMERGENCY)
Facility: HOSPITAL | Age: 78
Discharge: HOME | End: 2023-10-21
Attending: EMERGENCY MEDICINE
Payer: MEDICARE

## 2023-10-20 ENCOUNTER — APPOINTMENT (OUTPATIENT)
Dept: CT IMAGING | Facility: HOSPITAL | Age: 78
End: 2023-10-20
Payer: MEDICARE
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
## 2023-10-20 DIAGNOSIS — S09.90XA INJURY OF HEAD, INITIAL ENCOUNTER: ICD-10-CM

## 2023-10-20 PROCEDURE — 99284 EMERGENCY DEPT VISIT MOD MDM: CPT

## 2023-10-20 PROCEDURE — 72125 CT NECK SPINE W/O DYE: CPT | Performed by: RADIOLOGY

## 2023-10-20 PROCEDURE — 72125 CT NECK SPINE W/O DYE: CPT

## 2023-10-20 PROCEDURE — 70450 CT HEAD/BRAIN W/O DYE: CPT

## 2023-10-20 PROCEDURE — 70450 CT HEAD/BRAIN W/O DYE: CPT | Performed by: RADIOLOGY

## 2023-10-21 VITALS
BODY MASS INDEX: 34.53 KG/M2 | HEART RATE: 95 BPM | WEIGHT: 220 LBS | SYSTOLIC BLOOD PRESSURE: 130 MMHG | TEMPERATURE: 97.7 F | HEIGHT: 67 IN | RESPIRATION RATE: 18 BRPM | OXYGEN SATURATION: 92 % | DIASTOLIC BLOOD PRESSURE: 58 MMHG

## 2023-10-21 NOTE — ED NOTES
Called [facility] spoke with Evelyn for transport back to [facility]. She will let the crew know.

## 2023-11-10 NOTE — ED PROVIDER NOTES
Subjective   History of Present Illness  Patient is a 78-year-old female with significant past medical history positive for arthritis, asthma, headaches, dementia, GERD, hyperlipidemia, hypertension, osteoarthritis, type 2 diabetes presenting to the ER complaints of fall.  Patient fell from her wheelchair in a sitting position hit her head causing a headache.  Patient denies LOC.  Patient denies any additional symptoms at this time.    History provided by:  Patient   used: No        Review of Systems   Constitutional: Negative.  Negative for fever.   Respiratory: Negative.     Cardiovascular: Negative.  Negative for chest pain.   Gastrointestinal: Negative.  Negative for abdominal pain.   Endocrine: Negative.    Genitourinary: Negative.  Negative for dysuria.   Skin: Negative.    Neurological:  Positive for headaches.        Head injury   Psychiatric/Behavioral: Negative.     All other systems reviewed and are negative.      Past Medical History:   Diagnosis Date    Arthritis     Asthma     Chronic headaches 10/31/2018    Degenerative disc disease, lumbar     Dementia     E coli bacteremia 11/2019    Not ESBL    Fibromyalgia     GERD (gastroesophageal reflux disease)     Hyperlipidemia     Hypertension     Mixed stress and urge urinary incontinence 1/22/2020    MRSA pneumonia 12/18/2017    ENE (obstructive sleep apnea)     Noncompliant with BiPAP/CPAP    Osteoporosis     Type 2 diabetes mellitus     UTI due to extended-spectrum beta lactamase (ESBL) producing Escherichia coli 2021    In both July and November of 2021       Allergies   Allergen Reactions    Biaxin [Clarithromycin] Other (See Comments)     unknown       Past Surgical History:   Procedure Laterality Date    APPENDECTOMY      CARDIAC CATHETERIZATION N/A 10/30/2018    Procedure: Left Heart Cath;  Surgeon: Arturo Inman MD;  Location: [facility] CATH INVASIVE LOCATION;  Service: Cardiology    ESOPHAGEAL DILATATION      TOTAL SHOULDER  REPLACEMENT Bilateral     TUBAL ABDOMINAL LIGATION         Family History   Problem Relation Age of Onset    Breast cancer Sister        Social History     Socioeconomic History    Marital status:    Tobacco Use    Smoking status: Former    Smokeless tobacco: Never    Tobacco comments:     She quit at least 30 years ago.   Substance and Sexual Activity    Alcohol use: No    Drug use: No    Sexual activity: Defer           Objective   Physical Exam  Vitals and nursing note reviewed.   Constitutional:       General: She is not in acute distress.     Appearance: She is well-developed. She is not diaphoretic.   HENT:      Head: Normocephalic and atraumatic.      Right Ear: External ear normal.      Left Ear: External ear normal.      Nose: Nose normal.   Eyes:      Conjunctiva/sclera: Conjunctivae normal.      Pupils: Pupils are equal, round, and reactive to light.   Neck:      Vascular: No JVD.      Trachea: No tracheal deviation.   Cardiovascular:      Rate and Rhythm: Normal rate and regular rhythm.      Heart sounds: Normal heart sounds. No murmur heard.  Pulmonary:      Effort: Pulmonary effort is normal. No respiratory distress.      Breath sounds: Normal breath sounds. No wheezing.   Abdominal:      General: Bowel sounds are normal.      Palpations: Abdomen is soft.      Tenderness: There is no abdominal tenderness.   Musculoskeletal:         General: No deformity. Normal range of motion.      Cervical back: Normal range of motion and neck supple.   Skin:     General: Skin is warm and dry.      Coloration: Skin is not pale.      Findings: No erythema or rash.   Neurological:      Mental Status: She is alert and oriented to person, place, and time.      GCS: GCS eye subscore is 4. GCS verbal subscore is 5. GCS motor subscore is 6.      Cranial Nerves: No cranial nerve deficit.      Sensory: Sensation is intact.      Motor: Motor function is intact.      Coordination: Coordination is intact.      Gait: Gait  is intact.      Deep Tendon Reflexes: Reflexes normal. Babinski sign absent on the right side. Babinski sign absent on the left side.   Psychiatric:         Behavior: Behavior normal.         Thought Content: Thought content normal.         Procedures       Results for orders placed or performed during the hospital encounter of 07/18/23   Comprehensive Metabolic Panel    Specimen: Hand, Left; Blood   Result Value Ref Range    Glucose 129 (H) 65 - 99 mg/dL    BUN 14 8 - 23 mg/dL    Creatinine 0.78 0.57 - 1.00 mg/dL    Sodium 143 136 - 145 mmol/L    Potassium 4.1 3.5 - 5.2 mmol/L    Chloride 105 98 - 107 mmol/L    CO2 27.9 22.0 - 29.0 mmol/L    Calcium 9.5 8.6 - 10.5 mg/dL    Total Protein 7.6 6.0 - 8.5 g/dL    Albumin 3.6 3.5 - 5.2 g/dL    ALT (SGPT) 15 1 - 33 U/L    AST (SGOT) 18 1 - 32 U/L    Alkaline Phosphatase 106 39 - 117 U/L    Total Bilirubin 0.4 0.0 - 1.2 mg/dL    Globulin 4.0 gm/dL    A/G Ratio 0.9 g/dL    BUN/Creatinine Ratio 17.9 7.0 - 25.0    Anion Gap 10.1 5.0 - 15.0 mmol/L    eGFR 77.9 >60.0 mL/min/1.73   Lactic Acid, Plasma    Specimen: Hand, Left; Blood   Result Value Ref Range    Lactate 1.5 0.5 - 2.0 mmol/L   CBC Auto Differential    Specimen: Hand, Left; Blood   Result Value Ref Range    WBC 13.35 (H) 3.40 - 10.80 10*3/mm3    RBC 4.27 3.77 - 5.28 10*6/mm3    Hemoglobin 13.1 12.0 - 15.9 g/dL    Hematocrit 40.5 34.0 - 46.6 %    MCV 94.8 79.0 - 97.0 fL    MCH 30.7 26.6 - 33.0 pg    MCHC 32.3 31.5 - 35.7 g/dL    RDW 12.2 (L) 12.3 - 15.4 %    RDW-SD 42.4 37.0 - 54.0 fl    MPV 10.0 6.0 - 12.0 fL    Platelets 244 140 - 450 10*3/mm3    Neutrophil % 76.4 (H) 42.7 - 76.0 %    Lymphocyte % 15.1 (L) 19.6 - 45.3 %    Monocyte % 8.0 5.0 - 12.0 %    Eosinophil % 0.1 (L) 0.3 - 6.2 %    Basophil % 0.2 0.0 - 1.5 %    Immature Grans % 0.2 0.0 - 0.5 %    Neutrophils, Absolute 10.20 (H) 1.70 - 7.00 10*3/mm3    Lymphocytes, Absolute 2.01 0.70 - 3.10 10*3/mm3    Monocytes, Absolute 1.07 (H) 0.10 - 0.90 10*3/mm3     Eosinophils, Absolute 0.01 0.00 - 0.40 10*3/mm3    Basophils, Absolute 0.03 0.00 - 0.20 10*3/mm3    Immature Grans, Absolute 0.03 0.00 - 0.05 10*3/mm3    nRBC 0.0 0.0 - 0.2 /100 WBC   Urinalysis With Culture If Indicated - Urine, Clean Catch    Specimen: Urine, Clean Catch   Result Value Ref Range    Color, UA Dark Yellow (A) Yellow, Straw    Appearance, UA Clear Clear    pH, UA 7.0 5.0 - 8.0    Specific Gravity, UA 1.019 1.005 - 1.030    Glucose, UA Negative Negative    Ketones, UA Negative Negative    Bilirubin, UA Negative Negative    Blood, UA Negative Negative    Protein, UA Trace (A) Negative    Leuk Esterase, UA Trace (A) Negative    Nitrite, UA Negative Negative    Urobilinogen, UA 1.0 E.U./dL 0.2 - 1.0 E.U./dL   Urinalysis, Microscopic Only - Urine, Clean Catch    Specimen: Urine, Clean Catch   Result Value Ref Range    RBC, UA 0-2 None Seen, 0-2 /HPF    WBC, UA 3-5 (A) None Seen, 0-2 /HPF    Bacteria, UA None Seen None Seen /HPF    Squamous Epithelial Cells, UA 3-6 (A) None Seen, 0-2 /HPF    Hyaline Casts, UA None Seen None Seen /LPF    Methodology Automated Microscopy    Green Top (Gel)   Result Value Ref Range    Extra Tube Hold for add-ons.    Lavender Top   Result Value Ref Range    Extra Tube hold for add-on    Gold Top - SST   Result Value Ref Range    Extra Tube Hold for add-ons.    Light Blue Top   Result Value Ref Range    Extra Tube Hold for add-ons.       CT Cervical Spine Without Contrast   Final Result   No acute fracture.           This report was finalized on 10/21/2023 1:33 AM by Alex Pallas, DO.          CT Head Without Contrast   Final Result   No acute intracranial process.           This report was finalized on 10/21/2023 1:32 AM by Alex Pallas, DO.               ED Course                                           Medical Decision Making  Patient is a 78-year-old female with significant past medical history positive for arthritis, asthma, headaches, dementia, GERD, hyperlipidemia,  hypertension, osteoarthritis, type 2 diabetes presenting to the ER complaints of fall.  Patient fell from her wheelchair in a sitting position hit her head causing a headache.  Patient denies LOC.  Patient denies any additional symptoms at this time.    Advised patient to return to the ER with new or worsening symptoms.  Advised patient to follow-up with PCP.  Patient verbalized understanding and agrees.  Vital signs are stable at discharge.  Patient is in no acute distress.    Problems Addressed:  Fall, initial encounter: complicated acute illness or injury  Injury of head, initial encounter: complicated acute illness or injury    Amount and/or Complexity of Data Reviewed  Radiology: ordered.        Final diagnoses:   Fall, initial encounter   Injury of head, initial encounter       ED Disposition  ED Disposition       ED Disposition   Discharge    Condition   Stable    Comment   --               Skinny Meehan MD  [address] [facility] [address] [phone]    Schedule an appointment as soon as possible for a visit   As needed         Medication List      No changes were made to your prescriptions during this visit.            Nicole Baker, APRN  11/10/23 0649

## 2024-01-10 ENCOUNTER — LAB REQUISITION (OUTPATIENT)
Dept: LAB | Facility: HOSPITAL | Age: 79
End: 2024-01-10
Payer: MEDICARE

## 2024-01-10 DIAGNOSIS — I10 ESSENTIAL (PRIMARY) HYPERTENSION: ICD-10-CM

## 2024-01-10 LAB — REF LAB TEST METHOD: NORMAL

## 2024-01-10 PROCEDURE — 88185 FLOWCYTOMETRY/TC ADD-ON: CPT

## 2024-01-10 PROCEDURE — 88184 FLOWCYTOMETRY/ TC 1 MARKER: CPT

## 2024-01-11 LAB — REF LAB TEST METHOD: NORMAL

## 2024-01-19 ENCOUNTER — LAB REQUISITION (OUTPATIENT)
Dept: LAB | Facility: HOSPITAL | Age: 79
End: 2024-01-19
Payer: MEDICARE

## 2024-01-19 DIAGNOSIS — M62.81 MUSCLE WEAKNESS (GENERALIZED): ICD-10-CM

## 2024-01-19 LAB
25(OH)D3 SERPL-MCNC: 66.1 NG/ML (ref 30–100)
ALBUMIN SERPL-MCNC: 3.6 G/DL (ref 3.5–5.2)
ALBUMIN/GLOB SERPL: 1.1 G/DL
ALP SERPL-CCNC: 113 U/L (ref 39–117)
ALT SERPL W P-5'-P-CCNC: 15 U/L (ref 1–33)
ANION GAP SERPL CALCULATED.3IONS-SCNC: 11.6 MMOL/L (ref 5–15)
AST SERPL-CCNC: 15 U/L (ref 1–32)
BASOPHILS # BLD AUTO: 0.04 10*3/MM3 (ref 0–0.2)
BASOPHILS NFR BLD AUTO: 0.4 % (ref 0–1.5)
BILIRUB SERPL-MCNC: 0.3 MG/DL (ref 0–1.2)
BUN SERPL-MCNC: 18 MG/DL (ref 8–23)
BUN/CREAT SERPL: 23.4 (ref 7–25)
CALCIUM SPEC-SCNC: 9.3 MG/DL (ref 8.6–10.5)
CHLORIDE SERPL-SCNC: 102 MMOL/L (ref 98–107)
CO2 SERPL-SCNC: 28.4 MMOL/L (ref 22–29)
CREAT SERPL-MCNC: 0.77 MG/DL (ref 0.57–1)
DEPRECATED RDW RBC AUTO: 41.7 FL (ref 37–54)
EGFRCR SERPLBLD CKD-EPI 2021: 78.6 ML/MIN/1.73
EOSINOPHIL # BLD AUTO: 0.21 10*3/MM3 (ref 0–0.4)
EOSINOPHIL NFR BLD AUTO: 1.9 % (ref 0.3–6.2)
ERYTHROCYTE [DISTWIDTH] IN BLOOD BY AUTOMATED COUNT: 12 % (ref 12.3–15.4)
GLOBULIN UR ELPH-MCNC: 3.3 GM/DL
GLUCOSE SERPL-MCNC: 123 MG/DL (ref 65–99)
HCT VFR BLD AUTO: 41.1 % (ref 34–46.6)
HGB BLD-MCNC: 13.6 G/DL (ref 12–15.9)
IMM GRANULOCYTES # BLD AUTO: 0.03 10*3/MM3 (ref 0–0.05)
IMM GRANULOCYTES NFR BLD AUTO: 0.3 % (ref 0–0.5)
LYMPHOCYTES # BLD AUTO: 2.78 10*3/MM3 (ref 0.7–3.1)
LYMPHOCYTES NFR BLD AUTO: 24.7 % (ref 19.6–45.3)
MCH RBC QN AUTO: 31 PG (ref 26.6–33)
MCHC RBC AUTO-ENTMCNC: 33.1 G/DL (ref 31.5–35.7)
MCV RBC AUTO: 93.6 FL (ref 79–97)
MONOCYTES # BLD AUTO: 1.08 10*3/MM3 (ref 0.1–0.9)
MONOCYTES NFR BLD AUTO: 9.6 % (ref 5–12)
NEUTROPHILS NFR BLD AUTO: 63.1 % (ref 42.7–76)
NEUTROPHILS NFR BLD AUTO: 7.13 10*3/MM3 (ref 1.7–7)
NRBC BLD AUTO-RTO: 0.4 /100 WBC (ref 0–0.2)
PLATELET # BLD AUTO: 235 10*3/MM3 (ref 140–450)
PMV BLD AUTO: 10.9 FL (ref 6–12)
POTASSIUM SERPL-SCNC: 4.3 MMOL/L (ref 3.5–5.2)
PROT SERPL-MCNC: 6.9 G/DL (ref 6–8.5)
RBC # BLD AUTO: 4.39 10*6/MM3 (ref 3.77–5.28)
SODIUM SERPL-SCNC: 142 MMOL/L (ref 136–145)
VIT B12 BLD-MCNC: 529 PG/ML (ref 211–946)
WBC NRBC COR # BLD AUTO: 11.27 10*3/MM3 (ref 3.4–10.8)

## 2024-01-19 PROCEDURE — 82607 VITAMIN B-12: CPT | Performed by: INTERNAL MEDICINE

## 2024-01-19 PROCEDURE — 85025 COMPLETE CBC W/AUTO DIFF WBC: CPT | Performed by: INTERNAL MEDICINE

## 2024-01-19 PROCEDURE — 80053 COMPREHEN METABOLIC PANEL: CPT | Performed by: INTERNAL MEDICINE

## 2024-01-19 PROCEDURE — 82306 VITAMIN D 25 HYDROXY: CPT | Performed by: INTERNAL MEDICINE

## 2024-03-25 ENCOUNTER — APPOINTMENT (OUTPATIENT)
Dept: CT IMAGING | Facility: HOSPITAL | Age: 79
End: 2024-03-25
Payer: MEDICARE

## 2024-03-25 ENCOUNTER — HOSPITAL ENCOUNTER (EMERGENCY)
Facility: HOSPITAL | Age: 79
Discharge: NURSING, REHAB OR LONG-TERM CARE | End: 2024-03-25
Attending: STUDENT IN AN ORGANIZED HEALTH CARE EDUCATION/TRAINING PROGRAM | Admitting: STUDENT IN AN ORGANIZED HEALTH CARE EDUCATION/TRAINING PROGRAM
Payer: MEDICARE

## 2024-03-25 VITALS
SYSTOLIC BLOOD PRESSURE: 136 MMHG | OXYGEN SATURATION: 94 % | TEMPERATURE: 98.4 F | BODY MASS INDEX: 33.74 KG/M2 | HEART RATE: 92 BPM | DIASTOLIC BLOOD PRESSURE: 108 MMHG | HEIGHT: 67 IN | RESPIRATION RATE: 12 BRPM | WEIGHT: 215 LBS
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
## 2024-03-25 DIAGNOSIS — S09.90XA INJURY OF HEAD, INITIAL ENCOUNTER: Primary | ICD-10-CM

## 2024-03-25 PROCEDURE — 99284 EMERGENCY DEPT VISIT MOD MDM: CPT

## 2024-03-25 PROCEDURE — 70450 CT HEAD/BRAIN W/O DYE: CPT | Performed by: RADIOLOGY

## 2024-03-25 PROCEDURE — 70450 CT HEAD/BRAIN W/O DYE: CPT

## 2024-03-25 NOTE — ED NOTES
Spoke with house and they said they were sending a truck to take pt back to [facility]

## 2024-03-25 NOTE — ED NOTES
Tried calling report to [facility] at this time but no answer at this time. Will try again.

## 2024-03-25 NOTE — ED PROVIDER NOTES
Subjective   History of Present Illness  78-year-old female presents from local or nursing home with chief complaint of fall.  Apparently patient did hit her head.  This is history from EMS as well as from nursing home.  Patient does have known history of dementia        Review of Systems   Unable to perform ROS: Dementia       Past Medical History:   Diagnosis Date    Arthritis     Asthma     Chronic headaches 10/31/2018    Degenerative disc disease, lumbar     Dementia     E coli bacteremia 11/2019    Not ESBL    Fibromyalgia     GERD (gastroesophageal reflux disease)     Hyperlipidemia     Hypertension     Mixed stress and urge urinary incontinence 1/22/2020    MRSA pneumonia 12/18/2017    ENE (obstructive sleep apnea)     Noncompliant with BiPAP/CPAP    Osteoporosis     Type 2 diabetes mellitus     UTI due to extended-spectrum beta lactamase (ESBL) producing Escherichia coli 2021    In both July and November of 2021       Allergies   Allergen Reactions    Biaxin [Clarithromycin] Other (See Comments)     unknown       Past Surgical History:   Procedure Laterality Date    APPENDECTOMY      CARDIAC CATHETERIZATION N/A 10/30/2018    Procedure: Left Heart Cath;  Surgeon: Arturo Inman MD;  Location: [facility] CATH INVASIVE LOCATION;  Service: Cardiology    ESOPHAGEAL DILATATION      TOTAL SHOULDER REPLACEMENT Bilateral     TUBAL ABDOMINAL LIGATION         Family History   Problem Relation Age of Onset    Breast cancer Sister        Social History     Socioeconomic History    Marital status:    Tobacco Use    Smoking status: Former    Smokeless tobacco: Never    Tobacco comments:     She quit at least 30 years ago.   Substance and Sexual Activity    Alcohol use: No    Drug use: No    Sexual activity: Defer           Objective   Physical Exam  Vitals and nursing note reviewed.   Constitutional:       General: She is not in acute distress.     Appearance: She is well-developed. She is not diaphoretic.   HENT:       Head: Normocephalic and atraumatic.      Comments: No obvious external injury of the head.  No bleeding.     Right Ear: External ear normal.      Left Ear: External ear normal.      Nose: Nose normal.   Eyes:      Conjunctiva/sclera: Conjunctivae normal.   Neck:      Vascular: No JVD.      Trachea: No tracheal deviation.   Cardiovascular:      Rate and Rhythm: Normal rate.      Heart sounds: No murmur heard.  Pulmonary:      Effort: Pulmonary effort is normal. No respiratory distress.      Breath sounds: No wheezing.   Abdominal:      Palpations: Abdomen is soft.      Tenderness: There is no abdominal tenderness.   Musculoskeletal:         General: No deformity.      Cervical back: Normal range of motion and neck supple.   Skin:     General: Skin is warm and dry.      Coloration: Skin is not pale.      Findings: No erythema or rash.   Neurological:      Mental Status: She is alert. Mental status is at baseline.      Cranial Nerves: No cranial nerve deficit.   Psychiatric:         Behavior: Behavior normal.         Thought Content: Thought content normal.         Procedures           ED Course  ED Course as of 03/25/24 0458   Mon Mar 25, 2024   0455 CT head rad interpreted:  1. No acute intracranial process.  2. Volume loss with white matter changes in a pattern suggesting chronic  small vessel ischemic disease.   [RB]      ED Course User Index  [RB] Roe Cordova II, PA                                             Medical Decision Making  78-year-old female with chief complaint of fall at nursing home, patient did hit her head.    Problems Addressed:  Injury of head, initial encounter: acute illness or injury     Details: CT is negative, physical exam is benign.  Patient be discharged back to nursing home.    Amount and/or Complexity of Data Reviewed  Radiology: ordered. Decision-making details documented in ED Course.        Final diagnoses:   Injury of head, initial encounter   Fall at nursing home, initial  encounter       ED Disposition  ED Disposition       ED Disposition   Discharge    Condition   Stable    Comment   --               Skinny Meehan MD  [address] [facility] [address]  [phone]    Schedule an appointment as soon as possible for a visit            Medication List      No changes were made to your prescriptions during this visit.            Roe Cordova II, PA  03/25/24 8745

## 2024-04-18 ENCOUNTER — LAB REQUISITION (OUTPATIENT)
Dept: LAB | Facility: HOSPITAL | Age: 79
End: 2024-04-18
Payer: MEDICARE

## 2024-04-18 DIAGNOSIS — Z01.89 ENCOUNTER FOR OTHER SPECIFIED SPECIAL EXAMINATIONS: ICD-10-CM

## 2024-04-18 LAB
ALBUMIN SERPL-MCNC: 3.5 G/DL (ref 3.5–5.2)
ALBUMIN/GLOB SERPL: 1.1 G/DL
ALP SERPL-CCNC: 116 U/L (ref 39–117)
ALT SERPL W P-5'-P-CCNC: 12 U/L (ref 1–33)
ANION GAP SERPL CALCULATED.3IONS-SCNC: 10.7 MMOL/L (ref 5–15)
AST SERPL-CCNC: 13 U/L (ref 1–32)
BILIRUB SERPL-MCNC: 0.3 MG/DL (ref 0–1.2)
BUN SERPL-MCNC: 14 MG/DL (ref 8–23)
BUN/CREAT SERPL: 17.7 (ref 7–25)
CALCIUM SPEC-SCNC: 9.1 MG/DL (ref 8.6–10.5)
CHLORIDE SERPL-SCNC: 101 MMOL/L (ref 98–107)
CO2 SERPL-SCNC: 27.3 MMOL/L (ref 22–29)
CREAT SERPL-MCNC: 0.79 MG/DL (ref 0.57–1)
DEPRECATED RDW RBC AUTO: 41.4 FL (ref 37–54)
EGFRCR SERPLBLD CKD-EPI 2021: 76.2 ML/MIN/1.73
ERYTHROCYTE [DISTWIDTH] IN BLOOD BY AUTOMATED COUNT: 11.9 % (ref 12.3–15.4)
GLOBULIN UR ELPH-MCNC: 3.1 GM/DL
GLUCOSE SERPL-MCNC: 179 MG/DL (ref 65–99)
HCT VFR BLD AUTO: 39 % (ref 34–46.6)
HGB BLD-MCNC: 12.9 G/DL (ref 12–15.9)
MCH RBC QN AUTO: 30.9 PG (ref 26.6–33)
MCHC RBC AUTO-ENTMCNC: 33.1 G/DL (ref 31.5–35.7)
MCV RBC AUTO: 93.3 FL (ref 79–97)
PLATELET # BLD AUTO: 234 10*3/MM3 (ref 140–450)
PMV BLD AUTO: 10.6 FL (ref 6–12)
POTASSIUM SERPL-SCNC: 4.1 MMOL/L (ref 3.5–5.2)
PROT SERPL-MCNC: 6.6 G/DL (ref 6–8.5)
RBC # BLD AUTO: 4.18 10*6/MM3 (ref 3.77–5.28)
SODIUM SERPL-SCNC: 139 MMOL/L (ref 136–145)
TSH SERPL DL<=0.05 MIU/L-ACNC: 1.68 UIU/ML (ref 0.27–4.2)
WBC NRBC COR # BLD AUTO: 10.04 10*3/MM3 (ref 3.4–10.8)

## 2024-04-18 PROCEDURE — 85027 COMPLETE CBC AUTOMATED: CPT | Performed by: INTERNAL MEDICINE

## 2024-04-18 PROCEDURE — 82746 ASSAY OF FOLIC ACID SERUM: CPT | Performed by: INTERNAL MEDICINE

## 2024-04-18 PROCEDURE — 80053 COMPREHEN METABOLIC PANEL: CPT | Performed by: INTERNAL MEDICINE

## 2024-04-18 PROCEDURE — 84443 ASSAY THYROID STIM HORMONE: CPT | Performed by: INTERNAL MEDICINE

## 2024-04-18 PROCEDURE — 82607 VITAMIN B-12: CPT | Performed by: INTERNAL MEDICINE

## 2024-04-19 LAB
FOLATE SERPL-MCNC: 9.23 NG/ML (ref 4.78–24.2)
VIT B12 BLD-MCNC: 524 PG/ML (ref 211–946)

## 2024-04-29 ENCOUNTER — LAB REQUISITION (OUTPATIENT)
Dept: LAB | Facility: HOSPITAL | Age: 79
End: 2024-04-29
Payer: MEDICARE

## 2024-04-29 DIAGNOSIS — I10 ESSENTIAL (PRIMARY) HYPERTENSION: ICD-10-CM

## 2024-04-29 LAB
BASOPHILS # BLD AUTO: 0.06 10*3/MM3 (ref 0–0.2)
BASOPHILS NFR BLD AUTO: 0.5 % (ref 0–1.5)
DEPRECATED RDW RBC AUTO: 40.9 FL (ref 37–54)
EOSINOPHIL # BLD AUTO: 0.25 10*3/MM3 (ref 0–0.4)
EOSINOPHIL NFR BLD AUTO: 2.2 % (ref 0.3–6.2)
ERYTHROCYTE [DISTWIDTH] IN BLOOD BY AUTOMATED COUNT: 11.9 % (ref 12.3–15.4)
HCT VFR BLD AUTO: 42.1 % (ref 34–46.6)
HGB BLD-MCNC: 13.5 G/DL (ref 12–15.9)
IMM GRANULOCYTES # BLD AUTO: 0.04 10*3/MM3 (ref 0–0.05)
IMM GRANULOCYTES NFR BLD AUTO: 0.4 % (ref 0–0.5)
LYMPHOCYTES # BLD AUTO: 1.85 10*3/MM3 (ref 0.7–3.1)
LYMPHOCYTES NFR BLD AUTO: 16.4 % (ref 19.6–45.3)
MCH RBC QN AUTO: 30.2 PG (ref 26.6–33)
MCHC RBC AUTO-ENTMCNC: 32.1 G/DL (ref 31.5–35.7)
MCV RBC AUTO: 94.2 FL (ref 79–97)
MONOCYTES # BLD AUTO: 0.96 10*3/MM3 (ref 0.1–0.9)
MONOCYTES NFR BLD AUTO: 8.5 % (ref 5–12)
NEUTROPHILS NFR BLD AUTO: 72 % (ref 42.7–76)
NEUTROPHILS NFR BLD AUTO: 8.15 10*3/MM3 (ref 1.7–7)
NRBC BLD AUTO-RTO: 0 /100 WBC (ref 0–0.2)
PLATELET # BLD AUTO: 255 10*3/MM3 (ref 140–450)
PMV BLD AUTO: 10.5 FL (ref 6–12)
RBC # BLD AUTO: 4.47 10*6/MM3 (ref 3.77–5.28)
WBC NRBC COR # BLD AUTO: 11.31 10*3/MM3 (ref 3.4–10.8)

## 2024-04-29 PROCEDURE — 85025 COMPLETE CBC W/AUTO DIFF WBC: CPT | Performed by: INTERNAL MEDICINE

## 2024-07-10 ENCOUNTER — APPOINTMENT (OUTPATIENT)
Dept: GENERAL RADIOLOGY | Facility: HOSPITAL | Age: 79
End: 2024-07-10
Payer: MEDICARE

## 2024-07-10 ENCOUNTER — HOSPITAL ENCOUNTER (EMERGENCY)
Facility: HOSPITAL | Age: 79
Discharge: HOME | End: 2024-07-11
Attending: EMERGENCY MEDICINE
Payer: MEDICARE

## 2024-07-10 VITALS
BODY MASS INDEX: 33.74 KG/M2 | TEMPERATURE: 98.1 F | HEART RATE: 97 BPM | HEIGHT: 67 IN | RESPIRATION RATE: 18 BRPM | SYSTOLIC BLOOD PRESSURE: 168 MMHG | WEIGHT: 214.95 LBS | OXYGEN SATURATION: 94 % | DIASTOLIC BLOOD PRESSURE: 91 MMHG
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
## 2024-07-10 PROCEDURE — 73502 X-RAY EXAM HIP UNI 2-3 VIEWS: CPT | Performed by: RADIOLOGY

## 2024-07-10 PROCEDURE — 73502 X-RAY EXAM HIP UNI 2-3 VIEWS: CPT

## 2024-07-10 PROCEDURE — 99283 EMERGENCY DEPT VISIT LOW MDM: CPT

## 2024-07-10 NOTE — ED PROVIDER NOTES
Subjective   History of Present Illness  78-year-old female here today for follow-up.  According to the nursing home, the patient had an unwitnessed fall in the bathroom, apparently getting off of the toilet.  The patient states that she lost her balance because she was trying to pick something up.  She does have a history of dementia and not able to give specific details of her fall.  She complained of pain in left hip pain initially in the nursing home, then told EMS that she had bilateral pain, and in the ER states she is not having any hip pain.  She denied any other complaints.  Her past history aside from dementia is remarkable for lumbar degenerative disc disease, fibromyalgia, hyperlipidemia, hypertension, sleep apnea, diabetes      Review of Systems   All other systems reviewed and are negative.      Past Medical History:   Diagnosis Date    Arthritis     Asthma     Chronic headaches 10/31/2018    Degenerative disc disease, lumbar     Dementia     E coli bacteremia 11/2019    Not ESBL    Fibromyalgia     GERD (gastroesophageal reflux disease)     Hyperlipidemia     Hypertension     Mixed stress and urge urinary incontinence 1/22/2020    MRSA pneumonia 12/18/2017    ENE (obstructive sleep apnea)     Noncompliant with BiPAP/CPAP    Osteoporosis     Type 2 diabetes mellitus     UTI due to extended-spectrum beta lactamase (ESBL) producing Escherichia coli 2021    In both July and November of 2021       Allergies   Allergen Reactions    Biaxin [Clarithromycin] Other (See Comments)     unknown       Past Surgical History:   Procedure Laterality Date    APPENDECTOMY      CARDIAC CATHETERIZATION N/A 10/30/2018    Procedure: Left Heart Cath;  Surgeon: Arturo Inman MD;  Location: [facility] INVASIVE LOCATION;  Service: Cardiology    ESOPHAGEAL DILATATION      TOTAL SHOULDER REPLACEMENT Bilateral     TUBAL ABDOMINAL LIGATION         Family History   Problem Relation Age of Onset    Breast cancer Sister         Social History     Socioeconomic History    Marital status:    Tobacco Use    Smoking status: Former    Smokeless tobacco: Never    Tobacco comments:     She quit at least 30 years ago.   Substance and Sexual Activity    Alcohol use: No    Drug use: No    Sexual activity: Defer           Objective   Physical Exam  Vitals and nursing note reviewed. Exam conducted with a chaperone present.   Constitutional:       Appearance: Normal appearance. She is normal weight.   HENT:      Head: Normocephalic and atraumatic.   Cardiovascular:      Rate and Rhythm: Normal rate and regular rhythm.      Heart sounds: Normal heart sounds. No murmur heard.     No friction rub. No gallop.   Pulmonary:      Effort: Pulmonary effort is normal. No respiratory distress.      Breath sounds: Normal breath sounds. No wheezing, rhonchi or rales.   Chest:      Chest wall: No tenderness.   Abdominal:      General: Abdomen is flat. Bowel sounds are normal. There is no distension.      Palpations: Abdomen is soft.      Tenderness: There is no abdominal tenderness.   Musculoskeletal:      Right hip: Tenderness (Mild) present. No deformity. Decreased range of motion (Hip flexion to 45 degrees).      Left hip: Tenderness (Mild) present. No deformity. Decreased range of motion (Hip flexion to 45 degrees).   Skin:     General: Skin is warm and dry.   Neurological:      General: No focal deficit present.      Mental Status: She is alert and oriented to person, place, and time.   Psychiatric:         Mood and Affect: Mood normal.         Behavior: Behavior normal.         Procedures           ED Course  ED Course as of 07/11/24 0206   Wed Jul 10, 2024   1904 Patient care transferred to Dr. Turner at change of shift.    Galo Rodgers MD  7:04 PM EDT   [BC]      ED Course User Index  [BC] Galo Rodgers MD                                             Medical Decision Making  Problems Addressed:  Fall at nursing home, initial encounter:  complicated acute illness or injury    Amount and/or Complexity of Data Reviewed  Radiology: ordered.        Final diagnoses:   Fall at nursing home, initial encounter       ED Disposition  ED Disposition       ED Disposition   Discharge    Condition   Stable    Comment   --               Skinny Meehan MD  [address] [facility] [address] [phone]               Medication List      No changes were made to your prescriptions during this visit.          List      No changes were made to your prescriptions during this visit.            Rika Turner DO  07/28/24 0981

## 2024-07-11 RX ORDER — ONDANSETRON 4 MG/1
4 TABLET, ORALLY DISINTEGRATING ORAL ONCE
Status: DISCONTINUED | OUTPATIENT
Start: 2024-07-11 | End: 2024-07-11

## 2024-07-25 NOTE — PROGRESS NOTES
"Subjective   Domonique Cummings is a 78 y.o. female who presents today for initial evaluation     Chief Complaint:  Dementia, aggressive behaviors    History of Present Illness:   History of Present Illness  This is a new patient, here today for evaluation, accompanied by Rona (staff at [facility]). She is a resident at [facility], Nurse Jenelle Davidson (by phone), states she has been resident since 10/31/2018.  Patient is    Previous marriages: 1  Children: More than 5 children:  3 daughters and 3 sons.  Currently living with resident at [facility]  Education: High school diploma  Employment: she states she has worked outside the home in the past, she states she is \"working now\".     Here today with staff states she has aggressive behavior, dementia  Previous psychiatric diagnosis  Dementia  Symptoms began approximately several  years  Previous psychiatric hospitalizations: Yes, several years ago  Previous self harm behaviors: No  Risky behaviors None  Current medications include Xanax 0.25 mg AM, 0.50 mg PM, Cymbalta 60 mg BID, Gabapentin 300 mg TID, Norco 5 mg, every 8 hours prn,   Denies any thoughts of self harm, staff members denies self harm, but she has history of aggressive behaviors at the facility  Appetite is good.  Hallucinations: Staff report she has reported  auditory hallucinations  and has visual hallucinations  Self-harm: Patient denies thoughts of self-harm.  Alcohol use: no  Drug use: no  Marijuana use: no     Chronic health issues, no acute physical or medical issues today.    Details: Alert and oriented x 2, person and situation.  Jenelle Davidson RN at [facility] reports (by phone) that she is often aggressive and combative, has been occurring for a while, but has worsened recently within past few months. She sleeps during the daytime and stays up all night. She is nonambulatory. She is \"seeing her family and other people in her room and in the hallway\" " (hallucinations). Domonique is poor historian, she is unable to provide clear medical and psychiatric history due to mental status..        The following portions of the patient's history were reviewed and updated as appropriate: allergies, current medications, past family history, past medical history, past social history, past surgical history and problem list.      Past Medical History:  Past Medical History:   Diagnosis Date    Arthritis     Asthma     Chronic headaches 10/31/2018    Degenerative disc disease, lumbar     Dementia     E coli bacteremia 11/2019    Not ESBL    Fibromyalgia     GERD (gastroesophageal reflux disease)     Hyperlipidemia     Hypertension     Mixed stress and urge urinary incontinence 1/22/2020    MRSA pneumonia 12/18/2017    ENE (obstructive sleep apnea)     Noncompliant with BiPAP/CPAP    Osteoporosis     Type 2 diabetes mellitus     UTI due to extended-spectrum beta lactamase (ESBL) producing Escherichia coli 2021    In both July and November of 2021       Social History:  Social History     Socioeconomic History    Marital status:    Tobacco Use    Smoking status: Former    Smokeless tobacco: Never    Tobacco comments:     She quit at least 30 years ago.   Substance and Sexual Activity    Alcohol use: No    Drug use: No    Sexual activity: Defer       Family History:  Family History   Problem Relation Age of Onset    Breast cancer Sister        Past Surgical History:  Past Surgical History:   Procedure Laterality Date    APPENDECTOMY      CARDIAC CATHETERIZATION N/A 10/30/2018    Procedure: Left Heart Cath;  Surgeon: Arturo Inman MD;  Location: [facility] CATH INVASIVE LOCATION;  Service: Cardiology    ESOPHAGEAL DILATATION      TOTAL SHOULDER REPLACEMENT Bilateral     TUBAL ABDOMINAL LIGATION         Problem List:  Patient Active Problem List   Diagnosis    Chronic headaches    Essential hypertension    Mixed stress and urge urinary incontinence    Pneumonia of both lower lobes  due to infectious organism       Allergy:   Allergies   Allergen Reactions    Biaxin [Clarithromycin] Other (See Comments)     unknown        Current Medications:   Current Outpatient Medications   Medication Sig Dispense Refill    albuterol sulfate  (90 Base) MCG/ACT inhaler Inhale 2 puffs Every 8 (Eight) Hours As Needed for Wheezing.      ALPRAZolam (XANAX) 0.25 MG tablet       ALPRAZolam (XANAX) 0.5 MG tablet       amLODIPine (NORVASC) 2.5 MG tablet       amoxicillin-clavulanate (AUGMENTIN) 875-125 MG per tablet Take 1 tablet by mouth 2 (Two) Times a Day. 13 tablet 0    atorvastatin (LIPITOR) 20 MG tablet       calcium carbonate (TUMS) 500 MG chewable tablet Chew 1 tablet Every 6 (Six) Hours As Needed for Indigestion or Heartburn.      cycloSPORINE (RESTASIS) 0.05 % ophthalmic emulsion Administer 1 drop to both eyes 2 (Two) Times a Day As Needed (dry eye).      Diclofenac Sodium (VOLTAREN) 1 % gel gel Apply 4 g topically to the appropriate area as directed 4 (Four) Times a Day As Needed (Arthritis Pain). Shoulders and Knees      DULoxetine (CYMBALTA) 60 MG capsule Take 1 capsule by mouth Daily.      flecainide (TAMBOCOR) 50 MG tablet Take 1 tablet by mouth Every 12 (Twelve) Hours. 60 tablet 3    guaiFENesin (MUCINEX) 600 MG 12 hr tablet Take 1 tablet by mouth 2 (Two) Times a Day.      HYDROcodone-acetaminophen (NORCO) 5-325 MG per tablet       insulin lispro (HumaLOG) 100 UNIT/ML injection Inject 5 Units under the skin into the appropriate area as directed 3 (Three) Times a Day Before Meals.  12    lactulose (CHRONULAC) 10 GM/15ML solution Take 30 mL by mouth Daily.      lactulose (CHRONULAC) 10 GM/15ML solution Take 30 mL by mouth 2 (Two) Times a Day As Needed (Constipation).      levETIRAcetam (KEPPRA) 500 MG tablet Take 1 tablet by mouth Every Night.      Multiple Vitamins-Minerals (ICAPS AREDS 2) capsule Take 1 capsule by mouth 2 (Two) Times a Day.      multivitamin (DAILY DEBORAH) tablet tablet Take 1  tablet by mouth Daily.      nitrofurantoin, macrocrystal-monohydrate, (Macrobid) 100 MG capsule Take 1 capsule by mouth Daily. 56 capsule 3    nystatin (MYCOSTATIN) 776787 UNIT/GM powder Apply 1 Application topically to the appropriate area as directed 2 (Two) Times a Day. Apply underneath abdominal folds.      omeprazole (priLOSEC) 40 MG capsule Take 1 capsule by mouth Daily.      Probiotic Product (RISAQUAD-2) capsule capsule Take 1 capsule by mouth Daily.      Psyllium (REGULOID) 400 MG capsule Take 2 capsules by mouth Daily.      sodium chloride 0.65 % nasal spray 1 spray into the nostril(s) as directed by provider Every 1 (One) Hour As Needed for Congestion.      vitamin D3 125 MCG (5000 UT) capsule capsule Take 1 capsule by mouth Daily.      Brexpiprazole (Rexulti) 0.5 MG tablet Take 0.5 mg by mouth Every Night. 30 tablet 0    doxepin (SINEquan) 10 MG capsule Take 10 mg by mouth Every Night. (Patient not taking: Reported on 8/1/2024)      fluticasone (FLONASE) 50 MCG/ACT nasal spray 1 spray into the nostril(s) as directed by provider Daily. (Patient not taking: Reported on 8/1/2024)      gabapentin (NEURONTIN) 400 MG capsule Take 1 capsule by mouth 3 (Three) Times a Day for 3 days. 9 capsule 0    loratadine (CLARITIN) 10 MG tablet Take 10 mg by mouth Daily. (Patient not taking: Reported on 8/1/2024)      OLANZapine (zyPREXA) 2.5 MG tablet Take 1 tablet by mouth Every Night. (Patient not taking: Reported on 8/1/2024) 30 tablet 1    oxybutynin XL (DITROPAN-XL) 10 MG 24 hr tablet Take 10 mg by mouth Daily. (Patient not taking: Reported on 8/1/2024)       No current facility-administered medications for this visit.       Review of Symptoms:    Review of Systems   Musculoskeletal:  Positive for arthralgias and gait problem.   Psychiatric/Behavioral:  Positive for agitation, behavioral problems, dysphoric mood, hallucinations, sleep disturbance, depressed mood and stress. Negative for self-injury and suicidal ideas.  "The patient is nervous/anxious.    All other systems reviewed and are negative.      Objective   Physical Exam:   Blood pressure 130/78, pulse 85, height 170.2 cm (67.01\"), weight 92.9 kg (204 lb 12.8 oz), SpO2 94%.  Body mass index is 32.07 kg/m².  Facility age limit for growth %mi is 20 years.    08/01/24    MENTAL STATUS EXAM   General Appearance:  Cleanly groomed and dressed  Eye Contact:  Good eye contact  Attitude:  Cooperative  Motor Activity:  Other  Other Comment:  In wheelchair, sitting upright  Speech:  Minimal spontaneity  Language:  Spontaneous  Mood and affect:  Normal, pleasant  Hopelessness:  Denies  Thought Process:  Goal-directed  Hallucinations:  Auditory and visual  Suicidal Ideations:  Not present  Homicidal Ideation:  Not present  Sensorium:  Alert  Orientation:  Person and situation  Insight:  Other  Other Comment:  Impaired  Judgement:  Other  Other Comment:  Impaired  Reliability:  Other  Other Comment:  Impaired  Impulse Control:  Impaired      PHQ-Score Total:  PHQ-9 Total Score:      Lab Results:   No visits with results within 1 Month(s) from this visit.   Latest known visit with results is:   Lab Requisition on 04/29/2024   Component Date Value Ref Range Status    WBC 04/29/2024 11.31 (H)  3.40 - 10.80 10*3/mm3 Final    RBC 04/29/2024 4.47  3.77 - 5.28 10*6/mm3 Final    Hemoglobin 04/29/2024 13.5  12.0 - 15.9 g/dL Final    Hematocrit 04/29/2024 42.1  34.0 - 46.6 % Final    MCV 04/29/2024 94.2  79.0 - 97.0 fL Final    MCH 04/29/2024 30.2  26.6 - 33.0 pg Final    MCHC 04/29/2024 32.1  31.5 - 35.7 g/dL Final    RDW 04/29/2024 11.9 (L)  12.3 - 15.4 % Final    RDW-SD 04/29/2024 40.9  37.0 - 54.0 fl Final    MPV 04/29/2024 10.5  6.0 - 12.0 fL Final    Platelets 04/29/2024 255  140 - 450 10*3/mm3 Final    Neutrophil % 04/29/2024 72.0  42.7 - 76.0 % Final    Lymphocyte % 04/29/2024 16.4 (L)  19.6 - 45.3 % Final    Monocyte % 04/29/2024 8.5  5.0 - 12.0 % Final    Eosinophil % 04/29/2024 2.2  " 0.3 - 6.2 % Final    Basophil % 04/29/2024 0.5  0.0 - 1.5 % Final    Immature Grans % 04/29/2024 0.4  0.0 - 0.5 % Final    Neutrophils, Absolute 04/29/2024 8.15 (H)  1.70 - 7.00 10*3/mm3 Final    Lymphocytes, Absolute 04/29/2024 1.85  0.70 - 3.10 10*3/mm3 Final    Monocytes, Absolute 04/29/2024 0.96 (H)  0.10 - 0.90 10*3/mm3 Final    Eosinophils, Absolute 04/29/2024 0.25  0.00 - 0.40 10*3/mm3 Final    Basophils, Absolute 04/29/2024 0.06  0.00 - 0.20 10*3/mm3 Final    Immature Grans, Absolute 04/29/2024 0.04  0.00 - 0.05 10*3/mm3 Final    nRBC 04/29/2024 0.0  0.0 - 0.2 /100 WBC Final       Assessment & Plan   Problems Addressed this Visit    None  Visit Diagnoses       Severe vascular dementia with psychotic disturbance    -  Primary    Relevant Medications    ALPRAZolam (XANAX) 0.25 MG tablet    ALPRAZolam (XANAX) 0.5 MG tablet    Brexpiprazole (Rexulti) 0.5 MG tablet    Generalized anxiety disorder        Relevant Medications    ALPRAZolam (XANAX) 0.25 MG tablet    ALPRAZolam (XANAX) 0.5 MG tablet    Brexpiprazole (Rexulti) 0.5 MG tablet    Medication management        Relevant Medications    Brexpiprazole (Rexulti) 0.5 MG tablet          Diagnoses         Codes Comments    Severe vascular dementia with psychotic disturbance    -  Primary ICD-10-CM: F01.C2  ICD-9-CM: 290.40     Generalized anxiety disorder     ICD-10-CM: F41.1  ICD-9-CM: 300.02     Medication management     ICD-10-CM: Z79.899  ICD-9-CM: V58.69             Social History     Tobacco Use   Smoking Status Former   Smokeless Tobacco Never   Tobacco Comments    She quit at least 30 years ago.       HEBERT reviewed and appropriate. Patient counseled on use of controlled substances.     -The benefits of a healthy diet and exercise were discussed with patient, especially the positive effects they have on mental health. Patient encouraged to consider lifestyle modification regarding  diet and exercise patterns to maximize results of mental health  treatment.  -Reviewed previous available documentation  -Reviewed most recent available labs     -Lengthy discussion with patient on the possible side effects of antipsychotic medications including increased cholesterol, increased blood sugar, and possibility of weight gain.  Also discussed the need to monitor lab work associated with this.  The risk of muscle movement disorders with this class of medication was also discussed.      Visit Diagnoses:    ICD-10-CM ICD-9-CM   1. Severe vascular dementia with psychotic disturbance  F01.C2 290.40   2. Generalized anxiety disorder  F41.1 300.02   3. Medication management  Z79.899 V58.69         TREATMENT PLAN/GOALS: Continue supportive psychotherapy efforts and medications as indicated. Treatment and medication options discussed during today's visit. Patient acknowledged and verbally consented to continue with current treatment plan and was educated on the importance of compliance with treatment and follow-up appointments.    MEDICATION ISSUES:  Discussed medication options and treatment plan of prescribed medication as well as the risks, benefits, and side effects including potential falls, possible impaired driving and metabolic adversities among others. Patient is agreeable to call the office with any worsening of symptoms or onset of side effects. Patient is agreeable to call 911 or go to the nearest ER should he/she begin having SI/HI.     MEDS ORDERED DURING VISIT:  New Medications Ordered This Visit   Medications    Brexpiprazole (Rexulti) 0.5 MG tablet     Sig: Take 0.5 mg by mouth Every Night.     Dispense:  30 tablet     Refill:  0     -Continue current medications including Xanax 0.25 mg Am, Xanax 0.5 mg PM, Cymbalta 60 mg BID, Gabapentin 300 mg TID, Norco 5 mg, every 8 hours prn,  -Add Rexulti 0.5 mg tablet, take 1 tablet at night for dementia.  Return in about 1 month (around 9/1/2024).         Prognosis: Guarded dependent on medication/follow up and  treatment plan compliance.  Functionality: pt showing improvements in important areas of daily functioning.     Short-term goals: Patient will adhere to medication regimen and note continued improvement in symptoms over the next 3 months.   Long-term goals: Patient will be adherent to medication management and psychotherapy with continued improvement in symptoms over the next 6 months.    Interactive Complexity Yes If yes, due to:  Has other individuals legally responsible for their care staff member Barry Fernandez        This document has been electronically signed by DARREN Waggoner   August 1, 2024 14:31 EDT    Part of this note may be an electronic transcription/translation of spoken language to printed text using the Dragon Dictation System.

## 2024-08-01 ENCOUNTER — OFFICE VISIT (OUTPATIENT)
Dept: PSYCHIATRY | Facility: CLINIC | Age: 79
End: 2024-08-01
Payer: MEDICARE

## 2024-08-01 VITALS
OXYGEN SATURATION: 94 % | DIASTOLIC BLOOD PRESSURE: 78 MMHG | WEIGHT: 204.8 LBS | HEIGHT: 67 IN | HEART RATE: 85 BPM | SYSTOLIC BLOOD PRESSURE: 130 MMHG | BODY MASS INDEX: 32.15 KG/M2

## 2024-08-01 DIAGNOSIS — Z79.899 MEDICATION MANAGEMENT: ICD-10-CM

## 2024-08-01 DIAGNOSIS — F01.C2 SEVERE VASCULAR DEMENTIA WITH PSYCHOTIC DISTURBANCE: Primary | ICD-10-CM

## 2024-08-01 DIAGNOSIS — F41.1 GENERALIZED ANXIETY DISORDER: ICD-10-CM

## 2024-08-01 RX ORDER — ALPRAZOLAM 0.25 MG/1
TABLET ORAL
COMMUNITY
Start: 2024-07-08

## 2024-08-01 RX ORDER — AMLODIPINE BESYLATE 2.5 MG/1
TABLET ORAL
COMMUNITY
Start: 2024-07-19

## 2024-08-01 RX ORDER — HYDROCODONE BITARTRATE AND ACETAMINOPHEN 5; 325 MG/1; MG/1
TABLET ORAL
COMMUNITY
Start: 2024-07-29

## 2024-08-01 RX ORDER — ALPRAZOLAM 0.5 MG/1
TABLET ORAL
COMMUNITY
Start: 2024-07-24

## 2024-08-01 RX ORDER — ATORVASTATIN CALCIUM 20 MG/1
TABLET, FILM COATED ORAL
COMMUNITY
Start: 2024-07-29

## 2024-08-01 RX ORDER — BREXPIPRAZOLE 0.5 MG/1
1 TABLET ORAL NIGHTLY
Qty: 30 TABLET | Refills: 0 | Status: SHIPPED | OUTPATIENT
Start: 2024-08-01

## 2024-09-14 ENCOUNTER — APPOINTMENT (OUTPATIENT)
Dept: CT IMAGING | Facility: HOSPITAL | Age: 79
End: 2024-09-14
Payer: MEDICARE

## 2024-09-14 ENCOUNTER — APPOINTMENT (OUTPATIENT)
Dept: GENERAL RADIOLOGY | Facility: HOSPITAL | Age: 79
End: 2024-09-14
Payer: MEDICARE

## 2024-09-14 ENCOUNTER — HOSPITAL ENCOUNTER (EMERGENCY)
Facility: HOSPITAL | Age: 79
Discharge: HOME | End: 2024-09-14
Attending: STUDENT IN AN ORGANIZED HEALTH CARE EDUCATION/TRAINING PROGRAM
Payer: MEDICARE

## 2024-09-14 VITALS
DIASTOLIC BLOOD PRESSURE: 71 MMHG | HEART RATE: 93 BPM | HEIGHT: 67 IN | BODY MASS INDEX: 32.15 KG/M2 | WEIGHT: 204.81 LBS | RESPIRATION RATE: 18 BRPM | SYSTOLIC BLOOD PRESSURE: 135 MMHG | TEMPERATURE: 97.8 F | OXYGEN SATURATION: 100 %

## 2024-09-14 DIAGNOSIS — S46.911A STRAIN OF RIGHT SHOULDER, INITIAL ENCOUNTER: ICD-10-CM
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
## 2024-09-14 PROCEDURE — 73030 X-RAY EXAM OF SHOULDER: CPT | Performed by: RADIOLOGY

## 2024-09-14 PROCEDURE — 72125 CT NECK SPINE W/O DYE: CPT

## 2024-09-14 PROCEDURE — 99284 EMERGENCY DEPT VISIT MOD MDM: CPT

## 2024-09-14 PROCEDURE — 70450 CT HEAD/BRAIN W/O DYE: CPT

## 2024-09-14 PROCEDURE — 72125 CT NECK SPINE W/O DYE: CPT | Performed by: RADIOLOGY

## 2024-09-14 PROCEDURE — 73030 X-RAY EXAM OF SHOULDER: CPT

## 2024-09-14 PROCEDURE — 70450 CT HEAD/BRAIN W/O DYE: CPT | Performed by: RADIOLOGY

## 2024-09-14 NOTE — ED NOTES
Report received from ZOË Washington. Pt resting on stretcher with eyes closed appearing to be asleep. Pt RR even and unlabored, skin WPD. Pt VSS, O2 remains in place at 2L NC. Pt easily arouses to verbal stimuli. Pt brief noted to be soiled, Pt cleaned, clean brief and gown applied. Pt pending transport to [facility]. Report called per ZOË Washington. Safety measures remain WDL.

## 2024-09-15 NOTE — ED PROVIDER NOTES
Subjective   History of Present Illness  Patient is a 78-year-old female with past medical history positive for arthritis, asthma, chronic headaches, degenerative disc disease, dementia, fibromyalgia, GERD, hyperlipidemia, hypertension, osteoporosis, type 2 diabetes presenting to the ER from the [facility] nursing home due to an unwitnessed fall.  Patient fell out of bed.  Patient complains of left head pain and right shoulder pain.  Patient has no additional injuries.  Patient denies LOC.  Patient's nursing home staff did not see the fall.  Patient nursing home staff reports she was found on her left side.  Patient has no hip pain.    History provided by:  Patient and nursing home   used: No        Review of Systems   Constitutional: Negative.  Negative for fever.   HENT: Negative.     Respiratory: Negative.     Cardiovascular: Negative.  Negative for chest pain.   Gastrointestinal: Negative.  Negative for abdominal pain.   Endocrine: Negative.    Genitourinary: Negative.  Negative for dysuria.   Musculoskeletal:  Positive for arthralgias.        Right shoulder pain   Skin: Negative.    Neurological: Negative.  Positive for headaches.   Psychiatric/Behavioral: Negative.     All other systems reviewed and are negative.      Past Medical History:   Diagnosis Date    Arthritis     Asthma     Chronic headaches 10/31/2018    Degenerative disc disease, lumbar     Dementia     E coli bacteremia 11/2019    Not ESBL    Fibromyalgia     GERD (gastroesophageal reflux disease)     Hyperlipidemia     Hypertension     Mixed stress and urge urinary incontinence 1/22/2020    MRSA pneumonia 12/18/2017    ENE (obstructive sleep apnea)     Noncompliant with BiPAP/CPAP    Osteoporosis     Type 2 diabetes mellitus     UTI due to extended-spectrum beta lactamase (ESBL) producing Escherichia coli 2021    In both July and November of 2021       Allergies   Allergen Reactions    Biaxin [Clarithromycin] Other (See  Comments)     unknown       Past Surgical History:   Procedure Laterality Date    APPENDECTOMY      CARDIAC CATHETERIZATION N/A 10/30/2018    Procedure: Left Heart Cath;  Surgeon: Arturo Inman MD;  Location: [facility] INVASIVE LOCATION;  Service: Cardiology    ESOPHAGEAL DILATATION      TOTAL SHOULDER REPLACEMENT Bilateral     TUBAL ABDOMINAL LIGATION         Family History   Problem Relation Age of Onset    Breast cancer Sister        Social History     Socioeconomic History    Marital status:    Tobacco Use    Smoking status: Former    Smokeless tobacco: Never    Tobacco comments:     She quit at least 30 years ago.   Substance and Sexual Activity    Alcohol use: No    Drug use: No    Sexual activity: Defer           Objective   Physical Exam  Vitals and nursing note reviewed.   Constitutional:       General: She is not in acute distress.     Appearance: She is well-developed. She is not diaphoretic.   HENT:      Head: Normocephalic and atraumatic.      Right Ear: External ear normal.      Left Ear: External ear normal.      Nose: Nose normal.   Eyes:      Conjunctiva/sclera: Conjunctivae normal.      Pupils: Pupils are equal, round, and reactive to light.   Neck:      Vascular: No JVD.      Trachea: No tracheal deviation.   Cardiovascular:      Rate and Rhythm: Normal rate and regular rhythm.      Heart sounds: Normal heart sounds. No murmur heard.  Pulmonary:      Effort: Pulmonary effort is normal. No respiratory distress.      Breath sounds: Normal breath sounds. No wheezing.   Abdominal:      General: Bowel sounds are normal.      Palpations: Abdomen is soft.      Tenderness: There is no abdominal tenderness.   Musculoskeletal:         General: Tenderness present. No deformity. Normal range of motion.      Cervical back: Normal range of motion and neck supple.   Skin:     General: Skin is warm and dry.      Coloration: Skin is not pale.      Findings: No erythema or rash.   Neurological:       Mental Status: She is alert. Mental status is at baseline.      Cranial Nerves: No cranial nerve deficit.   Psychiatric:         Behavior: Behavior normal.         Thought Content: Thought content normal.         Procedures       XR Shoulder 2+ View Right   Final Result       1.  Right shoulder arthroplasty with intact components.   2.  No acute fracture or dislocation   3.  No acute foreign body.       This report was finalized on 9/14/2024 6:28 AM by Jeff Johnson MD.          CT Cervical Spine Without Contrast   Final Result       1.  No acute fracture or dislocation.   2.  Mild to moderate degenerative disc disease throughout the cervical   spine.   3.  No chronic compression fracture deformity.   4.  No lytic or blastic lesion.   5.  No apical pneumothorax.           This report was finalized on 9/14/2024 6:30 AM by Jeff Johnson MD.          CT Head Without Contrast   Final Result       1.  Mild to moderate generalized brain volume loss   2.  Mild to moderate chronic small vessel ischemic type changes in the   white matter.   3.  Old lacunar infarcts in the basal ganglia, right greater than left   4.  Air-fluid level in the right maxillary sinus and mild to moderate   mucosal thickening in the paranasal sinuses consistent with acute on   chronic sinusitis.   5.  No acute intracranial hemorrhage, mass, infarct, or edema.   6.  No fracture or foreign body.       This report was finalized on 9/14/2024 6:26 AM by Jeff Johnson MD.                ED Course                                             Medical Decision Making  Patient is a 78-year-old female with past medical history positive for arthritis, asthma, chronic headaches, degenerative disc disease, dementia, fibromyalgia, GERD, hyperlipidemia, hypertension, osteoporosis, type 2 diabetes [facility] to an unwitnessed [facility].  Patient fell out of bed.  Patient complains of left head pain and right shoulder pain.   Patient has no additional injuries.  Patient denies LOC.  Patient's nursing home staff did not see the fall.  Patient nursing home staff reports she was found on her left side.  Patient has no hip pain.    Advised patient to return to the ER with new or worsening symptoms.  Advised patient to follow-up with PCP.  Patient verbalized understanding and agrees.  Vital signs are stable at discharge.  Patient is in no acute distress.    Problems Addressed:  Fall, initial encounter: complicated acute illness or injury  Strain of right shoulder, initial encounter: complicated acute illness or injury    Amount and/or Complexity of Data Reviewed  Radiology: ordered.        Final diagnoses:   Fall, initial encounter   Strain of right shoulder, initial encounter       ED Disposition  ED Disposition       ED Disposition   Discharge    Condition   Stable    Comment   --               Skinny Meehan MD  [address] [facility] [address] [phone]    Schedule an appointment as soon as possible for a visit            Medication List      No changes were made to your prescriptions during this visit.            Nicole Baker, APRN  09/15/24 0129

## 2024-09-30 ENCOUNTER — APPOINTMENT (OUTPATIENT)
Dept: CT IMAGING | Facility: HOSPITAL | Age: 79
End: 2024-09-30
Payer: MEDICARE

## 2024-09-30 ENCOUNTER — HOSPITAL ENCOUNTER (EMERGENCY)
Facility: HOSPITAL | Age: 79
Discharge: HOME | End: 2024-10-01
Attending: STUDENT IN AN ORGANIZED HEALTH CARE EDUCATION/TRAINING PROGRAM | Admitting: STUDENT IN AN ORGANIZED HEALTH CARE EDUCATION/TRAINING PROGRAM
Payer: MEDICARE

## 2024-09-30 DIAGNOSIS — S00.03XA CONTUSION OF SCALP, INITIAL ENCOUNTER: Primary | ICD-10-CM
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
## 2024-09-30 PROCEDURE — 72125 CT NECK SPINE W/O DYE: CPT

## 2024-09-30 PROCEDURE — 99284 EMERGENCY DEPT VISIT MOD MDM: CPT

## 2024-09-30 PROCEDURE — 70450 CT HEAD/BRAIN W/O DYE: CPT

## 2024-10-01 VITALS
OXYGEN SATURATION: 95 % | BODY MASS INDEX: 32.78 KG/M2 | HEIGHT: 66 IN | HEART RATE: 105 BPM | SYSTOLIC BLOOD PRESSURE: 152 MMHG | WEIGHT: 204 LBS | TEMPERATURE: 97.7 F | RESPIRATION RATE: 20 BRPM | DIASTOLIC BLOOD PRESSURE: 105 MMHG

## 2024-10-01 LAB
QT INTERVAL: 390 MS
QTC INTERVAL: 497 MS

## 2024-10-01 PROCEDURE — 93005 ELECTROCARDIOGRAM TRACING: CPT | Performed by: PHYSICIAN ASSISTANT

## 2024-10-01 PROCEDURE — 70450 CT HEAD/BRAIN W/O DYE: CPT | Performed by: RADIOLOGY

## 2024-10-01 PROCEDURE — 72125 CT NECK SPINE W/O DYE: CPT | Performed by: RADIOLOGY

## 2024-10-01 NOTE — ED PROVIDER NOTES
Subjective   History of Present Illness  78-year-old female with known dementia presents to the ER chief complaint of falling out of a chair.  Nursing home reports small knot to the scalp.  No loss of consciousness.        Review of Systems   Unable to perform ROS: Dementia       Past Medical History:   Diagnosis Date    Arthritis     Asthma     Chronic headaches 10/31/2018    Degenerative disc disease, lumbar     Dementia     E coli bacteremia 11/2019    Not ESBL    Fibromyalgia     GERD (gastroesophageal reflux disease)     Hyperlipidemia     Hypertension     Mixed stress and urge urinary incontinence 1/22/2020    MRSA pneumonia 12/18/2017    ENE (obstructive sleep apnea)     Noncompliant with BiPAP/CPAP    Osteoporosis     Type 2 diabetes mellitus     UTI due to extended-spectrum beta lactamase (ESBL) producing Escherichia coli 2021    In both July and November of 2021       Allergies   Allergen Reactions    Biaxin [Clarithromycin] Other (See Comments)     unknown       Past Surgical History:   Procedure Laterality Date    APPENDECTOMY      CARDIAC CATHETERIZATION N/A 10/30/2018    Procedure: Left Heart Cath;  Surgeon: Arturo Inman MD;  Location: [facility] CATH INVASIVE LOCATION;  Service: Cardiology    ESOPHAGEAL DILATATION      TOTAL SHOULDER REPLACEMENT Bilateral     TUBAL ABDOMINAL LIGATION         Family History   Problem Relation Age of Onset    Breast cancer Sister        Social History     Socioeconomic History    Marital status:    Tobacco Use    Smoking status: Former    Smokeless tobacco: Never    Tobacco comments:     She quit at least 30 years ago.   Substance and Sexual Activity    Alcohol use: No    Drug use: No    Sexual activity: Defer           Objective   Physical Exam  Vitals and nursing note reviewed.   Constitutional:       General: She is not in acute distress.     Appearance: She is well-developed. She is not diaphoretic.   HENT:      Head: Normocephalic and atraumatic.      Right  Ear: External ear normal.      Left Ear: External ear normal.      Nose: Nose normal.   Eyes:      Extraocular Movements: Extraocular movements intact.      Conjunctiva/sclera: Conjunctivae normal.      Pupils: Pupils are equal, round, and reactive to light.   Neck:      Vascular: No JVD.      Trachea: No tracheal deviation.   Cardiovascular:      Rate and Rhythm: Normal rate.      Heart sounds: No murmur heard.  Pulmonary:      Effort: Pulmonary effort is normal. No respiratory distress.      Breath sounds: No wheezing.   Abdominal:      Palpations: Abdomen is soft.      Tenderness: There is no abdominal tenderness.   Musculoskeletal:         General: No deformity. Normal range of motion.      Cervical back: Normal range of motion and neck supple.   Skin:     General: Skin is warm and dry.      Coloration: Skin is not pale.      Findings: No erythema or rash.   Neurological:      Mental Status: She is alert and oriented to person, place, and time. Mental status is at baseline.      Cranial Nerves: No cranial nerve deficit.   Psychiatric:         Behavior: Behavior normal.         Thought Content: Thought content normal.         Procedures           ED Course  ED Course as of 10/01/24 0246   e Oct 01, 2024   0154 EKG notes sinus rhythm.  88 bpm.  No acute ST ovation.  QTc 497.  Artifact noted. [SF]   0200 CT cervcial spine rad interpreted:  1. Degenerative changes as seen previously.  2. No acute fracture or traumatic listhesis.      [RB]   0200 CT head rad interpreted:  1. No acute intracranial process.  2. Volume loss with white matter changes in a pattern suggesting chronic  small vessel ischemic disease.   [RB]      ED Course User Index  [RB] Roe Cordova II, PA  [SF] Shankar Ellsworth, DO                                             Medical Decision Making  78-year-old female from local nursing home with known dementia reports mechanical fall out of chair.  No loss of consciousness.  CT imaging was essentially  unremarkable.  Neck and head.  Continue current medications.    Amount and/or Complexity of Data Reviewed  Radiology: ordered. Decision-making details documented in ED Course.  ECG/medicine tests: ordered.        Final diagnoses:   Contusion of scalp, initial encounter   Fall at nursing home, initial encounter       ED Disposition  ED Disposition       ED Disposition   Discharge    Condition   Stable    Comment   --               Skinny Meehan MD  [address] [facility] [address]  [phone]    Schedule an appointment as soon as possible for a visit            Medication List      No changes were made to your prescriptions during this visit.            Roe Cordova II, PA  10/01/24 0246

## 2024-11-28 ENCOUNTER — APPOINTMENT (OUTPATIENT)
Dept: CT IMAGING | Facility: HOSPITAL | Age: 79
End: 2024-11-28
Payer: MEDICARE

## 2024-11-28 ENCOUNTER — HOSPITAL ENCOUNTER (EMERGENCY)
Facility: HOSPITAL | Age: 79
Discharge: HOME | End: 2024-11-29
Attending: STUDENT IN AN ORGANIZED HEALTH CARE EDUCATION/TRAINING PROGRAM
Payer: MEDICARE

## 2024-11-28 ENCOUNTER — APPOINTMENT (OUTPATIENT)
Dept: GENERAL RADIOLOGY | Facility: HOSPITAL | Age: 79
End: 2024-11-28
Payer: MEDICARE

## 2024-11-28 DIAGNOSIS — M25.511 ACUTE PAIN OF RIGHT SHOULDER: ICD-10-CM
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
## 2024-11-28 DIAGNOSIS — S09.90XA INJURY OF HEAD, INITIAL ENCOUNTER: Primary | ICD-10-CM
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
## 2024-11-28 PROCEDURE — 99284 EMERGENCY DEPT VISIT MOD MDM: CPT

## 2024-11-28 PROCEDURE — 72125 CT NECK SPINE W/O DYE: CPT

## 2024-11-28 PROCEDURE — 70450 CT HEAD/BRAIN W/O DYE: CPT

## 2024-11-28 PROCEDURE — 73030 X-RAY EXAM OF SHOULDER: CPT

## 2024-11-29 VITALS
OXYGEN SATURATION: 96 % | TEMPERATURE: 98.4 F | SYSTOLIC BLOOD PRESSURE: 153 MMHG | HEIGHT: 66 IN | WEIGHT: 203.93 LBS | DIASTOLIC BLOOD PRESSURE: 96 MMHG | RESPIRATION RATE: 16 BRPM | HEART RATE: 90 BPM | BODY MASS INDEX: 32.77 KG/M2

## 2024-11-29 PROCEDURE — 73030 X-RAY EXAM OF SHOULDER: CPT | Performed by: RADIOLOGY

## 2024-11-29 PROCEDURE — 70450 CT HEAD/BRAIN W/O DYE: CPT | Performed by: RADIOLOGY

## 2024-11-29 PROCEDURE — 72125 CT NECK SPINE W/O DYE: CPT | Performed by: RADIOLOGY

## 2024-11-29 NOTE — ED PROVIDER NOTES
Subjective   History of Present Illness  79-year-old female presents the ER from local nursing home chief complaint of witnessed fall.  Patient is complaining of headache as well as right shoulder pain.  Previous history of shoulder repair.        Review of Systems   Constitutional: Negative.  Negative for fever.   Respiratory: Negative.     Cardiovascular: Negative.  Negative for chest pain.   Gastrointestinal: Negative.  Negative for abdominal pain.   Endocrine: Negative.    Genitourinary: Negative.  Negative for dysuria.   Musculoskeletal:  Positive for arthralgias.   Skin: Negative.    Neurological:  Positive for headaches.   Psychiatric/Behavioral: Negative.     All other systems reviewed and are negative.      Past Medical History:   Diagnosis Date    Arthritis     Asthma     Chronic headaches 10/31/2018    Degenerative disc disease, lumbar     Dementia     E coli bacteremia 11/2019    Not ESBL    Fibromyalgia     GERD (gastroesophageal reflux disease)     Hyperlipidemia     Hypertension     Mixed stress and urge urinary incontinence 1/22/2020    MRSA pneumonia 12/18/2017    ENE (obstructive sleep apnea)     Noncompliant with BiPAP/CPAP    Osteoporosis     Type 2 diabetes mellitus     UTI due to extended-spectrum beta lactamase (ESBL) producing Escherichia coli 2021    In both July and November of 2021       Allergies   Allergen Reactions    Biaxin [Clarithromycin] Other (See Comments)     unknown       Past Surgical History:   Procedure Laterality Date    APPENDECTOMY      CARDIAC CATHETERIZATION N/A 10/30/2018    Procedure: Left Heart Cath;  Surgeon: Arturo Inman MD;  Location: [facility] CATH INVASIVE LOCATION;  Service: Cardiology    ESOPHAGEAL DILATATION      TOTAL SHOULDER REPLACEMENT Bilateral     TUBAL ABDOMINAL LIGATION         Family History   Problem Relation Age of Onset    Breast cancer Sister        Social History     Socioeconomic History    Marital status:    Tobacco Use    Smoking  status: Former    Smokeless tobacco: Never    Tobacco comments:     She quit at least 30 years ago.   Substance and Sexual Activity    Alcohol use: No    Drug use: No    Sexual activity: Defer           Objective   Physical Exam  Vitals and nursing note reviewed.   Constitutional:       General: She is not in acute distress.     Appearance: She is well-developed. She is not diaphoretic.   HENT:      Head: Normocephalic and atraumatic.      Right Ear: External ear normal.      Left Ear: External ear normal.      Nose: Nose normal.   Eyes:      Conjunctiva/sclera: Conjunctivae normal.   Neck:      Vascular: No JVD.      Trachea: No tracheal deviation.   Cardiovascular:      Rate and Rhythm: Normal rate.      Heart sounds: No murmur heard.  Pulmonary:      Effort: Pulmonary effort is normal. No respiratory distress.      Breath sounds: No wheezing.   Abdominal:      Palpations: Abdomen is soft.      Tenderness: There is no abdominal tenderness.   Musculoskeletal:         General: Tenderness (right shoulder) and signs of injury present. No deformity.      Cervical back: Normal range of motion and neck supple.   Skin:     General: Skin is warm and dry.      Coloration: Skin is not pale.      Findings: No erythema or rash.   Neurological:      Mental Status: She is alert and oriented to person, place, and time.      Cranial Nerves: No cranial nerve deficit.   Psychiatric:         Behavior: Behavior normal.         Thought Content: Thought content normal.         Procedures           ED Course  ED Course as of 11/29/24 0127   Fri Nov 29, 2024   0112 CT head rad interpreted:  1. No acute intracranial process.  2. Volume loss with white matter changes in a pattern suggesting chronic  small vessel ischemic disease.   [RB]   0112 CT cervical spine rad interpreted:  1. No acute fracture or traumatic listhesis.  2. Multilevel disc related degenerative changes and facet arthrosis.      [RB]   0113 XR shoulder rad interperted:  1.  Right shoulder arthroplasty as above. A bony fragment along the  medial margin of the joint is of uncertain chronicity. No fracture is  identified at the stem of the prosthesis. Correlation with prior imaging  would be helpful to evaluate for change.   [RB]      ED Course User Index  [RB] Roe Cordova II, PA                                                       Medical Decision Making      Final diagnoses:   Injury of head, initial encounter   Acute pain of right shoulder   Fall at nursing home, initial encounter       ED Disposition  ED Disposition       ED Disposition   Discharge    Condition   Stable    Comment   --               Skinny Meehan MD  [address] [id number] [phone]    Schedule an appointment as soon as possible for a visit            Medication List      No changes were made to your prescriptions during this visit.            Roe Cordova II, PA  11/29/24 9571

## 2025-04-02 ENCOUNTER — APPOINTMENT (OUTPATIENT)
Dept: CT IMAGING | Facility: HOSPITAL | Age: 80
End: 2025-04-02
Payer: MEDICARE

## 2025-04-02 ENCOUNTER — APPOINTMENT (OUTPATIENT)
Dept: GENERAL RADIOLOGY | Facility: HOSPITAL | Age: 80
End: 2025-04-02
Payer: MEDICARE

## 2025-04-02 ENCOUNTER — HOSPITAL ENCOUNTER (EMERGENCY)
Facility: HOSPITAL | Age: 80
Discharge: HOME | End: 2025-04-03
Payer: MEDICARE

## 2025-04-02 DIAGNOSIS — M25.511 ACUTE PAIN OF RIGHT SHOULDER: Primary | ICD-10-CM

## 2025-04-02 DIAGNOSIS — M25.561 ACUTE PAIN OF RIGHT KNEE: ICD-10-CM

## 2025-04-02 PROCEDURE — 70450 CT HEAD/BRAIN W/O DYE: CPT

## 2025-04-02 PROCEDURE — 72125 CT NECK SPINE W/O DYE: CPT

## 2025-04-02 PROCEDURE — 73030 X-RAY EXAM OF SHOULDER: CPT | Performed by: RADIOLOGY

## 2025-04-02 PROCEDURE — 70450 CT HEAD/BRAIN W/O DYE: CPT | Performed by: RADIOLOGY

## 2025-04-02 PROCEDURE — 70486 CT MAXILLOFACIAL W/O DYE: CPT

## 2025-04-02 PROCEDURE — 72125 CT NECK SPINE W/O DYE: CPT | Performed by: RADIOLOGY

## 2025-04-02 PROCEDURE — 93005 ELECTROCARDIOGRAM TRACING: CPT | Performed by: PHYSICIAN ASSISTANT

## 2025-04-02 PROCEDURE — 73030 X-RAY EXAM OF SHOULDER: CPT

## 2025-04-02 PROCEDURE — 70486 CT MAXILLOFACIAL W/O DYE: CPT | Performed by: RADIOLOGY

## 2025-04-02 PROCEDURE — 99284 EMERGENCY DEPT VISIT MOD MDM: CPT

## 2025-04-02 PROCEDURE — 93010 ELECTROCARDIOGRAM REPORT: CPT | Performed by: SPECIALIST

## 2025-04-02 PROCEDURE — 73562 X-RAY EXAM OF KNEE 3: CPT | Performed by: RADIOLOGY

## 2025-04-02 PROCEDURE — 73562 X-RAY EXAM OF KNEE 3: CPT

## 2025-04-02 RX ORDER — ACETAMINOPHEN 500 MG
1000 TABLET ORAL ONCE
Status: COMPLETED | OUTPATIENT
Start: 2025-04-02 | End: 2025-04-02

## 2025-04-02 RX ADMIN — ACETAMINOPHEN 1000 MG: 500 TABLET ORAL at 21:46

## 2025-04-03 VITALS
OXYGEN SATURATION: 97 % | BODY MASS INDEX: 36.19 KG/M2 | WEIGHT: 212 LBS | SYSTOLIC BLOOD PRESSURE: 144 MMHG | TEMPERATURE: 97.9 F | RESPIRATION RATE: 14 BRPM | HEART RATE: 90 BPM | HEIGHT: 64 IN | DIASTOLIC BLOOD PRESSURE: 94 MMHG

## 2025-04-03 LAB
QT INTERVAL: 410 MS
QTC INTERVAL: 490 MS

## 2025-04-03 NOTE — ED PROVIDER NOTES
Subjective   History of Present Illness  79-year-old female presents to the ER chief complaint of fall.  Patient presents from local nursing home EMS.  States she is having pain in her right shoulder as well as her right knee.  Patient verbalized hitting her head and her face.  Complaining of some mild neck pain as well.  Does not appear to have any loss of consciousness.        Review of Systems   Constitutional: Negative.  Negative for fever.   HENT: Negative.     Respiratory: Negative.     Cardiovascular: Negative.  Negative for chest pain.   Gastrointestinal: Negative.  Negative for abdominal pain.   Endocrine: Negative.    Genitourinary: Negative.  Negative for dysuria.   Musculoskeletal:  Positive for arthralgias, myalgias and neck pain.   Skin: Negative.    Neurological: Negative.    Psychiatric/Behavioral: Negative.     All other systems reviewed and are negative.      Past Medical History:   Diagnosis Date    Arthritis     Asthma     Chronic headaches 10/31/2018    Degenerative disc disease, lumbar     Dementia     E coli bacteremia 11/2019    Not ESBL    Fibromyalgia     GERD (gastroesophageal reflux disease)     Hyperlipidemia     Hypertension     Mixed stress and urge urinary incontinence 1/22/2020    MRSA pneumonia 12/18/2017    ENE (obstructive sleep apnea)     Noncompliant with BiPAP/CPAP    Osteoporosis     Type 2 diabetes mellitus     UTI due to extended-spectrum beta lactamase (ESBL) producing Escherichia coli 2021    In both July and November of 2021       Allergies   Allergen Reactions    Biaxin [Clarithromycin] Other (See Comments)     unknown       Past Surgical History:   Procedure Laterality Date    APPENDECTOMY      CARDIAC CATHETERIZATION N/A 10/30/2018    Procedure: Left Heart Cath;  Surgeon: Arturo Inman MD;  Location: [facility] CATH INVASIVE LOCATION;  Service: Cardiology    ESOPHAGEAL DILATATION      TOTAL SHOULDER REPLACEMENT Bilateral     TUBAL ABDOMINAL LIGATION         Family  History   Problem Relation Age of Onset    Breast cancer Sister        Social History     Socioeconomic History    Marital status:    Tobacco Use    Smoking status: Former    Smokeless tobacco: Never    Tobacco comments:     She quit at least 30 years ago.   Substance and Sexual Activity    Alcohol use: No    Drug use: No    Sexual activity: Defer           Objective   Physical Exam  Vitals and nursing note reviewed.   Constitutional:       General: She is not in acute distress.     Appearance: She is well-developed. She is not diaphoretic.   HENT:      Head: Normocephalic and atraumatic.      Right Ear: External ear normal.      Left Ear: External ear normal.      Nose: Nose normal.   Eyes:      Conjunctiva/sclera: Conjunctivae normal.   Neck:      Vascular: No JVD.      Trachea: No tracheal deviation.   Cardiovascular:      Rate and Rhythm: Normal rate.      Heart sounds: No murmur heard.  Pulmonary:      Effort: Pulmonary effort is normal. No respiratory distress.      Breath sounds: No wheezing.   Abdominal:      Palpations: Abdomen is soft.      Tenderness: There is no abdominal tenderness.   Musculoskeletal:         General: Tenderness and signs of injury present. No deformity.      Cervical back: Normal range of motion and neck supple.      Comments: Tenderness to palpation of the right shoulder.  There is also localized tenderness to palpation of the right knee.  No deformity noted.  Patient does have a small abrasion across her nasal bridge.   Skin:     General: Skin is warm and dry.      Coloration: Skin is not pale.      Findings: No erythema or rash.   Neurological:      Mental Status: She is alert and oriented to person, place, and time.      Cranial Nerves: No cranial nerve deficit.   Psychiatric:         Behavior: Behavior normal.         Thought Content: Thought content normal.         Procedures           ED Course  ED Course as of 04/02/25 2313 Wed Apr 02, 2025 2116 ECG 12 Lead Rhythm  Change  Personal EKG interpretation:  Heart rate 86, MI 2 time, first-degree AV block, no ST elevation or ST depression or T wave inversions.  No STEMI.  Electronically signed by Zia Boyce MD, 04/02/25, 9:16 PM EDT.   [JUAN]   2310 XR knee rad interpreted:  Moderate degenerative changes. No acute osseous abnormality evident.  Soft tissue swelling.      [RB]   2310 XR shoulder rad inteprreted:  No acute osseous abnormality. [RB]   2310 CT facial bones rad interpreted:  No acute fracture. [RB]   2310 CT cervical spine rad interpreted:  No acute fracture. [RB]   2311 CT head rad intepreted:  No acute intracranial process. [RB]      ED Course User Index  [JUAN] Zia Boyce MD  [RB] Roe Cordova II, PA                                                       Medical Decision Making  Amount and/or Complexity of Data Reviewed  Radiology: ordered.  ECG/medicine tests: ordered. Decision-making details documented in ED Course.    Risk  OTC drugs.        Final diagnoses:   Acute pain of right shoulder   Acute pain of right knee       ED Disposition  ED Disposition       ED Disposition   Discharge    Condition   Stable    Comment   --               Skinny Meehan MD  [address] [id number]  [phone]    Schedule an appointment as soon as possible for a visit            Medication List      No changes were made to your prescriptions during this visit.            Roe Cordova II, PA  04/02/25 2102

## 2025-04-17 ENCOUNTER — LAB REQUISITION (OUTPATIENT)
Dept: LAB | Facility: HOSPITAL | Age: 80
End: 2025-04-17
Payer: MEDICARE

## 2025-04-17 DIAGNOSIS — R05.9 COUGH, UNSPECIFIED: ICD-10-CM

## 2025-04-17 LAB
B PARAPERT DNA SPEC QL NAA+PROBE: NOT DETECTED
B PERT DNA SPEC QL NAA+PROBE: NOT DETECTED
C PNEUM DNA NPH QL NAA+NON-PROBE: NOT DETECTED
FLUAV SUBTYP SPEC NAA+PROBE: NOT DETECTED
FLUBV RNA ISLT QL NAA+PROBE: NOT DETECTED
HADV DNA SPEC NAA+PROBE: NOT DETECTED
HCOV 229E RNA SPEC QL NAA+PROBE: NOT DETECTED
HCOV HKU1 RNA SPEC QL NAA+PROBE: NOT DETECTED
HCOV NL63 RNA SPEC QL NAA+PROBE: NOT DETECTED
HCOV OC43 RNA SPEC QL NAA+PROBE: NOT DETECTED
HMPV RNA NPH QL NAA+NON-PROBE: NOT DETECTED
HPIV1 RNA ISLT QL NAA+PROBE: NOT DETECTED
HPIV2 RNA SPEC QL NAA+PROBE: NOT DETECTED
HPIV3 RNA NPH QL NAA+PROBE: NOT DETECTED
HPIV4 P GENE NPH QL NAA+PROBE: NOT DETECTED
M PNEUMO IGG SER IA-ACNC: NOT DETECTED
RHINOVIRUS RNA SPEC NAA+PROBE: DETECTED
RSV RNA NPH QL NAA+NON-PROBE: NOT DETECTED
SARS-COV-2 RNA RESP QL NAA+PROBE: NOT DETECTED

## 2025-04-17 PROCEDURE — 0202U NFCT DS 22 TRGT SARS-COV-2: CPT | Performed by: INTERNAL MEDICINE

## (undated) DEVICE — MYNXGRIP 5F: Brand: MYNXGRIP

## (undated) DEVICE — CANNULA,OXY,ADULT,SUPER SOFT,W/14'TUB,UC: Brand: MEDLINE INDUSTRIES, INC.

## (undated) DEVICE — PK CATH CARD 70

## (undated) DEVICE — A2000 MULTI-USE SYRINGE KIT, P/N 701277-003KIT CONTENTS: 100ML CONTRAST RESERVOIR AND TUBING WITH CONTRAST SPIKE AND CLAMP: Brand: A2000 MULTI-USE SYRINGE KIT

## (undated) DEVICE — ST INF PRI SMRTSTE 20DRP 2VLV 24ML 117

## (undated) DEVICE — MODEL AT P65, P/N 701554-001KIT CONTENTS: HAND CONTROLLER, 3-WAY HIGH-PRESSURE STOPCOCK WITH ROTATING END AND PREMIUM HIGH-PRESSURE TUBING: Brand: ANGIOTOUCH® KIT

## (undated) DEVICE — SHEATH INTRO SUPERSHEATH JWIRE .035 5F 11CM

## (undated) DEVICE — CATH F5 INF 3DRC 100CM: Brand: INFINITI

## (undated) DEVICE — ADULT DISPOSABLE SINGLE-PATIENT USE PULSE OXIMETER SENSOR: Brand: NONIN

## (undated) DEVICE — DRSNG SURESITE WNDW 4X4.5

## (undated) DEVICE — GW MOVE CORE .035 145CM

## (undated) DEVICE — ST EXT IV SMARTSITE 2VLV SP M LL 5ML IV1

## (undated) DEVICE — CATH F5 INF PIG145 110CM 6SH: Brand: INFINITI

## (undated) DEVICE — CATH F5 INF JL 4 100CM: Brand: INFINITI